# Patient Record
Sex: FEMALE | Race: AMERICAN INDIAN OR ALASKA NATIVE | HISPANIC OR LATINO | Employment: OTHER | URBAN - NONMETROPOLITAN AREA
[De-identification: names, ages, dates, MRNs, and addresses within clinical notes are randomized per-mention and may not be internally consistent; named-entity substitution may affect disease eponyms.]

---

## 2018-05-30 DIAGNOSIS — R42 DIZZINESS AND GIDDINESS: Primary | ICD-10-CM

## 2018-05-31 ENCOUNTER — HOSPITAL ENCOUNTER (OUTPATIENT)
Dept: REHABILITATION | Age: 68
Discharge: STILL ADMITTED OR ADMINISTRATIVE | End: 2018-05-31
Attending: INTERNAL MEDICINE

## 2018-05-31 DIAGNOSIS — R42 DIZZINESS AND GIDDINESS: ICD-10-CM

## 2018-05-31 PROCEDURE — G8982 BODY POS GOAL STATUS: HCPCS | Performed by: HEARING INSTRUMENT SPECIALIST

## 2018-05-31 PROCEDURE — 97112 NEUROMUSCULAR REEDUCATION: CPT | Performed by: HEARING INSTRUMENT SPECIALIST

## 2018-05-31 PROCEDURE — G8981 BODY POS CURRENT STATUS: HCPCS | Performed by: HEARING INSTRUMENT SPECIALIST

## 2018-05-31 PROCEDURE — 10004173 HB COUNTER-THERAPY VISIT PT: Performed by: HEARING INSTRUMENT SPECIALIST

## 2018-05-31 PROCEDURE — 97161 PT EVAL LOW COMPLEX 20 MIN: CPT | Performed by: HEARING INSTRUMENT SPECIALIST

## 2018-06-07 ENCOUNTER — HOSPITAL ENCOUNTER (OUTPATIENT)
Dept: REHABILITATION | Age: 68
Discharge: STILL ADMITTED OR ADMINISTRATIVE | End: 2018-06-07

## 2018-06-07 PROCEDURE — 10004173 HB COUNTER-THERAPY VISIT PT: Performed by: HEARING INSTRUMENT SPECIALIST

## 2018-06-07 PROCEDURE — 97112 NEUROMUSCULAR REEDUCATION: CPT | Performed by: HEARING INSTRUMENT SPECIALIST

## 2018-06-13 ENCOUNTER — HOSPITAL ENCOUNTER (OUTPATIENT)
Dept: REHABILITATION | Age: 68
Discharge: STILL ADMITTED OR ADMINISTRATIVE | End: 2018-06-13

## 2018-06-13 PROCEDURE — 10004173 HB COUNTER-THERAPY VISIT PT: Performed by: HEARING INSTRUMENT SPECIALIST

## 2018-06-13 PROCEDURE — 97112 NEUROMUSCULAR REEDUCATION: CPT | Performed by: HEARING INSTRUMENT SPECIALIST

## 2018-06-28 ENCOUNTER — HOSPITAL ENCOUNTER (OUTPATIENT)
Dept: REHABILITATION | Age: 68
Discharge: STILL ADMITTED OR ADMINISTRATIVE | End: 2018-06-28

## 2018-06-28 PROCEDURE — 10004173 HB COUNTER-THERAPY VISIT PT: Performed by: HEARING INSTRUMENT SPECIALIST

## 2018-06-28 PROCEDURE — 97112 NEUROMUSCULAR REEDUCATION: CPT | Performed by: HEARING INSTRUMENT SPECIALIST

## 2018-07-02 ENCOUNTER — HOSPITAL ENCOUNTER (OUTPATIENT)
Dept: REHABILITATION | Age: 68
Discharge: STILL ADMITTED OR ADMINISTRATIVE | End: 2018-07-02

## 2018-07-02 PROCEDURE — G8978 MOBILITY CURRENT STATUS: HCPCS | Performed by: HEARING INSTRUMENT SPECIALIST

## 2018-07-02 PROCEDURE — G8979 MOBILITY GOAL STATUS: HCPCS | Performed by: HEARING INSTRUMENT SPECIALIST

## 2018-07-02 PROCEDURE — 10004173 HB COUNTER-THERAPY VISIT PT: Performed by: HEARING INSTRUMENT SPECIALIST

## 2018-07-02 PROCEDURE — 97112 NEUROMUSCULAR REEDUCATION: CPT | Performed by: HEARING INSTRUMENT SPECIALIST

## 2018-07-02 PROCEDURE — 97530 THERAPEUTIC ACTIVITIES: CPT | Performed by: HEARING INSTRUMENT SPECIALIST

## 2018-07-10 ENCOUNTER — HOSPITAL ENCOUNTER (OUTPATIENT)
Dept: REHABILITATION | Age: 68
Discharge: STILL ADMITTED OR ADMINISTRATIVE | End: 2018-07-10

## 2018-07-10 PROCEDURE — 10004173 HB COUNTER-THERAPY VISIT PT: Performed by: HEARING INSTRUMENT SPECIALIST

## 2018-07-10 PROCEDURE — 97112 NEUROMUSCULAR REEDUCATION: CPT | Performed by: HEARING INSTRUMENT SPECIALIST

## 2018-07-17 ENCOUNTER — APPOINTMENT (OUTPATIENT)
Dept: REHABILITATION | Age: 68
End: 2018-07-17

## 2018-07-19 ENCOUNTER — HOSPITAL ENCOUNTER (OUTPATIENT)
Dept: REHABILITATION | Age: 68
Discharge: STILL ADMITTED OR ADMINISTRATIVE | End: 2018-07-19
Attending: INTERNAL MEDICINE

## 2018-07-19 PROCEDURE — 10004173 HB COUNTER-THERAPY VISIT PT: Performed by: HEARING INSTRUMENT SPECIALIST

## 2018-07-19 PROCEDURE — 97530 THERAPEUTIC ACTIVITIES: CPT | Performed by: HEARING INSTRUMENT SPECIALIST

## 2019-08-27 ENCOUNTER — WALK IN (OUTPATIENT)
Dept: URGENT CARE | Age: 69
End: 2019-08-27

## 2019-08-27 VITALS
HEART RATE: 95 BPM | WEIGHT: 149.5 LBS | OXYGEN SATURATION: 98 % | TEMPERATURE: 98.4 F | RESPIRATION RATE: 16 BRPM | HEIGHT: 60 IN | DIASTOLIC BLOOD PRESSURE: 58 MMHG | BODY MASS INDEX: 29.35 KG/M2 | SYSTOLIC BLOOD PRESSURE: 128 MMHG

## 2019-08-27 DIAGNOSIS — B86 SCABIES: Primary | ICD-10-CM

## 2019-08-27 PROCEDURE — 99203 OFFICE O/P NEW LOW 30 MIN: CPT | Performed by: NURSE PRACTITIONER

## 2019-08-27 RX ORDER — ROSUVASTATIN CALCIUM 20 MG/1
20 TABLET, COATED ORAL DAILY
Status: ON HOLD | COMMUNITY
End: 2019-10-01 | Stop reason: ALTCHOICE

## 2019-08-27 RX ORDER — SIMVASTATIN 20 MG
20 TABLET ORAL EVERY EVENING
COMMUNITY
Start: 2019-03-01

## 2019-08-27 RX ORDER — PERMETHRIN 50 MG/G
CREAM TOPICAL
Qty: 60 G | Refills: 1 | Status: SHIPPED | OUTPATIENT
Start: 2019-08-27 | End: 2019-09-10

## 2019-08-27 ASSESSMENT — ENCOUNTER SYMPTOMS
COLOR CHANGE: 0
RESPIRATORY NEGATIVE: 1
GASTROINTESTINAL NEGATIVE: 1
CONSTITUTIONAL NEGATIVE: 1
ACTIVITY CHANGE: 0
FATIGUE: 0
APPETITE CHANGE: 0
WOUND: 1
FEVER: 0
CHILLS: 0

## 2019-09-30 ENCOUNTER — APPOINTMENT (OUTPATIENT)
Dept: CT IMAGING | Age: 69
End: 2019-09-30
Attending: EMERGENCY MEDICINE

## 2019-09-30 ENCOUNTER — WALK IN (OUTPATIENT)
Dept: URGENT CARE | Age: 69
End: 2019-09-30

## 2019-09-30 ENCOUNTER — IMAGING SERVICES (OUTPATIENT)
Dept: GENERAL RADIOLOGY | Age: 69
End: 2019-09-30
Attending: FAMILY MEDICINE

## 2019-09-30 ENCOUNTER — HOSPITAL ENCOUNTER (EMERGENCY)
Age: 69
Discharge: ANOTHER HOSPITAL | End: 2019-09-30
Attending: EMERGENCY MEDICINE | Admitting: EMERGENCY MEDICINE

## 2019-09-30 VITALS
DIASTOLIC BLOOD PRESSURE: 78 MMHG | TEMPERATURE: 98.6 F | WEIGHT: 147 LBS | OXYGEN SATURATION: 97 % | SYSTOLIC BLOOD PRESSURE: 156 MMHG | BODY MASS INDEX: 28.86 KG/M2 | RESPIRATION RATE: 14 BRPM | HEIGHT: 60 IN | HEART RATE: 107 BPM

## 2019-09-30 VITALS
HEART RATE: 112 BPM | TEMPERATURE: 97.8 F | RESPIRATION RATE: 22 BRPM | WEIGHT: 147 LBS | HEIGHT: 60 IN | SYSTOLIC BLOOD PRESSURE: 161 MMHG | DIASTOLIC BLOOD PRESSURE: 72 MMHG | OXYGEN SATURATION: 95 % | BODY MASS INDEX: 28.86 KG/M2

## 2019-09-30 DIAGNOSIS — E87.5 HYPERKALEMIA: ICD-10-CM

## 2019-09-30 DIAGNOSIS — E87.29 METABOLIC ACIDOSIS, INCREASED ANION GAP: ICD-10-CM

## 2019-09-30 DIAGNOSIS — R73.9 HYPERGLYCEMIA: ICD-10-CM

## 2019-09-30 DIAGNOSIS — R10.84 GENERALIZED ABDOMINAL PAIN: ICD-10-CM

## 2019-09-30 DIAGNOSIS — R10.84 GENERALIZED ABDOMINAL PAIN: Primary | ICD-10-CM

## 2019-09-30 DIAGNOSIS — K85.90 ACUTE PANCREATITIS, UNSPECIFIED COMPLICATION STATUS, UNSPECIFIED PANCREATITIS TYPE: Primary | ICD-10-CM

## 2019-09-30 DIAGNOSIS — D72.825 BANDEMIA: ICD-10-CM

## 2019-09-30 LAB
ALBUMIN SERPL-MCNC: 3.3 G/DL (ref 3.6–5.1)
ALBUMIN SERPL-MCNC: 3.9 G/DL (ref 3.6–5.1)
ALBUMIN/GLOB SERPL: 0.7 {RATIO} (ref 1–2.4)
ALBUMIN/GLOB SERPL: 0.8 {RATIO} (ref 1–2.4)
ALP SERPL-CCNC: 85 UNITS/L (ref 45–117)
ALP SERPL-CCNC: 98 UNITS/L (ref 45–117)
ALT SERPL-CCNC: 83 UNITS/L
ALT SERPL-CCNC: 84 UNITS/L
ANION GAP SERPL CALC-SCNC: 19 MMOL/L (ref 10–20)
ANION GAP SERPL CALC-SCNC: 22 MMOL/L (ref 10–20)
AST SERPL-CCNC: 60 UNITS/L
AST SERPL-CCNC: 73 UNITS/L
BASE DEFICIT BLDV-SCNC: 3 MMOL/L (ref 0–2)
BASO+EOS+MONOS # BLD AUTO: 0.8 K/MCL (ref 0.1–1.1)
BASO+EOS+MONOS NFR BLD AUTO: 4 %
BASOPHILS # BLD AUTO: 0 K/MCL (ref 0–0.3)
BASOPHILS NFR BLD AUTO: 0 %
BILIRUB SERPL-MCNC: 0.7 MG/DL (ref 0.2–1)
BILIRUB SERPL-MCNC: 1 MG/DL (ref 0.2–1)
BILIRUB UR QL STRIP: NEGATIVE
BUN SERPL-MCNC: 26 MG/DL (ref 6–20)
BUN SERPL-MCNC: 26 MG/DL (ref 6–20)
BUN/CREAT SERPL: 33 (ref 7–25)
BUN/CREAT SERPL: 34 (ref 7–25)
CALCIUM SERPL-MCNC: 9.2 MG/DL (ref 8.4–10.2)
CALCIUM SERPL-MCNC: 9.3 MG/DL (ref 8.4–10.2)
CHLORIDE SERPL-SCNC: 94 MMOL/L (ref 98–107)
CHLORIDE SERPL-SCNC: 96 MMOL/L (ref 98–107)
CLARITY, POC: CLEAR
CO2 SERPL-SCNC: 22 MMOL/L (ref 21–32)
CO2 SERPL-SCNC: 23 MMOL/L (ref 21–32)
COLOR UR: YELLOW
CREAT SERPL-MCNC: 0.76 MG/DL (ref 0.51–0.95)
CREAT SERPL-MCNC: 0.8 MG/DL (ref 0.51–0.95)
CROSSMATCH EXPIRE: NORMAL
DIFFERENTIAL METHOD BLD: ABNORMAL
DIFFERENTIAL METHOD BLD: ABNORMAL
EOSINOPHIL # BLD AUTO: 0 K/MCL (ref 0.1–0.5)
EOSINOPHIL NFR SPEC: 0 %
ERYTHROCYTE [DISTWIDTH] IN BLOOD: 13.4 % (ref 11–15)
ERYTHROCYTE [DISTWIDTH] IN BLOOD: 13.8 % (ref 11–15)
FASTING STATUS PATIENT QL REPORTED: ABNORMAL HRS
GLOBULIN SER-MCNC: 4.8 G/DL (ref 2–4)
GLOBULIN SER-MCNC: 5 G/DL (ref 2–4)
GLUCOSE BLDC GLUCOMTR-MCNC: 370 MG/DL (ref 70–99)
GLUCOSE BLDC GLUCOMTR-MCNC: 481 MG/DL (ref 70–99)
GLUCOSE BLDC GLUCOMTR-MCNC: 583 MG/DL (ref 70–99)
GLUCOSE SERPL-MCNC: 610 MG/DL (ref 65–99)
GLUCOSE SERPL-MCNC: 635 MG/DL (ref 65–99)
GLUCOSE UR-MCNC: ABNORMAL MG/DL
HCO3 BLDV-SCNC: 23 MMOL/L (ref 22–28)
HCT VFR BLD CALC: 42.8 % (ref 36–46.5)
HCT VFR BLD CALC: 44.7 % (ref 36–46.5)
HGB BLD-MCNC: 14 G/DL (ref 12–15.5)
HGB BLD-MCNC: 14.6 G/DL (ref 12–15.5)
HOROWITZ INDEX BLDV+IHG-RTO: 41 % (ref 60–80)
IMM GRANULOCYTES # BLD AUTO: 0.2 K/MCL (ref 0–0.2)
IMM GRANULOCYTES NFR BLD: 1 %
KETONES, POC: ABNORMAL
LACTATE SERPL-SCNC: 4.9 MMOL/L (ref 0–2)
LEUKOCYTE ESTERASE UR QL STRIP: NEGATIVE
LIPASE SERPL-CCNC: ABNORMAL UNITS/L (ref 73–393)
LYMPHOCYTES # BLD AUTO: 1.1 K/MCL (ref 1–4)
LYMPHOCYTES # BLD MANUAL: 1.4 K/MCL (ref 1–4)
LYMPHOCYTES NFR BLD AUTO: 5 %
LYMPHOCYTES NFR BLD MANUAL: 6 %
MAGNESIUM SERPL-MCNC: 2.5 MG/DL (ref 1.7–2.4)
MCH RBC QN AUTO: 28.3 PG (ref 26–34)
MCH RBC QN AUTO: 29.3 PG (ref 26–34)
MCHC RBC AUTO-ENTMCNC: 32.7 G/DL (ref 32–36.5)
MCHC RBC AUTO-ENTMCNC: 32.7 G/DL (ref 32–36.5)
MCV RBC AUTO: 86.6 FL (ref 78–100)
MCV RBC AUTO: 89.6 FL (ref 78–100)
MONOCYTES # BLD MANUAL: 1.2 K/MCL (ref 0.3–0.9)
MONOCYTES NFR BLD MANUAL: 5 %
NEUTROPHILS # BLD AUTO: 21.5 K/MCL (ref 1.8–7.7)
NEUTROPHILS # BLD: 21.4 K/MCL (ref 1.8–7.7)
NEUTROPHILS NFR BLD AUTO: 88 %
NEUTROPHILS NFR BLD AUTO: 91 %
NITRITE UR QL STRIP: NEGATIVE
NRBC BLD MANUAL-RTO: 0 /100 WBC
OCCULT BLOOD, POC: ABNORMAL
PCO2 BLDV: 45 MM HG (ref 38–51)
PH BLDV: 7.32 UNITS (ref 7.35–7.45)
PH UR STRIP: 5.5 [PH]
PLATELET # BLD: 290 K/MCL (ref 140–450)
PLATELET # BLD: 294 K/MCL (ref 140–450)
PO2 BLDV: 26 MM HG (ref 35–42)
POTASSIUM SERPL-SCNC: 5.1 MMOL/L (ref 3.4–5.1)
POTASSIUM SERPL-SCNC: 5.7 MMOL/L (ref 3.4–5.1)
PROT SERPL-MCNC: 8.3 G/DL (ref 6.4–8.2)
PROT SERPL-MCNC: 8.7 G/DL (ref 6.4–8.2)
PROT UR-MCNC: ABNORMAL G/DL
RBC # BLD: 4.94 MIL/MCL (ref 4–5.2)
RBC # BLD: 4.99 MIL/MCL (ref 4–5.2)
SAO2 % BLDV: 42 % (ref 60–80)
SODIUM SERPL-SCNC: 130 MMOL/L (ref 135–145)
SODIUM SERPL-SCNC: 135 MMOL/L (ref 135–145)
SP GR UR STRIP: 1.01
TRIGL SERPL-MCNC: 42 MG/DL
TROPONIN I SERPL-MCNC: <0.02 NG/ML
UROBILINOGEN UR-MCNC: 0.2 MG/DL (ref 0–1)
WBC # BLD: 23.4 K/MCL (ref 4.2–11)
WBC # BLD: 24.3 K/MCL (ref 4.2–11)

## 2019-09-30 PROCEDURE — 93010 ELECTROCARDIOGRAM REPORT: CPT | Performed by: INTERNAL MEDICINE

## 2019-09-30 PROCEDURE — 99291 CRITICAL CARE FIRST HOUR: CPT | Performed by: EMERGENCY MEDICINE

## 2019-09-30 PROCEDURE — 10002805 HB CONTRAST AGENT: Performed by: RADIOLOGY

## 2019-09-30 PROCEDURE — 99203 OFFICE O/P NEW LOW 30 MIN: CPT | Performed by: FAMILY MEDICINE

## 2019-09-30 PROCEDURE — 10002807 HB RX 258: Performed by: EMERGENCY MEDICINE

## 2019-09-30 PROCEDURE — 84478 ASSAY OF TRIGLYCERIDES: CPT

## 2019-09-30 PROCEDURE — 10002807 HB RX 258: Performed by: RADIOLOGY

## 2019-09-30 PROCEDURE — 10002807 HB RX 258: Performed by: PHYSICIAN ASSISTANT

## 2019-09-30 PROCEDURE — 80053 COMPREHEN METABOLIC PANEL: CPT

## 2019-09-30 PROCEDURE — 82805 BLOOD GASES W/O2 SATURATION: CPT

## 2019-09-30 PROCEDURE — 99285 EMERGENCY DEPT VISIT HI MDM: CPT | Performed by: NURSE PRACTITIONER

## 2019-09-30 PROCEDURE — 93005 ELECTROCARDIOGRAM TRACING: CPT | Performed by: EMERGENCY MEDICINE

## 2019-09-30 PROCEDURE — 96361 HYDRATE IV INFUSION ADD-ON: CPT | Performed by: NURSE PRACTITIONER

## 2019-09-30 PROCEDURE — 74019 RADEX ABDOMEN 2 VIEWS: CPT | Performed by: RADIOLOGY

## 2019-09-30 PROCEDURE — 85025 COMPLETE CBC W/AUTO DIFF WBC: CPT | Performed by: INTERNAL MEDICINE

## 2019-09-30 PROCEDURE — 80053 COMPREHEN METABOLIC PANEL: CPT | Performed by: INTERNAL MEDICINE

## 2019-09-30 PROCEDURE — 36415 COLL VENOUS BLD VENIPUNCTURE: CPT | Performed by: INTERNAL MEDICINE

## 2019-09-30 PROCEDURE — 36415 COLL VENOUS BLD VENIPUNCTURE: CPT | Performed by: FAMILY MEDICINE

## 2019-09-30 PROCEDURE — 81003 URINALYSIS AUTO W/O SCOPE: CPT | Performed by: FAMILY MEDICINE

## 2019-09-30 PROCEDURE — 82962 GLUCOSE BLOOD TEST: CPT

## 2019-09-30 PROCEDURE — 96375 TX/PRO/DX INJ NEW DRUG ADDON: CPT | Performed by: NURSE PRACTITIONER

## 2019-09-30 PROCEDURE — 83605 ASSAY OF LACTIC ACID: CPT

## 2019-09-30 PROCEDURE — 96376 TX/PRO/DX INJ SAME DRUG ADON: CPT | Performed by: NURSE PRACTITIONER

## 2019-09-30 PROCEDURE — 74176 CT ABD & PELVIS W/O CONTRAST: CPT | Performed by: RADIOLOGY

## 2019-09-30 PROCEDURE — 83735 ASSAY OF MAGNESIUM: CPT

## 2019-09-30 PROCEDURE — 96365 THER/PROPH/DIAG IV INF INIT: CPT | Performed by: NURSE PRACTITIONER

## 2019-09-30 PROCEDURE — 82010 KETONE BODYS QUAN: CPT

## 2019-09-30 PROCEDURE — 87040 BLOOD CULTURE FOR BACTERIA: CPT

## 2019-09-30 PROCEDURE — 10002800 HB RX 250 W HCPCS: Performed by: EMERGENCY MEDICINE

## 2019-09-30 PROCEDURE — 74176 CT ABD & PELVIS W/O CONTRAST: CPT

## 2019-09-30 PROCEDURE — 36415 COLL VENOUS BLD VENIPUNCTURE: CPT

## 2019-09-30 PROCEDURE — 85025 COMPLETE CBC W/AUTO DIFF WBC: CPT

## 2019-09-30 PROCEDURE — 84484 ASSAY OF TROPONIN QUANT: CPT

## 2019-09-30 RX ORDER — DEXTROSE MONOHYDRATE 25 G/50ML
25 INJECTION, SOLUTION INTRAVENOUS PRN
Status: DISCONTINUED | OUTPATIENT
Start: 2019-09-30 | End: 2019-10-01 | Stop reason: HOSPADM

## 2019-09-30 RX ORDER — 0.9 % SODIUM CHLORIDE 0.9 %
2 VIAL (ML) INJECTION EVERY 12 HOURS SCHEDULED
Status: DISCONTINUED | OUTPATIENT
Start: 2019-09-30 | End: 2019-10-01 | Stop reason: HOSPADM

## 2019-09-30 RX ORDER — DEXTROSE MONOHYDRATE 25 G/50ML
25 INJECTION, SOLUTION INTRAVENOUS PRN
Status: DISCONTINUED | OUTPATIENT
Start: 2019-09-30 | End: 2019-09-30 | Stop reason: SDUPTHER

## 2019-09-30 RX ORDER — NICOTINE POLACRILEX 4 MG
15 LOZENGE BUCCAL PRN
Status: DISCONTINUED | OUTPATIENT
Start: 2019-09-30 | End: 2019-09-30 | Stop reason: SDUPTHER

## 2019-09-30 RX ORDER — SODIUM CHLORIDE 9 MG/ML
INJECTION, SOLUTION INTRAVENOUS
Status: DISCONTINUED
Start: 2019-09-30 | End: 2019-10-01 | Stop reason: HOSPADM

## 2019-09-30 RX ORDER — NICOTINE POLACRILEX 4 MG
15 LOZENGE BUCCAL PRN
Status: DISCONTINUED | OUTPATIENT
Start: 2019-09-30 | End: 2019-10-01 | Stop reason: HOSPADM

## 2019-09-30 RX ORDER — ONDANSETRON 2 MG/ML
4 INJECTION INTRAMUSCULAR; INTRAVENOUS ONCE
Status: COMPLETED | OUTPATIENT
Start: 2019-09-30 | End: 2019-09-30

## 2019-09-30 RX ADMIN — SODIUM CHLORIDE 1000 ML: 9 INJECTION, SOLUTION INTRAVENOUS at 22:04

## 2019-09-30 RX ADMIN — ONDANSETRON 4 MG: 2 INJECTION INTRAMUSCULAR; INTRAVENOUS at 20:40

## 2019-09-30 RX ADMIN — INSULIN HUMAN 14 UNITS/HR: 100 INJECTION, SOLUTION PARENTERAL at 22:25

## 2019-09-30 RX ADMIN — FENTANYL CITRATE 50 MCG: 50 INJECTION INTRAMUSCULAR; INTRAVENOUS at 20:42

## 2019-09-30 RX ADMIN — SODIUM CHLORIDE 64 ML: 9 INJECTION, SOLUTION INTRAVENOUS at 20:54

## 2019-09-30 RX ADMIN — IOPAMIDOL 90 ML: 612 INJECTION, SOLUTION INTRAVENOUS at 20:54

## 2019-09-30 RX ADMIN — SODIUM CHLORIDE 1000 ML: 9 INJECTION, SOLUTION INTRAVENOUS at 20:39

## 2019-09-30 RX ADMIN — FENTANYL CITRATE 50 MCG: 50 INJECTION INTRAMUSCULAR; INTRAVENOUS at 22:18

## 2019-09-30 RX ADMIN — HYDROMORPHONE HYDROCHLORIDE 1 MG: 1 INJECTION, SOLUTION INTRAMUSCULAR; INTRAVENOUS; SUBCUTANEOUS at 22:50

## 2019-09-30 SDOH — HEALTH STABILITY: MENTAL HEALTH: HOW OFTEN DO YOU HAVE A DRINK CONTAINING ALCOHOL?: NEVER

## 2019-09-30 ASSESSMENT — PAIN DESCRIPTION - PAIN TYPE: TYPE: ACUTE PAIN

## 2019-09-30 ASSESSMENT — PAIN SCALES - GENERAL
PAINLEVEL_OUTOF10: 10
PAINLEVEL_OUTOF10: 2
PAINLEVEL_OUTOF10: 10
PAINLEVEL_OUTOF10: 10

## 2019-10-01 ENCOUNTER — APPOINTMENT (OUTPATIENT)
Dept: ULTRASOUND IMAGING | Age: 69
DRG: 871 | End: 2019-10-01
Attending: INTERNAL MEDICINE

## 2019-10-01 ENCOUNTER — HOSPITAL ENCOUNTER (INPATIENT)
Age: 69
LOS: 14 days | Discharge: HOME HEALTH | DRG: 871 | End: 2019-10-15
Attending: INTERNAL MEDICINE | Admitting: INTERNAL MEDICINE

## 2019-10-01 ENCOUNTER — APPOINTMENT (OUTPATIENT)
Dept: GENERAL RADIOLOGY | Age: 69
DRG: 871 | End: 2019-10-01
Attending: INTERNAL MEDICINE

## 2019-10-01 DIAGNOSIS — Z74.09 IMPAIRED MOBILITY: ICD-10-CM

## 2019-10-01 DIAGNOSIS — K81.0 ACUTE CHOLECYSTITIS: Primary | ICD-10-CM

## 2019-10-01 DIAGNOSIS — D72.829 LEUKOCYTOSIS, UNSPECIFIED TYPE: ICD-10-CM

## 2019-10-01 DIAGNOSIS — R10.10 UPPER ABDOMINAL PAIN, UNSPECIFIED: ICD-10-CM

## 2019-10-01 DIAGNOSIS — E11.69 TYPE 2 DIABETES MELLITUS WITH OTHER SPECIFIED COMPLICATION, UNSPECIFIED WHETHER LONG TERM INSULIN USE (CMD): ICD-10-CM

## 2019-10-01 LAB
ALBUMIN SERPL-MCNC: 2.6 G/DL (ref 3.6–5.1)
ALBUMIN/GLOB SERPL: 0.7 {RATIO} (ref 1–2.4)
ALP SERPL-CCNC: 74 UNITS/L (ref 45–117)
ALT SERPL-CCNC: 52 UNITS/L
ANION GAP SERPL CALC-SCNC: 16 MMOL/L (ref 10–20)
APPEARANCE UR: ABNORMAL
AST SERPL-CCNC: 35 UNITS/L
ATRIAL RATE (BPM): 107
B-OH-BUTYR SERPL-SCNC: 1.9 MMOL/L (ref 0–0.3)
BACTERIA #/AREA URNS HPF: ABNORMAL /HPF
BASOPHILS # BLD AUTO: 0.1 K/MCL (ref 0–0.3)
BASOPHILS NFR BLD AUTO: 0 %
BILIRUB SERPL-MCNC: 0.5 MG/DL (ref 0.2–1)
BILIRUB UR QL STRIP: NEGATIVE
BUN SERPL-MCNC: 21 MG/DL (ref 6–20)
BUN/CREAT SERPL: 45 (ref 7–25)
CALCIUM SERPL-MCNC: 7.8 MG/DL (ref 8.4–10.2)
CANCER AG19-9 SERPL-ACNC: <2 UNITS/ML (ref 0–35)
CHLORIDE SERPL-SCNC: 108 MMOL/L (ref 98–107)
CO2 SERPL-SCNC: 21 MMOL/L (ref 21–32)
COLOR UR: YELLOW
CREAT SERPL-MCNC: 0.47 MG/DL (ref 0.51–0.95)
DIASTOLIC BLOOD PRESSURE: 75
DIFFERENTIAL METHOD BLD: ABNORMAL
EOSINOPHIL # BLD AUTO: 0 K/MCL (ref 0.1–0.5)
EOSINOPHIL NFR SPEC: 0 %
ERYTHROCYTE [DISTWIDTH] IN BLOOD: 14.1 % (ref 11–15)
ETHANOL SERPL-MCNC: NORMAL MG/DL
GLOBULIN SER-MCNC: 4 G/DL (ref 2–4)
GLUCOSE BLDC GLUCOMTR-MCNC: 120 MG/DL (ref 70–99)
GLUCOSE BLDC GLUCOMTR-MCNC: 142 MG/DL (ref 70–99)
GLUCOSE BLDC GLUCOMTR-MCNC: 167 MG/DL (ref 70–99)
GLUCOSE BLDC GLUCOMTR-MCNC: 173 MG/DL (ref 70–99)
GLUCOSE BLDC GLUCOMTR-MCNC: 180 MG/DL (ref 70–99)
GLUCOSE BLDC GLUCOMTR-MCNC: 182 MG/DL (ref 70–99)
GLUCOSE BLDC GLUCOMTR-MCNC: 194 MG/DL (ref 70–99)
GLUCOSE BLDC GLUCOMTR-MCNC: 198 MG/DL (ref 70–99)
GLUCOSE BLDC GLUCOMTR-MCNC: 203 MG/DL (ref 70–99)
GLUCOSE BLDC GLUCOMTR-MCNC: 227 MG/DL (ref 70–99)
GLUCOSE BLDC GLUCOMTR-MCNC: 248 MG/DL (ref 70–99)
GLUCOSE BLDC GLUCOMTR-MCNC: 323 MG/DL (ref 70–99)
GLUCOSE BLDC GLUCOMTR-MCNC: 340 MG/DL (ref 70–99)
GLUCOSE SERPL-MCNC: 210 MG/DL (ref 65–99)
GLUCOSE UR STRIP-MCNC: >500 MG/DL
HCT VFR BLD CALC: 36 % (ref 36–46.5)
HGB BLD-MCNC: 11.6 G/DL (ref 12–15.5)
HGB UR QL STRIP: NEGATIVE
HYALINE CASTS #/AREA URNS LPF: ABNORMAL /LPF (ref 0–5)
IMM GRANULOCYTES # BLD AUTO: 0.4 K/MCL (ref 0–0.2)
IMM GRANULOCYTES NFR BLD: 2 %
KETONES UR STRIP-MCNC: 20 MG/DL
LACTATE SERPL-SCNC: 1.1 MMOL/L (ref 0–2)
LACTATE SERPL-SCNC: 2.4 MMOL/L (ref 0–2)
LEUKOCYTE ESTERASE UR QL STRIP: NEGATIVE
LIPASE SERPL-CCNC: 3993 UNITS/L (ref 73–393)
LYMPHOCYTES # BLD MANUAL: 1.1 K/MCL (ref 1–4)
LYMPHOCYTES NFR BLD MANUAL: 4 %
MAGNESIUM SERPL-MCNC: 2.2 MG/DL (ref 1.7–2.4)
MCH RBC QN AUTO: 28.8 PG (ref 26–34)
MCHC RBC AUTO-ENTMCNC: 32.2 G/DL (ref 32–36.5)
MCV RBC AUTO: 89.3 FL (ref 78–100)
MONOCYTES # BLD MANUAL: 1 K/MCL (ref 0.3–0.9)
MONOCYTES NFR BLD MANUAL: 4 %
MUCOUS THREADS URNS QL MICRO: PRESENT
NEUTROPHILS # BLD: 24.8 K/MCL (ref 1.8–7.7)
NEUTROPHILS NFR BLD AUTO: 90 %
NITRITE UR QL STRIP: POSITIVE
NRBC BLD MANUAL-RTO: 0 /100 WBC
P AXIS (DEGREES): 63
PH UR STRIP: 6 UNITS (ref 5–7)
PLATELET # BLD: 205 K/MCL (ref 140–450)
POTASSIUM SERPL-SCNC: 4 MMOL/L (ref 3.4–5.1)
PR-INTERVAL (MSEC): 144
PROCALCITONIN SERPL-MCNC: 4.19 NG/ML
PROT SERPL-MCNC: 6.6 G/DL (ref 6.4–8.2)
PROT UR STRIP-MCNC: NEGATIVE MG/DL
QRS-INTERVAL (MSEC): 72
QT-INTERVAL (MSEC): 352
QTC: 470
R AXIS (DEGREES): 89
RBC # BLD: 4.03 MIL/MCL (ref 4–5.2)
RBC #/AREA URNS HPF: ABNORMAL /HPF (ref 0–2)
REPORT TEXT: NORMAL
SODIUM SERPL-SCNC: 141 MMOL/L (ref 135–145)
SP GR UR STRIP: 1.03 (ref 1–1.03)
SPECIMEN SOURCE: ABNORMAL
SQUAMOUS #/AREA URNS HPF: ABNORMAL /HPF (ref 0–5)
SYSTOLIC BLOOD PRESSURE: 164
T AXIS (DEGREES): 73
UROBILINOGEN UR STRIP-MCNC: 0.2 MG/DL (ref 0–1)
VENTRICULAR RATE EKG/MIN (BPM): 107
WBC # BLD: 27.3 K/MCL (ref 4.2–11)
WBC #/AREA URNS HPF: ABNORMAL /HPF (ref 0–5)

## 2019-10-01 PROCEDURE — 36415 COLL VENOUS BLD VENIPUNCTURE: CPT

## 2019-10-01 PROCEDURE — 10002803 HB RX 637: Performed by: NURSE PRACTITIONER

## 2019-10-01 PROCEDURE — 80320 DRUG SCREEN QUANTALCOHOLS: CPT

## 2019-10-01 PROCEDURE — 83735 ASSAY OF MAGNESIUM: CPT

## 2019-10-01 PROCEDURE — 10002801 HB RX 250 W/O HCPCS: Performed by: INTERNAL MEDICINE

## 2019-10-01 PROCEDURE — 82962 GLUCOSE BLOOD TEST: CPT

## 2019-10-01 PROCEDURE — 10002803 HB RX 637: Performed by: INTERNAL MEDICINE

## 2019-10-01 PROCEDURE — 86301 IMMUNOASSAY TUMOR CA 19-9: CPT

## 2019-10-01 PROCEDURE — 87186 SC STD MICRODIL/AGAR DIL: CPT

## 2019-10-01 PROCEDURE — 10002807 HB RX 258: Performed by: PHYSICIAN ASSISTANT

## 2019-10-01 PROCEDURE — 10002800 HB RX 250 W HCPCS: Performed by: INTERNAL MEDICINE

## 2019-10-01 PROCEDURE — 10004651 HB RX, NO CHARGE ITEM: Performed by: INTERNAL MEDICINE

## 2019-10-01 PROCEDURE — 10002800 HB RX 250 W HCPCS: Performed by: PHYSICIAN ASSISTANT

## 2019-10-01 PROCEDURE — 87086 URINE CULTURE/COLONY COUNT: CPT

## 2019-10-01 PROCEDURE — 10002807 HB RX 258: Performed by: INTERNAL MEDICINE

## 2019-10-01 PROCEDURE — 10003445 HB TELEMETRY PER DAY

## 2019-10-01 PROCEDURE — 71045 X-RAY EXAM CHEST 1 VIEW: CPT | Performed by: RADIOLOGY

## 2019-10-01 PROCEDURE — 71045 X-RAY EXAM CHEST 1 VIEW: CPT

## 2019-10-01 PROCEDURE — 84145 PROCALCITONIN (PCT): CPT

## 2019-10-01 PROCEDURE — 99222 1ST HOSP IP/OBS MODERATE 55: CPT | Performed by: INTERNAL MEDICINE

## 2019-10-01 PROCEDURE — 99223 1ST HOSP IP/OBS HIGH 75: CPT | Performed by: INTERNAL MEDICINE

## 2019-10-01 PROCEDURE — 82787 IGG 1 2 3 OR 4 EACH: CPT

## 2019-10-01 PROCEDURE — 87633 RESP VIRUS 12-25 TARGETS: CPT

## 2019-10-01 PROCEDURE — 10004281 HB COUNTER-STAFF TIME PER 15 MIN

## 2019-10-01 PROCEDURE — 80053 COMPREHEN METABOLIC PANEL: CPT

## 2019-10-01 PROCEDURE — 85025 COMPLETE CBC W/AUTO DIFF WBC: CPT

## 2019-10-01 PROCEDURE — 10003585 HB ROOM CHARGE INTERMEDIATE CARE

## 2019-10-01 PROCEDURE — 87088 URINE BACTERIA CULTURE: CPT

## 2019-10-01 PROCEDURE — 81001 URINALYSIS AUTO W/SCOPE: CPT

## 2019-10-01 PROCEDURE — 76705 ECHO EXAM OF ABDOMEN: CPT | Performed by: RADIOLOGY

## 2019-10-01 PROCEDURE — 83605 ASSAY OF LACTIC ACID: CPT

## 2019-10-01 PROCEDURE — 76705 ECHO EXAM OF ABDOMEN: CPT

## 2019-10-01 PROCEDURE — 10002800 HB RX 250 W HCPCS: Performed by: HOSPITALIST

## 2019-10-01 PROCEDURE — 87040 BLOOD CULTURE FOR BACTERIA: CPT

## 2019-10-01 PROCEDURE — 83690 ASSAY OF LIPASE: CPT

## 2019-10-01 RX ORDER — POTASSIUM CHLORIDE 1.5 G/1.58G
20 POWDER, FOR SOLUTION ORAL EVERY 4 HOURS PRN
Status: DISCONTINUED | OUTPATIENT
Start: 2019-10-01 | End: 2019-10-15 | Stop reason: HOSPADM

## 2019-10-01 RX ORDER — 0.9 % SODIUM CHLORIDE 0.9 %
2 VIAL (ML) INJECTION EVERY 12 HOURS SCHEDULED
Status: DISCONTINUED | OUTPATIENT
Start: 2019-10-01 | End: 2019-10-02

## 2019-10-01 RX ORDER — FAMOTIDINE 10 MG/ML
20 INJECTION, SOLUTION INTRAVENOUS EVERY 12 HOURS SCHEDULED
Status: DISCONTINUED | OUTPATIENT
Start: 2019-10-01 | End: 2019-10-03 | Stop reason: ALTCHOICE

## 2019-10-01 RX ORDER — NITROGLYCERIN 0.4 MG/1
0.4 TABLET SUBLINGUAL EVERY 5 MIN PRN
Status: DISCONTINUED | OUTPATIENT
Start: 2019-10-01 | End: 2019-10-15 | Stop reason: HOSPADM

## 2019-10-01 RX ORDER — POTASSIUM CHLORIDE 20 MEQ/1
20 TABLET, EXTENDED RELEASE ORAL EVERY 4 HOURS PRN
Status: DISCONTINUED | OUTPATIENT
Start: 2019-10-01 | End: 2019-10-15 | Stop reason: HOSPADM

## 2019-10-01 RX ORDER — MAGNESIUM SULFATE 1 G/100ML
1 INJECTION INTRAVENOUS DAILY PRN
Status: DISCONTINUED | OUTPATIENT
Start: 2019-10-01 | End: 2019-10-15 | Stop reason: HOSPADM

## 2019-10-01 RX ORDER — DEXTROSE MONOHYDRATE 50 MG/ML
INJECTION, SOLUTION INTRAVENOUS CONTINUOUS PRN
Status: DISCONTINUED | OUTPATIENT
Start: 2019-10-01 | End: 2019-10-11

## 2019-10-01 RX ORDER — DEXTROSE MONOHYDRATE 25 G/50ML
25 INJECTION, SOLUTION INTRAVENOUS PRN
Status: DISCONTINUED | OUTPATIENT
Start: 2019-10-01 | End: 2019-10-15 | Stop reason: HOSPADM

## 2019-10-01 RX ORDER — INSULIN LISPRO 100 [IU]/ML
4 INJECTION, SOLUTION INTRAVENOUS; SUBCUTANEOUS
Status: DISCONTINUED | OUTPATIENT
Start: 2019-10-01 | End: 2019-10-01

## 2019-10-01 RX ORDER — LOSARTAN POTASSIUM 50 MG/1
50 TABLET ORAL DAILY
Status: DISCONTINUED | OUTPATIENT
Start: 2019-10-01 | End: 2019-10-10

## 2019-10-01 RX ORDER — AMOXICILLIN 250 MG
2 CAPSULE ORAL DAILY PRN
Status: DISCONTINUED | OUTPATIENT
Start: 2019-10-01 | End: 2019-10-15 | Stop reason: HOSPADM

## 2019-10-01 RX ORDER — PERMETHRIN 50 MG/G
CREAM TOPICAL
Status: ON HOLD | COMMUNITY
End: 2019-10-11 | Stop reason: HOSPADM

## 2019-10-01 RX ORDER — NICOTINE POLACRILEX 4 MG
15 LOZENGE BUCCAL PRN
Status: DISCONTINUED | OUTPATIENT
Start: 2019-10-01 | End: 2019-10-15 | Stop reason: HOSPADM

## 2019-10-01 RX ORDER — NICOTINE POLACRILEX 4 MG
15 LOZENGE BUCCAL PRN
Status: DISCONTINUED | OUTPATIENT
Start: 2019-10-01 | End: 2019-10-03 | Stop reason: SDUPTHER

## 2019-10-01 RX ORDER — POLYETHYLENE GLYCOL 3350 17 G/17G
17 POWDER, FOR SOLUTION ORAL DAILY
Status: DISCONTINUED | OUTPATIENT
Start: 2019-10-01 | End: 2019-10-15 | Stop reason: HOSPADM

## 2019-10-01 RX ORDER — ENOXAPARIN SODIUM 100 MG/ML
40 INJECTION SUBCUTANEOUS DAILY
Status: DISCONTINUED | OUTPATIENT
Start: 2019-10-01 | End: 2019-10-15 | Stop reason: HOSPADM

## 2019-10-01 RX ORDER — DEXTROSE MONOHYDRATE 25 G/50ML
25 INJECTION, SOLUTION INTRAVENOUS PRN
Status: DISCONTINUED | OUTPATIENT
Start: 2019-10-01 | End: 2019-10-03 | Stop reason: SDUPTHER

## 2019-10-01 RX ORDER — POTASSIUM CHLORIDE 20 MEQ/1
40 TABLET, EXTENDED RELEASE ORAL EVERY 4 HOURS PRN
Status: DISCONTINUED | OUTPATIENT
Start: 2019-10-01 | End: 2019-10-15 | Stop reason: HOSPADM

## 2019-10-01 RX ORDER — DEXTROSE MONOHYDRATE 50 MG/ML
INJECTION, SOLUTION INTRAVENOUS CONTINUOUS PRN
Status: DISCONTINUED | OUTPATIENT
Start: 2019-10-01 | End: 2019-10-03 | Stop reason: SDUPTHER

## 2019-10-01 RX ORDER — BISACODYL 10 MG
10 SUPPOSITORY, RECTAL RECTAL ONCE
Status: COMPLETED | OUTPATIENT
Start: 2019-10-01 | End: 2019-10-01

## 2019-10-01 RX ORDER — ONDANSETRON 2 MG/ML
4 INJECTION INTRAMUSCULAR; INTRAVENOUS 2 TIMES DAILY PRN
Status: DISCONTINUED | OUTPATIENT
Start: 2019-10-01 | End: 2019-10-09

## 2019-10-01 RX ORDER — ACETAMINOPHEN 325 MG/1
650 TABLET ORAL EVERY 4 HOURS PRN
Status: DISCONTINUED | OUTPATIENT
Start: 2019-10-01 | End: 2019-10-15 | Stop reason: HOSPADM

## 2019-10-01 RX ORDER — POTASSIUM CHLORIDE 14.9 MG/ML
20 INJECTION INTRAVENOUS EVERY 4 HOURS PRN
Status: DISCONTINUED | OUTPATIENT
Start: 2019-10-01 | End: 2019-10-15 | Stop reason: HOSPADM

## 2019-10-01 RX ORDER — BISACODYL 10 MG
10 SUPPOSITORY, RECTAL RECTAL DAILY PRN
Status: DISCONTINUED | OUTPATIENT
Start: 2019-10-01 | End: 2019-10-15 | Stop reason: HOSPADM

## 2019-10-01 RX ORDER — POTASSIUM CHLORIDE 1.5 G/1.58G
40 POWDER, FOR SOLUTION ORAL EVERY 4 HOURS PRN
Status: DISCONTINUED | OUTPATIENT
Start: 2019-10-01 | End: 2019-10-15 | Stop reason: HOSPADM

## 2019-10-01 RX ORDER — ASPIRIN 81 MG/1
81 TABLET, CHEWABLE ORAL DAILY
Status: DISCONTINUED | OUTPATIENT
Start: 2019-10-01 | End: 2019-10-15 | Stop reason: HOSPADM

## 2019-10-01 RX ORDER — PROCHLORPERAZINE EDISYLATE 5 MG/ML
5 INJECTION INTRAMUSCULAR; INTRAVENOUS EVERY 6 HOURS PRN
Status: DISCONTINUED | OUTPATIENT
Start: 2019-10-01 | End: 2019-10-04

## 2019-10-01 RX ORDER — POTASSIUM CHLORIDE 14.9 G/1000ML
40 INJECTION, SOLUTION INTRAVENOUS EVERY 4 HOURS PRN
Status: DISCONTINUED | OUTPATIENT
Start: 2019-10-01 | End: 2019-10-15 | Stop reason: HOSPADM

## 2019-10-01 RX ORDER — INSULIN GLARGINE 100 [IU]/ML
50 INJECTION, SOLUTION SUBCUTANEOUS EVERY 12 HOURS SCHEDULED
Status: DISCONTINUED | OUTPATIENT
Start: 2019-10-01 | End: 2019-10-02

## 2019-10-01 RX ORDER — SODIUM CHLORIDE 9 MG/ML
INJECTION, SOLUTION INTRAVENOUS
Status: COMPLETED
Start: 2019-10-01 | End: 2019-10-01

## 2019-10-01 RX ORDER — SODIUM CHLORIDE, SODIUM LACTATE, POTASSIUM CHLORIDE, CALCIUM CHLORIDE 600; 310; 30; 20 MG/100ML; MG/100ML; MG/100ML; MG/100ML
INJECTION, SOLUTION INTRAVENOUS CONTINUOUS
Status: DISCONTINUED | OUTPATIENT
Start: 2019-10-01 | End: 2019-10-02

## 2019-10-01 RX ORDER — PROCHLORPERAZINE EDISYLATE 5 MG/ML
5 INJECTION INTRAMUSCULAR; INTRAVENOUS EVERY 6 HOURS PRN
Status: DISCONTINUED | OUTPATIENT
Start: 2019-10-01 | End: 2019-10-01

## 2019-10-01 RX ORDER — HYDRALAZINE HYDROCHLORIDE 20 MG/ML
10 INJECTION INTRAMUSCULAR; INTRAVENOUS EVERY 6 HOURS PRN
Status: DISCONTINUED | OUTPATIENT
Start: 2019-10-01 | End: 2019-10-15 | Stop reason: HOSPADM

## 2019-10-01 RX ADMIN — SODIUM CHLORIDE, POTASSIUM CHLORIDE, SODIUM LACTATE AND CALCIUM CHLORIDE: 600; 310; 30; 20 INJECTION, SOLUTION INTRAVENOUS at 06:33

## 2019-10-01 RX ADMIN — BISACODYL 10 MG: 10 SUPPOSITORY RECTAL at 02:18

## 2019-10-01 RX ADMIN — PIPERACILLIN SODIUM,TAZOBACTAM SODIUM 3.38 G: 3; .375 INJECTION, POWDER, FOR SOLUTION INTRAVENOUS at 21:18

## 2019-10-01 RX ADMIN — INSULIN GLARGINE 50 UNITS: 100 INJECTION, SOLUTION SUBCUTANEOUS at 09:46

## 2019-10-01 RX ADMIN — POLYETHYLENE GLYCOL 3350 17 G: 17 POWDER, FOR SOLUTION ORAL at 16:27

## 2019-10-01 RX ADMIN — SODIUM CHLORIDE, POTASSIUM CHLORIDE, SODIUM LACTATE AND CALCIUM CHLORIDE: 600; 310; 30; 20 INJECTION, SOLUTION INTRAVENOUS at 20:58

## 2019-10-01 RX ADMIN — HYDROMORPHONE HYDROCHLORIDE 1 MG: 1 INJECTION, SOLUTION INTRAMUSCULAR; INTRAVENOUS; SUBCUTANEOUS at 19:56

## 2019-10-01 RX ADMIN — SODIUM CHLORIDE, POTASSIUM CHLORIDE, SODIUM LACTATE AND CALCIUM CHLORIDE: 600; 310; 30; 20 INJECTION, SOLUTION INTRAVENOUS at 14:30

## 2019-10-01 RX ADMIN — PIPERACILLIN SODIUM,TAZOBACTAM SODIUM 3.38 G: 3; .375 INJECTION, POWDER, FOR SOLUTION INTRAVENOUS at 16:21

## 2019-10-01 RX ADMIN — CEFEPIME HYDROCHLORIDE 2000 MG: 2 INJECTION, POWDER, FOR SOLUTION INTRAVENOUS at 02:36

## 2019-10-01 RX ADMIN — FAMOTIDINE 20 MG: 10 INJECTION INTRAVENOUS at 01:45

## 2019-10-01 RX ADMIN — INSULIN LISPRO 4 UNITS: 100 INJECTION, SOLUTION INTRAVENOUS; SUBCUTANEOUS at 12:46

## 2019-10-01 RX ADMIN — ENOXAPARIN SODIUM 40 MG: 40 INJECTION SUBCUTANEOUS at 08:04

## 2019-10-01 RX ADMIN — HYDROMORPHONE HYDROCHLORIDE 1 MG: 1 INJECTION, SOLUTION INTRAMUSCULAR; INTRAVENOUS; SUBCUTANEOUS at 09:25

## 2019-10-01 RX ADMIN — HYDROMORPHONE HYDROCHLORIDE 0.5 MG: 1 INJECTION, SOLUTION INTRAMUSCULAR; INTRAVENOUS; SUBCUTANEOUS at 01:12

## 2019-10-01 RX ADMIN — SODIUM CHLORIDE 500 ML: 9 INJECTION, SOLUTION INTRAVENOUS at 02:36

## 2019-10-01 RX ADMIN — INSULIN HUMAN 8 UNITS/HR: 100 INJECTION, SOLUTION PARENTERAL at 05:05

## 2019-10-01 RX ADMIN — ONDANSETRON 4 MG: 2 INJECTION INTRAMUSCULAR; INTRAVENOUS at 14:06

## 2019-10-01 RX ADMIN — HYDROMORPHONE HYDROCHLORIDE 1 MG: 1 INJECTION, SOLUTION INTRAMUSCULAR; INTRAVENOUS; SUBCUTANEOUS at 14:08

## 2019-10-01 RX ADMIN — FAMOTIDINE 20 MG: 10 INJECTION INTRAVENOUS at 08:02

## 2019-10-01 RX ADMIN — FAMOTIDINE 20 MG: 10 INJECTION INTRAVENOUS at 21:20

## 2019-10-01 RX ADMIN — ONDANSETRON 4 MG: 2 INJECTION INTRAMUSCULAR; INTRAVENOUS at 08:00

## 2019-10-01 RX ADMIN — PROCHLORPERAZINE EDISYLATE 5 MG: 5 INJECTION INTRAMUSCULAR; INTRAVENOUS at 16:16

## 2019-10-01 RX ADMIN — SODIUM CHLORIDE 1000 ML: 9 INJECTION, SOLUTION INTRAVENOUS at 00:56

## 2019-10-01 RX ADMIN — HYDROMORPHONE HYDROCHLORIDE 1 MG: 1 INJECTION, SOLUTION INTRAMUSCULAR; INTRAVENOUS; SUBCUTANEOUS at 05:29

## 2019-10-01 RX ADMIN — ACETAMINOPHEN 650 MG: 325 TABLET ORAL at 20:06

## 2019-10-01 RX ADMIN — VANCOMYCIN HYDROCHLORIDE 1250 MG: 10 INJECTION, POWDER, LYOPHILIZED, FOR SOLUTION INTRAVENOUS at 03:17

## 2019-10-01 RX ADMIN — INSULIN LISPRO 2 UNITS: 100 INJECTION, SOLUTION INTRAVENOUS; SUBCUTANEOUS at 16:26

## 2019-10-01 RX ADMIN — PROCHLORPERAZINE EDISYLATE 5 MG: 5 INJECTION INTRAMUSCULAR; INTRAVENOUS at 09:42

## 2019-10-01 ASSESSMENT — LIFESTYLE VARIABLES
AUDIT-C TOTAL SCORE: 0
ALCOHOL_USE_STATUS: NO OR LOW RISK WITH VALIDATED TOOL
HOW MANY STANDARD DRINKS CONTAINING ALCOHOL DO YOU HAVE ON A TYPICAL DAY: 0,1 OR 2
HOW OFTEN DO YOU HAVE A DRINK CONTAINING ALCOHOL: NEVER
HOW OFTEN DO YOU HAVE 6 OR MORE DRINKS ON ONE OCCASION: NEVER

## 2019-10-01 ASSESSMENT — PAIN SCALES - GENERAL
PAINLEVEL_OUTOF10: 3
PAINLEVEL_OUTOF10: 10
PAINLEVEL_OUTOF10: 8
PAINLEVEL_OUTOF10: 10
PAINLEVEL_OUTOF10: 10
PAINLEVEL_OUTOF10: 0
PAINLEVEL_OUTOF10: 3

## 2019-10-01 ASSESSMENT — COGNITIVE AND FUNCTIONAL STATUS - GENERAL
BECAUSE OF A PHYSICAL, MENTAL, OR EMOTIONAL CONDITION, DO YOU HAVE DIFFICULTY DOING ERRANDS ALONE: NO
ARE YOU BLIND OR DO YOU HAVE SERIOUS DIFFICULTY SEEING, EVEN WHEN WEARING GLASSES: NO
DO YOU HAVE DIFFICULTY DRESSING OR BATHING: NO
DO YOU HAVE SERIOUS DIFFICULTY WALKING OR CLIMBING STAIRS: YES
ARE YOU DEAF OR DO YOU HAVE SERIOUS DIFFICULTY  HEARING: NO
BECAUSE OF A PHYSICAL, MENTAL, OR EMOTIONAL CONDITION, DO YOU HAVE SERIOUS DIFFICULTY CONCENTRATING, REMEMBERING OR MAKING DECISIONS: NO

## 2019-10-01 ASSESSMENT — ACTIVITIES OF DAILY LIVING (ADL)
ADL_BEFORE_ADMISSION: INDEPENDENT
ADL_SCORE: 12
ADL_SHORT_OF_BREATH: YES
CHRONIC_PAIN_PRESENT: NO
RECENT_DECLINE_ADL: YES, ACUTE ILLNESS WITHOUT THERAPY NEEDS

## 2019-10-01 ASSESSMENT — PATIENT HEALTH QUESTIONNAIRE - PHQ9: IS PATIENT ABLE TO COMPLETE PHQ2 OR PHQ9: NO, DEFER TO LATER TIME

## 2019-10-01 ASSESSMENT — COLUMBIA-SUICIDE SEVERITY RATING SCALE - C-SSRS: IS THE PATIENT ABLE TO COMPLETE C-SSRS: NO, DEFER TO LATER TIME

## 2019-10-02 ENCOUNTER — APPOINTMENT (OUTPATIENT)
Dept: INTERVENTIONAL RADIOLOGY/VASCULAR | Age: 69
DRG: 871 | End: 2019-10-02
Attending: HOSPITALIST

## 2019-10-02 ENCOUNTER — APPOINTMENT (OUTPATIENT)
Dept: MRI IMAGING | Age: 69
DRG: 871 | End: 2019-10-02
Attending: NURSE PRACTITIONER

## 2019-10-02 ENCOUNTER — APPOINTMENT (OUTPATIENT)
Dept: INTERVENTIONAL RADIOLOGY/VASCULAR | Age: 69
DRG: 871 | End: 2019-10-02
Attending: PHYSICIAN ASSISTANT

## 2019-10-02 ENCOUNTER — APPOINTMENT (OUTPATIENT)
Dept: NUCLEAR MEDICINE | Age: 69
DRG: 871 | End: 2019-10-02
Attending: PHYSICIAN ASSISTANT

## 2019-10-02 LAB
ALBUMIN SERPL-MCNC: 2.4 G/DL (ref 3.6–5.1)
ALBUMIN/GLOB SERPL: 0.6 {RATIO} (ref 1–2.4)
ALP SERPL-CCNC: 77 UNITS/L (ref 45–117)
ALT SERPL-CCNC: 38 UNITS/L
ANION GAP SERPL CALC-SCNC: 11 MMOL/L (ref 10–20)
AST SERPL-CCNC: 24 UNITS/L
ATRIAL RATE (BPM): 106
BACTERIA UR CULT: ABNORMAL
BASOPHILS # BLD AUTO: 0.1 K/MCL (ref 0–0.3)
BASOPHILS NFR BLD AUTO: 0 %
BILIRUB SERPL-MCNC: 0.5 MG/DL (ref 0.2–1)
BUN SERPL-MCNC: 14 MG/DL (ref 6–20)
BUN/CREAT SERPL: 24 (ref 7–25)
C PNEUM DNA SPEC QL NAA+PROBE: NOT DETECTED
CALCIUM SERPL-MCNC: 7.9 MG/DL (ref 8.4–10.2)
CHLORIDE SERPL-SCNC: 107 MMOL/L (ref 98–107)
CO2 SERPL-SCNC: 27 MMOL/L (ref 21–32)
CREAT SERPL-MCNC: 0.59 MG/DL (ref 0.51–0.95)
DIASTOLIC BLOOD PRESSURE: 71
DIFFERENTIAL METHOD BLD: ABNORMAL
EOSINOPHIL # BLD AUTO: 0.1 K/MCL (ref 0.1–0.5)
EOSINOPHIL NFR SPEC: 1 %
ERYTHROCYTE [DISTWIDTH] IN BLOOD: 14.4 % (ref 11–15)
FLUAV H1 2009 PAND RNA NPH QL NAA+PROBE: NOT DETECTED
FLUAV H1 RNA NPH QL NAA+PROBE: NOT DETECTED
FLUAV H3 RNA NPH QL NAA+PROBE: NOT DETECTED
FLUAV RNA NPH QL NAA+PROBE: NORMAL
FLUBV RNA NPH QL NAA+PROBE: NOT DETECTED
GLOBULIN SER-MCNC: 3.8 G/DL (ref 2–4)
GLUCOSE BLDC GLUCOMTR-MCNC: 140 MG/DL (ref 70–99)
GLUCOSE BLDC GLUCOMTR-MCNC: 156 MG/DL (ref 70–99)
GLUCOSE BLDC GLUCOMTR-MCNC: 156 MG/DL (ref 70–99)
GLUCOSE BLDC GLUCOMTR-MCNC: 159 MG/DL (ref 70–99)
GLUCOSE BLDC GLUCOMTR-MCNC: 160 MG/DL (ref 70–99)
GLUCOSE BLDC GLUCOMTR-MCNC: 183 MG/DL (ref 70–99)
GLUCOSE SERPL-MCNC: 173 MG/DL (ref 65–99)
HADV DNA NPH QL NAA+PROBE: NOT DETECTED
HBA1C MFR BLD: 9.9 % (ref 4.5–5.6)
HBOV DNA SPEC QL NAA+PROBE: NOT DETECTED
HCOV 229E RNA SPEC QL NAA+PROBE: NOT DETECTED
HCOV HKU1 RNA SPEC QL NAA+PROBE: NOT DETECTED
HCOV NL63 RNA SPEC QL NAA+PROBE: NOT DETECTED
HCOV OC43 RNA SPEC QL NAA+PROBE: NOT DETECTED
HCT VFR BLD CALC: 36.9 % (ref 36–46.5)
HGB BLD-MCNC: 11.8 G/DL (ref 12–15.5)
HMPV RNA NPH QL NAA+PROBE: NOT DETECTED
HPIV1 RNA NPH QL NAA+PROBE: NOT DETECTED
HPIV2 RNA NPH QL NAA+PROBE: NOT DETECTED
HPIV3 RNA NPH QL NAA+PROBE: NOT DETECTED
HPIV4 RNA NPH QL NAA+PROBE: NOT DETECTED
IMM GRANULOCYTES # BLD AUTO: 0.4 K/MCL (ref 0–0.2)
IMM GRANULOCYTES NFR BLD: 1 %
INR PPP: 1.4
LIPASE SERPL-CCNC: 593 UNITS/L (ref 73–393)
LYMPHOCYTES # BLD MANUAL: 2.1 K/MCL (ref 1–4)
LYMPHOCYTES NFR BLD MANUAL: 7 %
M PNEUMO DNA SPEC QL NAA+PROBE: NOT DETECTED
MCH RBC QN AUTO: 28.6 PG (ref 26–34)
MCHC RBC AUTO-ENTMCNC: 32 G/DL (ref 32–36.5)
MCV RBC AUTO: 89.3 FL (ref 78–100)
MONOCYTES # BLD MANUAL: 1.6 K/MCL (ref 0.3–0.9)
MONOCYTES NFR BLD MANUAL: 6 %
NEUTROPHILS # BLD: 24.4 K/MCL (ref 1.8–7.7)
NEUTROPHILS NFR BLD AUTO: 85 %
NRBC BLD MANUAL-RTO: 0 /100 WBC
ORGANISM: ABNORMAL
P AXIS (DEGREES): 54
PLATELET # BLD: 180 K/MCL (ref 140–450)
POTASSIUM SERPL-SCNC: 3.6 MMOL/L (ref 3.4–5.1)
POTASSIUM SERPL-SCNC: 3.7 MMOL/L (ref 3.4–5.1)
POTASSIUM SERPL-SCNC: 3.8 MMOL/L (ref 3.4–5.1)
PR-INTERVAL (MSEC): 142
PROT SERPL-MCNC: 6.2 G/DL (ref 6.4–8.2)
PROTHROMBIN TIME: 14 SEC (ref 9.7–11.8)
QRS-INTERVAL (MSEC): 74
QT-INTERVAL (MSEC): 354
QTC: 470
R AXIS (DEGREES): 91
RBC # BLD: 4.13 MIL/MCL (ref 4–5.2)
REPORT STATUS (RPT): ABNORMAL
REPORT TEXT: NORMAL
RSV A RNA NPH QL NAA+PROBE: NOT DETECTED
RSV B RNA NPH QL NAA+PROBE: NOT DETECTED
RV+EV RNA SPEC QL NAA+PROBE: NOT DETECTED
SODIUM SERPL-SCNC: 141 MMOL/L (ref 135–145)
SPECIMEN SOURCE: ABNORMAL
SPECIMEN SOURCE: NORMAL
SYSTOLIC BLOOD PRESSURE: 151
T AXIS (DEGREES): 79
VENTRICULAR RATE EKG/MIN (BPM): 106
WBC # BLD: 28.7 K/MCL (ref 4.2–11)

## 2019-10-02 PROCEDURE — 99233 SBSQ HOSP IP/OBS HIGH 50: CPT | Performed by: PHYSICIAN ASSISTANT

## 2019-10-02 PROCEDURE — 82962 GLUCOSE BLOOD TEST: CPT

## 2019-10-02 PROCEDURE — 78227 HEPATOBIL SYST IMAGE W/DRUG: CPT | Performed by: RADIOLOGY

## 2019-10-02 PROCEDURE — C1769 GUIDE WIRE: HCPCS

## 2019-10-02 PROCEDURE — 10002800 HB RX 250 W HCPCS: Performed by: HOSPITALIST

## 2019-10-02 PROCEDURE — 74181 MRI ABDOMEN W/O CONTRAST: CPT

## 2019-10-02 PROCEDURE — 85025 COMPLETE CBC W/AUTO DIFF WBC: CPT

## 2019-10-02 PROCEDURE — 36569 INSJ PICC 5 YR+ W/O IMAGING: CPT

## 2019-10-02 PROCEDURE — 10002800 HB RX 250 W HCPCS: Performed by: RADIOLOGY

## 2019-10-02 PROCEDURE — 74181 MRI ABDOMEN W/O CONTRAST: CPT | Performed by: RADIOLOGY

## 2019-10-02 PROCEDURE — 84132 ASSAY OF SERUM POTASSIUM: CPT

## 2019-10-02 PROCEDURE — 10004185 IR PERCUTANEOUS TRANSHEPATIC CHOLANGIOGRAM (PTC) *W/INTERVENTION

## 2019-10-02 PROCEDURE — 83036 HEMOGLOBIN GLYCOSYLATED A1C: CPT

## 2019-10-02 PROCEDURE — 10002803 HB RX 637: Performed by: INTERNAL MEDICINE

## 2019-10-02 PROCEDURE — C1894 INTRO/SHEATH, NON-LASER: HCPCS

## 2019-10-02 PROCEDURE — 99233 SBSQ HOSP IP/OBS HIGH 50: CPT | Performed by: HOSPITALIST

## 2019-10-02 PROCEDURE — 10004651 HB RX, NO CHARGE ITEM: Performed by: HOSPITALIST

## 2019-10-02 PROCEDURE — 10004325 HB COUNTER ASSESSMENT EA 15 MIN: Performed by: GENERAL ACUTE CARE HOSPITAL

## 2019-10-02 PROCEDURE — 10002807 HB RX 258: Performed by: INTERNAL MEDICINE

## 2019-10-02 PROCEDURE — 10002807 HB RX 258: Performed by: HOSPITALIST

## 2019-10-02 PROCEDURE — 10002801 HB RX 250 W/O HCPCS: Performed by: RADIOLOGY

## 2019-10-02 PROCEDURE — 10002801 HB RX 250 W/O HCPCS: Performed by: INTERNAL MEDICINE

## 2019-10-02 PROCEDURE — 10002800 HB RX 250 W HCPCS: Performed by: PHYSICIAN ASSISTANT

## 2019-10-02 PROCEDURE — 02HV33Z INSERTION OF INFUSION DEVICE INTO SUPERIOR VENA CAVA, PERCUTANEOUS APPROACH: ICD-10-PCS | Performed by: RADIOLOGY

## 2019-10-02 PROCEDURE — 10002803 HB RX 637: Performed by: NURSE PRACTITIONER

## 2019-10-02 PROCEDURE — 36573 INSJ PICC RS&I 5 YR+: CPT | Performed by: RADIOLOGY

## 2019-10-02 PROCEDURE — 10002807 HB RX 258: Performed by: PHYSICIAN ASSISTANT

## 2019-10-02 PROCEDURE — 76376 3D RENDER W/INTRP POSTPROCES: CPT | Performed by: RADIOLOGY

## 2019-10-02 PROCEDURE — 10003445 HB TELEMETRY PER DAY

## 2019-10-02 PROCEDURE — 87070 CULTURE OTHR SPECIMN AEROBIC: CPT

## 2019-10-02 PROCEDURE — 10003585 HB ROOM CHARGE INTERMEDIATE CARE

## 2019-10-02 PROCEDURE — 47490 INCISION OF GALLBLADDER: CPT | Performed by: RADIOLOGY

## 2019-10-02 PROCEDURE — 0F9430Z DRAINAGE OF GALLBLADDER WITH DRAINAGE DEVICE, PERCUTANEOUS APPROACH: ICD-10-PCS | Performed by: RADIOLOGY

## 2019-10-02 PROCEDURE — 10002805 HB CONTRAST AGENT: Performed by: RADIOLOGY

## 2019-10-02 PROCEDURE — C1729 CATH, DRAINAGE: HCPCS

## 2019-10-02 PROCEDURE — 83690 ASSAY OF LIPASE: CPT

## 2019-10-02 PROCEDURE — 10004651 HB RX, NO CHARGE ITEM: Performed by: INTERNAL MEDICINE

## 2019-10-02 PROCEDURE — 10002800 HB RX 250 W HCPCS: Performed by: INTERNAL MEDICINE

## 2019-10-02 PROCEDURE — 85610 PROTHROMBIN TIME: CPT

## 2019-10-02 PROCEDURE — 36415 COLL VENOUS BLD VENIPUNCTURE: CPT

## 2019-10-02 PROCEDURE — 10003056 HB DISPOSABLE INSTRUMENT/SUPPLY 1

## 2019-10-02 PROCEDURE — 80053 COMPREHEN METABOLIC PANEL: CPT

## 2019-10-02 PROCEDURE — 78803 RP LOCLZJ TUM SPECT 1 AREA: CPT

## 2019-10-02 PROCEDURE — 10002807 HB RX 258

## 2019-10-02 RX ORDER — LIDOCAINE HYDROCHLORIDE 10 MG/ML
INJECTION, SOLUTION INFILTRATION; PERINEURAL
Status: COMPLETED | OUTPATIENT
Start: 2019-10-02 | End: 2019-10-02

## 2019-10-02 RX ORDER — SODIUM CHLORIDE 9 MG/ML
INJECTION, SOLUTION INTRAVENOUS CONTINUOUS
Status: DISPENSED | OUTPATIENT
Start: 2019-10-02 | End: 2019-10-05

## 2019-10-02 RX ORDER — SODIUM CHLORIDE 9 MG/ML
INJECTION, SOLUTION INTRAVENOUS
Status: COMPLETED
Start: 2019-10-02 | End: 2019-10-02

## 2019-10-02 RX ORDER — 0.9 % SODIUM CHLORIDE 0.9 %
2 VIAL (ML) INJECTION EVERY 12 HOURS SCHEDULED
Status: DISCONTINUED | OUTPATIENT
Start: 2019-10-02 | End: 2019-10-15 | Stop reason: HOSPADM

## 2019-10-02 RX ORDER — INSULIN GLARGINE 100 [IU]/ML
25 INJECTION, SOLUTION SUBCUTANEOUS EVERY 12 HOURS SCHEDULED
Status: DISCONTINUED | OUTPATIENT
Start: 2019-10-02 | End: 2019-10-04

## 2019-10-02 RX ADMIN — PIPERACILLIN SODIUM,TAZOBACTAM SODIUM 3.38 G: 3; .375 INJECTION, POWDER, FOR SOLUTION INTRAVENOUS at 21:00

## 2019-10-02 RX ADMIN — LIDOCAINE HYDROCHLORIDE 5 ML: 10 INJECTION, SOLUTION INFILTRATION; PERINEURAL at 15:44

## 2019-10-02 RX ADMIN — SODIUM CHLORIDE 500 ML: 9 INJECTION, SOLUTION INTRAVENOUS at 18:00

## 2019-10-02 RX ADMIN — HYDROMORPHONE HYDROCHLORIDE 0.5 MG: 1 INJECTION, SOLUTION INTRAMUSCULAR; INTRAVENOUS; SUBCUTANEOUS at 18:19

## 2019-10-02 RX ADMIN — INSULIN GLARGINE 25 UNITS: 100 INJECTION, SOLUTION SUBCUTANEOUS at 08:42

## 2019-10-02 RX ADMIN — INSULIN LISPRO 2 UNITS: 100 INJECTION, SOLUTION INTRAVENOUS; SUBCUTANEOUS at 13:14

## 2019-10-02 RX ADMIN — SODIUM CHLORIDE 1000 ML: 9 INJECTION, SOLUTION INTRAVENOUS at 09:18

## 2019-10-02 RX ADMIN — INSULIN LISPRO 2 UNITS: 100 INJECTION, SOLUTION INTRAVENOUS; SUBCUTANEOUS at 06:08

## 2019-10-02 RX ADMIN — POTASSIUM CHLORIDE 20 MEQ: 1.5 POWDER, FOR SOLUTION ORAL at 07:41

## 2019-10-02 RX ADMIN — FAMOTIDINE 20 MG: 10 INJECTION INTRAVENOUS at 20:23

## 2019-10-02 RX ADMIN — INSULIN LISPRO 2 UNITS: 100 INJECTION, SOLUTION INTRAVENOUS; SUBCUTANEOUS at 18:15

## 2019-10-02 RX ADMIN — SODIUM CHLORIDE 1.27 MCG: 9 INJECTION, SOLUTION INTRAMUSCULAR; INTRAVENOUS; SUBCUTANEOUS at 09:47

## 2019-10-02 RX ADMIN — HYDROMORPHONE HYDROCHLORIDE 1 MG: 1 INJECTION, SOLUTION INTRAMUSCULAR; INTRAVENOUS; SUBCUTANEOUS at 01:17

## 2019-10-02 RX ADMIN — ONDANSETRON 4 MG: 2 INJECTION INTRAMUSCULAR; INTRAVENOUS at 22:11

## 2019-10-02 RX ADMIN — ASPIRIN 81 MG 81 MG: 81 TABLET ORAL at 07:40

## 2019-10-02 RX ADMIN — PIPERACILLIN SODIUM,TAZOBACTAM SODIUM 3.38 G: 3; .375 INJECTION, POWDER, FOR SOLUTION INTRAVENOUS at 06:12

## 2019-10-02 RX ADMIN — ENOXAPARIN SODIUM 40 MG: 40 INJECTION SUBCUTANEOUS at 07:46

## 2019-10-02 RX ADMIN — LIDOCAINE HYDROCHLORIDE 15 ML: 10 INJECTION, SOLUTION INFILTRATION; PERINEURAL at 15:52

## 2019-10-02 RX ADMIN — SODIUM CHLORIDE: 9 INJECTION, SOLUTION INTRAVENOUS at 20:24

## 2019-10-02 RX ADMIN — SODIUM CHLORIDE, POTASSIUM CHLORIDE, SODIUM LACTATE AND CALCIUM CHLORIDE: 600; 310; 30; 20 INJECTION, SOLUTION INTRAVENOUS at 04:26

## 2019-10-02 RX ADMIN — IOPAMIDOL 5 ML: 612 INJECTION, SOLUTION INTRAVENOUS at 15:45

## 2019-10-02 RX ADMIN — HYDROMORPHONE HYDROCHLORIDE 1 MG: 1 INJECTION, SOLUTION INTRAMUSCULAR; INTRAVENOUS; SUBCUTANEOUS at 09:43

## 2019-10-02 RX ADMIN — ACETAMINOPHEN 650 MG: 325 TABLET ORAL at 18:14

## 2019-10-02 RX ADMIN — POTASSIUM CHLORIDE 20 MEQ: 14.9 INJECTION, SOLUTION INTRAVENOUS at 14:27

## 2019-10-02 RX ADMIN — SODIUM CHLORIDE 500 ML: 9 INJECTION, SOLUTION INTRAVENOUS at 06:12

## 2019-10-02 RX ADMIN — ACETAMINOPHEN 650 MG: 325 TABLET ORAL at 22:07

## 2019-10-02 RX ADMIN — POLYETHYLENE GLYCOL 3350 17 G: 17 POWDER, FOR SOLUTION ORAL at 07:40

## 2019-10-02 RX ADMIN — HYDROMORPHONE HYDROCHLORIDE 1 MG: 1 INJECTION, SOLUTION INTRAMUSCULAR; INTRAVENOUS; SUBCUTANEOUS at 12:58

## 2019-10-02 RX ADMIN — HYDROMORPHONE HYDROCHLORIDE 0.5 MG: 1 INJECTION, SOLUTION INTRAMUSCULAR; INTRAVENOUS; SUBCUTANEOUS at 23:08

## 2019-10-02 RX ADMIN — HYDROMORPHONE HYDROCHLORIDE 1 MG: 1 INJECTION, SOLUTION INTRAMUSCULAR; INTRAVENOUS; SUBCUTANEOUS at 06:05

## 2019-10-02 RX ADMIN — FENTANYL CITRATE 50 MCG: 50 INJECTION INTRAMUSCULAR; INTRAVENOUS at 15:53

## 2019-10-02 RX ADMIN — LOSARTAN POTASSIUM 50 MG: 50 TABLET, FILM COATED ORAL at 07:40

## 2019-10-02 RX ADMIN — PIPERACILLIN SODIUM,TAZOBACTAM SODIUM 3.38 G: 3; .375 INJECTION, POWDER, FOR SOLUTION INTRAVENOUS at 13:04

## 2019-10-02 RX ADMIN — HYDRALAZINE HYDROCHLORIDE 10 MG: 20 INJECTION INTRAMUSCULAR; INTRAVENOUS at 10:00

## 2019-10-02 RX ADMIN — FAMOTIDINE 20 MG: 10 INJECTION INTRAVENOUS at 07:31

## 2019-10-02 RX ADMIN — HYDRALAZINE HYDROCHLORIDE 10 MG: 20 INJECTION INTRAMUSCULAR; INTRAVENOUS at 16:21

## 2019-10-02 RX ADMIN — ONDANSETRON 4 MG: 2 INJECTION INTRAMUSCULAR; INTRAVENOUS at 07:28

## 2019-10-02 ASSESSMENT — LIFESTYLE VARIABLES: SMOKING_HISTORY: NO

## 2019-10-02 ASSESSMENT — PAIN SCALES - GENERAL
PAINLEVEL_OUTOF10: 4
PAINLEVEL_OUTOF10: 10
PAINLEVEL_OUTOF10: 4
PAINLEVEL_OUTOF10: 8
PAINLEVEL_OUTOF10: 4
PAINLEVEL_OUTOF10: 10
PAINLEVEL_OUTOF10: 8
PAINLEVEL_OUTOF10: 10
PAINLEVEL_OUTOF10: 10
PAINLEVEL_OUTOF10: 3

## 2019-10-02 ASSESSMENT — PATIENT HEALTH QUESTIONNAIRE - PHQ9: IS PATIENT ABLE TO COMPLETE PHQ2 OR PHQ9: NO, DEFER TO LATER TIME

## 2019-10-03 LAB
ALBUMIN SERPL-MCNC: 1.9 G/DL (ref 3.6–5.1)
ALBUMIN/GLOB SERPL: 0.5 {RATIO} (ref 1–2.4)
ALP SERPL-CCNC: 69 UNITS/L (ref 45–117)
ALT SERPL-CCNC: 29 UNITS/L
ANION GAP SERPL CALC-SCNC: 12 MMOL/L (ref 10–20)
AST SERPL-CCNC: 22 UNITS/L
BASOPHILS # BLD AUTO: 0.1 K/MCL (ref 0–0.3)
BASOPHILS NFR BLD AUTO: 0 %
BILIRUB SERPL-MCNC: 0.5 MG/DL (ref 0.2–1)
BUN SERPL-MCNC: 11 MG/DL (ref 6–20)
BUN/CREAT SERPL: 23 (ref 7–25)
CALCIUM SERPL-MCNC: 7.3 MG/DL (ref 8.4–10.2)
CHLORIDE SERPL-SCNC: 108 MMOL/L (ref 98–107)
CO2 SERPL-SCNC: 25 MMOL/L (ref 21–32)
CREAT SERPL-MCNC: 0.48 MG/DL (ref 0.51–0.95)
DIFFERENTIAL METHOD BLD: ABNORMAL
EOSINOPHIL # BLD AUTO: 0.1 K/MCL (ref 0.1–0.5)
EOSINOPHIL NFR SPEC: 1 %
ERYTHROCYTE [DISTWIDTH] IN BLOOD: 14.5 % (ref 11–15)
GLOBULIN SER-MCNC: 3.7 G/DL (ref 2–4)
GLUCOSE BLDC GLUCOMTR-MCNC: 118 MG/DL (ref 70–99)
GLUCOSE BLDC GLUCOMTR-MCNC: 132 MG/DL (ref 70–99)
GLUCOSE BLDC GLUCOMTR-MCNC: 152 MG/DL (ref 70–99)
GLUCOSE BLDC GLUCOMTR-MCNC: 169 MG/DL (ref 70–99)
GLUCOSE BLDC GLUCOMTR-MCNC: 201 MG/DL (ref 70–99)
GLUCOSE BLDC GLUCOMTR-MCNC: 77 MG/DL (ref 70–99)
GLUCOSE SERPL-MCNC: 133 MG/DL (ref 65–99)
HCT VFR BLD CALC: 31.9 % (ref 36–46.5)
HGB BLD-MCNC: 10.3 G/DL (ref 12–15.5)
IGG4 SER-MCNC: 38.2 MG/DL (ref 3.9–86.4)
IMM GRANULOCYTES # BLD AUTO: 0.3 K/MCL (ref 0–0.2)
IMM GRANULOCYTES NFR BLD: 1 %
LIPASE SERPL-CCNC: 170 UNITS/L (ref 73–393)
LYMPHOCYTES # BLD MANUAL: 1.7 K/MCL (ref 1–4)
LYMPHOCYTES NFR BLD MANUAL: 9 %
MCH RBC QN AUTO: 28.7 PG (ref 26–34)
MCHC RBC AUTO-ENTMCNC: 32.3 G/DL (ref 32–36.5)
MCV RBC AUTO: 88.9 FL (ref 78–100)
MONOCYTES # BLD MANUAL: 0.8 K/MCL (ref 0.3–0.9)
MONOCYTES NFR BLD MANUAL: 4 %
NEUTROPHILS # BLD: 15.4 K/MCL (ref 1.8–7.7)
NEUTROPHILS NFR BLD AUTO: 85 %
NRBC BLD MANUAL-RTO: 0 /100 WBC
PLATELET # BLD: 174 K/MCL (ref 140–450)
POTASSIUM SERPL-SCNC: 3.4 MMOL/L (ref 3.4–5.1)
POTASSIUM SERPL-SCNC: 3.6 MMOL/L (ref 3.4–5.1)
POTASSIUM SERPL-SCNC: 3.7 MMOL/L (ref 3.4–5.1)
PROT SERPL-MCNC: 5.6 G/DL (ref 6.4–8.2)
RBC # BLD: 3.59 MIL/MCL (ref 4–5.2)
SODIUM SERPL-SCNC: 142 MMOL/L (ref 135–145)
WBC # BLD: 18.2 K/MCL (ref 4.2–11)

## 2019-10-03 PROCEDURE — 10002800 HB RX 250 W HCPCS: Performed by: INTERNAL MEDICINE

## 2019-10-03 PROCEDURE — 10002803 HB RX 637: Performed by: HOSPITALIST

## 2019-10-03 PROCEDURE — 10002803 HB RX 637: Performed by: INTERNAL MEDICINE

## 2019-10-03 PROCEDURE — 83690 ASSAY OF LIPASE: CPT

## 2019-10-03 PROCEDURE — 99231 SBSQ HOSP IP/OBS SF/LOW 25: CPT | Performed by: NURSE PRACTITIONER

## 2019-10-03 PROCEDURE — 10002801 HB RX 250 W/O HCPCS: Performed by: INTERNAL MEDICINE

## 2019-10-03 PROCEDURE — 10004173 HB COUNTER-THERAPY VISIT PT: Performed by: PHYSICAL THERAPIST

## 2019-10-03 PROCEDURE — 10002807 HB RX 258: Performed by: INTERNAL MEDICINE

## 2019-10-03 PROCEDURE — 10002800 HB RX 250 W HCPCS: Performed by: HOSPITALIST

## 2019-10-03 PROCEDURE — 10003445 HB TELEMETRY PER DAY

## 2019-10-03 PROCEDURE — 85025 COMPLETE CBC W/AUTO DIFF WBC: CPT

## 2019-10-03 PROCEDURE — 10002807 HB RX 258: Performed by: PHYSICIAN ASSISTANT

## 2019-10-03 PROCEDURE — 97116 GAIT TRAINING THERAPY: CPT | Performed by: PHYSICAL THERAPIST

## 2019-10-03 PROCEDURE — 84132 ASSAY OF SERUM POTASSIUM: CPT

## 2019-10-03 PROCEDURE — 99233 SBSQ HOSP IP/OBS HIGH 50: CPT | Performed by: SURGERY

## 2019-10-03 PROCEDURE — 10003585 HB ROOM CHARGE INTERMEDIATE CARE

## 2019-10-03 PROCEDURE — 10004651 HB RX, NO CHARGE ITEM: Performed by: INTERNAL MEDICINE

## 2019-10-03 PROCEDURE — 10002800 HB RX 250 W HCPCS: Performed by: PHYSICIAN ASSISTANT

## 2019-10-03 PROCEDURE — 82962 GLUCOSE BLOOD TEST: CPT

## 2019-10-03 PROCEDURE — 80053 COMPREHEN METABOLIC PANEL: CPT

## 2019-10-03 PROCEDURE — 10002807 HB RX 258: Performed by: HOSPITALIST

## 2019-10-03 PROCEDURE — 97161 PT EVAL LOW COMPLEX 20 MIN: CPT | Performed by: PHYSICAL THERAPIST

## 2019-10-03 PROCEDURE — 99233 SBSQ HOSP IP/OBS HIGH 50: CPT | Performed by: HOSPITALIST

## 2019-10-03 PROCEDURE — 99232 SBSQ HOSP IP/OBS MODERATE 35: CPT | Performed by: PHYSICIAN ASSISTANT

## 2019-10-03 RX ORDER — HYDROCODONE BITARTRATE AND ACETAMINOPHEN 5; 325 MG/1; MG/1
1-2 TABLET ORAL EVERY 6 HOURS PRN
Status: DISCONTINUED | OUTPATIENT
Start: 2019-10-03 | End: 2019-10-09

## 2019-10-03 RX ORDER — SODIUM CHLORIDE 9 MG/ML
INJECTION, SOLUTION INTRAVENOUS
Status: COMPLETED
Start: 2019-10-03 | End: 2019-10-03

## 2019-10-03 RX ORDER — FAMOTIDINE 20 MG/1
20 TABLET, FILM COATED ORAL EVERY 12 HOURS SCHEDULED
Status: DISCONTINUED | OUTPATIENT
Start: 2019-10-03 | End: 2019-10-04 | Stop reason: ALTCHOICE

## 2019-10-03 RX ADMIN — ONDANSETRON 4 MG: 2 INJECTION INTRAMUSCULAR; INTRAVENOUS at 09:18

## 2019-10-03 RX ADMIN — PIPERACILLIN SODIUM,TAZOBACTAM SODIUM 3.38 G: 3; .375 INJECTION, POWDER, FOR SOLUTION INTRAVENOUS at 14:32

## 2019-10-03 RX ADMIN — HYDROCODONE BITARTRATE AND ACETAMINOPHEN 1 TABLET: 5; 325 TABLET ORAL at 12:38

## 2019-10-03 RX ADMIN — PIPERACILLIN SODIUM,TAZOBACTAM SODIUM 3.38 G: 3; .375 INJECTION, POWDER, FOR SOLUTION INTRAVENOUS at 21:00

## 2019-10-03 RX ADMIN — SODIUM CHLORIDE: 9 INJECTION, SOLUTION INTRAVENOUS at 03:41

## 2019-10-03 RX ADMIN — SODIUM CHLORIDE: 9 INJECTION, SOLUTION INTRAVENOUS at 10:26

## 2019-10-03 RX ADMIN — LOSARTAN POTASSIUM 50 MG: 50 TABLET, FILM COATED ORAL at 09:31

## 2019-10-03 RX ADMIN — ASPIRIN 81 MG 81 MG: 81 TABLET ORAL at 09:31

## 2019-10-03 RX ADMIN — HYDROMORPHONE HYDROCHLORIDE 1 MG: 1 INJECTION, SOLUTION INTRAMUSCULAR; INTRAVENOUS; SUBCUTANEOUS at 08:53

## 2019-10-03 RX ADMIN — ENOXAPARIN SODIUM 40 MG: 40 INJECTION SUBCUTANEOUS at 09:36

## 2019-10-03 RX ADMIN — PIPERACILLIN SODIUM,TAZOBACTAM SODIUM 3.38 G: 3; .375 INJECTION, POWDER, FOR SOLUTION INTRAVENOUS at 05:32

## 2019-10-03 RX ADMIN — HYDROCODONE BITARTRATE AND ACETAMINOPHEN 1 TABLET: 5; 325 TABLET ORAL at 19:54

## 2019-10-03 RX ADMIN — POTASSIUM CHLORIDE 20 MEQ: 1.5 POWDER, FOR SOLUTION ORAL at 09:31

## 2019-10-03 RX ADMIN — SODIUM CHLORIDE 25 ML: 9 INJECTION, SOLUTION INTRAVENOUS at 14:33

## 2019-10-03 RX ADMIN — ACETAMINOPHEN 650 MG: 325 TABLET ORAL at 10:38

## 2019-10-03 RX ADMIN — INSULIN LISPRO 4 UNITS: 100 INJECTION, SOLUTION INTRAVENOUS; SUBCUTANEOUS at 17:06

## 2019-10-03 RX ADMIN — POTASSIUM CHLORIDE 20 MEQ: 1.5 POWDER, FOR SOLUTION ORAL at 16:52

## 2019-10-03 RX ADMIN — SODIUM CHLORIDE: 9 INJECTION, SOLUTION INTRAVENOUS at 17:05

## 2019-10-03 RX ADMIN — FAMOTIDINE 20 MG: 10 INJECTION INTRAVENOUS at 09:33

## 2019-10-03 RX ADMIN — PROCHLORPERAZINE EDISYLATE 5 MG: 5 INJECTION INTRAMUSCULAR; INTRAVENOUS at 12:43

## 2019-10-03 RX ADMIN — HYDROCODONE BITARTRATE AND ACETAMINOPHEN 1 TABLET: 5; 325 TABLET ORAL at 11:15

## 2019-10-03 RX ADMIN — HYDROCODONE BITARTRATE AND ACETAMINOPHEN 1 TABLET: 5; 325 TABLET ORAL at 16:53

## 2019-10-03 RX ADMIN — FAMOTIDINE 20 MG: 20 TABLET ORAL at 20:00

## 2019-10-03 RX ADMIN — HYDROMORPHONE HYDROCHLORIDE 1 MG: 1 INJECTION, SOLUTION INTRAMUSCULAR; INTRAVENOUS; SUBCUTANEOUS at 05:32

## 2019-10-03 ASSESSMENT — ACTIVITIES OF DAILY LIVING (ADL): PRIOR_ADL: INDEPENDENT

## 2019-10-03 ASSESSMENT — PAIN SCALES - GENERAL
PAINLEVEL_OUTOF10: 2
PAINLEVEL_OUTOF10: 3
PAINLEVEL_OUTOF10: 4
PAINLEVEL_OUTOF10: 0
PAINLEVEL_OUTOF10: 0
PAINLEVEL_OUTOF10: 8
PAINLEVEL_OUTOF10: 4
PAINLEVEL_OUTOF10: 8
PAINLEVEL_OUTOF10: 5
PAINLEVEL_OUTOF10: 10
PAINLEVEL_OUTOF10: 9
PAINLEVEL_OUTOF10: 8

## 2019-10-03 ASSESSMENT — COGNITIVE AND FUNCTIONAL STATUS - GENERAL
BASIC_MOBILITY_CONVERTED_SCORE: 41.05
BASIC_MOBILITY_RAW_SCORE: 18

## 2019-10-04 ENCOUNTER — APPOINTMENT (OUTPATIENT)
Dept: CT IMAGING | Age: 69
DRG: 871 | End: 2019-10-04
Attending: PHYSICIAN ASSISTANT

## 2019-10-04 ENCOUNTER — TELEPHONE (OUTPATIENT)
Dept: GASTROENTEROLOGY | Age: 69
End: 2019-10-04

## 2019-10-04 LAB
ALBUMIN SERPL-MCNC: 2 G/DL (ref 3.6–5.1)
ALBUMIN/GLOB SERPL: 0.5 {RATIO} (ref 1–2.4)
ALP SERPL-CCNC: 71 UNITS/L (ref 45–117)
ALT SERPL-CCNC: 23 UNITS/L
ANION GAP SERPL CALC-SCNC: 16 MMOL/L (ref 10–20)
AST SERPL-CCNC: 15 UNITS/L
BASOPHILS # BLD AUTO: 0.1 K/MCL (ref 0–0.3)
BASOPHILS NFR BLD AUTO: 0 %
BILIRUB SERPL-MCNC: 0.4 MG/DL (ref 0.2–1)
BUN SERPL-MCNC: 11 MG/DL (ref 6–20)
BUN/CREAT SERPL: 22 (ref 7–25)
CALCIUM SERPL-MCNC: 7.7 MG/DL (ref 8.4–10.2)
CHLORIDE SERPL-SCNC: 105 MMOL/L (ref 98–107)
CO2 SERPL-SCNC: 21 MMOL/L (ref 21–32)
CREAT SERPL-MCNC: 0.49 MG/DL (ref 0.51–0.95)
DIFFERENTIAL METHOD BLD: ABNORMAL
EOSINOPHIL # BLD AUTO: 0.3 K/MCL (ref 0.1–0.5)
EOSINOPHIL NFR SPEC: 2 %
ERYTHROCYTE [DISTWIDTH] IN BLOOD: 14.5 % (ref 11–15)
GLOBULIN SER-MCNC: 3.9 G/DL (ref 2–4)
GLUCOSE BLDC GLUCOMTR-MCNC: 162 MG/DL (ref 70–99)
GLUCOSE BLDC GLUCOMTR-MCNC: 164 MG/DL (ref 70–99)
GLUCOSE BLDC GLUCOMTR-MCNC: 164 MG/DL (ref 70–99)
GLUCOSE BLDC GLUCOMTR-MCNC: 205 MG/DL (ref 70–99)
GLUCOSE SERPL-MCNC: 169 MG/DL (ref 65–99)
HCT VFR BLD CALC: 34.3 % (ref 36–46.5)
HGB BLD-MCNC: 11 G/DL (ref 12–15.5)
IMM GRANULOCYTES # BLD AUTO: 0.2 K/MCL (ref 0–0.2)
IMM GRANULOCYTES NFR BLD: 1 %
LYMPHOCYTES # BLD MANUAL: 1.6 K/MCL (ref 1–4)
LYMPHOCYTES NFR BLD MANUAL: 9 %
MCH RBC QN AUTO: 28.6 PG (ref 26–34)
MCHC RBC AUTO-ENTMCNC: 32.1 G/DL (ref 32–36.5)
MCV RBC AUTO: 89.1 FL (ref 78–100)
MONOCYTES # BLD MANUAL: 1 K/MCL (ref 0.3–0.9)
MONOCYTES NFR BLD MANUAL: 5 %
NEUTROPHILS # BLD: 15.7 K/MCL (ref 1.8–7.7)
NEUTROPHILS NFR BLD AUTO: 83 %
NRBC BLD MANUAL-RTO: 0 /100 WBC
PLATELET # BLD: 212 K/MCL (ref 140–450)
POTASSIUM SERPL-SCNC: 3.5 MMOL/L (ref 3.4–5.1)
POTASSIUM SERPL-SCNC: 3.9 MMOL/L (ref 3.4–5.1)
PROT SERPL-MCNC: 5.9 G/DL (ref 6.4–8.2)
RBC # BLD: 3.85 MIL/MCL (ref 4–5.2)
SODIUM SERPL-SCNC: 138 MMOL/L (ref 135–145)
WBC # BLD: 18.8 K/MCL (ref 4.2–11)

## 2019-10-04 PROCEDURE — 10002803 HB RX 637: Performed by: HOSPITALIST

## 2019-10-04 PROCEDURE — 85025 COMPLETE CBC W/AUTO DIFF WBC: CPT

## 2019-10-04 PROCEDURE — 74177 CT ABD & PELVIS W/CONTRAST: CPT | Performed by: RADIOLOGY

## 2019-10-04 PROCEDURE — 10002805 HB CONTRAST AGENT: Performed by: HOSPITALIST

## 2019-10-04 PROCEDURE — 10004651 HB RX, NO CHARGE ITEM: Performed by: RADIOLOGY

## 2019-10-04 PROCEDURE — 10003445 HB TELEMETRY PER DAY

## 2019-10-04 PROCEDURE — 99223 1ST HOSP IP/OBS HIGH 75: CPT | Performed by: INTERNAL MEDICINE

## 2019-10-04 PROCEDURE — 74177 CT ABD & PELVIS W/CONTRAST: CPT

## 2019-10-04 PROCEDURE — 99231 SBSQ HOSP IP/OBS SF/LOW 25: CPT | Performed by: NURSE PRACTITIONER

## 2019-10-04 PROCEDURE — 10002807 HB RX 258: Performed by: HOSPITALIST

## 2019-10-04 PROCEDURE — 10002800 HB RX 250 W HCPCS: Performed by: NURSE PRACTITIONER

## 2019-10-04 PROCEDURE — 84132 ASSAY OF SERUM POTASSIUM: CPT

## 2019-10-04 PROCEDURE — 10004325 HB COUNTER ASSESSMENT EA 15 MIN: Performed by: GENERAL ACUTE CARE HOSPITAL

## 2019-10-04 PROCEDURE — 82962 GLUCOSE BLOOD TEST: CPT

## 2019-10-04 PROCEDURE — 99232 SBSQ HOSP IP/OBS MODERATE 35: CPT | Performed by: PHYSICIAN ASSISTANT

## 2019-10-04 PROCEDURE — 80053 COMPREHEN METABOLIC PANEL: CPT

## 2019-10-04 PROCEDURE — C9113 INJ PANTOPRAZOLE SODIUM, VIA: HCPCS | Performed by: NURSE PRACTITIONER

## 2019-10-04 PROCEDURE — 10003585 HB ROOM CHARGE INTERMEDIATE CARE

## 2019-10-04 PROCEDURE — 10002807 HB RX 258: Performed by: INTERNAL MEDICINE

## 2019-10-04 PROCEDURE — 10004281 HB COUNTER-STAFF TIME PER 15 MIN

## 2019-10-04 PROCEDURE — 10002800 HB RX 250 W HCPCS: Performed by: PHYSICIAN ASSISTANT

## 2019-10-04 PROCEDURE — 10002800 HB RX 250 W HCPCS: Performed by: INTERNAL MEDICINE

## 2019-10-04 PROCEDURE — 10002807 HB RX 258: Performed by: PHYSICIAN ASSISTANT

## 2019-10-04 PROCEDURE — 99233 SBSQ HOSP IP/OBS HIGH 50: CPT | Performed by: HOSPITALIST

## 2019-10-04 RX ORDER — PANTOPRAZOLE SODIUM 40 MG/10ML
40 INJECTION, POWDER, LYOPHILIZED, FOR SOLUTION INTRAVENOUS EVERY 12 HOURS SCHEDULED
Status: DISCONTINUED | OUTPATIENT
Start: 2019-10-04 | End: 2019-10-12

## 2019-10-04 RX ORDER — METOCLOPRAMIDE HYDROCHLORIDE 5 MG/ML
10 INJECTION INTRAMUSCULAR; INTRAVENOUS EVERY 6 HOURS PRN
Status: DISCONTINUED | OUTPATIENT
Start: 2019-10-04 | End: 2019-10-04

## 2019-10-04 RX ORDER — METOCLOPRAMIDE HYDROCHLORIDE 5 MG/ML
5 INJECTION INTRAMUSCULAR; INTRAVENOUS EVERY 6 HOURS PRN
Status: DISCONTINUED | OUTPATIENT
Start: 2019-10-04 | End: 2019-10-15 | Stop reason: HOSPADM

## 2019-10-04 RX ORDER — FAMOTIDINE 10 MG/ML
20 INJECTION, SOLUTION INTRAVENOUS EVERY 12 HOURS SCHEDULED
Status: DISCONTINUED | OUTPATIENT
Start: 2019-10-04 | End: 2019-10-04

## 2019-10-04 RX ADMIN — PIPERACILLIN SODIUM,TAZOBACTAM SODIUM 3.38 G: 3; .375 INJECTION, POWDER, FOR SOLUTION INTRAVENOUS at 15:21

## 2019-10-04 RX ADMIN — PANTOPRAZOLE SODIUM 40 MG: 40 INJECTION, POWDER, LYOPHILIZED, FOR SOLUTION INTRAVENOUS at 20:59

## 2019-10-04 RX ADMIN — INSULIN LISPRO 2 UNITS: 100 INJECTION, SOLUTION INTRAVENOUS; SUBCUTANEOUS at 16:08

## 2019-10-04 RX ADMIN — HYDROMORPHONE HYDROCHLORIDE 0.5 MG: 1 INJECTION, SOLUTION INTRAMUSCULAR; INTRAVENOUS; SUBCUTANEOUS at 11:33

## 2019-10-04 RX ADMIN — HYDROCODONE BITARTRATE AND ACETAMINOPHEN 1 TABLET: 5; 325 TABLET ORAL at 05:35

## 2019-10-04 RX ADMIN — SODIUM CHLORIDE 100 ML: 9 INJECTION, SOLUTION INTRAVENOUS at 10:55

## 2019-10-04 RX ADMIN — ONDANSETRON 4 MG: 2 INJECTION INTRAMUSCULAR; INTRAVENOUS at 19:15

## 2019-10-04 RX ADMIN — INSULIN LISPRO 2 UNITS: 100 INJECTION, SOLUTION INTRAVENOUS; SUBCUTANEOUS at 11:27

## 2019-10-04 RX ADMIN — INSULIN LISPRO 2 UNITS: 100 INJECTION, SOLUTION INTRAVENOUS; SUBCUTANEOUS at 06:00

## 2019-10-04 RX ADMIN — PIPERACILLIN SODIUM,TAZOBACTAM SODIUM 3.38 G: 3; .375 INJECTION, POWDER, FOR SOLUTION INTRAVENOUS at 21:04

## 2019-10-04 RX ADMIN — POTASSIUM CHLORIDE 40 MEQ: 14.9 INJECTION, SOLUTION INTRAVENOUS at 06:44

## 2019-10-04 RX ADMIN — HYDRALAZINE HYDROCHLORIDE 10 MG: 20 INJECTION INTRAMUSCULAR; INTRAVENOUS at 00:41

## 2019-10-04 RX ADMIN — HYDROMORPHONE HYDROCHLORIDE 0.5 MG: 1 INJECTION, SOLUTION INTRAMUSCULAR; INTRAVENOUS; SUBCUTANEOUS at 08:29

## 2019-10-04 RX ADMIN — HYDROCODONE BITARTRATE AND ACETAMINOPHEN 1 TABLET: 5; 325 TABLET ORAL at 00:33

## 2019-10-04 RX ADMIN — PANTOPRAZOLE SODIUM 40 MG: 40 INJECTION, POWDER, LYOPHILIZED, FOR SOLUTION INTRAVENOUS at 15:16

## 2019-10-04 RX ADMIN — POTASSIUM CHLORIDE 20 MEQ: 14.9 INJECTION, SOLUTION INTRAVENOUS at 17:14

## 2019-10-04 RX ADMIN — SODIUM CHLORIDE, PRESERVATIVE FREE 2 ML: 5 INJECTION INTRAVENOUS at 21:02

## 2019-10-04 RX ADMIN — PIPERACILLIN SODIUM,TAZOBACTAM SODIUM 3.38 G: 3; .375 INJECTION, POWDER, FOR SOLUTION INTRAVENOUS at 05:40

## 2019-10-04 RX ADMIN — SODIUM CHLORIDE: 9 INJECTION, SOLUTION INTRAVENOUS at 16:35

## 2019-10-04 RX ADMIN — SODIUM CHLORIDE, PRESERVATIVE FREE 2 ML: 5 INJECTION INTRAVENOUS at 08:32

## 2019-10-04 RX ADMIN — SODIUM CHLORIDE 500 ML: 9 INJECTION, SOLUTION INTRAVENOUS at 17:13

## 2019-10-04 RX ADMIN — IOPAMIDOL 100 ML: 612 INJECTION, SOLUTION INTRAVENOUS at 10:55

## 2019-10-04 RX ADMIN — HYDROMORPHONE HYDROCHLORIDE 0.5 MG: 1 INJECTION, SOLUTION INTRAMUSCULAR; INTRAVENOUS; SUBCUTANEOUS at 15:43

## 2019-10-04 RX ADMIN — ONDANSETRON 4 MG: 2 INJECTION INTRAMUSCULAR; INTRAVENOUS at 06:00

## 2019-10-04 RX ADMIN — HYDROMORPHONE HYDROCHLORIDE 0.5 MG: 1 INJECTION, SOLUTION INTRAMUSCULAR; INTRAVENOUS; SUBCUTANEOUS at 23:55

## 2019-10-04 RX ADMIN — HYDROMORPHONE HYDROCHLORIDE 0.5 MG: 1 INJECTION, SOLUTION INTRAMUSCULAR; INTRAVENOUS; SUBCUTANEOUS at 20:57

## 2019-10-04 ASSESSMENT — PATIENT HEALTH QUESTIONNAIRE - PHQ9
SUM OF ALL RESPONSES TO PHQ9 QUESTIONS 1 AND 2: 0
IS PATIENT ABLE TO COMPLETE PHQ2 OR PHQ9: YES

## 2019-10-04 ASSESSMENT — PAIN SCALES - GENERAL
PAINLEVEL_OUTOF10: 10
PAINLEVEL_OUTOF10: 6
PAINLEVEL_OUTOF10: 10
PAINLEVEL_OUTOF10: 2
PAINLEVEL_OUTOF10: 0
PAINLEVEL_OUTOF10: 10

## 2019-10-05 LAB
ALBUMIN SERPL-MCNC: 1.8 G/DL (ref 3.6–5.1)
ALBUMIN/GLOB SERPL: 0.5 {RATIO} (ref 1–2.4)
ALP SERPL-CCNC: 69 UNITS/L (ref 45–117)
ALT SERPL-CCNC: 17 UNITS/L
ANION GAP SERPL CALC-SCNC: 17 MMOL/L (ref 10–20)
AST SERPL-CCNC: 11 UNITS/L
BASOPHILS # BLD AUTO: 0 K/MCL (ref 0–0.3)
BASOPHILS NFR BLD AUTO: 0 %
BILIRUB SERPL-MCNC: 0.3 MG/DL (ref 0.2–1)
BUN SERPL-MCNC: 11 MG/DL (ref 6–20)
BUN/CREAT SERPL: 19 (ref 7–25)
CALCIUM SERPL-MCNC: 7.6 MG/DL (ref 8.4–10.2)
CHLORIDE SERPL-SCNC: 106 MMOL/L (ref 98–107)
CO2 SERPL-SCNC: 20 MMOL/L (ref 21–32)
CREAT SERPL-MCNC: 0.57 MG/DL (ref 0.51–0.95)
DIFFERENTIAL METHOD BLD: ABNORMAL
EOSINOPHIL # BLD AUTO: 0.2 K/MCL (ref 0.1–0.5)
EOSINOPHIL NFR SPEC: 1 %
ERYTHROCYTE [DISTWIDTH] IN BLOOD: 14.6 % (ref 11–15)
GLOBULIN SER-MCNC: 3.8 G/DL (ref 2–4)
GLUCOSE BLDC GLUCOMTR-MCNC: 189 MG/DL (ref 70–99)
GLUCOSE BLDC GLUCOMTR-MCNC: 196 MG/DL (ref 70–99)
GLUCOSE BLDC GLUCOMTR-MCNC: 212 MG/DL (ref 70–99)
GLUCOSE BLDC GLUCOMTR-MCNC: 238 MG/DL (ref 70–99)
GLUCOSE SERPL-MCNC: 229 MG/DL (ref 65–99)
HCT VFR BLD CALC: 31.4 % (ref 36–46.5)
HGB BLD-MCNC: 10 G/DL (ref 12–15.5)
IMM GRANULOCYTES # BLD AUTO: 0.1 K/MCL (ref 0–0.2)
IMM GRANULOCYTES NFR BLD: 1 %
LYMPHOCYTES # BLD MANUAL: 1.8 K/MCL (ref 1–4)
LYMPHOCYTES NFR BLD MANUAL: 11 %
MAGNESIUM SERPL-MCNC: 2.1 MG/DL (ref 1.7–2.4)
MCH RBC QN AUTO: 28.3 PG (ref 26–34)
MCHC RBC AUTO-ENTMCNC: 31.8 G/DL (ref 32–36.5)
MCV RBC AUTO: 89 FL (ref 78–100)
MONOCYTES # BLD MANUAL: 0.9 K/MCL (ref 0.3–0.9)
MONOCYTES NFR BLD MANUAL: 6 %
NEUTROPHILS # BLD: 13.1 K/MCL (ref 1.8–7.7)
NEUTROPHILS NFR BLD AUTO: 81 %
NRBC BLD MANUAL-RTO: 0 /100 WBC
PLATELET # BLD: 211 K/MCL (ref 140–450)
POTASSIUM SERPL-SCNC: 4.4 MMOL/L (ref 3.4–5.1)
PROT SERPL-MCNC: 5.6 G/DL (ref 6.4–8.2)
RBC # BLD: 3.53 MIL/MCL (ref 4–5.2)
SODIUM SERPL-SCNC: 139 MMOL/L (ref 135–145)
WBC # BLD: 16.2 K/MCL (ref 4.2–11)

## 2019-10-05 PROCEDURE — 10002800 HB RX 250 W HCPCS: Performed by: HOSPITALIST

## 2019-10-05 PROCEDURE — 83735 ASSAY OF MAGNESIUM: CPT

## 2019-10-05 PROCEDURE — 10003585 HB ROOM CHARGE INTERMEDIATE CARE

## 2019-10-05 PROCEDURE — 80053 COMPREHEN METABOLIC PANEL: CPT

## 2019-10-05 PROCEDURE — 10002803 HB RX 637: Performed by: INTERNAL MEDICINE

## 2019-10-05 PROCEDURE — 10004327 HB COUNTER ASSESSMENT EA 15 MIN PARENTERAL: Performed by: GENERAL ACUTE CARE HOSPITAL

## 2019-10-05 PROCEDURE — 10002800 HB RX 250 W HCPCS: Performed by: NURSE PRACTITIONER

## 2019-10-05 PROCEDURE — 10002800 HB RX 250 W HCPCS: Performed by: PHYSICIAN ASSISTANT

## 2019-10-05 PROCEDURE — 3E0336Z INTRODUCTION OF NUTRITIONAL SUBSTANCE INTO PERIPHERAL VEIN, PERCUTANEOUS APPROACH: ICD-10-PCS | Performed by: HOSPITALIST

## 2019-10-05 PROCEDURE — 99233 SBSQ HOSP IP/OBS HIGH 50: CPT | Performed by: INTERNAL MEDICINE

## 2019-10-05 PROCEDURE — 99233 SBSQ HOSP IP/OBS HIGH 50: CPT | Performed by: SURGERY

## 2019-10-05 PROCEDURE — 10004651 HB RX, NO CHARGE ITEM: Performed by: RADIOLOGY

## 2019-10-05 PROCEDURE — 10002807 HB RX 258: Performed by: HOSPITALIST

## 2019-10-05 PROCEDURE — 85025 COMPLETE CBC W/AUTO DIFF WBC: CPT

## 2019-10-05 PROCEDURE — 10002801 HB RX 250 W/O HCPCS: Performed by: INTERNAL MEDICINE

## 2019-10-05 PROCEDURE — 99233 SBSQ HOSP IP/OBS HIGH 50: CPT | Performed by: HOSPITALIST

## 2019-10-05 PROCEDURE — 82962 GLUCOSE BLOOD TEST: CPT

## 2019-10-05 PROCEDURE — 10002807 HB RX 258: Performed by: RADIOLOGY

## 2019-10-05 PROCEDURE — 10002807 HB RX 258: Performed by: PHYSICIAN ASSISTANT

## 2019-10-05 PROCEDURE — 10003445 HB TELEMETRY PER DAY

## 2019-10-05 PROCEDURE — 10004281 HB COUNTER-STAFF TIME PER 15 MIN: Performed by: GENERAL ACUTE CARE HOSPITAL

## 2019-10-05 PROCEDURE — 10002800 HB RX 250 W HCPCS: Performed by: INTERNAL MEDICINE

## 2019-10-05 PROCEDURE — C9113 INJ PANTOPRAZOLE SODIUM, VIA: HCPCS | Performed by: NURSE PRACTITIONER

## 2019-10-05 RX ORDER — DEXTROSE MONOHYDRATE 100 MG/ML
1000 INJECTION, SOLUTION INTRAVENOUS CONTINUOUS PRN
Status: DISCONTINUED | OUTPATIENT
Start: 2019-10-05 | End: 2019-10-11

## 2019-10-05 RX ADMIN — LOSARTAN POTASSIUM 50 MG: 50 TABLET, FILM COATED ORAL at 08:46

## 2019-10-05 RX ADMIN — PANTOPRAZOLE SODIUM 40 MG: 40 INJECTION, POWDER, LYOPHILIZED, FOR SOLUTION INTRAVENOUS at 21:07

## 2019-10-05 RX ADMIN — PIPERACILLIN SODIUM,TAZOBACTAM SODIUM 3.38 G: 3; .375 INJECTION, POWDER, FOR SOLUTION INTRAVENOUS at 15:18

## 2019-10-05 RX ADMIN — SODIUM CHLORIDE: 9 INJECTION, SOLUTION INTRAVENOUS at 15:27

## 2019-10-05 RX ADMIN — HYDROMORPHONE HYDROCHLORIDE 1 MG: 1 INJECTION, SOLUTION INTRAMUSCULAR; INTRAVENOUS; SUBCUTANEOUS at 11:49

## 2019-10-05 RX ADMIN — INSULIN LISPRO 4 UNITS: 100 INJECTION, SOLUTION INTRAVENOUS; SUBCUTANEOUS at 17:15

## 2019-10-05 RX ADMIN — METOCLOPRAMIDE 5 MG: 5 INJECTION, SOLUTION INTRAMUSCULAR; INTRAVENOUS at 02:52

## 2019-10-05 RX ADMIN — SODIUM CHLORIDE, PRESERVATIVE FREE 2 ML: 5 INJECTION INTRAVENOUS at 08:49

## 2019-10-05 RX ADMIN — HYDROMORPHONE HYDROCHLORIDE 1 MG: 1 INJECTION, SOLUTION INTRAMUSCULAR; INTRAVENOUS; SUBCUTANEOUS at 22:14

## 2019-10-05 RX ADMIN — PANTOPRAZOLE SODIUM 40 MG: 40 INJECTION, POWDER, LYOPHILIZED, FOR SOLUTION INTRAVENOUS at 08:48

## 2019-10-05 RX ADMIN — PIPERACILLIN SODIUM,TAZOBACTAM SODIUM 3.38 G: 3; .375 INJECTION, POWDER, FOR SOLUTION INTRAVENOUS at 21:09

## 2019-10-05 RX ADMIN — LYSINE, LEUCINE, PHENYLALANINE, VALINE, HISTIDINE, ISOLEUCINE, METHIONINE, THREONINE, TRYPTOPHAN, ALANINE, ARGININE, GLYCINE, PROLINE, GLUTAMIC ACID, SERINE, ASPARTIC ACID, TYROSINE
1.18; 1.04; 1.04; 960; 894; 749; 749; 749; 250; 2.17; 1.47; 1.04; 894; 749; 592; 434; 39 INJECTION, SOLUTION INTRAVENOUS at 21:14

## 2019-10-05 RX ADMIN — HYDROMORPHONE HYDROCHLORIDE 1 MG: 1 INJECTION, SOLUTION INTRAMUSCULAR; INTRAVENOUS; SUBCUTANEOUS at 08:54

## 2019-10-05 RX ADMIN — PIPERACILLIN SODIUM,TAZOBACTAM SODIUM 3.38 G: 3; .375 INJECTION, POWDER, FOR SOLUTION INTRAVENOUS at 05:09

## 2019-10-05 RX ADMIN — LABETALOL 20 MG/4 ML (5 MG/ML) INTRAVENOUS SYRINGE 10 MG: at 05:21

## 2019-10-05 RX ADMIN — INSULIN LISPRO 2 UNITS: 100 INJECTION, SOLUTION INTRAVENOUS; SUBCUTANEOUS at 11:47

## 2019-10-05 RX ADMIN — HYDROMORPHONE HYDROCHLORIDE 1 MG: 1 INJECTION, SOLUTION INTRAMUSCULAR; INTRAVENOUS; SUBCUTANEOUS at 17:23

## 2019-10-05 RX ADMIN — INSULIN LISPRO 4 UNITS: 100 INJECTION, SOLUTION INTRAVENOUS; SUBCUTANEOUS at 06:42

## 2019-10-05 RX ADMIN — SODIUM CHLORIDE 25 ML: 9 INJECTION, SOLUTION INTRAVENOUS at 17:31

## 2019-10-05 RX ADMIN — ONDANSETRON 4 MG: 2 INJECTION INTRAMUSCULAR; INTRAVENOUS at 11:43

## 2019-10-05 RX ADMIN — SOYBEAN OIL 250 ML: 20 INJECTION, SOLUTION INTRAVENOUS at 21:19

## 2019-10-06 LAB
ALBUMIN SERPL-MCNC: 1.7 G/DL (ref 3.6–5.1)
ALBUMIN/GLOB SERPL: 0.5 {RATIO} (ref 1–2.4)
ALP SERPL-CCNC: 79 UNITS/L (ref 45–117)
ALT SERPL-CCNC: 16 UNITS/L
ANION GAP SERPL CALC-SCNC: 12 MMOL/L (ref 10–20)
AST SERPL-CCNC: 15 UNITS/L
BACTERIA BLD CULT: NORMAL
BACTERIA BLD CULT: NORMAL
BACTERIA SPEC ANAEROBE+AEROBE CULT: NORMAL
BASOPHILS # BLD AUTO: 0 K/MCL (ref 0–0.3)
BASOPHILS NFR BLD AUTO: 0 %
BILIRUB SERPL-MCNC: 0.2 MG/DL (ref 0.2–1)
BUN SERPL-MCNC: 12 MG/DL (ref 6–20)
BUN/CREAT SERPL: 21 (ref 7–25)
CALCIUM SERPL-MCNC: 7.6 MG/DL (ref 8.4–10.2)
CHLORIDE SERPL-SCNC: 105 MMOL/L (ref 98–107)
CO2 SERPL-SCNC: 25 MMOL/L (ref 21–32)
CREAT SERPL-MCNC: 0.58 MG/DL (ref 0.51–0.95)
DIFFERENTIAL METHOD BLD: ABNORMAL
EOSINOPHIL # BLD AUTO: 0.3 K/MCL (ref 0.1–0.5)
EOSINOPHIL NFR SPEC: 3 %
ERYTHROCYTE [DISTWIDTH] IN BLOOD: 14.7 % (ref 11–15)
GLOBULIN SER-MCNC: 3.6 G/DL (ref 2–4)
GLUCOSE BLDC GLUCOMTR-MCNC: 243 MG/DL (ref 70–99)
GLUCOSE BLDC GLUCOMTR-MCNC: 258 MG/DL (ref 70–99)
GLUCOSE BLDC GLUCOMTR-MCNC: 279 MG/DL (ref 70–99)
GLUCOSE SERPL-MCNC: 318 MG/DL (ref 65–99)
GRAM STN SPEC: NORMAL
HCT VFR BLD CALC: 29.1 % (ref 36–46.5)
HGB BLD-MCNC: 9.3 G/DL (ref 12–15.5)
IMM GRANULOCYTES # BLD AUTO: 0.2 K/MCL (ref 0–0.2)
IMM GRANULOCYTES NFR BLD: 2 %
LYMPHOCYTES # BLD MANUAL: 1.7 K/MCL (ref 1–4)
LYMPHOCYTES NFR BLD MANUAL: 14 %
MAGNESIUM SERPL-MCNC: 2 MG/DL (ref 1.7–2.4)
MCH RBC QN AUTO: 28.5 PG (ref 26–34)
MCHC RBC AUTO-ENTMCNC: 32 G/DL (ref 32–36.5)
MCV RBC AUTO: 89.3 FL (ref 78–100)
MONOCYTES # BLD MANUAL: 0.8 K/MCL (ref 0.3–0.9)
MONOCYTES NFR BLD MANUAL: 7 %
NEUTROPHILS # BLD: 9.3 K/MCL (ref 1.8–7.7)
NEUTROPHILS NFR BLD AUTO: 74 %
NRBC BLD MANUAL-RTO: 0 /100 WBC
PHOSPHATE SERPL-MCNC: 2.1 MG/DL (ref 2.4–4.7)
PLATELET # BLD: 204 K/MCL (ref 140–450)
POTASSIUM SERPL-SCNC: 4.1 MMOL/L (ref 3.4–5.1)
PROT SERPL-MCNC: 5.3 G/DL (ref 6.4–8.2)
RBC # BLD: 3.26 MIL/MCL (ref 4–5.2)
REPORT STATUS (RPT): NORMAL
SODIUM SERPL-SCNC: 138 MMOL/L (ref 135–145)
SPECIMEN SOURCE: NORMAL
WBC # BLD: 12.4 K/MCL (ref 4.2–11)

## 2019-10-06 PROCEDURE — 10002800 HB RX 250 W HCPCS: Performed by: INTERNAL MEDICINE

## 2019-10-06 PROCEDURE — 10002807 HB RX 258: Performed by: HOSPITALIST

## 2019-10-06 PROCEDURE — 83735 ASSAY OF MAGNESIUM: CPT

## 2019-10-06 PROCEDURE — 10004173 HB COUNTER-THERAPY VISIT PT: Performed by: PHYSICAL THERAPIST

## 2019-10-06 PROCEDURE — C9113 INJ PANTOPRAZOLE SODIUM, VIA: HCPCS | Performed by: NURSE PRACTITIONER

## 2019-10-06 PROCEDURE — 10002800 HB RX 250 W HCPCS: Performed by: NURSE PRACTITIONER

## 2019-10-06 PROCEDURE — 10004172 HB COUNTER-THERAPY VISIT OT

## 2019-10-06 PROCEDURE — 85025 COMPLETE CBC W/AUTO DIFF WBC: CPT

## 2019-10-06 PROCEDURE — 10002807 HB RX 258: Performed by: PHYSICIAN ASSISTANT

## 2019-10-06 PROCEDURE — 10002803 HB RX 637: Performed by: INTERNAL MEDICINE

## 2019-10-06 PROCEDURE — 10002803 HB RX 637: Performed by: HOSPITALIST

## 2019-10-06 PROCEDURE — 97535 SELF CARE MNGMENT TRAINING: CPT

## 2019-10-06 PROCEDURE — 10004651 HB RX, NO CHARGE ITEM: Performed by: INTERNAL MEDICINE

## 2019-10-06 PROCEDURE — 80053 COMPREHEN METABOLIC PANEL: CPT

## 2019-10-06 PROCEDURE — 84100 ASSAY OF PHOSPHORUS: CPT

## 2019-10-06 PROCEDURE — 99233 SBSQ HOSP IP/OBS HIGH 50: CPT | Performed by: HOSPITALIST

## 2019-10-06 PROCEDURE — 97530 THERAPEUTIC ACTIVITIES: CPT | Performed by: PHYSICAL THERAPIST

## 2019-10-06 PROCEDURE — 10002800 HB RX 250 W HCPCS: Performed by: HOSPITALIST

## 2019-10-06 PROCEDURE — 10003445 HB TELEMETRY PER DAY

## 2019-10-06 PROCEDURE — 99233 SBSQ HOSP IP/OBS HIGH 50: CPT | Performed by: SURGERY

## 2019-10-06 PROCEDURE — 97165 OT EVAL LOW COMPLEX 30 MIN: CPT

## 2019-10-06 PROCEDURE — 82962 GLUCOSE BLOOD TEST: CPT

## 2019-10-06 PROCEDURE — 10000002 HB ROOM CHARGE MED SURG

## 2019-10-06 PROCEDURE — 97530 THERAPEUTIC ACTIVITIES: CPT

## 2019-10-06 PROCEDURE — 10002800 HB RX 250 W HCPCS: Performed by: PHYSICIAN ASSISTANT

## 2019-10-06 RX ORDER — INSULIN GLARGINE 100 [IU]/ML
25 INJECTION, SOLUTION SUBCUTANEOUS EVERY 12 HOURS SCHEDULED
Status: DISCONTINUED | OUTPATIENT
Start: 2019-10-06 | End: 2019-10-07

## 2019-10-06 RX ADMIN — METOCLOPRAMIDE 5 MG: 5 INJECTION, SOLUTION INTRAMUSCULAR; INTRAVENOUS at 05:29

## 2019-10-06 RX ADMIN — HYDROMORPHONE HYDROCHLORIDE 1 MG: 1 INJECTION, SOLUTION INTRAMUSCULAR; INTRAVENOUS; SUBCUTANEOUS at 15:08

## 2019-10-06 RX ADMIN — INSULIN LISPRO 9 UNITS: 100 INJECTION, SOLUTION INTRAVENOUS; SUBCUTANEOUS at 18:08

## 2019-10-06 RX ADMIN — INSULIN LISPRO 4 UNITS: 100 INJECTION, SOLUTION INTRAVENOUS; SUBCUTANEOUS at 00:32

## 2019-10-06 RX ADMIN — SOYBEAN OIL 250 ML: 20 INJECTION, SOLUTION INTRAVENOUS at 22:02

## 2019-10-06 RX ADMIN — HYDROMORPHONE HYDROCHLORIDE 1 MG: 1 INJECTION, SOLUTION INTRAMUSCULAR; INTRAVENOUS; SUBCUTANEOUS at 20:59

## 2019-10-06 RX ADMIN — HYDROCODONE BITARTRATE AND ACETAMINOPHEN 1 TABLET: 5; 325 TABLET ORAL at 10:06

## 2019-10-06 RX ADMIN — ENOXAPARIN SODIUM 40 MG: 40 INJECTION SUBCUTANEOUS at 09:55

## 2019-10-06 RX ADMIN — ONDANSETRON 4 MG: 2 INJECTION INTRAMUSCULAR; INTRAVENOUS at 18:09

## 2019-10-06 RX ADMIN — PIPERACILLIN SODIUM,TAZOBACTAM SODIUM 3.38 G: 3; .375 INJECTION, POWDER, FOR SOLUTION INTRAVENOUS at 05:31

## 2019-10-06 RX ADMIN — INSULIN LISPRO 9 UNITS: 100 INJECTION, SOLUTION INTRAVENOUS; SUBCUTANEOUS at 12:58

## 2019-10-06 RX ADMIN — INSULIN LISPRO 8 UNITS: 100 INJECTION, SOLUTION INTRAVENOUS; SUBCUTANEOUS at 05:48

## 2019-10-06 RX ADMIN — LOSARTAN POTASSIUM 50 MG: 50 TABLET, FILM COATED ORAL at 08:11

## 2019-10-06 RX ADMIN — PIPERACILLIN SODIUM,TAZOBACTAM SODIUM 3.38 G: 3; .375 INJECTION, POWDER, FOR SOLUTION INTRAVENOUS at 21:00

## 2019-10-06 RX ADMIN — INSULIN GLARGINE 25 UNITS: 100 INJECTION, SOLUTION SUBCUTANEOUS at 14:23

## 2019-10-06 RX ADMIN — LYSINE, LEUCINE, PHENYLALANINE, VALINE, HISTIDINE, ISOLEUCINE, METHIONINE, THREONINE, TRYPTOPHAN, ALANINE, ARGININE, GLYCINE, PROLINE, GLUTAMIC ACID, SERINE, ASPARTIC ACID, TYROSINE
1.18; 1.04; 1.04; 960; 894; 749; 749; 749; 250; 2.17; 1.47; 1.04; 894; 749; 592; 434; 39 INJECTION, SOLUTION INTRAVENOUS at 22:00

## 2019-10-06 RX ADMIN — PIPERACILLIN SODIUM,TAZOBACTAM SODIUM 3.38 G: 3; .375 INJECTION, POWDER, FOR SOLUTION INTRAVENOUS at 14:23

## 2019-10-06 RX ADMIN — PANTOPRAZOLE SODIUM 40 MG: 40 INJECTION, POWDER, LYOPHILIZED, FOR SOLUTION INTRAVENOUS at 20:59

## 2019-10-06 RX ADMIN — HYDROMORPHONE HYDROCHLORIDE 0.5 MG: 1 INJECTION, SOLUTION INTRAMUSCULAR; INTRAVENOUS; SUBCUTANEOUS at 12:55

## 2019-10-06 RX ADMIN — HYDROMORPHONE HYDROCHLORIDE 1 MG: 1 INJECTION, SOLUTION INTRAMUSCULAR; INTRAVENOUS; SUBCUTANEOUS at 05:48

## 2019-10-06 RX ADMIN — PANTOPRAZOLE SODIUM 40 MG: 40 INJECTION, POWDER, LYOPHILIZED, FOR SOLUTION INTRAVENOUS at 09:58

## 2019-10-06 RX ADMIN — ASPIRIN 81 MG 81 MG: 81 TABLET ORAL at 09:51

## 2019-10-06 RX ADMIN — HYDROMORPHONE HYDROCHLORIDE 1 MG: 1 INJECTION, SOLUTION INTRAMUSCULAR; INTRAVENOUS; SUBCUTANEOUS at 00:35

## 2019-10-06 ASSESSMENT — COGNITIVE AND FUNCTIONAL STATUS - GENERAL
HELP NEEDED DRESSING REGULAR LOWER BODY CLOTHING: A LOT
HELP NEEDED FOR TOILETING: A LOT
HELP NEEDED FOR BATHING: A LOT
HELP NEEDED DRESSING REGULAR UPPER BODY CLOTHING: A LITTLE
DAILY_ACTIVITY_RAW_SCORE: 16
BASIC_MOBILITY_RAW_SCORE: 13
BASIC_MOBILITY_CONVERTED_SCORE: 33.99
HELP NEEDED FOR PERSONAL GROOMING: A LITTLE
DAILY_ACTIVITY_CONVERTED_SCORE: 35.96

## 2019-10-06 ASSESSMENT — PAIN SCALES - GENERAL
PAINLEVEL_OUTOF10: 9
PAINLEVEL_OUTOF10: 10
PAINLEVEL_OUTOF10: 4
PAINLEVEL_OUTOF10: 10
PAINLEVEL_OUTOF10: 9
PAINLEVEL_OUTOF10: 10
PAINLEVEL_OUTOF10: 8

## 2019-10-07 LAB
ALBUMIN SERPL-MCNC: 1.9 G/DL (ref 3.6–5.1)
ALBUMIN/GLOB SERPL: 0.5 {RATIO} (ref 1–2.4)
ALP SERPL-CCNC: 86 UNITS/L (ref 45–117)
ALT SERPL-CCNC: 23 UNITS/L
ANION GAP SERPL CALC-SCNC: 10 MMOL/L (ref 10–20)
AST SERPL-CCNC: 20 UNITS/L
BASOPHILS # BLD AUTO: 0 K/MCL (ref 0–0.3)
BASOPHILS NFR BLD AUTO: 0 %
BILIRUB SERPL-MCNC: 0.3 MG/DL (ref 0.2–1)
BUN SERPL-MCNC: 9 MG/DL (ref 6–20)
BUN/CREAT SERPL: 17 (ref 7–25)
CALCIUM SERPL-MCNC: 7.9 MG/DL (ref 8.4–10.2)
CHLORIDE SERPL-SCNC: 104 MMOL/L (ref 98–107)
CO2 SERPL-SCNC: 27 MMOL/L (ref 21–32)
CREAT SERPL-MCNC: 0.52 MG/DL (ref 0.51–0.95)
DIFFERENTIAL METHOD BLD: ABNORMAL
EOSINOPHIL # BLD AUTO: 0.3 K/MCL (ref 0.1–0.5)
EOSINOPHIL NFR SPEC: 3 %
ERYTHROCYTE [DISTWIDTH] IN BLOOD: 14.3 % (ref 11–15)
GLOBULIN SER-MCNC: 3.9 G/DL (ref 2–4)
GLUCOSE BLDC GLUCOMTR-MCNC: 210 MG/DL (ref 70–99)
GLUCOSE BLDC GLUCOMTR-MCNC: 253 MG/DL (ref 70–99)
GLUCOSE BLDC GLUCOMTR-MCNC: 319 MG/DL (ref 70–99)
GLUCOSE BLDC GLUCOMTR-MCNC: 370 MG/DL (ref 70–99)
GLUCOSE SERPL-MCNC: 297 MG/DL (ref 65–99)
HCT VFR BLD CALC: 31.3 % (ref 36–46.5)
HGB BLD-MCNC: 10.1 G/DL (ref 12–15.5)
IMM GRANULOCYTES # BLD AUTO: 0.3 K/MCL (ref 0–0.2)
IMM GRANULOCYTES NFR BLD: 2 %
INR PPP: 1.1
LYMPHOCYTES # BLD MANUAL: 1.6 K/MCL (ref 1–4)
LYMPHOCYTES NFR BLD MANUAL: 15 %
MCH RBC QN AUTO: 28.9 PG (ref 26–34)
MCHC RBC AUTO-ENTMCNC: 32.3 G/DL (ref 32–36.5)
MCV RBC AUTO: 89.4 FL (ref 78–100)
MONOCYTES # BLD MANUAL: 0.8 K/MCL (ref 0.3–0.9)
MONOCYTES NFR BLD MANUAL: 7 %
NEUTROPHILS # BLD: 7.8 K/MCL (ref 1.8–7.7)
NEUTROPHILS NFR BLD AUTO: 73 %
NRBC BLD MANUAL-RTO: 0 /100 WBC
PLATELET # BLD: 219 K/MCL (ref 140–450)
POTASSIUM SERPL-SCNC: 3.1 MMOL/L (ref 3.4–5.1)
PROT SERPL-MCNC: 5.8 G/DL (ref 6.4–8.2)
PROTHROMBIN TIME: 11.7 SEC (ref 9.7–11.8)
RBC # BLD: 3.5 MIL/MCL (ref 4–5.2)
SODIUM SERPL-SCNC: 138 MMOL/L (ref 135–145)
TRIGL SERPL-MCNC: 221 MG/DL
WBC # BLD: 10.8 K/MCL (ref 4.2–11)

## 2019-10-07 PROCEDURE — 99232 SBSQ HOSP IP/OBS MODERATE 35: CPT | Performed by: NURSE PRACTITIONER

## 2019-10-07 PROCEDURE — 10002807 HB RX 258: Performed by: INTERNAL MEDICINE

## 2019-10-07 PROCEDURE — 10002803 HB RX 637: Performed by: NURSE PRACTITIONER

## 2019-10-07 PROCEDURE — 10002800 HB RX 250 W HCPCS: Performed by: INTERNAL MEDICINE

## 2019-10-07 PROCEDURE — 10004172 HB COUNTER-THERAPY VISIT OT: Performed by: OCCUPATIONAL THERAPIST

## 2019-10-07 PROCEDURE — 10002807 HB RX 258: Performed by: PHYSICIAN ASSISTANT

## 2019-10-07 PROCEDURE — 10002803 HB RX 637: Performed by: INTERNAL MEDICINE

## 2019-10-07 PROCEDURE — 10002800 HB RX 250 W HCPCS: Performed by: NURSE PRACTITIONER

## 2019-10-07 PROCEDURE — 10004651 HB RX, NO CHARGE ITEM: Performed by: INTERNAL MEDICINE

## 2019-10-07 PROCEDURE — C9113 INJ PANTOPRAZOLE SODIUM, VIA: HCPCS | Performed by: NURSE PRACTITIONER

## 2019-10-07 PROCEDURE — 10004327 HB COUNTER ASSESSMENT EA 15 MIN PARENTERAL: Performed by: DIETITIAN, REGISTERED

## 2019-10-07 PROCEDURE — 10002807 HB RX 258: Performed by: HOSPITALIST

## 2019-10-07 PROCEDURE — 99233 SBSQ HOSP IP/OBS HIGH 50: CPT | Performed by: HOSPITALIST

## 2019-10-07 PROCEDURE — 10002800 HB RX 250 W HCPCS: Performed by: PHYSICIAN ASSISTANT

## 2019-10-07 PROCEDURE — 85610 PROTHROMBIN TIME: CPT

## 2019-10-07 PROCEDURE — 84478 ASSAY OF TRIGLYCERIDES: CPT

## 2019-10-07 PROCEDURE — 10000002 HB ROOM CHARGE MED SURG

## 2019-10-07 PROCEDURE — 10002800 HB RX 250 W HCPCS: Performed by: HOSPITALIST

## 2019-10-07 PROCEDURE — 85025 COMPLETE CBC W/AUTO DIFF WBC: CPT

## 2019-10-07 PROCEDURE — 80053 COMPREHEN METABOLIC PANEL: CPT

## 2019-10-07 PROCEDURE — 97530 THERAPEUTIC ACTIVITIES: CPT | Performed by: OCCUPATIONAL THERAPIST

## 2019-10-07 PROCEDURE — 82962 GLUCOSE BLOOD TEST: CPT

## 2019-10-07 PROCEDURE — 97535 SELF CARE MNGMENT TRAINING: CPT | Performed by: OCCUPATIONAL THERAPIST

## 2019-10-07 PROCEDURE — 10002807 HB RX 258: Performed by: RADIOLOGY

## 2019-10-07 PROCEDURE — 99233 SBSQ HOSP IP/OBS HIGH 50: CPT | Performed by: SURGERY

## 2019-10-07 RX ORDER — INSULIN GLARGINE 100 [IU]/ML
35 INJECTION, SOLUTION SUBCUTANEOUS EVERY 12 HOURS SCHEDULED
Status: DISCONTINUED | OUTPATIENT
Start: 2019-10-07 | End: 2019-10-10

## 2019-10-07 RX ADMIN — INSULIN LISPRO 11 UNITS: 100 INJECTION, SOLUTION INTRAVENOUS; SUBCUTANEOUS at 11:56

## 2019-10-07 RX ADMIN — POTASSIUM CHLORIDE 40 MEQ: 14.9 INJECTION, SOLUTION INTRAVENOUS at 20:23

## 2019-10-07 RX ADMIN — ENOXAPARIN SODIUM 40 MG: 40 INJECTION SUBCUTANEOUS at 09:25

## 2019-10-07 RX ADMIN — ASPIRIN 81 MG 81 MG: 81 TABLET ORAL at 09:26

## 2019-10-07 RX ADMIN — HYDROMORPHONE HYDROCHLORIDE 1 MG: 1 INJECTION, SOLUTION INTRAMUSCULAR; INTRAVENOUS; SUBCUTANEOUS at 20:20

## 2019-10-07 RX ADMIN — INSULIN LISPRO 9 UNITS: 100 INJECTION, SOLUTION INTRAVENOUS; SUBCUTANEOUS at 17:59

## 2019-10-07 RX ADMIN — SODIUM CHLORIDE 25 ML: 9 INJECTION, SOLUTION INTRAVENOUS at 20:23

## 2019-10-07 RX ADMIN — PIPERACILLIN SODIUM,TAZOBACTAM SODIUM 3.38 G: 3; .375 INJECTION, POWDER, FOR SOLUTION INTRAVENOUS at 14:40

## 2019-10-07 RX ADMIN — INSULIN GLARGINE 35 UNITS: 100 INJECTION, SOLUTION SUBCUTANEOUS at 18:19

## 2019-10-07 RX ADMIN — SODIUM CHLORIDE 250 ML: 9 INJECTION, SOLUTION INTRAVENOUS at 05:39

## 2019-10-07 RX ADMIN — METOCLOPRAMIDE 5 MG: 5 INJECTION, SOLUTION INTRAMUSCULAR; INTRAVENOUS at 20:20

## 2019-10-07 RX ADMIN — INSULIN GLARGINE 25 UNITS: 100 INJECTION, SOLUTION SUBCUTANEOUS at 05:49

## 2019-10-07 RX ADMIN — PANTOPRAZOLE SODIUM 40 MG: 40 INJECTION, POWDER, LYOPHILIZED, FOR SOLUTION INTRAVENOUS at 20:20

## 2019-10-07 RX ADMIN — LOSARTAN POTASSIUM 50 MG: 50 TABLET, FILM COATED ORAL at 09:25

## 2019-10-07 RX ADMIN — INSULIN LISPRO 9 UNITS: 100 INJECTION, SOLUTION INTRAVENOUS; SUBCUTANEOUS at 05:49

## 2019-10-07 RX ADMIN — PIPERACILLIN SODIUM,TAZOBACTAM SODIUM 3.38 G: 3; .375 INJECTION, POWDER, FOR SOLUTION INTRAVENOUS at 05:44

## 2019-10-07 RX ADMIN — PIPERACILLIN SODIUM,TAZOBACTAM SODIUM 3.38 G: 3; .375 INJECTION, POWDER, FOR SOLUTION INTRAVENOUS at 23:00

## 2019-10-07 RX ADMIN — INSULIN LISPRO 13 UNITS: 100 INJECTION, SOLUTION INTRAVENOUS; SUBCUTANEOUS at 01:22

## 2019-10-07 RX ADMIN — PANTOPRAZOLE SODIUM 40 MG: 40 INJECTION, POWDER, LYOPHILIZED, FOR SOLUTION INTRAVENOUS at 09:25

## 2019-10-07 RX ADMIN — HYDROMORPHONE HYDROCHLORIDE 1 MG: 1 INJECTION, SOLUTION INTRAMUSCULAR; INTRAVENOUS; SUBCUTANEOUS at 14:40

## 2019-10-07 RX ADMIN — POLYETHYLENE GLYCOL 3350 17 G: 17 POWDER, FOR SOLUTION ORAL at 09:25

## 2019-10-07 RX ADMIN — HYDROMORPHONE HYDROCHLORIDE 1 MG: 1 INJECTION, SOLUTION INTRAMUSCULAR; INTRAVENOUS; SUBCUTANEOUS at 05:56

## 2019-10-07 RX ADMIN — ONDANSETRON 4 MG: 2 INJECTION INTRAMUSCULAR; INTRAVENOUS at 17:06

## 2019-10-07 RX ADMIN — LYSINE, LEUCINE, PHENYLALANINE, VALINE, HISTIDINE, ISOLEUCINE, METHIONINE, THREONINE, TRYPTOPHAN, ALANINE, ARGININE, GLYCINE, PROLINE, GLUTAMIC ACID, SERINE, ASPARTIC ACID, TYROSINE
1.18; 1.04; 1.04; 960; 894; 749; 749; 749; 250; 2.17; 1.47; 1.04; 894; 749; 592; 434; 39 INJECTION, SOLUTION INTRAVENOUS at 20:22

## 2019-10-07 RX ADMIN — HYDROMORPHONE HYDROCHLORIDE 1 MG: 1 INJECTION, SOLUTION INTRAMUSCULAR; INTRAVENOUS; SUBCUTANEOUS at 01:25

## 2019-10-07 RX ADMIN — SOYBEAN OIL 250 ML: 20 INJECTION, SOLUTION INTRAVENOUS at 20:22

## 2019-10-07 ASSESSMENT — COGNITIVE AND FUNCTIONAL STATUS - GENERAL
DAILY_ACTIVITY_RAW_SCORE: 16
HELP NEEDED FOR TOILETING: A LOT
HELP NEEDED FOR BATHING: A LOT
HELP NEEDED FOR PERSONAL GROOMING: A LITTLE
HELP NEEDED DRESSING REGULAR LOWER BODY CLOTHING: A LOT
DAILY_ACTIVITY_CONVERTED_SCORE: 35.96
HELP NEEDED DRESSING REGULAR UPPER BODY CLOTHING: A LITTLE

## 2019-10-07 ASSESSMENT — PAIN SCALES - GENERAL
PAINLEVEL_OUTOF10: 9
PAINLEVEL_OUTOF10: 10

## 2019-10-08 ENCOUNTER — ADVANCED DIRECTIVES (OUTPATIENT)
Dept: HEALTH INFORMATION MANAGEMENT | Age: 69
End: 2019-10-08

## 2019-10-08 ENCOUNTER — APPOINTMENT (OUTPATIENT)
Dept: CT IMAGING | Age: 69
DRG: 871 | End: 2019-10-08
Attending: NURSE PRACTITIONER

## 2019-10-08 LAB
ANION GAP SERPL CALC-SCNC: 13 MMOL/L (ref 10–20)
BUN SERPL-MCNC: 6 MG/DL (ref 6–20)
BUN/CREAT SERPL: 13 (ref 7–25)
CALCIUM SERPL-MCNC: 8.2 MG/DL (ref 8.4–10.2)
CHLORIDE SERPL-SCNC: 102 MMOL/L (ref 98–107)
CO2 SERPL-SCNC: 28 MMOL/L (ref 21–32)
CREAT SERPL-MCNC: 0.48 MG/DL (ref 0.51–0.95)
ERYTHROCYTE [DISTWIDTH] IN BLOOD: 14.1 % (ref 11–15)
GLUCOSE BLDC GLUCOMTR-MCNC: 203 MG/DL (ref 70–99)
GLUCOSE BLDC GLUCOMTR-MCNC: 250 MG/DL (ref 70–99)
GLUCOSE BLDC GLUCOMTR-MCNC: 261 MG/DL (ref 70–99)
GLUCOSE BLDC GLUCOMTR-MCNC: 269 MG/DL (ref 70–99)
GLUCOSE SERPL-MCNC: 241 MG/DL (ref 65–99)
HCT VFR BLD CALC: 31.8 % (ref 36–46.5)
HGB BLD-MCNC: 10.4 G/DL (ref 12–15.5)
LACTATE SERPL-SCNC: 1 MMOL/L (ref 0–2)
LIPASE SERPL-CCNC: 219 UNITS/L (ref 73–393)
MAGNESIUM SERPL-MCNC: 1.7 MG/DL (ref 1.7–2.4)
MCH RBC QN AUTO: 28.8 PG (ref 26–34)
MCHC RBC AUTO-ENTMCNC: 32.7 G/DL (ref 32–36.5)
MCV RBC AUTO: 88.1 FL (ref 78–100)
NRBC BLD MANUAL-RTO: 0 /100 WBC
PLATELET # BLD: 262 K/MCL (ref 140–450)
POTASSIUM SERPL-SCNC: 3.6 MMOL/L (ref 3.4–5.1)
RBC # BLD: 3.61 MIL/MCL (ref 4–5.2)
SODIUM SERPL-SCNC: 139 MMOL/L (ref 135–145)
WBC # BLD: 9.8 K/MCL (ref 4.2–11)

## 2019-10-08 PROCEDURE — 10002803 HB RX 637: Performed by: HOSPITALIST

## 2019-10-08 PROCEDURE — 97530 THERAPEUTIC ACTIVITIES: CPT | Performed by: OCCUPATIONAL THERAPIST

## 2019-10-08 PROCEDURE — 10002800 HB RX 250 W HCPCS: Performed by: NURSE PRACTITIONER

## 2019-10-08 PROCEDURE — 10004172 HB COUNTER-THERAPY VISIT OT: Performed by: OCCUPATIONAL THERAPIST

## 2019-10-08 PROCEDURE — 10002800 HB RX 250 W HCPCS: Performed by: PHYSICIAN ASSISTANT

## 2019-10-08 PROCEDURE — 99233 SBSQ HOSP IP/OBS HIGH 50: CPT | Performed by: INTERNAL MEDICINE

## 2019-10-08 PROCEDURE — 97535 SELF CARE MNGMENT TRAINING: CPT | Performed by: OCCUPATIONAL THERAPIST

## 2019-10-08 PROCEDURE — 10002803 HB RX 637: Performed by: INTERNAL MEDICINE

## 2019-10-08 PROCEDURE — 83690 ASSAY OF LIPASE: CPT

## 2019-10-08 PROCEDURE — 97116 GAIT TRAINING THERAPY: CPT

## 2019-10-08 PROCEDURE — 99233 SBSQ HOSP IP/OBS HIGH 50: CPT | Performed by: SURGERY

## 2019-10-08 PROCEDURE — 83605 ASSAY OF LACTIC ACID: CPT

## 2019-10-08 PROCEDURE — 10002807 HB RX 258: Performed by: RADIOLOGY

## 2019-10-08 PROCEDURE — 10004173 HB COUNTER-THERAPY VISIT PT

## 2019-10-08 PROCEDURE — 99233 SBSQ HOSP IP/OBS HIGH 50: CPT | Performed by: HOSPITALIST

## 2019-10-08 PROCEDURE — 10004327 HB COUNTER ASSESSMENT EA 15 MIN PARENTERAL: Performed by: DIETITIAN, REGISTERED

## 2019-10-08 PROCEDURE — 10002807 HB RX 258: Performed by: INTERNAL MEDICINE

## 2019-10-08 PROCEDURE — 10004651 HB RX, NO CHARGE ITEM: Performed by: RADIOLOGY

## 2019-10-08 PROCEDURE — 10004651 HB RX, NO CHARGE ITEM: Performed by: INTERNAL MEDICINE

## 2019-10-08 PROCEDURE — 83735 ASSAY OF MAGNESIUM: CPT

## 2019-10-08 PROCEDURE — 10002800 HB RX 250 W HCPCS: Performed by: INTERNAL MEDICINE

## 2019-10-08 PROCEDURE — 10002807 HB RX 258: Performed by: HOSPITALIST

## 2019-10-08 PROCEDURE — 82962 GLUCOSE BLOOD TEST: CPT

## 2019-10-08 PROCEDURE — 10000002 HB ROOM CHARGE MED SURG

## 2019-10-08 PROCEDURE — C9113 INJ PANTOPRAZOLE SODIUM, VIA: HCPCS | Performed by: NURSE PRACTITIONER

## 2019-10-08 PROCEDURE — 10002807 HB RX 258: Performed by: PHYSICIAN ASSISTANT

## 2019-10-08 PROCEDURE — 80048 BASIC METABOLIC PNL TOTAL CA: CPT

## 2019-10-08 PROCEDURE — 74177 CT ABD & PELVIS W/CONTRAST: CPT | Performed by: RADIOLOGY

## 2019-10-08 PROCEDURE — 10002800 HB RX 250 W HCPCS: Performed by: HOSPITALIST

## 2019-10-08 PROCEDURE — 10002803 HB RX 637: Performed by: NURSE PRACTITIONER

## 2019-10-08 PROCEDURE — 85027 COMPLETE CBC AUTOMATED: CPT

## 2019-10-08 PROCEDURE — 97530 THERAPEUTIC ACTIVITIES: CPT

## 2019-10-08 PROCEDURE — 10002805 HB CONTRAST AGENT: Performed by: RADIOLOGY

## 2019-10-08 PROCEDURE — 74177 CT ABD & PELVIS W/CONTRAST: CPT

## 2019-10-08 RX ADMIN — ONDANSETRON 4 MG: 2 INJECTION INTRAMUSCULAR; INTRAVENOUS at 22:01

## 2019-10-08 RX ADMIN — INSULIN GLARGINE 35 UNITS: 100 INJECTION, SOLUTION SUBCUTANEOUS at 05:08

## 2019-10-08 RX ADMIN — HYDROMORPHONE HYDROCHLORIDE 1 MG: 1 INJECTION, SOLUTION INTRAMUSCULAR; INTRAVENOUS; SUBCUTANEOUS at 00:48

## 2019-10-08 RX ADMIN — MAGNESIUM SULFATE HEPTAHYDRATE 1 G: 1 INJECTION, SOLUTION INTRAVENOUS at 17:31

## 2019-10-08 RX ADMIN — HYDROMORPHONE HYDROCHLORIDE 0.5 MG: 1 INJECTION, SOLUTION INTRAMUSCULAR; INTRAVENOUS; SUBCUTANEOUS at 15:12

## 2019-10-08 RX ADMIN — PIPERACILLIN SODIUM,TAZOBACTAM SODIUM 3.38 G: 3; .375 INJECTION, POWDER, FOR SOLUTION INTRAVENOUS at 13:15

## 2019-10-08 RX ADMIN — HYDROCODONE BITARTRATE AND ACETAMINOPHEN 1 TABLET: 5; 325 TABLET ORAL at 20:47

## 2019-10-08 RX ADMIN — LYSINE, LEUCINE, PHENYLALANINE, VALINE, HISTIDINE, ISOLEUCINE, METHIONINE, THREONINE, TRYPTOPHAN, ALANINE, ARGININE, GLYCINE, PROLINE, GLUTAMIC ACID, SERINE, ASPARTIC ACID, TYROSINE
1.18; 1.04; 1.04; 960; 894; 749; 749; 749; 250; 2.17; 1.47; 1.04; 894; 749; 592; 434; 39 INJECTION, SOLUTION INTRAVENOUS at 20:52

## 2019-10-08 RX ADMIN — SOYBEAN OIL 250 ML: 20 INJECTION, SOLUTION INTRAVENOUS at 20:51

## 2019-10-08 RX ADMIN — PIPERACILLIN SODIUM,TAZOBACTAM SODIUM 3.38 G: 3; .375 INJECTION, POWDER, FOR SOLUTION INTRAVENOUS at 22:01

## 2019-10-08 RX ADMIN — SODIUM CHLORIDE, PRESERVATIVE FREE 2 ML: 5 INJECTION INTRAVENOUS at 09:33

## 2019-10-08 RX ADMIN — ENOXAPARIN SODIUM 40 MG: 40 INJECTION SUBCUTANEOUS at 09:33

## 2019-10-08 RX ADMIN — HYDRALAZINE HYDROCHLORIDE 10 MG: 20 INJECTION INTRAMUSCULAR; INTRAVENOUS at 00:48

## 2019-10-08 RX ADMIN — INSULIN LISPRO 11 UNITS: 100 INJECTION, SOLUTION INTRAVENOUS; SUBCUTANEOUS at 13:14

## 2019-10-08 RX ADMIN — PANTOPRAZOLE SODIUM 40 MG: 40 INJECTION, POWDER, LYOPHILIZED, FOR SOLUTION INTRAVENOUS at 19:46

## 2019-10-08 RX ADMIN — POLYETHYLENE GLYCOL 3350 17 G: 17 POWDER, FOR SOLUTION ORAL at 09:33

## 2019-10-08 RX ADMIN — ONDANSETRON 4 MG: 2 INJECTION INTRAMUSCULAR; INTRAVENOUS at 05:08

## 2019-10-08 RX ADMIN — HYDROMORPHONE HYDROCHLORIDE 1 MG: 1 INJECTION, SOLUTION INTRAMUSCULAR; INTRAVENOUS; SUBCUTANEOUS at 05:08

## 2019-10-08 RX ADMIN — ONDANSETRON 4 MG: 2 INJECTION INTRAMUSCULAR; INTRAVENOUS at 10:36

## 2019-10-08 RX ADMIN — SODIUM CHLORIDE 25 ML: 9 INJECTION, SOLUTION INTRAVENOUS at 05:09

## 2019-10-08 RX ADMIN — SODIUM CHLORIDE 500 ML: 9 INJECTION, SOLUTION INTRAVENOUS at 22:13

## 2019-10-08 RX ADMIN — PANTOPRAZOLE SODIUM 40 MG: 40 INJECTION, POWDER, LYOPHILIZED, FOR SOLUTION INTRAVENOUS at 09:32

## 2019-10-08 RX ADMIN — INSULIN LISPRO 11 UNITS: 100 INJECTION, SOLUTION INTRAVENOUS; SUBCUTANEOUS at 05:08

## 2019-10-08 RX ADMIN — HYDROCODONE BITARTRATE AND ACETAMINOPHEN 1 TABLET: 5; 325 TABLET ORAL at 13:29

## 2019-10-08 RX ADMIN — HYDRALAZINE HYDROCHLORIDE 10 MG: 20 INJECTION INTRAMUSCULAR; INTRAVENOUS at 17:31

## 2019-10-08 RX ADMIN — HYDROMORPHONE HYDROCHLORIDE 0.5 MG: 1 INJECTION, SOLUTION INTRAMUSCULAR; INTRAVENOUS; SUBCUTANEOUS at 19:43

## 2019-10-08 RX ADMIN — PIPERACILLIN SODIUM,TAZOBACTAM SODIUM 3.38 G: 3; .375 INJECTION, POWDER, FOR SOLUTION INTRAVENOUS at 05:09

## 2019-10-08 RX ADMIN — HYDROMORPHONE HYDROCHLORIDE 1 MG: 1 INJECTION, SOLUTION INTRAMUSCULAR; INTRAVENOUS; SUBCUTANEOUS at 10:41

## 2019-10-08 RX ADMIN — INSULIN LISPRO 9 UNITS: 100 INJECTION, SOLUTION INTRAVENOUS; SUBCUTANEOUS at 00:48

## 2019-10-08 RX ADMIN — LOSARTAN POTASSIUM 50 MG: 50 TABLET, FILM COATED ORAL at 09:32

## 2019-10-08 RX ADMIN — INSULIN GLARGINE 35 UNITS: 100 INJECTION, SOLUTION SUBCUTANEOUS at 17:49

## 2019-10-08 RX ADMIN — ASPIRIN 81 MG 81 MG: 81 TABLET ORAL at 09:33

## 2019-10-08 RX ADMIN — ACETAMINOPHEN 650 MG: 325 TABLET ORAL at 09:32

## 2019-10-08 RX ADMIN — INSULIN LISPRO 9 UNITS: 100 INJECTION, SOLUTION INTRAVENOUS; SUBCUTANEOUS at 17:49

## 2019-10-08 RX ADMIN — IOPAMIDOL 100 ML: 612 INJECTION, SOLUTION INTRAVENOUS at 11:30

## 2019-10-08 RX ADMIN — SODIUM CHLORIDE, PRESERVATIVE FREE 2 ML: 5 INJECTION INTRAVENOUS at 21:05

## 2019-10-08 RX ADMIN — SODIUM CHLORIDE 100 ML: 900 INJECTION INTRAVENOUS at 11:30

## 2019-10-08 RX ADMIN — POTASSIUM CHLORIDE 20 MEQ: 14.9 INJECTION, SOLUTION INTRAVENOUS at 19:55

## 2019-10-08 ASSESSMENT — PAIN SCALES - GENERAL
PAINLEVEL_OUTOF10: 10
PAINLEVEL_OUTOF10: 9

## 2019-10-08 ASSESSMENT — COGNITIVE AND FUNCTIONAL STATUS - GENERAL
HELP NEEDED FOR TOILETING: A LOT
HELP NEEDED DRESSING REGULAR UPPER BODY CLOTHING: A LITTLE
HELP NEEDED FOR PERSONAL GROOMING: A LITTLE
HELP NEEDED FOR BATHING: A LOT
DAILY_ACTIVITY_RAW_SCORE: 16
HELP NEEDED DRESSING REGULAR LOWER BODY CLOTHING: A LOT
BASIC_MOBILITY_CONVERTED_SCORE: 33.99
DAILY_ACTIVITY_CONVERTED_SCORE: 35.96
BASIC_MOBILITY_RAW_SCORE: 13

## 2019-10-09 ENCOUNTER — APPOINTMENT (OUTPATIENT)
Dept: GENERAL RADIOLOGY | Age: 69
DRG: 871 | End: 2019-10-09
Attending: NURSE PRACTITIONER

## 2019-10-09 DIAGNOSIS — K81.0 ACUTE CHOLECYSTITIS: Primary | ICD-10-CM

## 2019-10-09 LAB
GLUCOSE BLDC GLUCOMTR-MCNC: 131 MG/DL (ref 70–99)
GLUCOSE BLDC GLUCOMTR-MCNC: 167 MG/DL (ref 70–99)
GLUCOSE BLDC GLUCOMTR-MCNC: 196 MG/DL (ref 70–99)
GLUCOSE BLDC GLUCOMTR-MCNC: 199 MG/DL (ref 70–99)
GLUCOSE BLDC GLUCOMTR-MCNC: 204 MG/DL (ref 70–99)
GLUCOSE BLDC GLUCOMTR-MCNC: 243 MG/DL (ref 70–99)
GLUCOSE BLDC GLUCOMTR-MCNC: 290 MG/DL (ref 70–99)
MAGNESIUM SERPL-MCNC: 2.1 MG/DL (ref 1.7–2.4)
NT-PROBNP SERPL-MCNC: 154 PG/ML
POTASSIUM SERPL-SCNC: 3.7 MMOL/L (ref 3.4–5.1)
POTASSIUM SERPL-SCNC: 3.7 MMOL/L (ref 3.4–5.1)

## 2019-10-09 PROCEDURE — 83735 ASSAY OF MAGNESIUM: CPT

## 2019-10-09 PROCEDURE — 10004281 HB COUNTER-STAFF TIME PER 15 MIN: Performed by: GENERAL ACUTE CARE HOSPITAL

## 2019-10-09 PROCEDURE — 83880 ASSAY OF NATRIURETIC PEPTIDE: CPT

## 2019-10-09 PROCEDURE — 10002800 HB RX 250 W HCPCS: Performed by: HOSPITALIST

## 2019-10-09 PROCEDURE — C9113 INJ PANTOPRAZOLE SODIUM, VIA: HCPCS | Performed by: NURSE PRACTITIONER

## 2019-10-09 PROCEDURE — 99232 SBSQ HOSP IP/OBS MODERATE 35: CPT | Performed by: NURSE PRACTITIONER

## 2019-10-09 PROCEDURE — 10002800 HB RX 250 W HCPCS: Performed by: PHYSICIAN ASSISTANT

## 2019-10-09 PROCEDURE — 10004651 HB RX, NO CHARGE ITEM: Performed by: INTERNAL MEDICINE

## 2019-10-09 PROCEDURE — 10004325 HB COUNTER ASSESSMENT EA 15 MIN: Performed by: GENERAL ACUTE CARE HOSPITAL

## 2019-10-09 PROCEDURE — 84132 ASSAY OF SERUM POTASSIUM: CPT

## 2019-10-09 PROCEDURE — 10004651 HB RX, NO CHARGE ITEM: Performed by: RADIOLOGY

## 2019-10-09 PROCEDURE — 10002800 HB RX 250 W HCPCS: Performed by: NURSE PRACTITIONER

## 2019-10-09 PROCEDURE — 99232 SBSQ HOSP IP/OBS MODERATE 35: CPT | Performed by: HOSPITALIST

## 2019-10-09 PROCEDURE — 97535 SELF CARE MNGMENT TRAINING: CPT | Performed by: OCCUPATIONAL THERAPIST

## 2019-10-09 PROCEDURE — 10002800 HB RX 250 W HCPCS: Performed by: INTERNAL MEDICINE

## 2019-10-09 PROCEDURE — 10002807 HB RX 258: Performed by: INTERNAL MEDICINE

## 2019-10-09 PROCEDURE — 82962 GLUCOSE BLOOD TEST: CPT

## 2019-10-09 PROCEDURE — 99232 SBSQ HOSP IP/OBS MODERATE 35: CPT | Performed by: PHYSICIAN ASSISTANT

## 2019-10-09 PROCEDURE — 97530 THERAPEUTIC ACTIVITIES: CPT | Performed by: OCCUPATIONAL THERAPIST

## 2019-10-09 PROCEDURE — 10002807 HB RX 258: Performed by: PHYSICIAN ASSISTANT

## 2019-10-09 PROCEDURE — 36415 COLL VENOUS BLD VENIPUNCTURE: CPT

## 2019-10-09 PROCEDURE — 10002803 HB RX 637: Performed by: INTERNAL MEDICINE

## 2019-10-09 PROCEDURE — 10004172 HB COUNTER-THERAPY VISIT OT: Performed by: OCCUPATIONAL THERAPIST

## 2019-10-09 PROCEDURE — 10002803 HB RX 637: Performed by: HOSPITALIST

## 2019-10-09 PROCEDURE — 10000002 HB ROOM CHARGE MED SURG

## 2019-10-09 PROCEDURE — 99233 SBSQ HOSP IP/OBS HIGH 50: CPT | Performed by: SURGERY

## 2019-10-09 PROCEDURE — 10002803 HB RX 637: Performed by: NURSE PRACTITIONER

## 2019-10-09 RX ORDER — LIDOCAINE HYDROCHLORIDE 20 MG/ML
10 JELLY TOPICAL ONCE
Status: DISCONTINUED | OUTPATIENT
Start: 2019-10-09 | End: 2019-10-15 | Stop reason: HOSPADM

## 2019-10-09 RX ORDER — HYDROCODONE BITARTRATE AND ACETAMINOPHEN 5; 325 MG/1; MG/1
1 TABLET ORAL EVERY 8 HOURS PRN
Status: DISCONTINUED | OUTPATIENT
Start: 2019-10-09 | End: 2019-10-10

## 2019-10-09 RX ORDER — ONDANSETRON 2 MG/ML
4 INJECTION INTRAMUSCULAR; INTRAVENOUS 2 TIMES DAILY
Status: DISCONTINUED | OUTPATIENT
Start: 2019-10-09 | End: 2019-10-10

## 2019-10-09 RX ORDER — INSULIN GLARGINE 100 [IU]/ML
20 INJECTION, SOLUTION SUBCUTANEOUS ONCE
Status: COMPLETED | OUTPATIENT
Start: 2019-10-09 | End: 2019-10-09

## 2019-10-09 RX ORDER — FUROSEMIDE 40 MG/1
40 TABLET ORAL DAILY
Status: DISCONTINUED | OUTPATIENT
Start: 2019-10-09 | End: 2019-10-15 | Stop reason: HOSPADM

## 2019-10-09 RX ADMIN — HYDROMORPHONE HYDROCHLORIDE 1 MG: 1 INJECTION, SOLUTION INTRAMUSCULAR; INTRAVENOUS; SUBCUTANEOUS at 01:06

## 2019-10-09 RX ADMIN — ASPIRIN 81 MG 81 MG: 81 TABLET ORAL at 08:17

## 2019-10-09 RX ADMIN — HYDROMORPHONE HYDROCHLORIDE 1 MG: 1 INJECTION, SOLUTION INTRAMUSCULAR; INTRAVENOUS; SUBCUTANEOUS at 04:42

## 2019-10-09 RX ADMIN — SODIUM CHLORIDE, PRESERVATIVE FREE 2 ML: 5 INJECTION INTRAVENOUS at 19:56

## 2019-10-09 RX ADMIN — HYDROCODONE BITARTRATE AND ACETAMINOPHEN 1 TABLET: 5; 325 TABLET ORAL at 08:42

## 2019-10-09 RX ADMIN — ONDANSETRON 4 MG: 2 INJECTION INTRAMUSCULAR; INTRAVENOUS at 08:17

## 2019-10-09 RX ADMIN — FUROSEMIDE 40 MG: 40 TABLET ORAL at 12:55

## 2019-10-09 RX ADMIN — INSULIN LISPRO 7 UNITS: 100 INJECTION, SOLUTION INTRAVENOUS; SUBCUTANEOUS at 12:54

## 2019-10-09 RX ADMIN — METOCLOPRAMIDE 5 MG: 5 INJECTION, SOLUTION INTRAMUSCULAR; INTRAVENOUS at 01:06

## 2019-10-09 RX ADMIN — LOSARTAN POTASSIUM 50 MG: 50 TABLET, FILM COATED ORAL at 08:16

## 2019-10-09 RX ADMIN — POTASSIUM CHLORIDE 20 MEQ: 14.9 INJECTION, SOLUTION INTRAVENOUS at 18:11

## 2019-10-09 RX ADMIN — HYDRALAZINE HYDROCHLORIDE 10 MG: 20 INJECTION INTRAMUSCULAR; INTRAVENOUS at 10:48

## 2019-10-09 RX ADMIN — HYDRALAZINE HYDROCHLORIDE 10 MG: 20 INJECTION INTRAMUSCULAR; INTRAVENOUS at 18:05

## 2019-10-09 RX ADMIN — HYDROCODONE BITARTRATE AND ACETAMINOPHEN 1 TABLET: 5; 325 TABLET ORAL at 18:08

## 2019-10-09 RX ADMIN — INSULIN GLARGINE 20 UNITS: 100 INJECTION, SOLUTION SUBCUTANEOUS at 19:55

## 2019-10-09 RX ADMIN — INSULIN LISPRO 11 UNITS: 100 INJECTION, SOLUTION INTRAVENOUS; SUBCUTANEOUS at 08:42

## 2019-10-09 RX ADMIN — ENOXAPARIN SODIUM 40 MG: 40 INJECTION SUBCUTANEOUS at 08:29

## 2019-10-09 RX ADMIN — POLYETHYLENE GLYCOL 3350 17 G: 17 POWDER, FOR SOLUTION ORAL at 08:44

## 2019-10-09 RX ADMIN — ONDANSETRON 4 MG: 2 INJECTION INTRAMUSCULAR; INTRAVENOUS at 18:01

## 2019-10-09 RX ADMIN — POTASSIUM CHLORIDE 20 MEQ: 14.9 INJECTION, SOLUTION INTRAVENOUS at 02:54

## 2019-10-09 RX ADMIN — PIPERACILLIN SODIUM,TAZOBACTAM SODIUM 3.38 G: 3; .375 INJECTION, POWDER, FOR SOLUTION INTRAVENOUS at 05:59

## 2019-10-09 RX ADMIN — PANTOPRAZOLE SODIUM 40 MG: 40 INJECTION, POWDER, LYOPHILIZED, FOR SOLUTION INTRAVENOUS at 08:07

## 2019-10-09 RX ADMIN — SODIUM CHLORIDE, PRESERVATIVE FREE 2 ML: 5 INJECTION INTRAVENOUS at 08:44

## 2019-10-09 RX ADMIN — SODIUM CHLORIDE 500 ML: 9 INJECTION, SOLUTION INTRAVENOUS at 18:17

## 2019-10-09 RX ADMIN — PANTOPRAZOLE SODIUM 40 MG: 40 INJECTION, POWDER, LYOPHILIZED, FOR SOLUTION INTRAVENOUS at 20:01

## 2019-10-09 RX ADMIN — INSULIN GLARGINE 35 UNITS: 100 INJECTION, SOLUTION SUBCUTANEOUS at 06:01

## 2019-10-09 ASSESSMENT — COGNITIVE AND FUNCTIONAL STATUS - GENERAL
DAILY_ACTIVITY_RAW_SCORE: 16
HELP NEEDED DRESSING REGULAR UPPER BODY CLOTHING: A LITTLE
HELP NEEDED FOR BATHING: A LOT
HELP NEEDED FOR PERSONAL GROOMING: A LITTLE
DAILY_ACTIVITY_CONVERTED_SCORE: 35.96
HELP NEEDED DRESSING REGULAR LOWER BODY CLOTHING: A LOT
HELP NEEDED FOR TOILETING: A LOT

## 2019-10-09 ASSESSMENT — PAIN SCALES - GENERAL
PAINLEVEL_OUTOF10: 7
PAINLEVEL_OUTOF10: 10
PAINLEVEL_OUTOF10: 8
PAINLEVEL_OUTOF10: 10
PAINLEVEL_OUTOF10: 8
PAINLEVEL_OUTOF10: 10
PAINLEVEL_OUTOF10: 8
PAINLEVEL_OUTOF10: 10

## 2019-10-10 LAB
ANION GAP SERPL CALC-SCNC: 13 MMOL/L (ref 10–20)
BASOPHILS # BLD AUTO: 0 K/MCL (ref 0–0.3)
BASOPHILS NFR BLD AUTO: 0 %
BUN SERPL-MCNC: 6 MG/DL (ref 6–20)
BUN/CREAT SERPL: 12 (ref 7–25)
CALCIUM SERPL-MCNC: 8.2 MG/DL (ref 8.4–10.2)
CHLORIDE SERPL-SCNC: 102 MMOL/L (ref 98–107)
CO2 SERPL-SCNC: 27 MMOL/L (ref 21–32)
CREAT SERPL-MCNC: 0.5 MG/DL (ref 0.51–0.95)
DIFFERENTIAL METHOD BLD: ABNORMAL
EOSINOPHIL # BLD AUTO: 0.1 K/MCL (ref 0.1–0.5)
EOSINOPHIL NFR SPEC: 1 %
ERYTHROCYTE [DISTWIDTH] IN BLOOD: 14.7 % (ref 11–15)
GLUCOSE BLDC GLUCOMTR-MCNC: 129 MG/DL (ref 70–99)
GLUCOSE BLDC GLUCOMTR-MCNC: 155 MG/DL (ref 70–99)
GLUCOSE BLDC GLUCOMTR-MCNC: 185 MG/DL (ref 70–99)
GLUCOSE BLDC GLUCOMTR-MCNC: 187 MG/DL (ref 70–99)
GLUCOSE SERPL-MCNC: 141 MG/DL (ref 65–99)
HCT VFR BLD CALC: 31.9 % (ref 36–46.5)
HGB BLD-MCNC: 10.5 G/DL (ref 12–15.5)
IMM GRANULOCYTES # BLD AUTO: 0.1 K/MCL (ref 0–0.2)
IMM GRANULOCYTES NFR BLD: 1 %
LYMPHOCYTES # BLD MANUAL: 1.8 K/MCL (ref 1–4)
LYMPHOCYTES NFR BLD MANUAL: 19 %
MCH RBC QN AUTO: 28.6 PG (ref 26–34)
MCHC RBC AUTO-ENTMCNC: 32.9 G/DL (ref 32–36.5)
MCV RBC AUTO: 86.9 FL (ref 78–100)
MONOCYTES # BLD MANUAL: 0.8 K/MCL (ref 0.3–0.9)
MONOCYTES NFR BLD MANUAL: 8 %
NEUTROPHILS # BLD: 6.7 K/MCL (ref 1.8–7.7)
NEUTROPHILS NFR BLD AUTO: 71 %
NRBC BLD MANUAL-RTO: 0 /100 WBC
PLATELET # BLD: 301 K/MCL (ref 140–450)
POTASSIUM SERPL-SCNC: 3.5 MMOL/L (ref 3.4–5.1)
POTASSIUM SERPL-SCNC: 3.8 MMOL/L (ref 3.4–5.1)
POTASSIUM SERPL-SCNC: 4 MMOL/L (ref 3.4–5.1)
RBC # BLD: 3.67 MIL/MCL (ref 4–5.2)
SODIUM SERPL-SCNC: 138 MMOL/L (ref 135–145)
WBC # BLD: 9.5 K/MCL (ref 4.2–11)

## 2019-10-10 PROCEDURE — 10002803 HB RX 637: Performed by: NURSE PRACTITIONER

## 2019-10-10 PROCEDURE — 10002800 HB RX 250 W HCPCS: Performed by: INTERNAL MEDICINE

## 2019-10-10 PROCEDURE — 10002800 HB RX 250 W HCPCS: Performed by: NURSE PRACTITIONER

## 2019-10-10 PROCEDURE — 97116 GAIT TRAINING THERAPY: CPT

## 2019-10-10 PROCEDURE — 10004281 HB COUNTER-STAFF TIME PER 15 MIN: Performed by: GENERAL ACUTE CARE HOSPITAL

## 2019-10-10 PROCEDURE — 10000002 HB ROOM CHARGE MED SURG

## 2019-10-10 PROCEDURE — 99232 SBSQ HOSP IP/OBS MODERATE 35: CPT | Performed by: HOSPITALIST

## 2019-10-10 PROCEDURE — 85025 COMPLETE CBC W/AUTO DIFF WBC: CPT

## 2019-10-10 PROCEDURE — 10002800 HB RX 250 W HCPCS: Performed by: HOSPITALIST

## 2019-10-10 PROCEDURE — 10004651 HB RX, NO CHARGE ITEM: Performed by: RADIOLOGY

## 2019-10-10 PROCEDURE — 80048 BASIC METABOLIC PNL TOTAL CA: CPT

## 2019-10-10 PROCEDURE — 87493 C DIFF AMPLIFIED PROBE: CPT

## 2019-10-10 PROCEDURE — 84132 ASSAY OF SERUM POTASSIUM: CPT

## 2019-10-10 PROCEDURE — 10002803 HB RX 637: Performed by: HOSPITALIST

## 2019-10-10 PROCEDURE — 10002803 HB RX 637: Performed by: INTERNAL MEDICINE

## 2019-10-10 PROCEDURE — C9113 INJ PANTOPRAZOLE SODIUM, VIA: HCPCS | Performed by: NURSE PRACTITIONER

## 2019-10-10 PROCEDURE — 82962 GLUCOSE BLOOD TEST: CPT

## 2019-10-10 PROCEDURE — 10004325 HB COUNTER ASSESSMENT EA 15 MIN: Performed by: GENERAL ACUTE CARE HOSPITAL

## 2019-10-10 PROCEDURE — 97530 THERAPEUTIC ACTIVITIES: CPT

## 2019-10-10 PROCEDURE — 10002801 HB RX 250 W/O HCPCS: Performed by: NURSE PRACTITIONER

## 2019-10-10 PROCEDURE — 10004651 HB RX, NO CHARGE ITEM: Performed by: INTERNAL MEDICINE

## 2019-10-10 PROCEDURE — 94640 AIRWAY INHALATION TREATMENT: CPT

## 2019-10-10 PROCEDURE — 10004173 HB COUNTER-THERAPY VISIT PT

## 2019-10-10 RX ORDER — ONDANSETRON 4 MG/1
4 TABLET, ORALLY DISINTEGRATING ORAL ONCE
Status: COMPLETED | OUTPATIENT
Start: 2019-10-10 | End: 2019-10-10

## 2019-10-10 RX ORDER — FUROSEMIDE 10 MG/ML
40 INJECTION INTRAMUSCULAR; INTRAVENOUS ONCE
Status: COMPLETED | OUTPATIENT
Start: 2019-10-10 | End: 2019-10-10

## 2019-10-10 RX ORDER — NICOTINE POLACRILEX 4 MG
30 LOZENGE BUCCAL PRN
Status: DISCONTINUED | OUTPATIENT
Start: 2019-10-10 | End: 2019-10-15 | Stop reason: HOSPADM

## 2019-10-10 RX ORDER — DEXTROSE MONOHYDRATE 50 MG/ML
INJECTION, SOLUTION INTRAVENOUS CONTINUOUS PRN
Status: DISCONTINUED | OUTPATIENT
Start: 2019-10-10 | End: 2019-10-10 | Stop reason: SDUPTHER

## 2019-10-10 RX ORDER — DEXTROSE MONOHYDRATE 25 G/50ML
12.5 INJECTION, SOLUTION INTRAVENOUS PRN
Status: DISCONTINUED | OUTPATIENT
Start: 2019-10-10 | End: 2019-10-15 | Stop reason: HOSPADM

## 2019-10-10 RX ORDER — LOSARTAN POTASSIUM 50 MG/1
50 TABLET ORAL ONCE
Status: COMPLETED | OUTPATIENT
Start: 2019-10-10 | End: 2019-10-10

## 2019-10-10 RX ORDER — LOSARTAN POTASSIUM 50 MG/1
100 TABLET ORAL DAILY
Status: DISCONTINUED | OUTPATIENT
Start: 2019-10-11 | End: 2019-10-15 | Stop reason: HOSPADM

## 2019-10-10 RX ORDER — INSULIN GLARGINE 100 [IU]/ML
15 INJECTION, SOLUTION SUBCUTANEOUS EVERY 12 HOURS SCHEDULED
Status: DISCONTINUED | OUTPATIENT
Start: 2019-10-10 | End: 2019-10-15 | Stop reason: HOSPADM

## 2019-10-10 RX ORDER — HYDROCODONE BITARTRATE AND ACETAMINOPHEN 5; 325 MG/1; MG/1
1 TABLET ORAL EVERY 12 HOURS PRN
Status: DISCONTINUED | OUTPATIENT
Start: 2019-10-10 | End: 2019-10-15 | Stop reason: HOSPADM

## 2019-10-10 RX ORDER — NICOTINE POLACRILEX 4 MG
15 LOZENGE BUCCAL PRN
Status: DISCONTINUED | OUTPATIENT
Start: 2019-10-10 | End: 2019-10-10 | Stop reason: SDUPTHER

## 2019-10-10 RX ORDER — DEXTROSE MONOHYDRATE 25 G/50ML
25 INJECTION, SOLUTION INTRAVENOUS PRN
Status: DISCONTINUED | OUTPATIENT
Start: 2019-10-10 | End: 2019-10-10 | Stop reason: SDUPTHER

## 2019-10-10 RX ORDER — IPRATROPIUM BROMIDE AND ALBUTEROL SULFATE 2.5; .5 MG/3ML; MG/3ML
3 SOLUTION RESPIRATORY (INHALATION)
Status: DISCONTINUED | OUTPATIENT
Start: 2019-10-10 | End: 2019-10-11

## 2019-10-10 RX ADMIN — ASPIRIN 81 MG 81 MG: 81 TABLET ORAL at 10:17

## 2019-10-10 RX ADMIN — POLYETHYLENE GLYCOL 3350 17 G: 17 POWDER, FOR SOLUTION ORAL at 10:17

## 2019-10-10 RX ADMIN — SODIUM CHLORIDE, PRESERVATIVE FREE 2 ML: 5 INJECTION INTRAVENOUS at 12:25

## 2019-10-10 RX ADMIN — LOSARTAN POTASSIUM 50 MG: 50 TABLET, FILM COATED ORAL at 11:01

## 2019-10-10 RX ADMIN — ALTEPLASE 2 MG: 2.2 INJECTION, POWDER, LYOPHILIZED, FOR SOLUTION INTRAVENOUS at 14:17

## 2019-10-10 RX ADMIN — ONDANSETRON 4 MG: 4 TABLET, ORALLY DISINTEGRATING ORAL at 10:04

## 2019-10-10 RX ADMIN — POTASSIUM CHLORIDE 20 MEQ: 14.9 INJECTION, SOLUTION INTRAVENOUS at 04:01

## 2019-10-10 RX ADMIN — HYDROCODONE BITARTRATE AND ACETAMINOPHEN 1 TABLET: 5; 325 TABLET ORAL at 08:35

## 2019-10-10 RX ADMIN — PANTOPRAZOLE SODIUM 40 MG: 40 INJECTION, POWDER, LYOPHILIZED, FOR SOLUTION INTRAVENOUS at 10:04

## 2019-10-10 RX ADMIN — INSULIN GLARGINE 15 UNITS: 100 INJECTION, SOLUTION SUBCUTANEOUS at 21:39

## 2019-10-10 RX ADMIN — IPRATROPIUM BROMIDE AND ALBUTEROL SULFATE 3 ML: 2.5; .5 SOLUTION RESPIRATORY (INHALATION) at 20:17

## 2019-10-10 RX ADMIN — HYDROCODONE BITARTRATE AND ACETAMINOPHEN 1 TABLET: 5; 325 TABLET ORAL at 21:57

## 2019-10-10 RX ADMIN — HYDRALAZINE HYDROCHLORIDE 10 MG: 20 INJECTION INTRAMUSCULAR; INTRAVENOUS at 12:23

## 2019-10-10 RX ADMIN — FUROSEMIDE 40 MG: 10 INJECTION, SOLUTION INTRAMUSCULAR; INTRAVENOUS at 15:02

## 2019-10-10 RX ADMIN — ALTEPLASE 2 MG: 2.2 INJECTION, POWDER, LYOPHILIZED, FOR SOLUTION INTRAVENOUS at 14:11

## 2019-10-10 RX ADMIN — FUROSEMIDE 40 MG: 40 TABLET ORAL at 10:17

## 2019-10-10 RX ADMIN — LOSARTAN POTASSIUM 50 MG: 50 TABLET, FILM COATED ORAL at 10:17

## 2019-10-10 RX ADMIN — PANTOPRAZOLE SODIUM 40 MG: 40 INJECTION, POWDER, LYOPHILIZED, FOR SOLUTION INTRAVENOUS at 21:39

## 2019-10-10 RX ADMIN — METOCLOPRAMIDE 5 MG: 5 INJECTION, SOLUTION INTRAMUSCULAR; INTRAVENOUS at 21:55

## 2019-10-10 RX ADMIN — POTASSIUM CHLORIDE 20 MEQ: 20 TABLET, EXTENDED RELEASE ORAL at 11:01

## 2019-10-10 RX ADMIN — SODIUM CHLORIDE, PRESERVATIVE FREE 2 ML: 5 INJECTION INTRAVENOUS at 21:39

## 2019-10-10 RX ADMIN — METOCLOPRAMIDE 5 MG: 5 INJECTION, SOLUTION INTRAMUSCULAR; INTRAVENOUS at 14:29

## 2019-10-10 RX ADMIN — ENOXAPARIN SODIUM 40 MG: 40 INJECTION SUBCUTANEOUS at 10:16

## 2019-10-10 RX ADMIN — IPRATROPIUM BROMIDE AND ALBUTEROL SULFATE 3 ML: 2.5; .5 SOLUTION RESPIRATORY (INHALATION) at 15:05

## 2019-10-10 ASSESSMENT — COGNITIVE AND FUNCTIONAL STATUS - GENERAL
BASIC_MOBILITY_RAW_SCORE: 15
BASIC_MOBILITY_CONVERTED_SCORE: 36.97

## 2019-10-10 ASSESSMENT — PAIN SCALES - GENERAL
PAINLEVEL_OUTOF10: 10
PAINLEVEL_OUTOF10: 9
PAINLEVEL_OUTOF10: 10
PAINLEVEL_OUTOF10: 9

## 2019-10-11 LAB
C DIFF DNA SPEC QL NAA+PROBE: NOT DETECTED
GLUCOSE BLDC GLUCOMTR-MCNC: 132 MG/DL (ref 70–99)
GLUCOSE BLDC GLUCOMTR-MCNC: 138 MG/DL (ref 70–99)
GLUCOSE BLDC GLUCOMTR-MCNC: 200 MG/DL (ref 70–99)
GLUCOSE BLDC GLUCOMTR-MCNC: 222 MG/DL (ref 70–99)
POTASSIUM SERPL-SCNC: 3.5 MMOL/L (ref 3.4–5.1)
POTASSIUM SERPL-SCNC: 3.7 MMOL/L (ref 3.4–5.1)
SPECIMEN SOURCE: NORMAL

## 2019-10-11 PROCEDURE — 10004281 HB COUNTER-STAFF TIME PER 15 MIN

## 2019-10-11 PROCEDURE — 84132 ASSAY OF SERUM POTASSIUM: CPT

## 2019-10-11 PROCEDURE — 10002800 HB RX 250 W HCPCS: Performed by: NURSE PRACTITIONER

## 2019-10-11 PROCEDURE — 10004173 HB COUNTER-THERAPY VISIT PT: Performed by: PHYSICAL THERAPY ASSISTANT

## 2019-10-11 PROCEDURE — 99232 SBSQ HOSP IP/OBS MODERATE 35: CPT | Performed by: HOSPITALIST

## 2019-10-11 PROCEDURE — 10004651 HB RX, NO CHARGE ITEM: Performed by: INTERNAL MEDICINE

## 2019-10-11 PROCEDURE — 97530 THERAPEUTIC ACTIVITIES: CPT | Performed by: PHYSICAL THERAPY ASSISTANT

## 2019-10-11 PROCEDURE — 10002803 HB RX 637: Performed by: HOSPITALIST

## 2019-10-11 PROCEDURE — 10002803 HB RX 637: Performed by: NURSE PRACTITIONER

## 2019-10-11 PROCEDURE — 10004651 HB RX, NO CHARGE ITEM: Performed by: RADIOLOGY

## 2019-10-11 PROCEDURE — C9113 INJ PANTOPRAZOLE SODIUM, VIA: HCPCS | Performed by: NURSE PRACTITIONER

## 2019-10-11 PROCEDURE — 10002800 HB RX 250 W HCPCS: Performed by: INTERNAL MEDICINE

## 2019-10-11 PROCEDURE — 10004172 HB COUNTER-THERAPY VISIT OT: Performed by: OCCUPATIONAL THERAPIST

## 2019-10-11 PROCEDURE — 10002803 HB RX 637: Performed by: INTERNAL MEDICINE

## 2019-10-11 PROCEDURE — 10000002 HB ROOM CHARGE MED SURG

## 2019-10-11 PROCEDURE — 97116 GAIT TRAINING THERAPY: CPT | Performed by: PHYSICAL THERAPY ASSISTANT

## 2019-10-11 PROCEDURE — 97535 SELF CARE MNGMENT TRAINING: CPT | Performed by: OCCUPATIONAL THERAPIST

## 2019-10-11 PROCEDURE — 82962 GLUCOSE BLOOD TEST: CPT

## 2019-10-11 RX ORDER — ASPIRIN 81 MG/1
81 TABLET, CHEWABLE ORAL DAILY
Qty: 30 TABLET | Refills: 0 | Status: SHIPPED | COMMUNITY
Start: 2019-10-12

## 2019-10-11 RX ORDER — ONDANSETRON 4 MG/1
4 TABLET, FILM COATED ORAL EVERY 12 HOURS PRN
Qty: 20 TABLET | Refills: 0 | Status: SHIPPED
Start: 2019-10-11

## 2019-10-11 RX ORDER — INSULIN GLARGINE 100 [IU]/ML
15 INJECTION, SOLUTION SUBCUTANEOUS EVERY 12 HOURS SCHEDULED
Qty: 10 ML | Refills: 12 | Status: SHIPPED | OUTPATIENT
Start: 2019-10-11

## 2019-10-11 RX ORDER — ACETAMINOPHEN 325 MG/1
650 TABLET ORAL EVERY 4 HOURS PRN
Qty: 30 TABLET | Refills: 0 | Status: SHIPPED | OUTPATIENT
Start: 2019-10-11

## 2019-10-11 RX ORDER — LOSARTAN POTASSIUM 100 MG/1
100 TABLET ORAL DAILY
Qty: 30 TABLET | Refills: 0 | Status: SHIPPED | COMMUNITY
Start: 2019-10-12

## 2019-10-11 RX ORDER — IPRATROPIUM BROMIDE AND ALBUTEROL SULFATE 2.5; .5 MG/3ML; MG/3ML
3 SOLUTION RESPIRATORY (INHALATION)
Status: DISCONTINUED | OUTPATIENT
Start: 2019-10-11 | End: 2019-10-11

## 2019-10-11 RX ADMIN — INSULIN LISPRO 2 UNITS: 100 INJECTION, SOLUTION INTRAVENOUS; SUBCUTANEOUS at 17:53

## 2019-10-11 RX ADMIN — FUROSEMIDE 40 MG: 40 TABLET ORAL at 08:23

## 2019-10-11 RX ADMIN — PANTOPRAZOLE SODIUM 40 MG: 40 INJECTION, POWDER, LYOPHILIZED, FOR SOLUTION INTRAVENOUS at 20:24

## 2019-10-11 RX ADMIN — ASPIRIN 81 MG 81 MG: 81 TABLET ORAL at 08:23

## 2019-10-11 RX ADMIN — ENOXAPARIN SODIUM 40 MG: 40 INJECTION SUBCUTANEOUS at 08:23

## 2019-10-11 RX ADMIN — SODIUM CHLORIDE, PRESERVATIVE FREE 2 ML: 5 INJECTION INTRAVENOUS at 20:25

## 2019-10-11 RX ADMIN — POTASSIUM CHLORIDE 20 MEQ: 20 TABLET, EXTENDED RELEASE ORAL at 13:06

## 2019-10-11 RX ADMIN — INSULIN LISPRO 4 UNITS: 100 INJECTION, SOLUTION INTRAVENOUS; SUBCUTANEOUS at 13:06

## 2019-10-11 RX ADMIN — POTASSIUM CHLORIDE 20 MEQ: 20 TABLET, EXTENDED RELEASE ORAL at 21:46

## 2019-10-11 RX ADMIN — INSULIN GLARGINE 15 UNITS: 100 INJECTION, SOLUTION SUBCUTANEOUS at 20:25

## 2019-10-11 RX ADMIN — PANTOPRAZOLE SODIUM 40 MG: 40 INJECTION, POWDER, LYOPHILIZED, FOR SOLUTION INTRAVENOUS at 08:23

## 2019-10-11 RX ADMIN — SODIUM CHLORIDE, PRESERVATIVE FREE 2 ML: 5 INJECTION INTRAVENOUS at 08:25

## 2019-10-11 RX ADMIN — LOSARTAN POTASSIUM 100 MG: 50 TABLET, FILM COATED ORAL at 08:23

## 2019-10-11 RX ADMIN — INSULIN GLARGINE 15 UNITS: 100 INJECTION, SOLUTION SUBCUTANEOUS at 08:23

## 2019-10-11 ASSESSMENT — COGNITIVE AND FUNCTIONAL STATUS - GENERAL
HELP NEEDED FOR BATHING: A LOT
HELP NEEDED FOR TOILETING: A LOT
BASIC_MOBILITY_RAW_SCORE: 17
BASIC_MOBILITY_CONVERTED_SCORE: 39.67
HELP NEEDED DRESSING REGULAR UPPER BODY CLOTHING: A LITTLE
DAILY_ACTIVITY_RAW_SCORE: 16
HELP NEEDED DRESSING REGULAR LOWER BODY CLOTHING: A LOT
HELP NEEDED FOR PERSONAL GROOMING: A LITTLE
DAILY_ACTIVITY_CONVERTED_SCORE: 35.96

## 2019-10-11 ASSESSMENT — PAIN SCALES - GENERAL
PAINLEVEL_OUTOF10: 10
PAINLEVEL_OUTOF10: 4
PAINLEVEL_OUTOF10: 9
PAINLEVEL_OUTOF10: 3
PAINLEVEL_OUTOF10: 0

## 2019-10-12 LAB
GLUCOSE BLDC GLUCOMTR-MCNC: 157 MG/DL (ref 70–99)
GLUCOSE BLDC GLUCOMTR-MCNC: 274 MG/DL (ref 70–99)
GLUCOSE BLDC GLUCOMTR-MCNC: 91 MG/DL (ref 70–99)
POTASSIUM SERPL-SCNC: 3.4 MMOL/L (ref 3.4–5.1)
POTASSIUM SERPL-SCNC: 3.9 MMOL/L (ref 3.4–5.1)

## 2019-10-12 PROCEDURE — 10002803 HB RX 637: Performed by: NURSE PRACTITIONER

## 2019-10-12 PROCEDURE — 10002800 HB RX 250 W HCPCS: Performed by: NURSE PRACTITIONER

## 2019-10-12 PROCEDURE — 10002803 HB RX 637: Performed by: HOSPITALIST

## 2019-10-12 PROCEDURE — 10004651 HB RX, NO CHARGE ITEM: Performed by: INTERNAL MEDICINE

## 2019-10-12 PROCEDURE — 10004651 HB RX, NO CHARGE ITEM: Performed by: RADIOLOGY

## 2019-10-12 PROCEDURE — 82962 GLUCOSE BLOOD TEST: CPT

## 2019-10-12 PROCEDURE — 10002800 HB RX 250 W HCPCS: Performed by: INTERNAL MEDICINE

## 2019-10-12 PROCEDURE — 36415 COLL VENOUS BLD VENIPUNCTURE: CPT

## 2019-10-12 PROCEDURE — 10000002 HB ROOM CHARGE MED SURG

## 2019-10-12 PROCEDURE — 10004173 HB COUNTER-THERAPY VISIT PT: Performed by: PHYSICAL THERAPY ASSISTANT

## 2019-10-12 PROCEDURE — 97116 GAIT TRAINING THERAPY: CPT | Performed by: PHYSICAL THERAPY ASSISTANT

## 2019-10-12 PROCEDURE — 97530 THERAPEUTIC ACTIVITIES: CPT | Performed by: PHYSICAL THERAPY ASSISTANT

## 2019-10-12 PROCEDURE — 84132 ASSAY OF SERUM POTASSIUM: CPT

## 2019-10-12 PROCEDURE — 99232 SBSQ HOSP IP/OBS MODERATE 35: CPT | Performed by: HOSPITALIST

## 2019-10-12 PROCEDURE — 10002803 HB RX 637: Performed by: INTERNAL MEDICINE

## 2019-10-12 RX ORDER — PANTOPRAZOLE SODIUM 40 MG/1
40 TABLET, DELAYED RELEASE ORAL EVERY 12 HOURS SCHEDULED
Status: DISCONTINUED | OUTPATIENT
Start: 2019-10-12 | End: 2019-10-15 | Stop reason: HOSPADM

## 2019-10-12 RX ADMIN — INSULIN GLARGINE 15 UNITS: 100 INJECTION, SOLUTION SUBCUTANEOUS at 10:49

## 2019-10-12 RX ADMIN — POTASSIUM CHLORIDE 40 MEQ: 1.5 POWDER, FOR SOLUTION ORAL at 11:53

## 2019-10-12 RX ADMIN — ENOXAPARIN SODIUM 40 MG: 40 INJECTION SUBCUTANEOUS at 10:49

## 2019-10-12 RX ADMIN — LOSARTAN POTASSIUM 100 MG: 50 TABLET, FILM COATED ORAL at 10:49

## 2019-10-12 RX ADMIN — PANTOPRAZOLE SODIUM 40 MG: 40 TABLET, DELAYED RELEASE ORAL at 11:53

## 2019-10-12 RX ADMIN — SODIUM CHLORIDE, PRESERVATIVE FREE 2 ML: 5 INJECTION INTRAVENOUS at 10:50

## 2019-10-12 RX ADMIN — INSULIN GLARGINE 15 UNITS: 100 INJECTION, SOLUTION SUBCUTANEOUS at 20:23

## 2019-10-12 RX ADMIN — POLYETHYLENE GLYCOL 3350 17 G: 17 POWDER, FOR SOLUTION ORAL at 10:48

## 2019-10-12 RX ADMIN — FUROSEMIDE 40 MG: 40 TABLET ORAL at 10:48

## 2019-10-12 RX ADMIN — PANTOPRAZOLE SODIUM 40 MG: 40 TABLET, DELAYED RELEASE ORAL at 20:23

## 2019-10-12 RX ADMIN — ASPIRIN 81 MG 81 MG: 81 TABLET ORAL at 10:49

## 2019-10-12 RX ADMIN — ACETAMINOPHEN 650 MG: 325 TABLET ORAL at 18:32

## 2019-10-12 RX ADMIN — SODIUM CHLORIDE, PRESERVATIVE FREE 2 ML: 5 INJECTION INTRAVENOUS at 20:24

## 2019-10-12 ASSESSMENT — PAIN SCALES - GENERAL
PAINLEVEL_OUTOF10: 0
PAINLEVEL_OUTOF10: 8
PAINLEVEL_OUTOF10: 2
PAINLEVEL_OUTOF10: 7

## 2019-10-12 ASSESSMENT — COGNITIVE AND FUNCTIONAL STATUS - GENERAL
BASIC_MOBILITY_RAW_SCORE: 20
BASIC_MOBILITY_CONVERTED_SCORE: 43.99

## 2019-10-13 LAB
ALBUMIN SERPL-MCNC: 2.6 G/DL (ref 3.6–5.1)
ALBUMIN/GLOB SERPL: 0.7 {RATIO} (ref 1–2.4)
ALP SERPL-CCNC: 73 UNITS/L (ref 45–117)
ALT SERPL-CCNC: 30 UNITS/L
ANION GAP SERPL CALC-SCNC: 15 MMOL/L (ref 10–20)
AST SERPL-CCNC: 28 UNITS/L
BASOPHILS # BLD AUTO: 0 K/MCL (ref 0–0.3)
BASOPHILS NFR BLD AUTO: 0 %
BILIRUB SERPL-MCNC: 0.3 MG/DL (ref 0.2–1)
BUN SERPL-MCNC: 11 MG/DL (ref 6–20)
BUN/CREAT SERPL: 18 (ref 7–25)
CALCIUM SERPL-MCNC: 8.4 MG/DL (ref 8.4–10.2)
CHLORIDE SERPL-SCNC: 103 MMOL/L (ref 98–107)
CO2 SERPL-SCNC: 25 MMOL/L (ref 21–32)
CREAT SERPL-MCNC: 0.61 MG/DL (ref 0.51–0.95)
DIFFERENTIAL METHOD BLD: ABNORMAL
EOSINOPHIL # BLD AUTO: 0.4 K/MCL (ref 0.1–0.5)
EOSINOPHIL NFR SPEC: 4 %
ERYTHROCYTE [DISTWIDTH] IN BLOOD: 14.7 % (ref 11–15)
GLOBULIN SER-MCNC: 3.8 G/DL (ref 2–4)
GLUCOSE BLDC GLUCOMTR-MCNC: 142 MG/DL (ref 70–99)
GLUCOSE BLDC GLUCOMTR-MCNC: 147 MG/DL (ref 70–99)
GLUCOSE BLDC GLUCOMTR-MCNC: 175 MG/DL (ref 70–99)
GLUCOSE BLDC GLUCOMTR-MCNC: 209 MG/DL (ref 70–99)
GLUCOSE SERPL-MCNC: 139 MG/DL (ref 65–99)
HCT VFR BLD CALC: 31.7 % (ref 36–46.5)
HGB BLD-MCNC: 10.4 G/DL (ref 12–15.5)
IMM GRANULOCYTES # BLD AUTO: 0.1 K/MCL (ref 0–0.2)
IMM GRANULOCYTES NFR BLD: 1 %
LYMPHOCYTES # BLD MANUAL: 2.1 K/MCL (ref 1–4)
LYMPHOCYTES NFR BLD MANUAL: 23 %
MCH RBC QN AUTO: 28.7 PG (ref 26–34)
MCHC RBC AUTO-ENTMCNC: 32.8 G/DL (ref 32–36.5)
MCV RBC AUTO: 87.3 FL (ref 78–100)
MONOCYTES # BLD MANUAL: 0.8 K/MCL (ref 0.3–0.9)
MONOCYTES NFR BLD MANUAL: 8 %
NEUTROPHILS # BLD: 5.8 K/MCL (ref 1.8–7.7)
NEUTROPHILS NFR BLD AUTO: 64 %
NRBC BLD MANUAL-RTO: 0 /100 WBC
PLATELET # BLD: 355 K/MCL (ref 140–450)
POTASSIUM SERPL-SCNC: 3.7 MMOL/L (ref 3.4–5.1)
PROT SERPL-MCNC: 6.4 G/DL (ref 6.4–8.2)
RBC # BLD: 3.63 MIL/MCL (ref 4–5.2)
SODIUM SERPL-SCNC: 139 MMOL/L (ref 135–145)
WBC # BLD: 9.2 K/MCL (ref 4.2–11)

## 2019-10-13 PROCEDURE — 10002800 HB RX 250 W HCPCS: Performed by: INTERNAL MEDICINE

## 2019-10-13 PROCEDURE — 10002803 HB RX 637: Performed by: NURSE PRACTITIONER

## 2019-10-13 PROCEDURE — 10002803 HB RX 637: Performed by: INTERNAL MEDICINE

## 2019-10-13 PROCEDURE — 10002800 HB RX 250 W HCPCS: Performed by: NURSE PRACTITIONER

## 2019-10-13 PROCEDURE — 97535 SELF CARE MNGMENT TRAINING: CPT | Performed by: OCCUPATIONAL THERAPIST

## 2019-10-13 PROCEDURE — 85025 COMPLETE CBC W/AUTO DIFF WBC: CPT

## 2019-10-13 PROCEDURE — 99232 SBSQ HOSP IP/OBS MODERATE 35: CPT | Performed by: HOSPITALIST

## 2019-10-13 PROCEDURE — 10004651 HB RX, NO CHARGE ITEM: Performed by: RADIOLOGY

## 2019-10-13 PROCEDURE — 80053 COMPREHEN METABOLIC PANEL: CPT

## 2019-10-13 PROCEDURE — 10004172 HB COUNTER-THERAPY VISIT OT: Performed by: OCCUPATIONAL THERAPIST

## 2019-10-13 PROCEDURE — 10002803 HB RX 637: Performed by: HOSPITALIST

## 2019-10-13 PROCEDURE — 10000002 HB ROOM CHARGE MED SURG

## 2019-10-13 PROCEDURE — 10004651 HB RX, NO CHARGE ITEM: Performed by: INTERNAL MEDICINE

## 2019-10-13 PROCEDURE — 82962 GLUCOSE BLOOD TEST: CPT

## 2019-10-13 RX ADMIN — HYDROCODONE BITARTRATE AND ACETAMINOPHEN 1 TABLET: 5; 325 TABLET ORAL at 16:13

## 2019-10-13 RX ADMIN — SODIUM CHLORIDE, PRESERVATIVE FREE 2 ML: 5 INJECTION INTRAVENOUS at 20:41

## 2019-10-13 RX ADMIN — PANTOPRAZOLE SODIUM 40 MG: 40 TABLET, DELAYED RELEASE ORAL at 20:40

## 2019-10-13 RX ADMIN — HYDROCODONE BITARTRATE AND ACETAMINOPHEN 1 TABLET: 5; 325 TABLET ORAL at 09:13

## 2019-10-13 RX ADMIN — FUROSEMIDE 40 MG: 40 TABLET ORAL at 09:12

## 2019-10-13 RX ADMIN — ASPIRIN 81 MG 81 MG: 81 TABLET ORAL at 09:12

## 2019-10-13 RX ADMIN — SODIUM CHLORIDE, PRESERVATIVE FREE 2 ML: 5 INJECTION INTRAVENOUS at 09:15

## 2019-10-13 RX ADMIN — POTASSIUM CHLORIDE 20 MEQ: 20 TABLET, EXTENDED RELEASE ORAL at 09:13

## 2019-10-13 RX ADMIN — ACETAMINOPHEN 650 MG: 325 TABLET ORAL at 06:09

## 2019-10-13 RX ADMIN — LOSARTAN POTASSIUM 100 MG: 50 TABLET, FILM COATED ORAL at 09:12

## 2019-10-13 RX ADMIN — INSULIN GLARGINE 15 UNITS: 100 INJECTION, SOLUTION SUBCUTANEOUS at 20:40

## 2019-10-13 RX ADMIN — INSULIN GLARGINE 15 UNITS: 100 INJECTION, SOLUTION SUBCUTANEOUS at 09:13

## 2019-10-13 RX ADMIN — PANTOPRAZOLE SODIUM 40 MG: 40 TABLET, DELAYED RELEASE ORAL at 09:12

## 2019-10-13 RX ADMIN — ENOXAPARIN SODIUM 40 MG: 40 INJECTION SUBCUTANEOUS at 09:13

## 2019-10-13 RX ADMIN — ACETAMINOPHEN 650 MG: 325 TABLET ORAL at 20:40

## 2019-10-13 RX ADMIN — INSULIN LISPRO 4 UNITS: 100 INJECTION, SOLUTION INTRAVENOUS; SUBCUTANEOUS at 18:21

## 2019-10-13 ASSESSMENT — PAIN SCALES - GENERAL
PAINLEVEL_OUTOF10: 8
PAINLEVEL_OUTOF10: 7
PAINLEVEL_OUTOF10: 3
PAINLEVEL_OUTOF10: 8
PAINLEVEL_OUTOF10: 10
PAINLEVEL_OUTOF10: 8
PAINLEVEL_OUTOF10: 10

## 2019-10-13 ASSESSMENT — COGNITIVE AND FUNCTIONAL STATUS - GENERAL
HELP NEEDED DRESSING REGULAR LOWER BODY CLOTHING: A LOT
DAILY_ACTIVITY_RAW_SCORE: 17
HELP NEEDED FOR BATHING: A LOT
HELP NEEDED FOR PERSONAL GROOMING: A LITTLE
DAILY_ACTIVITY_CONVERTED_SCORE: 37.26
HELP NEEDED FOR TOILETING: A LITTLE
HELP NEEDED DRESSING REGULAR UPPER BODY CLOTHING: A LITTLE

## 2019-10-14 LAB
GLUCOSE BLDC GLUCOMTR-MCNC: 141 MG/DL (ref 70–99)
GLUCOSE BLDC GLUCOMTR-MCNC: 205 MG/DL (ref 70–99)
GLUCOSE BLDC GLUCOMTR-MCNC: 216 MG/DL (ref 70–99)
INR PPP: 1.1
PROTHROMBIN TIME: 11.5 SEC (ref 9.7–11.8)

## 2019-10-14 PROCEDURE — 97116 GAIT TRAINING THERAPY: CPT | Performed by: PHYSICAL THERAPIST

## 2019-10-14 PROCEDURE — 10002800 HB RX 250 W HCPCS: Performed by: NURSE PRACTITIONER

## 2019-10-14 PROCEDURE — 10004325 HB COUNTER ASSESSMENT EA 15 MIN: Performed by: DIETITIAN, REGISTERED

## 2019-10-14 PROCEDURE — 10002803 HB RX 637: Performed by: NURSE PRACTITIONER

## 2019-10-14 PROCEDURE — 36415 COLL VENOUS BLD VENIPUNCTURE: CPT

## 2019-10-14 PROCEDURE — 10004173 HB COUNTER-THERAPY VISIT PT: Performed by: PHYSICAL THERAPIST

## 2019-10-14 PROCEDURE — 10004651 HB RX, NO CHARGE ITEM: Performed by: INTERNAL MEDICINE

## 2019-10-14 PROCEDURE — 10002803 HB RX 637: Performed by: HOSPITALIST

## 2019-10-14 PROCEDURE — 10004651 HB RX, NO CHARGE ITEM: Performed by: RADIOLOGY

## 2019-10-14 PROCEDURE — 99232 SBSQ HOSP IP/OBS MODERATE 35: CPT | Performed by: HOSPITALIST

## 2019-10-14 PROCEDURE — 82962 GLUCOSE BLOOD TEST: CPT

## 2019-10-14 PROCEDURE — 10002800 HB RX 250 W HCPCS: Performed by: INTERNAL MEDICINE

## 2019-10-14 PROCEDURE — 10000002 HB ROOM CHARGE MED SURG

## 2019-10-14 PROCEDURE — 85610 PROTHROMBIN TIME: CPT

## 2019-10-14 PROCEDURE — 97530 THERAPEUTIC ACTIVITIES: CPT | Performed by: PHYSICAL THERAPIST

## 2019-10-14 RX ORDER — PANTOPRAZOLE SODIUM 40 MG/1
40 TABLET, DELAYED RELEASE ORAL EVERY 12 HOURS SCHEDULED
Qty: 60 TABLET | Refills: 0 | Status: SHIPPED | OUTPATIENT
Start: 2019-10-14

## 2019-10-14 RX ADMIN — LOSARTAN POTASSIUM 100 MG: 50 TABLET, FILM COATED ORAL at 09:26

## 2019-10-14 RX ADMIN — HYDROCODONE BITARTRATE AND ACETAMINOPHEN 1 TABLET: 5; 325 TABLET ORAL at 09:25

## 2019-10-14 RX ADMIN — INSULIN LISPRO 4 UNITS: 100 INJECTION, SOLUTION INTRAVENOUS; SUBCUTANEOUS at 18:10

## 2019-10-14 RX ADMIN — ASPIRIN 81 MG 81 MG: 81 TABLET ORAL at 09:26

## 2019-10-14 RX ADMIN — INSULIN GLARGINE 15 UNITS: 100 INJECTION, SOLUTION SUBCUTANEOUS at 09:26

## 2019-10-14 RX ADMIN — POLYETHYLENE GLYCOL 3350 17 G: 17 POWDER, FOR SOLUTION ORAL at 09:25

## 2019-10-14 RX ADMIN — SODIUM CHLORIDE, PRESERVATIVE FREE 2 ML: 5 INJECTION INTRAVENOUS at 21:34

## 2019-10-14 RX ADMIN — FUROSEMIDE 40 MG: 40 TABLET ORAL at 09:26

## 2019-10-14 RX ADMIN — PANTOPRAZOLE SODIUM 40 MG: 40 TABLET, DELAYED RELEASE ORAL at 21:33

## 2019-10-14 RX ADMIN — PANTOPRAZOLE SODIUM 40 MG: 40 TABLET, DELAYED RELEASE ORAL at 09:26

## 2019-10-14 RX ADMIN — ENOXAPARIN SODIUM 40 MG: 40 INJECTION SUBCUTANEOUS at 09:26

## 2019-10-14 RX ADMIN — SODIUM CHLORIDE, PRESERVATIVE FREE 2 ML: 5 INJECTION INTRAVENOUS at 09:33

## 2019-10-14 RX ADMIN — INSULIN GLARGINE 15 UNITS: 100 INJECTION, SOLUTION SUBCUTANEOUS at 21:33

## 2019-10-14 ASSESSMENT — PAIN SCALES - GENERAL
PAINLEVEL_OUTOF10: 6
PAINLEVEL_OUTOF10: 0
PAINLEVEL_OUTOF10: 10
PAINLEVEL_OUTOF10: 6

## 2019-10-14 ASSESSMENT — COGNITIVE AND FUNCTIONAL STATUS - GENERAL
BASIC_MOBILITY_RAW_SCORE: 20
BASIC_MOBILITY_CONVERTED_SCORE: 43.99

## 2019-10-15 VITALS
WEIGHT: 143.08 LBS | OXYGEN SATURATION: 96 % | HEIGHT: 60 IN | HEART RATE: 84 BPM | SYSTOLIC BLOOD PRESSURE: 117 MMHG | BODY MASS INDEX: 28.09 KG/M2 | TEMPERATURE: 98.5 F | RESPIRATION RATE: 16 BRPM | DIASTOLIC BLOOD PRESSURE: 56 MMHG

## 2019-10-15 LAB
GLUCOSE BLDC GLUCOMTR-MCNC: 138 MG/DL (ref 70–99)
GLUCOSE BLDC GLUCOMTR-MCNC: 285 MG/DL (ref 70–99)

## 2019-10-15 PROCEDURE — 10002803 HB RX 637: Performed by: NURSE PRACTITIONER

## 2019-10-15 PROCEDURE — 10002800 HB RX 250 W HCPCS: Performed by: INTERNAL MEDICINE

## 2019-10-15 PROCEDURE — 10004651 HB RX, NO CHARGE ITEM: Performed by: RADIOLOGY

## 2019-10-15 PROCEDURE — 97530 THERAPEUTIC ACTIVITIES: CPT | Performed by: PHYSICAL THERAPY ASSISTANT

## 2019-10-15 PROCEDURE — 99239 HOSP IP/OBS DSCHRG MGMT >30: CPT | Performed by: HOSPITALIST

## 2019-10-15 PROCEDURE — 82962 GLUCOSE BLOOD TEST: CPT

## 2019-10-15 PROCEDURE — 10004651 HB RX, NO CHARGE ITEM: Performed by: INTERNAL MEDICINE

## 2019-10-15 PROCEDURE — 10004173 HB COUNTER-THERAPY VISIT PT: Performed by: PHYSICAL THERAPY ASSISTANT

## 2019-10-15 PROCEDURE — 97116 GAIT TRAINING THERAPY: CPT | Performed by: PHYSICAL THERAPY ASSISTANT

## 2019-10-15 PROCEDURE — 10002803 HB RX 637: Performed by: HOSPITALIST

## 2019-10-15 PROCEDURE — 10002800 HB RX 250 W HCPCS: Performed by: NURSE PRACTITIONER

## 2019-10-15 PROCEDURE — 97535 SELF CARE MNGMENT TRAINING: CPT | Performed by: OCCUPATIONAL THERAPIST

## 2019-10-15 PROCEDURE — 10004172 HB COUNTER-THERAPY VISIT OT: Performed by: OCCUPATIONAL THERAPIST

## 2019-10-15 RX ADMIN — PANTOPRAZOLE SODIUM 40 MG: 40 TABLET, DELAYED RELEASE ORAL at 08:28

## 2019-10-15 RX ADMIN — ASPIRIN 81 MG 81 MG: 81 TABLET ORAL at 08:28

## 2019-10-15 RX ADMIN — POLYETHYLENE GLYCOL 3350 17 G: 17 POWDER, FOR SOLUTION ORAL at 08:28

## 2019-10-15 RX ADMIN — INSULIN GLARGINE 15 UNITS: 100 INJECTION, SOLUTION SUBCUTANEOUS at 08:28

## 2019-10-15 RX ADMIN — SODIUM CHLORIDE, PRESERVATIVE FREE 2 ML: 5 INJECTION INTRAVENOUS at 08:28

## 2019-10-15 RX ADMIN — FUROSEMIDE 40 MG: 40 TABLET ORAL at 08:28

## 2019-10-15 RX ADMIN — INSULIN LISPRO 6 UNITS: 100 INJECTION, SOLUTION INTRAVENOUS; SUBCUTANEOUS at 12:24

## 2019-10-15 RX ADMIN — LOSARTAN POTASSIUM 100 MG: 50 TABLET, FILM COATED ORAL at 08:28

## 2019-10-15 RX ADMIN — ENOXAPARIN SODIUM 40 MG: 40 INJECTION SUBCUTANEOUS at 08:28

## 2019-10-15 ASSESSMENT — COGNITIVE AND FUNCTIONAL STATUS - GENERAL
BASIC_MOBILITY_CONVERTED_SCORE: 57.68
DAILY_ACTIVITY_RAW_SCORE: 24
BASIC_MOBILITY_RAW_SCORE: 24
DAILY_ACTIVITY_CONVERTED_SCORE: 57.54

## 2019-10-15 ASSESSMENT — PAIN SCALES - GENERAL
PAINLEVEL_OUTOF10: 3
PAINLEVEL_OUTOF10: 3

## 2019-11-01 ENCOUNTER — TELEPHONE (OUTPATIENT)
Dept: INTERVENTIONAL RADIOLOGY/VASCULAR | Age: 69
End: 2019-11-01

## 2021-10-27 ENCOUNTER — APPOINTMENT (OUTPATIENT)
Dept: CT IMAGING | Facility: HOSPITAL | Age: 71
DRG: 639 | End: 2021-10-27
Attending: EMERGENCY MEDICINE
Payer: MEDICARE

## 2021-10-27 ENCOUNTER — HOSPITAL ENCOUNTER (INPATIENT)
Facility: HOSPITAL | Age: 71
LOS: 3 days | Discharge: HOME | DRG: 639 | End: 2021-10-30
Attending: EMERGENCY MEDICINE | Admitting: HOSPITALIST
Payer: MEDICARE

## 2021-10-27 DIAGNOSIS — R55 SYNCOPE AND COLLAPSE: Primary | ICD-10-CM

## 2021-10-27 DIAGNOSIS — R73.9 HYPERGLYCEMIA: ICD-10-CM

## 2021-10-27 PROBLEM — E11.65 HYPEROSMOLAR HYPERGLYCEMIC STATE (HHS) (HCC): Status: ACTIVE | Noted: 2021-10-27

## 2021-10-27 PROBLEM — E11.00 HYPEROSMOLAR HYPERGLYCEMIC STATE (HHS) (HCC): Status: ACTIVE | Noted: 2021-10-27

## 2021-10-27 PROCEDURE — 70486 CT MAXILLOFACIAL W/O DYE: CPT | Performed by: EMERGENCY MEDICINE

## 2021-10-27 PROCEDURE — 99223 1ST HOSP IP/OBS HIGH 75: CPT | Performed by: HOSPITALIST

## 2021-10-27 PROCEDURE — 70450 CT HEAD/BRAIN W/O DYE: CPT | Performed by: EMERGENCY MEDICINE

## 2021-10-27 RX ORDER — ONDANSETRON 2 MG/ML
4 INJECTION INTRAMUSCULAR; INTRAVENOUS EVERY 6 HOURS PRN
Status: DISCONTINUED | OUTPATIENT
Start: 2021-10-27 | End: 2021-10-30

## 2021-10-27 RX ORDER — MULTIVIT-MIN/IRON/FOLIC ACID/K 18-600-40
CAPSULE ORAL DAILY
COMMUNITY

## 2021-10-27 RX ORDER — ACETAMINOPHEN 325 MG/1
650 TABLET ORAL EVERY 6 HOURS PRN
Status: DISCONTINUED | OUTPATIENT
Start: 2021-10-27 | End: 2021-10-28

## 2021-10-27 RX ORDER — SODIUM CHLORIDE 9 MG/ML
INJECTION, SOLUTION INTRAVENOUS CONTINUOUS
Status: DISCONTINUED | OUTPATIENT
Start: 2021-10-27 | End: 2021-10-30

## 2021-10-27 RX ORDER — LOSARTAN POTASSIUM 50 MG/1
50 TABLET ORAL DAILY
COMMUNITY

## 2021-10-27 RX ORDER — SIMVASTATIN 20 MG
20 TABLET ORAL DAILY
COMMUNITY
End: 2021-12-15

## 2021-10-27 RX ORDER — INSULIN LISPRO 100 [IU]/ML
68 INJECTION, SOLUTION INTRAVENOUS; SUBCUTANEOUS
Status: ON HOLD | COMMUNITY
End: 2021-10-30

## 2021-10-27 RX ORDER — DEXTROSE MONOHYDRATE 25 G/50ML
50 INJECTION, SOLUTION INTRAVENOUS
Status: DISCONTINUED | OUTPATIENT
Start: 2021-10-27 | End: 2021-10-28

## 2021-10-27 RX ORDER — HEPARIN SODIUM 5000 [USP'U]/ML
5000 INJECTION, SOLUTION INTRAVENOUS; SUBCUTANEOUS EVERY 12 HOURS SCHEDULED
Status: DISCONTINUED | OUTPATIENT
Start: 2021-10-27 | End: 2021-10-30

## 2021-10-27 NOTE — ED QUICK NOTES
Orders for admission, patient is aware of plan and ready to go upstairs. Any questions, please call ED JULISSA Villegas extension [phone].       Type of COVID test sent: Rapid  COVID Suspicion level: Low    Titratable drug(s) infusing: NA  Rate: NA    LOC at time o

## 2021-10-27 NOTE — ED INITIAL ASSESSMENT (HPI)
Patient complains of fall at home today, unwitnessed, states she does not remember falling, denies chest pain, complains of headache, a&ox3

## 2021-10-27 NOTE — ED PROVIDER NOTES
Patient Seen in: [facility] Emergency Department      History   Patient presents with:  Fall    Stated Complaint: fall, head injury    Subjective:   HPI    71-year-old female presents for evaluation after a fall.   Patient reports she remembers goi Movements: Extraocular movements intact. Conjunctiva/sclera: Conjunctivae normal.      Pupils: Pupils are equal, round, and reactive to light. Cardiovascular:      Rate and Rhythm: Normal rate and regular rhythm.       Heart sounds: Normal heart soun while in ED: CT MAXILLOFACIAL (BWK=[id number])   Final Result    PROCEDURE: CT MAXILLO FACIAL (VTR=60141)         COMPARISON: [facility], CT BRAIN OR HEAD (CPT=70450),     10/27/2021, 6:01 PM.         INDICATIONS: Unwitnessed fall, right facial unremarkable.                         =====    CONCLUSION:     1. Mild soft tissue swelling over the right zygoma. No associated     fracture of the facial bones. No postseptal/intraorbital extension. 2. Left maxillary sinusitis.     3. Large perforati Soft tissue swelling over the right zygoma. No calvarial fracture or     acute intracranial process. 2. Mild generalized atrophy and mild chronic microangiopathic ischemic     changes with large vessel calcific atherosclerosis.     3. Left maxillary sin up care with a primary care provider within the next three months to obtain basic health screening including reassessment of your blood pressure. Medications Prescribed:  There are no discharge medications for this patient.                         [facility]

## 2021-10-28 ENCOUNTER — APPOINTMENT (OUTPATIENT)
Dept: ULTRASOUND IMAGING | Facility: HOSPITAL | Age: 71
DRG: 639 | End: 2021-10-28
Attending: HOSPITALIST
Payer: MEDICARE

## 2021-10-28 ENCOUNTER — APPOINTMENT (OUTPATIENT)
Dept: CV DIAGNOSTICS | Facility: HOSPITAL | Age: 71
DRG: 639 | End: 2021-10-28
Attending: HOSPITALIST
Payer: MEDICARE

## 2021-10-28 PROCEDURE — 93880 EXTRACRANIAL BILAT STUDY: CPT | Performed by: HOSPITALIST

## 2021-10-28 PROCEDURE — 93306 TTE W/DOPPLER COMPLETE: CPT | Performed by: HOSPITALIST

## 2021-10-28 PROCEDURE — 99233 SBSQ HOSP IP/OBS HIGH 50: CPT | Performed by: HOSPITALIST

## 2021-10-28 PROCEDURE — 99222 1ST HOSP IP/OBS MODERATE 55: CPT | Performed by: INTERNAL MEDICINE

## 2021-10-28 RX ORDER — DEXTROSE MONOHYDRATE 25 G/50ML
50 INJECTION, SOLUTION INTRAVENOUS
Status: DISCONTINUED | OUTPATIENT
Start: 2021-10-28 | End: 2021-10-30

## 2021-10-28 RX ORDER — ATORVASTATIN CALCIUM 10 MG/1
10 TABLET, FILM COATED ORAL NIGHTLY
Status: DISCONTINUED | OUTPATIENT
Start: 2021-10-28 | End: 2021-10-30

## 2021-10-28 RX ORDER — ACETAMINOPHEN 325 MG/1
650 TABLET ORAL EVERY 6 HOURS PRN
Status: DISCONTINUED | OUTPATIENT
Start: 2021-10-28 | End: 2021-10-30

## 2021-10-28 NOTE — PLAN OF CARE
Gerry Mai has been sleeping on and off this morning between blood sugar checks. Up to bathroom standby assist, dizzy when first standing resolved quickly, encouraged use of walker for steadying assist but Gerry Pau refused. Blood pressure is stable.    Transitioned o puncture sites for bleeding and/or hematoma  - Assess quality of pulses, skin color and temperature  - Assess for signs of decreased coronary artery perfusion - ex.  Angina  - Evaluate fluid balance, assess for edema, trend weights  Outcome: Progressing

## 2021-10-28 NOTE — PROGRESS NOTES
[facility]    Progress Note    Mekhi Kessler Patient Status:  Inpatient    1950 MRN [id number]   Location [facility] 2W/SW Attending Morris Baron MD   [facility] Day # 1 PCP None Pcp     Chief Complaint: syncope    SUBJECTIVE: MAXILLOFACIAL (LZY=85490)    Result Date: 10/27/2021  CONCLUSION:  1. Mild soft tissue swelling over the right zygoma. No associated fracture of the facial bones. No postseptal/intraorbital extension. 2. Left maxillary sinusitis.  3. Large perforation of state (HHS) ([facility]      Plan:     Syncope  - seems vasovagal in nature  - checked orthostats - negative  - given IVFs  - echo done - normal EF, grade 2 diastolic dysfunction   - awaiting carotids    Hyperosmolar hyperglycemia  - seen by Dr Prashanth Pulido  - start

## 2021-10-28 NOTE — PLAN OF CARE
Ms. Emmett Naranjo is alert and oriented. Remains on IVF and insulin gtt, insulin was OFF at 0200, and restarted at 0230 per protocol, see flowsheets for more information. Now q2 checks. VSS overnight, tylenol given PRN for headache.      Problem: Patient Centered C medications to optimize hemodynamic stability  - Monitor arterial and/or venous puncture sites for bleeding and/or hematoma  - Assess quality of pulses, skin color and temperature  - Assess for signs of decreased coronary artery perfusion - ex.  Angina  - E

## 2021-10-28 NOTE — CONSULTS
[facility]    Report of Consultation    Lashawn Sandhu Patient Status:  Inpatient    1950 MRN [id number]   Location [facility] 2W/SW Attending Shorty Talbot MD   [facility] Day # 1 PCP None Pcp     Date of Admission:  10/27/2021 glucose-vitamin C (DEX-4) chewable tab 8 tablet, 8 tablet, Oral, Q15 Min PRN  •  Insulin Aspart Pen (NOVOLOG) 100 UNIT/ML flexpen 1-5 Units, 1-5 Units, Subcutaneous, TID CC  •  insulin detemir (LEVEMIR) 100 UNIT/ML flextouch 18 Units, 18 Units, Subcutaneou bruising  Neuro: motor grossly intact, moving all extremities without difficulty  Psychiatric:  oriented to time, self, and place  Extremities: no obvious extremity swelling, no lesions      Impression and Plan:  Patient Active Problem List:     Syncope an

## 2021-10-28 NOTE — H&P
[facility]    PATIENT'S NAME: Venessa Serina   ATTENDING PHYSICIAN: Philly Patel MD   PATIENT ACCOUNT#:   [de-identified]    LOCATION:  [place] [address]  MEDICAL RECORD #:   [id number]       YOB: 1950  ADMISSION DATE:       10/27/2 nighttime. She takes her own insulin, but she did not take her long-acting insulin today. She checks her blood sugar at home sporadically, and she gets numbers in the 200s and 300s usually. She eats only 1 meal a day.   She reported that she feels a bit Dictated By Duncan Quintanilla MD  d: 10/27/2021 19:00:37  t: 10/27/2021 19:30:22  [facility] [id number]  /

## 2021-10-28 NOTE — ED QUICK NOTES
Patient present to ED due to fall at home. patient with no memory of fall and fall was unwitnessed. Luis Walters on [employer]. Bruising to right forehead. PMH of diabetes and HTN. patient is a/o x 4. Answering questions appropriately.  States she was feeling f

## 2021-10-29 PROCEDURE — 99233 SBSQ HOSP IP/OBS HIGH 50: CPT | Performed by: HOSPITALIST

## 2021-10-29 RX ORDER — LISINOPRIL 5 MG/1
5 TABLET ORAL DAILY
Status: DISCONTINUED | OUTPATIENT
Start: 2021-10-29 | End: 2021-10-29

## 2021-10-29 RX ORDER — LOSARTAN POTASSIUM 50 MG/1
50 TABLET ORAL DAILY
Status: DISCONTINUED | OUTPATIENT
Start: 2021-10-29 | End: 2021-10-30

## 2021-10-29 NOTE — OCCUPATIONAL THERAPY NOTE
OCCUPATIONAL THERAPY EVALUATION - INPATIENT     Room Number: 226/226-A  Evaluation Date: 10/29/2021  Type of Evaluation: Initial       Physician Order: IP Consult to Occupational Therapy  Reason for Therapy: ADL/IADL Dysfunction and Discharge Planning    O Recommendations:  (RW, Shower chair)    PLAN  OT Treatment Plan: Balance activities; ADL training;Functional transfer training; Endurance training;Patient/Family training;Patient/Family education; Compensatory technique education       OCCUPATIONAL THERAPY ME upper body clothing?: A Little  -   Taking care of personal grooming such as brushing teeth?: A Little  -   Eating meals?: A Little    AM-PAC Score:  Score: 18  Approx Degree of Impairment: 46.65%  Standardized Score (AM-PAC Scale): 38.66  CMS Modifier (G-

## 2021-10-29 NOTE — DIABETES ED
[facility]    Diabetes Education  Note    Kari Wells Patient Status:  Inpatient   1950 MRN [id number]  Location [facility] 2W/SW Attending Benji Toure MD  [facility] Day # 2 PCP None Pcp      Order received to discuss Diabet

## 2021-10-29 NOTE — CM/SW NOTE
10/29/21 1200   CM/SW Referral Data   Referral Source    Reason for Referral Discharge planning   Informant Patient   Patient Info   Patient's Current Mental Status at Time of Assessment Alert;Oriented   Patient's [facility]

## 2021-10-29 NOTE — SPIRITUAL CARE NOTE
Pt alert and had just finished eating her breakfast.  Pt says is coping adequately, but is ready to go home. Hoping her blood sugar will stabilize.  listened empathically as she talked about how she would need additional care since her fall.

## 2021-10-29 NOTE — PLAN OF CARE
Problem: Patient Centered Care  Goal: Patient preferences are identified and integrated in the patient's plan of care  Description: Interventions:  - What would you like us to know as we care for you?   - Provide timely, complete, and accurate informatio repeat lab results as appropriate  - Fluid restriction as ordered  - Instruct patient on fluid and nutrition restrictions as appropriate  Outcome: Progressing     Problem: MUSCULOSKELETAL - ADULT  Goal: Return mobility to safest level of function  Descript

## 2021-10-29 NOTE — PROGRESS NOTES
[place] [facility] - [facility]    Progress Note    Milagros Meza Patient Status:  Inpatient    1950 MRN [id number]   Location [facility] 2W/SW Attending Karyn Baez MD   [facility] Day # 2 PCP None Pcp     Date of Admission:  10/27/2021  Date of **OR** [DISCONTINUED] glucose-vitamin C (DEX-4) chewable tab 8 tablet, 8 tablet, Oral, Q15 Min PRN  •  Insulin Aspart Pen (NOVOLOG) 100 UNIT/ML flexpen 1-5 Units, 1-5 Units, Subcutaneous, TID CC  •  insulin detemir (LEVEMIR) 100 UNIT/ML flextouch 18 Units, time, self, and place  Extremities: no obvious extremity swelling, no lesions      Impression and Plan:  Patient Active Problem List:     Syncope and collapse     Hyperglycemia     Hyperosmolar hyperglycemic state (HHS) (HCC)    Uncontrolled Type 2 DM pres

## 2021-10-29 NOTE — PHYSICAL THERAPY NOTE
PHYSICAL THERAPY EVALUATION - INPATIENT     Room Number: 226/226-A  Evaluation Date: 10/29/2021  Type of Evaluation: Initial   Physician Order: PT Eval and Treat    Presenting Problem:  (hyperglycemia)  Reason for Therapy: Mobility Dysfunction and Dischar education; Family education;Gait training;Transfer training  Rehab Potential : Fair  Frequency (Obs): 3-5x/week       PHYSICAL THERAPY MEDICAL/SOCIAL HISTORY     History related to current admission:      Problem List  Principal Problem:    Syncope and ny Moving to and from a bed to a chair (including a wheelchair)?: A Little   -   Need to walk in hospital room?: A Little   -   Climbing 3-5 steps with a railing?: A Little     AM-PAC Score:  Raw Score: 18   Approx Degree of Impairment: 46.58%   Standardized

## 2021-10-29 NOTE — PLAN OF CARE
Problem: Diabetes/Glucose Control  Goal: Glucose maintained within prescribed range  Description: INTERVENTIONS:  - Monitor Blood Glucose as ordered  - Assess for signs and symptoms of hyperglycemia and hypoglycemia  - Administer ordered medications to m needed  - Ensure adequate protection for wounds/incisions during mobilization  - Obtain PT/OT consults as needed  - Advance activity as appropriate  - Communicate ordered activity level and limitations with patient/family  Outcome: Progressing

## 2021-10-29 NOTE — PROGRESS NOTES
[facility]    Progress Note    Consuelo Craven Patient Status:  Inpatient    1950 MRN [id number]   Location [facility] 2W/SW Attending Batsheva Downs MD   [facility] Day # 2 PCP None Pcp     Chief Complaint: syncope    SUBJECTIVE: 10/27/2021 at 6:20 PM          Kenisha 3 (CPT=93880)    Result Date: 10/28/2021  CONCLUSION:  1. Bilateral carotid bifurcation/proximal ICA plaque without hemodynamic significance.   2. Antegrade flow within both vertebral arteries Q15 Min PRN          Assessment  Patient Active Problem List:     Syncope and collapse     Hyperglycemia     Hyperosmolar hyperglycemic state (HHS) ([facility])      Plan:     Syncope  - seems vasovagal in nature  - checked orthostats - negative  - given IVFs  -

## 2021-10-30 VITALS
RESPIRATION RATE: 16 BRPM | WEIGHT: 138 LBS | BODY MASS INDEX: 27.09 KG/M2 | SYSTOLIC BLOOD PRESSURE: 133 MMHG | TEMPERATURE: 98 F | HEART RATE: 83 BPM | HEIGHT: 60 IN | OXYGEN SATURATION: 96 % | DIASTOLIC BLOOD PRESSURE: 49 MMHG

## 2021-10-30 PROCEDURE — 99239 HOSP IP/OBS DSCHRG MGMT >30: CPT | Performed by: HOSPITALIST

## 2021-10-30 PROCEDURE — 99233 SBSQ HOSP IP/OBS HIGH 50: CPT | Performed by: INTERNAL MEDICINE

## 2021-10-30 RX ORDER — INSULIN LISPRO 100 [IU]/ML
12 INJECTION, SOLUTION INTRAVENOUS; SUBCUTANEOUS
Qty: 4 ML | Refills: 0 | Status: SHIPPED | OUTPATIENT
Start: 2021-10-30 | End: 2021-10-30

## 2021-10-30 RX ORDER — INSULIN LISPRO 100 [IU]/ML
25 INJECTION, SOLUTION INTRAVENOUS; SUBCUTANEOUS
Qty: 1 EACH | Refills: 0 | Status: SHIPPED | OUTPATIENT
Start: 2021-10-30 | End: 2021-10-30

## 2021-10-30 RX ORDER — INSULIN LISPRO 100 [IU]/ML
12 INJECTION, SOLUTION INTRAVENOUS; SUBCUTANEOUS
Qty: 1 EACH | Refills: 0 | Status: SHIPPED | OUTPATIENT
Start: 2021-10-30 | End: 2021-10-30

## 2021-10-30 RX ORDER — INSULIN LISPRO 100 [IU]/ML
25 INJECTION, SOLUTION INTRAVENOUS; SUBCUTANEOUS
Qty: 1 EACH | Refills: 0 | Status: SHIPPED | OUTPATIENT
Start: 2021-10-30 | End: 2021-11-12

## 2021-10-30 NOTE — PLAN OF CARE
Problem: Patient Centered Care  Goal: Patient preferences are identified and integrated in the patient's plan of care  Description: Interventions:  - What would you like us to know as we care for you? \"I am from home with my family. \"  - Provide timely, and/or venous puncture sites for bleeding and/or hematoma  - Assess quality of pulses, skin color and temperature  - Assess for signs of decreased coronary artery perfusion - ex.  Angina  - Evaluate fluid balance, assess for edema, trend weights  Outcome: P

## 2021-10-30 NOTE — PLAN OF CARE
Problem: Patient Centered Care  Goal: Patient preferences are identified and integrated in the patient's plan of care  Description: Interventions:  - What would you like us to know as we care for you? \"I am from home with my family. \"  - Provide timely, optimize hemodynamic stability  - Monitor arterial and/or venous puncture sites for bleeding and/or hematoma  - Assess quality of pulses, skin color and temperature  - Assess for signs of decreased coronary artery perfusion - ex.  Angina  - Evaluate fluid b

## 2021-10-30 NOTE — PROGRESS NOTES
[place] [facility] - [facility]    Progress Note    Rasta Ruiz Patient Status:  Inpatient    1950 MRN [id number]   Location [facility] 2W/SW Attending Foreign Gama MD   [facility] Day # 3 PCP None Pcp     Date of Admission:  10/27/2021  Date of (LIPITOR) tab 10 mg, 10 mg, Oral, Nightly  •  glucose (DEX4) oral liquid 15 g, 15 g, Oral, Q15 Min PRN **OR** glucose-vitamin C (DEX-4) chewable tab 4 tablet, 4 tablet, Oral, Q15 Min PRN **OR** dextrose 50 % injection 50 mL, 50 mL, Intravenous, Q15 Min PRN extremities without difficulty  Psychiatric:  oriented to time, self, and place  Extremities: no obvious extremity swelling, no lesions      Impression and Plan:  Patient Active Problem List:     Syncope and collapse     Hyperglycemia     Hyperosmolar hype

## 2021-10-30 NOTE — DISCHARGE SUMMARY
[facility]    Discharge Summary    Rylee Crawford Patient Status:  Inpatient    1950 MRN [id number]   Location [facility] 5SW/SE Attending Mickie Lopez MD   Hosp Day # 3 PCP None Pcp     Date of Admission: 10/27/2021 orthostats - negative  - given IVFs  - echo done - normal EF, grade 2 diastolic dysfunction       - carotids with no significant stenosis     Hyperosmolar hyperglycemia  - seen by Dr Sheri Garcia  - started insulin drip - now stopped  - weaned to subcutaneous

## 2021-10-30 NOTE — DIABETES ED
[facility]OSP - [facility]    Diabetes Education  Note    Lien Butler Patient Status:  Inpatient   1950 MRN [id number]  Location [facility] 5SW/SE Attending Qian Price MD  Hosp Day # 3 PCP None Pcp      Labs:    HgbA1C (%)   Date Valu with PCP and medical team    Patient verbalized understanding, was receptive to information provided. Recommendations:  Monitor blood sugar and take medications as prescribed.   Attend outpatient diabetes education          Vivienne Roman RN  Diabetes E

## 2021-10-30 NOTE — BH PROGRESS NOTE
Patient transferred in stable conditions, blood sugar WNL no signs or symptoms of hyperglycemia, all personal belongings brought with. Currently in bed resting.

## 2021-10-31 NOTE — PLAN OF CARE
Daughter Abbe Samano called that she is at the ER ready to pick the pt. Removed IV, dressing in place, still c/d/i. Discharge paperworks and personal belongings handed to the pt and instructed regarding her new meds and follow up appt with Dr. Donald Paulino.  Left t

## 2021-11-04 ENCOUNTER — PATIENT OUTREACH (OUTPATIENT)
Dept: CASE MANAGEMENT | Age: 71
End: 2021-11-04

## 2021-11-04 NOTE — PROGRESS NOTES
1st attempt       Keith Goodman MD  [address]   [phone]     Unable to contact patient       Left voice mail

## 2021-11-05 NOTE — PROGRESS NOTES
2nd attempt to review DM F/U questions    Diabetic Outreach D/C Follow Up Questions:     1. Did you receive the information provided during your hospitalization and at discharge about diabetes? yes    If No:  Would you like us to mail you the information?

## 2021-11-11 ENCOUNTER — HOSPITAL ENCOUNTER (OUTPATIENT)
Age: 71
Discharge: HOME | End: 2021-11-11
Payer: MEDICARE

## 2021-11-11 VITALS
DIASTOLIC BLOOD PRESSURE: 42 MMHG | OXYGEN SATURATION: 96 % | SYSTOLIC BLOOD PRESSURE: 131 MMHG | RESPIRATION RATE: 18 BRPM | WEIGHT: 137 LBS | TEMPERATURE: 97 F | HEIGHT: 60 IN | BODY MASS INDEX: 26.9 KG/M2 | HEART RATE: 96 BPM

## 2021-11-11 DIAGNOSIS — Z20.822 ENCOUNTER FOR LABORATORY TESTING FOR COVID-19 VIRUS: Primary | ICD-10-CM

## 2021-11-11 DIAGNOSIS — J06.9 UPPER RESPIRATORY TRACT INFECTION, UNSPECIFIED TYPE: ICD-10-CM

## 2021-11-11 PROCEDURE — 99203 OFFICE O/P NEW LOW 30 MIN: CPT | Performed by: NURSE PRACTITIONER

## 2021-11-11 PROCEDURE — 87880 STREP A ASSAY W/OPTIC: CPT | Performed by: NURSE PRACTITIONER

## 2021-11-11 PROCEDURE — U0002 COVID-19 LAB TEST NON-CDC: HCPCS | Performed by: NURSE PRACTITIONER

## 2021-11-11 NOTE — ED INITIAL ASSESSMENT (HPI)
Cough, stuffy nose, and sore throat  Symptoms for 3 days  No GI complaints  No fever  +sick contacts

## 2021-11-11 NOTE — TELEPHONE ENCOUNTER
Pharmacy refill request for:     Humalog 100 U/ml Kwik pen inj  3ml  Administer 12 units under the skin three times daily before meals  Qty: 15    REQUESTED QTY: 36    * Patient is requesting a 90 day supply.

## 2021-11-11 NOTE — ED PROVIDER NOTES
Patient Seen in: [facility]      History   Patient presents with:  Cough/URI    Stated Complaint: coughing/sorethroat/sneezing    Subjective:   HPI    60-year-old female, with history of diabetes, hypertension and high cholesterol, presents t is normal.      Breath sounds: Normal breath sounds. Abdominal:      Palpations: Abdomen is soft. Tenderness: There is no abdominal tenderness. Musculoskeletal:         General: No tenderness. Cervical back: Neck supple.    Skin:     Findings:

## 2021-11-13 RX ORDER — INSULIN LISPRO 100 [IU]/ML
25 INJECTION, SOLUTION INTRAVENOUS; SUBCUTANEOUS
Qty: 69 ML | Refills: 0 | Status: SHIPPED | OUTPATIENT
Start: 2021-11-13 | End: 2021-12-13

## 2021-11-29 ENCOUNTER — PATIENT OUTREACH (OUTPATIENT)
Dept: CASE MANAGEMENT | Age: 71
End: 2021-11-29

## 2021-12-03 NOTE — PROGRESS NOTES
Patients daughter [facility] requesting assistance scheduling apt for patient     Attempted to return VM but was unable to contact patients daughter. [facility] for CB if assistance is still needed.

## 2021-12-15 NOTE — TELEPHONE ENCOUNTER
Dr. Estefania Jones    Patient had a appointment on 11/29/21 with Albert Goetz but was a no show, next follow up appointment scheduled on 1/10/22. 30 day supply pended.

## 2021-12-15 NOTE — TELEPHONE ENCOUNTER
Pt requesting refill: Pt is out of medication         Current Outpatient Medications:   •  simvastatin 20 MG Oral Tab, Take 20 mg by mouth daily. , Disp: , Rfl:

## 2021-12-16 ENCOUNTER — APPOINTMENT (OUTPATIENT)
Dept: CT IMAGING | Facility: HOSPITAL | Age: 71
End: 2021-12-16
Attending: EMERGENCY MEDICINE
Payer: MEDICARE

## 2021-12-16 ENCOUNTER — HOSPITAL ENCOUNTER (EMERGENCY)
Facility: HOSPITAL | Age: 71
Discharge: HOME | End: 2021-12-16
Attending: EMERGENCY MEDICINE
Payer: MEDICARE

## 2021-12-16 VITALS
DIASTOLIC BLOOD PRESSURE: 84 MMHG | RESPIRATION RATE: 18 BRPM | BODY MASS INDEX: 26.9 KG/M2 | WEIGHT: 137 LBS | OXYGEN SATURATION: 98 % | TEMPERATURE: 98 F | HEIGHT: 60 IN | SYSTOLIC BLOOD PRESSURE: 128 MMHG | HEART RATE: 80 BPM

## 2021-12-16 DIAGNOSIS — S00.511A ABRASION OF LIP, INITIAL ENCOUNTER: ICD-10-CM

## 2021-12-16 DIAGNOSIS — S00.33XA CONTUSION OF NOSE, INITIAL ENCOUNTER: Primary | ICD-10-CM

## 2021-12-16 PROCEDURE — 99284 EMERGENCY DEPT VISIT MOD MDM: CPT

## 2021-12-16 PROCEDURE — 70486 CT MAXILLOFACIAL W/O DYE: CPT | Performed by: EMERGENCY MEDICINE

## 2021-12-16 RX ORDER — IBUPROFEN 600 MG/1
600 TABLET ORAL EVERY 8 HOURS PRN
Qty: 30 TABLET | Refills: 0 | Status: SHIPPED | OUTPATIENT
Start: 2021-12-16 | End: 2021-12-23

## 2021-12-16 RX ORDER — ACETAMINOPHEN 325 MG/1
650 TABLET ORAL ONCE
Status: COMPLETED | OUTPATIENT
Start: 2021-12-16 | End: 2021-12-16

## 2021-12-16 RX ORDER — SIMVASTATIN 20 MG
20 TABLET ORAL DAILY
Qty: 30 TABLET | Refills: 0 | Status: SHIPPED | OUTPATIENT
Start: 2021-12-16

## 2021-12-17 NOTE — ED INITIAL ASSESSMENT (HPI)
Patient had mechanical fall this evening. Patient hit her face, has abrasions to lip and nose. Patient also has swelling and bruising to nose. Patient denies loss of consciousness. Patient denies blood thinner use.

## 2021-12-17 NOTE — ED PROVIDER NOTES
Patient Seen in: [facility] AND [facility] Emergency Department      History   Patient presents with:  Fall    Stated Complaint: fall r side of face    Subjective:   HPI    30-year-old female with past medical history significant for depression, diabetes, high b abrasions. No loose teeth  Eyes: Conjunctivae and EOM are normal. PERRLA  Neck: Normal range of motion. Neck supple. No tracheal deviation present. CV: s1s2+, RRR, no m/r/g, normal distal pulses  Pulmonary/Chest: CTA b/l with no rales, wheezes.   No ches

## 2022-01-03 ENCOUNTER — HOSPITAL ENCOUNTER (INPATIENT)
Facility: HOSPITAL | Age: 72
LOS: 3 days | Discharge: NURSING, REHAB OR LONG-TERM CARE | DRG: 637 | End: 2022-01-06
Attending: EMERGENCY MEDICINE | Admitting: HOSPITALIST
Payer: MEDICARE

## 2022-01-03 ENCOUNTER — APPOINTMENT (OUTPATIENT)
Dept: CT IMAGING | Facility: HOSPITAL | Age: 72
DRG: 637 | End: 2022-01-03
Attending: EMERGENCY MEDICINE
Payer: MEDICARE

## 2022-01-03 DIAGNOSIS — E11.65 TYPE 2 DIABETES MELLITUS WITH HYPERGLYCEMIA, UNSPECIFIED WHETHER LONG TERM INSULIN USE (HCC): Primary | ICD-10-CM

## 2022-01-03 DIAGNOSIS — U07.1 COVID-19: ICD-10-CM

## 2022-01-03 DIAGNOSIS — R29.6 FREQUENT FALLS: ICD-10-CM

## 2022-01-03 LAB
ANION GAP SERPL CALC-SCNC: 11 MMOL/L (ref 0–18)
BASOPHILS # BLD AUTO: 0.01 X10(3) UL (ref 0–0.2)
BASOPHILS NFR BLD AUTO: 0.2 %
BILIRUB UR QL: NEGATIVE
BUN BLD-MCNC: 18 MG/DL (ref 7–18)
BUN/CREAT SERPL: 18 (ref 10–20)
CALCIUM BLD-MCNC: 9.4 MG/DL (ref 8.5–10.1)
CHLORIDE SERPL-SCNC: 93 MMOL/L (ref 98–112)
CO2 SERPL-SCNC: 27 MMOL/L (ref 21–32)
COLOR UR: YELLOW
CREAT BLD-MCNC: 1 MG/DL
DEPRECATED RDW RBC AUTO: 39.9 FL (ref 35.1–46.3)
EOSINOPHIL # BLD AUTO: 0.01 X10(3) UL (ref 0–0.7)
EOSINOPHIL NFR BLD AUTO: 0.2 %
ERYTHROCYTE [DISTWIDTH] IN BLOOD BY AUTOMATED COUNT: 12.9 % (ref 11–15)
GLUCOSE BLD-MCNC: 535 MG/DL (ref 70–99)
GLUCOSE BLDC GLUCOMTR-MCNC: 135 MG/DL (ref 70–99)
GLUCOSE BLDC GLUCOMTR-MCNC: 141 MG/DL (ref 70–99)
GLUCOSE BLDC GLUCOMTR-MCNC: 141 MG/DL (ref 70–99)
GLUCOSE BLDC GLUCOMTR-MCNC: 142 MG/DL (ref 70–99)
GLUCOSE BLDC GLUCOMTR-MCNC: 142 MG/DL (ref 70–99)
GLUCOSE BLDC GLUCOMTR-MCNC: 159 MG/DL (ref 70–99)
GLUCOSE BLDC GLUCOMTR-MCNC: 200 MG/DL (ref 70–99)
GLUCOSE BLDC GLUCOMTR-MCNC: 245 MG/DL (ref 70–99)
GLUCOSE BLDC GLUCOMTR-MCNC: 270 MG/DL (ref 70–99)
GLUCOSE BLDC GLUCOMTR-MCNC: 272 MG/DL (ref 70–99)
GLUCOSE BLDC GLUCOMTR-MCNC: 301 MG/DL (ref 70–99)
GLUCOSE BLDC GLUCOMTR-MCNC: 410 MG/DL (ref 70–99)
GLUCOSE UR-MCNC: >=500 MG/DL
HCT VFR BLD AUTO: 40.8 %
HGB BLD-MCNC: 13.4 G/DL
HGB UR QL STRIP.AUTO: NEGATIVE
IMM GRANULOCYTES # BLD AUTO: 0.02 X10(3) UL (ref 0–1)
IMM GRANULOCYTES NFR BLD: 0.3 %
KETONES UR-MCNC: 20 MG/DL
LEUKOCYTE ESTERASE UR QL STRIP.AUTO: NEGATIVE
LYMPHOCYTES # BLD AUTO: 1.35 X10(3) UL (ref 1–4)
LYMPHOCYTES NFR BLD AUTO: 21.6 %
MCH RBC QN AUTO: 27.7 PG (ref 26–34)
MCHC RBC AUTO-ENTMCNC: 32.8 G/DL (ref 31–37)
MCV RBC AUTO: 84.5 FL
MONOCYTES # BLD AUTO: 0.44 X10(3) UL (ref 0.1–1)
MONOCYTES NFR BLD AUTO: 7 %
MRSA DNA SPEC QL NAA+PROBE: NEGATIVE
NEUTROPHILS # BLD AUTO: 4.43 X10 (3) UL (ref 1.5–7.7)
NEUTROPHILS # BLD AUTO: 4.43 X10(3) UL (ref 1.5–7.7)
NEUTROPHILS NFR BLD AUTO: 70.7 %
NITRITE UR QL STRIP.AUTO: POSITIVE
OSMOLALITY SERPL CALC.SUM OF ELEC: 298 MOSM/KG (ref 275–295)
PH UR: 6 [PH] (ref 5–8)
PLATELET # BLD AUTO: 196 10(3)UL (ref 150–450)
POTASSIUM SERPL-SCNC: 4.6 MMOL/L (ref 3.5–5.1)
PROT UR-MCNC: 30 MG/DL
RBC # BLD AUTO: 4.83 X10(6)UL
SARS-COV-2 RNA RESP QL NAA+PROBE: DETECTED
SODIUM SERPL-SCNC: 131 MMOL/L (ref 136–145)
SP GR UR STRIP: 1.03 (ref 1–1.03)
UROBILINOGEN UR STRIP-ACNC: <2
WBC # BLD AUTO: 6.3 X10(3) UL (ref 4–11)

## 2022-01-03 PROCEDURE — 70450 CT HEAD/BRAIN W/O DYE: CPT | Performed by: EMERGENCY MEDICINE

## 2022-01-03 PROCEDURE — 99221 1ST HOSP IP/OBS SF/LOW 40: CPT | Performed by: INTERNAL MEDICINE

## 2022-01-03 RX ORDER — CYCLOBENZAPRINE HCL 10 MG
5 TABLET ORAL 2 TIMES DAILY PRN
Status: DISCONTINUED | OUTPATIENT
Start: 2022-01-03 | End: 2022-01-06

## 2022-01-03 RX ORDER — DEXAMETHASONE 6 MG/1
6 TABLET ORAL DAILY
Status: DISCONTINUED | OUTPATIENT
Start: 2022-01-03 | End: 2022-01-03

## 2022-01-03 RX ORDER — DEXTROSE MONOHYDRATE 25 G/50ML
50 INJECTION, SOLUTION INTRAVENOUS
Status: DISCONTINUED | OUTPATIENT
Start: 2022-01-03 | End: 2022-01-06

## 2022-01-03 RX ORDER — INSULIN LISPRO 100 [IU]/ML
INJECTION, SOLUTION INTRAVENOUS; SUBCUTANEOUS
COMMUNITY
End: 2022-01-06

## 2022-01-03 NOTE — PLAN OF CARE
Problem: Patient Centered Care  Goal: Patient preferences are identified and integrated in the patient's plan of care  Description: Interventions:  - What would you like us to know as we care for you?  I live home alone  - Provide timely, complete, and ac Progressing   Patient admitted from ER in stable conditions. No signs of distress noted. Denies any pain or discomfort. BG remained WNL. Will continue to monitor.

## 2022-01-03 NOTE — H&P
Hospitalist History and Physical  name: Fabrice Cole  Room: 929850-  Date of admission: 1/3/2022    Primary care provider:  Dr. Felix Mcmullen:  Reason for Admission: Complicated UTI with elevated blood sugar and mechanical fall    History Never Smoker      Smokeless tobacco: Never Used    Vaping Use      Vaping Use: Never used    Substance and Sexual Activity      Alcohol use: Yes        Comment: very infrequent, every couple of months      Drug use: Never      Sexual activity: Not on file Wili Cardona, [address] [facility], [address] [facility], [address] [facility], [facility], [address], [address], [address], [address], [place]    CardiacEnzymes  Invalid input(s): TROPONINT    Diabetes Studies  No results found for: GLUF, MICROALBUR            Imaging:  CT BRAIN OR Toya Sommers MD on 1/03/2022 at 7:26 AM     Finalized by (CST): Toya Sommers MD on 1/03/2022 at 7:29 AM          CT MAXILLOFACIAL (QLK=48248)    Result Date: 12/16/2021  PROCEDURE: CT MAXILLO FACIAL (CPT=70486)  COMPARISON: [facility] culture. 4. Hyperlipidemia: Continue statin  5. Hypertension: Continue losartan  6.  Vitamin D deficiency: Continue replacement      Prophylaxis:  DVT-Heparin  GI-Protonix    Code Status: Full code    Teodora Simeon MD  [facility] Primary Care

## 2022-01-03 NOTE — ED QUICK NOTES
PATIENT IS BROUGHT BY HER DAUGHTER FOR THE FALLS. PATIENT IS NOT AWARE WHY SHE FELL. DENIED TRIPPING. PATIENT STATES FALLING FIRST TIME AT 21:15 ON CONCRETE IN THE BASEMENT AND SECOND TIME OFF THE 3RD STAIR AT 2215. PATIENT DENIES HITTING HER HEAD.  DEN

## 2022-01-03 NOTE — ED QUICK NOTES
Orders for admission, patient is aware of plan and ready to go upstairs. Any questions, please call ED RN Domo Polo at extension [phone].      Patient Covid vaccination status: Fully vaccinated     COVID Test Ordered in ED: Rapid SARS-CoV-2 by PCR    COVID Suspi

## 2022-01-03 NOTE — CONSULTS
[facility]OSP - [facility]    Report of Consultation    Brant Rangel Patient Status:  Emergency    1950 MRN [id number]   Location [address] Attending Leslie Durham MD   Hosp Day # 0 PCP None Pcp     Date of Admis in full sentences   Cardiac: Regular rate .     Impression and Plan:  Patient Active Problem List:     Syncope and collapse     Hyperglycemia     Hyperosmolar hyperglycemic state (HHS) ([facility] [address]     Type 2 diabetes mellitus with hyperglycemia, with long-term cu

## 2022-01-03 NOTE — ED PROVIDER NOTES
Patient Seen in: [facility] AND [facility] Emergency Department      History   Patient presents with:  Fall    Stated Complaint: falls     Subjective:   HPI    Pt is 81 yo F who arrives by EMS from home for fall. Pt tripped and fell in basement earlier today.   non tender  CV: RRR, no murmurs  Resp: CTAB, no wheezes or retractions  Ab: soft, nontender, no distension, no midline back tenderness  Extremities: FROM of all extremities, no cyanosis/clubbing/edema  Neuro: CN intact, normal speech, 5/5 motor strength in Record Review: I personally reviewed available prior medical records for any recent pertinent discharge summaries, testing, and procedures and reviewed those reports. Critical Care:   I spent a total of 60 minutes of critical care time in obtaining histo

## 2022-01-03 NOTE — ED INITIAL ASSESSMENT (HPI)
Pt brought in by daughter for frequent falls, c/o 2 falls tonight, reports tripping on a hole in a concrete floor in the basement, then falling down 4 steps at 0030a. Pt is awake and A&O x3 c/o back pain, bilateral knee pain, headache. No LOC.

## 2022-01-04 LAB
GLUCOSE BLDC GLUCOMTR-MCNC: 126 MG/DL (ref 70–99)
GLUCOSE BLDC GLUCOMTR-MCNC: 222 MG/DL (ref 70–99)
GLUCOSE BLDC GLUCOMTR-MCNC: 246 MG/DL (ref 70–99)
GLUCOSE BLDC GLUCOMTR-MCNC: 260 MG/DL (ref 70–99)
GLUCOSE BLDC GLUCOMTR-MCNC: 281 MG/DL (ref 70–99)
VIT D+METAB SERPL-MCNC: 46.9 NG/ML (ref 30–100)

## 2022-01-04 RX ORDER — LOSARTAN POTASSIUM 50 MG/1
50 TABLET ORAL DAILY
Status: DISCONTINUED | OUTPATIENT
Start: 2022-01-04 | End: 2022-01-05

## 2022-01-04 RX ORDER — SODIUM CHLORIDE 9 MG/ML
INJECTION, SOLUTION INTRAVENOUS CONTINUOUS
Status: DISCONTINUED | OUTPATIENT
Start: 2022-01-04 | End: 2022-01-06

## 2022-01-04 RX ORDER — ATORVASTATIN CALCIUM 20 MG/1
20 TABLET, FILM COATED ORAL NIGHTLY
Refills: 0 | Status: DISCONTINUED | OUTPATIENT
Start: 2022-01-04 | End: 2022-01-06

## 2022-01-04 RX ORDER — MELATONIN
2000 DAILY
Status: DISCONTINUED | OUTPATIENT
Start: 2022-01-04 | End: 2022-01-06

## 2022-01-04 NOTE — CM/SW NOTE
WAQAS received MDO for discharge planning, \"family requesting home health services. \" PT/OT recommending [facility]. WAQAS contacted patient's daughter, Melissa Mckeon, to discuss discharge planning. Melissa Santinosari reports that patient lives at home with her and family.  Melissa Mckeon report

## 2022-01-04 NOTE — PLAN OF CARE
Problem: Patient Centered Care  Goal: Patient preferences are identified and integrated in the patient's plan of care  Description: Interventions:  - What would you like us to know as we care for you?  I live home alone  - Provide timely, complete, and ac target range  - Assess barriers to adequate nutritional intake and initiate nutrition consult as needed  - Instruct patient on self management of diabetes  Outcome: Progressing     Patient states has chronic cramping of feet, MD notified and PRN medication

## 2022-01-04 NOTE — PROGRESS NOTES
Endocrine Note    Reviewed BG levels from the past 24 hours which are improved since admission but persistent hyperglycemia. Increase Novolog to 14 units subcutaneous TID with meals and increase Levemir to 36 units subcutaneous daily. High dose CF.   Will

## 2022-01-04 NOTE — PROGRESS NOTES
Progress Note  name: Daniel Geronimo  Room: 674/383-F  Date of admission: 1/3/2022    Primary care provider:  Dr. Jamar Gonzalez: seen and examined. Patient is saturating well on RA. orthostats positive feel dizzy when walk. Started on IVF.  On R of education: Not on file      Highest education level: Not on file    Occupational History      Not on file    Tobacco Use      Smoking status: Never Smoker      Smokeless tobacco: Never Used    Vaping Use      Vaping Use: Never used    Substance and Sexu Coags:   Recent Labs   Lab 01/03/22  0218   .0         Urinalysis  Invalid input(s): Rajesh Gonzalez, TODD WOOD, Arianna Tierney, Octavio Beaulieu, Mynor Sebastian, [address] [place] [address] 13, [facility], [address] [place], [facility] [address], [facility] 41, S sinusitis. A preliminary report was issued by the [address] [place] [address] Radiology teleradiology service. There are no major discrepancies.   Dictated by (CST): Haley Barth MD on 1/03/2022 at 7:26 AM     Finalized by (CST): Haley Barth MD on 1/03/2022 at 7: positive. IVF. 3. Generalized weakness secondary to Covid: Patient is vaccinated. Continue symptomatic care. Patient is not hypoxic. 4. Complicated UTI: Ceftriaxone. Follow-up urine culture. 5. Hyperlipidemia: Continue statin  6.  Hypertension: Cont

## 2022-01-04 NOTE — OCCUPATIONAL THERAPY NOTE
OCCUPATIONAL THERAPY EVALUATION - INPATIENT     Room Number: 558/558-A  Evaluation Date: 1/4/2022  Type of Evaluation: Initial  Presenting Problem: type II DM with hyperglycemia    Physician Order: IP Consult to Occupational Therapy  Reason for Therapy: with hyperglycemia ([facility].)    Frequent falls    COVID-19      Past Medical History  Past Medical History:   Diagnosis Date   • Arthritis    • Back problem    • Depression    • Diabetes ([facility].)    • Essential hypertension    • High blood pressure    • High chol 19  Approx Degree of Impairment: 42.8%  Standardized Score (AM-PAC Scale): 40.22  CMS Modifier (G-Code): CK    FUNCTIONAL TRANSFER ASSESSMENT        Toilet Transfer: min A  Chair Transfer: min A    FUNCTIONAL ADL ASSESSMENT  Grooming: CGA  Toileting: CGA

## 2022-01-04 NOTE — PHYSICAL THERAPY NOTE
PHYSICAL THERAPY EVALUATION - INPATIENT     Room Number: 558/558-A  Evaluation Date: 1/4/2022  Type of Evaluation: Initial   Physician Order: PT Eval and Treat    Presenting Problem: falls, weakness, found to be COVID+ 1/3/22  Co-Morbidities : DM, high gl use bathroom. Able to walk 5'x3 with RW and mod A, unsteady and feeling weak. She needs help to stand and manage clothing. She is below her normal baseline and will benefit from inpatient rehab.  She is up in chair with all needs in reach and RN aware of se assist her in the daytime. She reports being independent on stairs and with walking without a device, uses a cane in the community.  She does not shop or cook per her own report    SUBJECTIVE  \"I feel weak\"    PHYSICAL THERAPY EXAMINATION     OBJECTIVE  P 40.78   CMS Modifier (G-Code): CK    FUNCTIONAL ABILITY STATUS  Functional Mobility/Gait Assessment  Gait Assistance:  Moderate assistance  Distance (ft): 5'x3  Assistive Device: Rolling walker  Pattern: Shuffle    Bed Mobility: min A    Transfers:  Min A t

## 2022-01-05 LAB
GLUCOSE BLDC GLUCOMTR-MCNC: 257 MG/DL (ref 70–99)
GLUCOSE BLDC GLUCOMTR-MCNC: 306 MG/DL (ref 70–99)
GLUCOSE BLDC GLUCOMTR-MCNC: 309 MG/DL (ref 70–99)
GLUCOSE BLDC GLUCOMTR-MCNC: 320 MG/DL (ref 70–99)

## 2022-01-05 PROCEDURE — 99231 SBSQ HOSP IP/OBS SF/LOW 25: CPT | Performed by: INTERNAL MEDICINE

## 2022-01-05 NOTE — PROGRESS NOTES
[facility]    Progress Note    [place] Patient Status:  Inpatient    1950 MRN [id number]   Location [facility] 5SW/SE Attending Amish Wilkerson MD   Hosp Day # 2 PCP None Pcp     Subjective:  Feeling better this morning,

## 2022-01-05 NOTE — PLAN OF CARE
Problem: Patient Centered Care  Goal: Patient preferences are identified and integrated in the patient's plan of care  Description: Interventions:  - What would you like us to know as we care for you?  I live home alone  - Provide timely, complete, and ac Progressing   Patient remained stable. No signs of distress. Denies any pain or discomfort. Will continue to monitor.

## 2022-01-05 NOTE — PLAN OF CARE
Problem: Patient Centered Care  Goal: Patient preferences are identified and integrated in the patient's plan of care  Description: Interventions:  - What would you like us to know as we care for you?  I live home alone  - Provide timely, complete, and ac signs and symptoms of hyperglycemia and hypoglycemia  - Administer ordered medications to maintain glucose within target range  - Assess barriers to adequate nutritional intake and initiate nutrition consult as needed  - Instruct patient on self management

## 2022-01-05 NOTE — PLAN OF CARE
Orthostatic BP assessed, per MD to keep one more day. IV antibiotics, IV fluids. Sugars controlled. Will continue to monitor.    Problem: Patient Centered Care  Goal: Patient preferences are identified and integrated in the patient's plan of care  [facility] ADULT  Goal: Glucose maintained within prescribed range  Description: INTERVENTIONS:  - Monitor Blood Glucose as ordered  - Assess for signs and symptoms of hyperglycemia and hypoglycemia  - Administer ordered medications to maintain glucose within target

## 2022-01-05 NOTE — PROGRESS NOTES
Progress Note  name: Jorge Vivar  Room: 948/054-Q  Date of admission: 1/3/2022    Primary care provider:  Dr. Stanislav Miles: seen and examined. Patient is saturating well on RA. orthostats positive still continue iVF.   Discontinue losartan education level: Not on file    Occupational History      Not on file    Tobacco Use      Smoking status: Never Smoker      Smokeless tobacco: Never Used    Vaping Use      Vaping Use: Never used    Substance and Sexual Activity      Alcohol use:  Yes .0         Urinalysis  Invalid input(s): Mary Martinez, [address], [address] [facility], [address], [address] [facility], [address] [facility], [address] [place] [address], [address] [place] [address], [address] [place] [address] 13, [facility], [address], [facility] [address], [facility], [address] [address]  Invalid input(s): [facility] teleradiology service. There are no major discrepancies.   Dictated by (CST): Jennifer Anderson MD on 1/03/2022 at 7:26 AM     Finalized by (CST): Jennifer Anderson MD on 1/03/2022 at 7:29 AM          CT MAXILLOFACIAL (CPT=70486)    Resul Covid: Patient is vaccinated. Continue symptomatic care. Patient is not hypoxic. 4. Complicated UTI: Ceftriaxone. Follow-up urine culture. 5. Hyperlipidemia: Continue statin  6. Hypertension: Continue losartan- hold due to orthostat positive  7.  Vit

## 2022-01-05 NOTE — DIABETES ED
[facility] HOSP - [facility]    Diabetes Education  Note    Gladys Macias Patient Status:  Inpatient   1950 MRN [id number]  Location [facility] 5SW/SE Attending Alivia Membreno MD  Hosp Day # 2 PCP None Pcp      Reason for Visit:Md order indicate

## 2022-01-06 VITALS
TEMPERATURE: 98 F | DIASTOLIC BLOOD PRESSURE: 59 MMHG | HEART RATE: 80 BPM | SYSTOLIC BLOOD PRESSURE: 143 MMHG | HEIGHT: 60 IN | RESPIRATION RATE: 18 BRPM | OXYGEN SATURATION: 95 % | BODY MASS INDEX: 28.47 KG/M2 | WEIGHT: 145 LBS

## 2022-01-06 LAB
ANION GAP SERPL CALC-SCNC: 6 MMOL/L (ref 0–18)
BASOPHILS # BLD AUTO: 0.02 X10(3) UL (ref 0–0.2)
BASOPHILS NFR BLD AUTO: 0.3 %
BUN BLD-MCNC: 14 MG/DL (ref 7–18)
BUN/CREAT SERPL: 22.6 (ref 10–20)
CALCIUM BLD-MCNC: 8.4 MG/DL (ref 8.5–10.1)
CHLORIDE SERPL-SCNC: 108 MMOL/L (ref 98–112)
CO2 SERPL-SCNC: 28 MMOL/L (ref 21–32)
CREAT BLD-MCNC: 0.62 MG/DL
DEPRECATED RDW RBC AUTO: 40.5 FL (ref 35.1–46.3)
EOSINOPHIL # BLD AUTO: 0.12 X10(3) UL (ref 0–0.7)
EOSINOPHIL NFR BLD AUTO: 1.7 %
ERYTHROCYTE [DISTWIDTH] IN BLOOD BY AUTOMATED COUNT: 12.7 % (ref 11–15)
GLUCOSE BLD-MCNC: 277 MG/DL (ref 70–99)
GLUCOSE BLDC GLUCOMTR-MCNC: 200 MG/DL (ref 70–99)
GLUCOSE BLDC GLUCOMTR-MCNC: 270 MG/DL (ref 70–99)
GLUCOSE BLDC GLUCOMTR-MCNC: 279 MG/DL (ref 70–99)
HCT VFR BLD AUTO: 37.7 %
HGB BLD-MCNC: 12.4 G/DL
IMM GRANULOCYTES # BLD AUTO: 0.04 X10(3) UL (ref 0–1)
IMM GRANULOCYTES NFR BLD: 0.6 %
LYMPHOCYTES # BLD AUTO: 2.33 X10(3) UL (ref 1–4)
LYMPHOCYTES NFR BLD AUTO: 32.1 %
MCH RBC QN AUTO: 28.6 PG (ref 26–34)
MCHC RBC AUTO-ENTMCNC: 32.9 G/DL (ref 31–37)
MCV RBC AUTO: 86.9 FL
MONOCYTES # BLD AUTO: 0.55 X10(3) UL (ref 0.1–1)
MONOCYTES NFR BLD AUTO: 7.6 %
NEUTROPHILS # BLD AUTO: 4.19 X10 (3) UL (ref 1.5–7.7)
NEUTROPHILS # BLD AUTO: 4.19 X10(3) UL (ref 1.5–7.7)
NEUTROPHILS NFR BLD AUTO: 57.7 %
OSMOLALITY SERPL CALC.SUM OF ELEC: 304 MOSM/KG (ref 275–295)
PLATELET # BLD AUTO: 183 10(3)UL (ref 150–450)
POTASSIUM SERPL-SCNC: 5.2 MMOL/L (ref 3.5–5.1)
RBC # BLD AUTO: 4.34 X10(6)UL
SODIUM SERPL-SCNC: 142 MMOL/L (ref 136–145)
WBC # BLD AUTO: 7.3 X10(3) UL (ref 4–11)

## 2022-01-06 PROCEDURE — 99231 SBSQ HOSP IP/OBS SF/LOW 25: CPT | Performed by: INTERNAL MEDICINE

## 2022-01-06 NOTE — CM/SW NOTE
MDO for dc. SAMMY spoke with RN to confirm, CM completed AVS. RN to review for accuracy and print.     Ivory Plummer, JULISSA    Ext [phone]

## 2022-01-06 NOTE — PHYSICAL THERAPY NOTE
PHYSICAL THERAPY TREATMENT NOTE - INPATIENT     Room Number: 558/558-A       Presenting Problem: falls, weakness, found to be COVID+ 1/3/22  Co-Morbidities : DM, high glucose levels    Problem List  Principal Problem:    Type 2 diabetes mellitus with hyper +  Dynamic Sitting: Fair  Static Standing: Fair -  Dynamic Standing: Fair -    ACTIVITY TOLERANCE          BP: 154/84  BP Location: Right arm  BP Method: Automatic  Patient Position: Sitting     O2 WALK  Oxygen Therapy  SPO2% on Room Air at Rest: 95  SPO2% is able to ambulate 100  feet with assist device: walker - rolling at assistance level: SBA   Goal #3   Current Status  Goal met.  Updated 1/6/21. 50 ft with RW, CGA   Goal #4 Patient will negotiate 6 stairs/one curb w/ assistive device and minimal assistan

## 2022-01-06 NOTE — OCCUPATIONAL THERAPY NOTE
OCCUPATIONAL THERAPY TREATMENT NOTE - INPATIENT        Room Number: 558/558-A  Presenting Problem:  (covid)    Problem List  Principal Problem:    Type 2 diabetes mellitus with hyperglycemia, unspecified whether long term insulin use ([facility] 75.)  Active Problems transfer training    SUBJECTIVE  Pt w/ c/o lightheadedness w/ ambulation    OBJECTIVE  Precautions: Hard of hearing;Bed/chair alarm    PAIN ASSESSMENT  Ratin    ACTIVITY TOLERANCE  good  98%  On room air  91HR w/ activity      ACTIVITIES OF DAILY LIVIN

## 2022-01-06 NOTE — PROGRESS NOTES
[facility]    Progress Note    Shun Golden Patient Status:  Inpatient    1950 MRN [id number]   Location [facility] 5SW/SE Attending Florentino Cee MD   Hosp Day # 3 PCP None Pcp     Subjective:  Feeling better this morning,

## 2022-01-06 NOTE — PLAN OF CARE
No acute changes overnight. IV fluids continued. All safety measures are in place. Call light is within reach. Plan is to discharge to [facility] once medically cleared.   Problem: Patient Centered Care  Goal: Patient preferences are identified and integrated in t METABOLIC/FLUID AND ELECTROLYTES - ADULT  Goal: Glucose maintained within prescribed range  Description: INTERVENTIONS:  - Monitor Blood Glucose as ordered  - Assess for signs and symptoms of hyperglycemia and hypoglycemia  - Administer ordered medications

## 2022-01-06 NOTE — PROGRESS NOTES
Progress Note  name: Ghada Perez  Room: 564/797-C  Date of admission: 1/3/2022    Primary care provider:  Dr. Elisabeth Middleton: seen and examined. Patient is saturating well on RA. orthostats positive still continue iVF.   Discontinue losartan education level: Not on file    Occupational History      Not on file    Tobacco Use      Smoking status: Never Smoker      Smokeless tobacco: Never Used    Vaping Use      Vaping Use: Never used    Substance and Sexual Activity      Alcohol use:  Yes 28.0   BUN 18 14       Coags:   Recent Labs   Lab 01/03/22  0218 01/06/22  0613   .0 183.0         Urinalysis  Invalid input(s): ISRAEL Whitaker, [address] [facility], [facility] [address] [facility], [address] [facility], [place], [place], [address] [facility], [facility],  atherosclerosis. 3. Chronic left maxillary sinusitis. A preliminary report was issued by the [address] [place] [address] Radiology teleradiology service. There are no major discrepancies.   Dictated by (CST): Rock Dao MD on 1/03/2022 at 7:26 AM     Finalized by (CST times daily. 2. Dehydration: orthostats positive. IVF. 3. Generalized weakness secondary to Covid: Patient is vaccinated. Continue symptomatic care. Patient is not hypoxic. 4. Complicated UTI: Ceftriaxone. Follow-up urine culture.   5. Hyperlipidemi

## 2022-01-06 NOTE — PLAN OF CARE
VSS, orthostatic BP assessed this shift, no acute changes, patient will be discharging to [facility], will call for report, will remove IV. Adequate for discharge. Report given to [facility].     Problem: Patient Centered Care  Goal: Patient preferences are carlos specific interventions  Outcome: Adequate for Discharge     Problem: METABOLIC/FLUID AND ELECTROLYTES - ADULT  Goal: Glucose maintained within prescribed range  Description: INTERVENTIONS:  - Monitor Blood Glucose as ordered  - Assess for signs and symptom

## 2022-01-07 NOTE — PROGRESS NOTES
Lens Material: Patient picked up by [facility] ambulance. IV line discontinued and all personal belongings accompanied with patient.

## 2022-01-07 NOTE — PROGRESS NOTES
Patient had some red streaks in BM this evening, MD notified, Hemoglobin stable this morning. No concern from MD, follow up CBC tomorrow morning outpatient at facility and follow up [facility] aware.

## 2022-01-14 NOTE — DISCHARGE SUMMARY
Discharge summary  name: Ghada Perez  Room: 255/768-V  Date of admission: 1/3/2022  Date of discharge: 1/6/2022    Primary care provider:  Dr. Elisabeth Middleton: seen and examined. Patient is saturating well on RA. Stable for discharge.  Follow file      Highest education level: Not on file    Occupational History      Not on file    Tobacco Use      Smoking status: Never Smoker      Smokeless tobacco: Never Used    Vaping Use      Vaping Use: Never used    Substance and Sexual Activity      Alco ALT, AST, ALKPHOS, ALB, LIPASE in the last 168 hours. Invalid input(s): CREATININE, GLCS, CALCIUM, MGNSM, PHOSPH, BILITOT, BILIDIR, PROT, AMYASE    Coags:   No results for input(s): INR, PLT, PTT in the last 168 hours.       Urinalysis  Invalid input(s): Stable mild generalized atrophy and mild chronic microangiopathic ischemic changes with large vessel calcific atherosclerosis. 3. Chronic left maxillary sinusitis. A preliminary report was issued by the [address] [place] [address] Radiology teleradiology service.  There are n Patient is noncompliant. Blood glucose in 500s. Patient is started on Levemir 30 units daily. NovoLog 8 units 3 times daily. 2. Dehydration: orthostats positive. IVF. 3. Generalized weakness secondary to Covid: Patient is vaccinated.   Continue symptoma

## 2022-02-04 ENCOUNTER — HOSPITAL ENCOUNTER (EMERGENCY)
Facility: HOSPITAL | Age: 72
Discharge: HOME | End: 2022-02-05
Attending: EMERGENCY MEDICINE
Payer: MEDICARE

## 2022-02-04 DIAGNOSIS — R29.6 FREQUENT FALLS: Primary | ICD-10-CM

## 2022-02-04 DIAGNOSIS — E11.65 TYPE 2 DIABETES MELLITUS WITH HYPERGLYCEMIA, WITH LONG-TERM CURRENT USE OF INSULIN (HCC): ICD-10-CM

## 2022-02-04 DIAGNOSIS — Z79.4 TYPE 2 DIABETES MELLITUS WITH HYPERGLYCEMIA, WITH LONG-TERM CURRENT USE OF INSULIN (HCC): ICD-10-CM

## 2022-02-04 LAB
ANION GAP SERPL CALC-SCNC: 5 MMOL/L (ref 0–18)
BASOPHILS # BLD AUTO: 0.03 X10(3) UL (ref 0–0.2)
BASOPHILS NFR BLD AUTO: 0.3 %
BUN BLD-MCNC: 18 MG/DL (ref 7–18)
BUN/CREAT SERPL: 14.8 (ref 10–20)
CALCIUM BLD-MCNC: 9.5 MG/DL (ref 8.5–10.1)
CHLORIDE SERPL-SCNC: 94 MMOL/L (ref 98–112)
CO2 SERPL-SCNC: 28 MMOL/L (ref 21–32)
CREAT BLD-MCNC: 1.22 MG/DL
DEPRECATED RDW RBC AUTO: 37.7 FL (ref 35.1–46.3)
EOSINOPHIL # BLD AUTO: 0.1 X10(3) UL (ref 0–0.7)
EOSINOPHIL NFR BLD AUTO: 1 %
ERYTHROCYTE [DISTWIDTH] IN BLOOD BY AUTOMATED COUNT: 12.4 % (ref 11–15)
GLUCOSE BLD-MCNC: 559 MG/DL (ref 70–99)
GLUCOSE BLDC GLUCOMTR-MCNC: 416 MG/DL (ref 70–99)
GLUCOSE BLDC GLUCOMTR-MCNC: 587 MG/DL (ref 70–99)
HCT VFR BLD AUTO: 38.6 %
HGB BLD-MCNC: 12.8 G/DL
IMM GRANULOCYTES # BLD AUTO: 0.05 X10(3) UL (ref 0–1)
IMM GRANULOCYTES NFR BLD: 0.5 %
LYMPHOCYTES # BLD AUTO: 2.18 X10(3) UL (ref 1–4)
LYMPHOCYTES NFR BLD AUTO: 22.2 %
MCH RBC QN AUTO: 27.9 PG (ref 26–34)
MCHC RBC AUTO-ENTMCNC: 33.2 G/DL (ref 31–37)
MCV RBC AUTO: 84.1 FL
MONOCYTES # BLD AUTO: 0.58 X10(3) UL (ref 0.1–1)
MONOCYTES NFR BLD AUTO: 5.9 %
NEUTROPHILS # BLD AUTO: 6.87 X10 (3) UL (ref 1.5–7.7)
NEUTROPHILS # BLD AUTO: 6.87 X10(3) UL (ref 1.5–7.7)
OSMOLALITY SERPL CALC.SUM OF ELEC: 291 MOSM/KG (ref 275–295)
PLATELET # BLD AUTO: 248 10(3)UL (ref 150–450)
POTASSIUM SERPL-SCNC: 4.2 MMOL/L (ref 3.5–5.1)
RBC # BLD AUTO: 4.59 X10(6)UL
SARS-COV-2 RNA RESP QL NAA+PROBE: DETECTED
SODIUM SERPL-SCNC: 127 MMOL/L (ref 136–145)
WBC # BLD AUTO: 9.8 X10(3) UL (ref 4–11)

## 2022-02-04 PROCEDURE — 96360 HYDRATION IV INFUSION INIT: CPT

## 2022-02-04 PROCEDURE — 82962 GLUCOSE BLOOD TEST: CPT

## 2022-02-04 PROCEDURE — 80048 BASIC METABOLIC PNL TOTAL CA: CPT | Performed by: EMERGENCY MEDICINE

## 2022-02-04 PROCEDURE — 99285 EMERGENCY DEPT VISIT HI MDM: CPT

## 2022-02-04 PROCEDURE — 85025 COMPLETE CBC W/AUTO DIFF WBC: CPT | Performed by: EMERGENCY MEDICINE

## 2022-02-04 PROCEDURE — 96361 HYDRATE IV INFUSION ADD-ON: CPT

## 2022-02-04 RX ORDER — INSULIN ASPART 100 [IU]/ML
0.15 INJECTION, SOLUTION INTRAVENOUS; SUBCUTANEOUS ONCE
Status: COMPLETED | OUTPATIENT
Start: 2022-02-04 | End: 2022-02-04

## 2022-02-04 RX ORDER — INSULIN ASPART 100 [IU]/ML
0.2 INJECTION, SOLUTION INTRAVENOUS; SUBCUTANEOUS ONCE
Status: COMPLETED | OUTPATIENT
Start: 2022-02-04 | End: 2022-02-04

## 2022-02-05 VITALS
HEART RATE: 79 BPM | SYSTOLIC BLOOD PRESSURE: 158 MMHG | DIASTOLIC BLOOD PRESSURE: 75 MMHG | TEMPERATURE: 99 F | OXYGEN SATURATION: 99 % | RESPIRATION RATE: 16 BRPM

## 2022-02-05 LAB
GLUCOSE BLDC GLUCOMTR-MCNC: 182 MG/DL (ref 70–99)
GLUCOSE BLDC GLUCOMTR-MCNC: 291 MG/DL (ref 70–99)

## 2022-02-05 PROCEDURE — 82962 GLUCOSE BLOOD TEST: CPT

## 2022-02-05 NOTE — ED INITIAL ASSESSMENT (HPI)
Pt reports fall hit head , been home for one week from rehab, usually taking insulin,needs prescription at home, pt granddaughter brought here reports need more assistance at home

## 2022-02-05 NOTE — CM/SW NOTE
RACH contacted Joaquín Edmondraisa SUERO @ [facility] re: SNF placment. David Phelps [facility] SUP reached out to her admissions department and they have no beds available tonight. Per David Phelps she states her admissions team encourages us to check back again tomorrow AM after 11AM.    RACH also left message for Bharti at [facility] to see if they are able to take patient tonight. Will await call back from [facility]. RACH notified Dr. Mariola Snyder to obtain rapid COVID on patient.

## 2022-02-05 NOTE — CM/SW NOTE
ERCM provided patient and pt's grand-daughter with [facility] SNF list. Belen Galan agreeable to [facility], [facility]. contacting [facility] in [place] as they have bed availability. RACH called and spoke with Vandana at [facility] in [place] @ [phone] re: SNF placement for patient. Vandana requests ERCM send referral via Aidin. RACH will send SNF referral via Aidin. Vadnana will call ERCM back once referral received and reviewed. Belen Angelia will review SNF list and notify ERCM of any other SNF's she would like for us to try to send patient to if [facility] unable to take patient tonight. PJM notified El Mahan RN to have Dr. Aishwarya Ramirez complete pt's ER note as soon as he is able. El Mahan RN notified to please send pt's rapid COVID now if possible.  [facility] ER RN v/u.

## 2022-02-05 NOTE — ED QUICK NOTES
Called  of the nursing home, no answer from the floor nurse. This is the 4th time I called. Informed  that patient will be picked up from the ED at [address] home to call if they have questions.

## 2022-02-05 NOTE — CM/SW NOTE
ERCM sent SNF referral via Aidin to MEL[facility] Vargas in [facility]. ERCM attached face sheet, IP transfer report, pt's COVID vaccine record and all pertinent ERCM notes with SNF referral via Aidin. ERCM will send ER encounter summary once ER MD has completed his note and pt's rapid COVID results once available in epic.

## 2022-02-05 NOTE — CM/SW NOTE
The pt has been accepted to [facility] in [place].     [facility] scheduled for 10am    PCS completed    Going to room # 114    Nurse to nurse report [phone] extension [phone]

## 2022-02-05 NOTE — CM/SW NOTE
Casey Calderon from [facility] called back - they have no beds available at [facility].

## 2022-02-05 NOTE — ED QUICK NOTES
Called nursing home for report. No answer and talked to . Dulce Maria Bridges me to call back for report.  Informed facility that the ambulance will be here at [facility].

## 2022-02-05 NOTE — CM/SW NOTE
RACH called pt's grand-daughter Shruthi Jameson @ [phone] and updated her with pt's likely discharge plan to [facility] in [facility] grand-daughter agreeable.

## 2022-02-05 NOTE — CM/SW NOTE
ER MD completed ER note - ER encounter summary sent via Aidin. Rapid COVID (+) - COVID result sent via Aidin. ERCM called and spoke with Vandnaa @ [facility] [address] and informed her of pt's COVID (+) result. Per Jacklyn Hodge she reached out to her  re: SNF admission and he advised her to contact the DON to let them know, per Jacklyn Hodge she called and texted her DON and is just waiting for the DON to get back with her - per Vandana they have beds and likely will be able to take patient, however it may not be until AM. [facility], [facility]. notified Dr. Toby Villeda of this - he is agreeable to hold patient in ER until SNF placement obtained. RACH also updated LadanER Charge RN on the above, Fabien [facility] RN ok with patient remaining in ER until placement obtained in AM. Patient updated on the above discharge plan and agreeable also.

## 2022-02-22 ENCOUNTER — OFFICE VISIT (OUTPATIENT)
Dept: INTERNAL MEDICINE CLINIC | Facility: CLINIC | Age: 72
End: 2022-02-22
Payer: MEDICARE

## 2022-02-22 VITALS
SYSTOLIC BLOOD PRESSURE: 124 MMHG | BODY MASS INDEX: 25.32 KG/M2 | OXYGEN SATURATION: 97 % | WEIGHT: 129 LBS | TEMPERATURE: 97 F | DIASTOLIC BLOOD PRESSURE: 60 MMHG | HEIGHT: 60 IN | HEART RATE: 93 BPM

## 2022-02-22 DIAGNOSIS — R29.6 FREQUENT FALLS: ICD-10-CM

## 2022-02-22 DIAGNOSIS — R41.3 MEMORY LOSS: ICD-10-CM

## 2022-02-22 DIAGNOSIS — E11.65 TYPE 2 DIABETES MELLITUS WITH HYPERGLYCEMIA, WITH LONG-TERM CURRENT USE OF INSULIN (HCC): ICD-10-CM

## 2022-02-22 DIAGNOSIS — Z79.4 TYPE 2 DIABETES MELLITUS WITH HYPERGLYCEMIA, WITH LONG-TERM CURRENT USE OF INSULIN (HCC): ICD-10-CM

## 2022-02-22 DIAGNOSIS — R55 SYNCOPE AND COLLAPSE: ICD-10-CM

## 2022-02-22 DIAGNOSIS — I51.89 GRADE II DIASTOLIC DYSFUNCTION: ICD-10-CM

## 2022-02-22 DIAGNOSIS — E11.65 HYPEROSMOLAR HYPERGLYCEMIC STATE (HHS) (HCC): Primary | ICD-10-CM

## 2022-02-22 DIAGNOSIS — E11.00 HYPEROSMOLAR HYPERGLYCEMIC STATE (HHS) (HCC): Primary | ICD-10-CM

## 2022-02-22 PROCEDURE — 99203 OFFICE O/P NEW LOW 30 MIN: CPT | Performed by: INTERNAL MEDICINE

## 2022-02-22 RX ORDER — INSULIN GLARGINE 100 [IU]/ML
INJECTION, SOLUTION SUBCUTANEOUS
COMMUNITY
End: 2022-02-24

## 2022-02-23 ENCOUNTER — OFFICE VISIT (OUTPATIENT)
Dept: INTERNAL MEDICINE CLINIC | Facility: CLINIC | Age: 72
End: 2022-02-23
Payer: MEDICARE

## 2022-02-23 VITALS — HEART RATE: 85 BPM | OXYGEN SATURATION: 98 % | DIASTOLIC BLOOD PRESSURE: 62 MMHG | SYSTOLIC BLOOD PRESSURE: 118 MMHG

## 2022-02-23 DIAGNOSIS — G31.84 MILD COGNITIVE IMPAIRMENT: ICD-10-CM

## 2022-02-23 DIAGNOSIS — E11.59 TYPE 2 DIABETES MELLITUS WITH OTHER CIRCULATORY COMPLICATION, WITH LONG-TERM CURRENT USE OF INSULIN (HCC): ICD-10-CM

## 2022-02-23 DIAGNOSIS — E11.65 TYPE 2 DIABETES MELLITUS WITH HYPERGLYCEMIA, WITH LONG-TERM CURRENT USE OF INSULIN (HCC): Primary | ICD-10-CM

## 2022-02-23 DIAGNOSIS — Z79.4 TYPE 2 DIABETES MELLITUS WITH HYPERGLYCEMIA, WITH LONG-TERM CURRENT USE OF INSULIN (HCC): Primary | ICD-10-CM

## 2022-02-23 DIAGNOSIS — Z79.4 TYPE 2 DIABETES MELLITUS WITH OTHER CIRCULATORY COMPLICATION, WITH LONG-TERM CURRENT USE OF INSULIN (HCC): ICD-10-CM

## 2022-02-23 LAB
GLUCOSE BLOOD: 437
TEST STRIP LOT #: NORMAL NUMERIC

## 2022-02-23 PROCEDURE — 99214 OFFICE O/P EST MOD 30 MIN: CPT | Performed by: INTERNAL MEDICINE

## 2022-02-23 PROCEDURE — 82947 ASSAY GLUCOSE BLOOD QUANT: CPT | Performed by: INTERNAL MEDICINE

## 2022-02-23 RX ORDER — INSULIN DEGLUDEC 200 U/ML
30 INJECTION, SOLUTION SUBCUTANEOUS
COMMUNITY
End: 2022-02-24

## 2022-02-24 ENCOUNTER — TELEPHONE (OUTPATIENT)
Dept: INTERNAL MEDICINE CLINIC | Facility: CLINIC | Age: 72
End: 2022-02-24

## 2022-02-24 RX ORDER — INSULIN DEGLUDEC 200 U/ML
30 INJECTION, SOLUTION SUBCUTANEOUS DAILY
Qty: 6 ML | Refills: 0 | Status: SHIPPED | OUTPATIENT
Start: 2022-02-24

## 2022-02-24 NOTE — TELEPHONE ENCOUNTER
Daughter of pt is calling stating that pt needs lantus, insulin degludec and insulin aspart pen.         Please advise

## 2022-02-25 ENCOUNTER — TELEPHONE (OUTPATIENT)
Dept: INTERNAL MEDICINE CLINIC | Facility: CLINIC | Age: 72
End: 2022-02-25

## 2022-02-25 NOTE — TELEPHONE ENCOUNTER
Daughter is calling requesting a new RX for patient above for Glucose monitor,lancets and test strips (contour next) please call Nhan Gonzalez back with any updates. Thank you.

## 2022-02-26 RX ORDER — PEN NEEDLE, DIABETIC 30 GX3/16"
1 NEEDLE, DISPOSABLE MISCELLANEOUS 4 TIMES DAILY
Qty: 100 EACH | Refills: 5 | Status: SHIPPED | OUTPATIENT
Start: 2022-02-26

## 2022-02-28 RX ORDER — BLOOD-GLUCOSE METER
1 EACH MISCELLANEOUS 2 TIMES DAILY
Qty: 1 KIT | Refills: 0 | Status: SHIPPED | OUTPATIENT
Start: 2022-02-28 | End: 2023-02-28

## 2022-02-28 RX ORDER — BLOOD SUGAR DIAGNOSTIC
STRIP MISCELLANEOUS
Qty: 100 STRIP | Refills: 1 | Status: SHIPPED | OUTPATIENT
Start: 2022-02-28 | End: 2023-02-28

## 2022-02-28 RX ORDER — LANCETS
1 EACH MISCELLANEOUS 2 TIMES DAILY
Qty: 100 EACH | Refills: 1 | Status: SHIPPED | OUTPATIENT
Start: 2022-02-28 | End: 2023-02-28

## 2022-03-08 ENCOUNTER — OFFICE VISIT (OUTPATIENT)
Dept: INTERNAL MEDICINE CLINIC | Facility: CLINIC | Age: 72
End: 2022-03-08
Payer: MEDICARE

## 2022-03-08 VITALS
HEIGHT: 60 IN | HEART RATE: 88 BPM | BODY MASS INDEX: 25.52 KG/M2 | WEIGHT: 130 LBS | DIASTOLIC BLOOD PRESSURE: 56 MMHG | TEMPERATURE: 98 F | SYSTOLIC BLOOD PRESSURE: 112 MMHG | OXYGEN SATURATION: 98 %

## 2022-03-08 DIAGNOSIS — Z79.4 TYPE 2 DIABETES MELLITUS WITH HYPERGLYCEMIA, WITH LONG-TERM CURRENT USE OF INSULIN (HCC): Primary | ICD-10-CM

## 2022-03-08 DIAGNOSIS — E11.65 TYPE 2 DIABETES MELLITUS WITH HYPERGLYCEMIA, WITH LONG-TERM CURRENT USE OF INSULIN (HCC): Primary | ICD-10-CM

## 2022-03-08 DIAGNOSIS — R55 SYNCOPE AND COLLAPSE: ICD-10-CM

## 2022-03-08 PROCEDURE — 99213 OFFICE O/P EST LOW 20 MIN: CPT | Performed by: INTERNAL MEDICINE

## 2022-04-18 ENCOUNTER — TELEPHONE (OUTPATIENT)
Dept: INTERNAL MEDICINE CLINIC | Facility: CLINIC | Age: 72
End: 2022-04-18

## 2022-04-19 ENCOUNTER — OFFICE VISIT (OUTPATIENT)
Dept: INTERNAL MEDICINE CLINIC | Facility: CLINIC | Age: 72
End: 2022-04-19
Payer: MEDICARE

## 2022-04-19 VITALS
DIASTOLIC BLOOD PRESSURE: 52 MMHG | SYSTOLIC BLOOD PRESSURE: 98 MMHG | BODY MASS INDEX: 25.52 KG/M2 | WEIGHT: 130 LBS | OXYGEN SATURATION: 97 % | HEIGHT: 60 IN | HEART RATE: 86 BPM | TEMPERATURE: 97 F

## 2022-04-19 DIAGNOSIS — E11.65 TYPE 2 DIABETES MELLITUS WITH HYPERGLYCEMIA, WITH LONG-TERM CURRENT USE OF INSULIN (HCC): ICD-10-CM

## 2022-04-19 DIAGNOSIS — Z79.4 TYPE 2 DIABETES MELLITUS WITH HYPERGLYCEMIA, WITH LONG-TERM CURRENT USE OF INSULIN (HCC): ICD-10-CM

## 2022-04-19 DIAGNOSIS — R68.89 COLD INTOLERANCE: Primary | ICD-10-CM

## 2022-04-19 LAB
ALBUMIN SERPL-MCNC: 3.3 G/DL (ref 3.4–5)
ALBUMIN/GLOB SERPL: 0.7 {RATIO} (ref 1–2)
ALP LIVER SERPL-CCNC: 93 U/L
ALT SERPL-CCNC: 37 U/L
ANION GAP SERPL CALC-SCNC: 7 MMOL/L (ref 0–18)
AST SERPL-CCNC: 37 U/L (ref 15–37)
BILIRUB SERPL-MCNC: 0.4 MG/DL (ref 0.1–2)
BUN BLD-MCNC: 21 MG/DL (ref 7–18)
BUN/CREAT SERPL: 16.3 (ref 10–20)
CALCIUM BLD-MCNC: 9.4 MG/DL (ref 8.5–10.1)
CHLORIDE SERPL-SCNC: 99 MMOL/L (ref 98–112)
CHOLEST SERPL-MCNC: 228 MG/DL (ref ?–200)
CO2 SERPL-SCNC: 25 MMOL/L (ref 21–32)
CREAT BLD-MCNC: 1.29 MG/DL
EST. AVERAGE GLUCOSE BLD GHB EST-MCNC: 326 MG/DL (ref 68–126)
FASTING PATIENT LIPID ANSWER: NO
FASTING STATUS PATIENT QL REPORTED: NO
GLOBULIN PLAS-MCNC: 4.9 G/DL (ref 2.8–4.4)
GLUCOSE BLD-MCNC: 277 MG/DL (ref 70–99)
HBA1C MFR BLD: 13 % (ref ?–5.7)
HDLC SERPL-MCNC: 38 MG/DL (ref 40–59)
LDLC SERPL CALC-MCNC: 132 MG/DL (ref ?–100)
NONHDLC SERPL-MCNC: 190 MG/DL (ref ?–130)
OSMOLALITY SERPL CALC.SUM OF ELEC: 285 MOSM/KG (ref 275–295)
POTASSIUM SERPL-SCNC: 4.6 MMOL/L (ref 3.5–5.1)
PROT SERPL-MCNC: 8.2 G/DL (ref 6.4–8.2)
SODIUM SERPL-SCNC: 131 MMOL/L (ref 136–145)
T4 FREE SERPL-MCNC: 1 NG/DL (ref 0.8–1.7)
TRIGL SERPL-MCNC: 326 MG/DL (ref 30–149)
TSI SER-ACNC: 4.01 MIU/ML (ref 0.36–3.74)
VLDLC SERPL CALC-MCNC: 60 MG/DL (ref 0–30)

## 2022-04-19 PROCEDURE — 36415 COLL VENOUS BLD VENIPUNCTURE: CPT | Performed by: INTERNAL MEDICINE

## 2022-04-19 PROCEDURE — 99214 OFFICE O/P EST MOD 30 MIN: CPT | Performed by: INTERNAL MEDICINE

## 2022-04-19 PROCEDURE — 80053 COMPREHEN METABOLIC PANEL: CPT | Performed by: INTERNAL MEDICINE

## 2022-04-19 PROCEDURE — 83036 HEMOGLOBIN GLYCOSYLATED A1C: CPT | Performed by: INTERNAL MEDICINE

## 2022-04-19 PROCEDURE — 84443 ASSAY THYROID STIM HORMONE: CPT | Performed by: INTERNAL MEDICINE

## 2022-04-19 PROCEDURE — 84439 ASSAY OF FREE THYROXINE: CPT | Performed by: INTERNAL MEDICINE

## 2022-04-19 PROCEDURE — 80061 LIPID PANEL: CPT | Performed by: INTERNAL MEDICINE

## 2022-04-19 PROCEDURE — 82570 ASSAY OF URINE CREATININE: CPT | Performed by: INTERNAL MEDICINE

## 2022-04-19 PROCEDURE — 82043 UR ALBUMIN QUANTITATIVE: CPT | Performed by: INTERNAL MEDICINE

## 2022-04-20 ENCOUNTER — TELEPHONE (OUTPATIENT)
Dept: INTERNAL MEDICINE CLINIC | Facility: CLINIC | Age: 72
End: 2022-04-20

## 2022-04-20 LAB
CREAT UR-SCNC: 116 MG/DL
MICROALBUMIN UR-MCNC: 14 MG/DL
MICROALBUMIN/CREAT 24H UR-RTO: 120.7 UG/MG (ref ?–30)

## 2022-04-20 NOTE — TELEPHONE ENCOUNTER
Called Phuc back. He stated that upon discussion yesterday with patient's daughter, daughter was not pleased with the fact that the Novolog was switched to Humalog because Novolog wasn't covered. Informed Sheri Ferreira that the daughter was already made aware the reasoning behind the Novolog switch (costing ~$300+), while Humalog is in formulary. Also discussed that the 6-12 units usage has been discussed by Dr Kailey Worthy with the daughter. Will titrate up to 12 units based off of readings.     No further questions from pharmacist.

## 2022-04-20 NOTE — TELEPHONE ENCOUNTER
Daughter of pt is calling stating that pt has court on may 20th in [facility]. Daughter wants to know if doctor has diagnosis pt with memory loss and if has if he can provide a letter to take court.       Please call and advise

## 2022-04-20 NOTE — TELEPHONE ENCOUNTER
Hubert Buckner with [facility]'s pharmacy is calling for clarification on the following medication :      Insulin Lispro 200 UNIT/ML Subcutaneous Solution Pen-injector

## 2022-04-20 NOTE — TELEPHONE ENCOUNTER
----- Message from Bharti Silverio MD sent at 4/20/2022  8:27 AM CDT -----  Please let know that hemoglobin A1c has not changed. Suggesting were not making much progress.   Please have her make an appointment to see Dr. Skylar Diaz the endocrinologist.

## 2022-04-20 NOTE — TELEPHONE ENCOUNTER
Results were discussed with granddaughter Malachi Josue) regarding patient's A1C being uncontrolled at 13.0. Patient would need to see endo to treat her diabetes. Patient was supposed to see Marko Brito but no show twice. Patient's granddaughter expressed understanding.

## 2022-04-21 NOTE — TELEPHONE ENCOUNTER
Daughter Hyun Dawn) called back in regards to message below. Daughter is basically stated that she needs a letter for her mom stating that she has MCI (mild cognitive impairment) as  had mention before. Patient is on probation and  needs a letter to present to the court.

## 2022-05-11 ENCOUNTER — PATIENT OUTREACH (OUTPATIENT)
Dept: CASE MANAGEMENT | Age: 72
End: 2022-05-11

## 2022-05-17 ENCOUNTER — OFFICE VISIT (OUTPATIENT)
Dept: INTERNAL MEDICINE CLINIC | Facility: CLINIC | Age: 72
End: 2022-05-17
Payer: MEDICARE

## 2022-05-17 VITALS
OXYGEN SATURATION: 97 % | DIASTOLIC BLOOD PRESSURE: 46 MMHG | BODY MASS INDEX: 25.52 KG/M2 | SYSTOLIC BLOOD PRESSURE: 90 MMHG | WEIGHT: 130 LBS | HEIGHT: 60 IN | HEART RATE: 70 BPM | TEMPERATURE: 97 F

## 2022-05-17 DIAGNOSIS — Z79.4 TYPE 2 DIABETES MELLITUS WITH OTHER CIRCULATORY COMPLICATION, WITH LONG-TERM CURRENT USE OF INSULIN (HCC): ICD-10-CM

## 2022-05-17 DIAGNOSIS — E11.21 DIABETIC NEPHROPATHY ASSOCIATED WITH TYPE 2 DIABETES MELLITUS (HCC): ICD-10-CM

## 2022-05-17 DIAGNOSIS — G31.84 MILD COGNITIVE IMPAIRMENT: ICD-10-CM

## 2022-05-17 DIAGNOSIS — R54 FRAILTY: Primary | ICD-10-CM

## 2022-05-17 DIAGNOSIS — R68.89 COLD INTOLERANCE: ICD-10-CM

## 2022-05-17 DIAGNOSIS — E11.65 UNCONTROLLED TYPE 2 DIABETES MELLITUS WITH HYPERGLYCEMIA (HCC): ICD-10-CM

## 2022-05-17 DIAGNOSIS — R26.9 ABNORMAL GAIT: ICD-10-CM

## 2022-05-17 DIAGNOSIS — E11.59 TYPE 2 DIABETES MELLITUS WITH OTHER CIRCULATORY COMPLICATION, WITH LONG-TERM CURRENT USE OF INSULIN (HCC): ICD-10-CM

## 2022-05-17 PROCEDURE — 99214 OFFICE O/P EST MOD 30 MIN: CPT | Performed by: INTERNAL MEDICINE

## 2022-05-17 RX ORDER — INSULIN LISPRO 100 [IU]/ML
INJECTION, SOLUTION INTRAVENOUS; SUBCUTANEOUS
COMMUNITY
Start: 2022-04-21

## 2022-05-23 ENCOUNTER — PATIENT OUTREACH (OUTPATIENT)
Dept: CASE MANAGEMENT | Age: 72
End: 2022-05-23

## 2022-05-23 NOTE — PROGRESS NOTES
Contacting patient to follow up on CCM for the month.  LMCB    Total time - 2 min  Total Monthly time- 2  min

## 2022-06-16 ENCOUNTER — PATIENT OUTREACH (OUTPATIENT)
Dept: CASE MANAGEMENT | Age: 72
End: 2022-06-16

## 2022-06-30 ENCOUNTER — OFFICE VISIT (OUTPATIENT)
Dept: ENDOCRINOLOGY CLINIC | Facility: CLINIC | Age: 72
End: 2022-06-30
Payer: MEDICARE

## 2022-06-30 VITALS
HEART RATE: 70 BPM | RESPIRATION RATE: 16 BRPM | BODY MASS INDEX: 26.11 KG/M2 | SYSTOLIC BLOOD PRESSURE: 139 MMHG | WEIGHT: 133 LBS | HEIGHT: 60 IN | DIASTOLIC BLOOD PRESSURE: 54 MMHG

## 2022-06-30 DIAGNOSIS — E78.5 DYSLIPIDEMIA: ICD-10-CM

## 2022-06-30 DIAGNOSIS — I10 PRIMARY HYPERTENSION: ICD-10-CM

## 2022-06-30 DIAGNOSIS — Z79.4 TYPE 2 DIABETES MELLITUS WITH HYPERGLYCEMIA, WITH LONG-TERM CURRENT USE OF INSULIN (HCC): Primary | ICD-10-CM

## 2022-06-30 DIAGNOSIS — E11.65 TYPE 2 DIABETES MELLITUS WITH HYPERGLYCEMIA, WITH LONG-TERM CURRENT USE OF INSULIN (HCC): Primary | ICD-10-CM

## 2022-06-30 LAB
CARTRIDGE LOT#: ABNORMAL NUMERIC
GLUCOSE BLOOD: 190
HEMOGLOBIN A1C: 12 % (ref 4.3–5.6)
TEST STRIP LOT #: NORMAL NUMERIC

## 2022-07-06 NOTE — PROGRESS NOTES
Blood Sugar Review and Adjustments    Start Time: 4:04pm  End Time: 4:51pm    Pt presents with daughter and granddaughter. Indication: Pt is T2D followed by Dr Marsha Lares. At their last appointment, it was noted that pt was only taking [place] when her blood sugar was elevated and Humalog as needed. Advised to start [place] consistently at 30 units in AM and Humalog 8 units TID with meals. Appointment today for follow up. A1c: 12.0% (2022)    Current Diabetes Medication:  Tresiba 30 units daily in AM --> 34 unit   Humalog 8 units TID with meals   ** always taking before eating   ** only taking with breakfast and dinner    Blood Sugar Values:  Advised to check blood sugars pre-meals  Fastin's   Before Lunch: Not checking because not eating  Before Dinner: 300's   Lowest blood sugar 100    Nutrition Intake:  Breakfast: Eggs with tortilla/bread or oatmeal (monkefruit sugar)  Lunch/Snacks: fruit (peaches, tries to avoid banana), sandwich with lunchmeat, candy (dannyler, M&Ms, cupcake)  Dinner: Ground beef/pork chops/fish with potatoes (very minimal) with mixed veggies/brocoli with tortillas (3)     Reviewed Nutrition:   [x] Discussed healthy weight management, glucose management, lipid management, and BP management as related to diabetes   [x] Discussed basic meal planning guidelines for diabetes regular mealtime, limited concentrated sweets.  Worked on establishing eating pattern/timing of meals and snacks    [x] Discussed in depth meal planning using the healthy eating with diabetes plate method with focus on balanced macronutrient consumption, including identifying foods that are carbohydrates, lean protein, non-starchy vegetables, and heart healthy fats   [x] Stressed importance of carbohydrate consistency in each meal   [x] Recommended carbohydrate targets of 45 grams at meals and 10-15 grams at snacks   [x] Educated on label reading   [x] Educated on portion control; including use of measuring cups, spoons, or food scale   [x] Provided suggestions for lower carb, healthy snacks   [x] Educated on importance of food monitoring and how to use food logs   [x] Discussed how to handle special occasions, dining out, and eating on the go   [x] Discussed menu planning, healthy food shopping, cooking tips, and need to pre prepare foods    [x] Educated on emotional eating and being mindful at meals   [x] Reviewed other behavior modification strategies    [x] Emphasized the need for small, sustainable changes and working on [employer] goals to encourage sustainable behaviors    [x] Instructions on how to use helpful websites or nutrition/tracking apps reviewed    [x] Discussed barriers and overcoming barriers to best achieve meal planning goals     Interpretation:  - Pt states she is not eating a lunch so she is not taking Humalog dose.  However, she is snacking around lunch consisting of carbohydrates that add up to a meal  - Higher blood sugars before dinner most likely related to uncovered high carb snack/meal    Recommendations/Plan:  - Increase Tresiba to 34 units in AM  - Continue with Humalog 8 units at meal  - Give 8 units at lunch unless <15g of carbs  - Check blood sugar 3x daily  - Nutrition   --> monitor oatmeal intake    --> avoid candy, cupcakes, sweets, etc   --> limit dinner to 1-2 tortillas if having a small amount of rice   --> measure out corn as it has carbohydrates   --> reviewed healthy dessert options    Updated Diabetes Medication:  Tresiba 34 units in AM  Humalog 8 units TID with meals    Follow Up:  1 week update with BG  Dr Marsha Lares 8/10/2022    [address]

## 2022-07-07 ENCOUNTER — NURSE ONLY (OUTPATIENT)
Dept: ENDOCRINOLOGY CLINIC | Facility: CLINIC | Age: 72
End: 2022-07-07
Payer: MEDICARE

## 2022-07-07 DIAGNOSIS — E11.65 TYPE 2 DIABETES MELLITUS WITH HYPERGLYCEMIA, WITH LONG-TERM CURRENT USE OF INSULIN (HCC): Primary | ICD-10-CM

## 2022-07-07 DIAGNOSIS — Z79.4 TYPE 2 DIABETES MELLITUS WITH HYPERGLYCEMIA, WITH LONG-TERM CURRENT USE OF INSULIN (HCC): Primary | ICD-10-CM

## 2022-07-07 PROCEDURE — 99211 OFF/OP EST MAY X REQ PHY/QHP: CPT | Performed by: INTERNAL MEDICINE

## 2022-08-10 ENCOUNTER — OFFICE VISIT (OUTPATIENT)
Dept: ENDOCRINOLOGY CLINIC | Facility: CLINIC | Age: 72
End: 2022-08-10
Payer: MEDICARE

## 2022-08-10 VITALS
HEIGHT: 60 IN | WEIGHT: 141 LBS | SYSTOLIC BLOOD PRESSURE: 122 MMHG | RESPIRATION RATE: 16 BRPM | HEART RATE: 85 BPM | BODY MASS INDEX: 27.68 KG/M2 | DIASTOLIC BLOOD PRESSURE: 64 MMHG

## 2022-08-10 DIAGNOSIS — Z79.4 TYPE 2 DIABETES MELLITUS WITH HYPERGLYCEMIA, WITH LONG-TERM CURRENT USE OF INSULIN (HCC): Primary | ICD-10-CM

## 2022-08-10 DIAGNOSIS — I10 PRIMARY HYPERTENSION: ICD-10-CM

## 2022-08-10 DIAGNOSIS — E11.65 TYPE 2 DIABETES MELLITUS WITH HYPERGLYCEMIA, WITH LONG-TERM CURRENT USE OF INSULIN (HCC): Primary | ICD-10-CM

## 2022-08-10 DIAGNOSIS — E78.5 DYSLIPIDEMIA: ICD-10-CM

## 2022-08-10 LAB
CARTRIDGE LOT#: ABNORMAL NUMERIC
GLUCOSE BLOOD: 208
HEMOGLOBIN A1C: 9.4 % (ref 4.3–5.6)
TEST STRIP LOT #: NORMAL NUMERIC

## 2022-08-10 PROCEDURE — 99214 OFFICE O/P EST MOD 30 MIN: CPT | Performed by: INTERNAL MEDICINE

## 2022-08-10 PROCEDURE — 82947 ASSAY GLUCOSE BLOOD QUANT: CPT | Performed by: INTERNAL MEDICINE

## 2022-08-10 PROCEDURE — 83036 HEMOGLOBIN GLYCOSYLATED A1C: CPT | Performed by: INTERNAL MEDICINE

## 2022-08-23 RX ORDER — INSULIN DEGLUDEC 200 U/ML
INJECTION, SOLUTION SUBCUTANEOUS
Qty: 6 ML | Refills: 0 | OUTPATIENT
Start: 2022-08-23

## 2022-08-25 ENCOUNTER — OFFICE VISIT (OUTPATIENT)
Dept: PHYSICAL MEDICINE AND REHAB | Facility: CLINIC | Age: 72
End: 2022-08-25
Payer: MEDICARE

## 2022-08-25 VITALS — WEIGHT: 142 LBS | BODY MASS INDEX: 27.88 KG/M2 | HEIGHT: 60 IN

## 2022-08-25 DIAGNOSIS — R20.2 NUMBNESS AND TINGLING OF BOTH FEET: Primary | ICD-10-CM

## 2022-08-25 DIAGNOSIS — M54.16 LEFT LUMBAR RADICULITIS: ICD-10-CM

## 2022-08-25 DIAGNOSIS — R20.0 NUMBNESS AND TINGLING OF BOTH FEET: Primary | ICD-10-CM

## 2022-08-25 PROBLEM — M62.81 MUSCLE WEAKNESS (GENERALIZED): Status: ACTIVE | Noted: 2022-01-06

## 2022-08-25 PROBLEM — I73.9 PERIPHERAL VASCULAR DISEASE (HCC): Status: ACTIVE | Noted: 2018-08-15

## 2022-08-25 PROBLEM — R26.81 UNSTEADINESS ON FEET: Status: ACTIVE | Noted: 2022-01-06

## 2022-08-25 PROBLEM — M19.90 UNSPECIFIED OSTEOARTHRITIS, UNSPECIFIED SITE: Status: ACTIVE | Noted: 2022-01-06

## 2022-08-25 PROBLEM — R41.841 COGNITIVE COMMUNICATION DEFICIT: Status: ACTIVE | Noted: 2022-01-06

## 2022-08-25 PROBLEM — R42 VERTIGO: Status: ACTIVE | Noted: 2020-08-04

## 2022-08-25 PROBLEM — I73.9 PERIPHERAL VASCULAR DISEASE: Status: ACTIVE | Noted: 2018-08-15

## 2022-08-25 PROCEDURE — 99204 OFFICE O/P NEW MOD 45 MIN: CPT | Performed by: PHYSICAL MEDICINE & REHABILITATION

## 2022-08-25 RX ORDER — GABAPENTIN 100 MG/1
CAPSULE ORAL
Qty: 120 CAPSULE | Refills: 0 | Status: SHIPPED | OUTPATIENT
Start: 2022-08-25

## 2022-08-29 ENCOUNTER — HOSPITAL ENCOUNTER (OUTPATIENT)
Age: 72
Discharge: HOME | End: 2022-08-29
Payer: MEDICARE

## 2022-08-29 ENCOUNTER — APPOINTMENT (OUTPATIENT)
Dept: GENERAL RADIOLOGY | Age: 72
End: 2022-08-29
Attending: NURSE PRACTITIONER
Payer: MEDICARE

## 2022-08-29 VITALS
TEMPERATURE: 98 F | OXYGEN SATURATION: 99 % | DIASTOLIC BLOOD PRESSURE: 53 MMHG | SYSTOLIC BLOOD PRESSURE: 118 MMHG | HEART RATE: 82 BPM | RESPIRATION RATE: 18 BRPM

## 2022-08-29 DIAGNOSIS — S99.922A FOOT INJURY, LEFT, INITIAL ENCOUNTER: Primary | ICD-10-CM

## 2022-08-29 PROCEDURE — 99213 OFFICE O/P EST LOW 20 MIN: CPT | Performed by: NURSE PRACTITIONER

## 2022-08-29 PROCEDURE — L3260 AMBULATORY SURGICAL BOOT EAC: HCPCS | Performed by: NURSE PRACTITIONER

## 2022-08-29 PROCEDURE — 73630 X-RAY EXAM OF FOOT: CPT | Performed by: NURSE PRACTITIONER

## 2022-08-29 RX ORDER — NAPROXEN 500 MG/1
500 TABLET ORAL 2 TIMES DAILY PRN
Qty: 20 TABLET | Refills: 0 | Status: SHIPPED | OUTPATIENT
Start: 2022-08-29

## 2022-08-29 NOTE — ED INITIAL ASSESSMENT (HPI)
Pt here with family , pt states 2 days ago she slipped down 3 steps of stairs and injured the top and anterior lateral part of her left foot , pt states she get sharp spasms in her foot, pt states she has pain to put weight on her left foot

## 2022-08-31 ENCOUNTER — PATIENT OUTREACH (OUTPATIENT)
Dept: CASE MANAGEMENT | Age: 72
End: 2022-08-31

## 2022-09-12 RX ORDER — INSULIN LISPRO 100 [IU]/ML
INJECTION, SOLUTION INTRAVENOUS; SUBCUTANEOUS
Qty: 15 ML | Refills: 0 | Status: SHIPPED | OUTPATIENT
Start: 2022-09-12

## 2022-09-21 ENCOUNTER — HOSPITAL ENCOUNTER (OUTPATIENT)
Age: 72
Discharge: HOME | End: 2022-09-21

## 2022-09-21 VITALS
DIASTOLIC BLOOD PRESSURE: 68 MMHG | HEART RATE: 83 BPM | SYSTOLIC BLOOD PRESSURE: 148 MMHG | TEMPERATURE: 97 F | OXYGEN SATURATION: 100 % | RESPIRATION RATE: 19 BRPM

## 2022-09-21 DIAGNOSIS — J02.9 SORE THROAT: ICD-10-CM

## 2022-09-21 DIAGNOSIS — J02.0 STREPTOCOCCAL SORE THROAT: Primary | ICD-10-CM

## 2022-09-21 DIAGNOSIS — Z20.822 ENCOUNTER FOR LABORATORY TESTING FOR COVID-19 VIRUS: ICD-10-CM

## 2022-09-21 LAB
S PYO AG THROAT QL: POSITIVE
SARS-COV-2 RNA RESP QL NAA+PROBE: NOT DETECTED

## 2022-09-21 PROCEDURE — U0002 COVID-19 LAB TEST NON-CDC: HCPCS | Performed by: EMERGENCY MEDICINE

## 2022-09-21 PROCEDURE — A9150 MISC/EXPER NON-PRESCRIPT DRU: HCPCS | Performed by: EMERGENCY MEDICINE

## 2022-09-21 PROCEDURE — 87880 STREP A ASSAY W/OPTIC: CPT | Performed by: EMERGENCY MEDICINE

## 2022-09-21 PROCEDURE — 99213 OFFICE O/P EST LOW 20 MIN: CPT | Performed by: EMERGENCY MEDICINE

## 2022-09-21 RX ORDER — PENICILLIN V POTASSIUM 500 MG/1
500 TABLET ORAL 2 TIMES DAILY
Qty: 20 TABLET | Refills: 0 | Status: SHIPPED | OUTPATIENT
Start: 2022-09-21 | End: 2022-10-01

## 2022-09-21 RX ORDER — ACETAMINOPHEN 500 MG
1000 TABLET ORAL ONCE
Status: COMPLETED | OUTPATIENT
Start: 2022-09-21 | End: 2022-09-21

## 2022-09-23 RX ORDER — INSULIN DEGLUDEC 200 U/ML
30 INJECTION, SOLUTION SUBCUTANEOUS DAILY
Qty: 15 ML | Refills: 0 | Status: SHIPPED | OUTPATIENT
Start: 2022-09-23

## 2022-09-26 ENCOUNTER — PROCEDURE VISIT (OUTPATIENT)
Dept: PHYSICAL MEDICINE AND REHAB | Facility: CLINIC | Age: 72
End: 2022-09-26

## 2022-09-26 DIAGNOSIS — M54.41 CHRONIC BILATERAL LOW BACK PAIN WITH BILATERAL SCIATICA: ICD-10-CM

## 2022-09-26 DIAGNOSIS — M54.42 CHRONIC BILATERAL LOW BACK PAIN WITH BILATERAL SCIATICA: ICD-10-CM

## 2022-09-26 DIAGNOSIS — R20.0 NUMBNESS AND TINGLING OF BOTH FEET: ICD-10-CM

## 2022-09-26 DIAGNOSIS — R20.2 NUMBNESS AND TINGLING OF BOTH FEET: ICD-10-CM

## 2022-09-26 DIAGNOSIS — G89.29 CHRONIC BILATERAL LOW BACK PAIN WITH BILATERAL SCIATICA: ICD-10-CM

## 2022-09-26 NOTE — PROGRESS NOTES
EMG results discussed at conclusion of the visit; sensorimotor polyneuropathy, no lumbosacral radiculopathy. Mild relief from gabapentin without side effects. Increase gabapentin to 300mg BID; due to CKD this is max dose for her. Pursue MRI lumbar spine, return to discuss results. Given the EMG results she likely has symptoms coming from both lumbar facet arthropathy + DM neuropathy.      Eveline West DO  Physical Medicine and [facility] [address] [place] [address]

## 2022-09-27 ENCOUNTER — PATIENT MESSAGE (OUTPATIENT)
Dept: PHYSICAL MEDICINE AND REHAB | Facility: CLINIC | Age: 72
End: 2022-09-27

## 2022-09-27 ENCOUNTER — OFFICE VISIT (OUTPATIENT)
Dept: INTERNAL MEDICINE CLINIC | Facility: CLINIC | Age: 72
End: 2022-09-27

## 2022-09-27 VITALS
SYSTOLIC BLOOD PRESSURE: 136 MMHG | TEMPERATURE: 98 F | HEART RATE: 75 BPM | BODY MASS INDEX: 29 KG/M2 | DIASTOLIC BLOOD PRESSURE: 64 MMHG | WEIGHT: 148 LBS | OXYGEN SATURATION: 96 %

## 2022-09-27 DIAGNOSIS — R26.9 ABNORMAL GAIT: ICD-10-CM

## 2022-09-27 DIAGNOSIS — Z79.4 TYPE 2 DIABETES MELLITUS WITH HYPERGLYCEMIA, WITH LONG-TERM CURRENT USE OF INSULIN (HCC): Primary | ICD-10-CM

## 2022-09-27 DIAGNOSIS — E11.65 TYPE 2 DIABETES MELLITUS WITH HYPERGLYCEMIA, WITH LONG-TERM CURRENT USE OF INSULIN (HCC): Primary | ICD-10-CM

## 2022-09-27 DIAGNOSIS — H90.3 SENSORINEURAL HEARING LOSS (SNHL) OF BOTH EARS: ICD-10-CM

## 2022-09-27 DIAGNOSIS — Z12.11 COLON CANCER SCREENING: ICD-10-CM

## 2022-09-27 PROCEDURE — 99214 OFFICE O/P EST MOD 30 MIN: CPT | Performed by: INTERNAL MEDICINE

## 2022-09-27 NOTE — TELEPHONE ENCOUNTER
Left message reminding patient to schedule L-Spine MRI ([id number]) recommended by Dr. Amparo Chavez.  Number provided to [address] [place] [address] scheduling ([phone])

## 2022-10-12 ENCOUNTER — TELEPHONE (OUTPATIENT)
Dept: INTERNAL MEDICINE CLINIC | Facility: CLINIC | Age: 72
End: 2022-10-12

## 2022-10-12 NOTE — TELEPHONE ENCOUNTER
Daughter of pt is calling stating that last visit with dr. Nunu Jackson pt was sent to see a physiatry specialist. Daughter states that was told she was going to get a call and has not received any call by specialist.      Please call and advise

## 2022-10-13 ENCOUNTER — HOSPITAL ENCOUNTER (OUTPATIENT)
Dept: MRI IMAGING | Age: 72
Discharge: HOME | End: 2022-10-13
Attending: PHYSICAL MEDICINE & REHABILITATION
Payer: MEDICARE

## 2022-10-13 DIAGNOSIS — M54.16 LEFT LUMBAR RADICULITIS: ICD-10-CM

## 2022-10-13 DIAGNOSIS — R20.0 NUMBNESS AND TINGLING OF BOTH FEET: ICD-10-CM

## 2022-10-13 DIAGNOSIS — R20.2 NUMBNESS AND TINGLING OF BOTH FEET: ICD-10-CM

## 2022-10-13 PROCEDURE — 72148 MRI LUMBAR SPINE W/O DYE: CPT | Performed by: PHYSICAL MEDICINE & REHABILITATION

## 2022-10-13 NOTE — TELEPHONE ENCOUNTER
LVM.    Patient will be the one to  office, not the other way around. A copy of the referral will be mailed to patient's home address as STARFACE is not active. Otherwise, daughter can call back so PCP office can provide the number to physiatry. ---    If daughter calls back, please provide number to Dr. Ruthie Ballard (referral and information found under 'Referrals' tab).

## 2022-10-17 ENCOUNTER — TELEPHONE (OUTPATIENT)
Dept: PHYSICAL MEDICINE AND REHAB | Facility: CLINIC | Age: 72
End: 2022-10-17

## 2022-10-17 NOTE — TELEPHONE ENCOUNTER
----- Message from Beto Price DO sent at 10/14/2022 10:31 AM CDT -----  MRI lumbar spine reviewed; she has a disc protrusion at one of the lower levels of her back and mild age related findings of the joints of the lower back. Follow up to discuss treatment.

## 2022-10-20 ENCOUNTER — OFFICE VISIT (OUTPATIENT)
Dept: AUDIOLOGY | Facility: CLINIC | Age: 72
End: 2022-10-20
Payer: MEDICARE

## 2022-10-20 ENCOUNTER — OFFICE VISIT (OUTPATIENT)
Dept: OTOLARYNGOLOGY | Facility: CLINIC | Age: 72
End: 2022-10-20
Payer: MEDICARE

## 2022-10-20 VITALS — BODY MASS INDEX: 29 KG/M2 | TEMPERATURE: 98 F | WEIGHT: 148 LBS

## 2022-10-20 DIAGNOSIS — R49.0 HOARSENESS: Primary | ICD-10-CM

## 2022-10-20 DIAGNOSIS — H90.3 SENSORINEURAL HEARING LOSS (SNHL) OF BOTH EARS: Primary | ICD-10-CM

## 2022-10-20 DIAGNOSIS — H90.3 SENSORINEURAL HEARING LOSS (SNHL) OF BOTH EARS: ICD-10-CM

## 2022-10-20 PROCEDURE — 92557 COMPREHENSIVE HEARING TEST: CPT | Performed by: AUDIOLOGIST

## 2022-10-20 PROCEDURE — 92567 TYMPANOMETRY: CPT | Performed by: AUDIOLOGIST

## 2022-10-20 PROCEDURE — 99213 OFFICE O/P EST LOW 20 MIN: CPT | Performed by: SPECIALIST

## 2022-10-20 NOTE — PATIENT INSTRUCTIONS
Had both an audiogram and otoacoustic emission test.  This reveals probably a mild to moderate hearing loss. The hearing test showed an asymmetric loss on the left ear however the otoacoustic emission test was symmetric. Word discrimination score was also 92% in both ears which is very good. I think you should contact [address] [place] [address] and see whether they have any benefits for hearing aids or not. Your vocal cords were moving normally and the back portion of them did not show any abnormalities by mirror examination. If your hoarseness continues we can consider a fiberoptic scope.

## 2022-10-24 RX ORDER — INSULIN LISPRO 100 [IU]/ML
INJECTION, SOLUTION INTRAVENOUS; SUBCUTANEOUS
Qty: 15 ML | Refills: 0 | Status: SHIPPED | OUTPATIENT
Start: 2022-10-24

## 2022-10-27 RX ORDER — INSULIN DEGLUDEC 200 U/ML
INJECTION, SOLUTION SUBCUTANEOUS
Qty: 15 ML | Refills: 0 | Status: SHIPPED | OUTPATIENT
Start: 2022-10-27

## 2022-11-01 ENCOUNTER — OFFICE VISIT (OUTPATIENT)
Dept: PHYSICAL MEDICINE AND REHAB | Facility: CLINIC | Age: 72
End: 2022-11-01
Payer: MEDICARE

## 2022-11-01 VITALS
WEIGHT: 148 LBS | DIASTOLIC BLOOD PRESSURE: 60 MMHG | BODY MASS INDEX: 29 KG/M2 | HEART RATE: 82 BPM | SYSTOLIC BLOOD PRESSURE: 110 MMHG | OXYGEN SATURATION: 97 %

## 2022-11-01 DIAGNOSIS — G62.9 POLYNEUROPATHY: Primary | ICD-10-CM

## 2022-11-01 PROCEDURE — 99214 OFFICE O/P EST MOD 30 MIN: CPT | Performed by: PHYSICAL MEDICINE & REHABILITATION

## 2022-11-01 RX ORDER — PREGABALIN 50 MG/1
50 CAPSULE ORAL 2 TIMES DAILY
Qty: 60 CAPSULE | Refills: 0 | Status: SHIPPED | OUTPATIENT
Start: 2022-11-01

## 2022-11-01 NOTE — PATIENT INSTRUCTIONS
Stop the gabapentin and replace it with the lyrica twice daily once you receive the prescription. Do not take both.

## 2022-11-10 RX ORDER — PEN NEEDLE, DIABETIC 30 GX3/16"
1 NEEDLE, DISPOSABLE MISCELLANEOUS 4 TIMES DAILY
Qty: 100 EACH | Refills: 5 | Status: SHIPPED | OUTPATIENT
Start: 2022-11-10

## 2022-12-03 DIAGNOSIS — G62.9 POLYNEUROPATHY: ICD-10-CM

## 2022-12-05 RX ORDER — INSULIN LISPRO 100 [IU]/ML
10 INJECTION, SOLUTION INTRAVENOUS; SUBCUTANEOUS
Qty: 30 ML | Refills: 0 | Status: SHIPPED | OUTPATIENT
Start: 2022-12-05

## 2022-12-05 RX ORDER — INSULIN DEGLUDEC 200 U/ML
30 INJECTION, SOLUTION SUBCUTANEOUS DAILY
Qty: 15 ML | Refills: 0 | Status: SHIPPED | OUTPATIENT
Start: 2022-12-05

## 2022-12-05 RX ORDER — PEN NEEDLE, DIABETIC 30 GX3/16"
1 NEEDLE, DISPOSABLE MISCELLANEOUS 4 TIMES DAILY
Qty: 400 EACH | Refills: 0 | Status: SHIPPED | OUTPATIENT
Start: 2022-12-05

## 2022-12-05 NOTE — TELEPHONE ENCOUNTER
LOV: 8/10/22    No future appointments. RTC 3 months  Mychart message sent reminding patient to make an appointment.

## 2022-12-19 RX ORDER — PREGABALIN 50 MG/1
50 CAPSULE ORAL 2 TIMES DAILY
Qty: 60 CAPSULE | Refills: 0 | Status: SHIPPED | OUTPATIENT
Start: 2022-12-19

## 2022-12-19 NOTE — TELEPHONE ENCOUNTER
Update received via patient message. Will route to Dr. Mode Bansal for his review/signature at this time.

## 2023-01-03 RX ORDER — INSULIN LISPRO 100 [IU]/ML
10 INJECTION, SOLUTION INTRAVENOUS; SUBCUTANEOUS
Qty: 30 ML | Refills: 0 | Status: SHIPPED | OUTPATIENT
Start: 2023-01-03

## 2023-01-03 RX ORDER — INSULIN DEGLUDEC 200 U/ML
30 INJECTION, SOLUTION SUBCUTANEOUS DAILY
Qty: 15 ML | Refills: 0 | Status: SHIPPED | OUTPATIENT
Start: 2023-01-03

## 2023-01-03 NOTE — TELEPHONE ENCOUNTER
LOV: 8/10/2022    RTC: 3 months     F/U: 1/25/2023    Dr Colleen Cantu-- orders pending, approve if appropriate

## 2023-01-12 ENCOUNTER — OFFICE VISIT (OUTPATIENT)
Dept: INTERNAL MEDICINE CLINIC | Facility: CLINIC | Age: 73
End: 2023-01-12
Payer: MEDICARE

## 2023-01-12 VITALS
WEIGHT: 148 LBS | DIASTOLIC BLOOD PRESSURE: 68 MMHG | SYSTOLIC BLOOD PRESSURE: 126 MMHG | OXYGEN SATURATION: 100 % | TEMPERATURE: 98 F | BODY MASS INDEX: 29 KG/M2 | HEART RATE: 89 BPM

## 2023-01-12 DIAGNOSIS — E78.5 DYSLIPIDEMIA: ICD-10-CM

## 2023-01-12 DIAGNOSIS — E11.22 TYPE 2 DIABETES MELLITUS WITH STAGE 3B CHRONIC KIDNEY DISEASE, WITH LONG-TERM CURRENT USE OF INSULIN (HCC): ICD-10-CM

## 2023-01-12 DIAGNOSIS — F33.2 SEVERE EPISODE OF RECURRENT MAJOR DEPRESSIVE DISORDER, WITHOUT PSYCHOTIC FEATURES (HCC): ICD-10-CM

## 2023-01-12 DIAGNOSIS — Z79.4 TYPE 2 DIABETES MELLITUS WITH STAGE 3B CHRONIC KIDNEY DISEASE, WITH LONG-TERM CURRENT USE OF INSULIN (HCC): ICD-10-CM

## 2023-01-12 DIAGNOSIS — I10 PRIMARY HYPERTENSION: ICD-10-CM

## 2023-01-12 DIAGNOSIS — N18.32 TYPE 2 DIABETES MELLITUS WITH STAGE 3B CHRONIC KIDNEY DISEASE, WITH LONG-TERM CURRENT USE OF INSULIN (HCC): ICD-10-CM

## 2023-01-12 DIAGNOSIS — Z78.0 ENCOUNTER FOR OSTEOPOROSIS SCREENING IN ASYMPTOMATIC POSTMENOPAUSAL PATIENT: Primary | ICD-10-CM

## 2023-01-12 DIAGNOSIS — Z13.820 ENCOUNTER FOR OSTEOPOROSIS SCREENING IN ASYMPTOMATIC POSTMENOPAUSAL PATIENT: Primary | ICD-10-CM

## 2023-01-12 DIAGNOSIS — Z12.31 BREAST CANCER SCREENING BY MAMMOGRAM: ICD-10-CM

## 2023-01-12 PROBLEM — R41.841 COGNITIVE COMMUNICATION DEFICIT: Status: RESOLVED | Noted: 2022-01-06 | Resolved: 2023-01-12

## 2023-01-12 PROBLEM — R54 FRAILTY: Status: RESOLVED | Noted: 2022-05-17 | Resolved: 2023-01-12

## 2023-01-12 PROBLEM — R73.9 HYPERGLYCEMIA: Status: RESOLVED | Noted: 2021-10-27 | Resolved: 2023-01-12

## 2023-01-12 PROBLEM — R42 VERTIGO: Status: RESOLVED | Noted: 2020-08-04 | Resolved: 2023-01-12

## 2023-01-12 PROBLEM — R26.81 UNSTEADINESS ON FEET: Status: RESOLVED | Noted: 2022-01-06 | Resolved: 2023-01-12

## 2023-01-12 PROBLEM — E11.00 HYPEROSMOLAR HYPERGLYCEMIC STATE (HHS) (HCC): Status: RESOLVED | Noted: 2021-10-27 | Resolved: 2023-01-12

## 2023-01-12 PROBLEM — M62.81 MUSCLE WEAKNESS (GENERALIZED): Status: RESOLVED | Noted: 2022-01-06 | Resolved: 2023-01-12

## 2023-01-12 PROBLEM — R55 SYNCOPE AND COLLAPSE: Status: RESOLVED | Noted: 2021-10-27 | Resolved: 2023-01-12

## 2023-01-12 PROBLEM — R29.6 FREQUENT FALLS: Status: RESOLVED | Noted: 2022-01-03 | Resolved: 2023-01-12

## 2023-01-12 PROCEDURE — G0439 PPPS, SUBSEQ VISIT: HCPCS | Performed by: INTERNAL MEDICINE

## 2023-01-12 PROCEDURE — 99214 OFFICE O/P EST MOD 30 MIN: CPT | Performed by: INTERNAL MEDICINE

## 2023-01-12 RX ORDER — INSULIN DEGLUDEC 200 U/ML
30 INJECTION, SOLUTION SUBCUTANEOUS DAILY
Qty: 15 ML | Refills: 3 | Status: SHIPPED | OUTPATIENT
Start: 2023-01-12

## 2023-01-12 RX ORDER — LOSARTAN POTASSIUM 50 MG/1
50 TABLET ORAL DAILY
Qty: 90 TABLET | Refills: 2 | Status: SHIPPED | OUTPATIENT
Start: 2023-01-12

## 2023-01-12 RX ORDER — SIMVASTATIN 20 MG
20 TABLET ORAL DAILY
Qty: 90 TABLET | Refills: 2 | Status: SHIPPED | OUTPATIENT
Start: 2023-01-12

## 2023-02-10 ENCOUNTER — TELEPHONE (OUTPATIENT)
Dept: INTERNAL MEDICINE CLINIC | Facility: CLINIC | Age: 73
End: 2023-02-10

## 2023-02-15 ENCOUNTER — PATIENT OUTREACH (OUTPATIENT)
Dept: CASE MANAGEMENT | Age: 73
End: 2023-02-15

## 2023-02-16 ENCOUNTER — PATIENT OUTREACH (OUTPATIENT)
Dept: CASE MANAGEMENT | Age: 73
End: 2023-02-16

## 2023-02-16 DIAGNOSIS — E78.5 DYSLIPIDEMIA: ICD-10-CM

## 2023-02-16 DIAGNOSIS — N18.32 TYPE 2 DIABETES MELLITUS WITH STAGE 3B CHRONIC KIDNEY DISEASE, WITH LONG-TERM CURRENT USE OF INSULIN (HCC): ICD-10-CM

## 2023-02-16 DIAGNOSIS — Z79.4 TYPE 2 DIABETES MELLITUS WITH STAGE 3B CHRONIC KIDNEY DISEASE, WITH LONG-TERM CURRENT USE OF INSULIN (HCC): ICD-10-CM

## 2023-02-16 DIAGNOSIS — R20.0 NUMBNESS AND TINGLING OF BOTH FEET: ICD-10-CM

## 2023-02-16 DIAGNOSIS — E11.22 TYPE 2 DIABETES MELLITUS WITH STAGE 3B CHRONIC KIDNEY DISEASE, WITH LONG-TERM CURRENT USE OF INSULIN (HCC): ICD-10-CM

## 2023-02-16 DIAGNOSIS — I73.9 PERIPHERAL VASCULAR DISEASE (HCC): ICD-10-CM

## 2023-02-16 DIAGNOSIS — M54.16 LEFT LUMBAR RADICULITIS: ICD-10-CM

## 2023-02-16 DIAGNOSIS — R20.2 NUMBNESS AND TINGLING OF BOTH FEET: ICD-10-CM

## 2023-02-16 RX ORDER — SERTRALINE HYDROCHLORIDE 100 MG/1
TABLET, FILM COATED ORAL
COMMUNITY
Start: 2023-01-24

## 2023-02-16 RX ORDER — GABAPENTIN 300 MG/1
CAPSULE ORAL
Qty: 180 CAPSULE | Refills: 0 | OUTPATIENT
Start: 2023-02-16

## 2023-02-16 NOTE — PROGRESS NOTES
Spoke to patient daughter Dewayne Kaiser, whom states patient has a different phone number that what we have on file,however she is POA and would like her phone number to be primary. She provided me with [phone] to contact patient for CCM call. [facility] patient is currently with her and is available to complete CCM call. Contacted the number provided 3 different times without a success.  Left a message for Dewayne Kaiser asking her to have patient call back to complete initial into the program.

## 2023-02-16 NOTE — PROGRESS NOTES
Daughter Red Duff returned my call.  States patient is having problems with her phone asked for a call back around 4pm .

## 2023-02-23 ENCOUNTER — OFFICE VISIT (OUTPATIENT)
Dept: INTERNAL MEDICINE CLINIC | Facility: CLINIC | Age: 73
End: 2023-02-23
Payer: MEDICARE

## 2023-02-23 VITALS
OXYGEN SATURATION: 96 % | BODY MASS INDEX: 27 KG/M2 | HEART RATE: 93 BPM | WEIGHT: 137 LBS | DIASTOLIC BLOOD PRESSURE: 64 MMHG | SYSTOLIC BLOOD PRESSURE: 118 MMHG

## 2023-02-23 DIAGNOSIS — I10 PRIMARY HYPERTENSION: ICD-10-CM

## 2023-02-23 DIAGNOSIS — G64 ABNORMAL GAIT DUE TO PERIPHERAL SENSORY DISORDER: ICD-10-CM

## 2023-02-23 DIAGNOSIS — E11.22 TYPE 2 DIABETES MELLITUS WITH STAGE 3B CHRONIC KIDNEY DISEASE, WITH LONG-TERM CURRENT USE OF INSULIN (HCC): ICD-10-CM

## 2023-02-23 DIAGNOSIS — N18.32 TYPE 2 DIABETES MELLITUS WITH STAGE 3B CHRONIC KIDNEY DISEASE, WITH LONG-TERM CURRENT USE OF INSULIN (HCC): ICD-10-CM

## 2023-02-23 DIAGNOSIS — Z79.4 TYPE 2 DIABETES MELLITUS WITH STAGE 3B CHRONIC KIDNEY DISEASE, WITH LONG-TERM CURRENT USE OF INSULIN (HCC): ICD-10-CM

## 2023-02-23 DIAGNOSIS — R26.9 ABNORMAL GAIT DUE TO PERIPHERAL SENSORY DISORDER: ICD-10-CM

## 2023-02-23 DIAGNOSIS — F33.2 SEVERE EPISODE OF RECURRENT MAJOR DEPRESSIVE DISORDER, WITHOUT PSYCHOTIC FEATURES (HCC): ICD-10-CM

## 2023-02-23 DIAGNOSIS — Z91.81 HISTORY OF RECENT FALL: Primary | ICD-10-CM

## 2023-02-23 PROCEDURE — 99214 OFFICE O/P EST MOD 30 MIN: CPT | Performed by: INTERNAL MEDICINE

## 2023-02-23 RX ORDER — LOSARTAN POTASSIUM 50 MG/1
25 TABLET ORAL DAILY
Qty: 90 TABLET | Refills: 2 | Status: SHIPPED | OUTPATIENT
Start: 2023-02-23

## 2023-02-23 RX ORDER — SIMVASTATIN 20 MG
20 TABLET ORAL DAILY
Qty: 90 TABLET | Refills: 2 | Status: SHIPPED | OUTPATIENT
Start: 2023-02-23

## 2023-02-28 ENCOUNTER — APPOINTMENT (OUTPATIENT)
Dept: MRI IMAGING | Facility: HOSPITAL | Age: 73
End: 2023-02-28
Attending: HOSPITALIST
Payer: MEDICARE

## 2023-02-28 ENCOUNTER — APPOINTMENT (OUTPATIENT)
Dept: CT IMAGING | Facility: HOSPITAL | Age: 73
End: 2023-02-28
Attending: EMERGENCY MEDICINE
Payer: MEDICARE

## 2023-02-28 ENCOUNTER — HOSPITAL ENCOUNTER (INPATIENT)
Facility: HOSPITAL | Age: 73
LOS: 3 days | Discharge: NURSING, REHAB OR LONG-TERM CARE | End: 2023-03-03
Attending: EMERGENCY MEDICINE | Admitting: HOSPITALIST
Payer: MEDICARE

## 2023-02-28 DIAGNOSIS — N39.0 URINARY TRACT INFECTION WITHOUT HEMATURIA, SITE UNSPECIFIED: ICD-10-CM

## 2023-02-28 DIAGNOSIS — R73.9 HYPERGLYCEMIA: Primary | ICD-10-CM

## 2023-02-28 DIAGNOSIS — R29.6 FREQUENT FALLS: ICD-10-CM

## 2023-02-28 LAB
ANION GAP SERPL CALC-SCNC: 8 MMOL/L (ref 0–18)
BASOPHILS # BLD AUTO: 0.02 X10(3) UL (ref 0–0.2)
BASOPHILS NFR BLD AUTO: 0.2 %
BILIRUB UR QL: NEGATIVE
BUN BLD-MCNC: 19 MG/DL (ref 7–18)
BUN/CREAT SERPL: 19 (ref 10–20)
CALCIUM BLD-MCNC: 9.1 MG/DL (ref 8.5–10.1)
CHLORIDE SERPL-SCNC: 96 MMOL/L (ref 98–112)
CLARITY UR: CLEAR
CO2 SERPL-SCNC: 28 MMOL/L (ref 21–32)
COLOR UR: YELLOW
CREAT BLD-MCNC: 1 MG/DL
DEPRECATED RDW RBC AUTO: 42 FL (ref 35.1–46.3)
EOSINOPHIL # BLD AUTO: 0.09 X10(3) UL (ref 0–0.7)
EOSINOPHIL NFR BLD AUTO: 1 %
ERYTHROCYTE [DISTWIDTH] IN BLOOD BY AUTOMATED COUNT: 13.6 % (ref 11–15)
EST. AVERAGE GLUCOSE BLD GHB EST-MCNC: 315 MG/DL (ref 68–126)
GFR SERPLBLD BASED ON 1.73 SQ M-ARVRAT: 60 ML/MIN/1.73M2 (ref 60–?)
GLUCOSE BLD-MCNC: 322 MG/DL (ref 70–99)
GLUCOSE BLDC GLUCOMTR-MCNC: 132 MG/DL (ref 70–99)
GLUCOSE BLDC GLUCOMTR-MCNC: 169 MG/DL (ref 70–99)
GLUCOSE BLDC GLUCOMTR-MCNC: 173 MG/DL (ref 70–99)
GLUCOSE BLDC GLUCOMTR-MCNC: 178 MG/DL (ref 70–99)
GLUCOSE BLDC GLUCOMTR-MCNC: 299 MG/DL (ref 70–99)
GLUCOSE BLDC GLUCOMTR-MCNC: 301 MG/DL (ref 70–99)
GLUCOSE BLDC GLUCOMTR-MCNC: 63 MG/DL (ref 70–99)
GLUCOSE BLDC GLUCOMTR-MCNC: 66 MG/DL (ref 70–99)
GLUCOSE BLDC GLUCOMTR-MCNC: 74 MG/DL (ref 70–99)
GLUCOSE UR-MCNC: >=1000 MG/DL
HBA1C MFR BLD: 12.6 % (ref ?–5.7)
HCT VFR BLD AUTO: 42.8 %
HGB BLD-MCNC: 14.2 G/DL
IMM GRANULOCYTES # BLD AUTO: 0.04 X10(3) UL (ref 0–1)
IMM GRANULOCYTES NFR BLD: 0.5 %
LEUKOCYTE ESTERASE UR QL STRIP.AUTO: NEGATIVE
LYMPHOCYTES # BLD AUTO: 1.07 X10(3) UL (ref 1–4)
LYMPHOCYTES NFR BLD AUTO: 12.4 %
MCH RBC QN AUTO: 27.6 PG (ref 26–34)
MCHC RBC AUTO-ENTMCNC: 33.2 G/DL (ref 31–37)
MCV RBC AUTO: 83.3 FL
MONOCYTES # BLD AUTO: 0.67 X10(3) UL (ref 0.1–1)
MONOCYTES NFR BLD AUTO: 7.8 %
NEUTROPHILS # BLD AUTO: 6.75 X10 (3) UL (ref 1.5–7.7)
NEUTROPHILS # BLD AUTO: 6.75 X10(3) UL (ref 1.5–7.7)
NEUTROPHILS NFR BLD AUTO: 78.1 %
NITRITE UR QL STRIP.AUTO: POSITIVE
OSMOLALITY SERPL CALC.SUM OF ELEC: 289 MOSM/KG (ref 275–295)
PH UR: 6 [PH] (ref 5–8)
PLATELET # BLD AUTO: 207 10(3)UL (ref 150–450)
POTASSIUM SERPL-SCNC: 3.5 MMOL/L (ref 3.5–5.1)
RBC # BLD AUTO: 5.14 X10(6)UL
SARS-COV-2 RNA RESP QL NAA+PROBE: NOT DETECTED
SODIUM SERPL-SCNC: 132 MMOL/L (ref 136–145)
SP GR UR STRIP: 1.01 (ref 1–1.03)
UROBILINOGEN UR STRIP-ACNC: 0.2
WBC # BLD AUTO: 8.6 X10(3) UL (ref 4–11)

## 2023-02-28 PROCEDURE — 72125 CT NECK SPINE W/O DYE: CPT | Performed by: EMERGENCY MEDICINE

## 2023-02-28 PROCEDURE — 99222 1ST HOSP IP/OBS MODERATE 55: CPT | Performed by: HOSPITALIST

## 2023-02-28 PROCEDURE — 72141 MRI NECK SPINE W/O DYE: CPT | Performed by: HOSPITALIST

## 2023-02-28 PROCEDURE — 70450 CT HEAD/BRAIN W/O DYE: CPT | Performed by: EMERGENCY MEDICINE

## 2023-02-28 PROCEDURE — 70551 MRI BRAIN STEM W/O DYE: CPT | Performed by: HOSPITALIST

## 2023-02-28 RX ORDER — ACETAMINOPHEN 325 MG/1
650 TABLET ORAL EVERY 6 HOURS PRN
Status: DISCONTINUED | OUTPATIENT
Start: 2023-02-28 | End: 2023-03-03

## 2023-02-28 RX ORDER — ONDANSETRON 2 MG/ML
4 INJECTION INTRAMUSCULAR; INTRAVENOUS EVERY 6 HOURS PRN
Status: DISCONTINUED | OUTPATIENT
Start: 2023-02-28 | End: 2023-03-03

## 2023-02-28 RX ORDER — MAGNESIUM HYDROXIDE/ALUMINUM HYDROXICE/SIMETHICONE 120; 1200; 1200 MG/30ML; MG/30ML; MG/30ML
30 SUSPENSION ORAL 4 TIMES DAILY PRN
Status: DISCONTINUED | OUTPATIENT
Start: 2023-02-28 | End: 2023-03-03

## 2023-02-28 RX ORDER — METOCLOPRAMIDE HYDROCHLORIDE 5 MG/ML
5 INJECTION INTRAMUSCULAR; INTRAVENOUS EVERY 6 HOURS PRN
Status: DISCONTINUED | OUTPATIENT
Start: 2023-02-28 | End: 2023-03-03

## 2023-02-28 RX ORDER — SODIUM CHLORIDE 9 MG/ML
INJECTION, SOLUTION INTRAVENOUS CONTINUOUS
Status: DISCONTINUED | OUTPATIENT
Start: 2023-02-28 | End: 2023-02-28

## 2023-02-28 RX ORDER — ZOLPIDEM TARTRATE 5 MG/1
5 TABLET ORAL NIGHTLY PRN
Status: DISCONTINUED | OUTPATIENT
Start: 2023-02-28 | End: 2023-03-03

## 2023-02-28 RX ORDER — HYDROCODONE BITARTRATE AND ACETAMINOPHEN 5; 325 MG/1; MG/1
1 TABLET ORAL EVERY 6 HOURS PRN
Status: DISCONTINUED | OUTPATIENT
Start: 2023-02-28 | End: 2023-03-03

## 2023-02-28 RX ORDER — PANTOPRAZOLE SODIUM 40 MG/1
40 TABLET, DELAYED RELEASE ORAL
Status: DISCONTINUED | OUTPATIENT
Start: 2023-02-28 | End: 2023-03-03

## 2023-02-28 RX ORDER — NICOTINE POLACRILEX 4 MG
15 LOZENGE BUCCAL
Status: DISCONTINUED | OUTPATIENT
Start: 2023-02-28 | End: 2023-03-03

## 2023-02-28 RX ORDER — HYDRALAZINE HYDROCHLORIDE 20 MG/ML
10 INJECTION INTRAMUSCULAR; INTRAVENOUS EVERY 4 HOURS PRN
Status: DISCONTINUED | OUTPATIENT
Start: 2023-02-28 | End: 2023-03-03

## 2023-02-28 RX ORDER — SERTRALINE HYDROCHLORIDE 100 MG/1
100 TABLET, FILM COATED ORAL DAILY
Status: DISCONTINUED | OUTPATIENT
Start: 2023-02-28 | End: 2023-03-03

## 2023-02-28 RX ORDER — SODIUM CHLORIDE 9 MG/ML
INJECTION, SOLUTION INTRAVENOUS CONTINUOUS
Status: DISCONTINUED | OUTPATIENT
Start: 2023-02-28 | End: 2023-03-01

## 2023-02-28 RX ORDER — ATORVASTATIN CALCIUM 10 MG/1
10 TABLET, FILM COATED ORAL NIGHTLY
Status: DISCONTINUED | OUTPATIENT
Start: 2023-02-28 | End: 2023-03-03

## 2023-02-28 RX ORDER — NICOTINE POLACRILEX 4 MG
30 LOZENGE BUCCAL
Status: DISCONTINUED | OUTPATIENT
Start: 2023-02-28 | End: 2023-03-03

## 2023-02-28 RX ORDER — HEPARIN SODIUM 5000 [USP'U]/ML
5000 INJECTION, SOLUTION INTRAVENOUS; SUBCUTANEOUS EVERY 12 HOURS SCHEDULED
Status: DISCONTINUED | OUTPATIENT
Start: 2023-02-28 | End: 2023-03-03

## 2023-02-28 RX ORDER — DEXTROSE MONOHYDRATE 25 G/50ML
50 INJECTION, SOLUTION INTRAVENOUS
Status: DISCONTINUED | OUTPATIENT
Start: 2023-02-28 | End: 2023-03-03

## 2023-02-28 RX ORDER — PREGABALIN 25 MG/1
50 CAPSULE ORAL 2 TIMES DAILY
Status: DISCONTINUED | OUTPATIENT
Start: 2023-02-28 | End: 2023-03-03

## 2023-02-28 RX ORDER — LOSARTAN POTASSIUM 25 MG/1
25 TABLET ORAL DAILY
Status: DISCONTINUED | OUTPATIENT
Start: 2023-02-28 | End: 2023-03-03

## 2023-02-28 NOTE — ED INITIAL ASSESSMENT (HPI)
S: various vague symptoms that include but not limited to increased frequency of urination, increased blood sugars, intermittently not following directions, lower extremity weakness  B: DM II, cholesterol  A: Patient is following directions in triage, and although difficult to ascertain the exact reason for visit to the ER, many of the presenting problems sound chronic, but just worsening as of recent.   R: basic labs ordered, accucheck

## 2023-02-28 NOTE — PROGRESS NOTES
[facility] Pharmacy Note:  Renal Dose Adjustment for Metoclopramide (REGLAN)    Joey Talbot has been prescribed Metoclopramide (REGLAN) 10 mg every 6 hours as needed for nausea/vomiting,. Estimated Creatinine Clearance: 37.1 mL/min (based on SCr of 1 mg/dL). Calculated creatinine clearance is < 40 ml/min, therefore, the dose of Metoclopramide (REGLAN) has been changed to 5 mg every 6 hours as needed for nausea/vomiting per P&T approved protocol. Pharmacy will continue to follow, and if renal function improves, will resume the original order.        Thank you,  Marleni Clark, PharmD  2/28/2023 8:08 AM

## 2023-02-28 NOTE — CM/SW NOTE
02/28/23 0900   CM/SW Referral Data   Referral Source Social Work (self-referral)   Reason for Referral Discharge planning   Informant Patient   Access to Care / Medical Hx   Do you have a doctor or clinic where you usually go for medical care (including prenatal care)? Yes  [facility] Running)   Patient Info   Advanced directives? No   Information provided? Yes   Patient's Current Mental Status at Time of Assessment Alert;Oriented   Patient's [address]   Number of Levels in Home 2   Number of Stair in Home 6 to bedroom   Patient lives with Daughter  Ele Burgos)   Patient Status Prior to Admission   Independent with ADLs and Mobility No   Pt. requires assistance with Ambulating   Services in place prior to admission DME/Supplies at home   Type of DME/Supplies [facility]   Discharge Needs   Anticipated D/C needs To be determined   Choice of Post-Acute Provider   Informed patient of right to choose their preferred provider Yes   SW intern met with pt at bedside to complete above assessment. Pt confirmed address on face sheet as correct. Pt states that when ambulating she uses a cane at baseline. Pt states that she does not drive anymore. Pt denied O2 at home and denied dialysis. Pt is agreeable to [facility] and [facility] if needed. PLAN: Pending PT/OT rec. Requested Glendy to send tentative [facility] referrals. Need F2F and facility acceptance/pt choice.      Social Work Intern  [employer]

## 2023-02-28 NOTE — CM/SW NOTE
Department  notified of request for LARA and HHC, aidin referrals started. Assigned CM/SW to follow up with pt/family on further discharge planning.      Jocelynn Millan   February 28, 2023   10:18

## 2023-02-28 NOTE — PLAN OF CARE
Patient being transferred to room 524. All belongings with patient and daughter Susanna Berry was notified of room transfer. Report given to [facility], JULISSA.

## 2023-02-28 NOTE — ED QUICK NOTES
Orders for admission, patient is aware of plan and ready to go upstairs. Any questions, please call ED RN acosta at extension.      Patient Covid vaccination status: Fully vaccinated     COVID Test Ordered in ED: Rapid SARS-CoV-2 by PCR    COVID Suspicion at Admission: N/A    Running Infusions:  NS    Mental Status/LOC at time of transport: alert    Other pertinent information:   CIWA score: N/A   NIH score:  N/A

## 2023-03-01 ENCOUNTER — TELEPHONE (OUTPATIENT)
Dept: ENDOCRINOLOGY CLINIC | Facility: CLINIC | Age: 73
End: 2023-03-01

## 2023-03-01 LAB
ANION GAP SERPL CALC-SCNC: 5 MMOL/L (ref 0–18)
BASOPHILS # BLD AUTO: 0.02 X10(3) UL (ref 0–0.2)
BASOPHILS NFR BLD AUTO: 0.3 %
BUN BLD-MCNC: 9 MG/DL (ref 7–18)
BUN/CREAT SERPL: 15 (ref 10–20)
CALCIUM BLD-MCNC: 8.1 MG/DL (ref 8.5–10.1)
CHLORIDE SERPL-SCNC: 108 MMOL/L (ref 98–112)
CO2 SERPL-SCNC: 28 MMOL/L (ref 21–32)
CREAT BLD-MCNC: 0.6 MG/DL
DEPRECATED RDW RBC AUTO: 42.2 FL (ref 35.1–46.3)
EOSINOPHIL # BLD AUTO: 0.21 X10(3) UL (ref 0–0.7)
EOSINOPHIL NFR BLD AUTO: 2.8 %
ERYTHROCYTE [DISTWIDTH] IN BLOOD BY AUTOMATED COUNT: 13.9 % (ref 11–15)
GFR SERPLBLD BASED ON 1.73 SQ M-ARVRAT: 96 ML/MIN/1.73M2 (ref 60–?)
GLUCOSE BLD-MCNC: 133 MG/DL (ref 70–99)
GLUCOSE BLDC GLUCOMTR-MCNC: 119 MG/DL (ref 70–99)
GLUCOSE BLDC GLUCOMTR-MCNC: 125 MG/DL (ref 70–99)
GLUCOSE BLDC GLUCOMTR-MCNC: 183 MG/DL (ref 70–99)
GLUCOSE BLDC GLUCOMTR-MCNC: 234 MG/DL (ref 70–99)
GLUCOSE BLDC GLUCOMTR-MCNC: 261 MG/DL (ref 70–99)
GLUCOSE BLDC GLUCOMTR-MCNC: 59 MG/DL (ref 70–99)
HCT VFR BLD AUTO: 36.5 %
HGB BLD-MCNC: 12.1 G/DL
IMM GRANULOCYTES # BLD AUTO: 0.05 X10(3) UL (ref 0–1)
IMM GRANULOCYTES NFR BLD: 0.7 %
LYMPHOCYTES # BLD AUTO: 1.78 X10(3) UL (ref 1–4)
LYMPHOCYTES NFR BLD AUTO: 23.7 %
MCH RBC QN AUTO: 27.4 PG (ref 26–34)
MCHC RBC AUTO-ENTMCNC: 33.2 G/DL (ref 31–37)
MCV RBC AUTO: 82.8 FL
MONOCYTES # BLD AUTO: 0.64 X10(3) UL (ref 0.1–1)
MONOCYTES NFR BLD AUTO: 8.5 %
NEUTROPHILS # BLD AUTO: 4.82 X10 (3) UL (ref 1.5–7.7)
NEUTROPHILS # BLD AUTO: 4.82 X10(3) UL (ref 1.5–7.7)
NEUTROPHILS NFR BLD AUTO: 64 %
OSMOLALITY SERPL CALC.SUM OF ELEC: 293 MOSM/KG (ref 275–295)
PLATELET # BLD AUTO: 197 10(3)UL (ref 150–450)
POTASSIUM SERPL-SCNC: 3.1 MMOL/L (ref 3.5–5.1)
RBC # BLD AUTO: 4.41 X10(6)UL
SODIUM SERPL-SCNC: 141 MMOL/L (ref 136–145)
WBC # BLD AUTO: 7.5 X10(3) UL (ref 4–11)

## 2023-03-01 PROCEDURE — 99233 SBSQ HOSP IP/OBS HIGH 50: CPT | Performed by: HOSPITALIST

## 2023-03-01 RX ORDER — POTASSIUM CHLORIDE 20 MEQ/1
40 TABLET, EXTENDED RELEASE ORAL ONCE
Status: COMPLETED | OUTPATIENT
Start: 2023-03-01 | End: 2023-03-01

## 2023-03-01 NOTE — TELEPHONE ENCOUNTER
Hospital scheduled called to schedule a hospital follow up for pt. Pt being discharged tomorrow and advised to see endo within 30 days. Scheduled First available, pt unable to drive to [facility]. It calling today, call  or call pt tomorrow.

## 2023-03-01 NOTE — DIETARY NOTE
Brief Nutrition Education Note:    A1c 12.6%, per H&P family reporting poor compliance with medication. Pt visited, no family in room. Patient educated regarding diabetes diet basics. Discussed carbohydrate foods, portion sizes, and food label reading. Ready, Set, Start Counting Handout given and initial meal plan provided. Encouraged to share education material with daughter. Questions answered. Pt somewhat receptive to information, may require reinforcement. Encouraged to attend outpatient diabetes education. Will f/u per protocol. Please consult nutrition services if earlier intervention is indicated.     Evelyne Livingston MS, KATJA, [facility]  Clinical Dietitian  P: [phone]

## 2023-03-01 NOTE — CM/SW NOTE
PT/OT rec home health care. Per chart review, patient is in agreement with home health care. Orders/f2f placed. Notified [facility] as they are an accepting provider.        MAURI Bey, [place]    [id number]

## 2023-03-02 ENCOUNTER — TELEPHONE (OUTPATIENT)
Dept: INTERNAL MEDICINE CLINIC | Facility: CLINIC | Age: 73
End: 2023-03-02

## 2023-03-02 LAB
ANION GAP SERPL CALC-SCNC: 1 MMOL/L (ref 0–18)
BUN BLD-MCNC: 11 MG/DL (ref 7–18)
BUN/CREAT SERPL: 14.3 (ref 10–20)
CALCIUM BLD-MCNC: 8.7 MG/DL (ref 8.5–10.1)
CHLORIDE SERPL-SCNC: 108 MMOL/L (ref 98–112)
CO2 SERPL-SCNC: 30 MMOL/L (ref 21–32)
CREAT BLD-MCNC: 0.77 MG/DL
DEPRECATED RDW RBC AUTO: 43.4 FL (ref 35.1–46.3)
ERYTHROCYTE [DISTWIDTH] IN BLOOD BY AUTOMATED COUNT: 14 % (ref 11–15)
GFR SERPLBLD BASED ON 1.73 SQ M-ARVRAT: 82 ML/MIN/1.73M2 (ref 60–?)
GLUCOSE BLD-MCNC: 262 MG/DL (ref 70–99)
GLUCOSE BLDC GLUCOMTR-MCNC: 145 MG/DL (ref 70–99)
GLUCOSE BLDC GLUCOMTR-MCNC: 153 MG/DL (ref 70–99)
GLUCOSE BLDC GLUCOMTR-MCNC: 191 MG/DL (ref 70–99)
GLUCOSE BLDC GLUCOMTR-MCNC: 216 MG/DL (ref 70–99)
GLUCOSE BLDC GLUCOMTR-MCNC: 221 MG/DL (ref 70–99)
HCT VFR BLD AUTO: 38.3 %
HGB BLD-MCNC: 12.7 G/DL
MCH RBC QN AUTO: 27.8 PG (ref 26–34)
MCHC RBC AUTO-ENTMCNC: 33.2 G/DL (ref 31–37)
MCV RBC AUTO: 83.8 FL
OSMOLALITY SERPL CALC.SUM OF ELEC: 296 MOSM/KG (ref 275–295)
PLATELET # BLD AUTO: 228 10(3)UL (ref 150–450)
POTASSIUM SERPL-SCNC: 3.7 MMOL/L (ref 3.5–5.1)
POTASSIUM SERPL-SCNC: 3.7 MMOL/L (ref 3.5–5.1)
RBC # BLD AUTO: 4.57 X10(6)UL
SODIUM SERPL-SCNC: 139 MMOL/L (ref 136–145)
WBC # BLD AUTO: 8.8 X10(3) UL (ref 4–11)

## 2023-03-02 PROCEDURE — 99233 SBSQ HOSP IP/OBS HIGH 50: CPT | Performed by: HOSPITALIST

## 2023-03-02 NOTE — PHYSICAL THERAPY NOTE
PHYSICAL THERAPY TREATMENT NOTE - INPATIENT     Room Number: 524/524-A       Presenting Problem: hyperglycemia    Problem List  Principal Problem:    Hyperglycemia  Active Problems:    Urinary tract infection without hematuria, site unspecified    Frequent falls      PHYSICAL THERAPY ASSESSMENT   Chart reviewed. RN Ernie Vallecillo approved participation in physical therapy. PPE worn by therapist: mask and gloves. Patient was not wearing a mask during session. Patient presented in bed with 0/10 pain. Patient with good  progress towards goals during this session. Education provided on Physical therapy plan of care and physiological benefits of out of bed mobility. Patient with good carryover. Pt is received in the bed and was cleared for therapy session. Co treat session with JAYRO Will. Ortho static BP taken throughout the session. Supine was 155/67 Pt is min A with bed mobility and to transfer to the EOB. Pt required assist with her upper trunk to sit up. Pt sat EOB for a few minutes and denied any dizziness and light headedness. BP EOB was 149/56. Pt then was min A with sit<>stand transfers with the RW. Pt stood for about 1 minute with posterior lean and unsteady and BP was 132/40. Pt AMB in the room about 25'x2 with RW min A. Pt with decreased cammie and shuffling steps with narrow DUC. Did not AMB pt further due to decreased BP in standing. Returned pt back to sitting in the chair with all needs within reach and alarm system activated. BP taken after AMB and was 127/56. Pt reported that she has had multiple falls at home stating that she has a fall about once every other week. Would recommend pt dc to [facility] at this time due to her history of falls and her unsteadiness. Discussed with pt and she has good understanding and was agreeable. Reported to the RN on the status of the pt.      Bed mobility: Min assist  Transfers: Min assist  Gait Assistance: Minimum assistance  Distance (ft): 25'x2  Assistive Device: Rolling walker  Pattern:  (narrow DUC)           Patient was left in bedside chair and alarm activated at end of session with all needs in reach. The patient's Approx Degree of Impairment: 50.57% has been calculated based on documentation in the [facility] '6 clicks' Inpatient Basic Mobility Short Form. Research supports that patients with this level of impairment may benefit from Subacute Rehab. RN aware of patient status post session. DISCHARGE RECOMMENDATIONS  PT Discharge Recommendations: Sub-acute rehabilitation     PLAN  PT Treatment Plan: Bed mobility; Body mechanics; Coordination; Endurance; Energy conservation;Patient education;Gait training;Strengthening;Stair training;Transfer training;Balance training    SUBJECTIVE  Pt was agreeable to therapy session. OBJECTIVE  Precautions: Bed/chair alarm    WEIGHT BEARING RESTRICTION  Weight Bearing Restriction: None                PAIN ASSESSMENT   Ratin     Management Techniques: Activity promotion; Body mechanics; Relaxation;Repositioning    BALANCE                                                                                                                       Static Sitting: Fair +  Dynamic Sitting: Fair           Static Standing: Poor +  Dynamic Standing: Poor +    ACTIVITY TOLERANCE                         O2 WALK       AM-PAC '6-Clicks' INPATIENT SHORT FORM - BASIC MOBILITY  How much difficulty does the patient currently have. .. Patient Difficulty: Turning over in bed (including adjusting bedclothes, sheets and blankets)?: A Little   Patient Difficulty: Sitting down on and standing up from a chair with arms (e.g., wheelchair, bedside commode, etc.): A Little   Patient Difficulty: Moving from lying on back to sitting on the side of the bed?: A Little   How much help from another person does the patient currently need. ..    Help from Another: Moving to and from a bed to a chair (including a wheelchair)?: A Little   Help from Another: Need to walk in hospital room?: A Little   Help from Another: Climbing 3-5 steps with a railing?: A Lot     AM-PAC Score:  Raw Score: 17   Approx Degree of Impairment: 50.57%   Standardized Score (AM-PAC Scale): 42.13   CMS Modifier (G-Code): CK          Patient End of Session: Up in chair;Needs met;Call light within reach;RN aware of session/findings; All patient questions and concerns addressed; Alarm set    CURRENT GOALS   Goals to be met by: 3/8/23  Patient Goal Patient's self-stated goal is: go home   Goal #1 Patient is able to demonstrate supine - sit EOB @ level: modified independent     Goal #1   Current Status Min A   Goal #2 Patient is able to demonstrate transfers Sit to/from Stand at assistance level: supervision with walker - rolling     Goal #2  Current Status Min A with the RW unsteady with posterior lean   Goal #3 Patient is able to ambulate 150 feet with assist device: walker - rolling at assistance level: supervision   Goal #3   Current Status 25'x2 with the RW min A   Goal #4 Patient will negotiate 6 stairs/one curb w/ assistive device and supervision   Goal #4   Current Status NT   Goal #5 Patient to demonstrate independence with home activity/exercise instructions provided to patient in preparation for discharge.    Goal #5   Current Status IN PROGRESS   Goal #6    Goal #6  Current Status      PT Session Time: 24 minutes    Therapeutic Activity: 24 minutes

## 2023-03-02 NOTE — HOME CARE LIAISON
Patient met with and provided with list of Lara Jeffery providers from [facility], patient choice is [facility]. Agency reserved in [facility] and contact information placed on AVS. Financial interest disclosure provided to patient. SAMMY Jameson updated.

## 2023-03-02 NOTE — TELEPHONE ENCOUNTER
[facility] sent advising patient that Dr. Millie Arroyo has openings on 3/7 and 3/8 (as of right now) - patient advised to call clinic if interested

## 2023-03-02 NOTE — DISCHARGE INSTRUCTIONS
Sometimes managing your health at home requires assistance. The [place] team has recognized your preference to use [facility]. They can be reached by phone at [phone]. The fax number for your reference is [phone]. A representative from the home health agency will contact you or your family to schedule your first visit.       aKt Taylor RN  [facility] Liaison  [phone]

## 2023-03-02 NOTE — TELEPHONE ENCOUNTER
Dr Zuleyma Edward-- see pt message, currently does not know the status of when she is being discharged. Ok to keep current appt on 4/12/2023 at the moment? Please advise.

## 2023-03-02 NOTE — CM/SW NOTE
PT/OT recommending LARA at dc, pt agreeable to dc recommendation. List of accepting facilities provide to patient at bedside. Pt agreeable to review list and provide choice tomorrow. [facility] is back plan if pt changes her mind and is not agreeable to LARA at time of dc. Plan: LARA pending facility choice and medical clearance.     Alvarado Qureshi, BSN    [phone]

## 2023-03-03 VITALS
RESPIRATION RATE: 18 BRPM | OXYGEN SATURATION: 95 % | TEMPERATURE: 98 F | BODY MASS INDEX: 25 KG/M2 | SYSTOLIC BLOOD PRESSURE: 160 MMHG | HEART RATE: 81 BPM | WEIGHT: 126 LBS | DIASTOLIC BLOOD PRESSURE: 55 MMHG

## 2023-03-03 LAB
ANION GAP SERPL CALC-SCNC: 6 MMOL/L (ref 0–18)
BUN BLD-MCNC: 15 MG/DL (ref 7–18)
BUN/CREAT SERPL: 21.1 (ref 10–20)
CALCIUM BLD-MCNC: 8.8 MG/DL (ref 8.5–10.1)
CHLORIDE SERPL-SCNC: 107 MMOL/L (ref 98–112)
CO2 SERPL-SCNC: 26 MMOL/L (ref 21–32)
CREAT BLD-MCNC: 0.71 MG/DL
DEPRECATED RDW RBC AUTO: 41.1 FL (ref 35.1–46.3)
ERYTHROCYTE [DISTWIDTH] IN BLOOD BY AUTOMATED COUNT: 13.8 % (ref 11–15)
GFR SERPLBLD BASED ON 1.73 SQ M-ARVRAT: 91 ML/MIN/1.73M2 (ref 60–?)
GLUCOSE BLD-MCNC: 189 MG/DL (ref 70–99)
GLUCOSE BLDC GLUCOMTR-MCNC: 239 MG/DL (ref 70–99)
GLUCOSE BLDC GLUCOMTR-MCNC: 329 MG/DL (ref 70–99)
HCT VFR BLD AUTO: 37.5 %
HGB BLD-MCNC: 12.4 G/DL
MCH RBC QN AUTO: 27.3 PG (ref 26–34)
MCHC RBC AUTO-ENTMCNC: 33.1 G/DL (ref 31–37)
MCV RBC AUTO: 82.4 FL
OSMOLALITY SERPL CALC.SUM OF ELEC: 294 MOSM/KG (ref 275–295)
PLATELET # BLD AUTO: 222 10(3)UL (ref 150–450)
POTASSIUM SERPL-SCNC: 3.8 MMOL/L (ref 3.5–5.1)
RBC # BLD AUTO: 4.55 X10(6)UL
SARS-COV-2 RNA RESP QL NAA+PROBE: NOT DETECTED
SODIUM SERPL-SCNC: 139 MMOL/L (ref 136–145)
WBC # BLD AUTO: 9.5 X10(3) UL (ref 4–11)

## 2023-03-03 PROCEDURE — 99232 SBSQ HOSP IP/OBS MODERATE 35: CPT | Performed by: INTERNAL MEDICINE

## 2023-03-03 PROCEDURE — 99239 HOSP IP/OBS DSCHRG MGMT >30: CPT | Performed by: HOSPITALIST

## 2023-03-03 RX ORDER — INSULIN LISPRO 100 [IU]/ML
14 INJECTION, SOLUTION INTRAVENOUS; SUBCUTANEOUS
Qty: 30 ML | Refills: 0 | Status: SHIPPED | OUTPATIENT
Start: 2023-03-03

## 2023-03-03 RX ORDER — INSULIN LISPRO 100 [IU]/ML
8 INJECTION, SOLUTION INTRAVENOUS; SUBCUTANEOUS
Qty: 30 ML | Refills: 0 | Status: SHIPPED | OUTPATIENT
Start: 2023-03-03 | End: 2023-03-03

## 2023-03-03 RX ORDER — CEFDINIR 300 MG/1
300 CAPSULE ORAL 2 TIMES DAILY
Qty: 10 CAPSULE | Refills: 0 | Status: SHIPPED | OUTPATIENT
Start: 2023-03-03 | End: 2023-03-08

## 2023-03-03 NOTE — PHYSICAL THERAPY NOTE
PHYSICAL THERAPY TREATMENT NOTE - INPATIENT     Room Number: 524/524-A       Presenting Problem: hyperglycemia    Problem List  Principal Problem:    Hyperglycemia  Active Problems:    Urinary tract infection without hematuria, site unspecified    Frequent falls      PHYSICAL THERAPY ASSESSMENT   Chart reviewed. RN Jay Gamble approved participation in physical therapy. PPE worn by therapist: mask and gloves. Patient was not wearing a mask during session. Patient presented in bed with did not rate pain. Patient with good  progress towards goals during this session. Education provided on Physical therapy plan of care and physiological benefits of out of bed mobility. Patient with fair carryover. Pt is received in the bed and was cleared for therapy session. Pt is min A with bed mobility and to transfer to the EOB. Pt sat EOB for a few minutes and denied any dizziness and light headedness. Pt is min A with sit<>stand transfers with the RW. Pt with unsteadiness while standing. Pt was able to AMB around the bed to the chair with the RW about 20' min A. Pt also with some unsteadiness AMB. Pt is repositioned and left in the chair with all needs within reach and alarm system activated. Pt then was educated and performed B LE exercises to increase strength/endurence to improve functional mobility. Pt is cued for proper technique. Pt reported that she has had multiple falls at home stating that she has a fall about once every other week. Would recommend pt dc to [facility] at this time due to her history of falls and her unsteadiness. Discussed with pt and she has good understanding and was agreeable. Reported to the RN on the status of the pt. Bed mobility: Min assist  Transfers: Min assist  Gait Assistance: Minimum assistance  Distance (ft): 20'  Assistive Device: Rolling walker  Pattern: Shuffle           Patient was left in bedside chair and alarm activated at end of session with all needs in reach.  The patient's Approx Degree of Impairment: 50.57% has been calculated based on documentation in the [facility] '6 clicks' Inpatient Basic Mobility Short Form. Research supports that patients with this level of impairment may benefit from Subacute Rehab. RN aware of patient status post session. DISCHARGE RECOMMENDATIONS  PT Discharge Recommendations: Sub-acute rehabilitation     PLAN  PT Treatment Plan: Bed mobility; Body mechanics; Coordination; Endurance; Patient education;Gait training;Strengthening;Transfer training;Balance training    SUBJECTIVE  Pt was agreeable to therapy session. OBJECTIVE  Precautions: Bed/chair alarm    WEIGHT BEARING RESTRICTION  Weight Bearing Restriction: None                PAIN ASSESSMENT   Rating:  (did not rate)  Location: [facility]  Management Techniques: Activity promotion; Body mechanics; Relaxation;Repositioning    BALANCE                                                                                                                       Static Sitting: Fair +  Dynamic Sitting: Fair           Static Standing: Poor +  Dynamic Standing: Poor +    ACTIVITY TOLERANCE                         O2 WALK       AM-PAC '6-Clicks' INPATIENT SHORT FORM - BASIC MOBILITY  How much difficulty does the patient currently have. .. Patient Difficulty: Turning over in bed (including adjusting bedclothes, sheets and blankets)?: A Little   Patient Difficulty: Sitting down on and standing up from a chair with arms (e.g., wheelchair, bedside commode, etc.): A Little   Patient Difficulty: Moving from lying on back to sitting on the side of the bed?: A Little   How much help from another person does the patient currently need. ..    Help from Another: Moving to and from a bed to a chair (including a wheelchair)?: A Little   Help from Another: Need to walk in hospital room?: A Little   Help from Another: Climbing 3-5 steps with a railing?: A Lot     AM-PAC Score:  Raw Score: 17   Approx Degree of Impairment: 50.57%   Standardized Score (AM-PAC Scale): 42.13   CMS Modifier (G-Code): CK          THERAPEUTIC EXERCISES  Lower Extremity Alternating marching  Ankle pumps  Glut sets  LAQ     Position Sitting       Patient End of Session: Up in chair;Needs met;Call light within reach;RN aware of session/findings; All patient questions and concerns addressed; Alarm set    CURRENT GOALS     Goals to be met by: 3/8/23  Patient Goal Patient's self-stated goal is: go home   Goal #1 Patient is able to demonstrate supine - sit EOB @ level: modified independent     Goal #1   Current Status Min A   Goal #2 Patient is able to demonstrate transfers Sit to/from Stand at assistance level: supervision with walker - rolling     Goal #2  Current Status Min A with the RW unsteady    Goal #3 Patient is able to ambulate 150 feet with assist device: walker - rolling at assistance level: supervision   Goal #3   Current Status 20' with the RW min A unsteady   Goal #4 Patient will negotiate 6 stairs/one curb w/ assistive device and supervision   Goal #4   Current Status NT   Goal #5 Patient to demonstrate independence with home activity/exercise instructions provided to patient in preparation for discharge.    Goal #5   Current Status IN PROGRESS   Goal #6    Goal #6  Current Status          PT Session Time: 25 minutes    Therapeutic Activity: 15 minutes  Therapeutic Exercise: 10 minutes

## 2023-03-03 NOTE — CM/SW NOTE
03/03/23 1410   Discharge disposition   Expected discharge disposition [facility] Provider   [facility] [address])   Discharge transportation [facility]     Pt is stable for dc today. MD dc order entered. Pt will dc to [facility] [address] for LARA, liaison Windy Bermudez confirmed bed is available today. Pt and daughter agreeable to transfer today and cost of medicar which will be $40. [facility] scheduled for 4:30pm.    Number for nurse report is [phone]. Plan:  [place] for LARA today. / to remain available for support and/or discharge planning.      VASILIY OliveraN    [phone]

## 2023-03-03 NOTE — CM/SW NOTE
Pt notified CM that [facility] is chosen facility for LARA at [facility]. Facility reserved in [address] and notified of possible dc today. Plan: [facility] for pending medical clearance.     Serene Fatima, VASILIYN    [phone]

## 2023-03-06 ENCOUNTER — INITIAL APN SNF VISIT (OUTPATIENT)
Dept: INTERNAL MEDICINE CLINIC | Facility: SKILLED NURSING FACILITY | Age: 73
End: 2023-03-06

## 2023-03-06 VITALS
BODY MASS INDEX: 29 KG/M2 | WEIGHT: 150 LBS | TEMPERATURE: 98 F | OXYGEN SATURATION: 98 % | DIASTOLIC BLOOD PRESSURE: 62 MMHG | SYSTOLIC BLOOD PRESSURE: 111 MMHG | HEART RATE: 78 BPM | RESPIRATION RATE: 18 BRPM

## 2023-03-06 DIAGNOSIS — R29.6 FREQUENT FALLS: ICD-10-CM

## 2023-03-06 DIAGNOSIS — F32.A DEPRESSION, UNSPECIFIED DEPRESSION TYPE: ICD-10-CM

## 2023-03-06 DIAGNOSIS — R73.9 HYPERGLYCEMIA: ICD-10-CM

## 2023-03-06 DIAGNOSIS — R53.1 GENERALIZED WEAKNESS: ICD-10-CM

## 2023-03-06 DIAGNOSIS — R27.8 ACUTE ATAXIA: ICD-10-CM

## 2023-03-06 PROCEDURE — 99310 SBSQ NF CARE HIGH MDM 45: CPT | Performed by: NURSE PRACTITIONER

## 2023-03-06 PROCEDURE — 1111F DSCHRG MED/CURRENT MED MERGE: CPT | Performed by: NURSE PRACTITIONER

## 2023-03-06 PROCEDURE — 1126F AMNT PAIN NOTED NONE PRSNT: CPT | Performed by: NURSE PRACTITIONER

## 2023-03-07 ENCOUNTER — EXTERNAL FACILITY (OUTPATIENT)
Dept: INTERNAL MEDICINE CLINIC | Facility: CLINIC | Age: 73
End: 2023-03-07

## 2023-03-07 DIAGNOSIS — I51.89 GRADE II DIASTOLIC DYSFUNCTION: ICD-10-CM

## 2023-03-07 DIAGNOSIS — R29.6 FREQUENT FALLS: ICD-10-CM

## 2023-03-07 DIAGNOSIS — E11.22 TYPE 2 DIABETES MELLITUS WITH STAGE 3B CHRONIC KIDNEY DISEASE, WITH LONG-TERM CURRENT USE OF INSULIN (HCC): ICD-10-CM

## 2023-03-07 DIAGNOSIS — I10 PRIMARY HYPERTENSION: ICD-10-CM

## 2023-03-07 DIAGNOSIS — N18.32 TYPE 2 DIABETES MELLITUS WITH STAGE 3B CHRONIC KIDNEY DISEASE, WITH LONG-TERM CURRENT USE OF INSULIN (HCC): ICD-10-CM

## 2023-03-07 DIAGNOSIS — Z79.4 TYPE 2 DIABETES MELLITUS WITH STAGE 3B CHRONIC KIDNEY DISEASE, WITH LONG-TERM CURRENT USE OF INSULIN (HCC): ICD-10-CM

## 2023-03-07 DIAGNOSIS — I73.9 PERIPHERAL VASCULAR DISEASE (HCC): ICD-10-CM

## 2023-03-07 PROCEDURE — 99305 1ST NF CARE MODERATE MDM 35: CPT | Performed by: INTERNAL MEDICINE

## 2023-03-07 PROCEDURE — 1111F DSCHRG MED/CURRENT MED MERGE: CPT | Performed by: INTERNAL MEDICINE

## 2023-03-08 ENCOUNTER — SNF VISIT (OUTPATIENT)
Dept: INTERNAL MEDICINE CLINIC | Facility: SKILLED NURSING FACILITY | Age: 73
End: 2023-03-08

## 2023-03-08 VITALS
OXYGEN SATURATION: 98 % | WEIGHT: 150 LBS | RESPIRATION RATE: 19 BRPM | SYSTOLIC BLOOD PRESSURE: 111 MMHG | DIASTOLIC BLOOD PRESSURE: 62 MMHG | BODY MASS INDEX: 29 KG/M2 | HEART RATE: 78 BPM | TEMPERATURE: 97 F

## 2023-03-08 DIAGNOSIS — R29.6 FREQUENT FALLS: ICD-10-CM

## 2023-03-08 DIAGNOSIS — R27.8 ACUTE ATAXIA: ICD-10-CM

## 2023-03-08 DIAGNOSIS — R73.9 HYPERGLYCEMIA: ICD-10-CM

## 2023-03-08 PROCEDURE — 1126F AMNT PAIN NOTED NONE PRSNT: CPT | Performed by: NURSE PRACTITIONER

## 2023-03-08 PROCEDURE — 1111F DSCHRG MED/CURRENT MED MERGE: CPT | Performed by: NURSE PRACTITIONER

## 2023-03-08 PROCEDURE — 99309 SBSQ NF CARE MODERATE MDM 30: CPT | Performed by: NURSE PRACTITIONER

## 2023-03-09 ENCOUNTER — EXTERNAL FACILITY (OUTPATIENT)
Dept: INTERNAL MEDICINE CLINIC | Facility: CLINIC | Age: 73
End: 2023-03-09

## 2023-03-09 DIAGNOSIS — Z79.4 TYPE 2 DIABETES MELLITUS WITH STAGE 3B CHRONIC KIDNEY DISEASE, WITH LONG-TERM CURRENT USE OF INSULIN (HCC): ICD-10-CM

## 2023-03-09 DIAGNOSIS — E11.22 TYPE 2 DIABETES MELLITUS WITH STAGE 3B CHRONIC KIDNEY DISEASE, WITH LONG-TERM CURRENT USE OF INSULIN (HCC): ICD-10-CM

## 2023-03-09 DIAGNOSIS — I51.89 GRADE II DIASTOLIC DYSFUNCTION: ICD-10-CM

## 2023-03-09 DIAGNOSIS — N18.32 TYPE 2 DIABETES MELLITUS WITH STAGE 3B CHRONIC KIDNEY DISEASE, WITH LONG-TERM CURRENT USE OF INSULIN (HCC): ICD-10-CM

## 2023-03-09 DIAGNOSIS — R29.6 FREQUENT FALLS: ICD-10-CM

## 2023-03-09 DIAGNOSIS — I10 PRIMARY HYPERTENSION: ICD-10-CM

## 2023-03-09 DIAGNOSIS — I73.9 PERIPHERAL VASCULAR DISEASE (HCC): ICD-10-CM

## 2023-03-09 PROCEDURE — 1111F DSCHRG MED/CURRENT MED MERGE: CPT | Performed by: INTERNAL MEDICINE

## 2023-03-09 PROCEDURE — 99308 SBSQ NF CARE LOW MDM 20: CPT | Performed by: INTERNAL MEDICINE

## 2023-03-13 ENCOUNTER — SNF VISIT (OUTPATIENT)
Dept: INTERNAL MEDICINE CLINIC | Facility: SKILLED NURSING FACILITY | Age: 73
End: 2023-03-13

## 2023-03-13 VITALS
DIASTOLIC BLOOD PRESSURE: 68 MMHG | OXYGEN SATURATION: 97 % | BODY MASS INDEX: 28 KG/M2 | HEART RATE: 69 BPM | TEMPERATURE: 98 F | WEIGHT: 143 LBS | RESPIRATION RATE: 18 BRPM | SYSTOLIC BLOOD PRESSURE: 124 MMHG

## 2023-03-13 DIAGNOSIS — R73.9 HYPERGLYCEMIA: ICD-10-CM

## 2023-03-13 DIAGNOSIS — R29.6 FREQUENT FALLS: ICD-10-CM

## 2023-03-13 DIAGNOSIS — R27.8 ACUTE ATAXIA: ICD-10-CM

## 2023-03-14 ENCOUNTER — EXTERNAL FACILITY (OUTPATIENT)
Dept: INTERNAL MEDICINE CLINIC | Facility: CLINIC | Age: 73
End: 2023-03-14

## 2023-03-14 DIAGNOSIS — E78.5 DYSLIPIDEMIA: ICD-10-CM

## 2023-03-14 DIAGNOSIS — I73.9 PERIPHERAL VASCULAR DISEASE (HCC): ICD-10-CM

## 2023-03-14 DIAGNOSIS — I10 PRIMARY HYPERTENSION: ICD-10-CM

## 2023-03-14 DIAGNOSIS — R29.6 FREQUENT FALLS: ICD-10-CM

## 2023-03-14 PROCEDURE — 99308 SBSQ NF CARE LOW MDM 20: CPT | Performed by: INTERNAL MEDICINE

## 2023-03-14 PROCEDURE — 1111F DSCHRG MED/CURRENT MED MERGE: CPT | Performed by: INTERNAL MEDICINE

## 2023-03-16 ENCOUNTER — EXTERNAL FACILITY (OUTPATIENT)
Dept: INTERNAL MEDICINE CLINIC | Facility: CLINIC | Age: 73
End: 2023-03-16

## 2023-03-16 DIAGNOSIS — I51.89 GRADE II DIASTOLIC DYSFUNCTION: ICD-10-CM

## 2023-03-16 DIAGNOSIS — N18.32 TYPE 2 DIABETES MELLITUS WITH STAGE 3B CHRONIC KIDNEY DISEASE, WITH LONG-TERM CURRENT USE OF INSULIN (HCC): ICD-10-CM

## 2023-03-16 DIAGNOSIS — Z79.4 TYPE 2 DIABETES MELLITUS WITH STAGE 3B CHRONIC KIDNEY DISEASE, WITH LONG-TERM CURRENT USE OF INSULIN (HCC): ICD-10-CM

## 2023-03-16 DIAGNOSIS — R29.6 FREQUENT FALLS: ICD-10-CM

## 2023-03-16 DIAGNOSIS — E11.22 TYPE 2 DIABETES MELLITUS WITH STAGE 3B CHRONIC KIDNEY DISEASE, WITH LONG-TERM CURRENT USE OF INSULIN (HCC): ICD-10-CM

## 2023-03-16 DIAGNOSIS — I73.9 PERIPHERAL VASCULAR DISEASE (HCC): ICD-10-CM

## 2023-03-16 PROCEDURE — 99308 SBSQ NF CARE LOW MDM 20: CPT | Performed by: INTERNAL MEDICINE

## 2023-03-16 PROCEDURE — 1111F DSCHRG MED/CURRENT MED MERGE: CPT | Performed by: INTERNAL MEDICINE

## 2023-03-17 ENCOUNTER — SNF DISCHARGE (OUTPATIENT)
Dept: INTERNAL MEDICINE CLINIC | Facility: SKILLED NURSING FACILITY | Age: 73
End: 2023-03-17

## 2023-03-17 DIAGNOSIS — N39.0 URINARY TRACT INFECTION WITHOUT HEMATURIA, SITE UNSPECIFIED: ICD-10-CM

## 2023-03-17 DIAGNOSIS — Z79.4 TYPE 2 DIABETES MELLITUS WITH DIABETIC NEUROPATHY, WITH LONG-TERM CURRENT USE OF INSULIN (HCC): ICD-10-CM

## 2023-03-17 DIAGNOSIS — R26.0 ATAXIC GAIT: ICD-10-CM

## 2023-03-17 DIAGNOSIS — I10 PRIMARY HYPERTENSION: ICD-10-CM

## 2023-03-17 DIAGNOSIS — F32.A DEPRESSION, UNSPECIFIED DEPRESSION TYPE: ICD-10-CM

## 2023-03-17 DIAGNOSIS — E11.40 TYPE 2 DIABETES MELLITUS WITH DIABETIC NEUROPATHY, WITH LONG-TERM CURRENT USE OF INSULIN (HCC): ICD-10-CM

## 2023-03-17 PROCEDURE — 99315 NF DSCHRG MGMT 30 MIN/LESS: CPT | Performed by: NURSE PRACTITIONER

## 2023-03-17 PROCEDURE — 1111F DSCHRG MED/CURRENT MED MERGE: CPT | Performed by: NURSE PRACTITIONER

## 2023-03-18 ENCOUNTER — APPOINTMENT (OUTPATIENT)
Dept: CT IMAGING | Facility: HOSPITAL | Age: 73
End: 2023-03-18
Attending: EMERGENCY MEDICINE
Payer: MEDICARE

## 2023-03-18 ENCOUNTER — APPOINTMENT (OUTPATIENT)
Dept: CT IMAGING | Facility: HOSPITAL | Age: 73
DRG: 639 | End: 2023-03-18
Attending: EMERGENCY MEDICINE
Payer: MEDICARE

## 2023-03-18 ENCOUNTER — HOSPITAL ENCOUNTER (INPATIENT)
Facility: HOSPITAL | Age: 73
LOS: 3 days | Discharge: HOME | End: 2023-03-21
Attending: EMERGENCY MEDICINE | Admitting: HOSPITALIST
Payer: MEDICARE

## 2023-03-18 ENCOUNTER — HOSPITAL ENCOUNTER (INPATIENT)
Facility: HOSPITAL | Age: 73
LOS: 3 days | Discharge: HOME | DRG: 639 | End: 2023-03-21
Attending: EMERGENCY MEDICINE | Admitting: HOSPITALIST
Payer: MEDICARE

## 2023-03-18 DIAGNOSIS — R29.6 FREQUENT FALLS: ICD-10-CM

## 2023-03-18 DIAGNOSIS — R73.9 HYPERGLYCEMIA: Primary | ICD-10-CM

## 2023-03-18 LAB
AMPHET UR QL SCN: NEGATIVE
ANION GAP SERPL CALC-SCNC: 7 MMOL/L (ref 0–18)
BASOPHILS # BLD AUTO: 0.04 X10(3) UL (ref 0–0.2)
BASOPHILS NFR BLD AUTO: 0.4 %
BENZODIAZ UR QL SCN: NEGATIVE
BILIRUB UR QL: NEGATIVE
BUN BLD-MCNC: 23 MG/DL (ref 7–18)
BUN/CREAT SERPL: 19.3 (ref 10–20)
CALCIUM BLD-MCNC: 9.1 MG/DL (ref 8.5–10.1)
CANNABINOIDS UR QL SCN: NEGATIVE
CHLORIDE SERPL-SCNC: 97 MMOL/L (ref 98–112)
CLARITY UR: CLEAR
CO2 SERPL-SCNC: 27 MMOL/L (ref 21–32)
COCAINE UR QL: NEGATIVE
COLOR UR: YELLOW
CREAT BLD-MCNC: 1.19 MG/DL
CREAT UR-SCNC: 41.7 MG/DL
DEPRECATED RDW RBC AUTO: 42.1 FL (ref 35.1–46.3)
EOSINOPHIL # BLD AUTO: 0.13 X10(3) UL (ref 0–0.7)
EOSINOPHIL NFR BLD AUTO: 1.4 %
ERYTHROCYTE [DISTWIDTH] IN BLOOD BY AUTOMATED COUNT: 13.8 % (ref 11–15)
ETHANOL SERPL-MCNC: <3 MG/DL (ref ?–3)
GFR SERPLBLD BASED ON 1.73 SQ M-ARVRAT: 49 ML/MIN/1.73M2 (ref 60–?)
GLUCOSE BLD-MCNC: 495 MG/DL (ref 70–99)
GLUCOSE BLDC GLUCOMTR-MCNC: 117 MG/DL (ref 70–99)
GLUCOSE BLDC GLUCOMTR-MCNC: 175 MG/DL (ref 70–99)
GLUCOSE BLDC GLUCOMTR-MCNC: 202 MG/DL (ref 70–99)
GLUCOSE BLDC GLUCOMTR-MCNC: 320 MG/DL (ref 70–99)
GLUCOSE BLDC GLUCOMTR-MCNC: 448 MG/DL (ref 70–99)
GLUCOSE BLDC GLUCOMTR-MCNC: 459 MG/DL (ref 70–99)
GLUCOSE UR-MCNC: >=500 MG/DL
HCT VFR BLD AUTO: 40.7 %
HGB BLD-MCNC: 13.4 G/DL
HGB UR QL STRIP.AUTO: NEGATIVE
IMM GRANULOCYTES # BLD AUTO: 0.03 X10(3) UL (ref 0–1)
IMM GRANULOCYTES NFR BLD: 0.3 %
KETONES UR-MCNC: NEGATIVE MG/DL
LYMPHOCYTES # BLD AUTO: 2.07 X10(3) UL (ref 1–4)
LYMPHOCYTES NFR BLD AUTO: 21.9 %
MCH RBC QN AUTO: 27.7 PG (ref 26–34)
MCHC RBC AUTO-ENTMCNC: 32.9 G/DL (ref 31–37)
MCV RBC AUTO: 84.3 FL
MDMA UR QL SCN: NEGATIVE
MONOCYTES # BLD AUTO: 0.82 X10(3) UL (ref 0.1–1)
MONOCYTES NFR BLD AUTO: 8.7 %
MRSA DNA SPEC QL NAA+PROBE: NEGATIVE
NEUTROPHILS # BLD AUTO: 6.36 X10 (3) UL (ref 1.5–7.7)
NEUTROPHILS # BLD AUTO: 6.36 X10(3) UL (ref 1.5–7.7)
NEUTROPHILS NFR BLD AUTO: 67.3 %
NITRITE UR QL STRIP.AUTO: NEGATIVE
OPIATES UR QL SCN: NEGATIVE
OSMOLALITY SERPL CALC.SUM OF ELEC: 298 MOSM/KG (ref 275–295)
OXYCODONE UR QL SCN: NEGATIVE
PH UR: 7 [PH] (ref 5–8)
PLATELET # BLD AUTO: 273 10(3)UL (ref 150–450)
POTASSIUM SERPL-SCNC: 4.8 MMOL/L (ref 3.5–5.1)
PROT UR-MCNC: NEGATIVE MG/DL
RBC # BLD AUTO: 4.83 X10(6)UL
SARS-COV-2 RNA RESP QL NAA+PROBE: NOT DETECTED
SODIUM SERPL-SCNC: 131 MMOL/L (ref 136–145)
SP GR UR STRIP: 1.02 (ref 1–1.03)
UROBILINOGEN UR STRIP-ACNC: <2
VIT C UR-MCNC: NEGATIVE MG/DL
WBC # BLD AUTO: 9.5 X10(3) UL (ref 4–11)

## 2023-03-18 PROCEDURE — 70450 CT HEAD/BRAIN W/O DYE: CPT | Performed by: EMERGENCY MEDICINE

## 2023-03-18 PROCEDURE — 72125 CT NECK SPINE W/O DYE: CPT | Performed by: EMERGENCY MEDICINE

## 2023-03-18 PROCEDURE — 99223 1ST HOSP IP/OBS HIGH 75: CPT | Performed by: HOSPITALIST

## 2023-03-18 RX ORDER — ONDANSETRON 2 MG/ML
4 INJECTION INTRAMUSCULAR; INTRAVENOUS EVERY 6 HOURS PRN
Status: DISCONTINUED | OUTPATIENT
Start: 2023-03-18 | End: 2023-03-21

## 2023-03-18 RX ORDER — NICOTINE POLACRILEX 4 MG
30 LOZENGE BUCCAL
Status: DISCONTINUED | OUTPATIENT
Start: 2023-03-18 | End: 2023-03-21

## 2023-03-18 RX ORDER — SERTRALINE HYDROCHLORIDE 25 MG/1
25 TABLET, FILM COATED ORAL DAILY
Status: DISCONTINUED | OUTPATIENT
Start: 2023-03-19 | End: 2023-03-21

## 2023-03-18 RX ORDER — BISACODYL 10 MG
10 SUPPOSITORY, RECTAL RECTAL
Status: DISCONTINUED | OUTPATIENT
Start: 2023-03-18 | End: 2023-03-21

## 2023-03-18 RX ORDER — HYDROCODONE BITARTRATE AND ACETAMINOPHEN 5; 325 MG/1; MG/1
1 TABLET ORAL EVERY 4 HOURS PRN
Status: DISCONTINUED | OUTPATIENT
Start: 2023-03-18 | End: 2023-03-21

## 2023-03-18 RX ORDER — LOSARTAN POTASSIUM 25 MG/1
25 TABLET ORAL DAILY
Status: DISCONTINUED | OUTPATIENT
Start: 2023-03-19 | End: 2023-03-21

## 2023-03-18 RX ORDER — POLYETHYLENE GLYCOL 3350 17 G/17G
17 POWDER, FOR SOLUTION ORAL DAILY PRN
Status: DISCONTINUED | OUTPATIENT
Start: 2023-03-18 | End: 2023-03-21

## 2023-03-18 RX ORDER — HYDROCODONE BITARTRATE AND ACETAMINOPHEN 5; 325 MG/1; MG/1
2 TABLET ORAL EVERY 4 HOURS PRN
Status: DISCONTINUED | OUTPATIENT
Start: 2023-03-18 | End: 2023-03-21

## 2023-03-18 RX ORDER — SODIUM CHLORIDE 9 MG/ML
INJECTION, SOLUTION INTRAVENOUS CONTINUOUS
Status: DISCONTINUED | OUTPATIENT
Start: 2023-03-18 | End: 2023-03-20

## 2023-03-18 RX ORDER — SODIUM PHOSPHATE, DIBASIC AND SODIUM PHOSPHATE, MONOBASIC 7; 19 G/133ML; G/133ML
1 ENEMA RECTAL ONCE AS NEEDED
Status: DISCONTINUED | OUTPATIENT
Start: 2023-03-18 | End: 2023-03-21

## 2023-03-18 RX ORDER — PRAVASTATIN SODIUM 20 MG
20 TABLET ORAL NIGHTLY
Status: DISCONTINUED | OUTPATIENT
Start: 2023-03-18 | End: 2023-03-21

## 2023-03-18 RX ORDER — SENNOSIDES 8.6 MG
17.2 TABLET ORAL NIGHTLY PRN
Status: DISCONTINUED | OUTPATIENT
Start: 2023-03-18 | End: 2023-03-21

## 2023-03-18 RX ORDER — PREGABALIN 25 MG/1
50 CAPSULE ORAL 2 TIMES DAILY
Status: DISCONTINUED | OUTPATIENT
Start: 2023-03-18 | End: 2023-03-21

## 2023-03-18 RX ORDER — NICOTINE POLACRILEX 4 MG
15 LOZENGE BUCCAL
Status: DISCONTINUED | OUTPATIENT
Start: 2023-03-18 | End: 2023-03-21

## 2023-03-18 RX ORDER — HYDRALAZINE HYDROCHLORIDE 20 MG/ML
20 INJECTION INTRAMUSCULAR; INTRAVENOUS EVERY 6 HOURS PRN
Status: DISCONTINUED | OUTPATIENT
Start: 2023-03-18 | End: 2023-03-21

## 2023-03-18 RX ORDER — DEXTROSE MONOHYDRATE 25 G/50ML
50 INJECTION, SOLUTION INTRAVENOUS
Status: DISCONTINUED | OUTPATIENT
Start: 2023-03-18 | End: 2023-03-21

## 2023-03-18 RX ORDER — ACETAMINOPHEN 325 MG/1
650 TABLET ORAL EVERY 4 HOURS PRN
Status: DISCONTINUED | OUTPATIENT
Start: 2023-03-18 | End: 2023-03-21

## 2023-03-18 RX ORDER — HEPARIN SODIUM 5000 [USP'U]/ML
5000 INJECTION, SOLUTION INTRAVENOUS; SUBCUTANEOUS EVERY 8 HOURS SCHEDULED
Status: DISCONTINUED | OUTPATIENT
Start: 2023-03-18 | End: 2023-03-21

## 2023-03-18 RX ORDER — METOCLOPRAMIDE HYDROCHLORIDE 5 MG/ML
5 INJECTION INTRAMUSCULAR; INTRAVENOUS EVERY 8 HOURS PRN
Status: DISCONTINUED | OUTPATIENT
Start: 2023-03-18 | End: 2023-03-21

## 2023-03-18 NOTE — ED INITIAL ASSESSMENT (HPI)
Pt presents with granddaughter s/p being discharged yesterday. Granddaughter reports pt has fallen three times since discharge. Granddaughter reports pt hit back of head on corner of table. Denies blood thinner use. Pt does not remember falls.

## 2023-03-19 LAB
ANION GAP SERPL CALC-SCNC: 9 MMOL/L (ref 0–18)
BASOPHILS # BLD AUTO: 0.04 X10(3) UL (ref 0–0.2)
BASOPHILS NFR BLD AUTO: 0.4 %
BUN BLD-MCNC: 20 MG/DL (ref 7–18)
BUN/CREAT SERPL: 25 (ref 10–20)
CALCIUM BLD-MCNC: 9.2 MG/DL (ref 8.5–10.1)
CHLORIDE SERPL-SCNC: 104 MMOL/L (ref 98–112)
CO2 SERPL-SCNC: 26 MMOL/L (ref 21–32)
CREAT BLD-MCNC: 0.8 MG/DL
DEPRECATED RDW RBC AUTO: 41.1 FL (ref 35.1–46.3)
EOSINOPHIL # BLD AUTO: 0.22 X10(3) UL (ref 0–0.7)
EOSINOPHIL NFR BLD AUTO: 2.3 %
ERYTHROCYTE [DISTWIDTH] IN BLOOD BY AUTOMATED COUNT: 13.4 % (ref 11–15)
EST. AVERAGE GLUCOSE BLD GHB EST-MCNC: 312 MG/DL (ref 68–126)
GFR SERPLBLD BASED ON 1.73 SQ M-ARVRAT: 79 ML/MIN/1.73M2 (ref 60–?)
GLUCOSE BLD-MCNC: 118 MG/DL (ref 70–99)
GLUCOSE BLDC GLUCOMTR-MCNC: 116 MG/DL (ref 70–99)
GLUCOSE BLDC GLUCOMTR-MCNC: 119 MG/DL (ref 70–99)
GLUCOSE BLDC GLUCOMTR-MCNC: 133 MG/DL (ref 70–99)
GLUCOSE BLDC GLUCOMTR-MCNC: 207 MG/DL (ref 70–99)
GLUCOSE BLDC GLUCOMTR-MCNC: 282 MG/DL (ref 70–99)
GLUCOSE BLDC GLUCOMTR-MCNC: 99 MG/DL (ref 70–99)
HBA1C MFR BLD: 12.5 % (ref ?–5.7)
HCT VFR BLD AUTO: 37.2 %
HGB BLD-MCNC: 12.2 G/DL
IMM GRANULOCYTES # BLD AUTO: 0.04 X10(3) UL (ref 0–1)
IMM GRANULOCYTES NFR BLD: 0.4 %
LYMPHOCYTES # BLD AUTO: 2.51 X10(3) UL (ref 1–4)
LYMPHOCYTES NFR BLD AUTO: 26.8 %
MCH RBC QN AUTO: 27.5 PG (ref 26–34)
MCHC RBC AUTO-ENTMCNC: 32.8 G/DL (ref 31–37)
MCV RBC AUTO: 83.8 FL
MONOCYTES # BLD AUTO: 0.87 X10(3) UL (ref 0.1–1)
MONOCYTES NFR BLD AUTO: 9.3 %
NEUTROPHILS # BLD AUTO: 5.69 X10 (3) UL (ref 1.5–7.7)
NEUTROPHILS # BLD AUTO: 5.69 X10(3) UL (ref 1.5–7.7)
NEUTROPHILS NFR BLD AUTO: 60.8 %
OSMOLALITY SERPL CALC.SUM OF ELEC: 292 MOSM/KG (ref 275–295)
PLATELET # BLD AUTO: 261 10(3)UL (ref 150–450)
POTASSIUM SERPL-SCNC: 3.8 MMOL/L (ref 3.5–5.1)
RBC # BLD AUTO: 4.44 X10(6)UL
SODIUM SERPL-SCNC: 139 MMOL/L (ref 136–145)
WBC # BLD AUTO: 9.4 X10(3) UL (ref 4–11)

## 2023-03-19 PROCEDURE — 99222 1ST HOSP IP/OBS MODERATE 55: CPT | Performed by: INTERNAL MEDICINE

## 2023-03-19 PROCEDURE — 99233 SBSQ HOSP IP/OBS HIGH 50: CPT | Performed by: HOSPITALIST

## 2023-03-19 RX ORDER — POTASSIUM CHLORIDE 20 MEQ/1
40 TABLET, EXTENDED RELEASE ORAL ONCE
Status: COMPLETED | OUTPATIENT
Start: 2023-03-19 | End: 2023-03-19

## 2023-03-19 NOTE — PLAN OF CARE
Problem: Patient Centered Care  Goal: Patient preferences are identified and integrated in the patient's plan of care  Description: Interventions:  - What would you like us to know as we care for you?   - Provide timely, complete, and accurate information to patient/family  - Incorporate patient and family knowledge, values, beliefs, and cultural backgrounds into the planning and delivery of care  - Encourage patient/family to participate in care and decision-making at the level they choose  - Honor patient and family perspectives and choices  Outcome: Progressing     Problem: Diabetes/Glucose Control  Goal: Glucose maintained within prescribed range  Description: INTERVENTIONS:  - Monitor Blood Glucose as ordered  - Assess for signs and symptoms of hyperglycemia and hypoglycemia  - Administer ordered medications to maintain glucose within target range  - Assess barriers to adequate nutritional intake and initiate nutrition consult as needed  - Instruct patient on self management of diabetes  Outcome: Progressing     Problem: Patient/Family Goals  Goal: Patient/Family Long Term Goal  Description: Patient's Long Term Goal: Safe & Free of Falls! Interventions:  - Fall risk precautions are followed at all times. - Bed alarm in use at all times.  - Non-skid socks are worn to ensure safety.  - See additional Care Plan goals for specific interventions  Outcome: Progressing  Goal: Patient/Family Short Term Goal  Description: Patient's Short Term Goal: Blood sugar within normal range! Interventions:   - Blood sugar is monitored before meals and at bedtime.  - Patient on insulin sliding scale and receives insulin per scale and during meal    times. - Endocrinologist on consult. - See additional Care Plan goals for specific interventions  Outcome: Progressing     Patient is presently resting in the bed. Alert x 4. Vital signs taken and stable. Patient denied pain or discomfort at current time.  Fall risk precautions are followed at all times. Tolerates clear liquid diet well. Intravenous fluid infusing and tolerated. Bed alarm in use at all times. Fall risk band on patient. Call light within reach at all times.

## 2023-03-19 NOTE — ED QUICK NOTES
Report called to [facility]. Pt and family updated in the plan of care. All questions answered, pt and family verbalized understanding.

## 2023-03-19 NOTE — PROGRESS NOTES
Double RN skin check done prior to transfer off Unit. Skin check performed by this RN and karen    Wounds are as follows: buttocks red, clean dry intact. trasnferred to room 519. Will remain available for any further questions or concerns.

## 2023-03-19 NOTE — PLAN OF CARE
Pt is alert & or x 3,  following cammands. Up to bathroom with walker assist. She fell at home before coming in , ct & spin neg. I reviewed importance of using your walker at home, since she has had falls in the past. Giving her a lot of encouragement to do this and to do better on checking her blood glucose. We reviewed the diabetic booklet together. She stated she will do better. Sinus r. Yury ac/hs, last bm 03/18, voding per bathroom. 1 assit with walker. Bed alarm on. Call light reviewed, given a lot of emotional support and encouragement. reviewed call light usage. Going to room 519 report given to rn. Steff Solis. Spoke to [employer] and let her kno we are transferring her to room 519.   Problem: Patient Centered Care  Goal: Patient preferences are identified and integrated in the patient's plan of care  Description: Interventions:  - What would you like us to know as we care for you?  - Provide timely, complete, and accurate information to patient/family  - Incorporate patient and family knowledge, values, beliefs, and cultural backgrounds into the planning and delivery of care  - Encourage patient/family to participate in care and decision-making at the level they choose  - Honor patient and family perspectives and choices  Outcome: Progressing     Problem: Diabetes/Glucose Control  Goal: Glucose maintained within prescribed range  Description: INTERVENTIONS:  - Monitor Blood Glucose as ordered  - Assess for signs and symptoms of hyperglycemia and hypoglycemia  - Administer ordered medications to maintain glucose within target range  - Assess barriers to adequate nutritional intake and initiate nutrition consult as needed  - Instruct patient on self management of diabetes  Outcome: Progressing     Problem: Patient/Family Goals  Goal: Patient/Family Long Term Goal  Description: Patient's Long Term Goal: to not fall again    Interventions:  - See additional Care Plan goals for specific interventions  Outcome: Progressing  Goal: Patient/Family Short Term Goal  Description: Patient's Short Term Goal: to go back home with my daughter    Interventions:     - See additional Care Plan goals for specific interventions  Outcome: Progressing

## 2023-03-19 NOTE — PLAN OF CARE
Patient received in bed as transferred from [address] Unit. Alert x 4. Patient made comfortable in the bed. Vital signs taken and stable. Skin was assessed by this RN and Charge RN. Patient buttocks are red, but clean, dry and intact. No complaints of pain or discomfort. Intravenous fluids infusing and tolerated. Fall risk precautions are followed at all times. Fall risk band placed on patient. Bed alarm in use at all times. Call light within reach at all times. Patient instructed to use call light within reach. Patient verbalized understanding. Patient will be monitored for pain, safety, and comfort at all times.

## 2023-03-20 LAB
ANION GAP SERPL CALC-SCNC: 4 MMOL/L (ref 0–18)
BUN BLD-MCNC: 14 MG/DL (ref 7–18)
BUN/CREAT SERPL: 20.3 (ref 10–20)
CALCIUM BLD-MCNC: 8.5 MG/DL (ref 8.5–10.1)
CHLORIDE SERPL-SCNC: 113 MMOL/L (ref 98–112)
CO2 SERPL-SCNC: 26 MMOL/L (ref 21–32)
CREAT BLD-MCNC: 0.69 MG/DL
DEPRECATED RDW RBC AUTO: 43.4 FL (ref 35.1–46.3)
ERYTHROCYTE [DISTWIDTH] IN BLOOD BY AUTOMATED COUNT: 13.7 % (ref 11–15)
GFR SERPLBLD BASED ON 1.73 SQ M-ARVRAT: 93 ML/MIN/1.73M2 (ref 60–?)
GLUCOSE BLD-MCNC: 138 MG/DL (ref 70–99)
GLUCOSE BLDC GLUCOMTR-MCNC: 131 MG/DL (ref 70–99)
GLUCOSE BLDC GLUCOMTR-MCNC: 183 MG/DL (ref 70–99)
GLUCOSE BLDC GLUCOMTR-MCNC: 231 MG/DL (ref 70–99)
GLUCOSE BLDC GLUCOMTR-MCNC: 235 MG/DL (ref 70–99)
GLUCOSE BLDC GLUCOMTR-MCNC: 351 MG/DL (ref 70–99)
HCT VFR BLD AUTO: 37.2 %
HGB BLD-MCNC: 12.1 G/DL
MCH RBC QN AUTO: 28.1 PG (ref 26–34)
MCHC RBC AUTO-ENTMCNC: 32.5 G/DL (ref 31–37)
MCV RBC AUTO: 86.3 FL
OSMOLALITY SERPL CALC.SUM OF ELEC: 299 MOSM/KG (ref 275–295)
PLATELET # BLD AUTO: 254 10(3)UL (ref 150–450)
POTASSIUM SERPL-SCNC: 4.2 MMOL/L (ref 3.5–5.1)
POTASSIUM SERPL-SCNC: 4.2 MMOL/L (ref 3.5–5.1)
RBC # BLD AUTO: 4.31 X10(6)UL
SODIUM SERPL-SCNC: 143 MMOL/L (ref 136–145)
WBC # BLD AUTO: 8.7 X10(3) UL (ref 4–11)

## 2023-03-20 PROCEDURE — 99233 SBSQ HOSP IP/OBS HIGH 50: CPT | Performed by: HOSPITALIST

## 2023-03-20 PROCEDURE — 99232 SBSQ HOSP IP/OBS MODERATE 35: CPT | Performed by: INTERNAL MEDICINE

## 2023-03-20 NOTE — HOME CARE LIAISON
This patient is pending with [facility]. Patient will need a F2F prior to DC.

## 2023-03-20 NOTE — PLAN OF CARE
Problem: Patient Centered Care  Goal: Patient preferences are identified and integrated in the patient's plan of care  Description: Interventions:  - What would you like us to know as we care for you? From home with daughter  - Provide timely, complete, and accurate information to patient/family  - Incorporate patient and family knowledge, values, beliefs, and cultural backgrounds into the planning and delivery of care  - Encourage patient/family to participate in care and decision-making at the level they choose  - Honor patient and family perspectives and choices  Outcome: Progressing     Problem: Diabetes/Glucose Control  Goal: Glucose maintained within prescribed range  Description: INTERVENTIONS:  - Monitor Blood Glucose as ordered  - Assess for signs and symptoms of hyperglycemia and hypoglycemia  - Administer ordered medications to maintain glucose within target range  - Assess barriers to adequate nutritional intake and initiate nutrition consult as needed  - Instruct patient on self management of diabetes  3/20/2023 1403 by Philly Antoine RN  Outcome: Progressing  Note: Nurse monitored blood glucose during shift, nurse administered insulin per protocol. 3/20/2023 1403 by Philly Antoine RN  Note: Nurse monitored blood glucose during shift, nurse administered insulin per protocol.       Problem: MUSCULOSKELETAL - ADULT  Goal: Return mobility to safest level of function  Description: INTERVENTIONS:  - Assess patient stability and activity tolerance for standing, transferring and ambulating w/ or w/o assistive devices  - Assist with transfers and ambulation using safe patient handling equipment as needed  - Ensure adequate protection for wounds/incisions during mobilization  - Obtain PT/OT consults as needed  - Advance activity as appropriate  - Communicate ordered activity level and limitations with patient/family  3/20/2023 1403 by Philly Antoine RN  Outcome: Not Progressing  Note: Nurse ambulated with patient to restroom, patient unsteady gait. 3/20/2023 1403 by Roxanne Askew RN  Note: Nurse ambulated with patient to restroom, patient unsteady gait. Problem: Impaired Functional Mobility  Goal: Achieve highest/safest level of mobility/gait  Description: Interventions:  - Assess patient's functional ability and stability  - Promote increasing activity/tolerance for mobility and gait  - Educate and engage patient/family in tolerated activity level and precautions  - Recommend use of  RW for transfers and ambulation  3/20/2023 1403 by Roxanne Askew RN  Outcome: Not Progressing  Note: Patient 1 assist with walker and stand by assist.   3/20/2023 1403 by Roxanne Askew RN  Note: Patient 1 assist with walker and stand by assist.     Patients vitals stable, denies pain. Patient ambulates 1 assist with walker, unsteady gait. Patient up in chair. Chair alarm. Bed low, locked, low and bed alarm on. Call light in reach. Frequent hourly rounding during shift.

## 2023-03-20 NOTE — DISCHARGE INSTRUCTIONS
Diabetes: Your A1C level is greater than 8%. It is recommended that you attend an outpatient diabetes education program.  Please discuss with your Primary Care Provider at your next visit to obtain a referral.  If you wish to make an appointment at [facility], call [phone]. Sometimes managing your health at home requires assistance. The [facility] team has recognized your preference to use [facility]. They can be reached by phone at [phone]. The fax number for your reference is [phone]. A representative from the home health agency will contact you or your family to schedule your first visit.

## 2023-03-20 NOTE — CM/SW NOTE
03/20/23 1300   CM/WAQAS Referral Data   Referral Source    Reason for Referral Discharge planning;Readmission   Informant Patient   Readmission Assessment   Factors that patient feels contributed to this readmission Acute/Chronic Clinical Presentation   Pt's living situation prior to admission? Home with family   Pt's level of independence at discharge? Total assist (max)   Pt. received education on diagnoses at time of discharge? Yes   Did you know who and how to call someone if you felt worse? Yes   Did any new symptoms or issues develop after you were discharged? Yes   ----Post D/C symptoms: Symptoms/issue related to previous hospitalization No   Did you understand your discharge instructions? Yes   Were medications taken as indicated on discharge instructions? Yes   Did you have a follow-up appointment scheduled at time of discharge? Yes   ----F/U appt: Did you go to that appointment? Yes   ---- F/U appt: How many days after discharge was follow-up appointment? 0-14 days   ----Home Health Recommendation: Recommended, arranged   Was full assessment completed by WAQAS/SAMMY on prior admission? Yes   Was the recommended discharge plan achieved? Yes   Was pt. discharged w/out services? No   Medical Hx   Does patient have an established PCP? Yes  Joann Silva   Patient Info   Patient's Current Mental Status at Time of Assessment Alert;Oriented   Patient's [address]   Patient lives with Daughter   Patient Status Prior to Admission   Independent with ADLs and Mobility Yes   Discharge Needs   Anticipated D/C needs To be determined   Pt discussed during nursing rounds. Dx hyperglycemia, falls at home. From home w/daughter, uses RW at baseline. Recent dc to [facility] after last admission. PT/OT evals are needed for dc recommendation, RN is aware. 1730: MD recommending [facility] placement d/t multiple hospitalizations after falls at home.  CM spoke w/patient at bedside regarding dc planning and pt notified CM that she will refused [facility] at [place]. PT recommending HH w/PT and OT at [place], pt agreeable to that recommendation. Pt was already pending w/[facility] per liaison Chinmay Hoover. New F2F entered for RN, PT, and OT. Plan: Home w/daughter with [facility] pending medical clearance. / to remain available for support and/or discharge planning.      MAURA Arellano    [phone]

## 2023-03-20 NOTE — PLAN OF CARE
No acute changes at this point in time, patient resting comfortably in bed. No reports of pain or discomfort. Problem: Patient Centered Care  Goal: Patient preferences are identified and integrated in the patient's plan of care  Description: Interventions:  - What would you like us to know as we care for you?  From home with daughter  - Provide timely, complete, and accurate information to patient/family  - Incorporate patient and family knowledge, values, beliefs, and cultural backgrounds into the planning and delivery of care  - Encourage patient/family to participate in care and decision-making at the level they choose  - Honor patient and family perspectives and choices  Outcome: Progressing     Problem: Diabetes/Glucose Control  Goal: Glucose maintained within prescribed range  Description: INTERVENTIONS:  - Monitor Blood Glucose as ordered  - Assess for signs and symptoms of hyperglycemia and hypoglycemia  - Administer ordered medications to maintain glucose within target range  - Assess barriers to adequate nutritional intake and initiate nutrition consult as needed  - Instruct patient on self management of diabetes  Outcome: Progressing     Problem: Patient/Family Goals  Goal: Patient/Family Long Term Goal  Description: Patient's Long Term Goal: to go back home    Interventions:  - monitor VS, labs, test results  - follow MD orders  - administer medications  - discharge planning   - See additional Care Plan goals for specific interventions  Outcome: Progressing  Goal: Patient/Family Short Term Goal  Description: Patient's Short Term Goal: to control BG    Interventions:   - endo consult  - MD orders  - See additional Care Plan goals for specific interventions  Outcome: Not Progressing     Problem: MUSCULOSKELETAL - ADULT  Goal: Return mobility to safest level of function  Description: INTERVENTIONS:  - Assess patient stability and activity tolerance for standing, transferring and ambulating w/ or w/o assistive devices  - Assist with transfers and ambulation using safe patient handling equipment as needed  - Ensure adequate protection for wounds/incisions during mobilization  - Obtain PT/OT consults as needed  - Advance activity as appropriate  - Communicate ordered activity level and limitations with patient/family  Outcome: Progressing     Problem: Impaired Functional Mobility  Goal: Achieve highest/safest level of mobility/gait  Description: Interventions:  - Assess patient's functional ability and stability  - Promote increasing activity/tolerance for mobility and gait  - Educate and engage patient/family in tolerated activity level and precautions  - Recommend use of  RW for transfers and ambulation  Outcome: Progressing

## 2023-03-21 VITALS
HEART RATE: 90 BPM | RESPIRATION RATE: 16 BRPM | HEIGHT: 60 IN | DIASTOLIC BLOOD PRESSURE: 37 MMHG | OXYGEN SATURATION: 96 % | BODY MASS INDEX: 27.82 KG/M2 | WEIGHT: 141.69 LBS | SYSTOLIC BLOOD PRESSURE: 111 MMHG | TEMPERATURE: 98 F

## 2023-03-21 LAB
GLUCOSE BLDC GLUCOMTR-MCNC: 177 MG/DL (ref 70–99)
GLUCOSE BLDC GLUCOMTR-MCNC: 301 MG/DL (ref 70–99)

## 2023-03-21 PROCEDURE — 99239 HOSP IP/OBS DSCHRG MGMT >30: CPT | Performed by: HOSPITALIST

## 2023-03-21 PROCEDURE — 99232 SBSQ HOSP IP/OBS MODERATE 35: CPT | Performed by: INTERNAL MEDICINE

## 2023-03-21 RX ORDER — METOCLOPRAMIDE HYDROCHLORIDE 5 MG/ML
10 INJECTION INTRAMUSCULAR; INTRAVENOUS EVERY 8 HOURS PRN
Status: DISCONTINUED | OUTPATIENT
Start: 2023-03-21 | End: 2023-03-21

## 2023-03-21 RX ORDER — CIPROFLOXACIN 500 MG/1
500 TABLET, FILM COATED ORAL 2 TIMES DAILY
Qty: 14 TABLET | Refills: 0 | Status: SHIPPED | OUTPATIENT
Start: 2023-03-21 | End: 2023-03-28

## 2023-03-21 NOTE — CM/SW NOTE
WAQAS spoke with the pt's dtr. Vicky Swanson via telephone. Vicky Swanson is looking at alternative places for the pt. To live, but the pt. Does not qualify for medicaid and the pt. Make to little to private pay at memory care or assisted living. She did confirm she is working with [facility] and is running into the same problem. WAQAS notified her that unfortunately there aren't grants for these types of services available. WAQAS did explain the [facility] and notified her it will start 24-48 hours after discharge. [facility] info has been added to the AVS.  WAQAS also emailed the caregiver list to Vicky Swanson at [email or web address]    Plan: Home with [facility] [place], [place] ext [phone]

## 2023-03-21 NOTE — PLAN OF CARE
Patient  at bedtime, per MD given additional novolog. Recheck BG at 0200 301, insulin given per MD for coverage. No reports of pain or discomfort. Problem: Patient Centered Care  Goal: Patient preferences are identified and integrated in the patient's plan of care  Description: Interventions:  - What would you like us to know as we care for you?  From home with daughter  - Provide timely, complete, and accurate information to patient/family  - Incorporate patient and family knowledge, values, beliefs, and cultural backgrounds into the planning and delivery of care  - Encourage patient/family to participate in care and decision-making at the level they choose  - Honor patient and family perspectives and choices  Outcome: Progressing     Problem: Diabetes/Glucose Control  Goal: Glucose maintained within prescribed range  Description: INTERVENTIONS:  - Monitor Blood Glucose as ordered  - Assess for signs and symptoms of hyperglycemia and hypoglycemia  - Administer ordered medications to maintain glucose within target range  - Assess barriers to adequate nutritional intake and initiate nutrition consult as needed  - Instruct patient on self management of diabetes  Outcome: Not Progressing     Problem: Patient/Family Goals  Goal: Patient/Family Long Term Goal  Description: Patient's Long Term Goal: to go back home    Interventions:  - monitor VS, labs, test results  - follow MD orders  - administer medications  - discharge planning   - See additional Care Plan goals for specific interventions  Outcome: Progressing  Goal: Patient/Family Short Term Goal  Description: Patient's Short Term Goal: to control BG    Interventions:   - endo consult  - MD orders  - See additional Care Plan goals for specific interventions  Outcome: Progressing     Problem: MUSCULOSKELETAL - ADULT  Goal: Return mobility to safest level of function  Description: INTERVENTIONS:  - Assess patient stability and activity tolerance for standing, transferring and ambulating w/ or w/o assistive devices  - Assist with transfers and ambulation using safe patient handling equipment as needed  - Ensure adequate protection for wounds/incisions during mobilization  - Obtain PT/OT consults as needed  - Advance activity as appropriate  - Communicate ordered activity level and limitations with patient/family  Outcome: Progressing     Problem: Impaired Functional Mobility  Goal: Achieve highest/safest level of mobility/gait  Description: Interventions:  - Assess patient's functional ability and stability  - Promote increasing activity/tolerance for mobility and gait  - Educate and engage patient/family in tolerated activity level and precautions  - Recommend use of  RW for transfers and ambulation  Outcome: Progressing

## 2023-03-21 NOTE — PLAN OF CARE
Patient is alert and oriented x's 4, vital signs are stable. Patient has no pain or discomfort. Fall and safety precautions in place, call light within reach. Discharge orders are in place. Patient to be transported home by daughter, home health arranged. AVS explained to patient and daughter, all questions answered. Problem: Patient Centered Care  Goal: Patient preferences are identified and integrated in the patient's plan of care  Description: Interventions:  - What would you like us to know as we care for you?  From home with daughter  - Provide timely, complete, and accurate information to patient/family  - Incorporate patient and family knowledge, values, beliefs, and cultural backgrounds into the planning and delivery of care  - Encourage patient/family to participate in care and decision-making at the level they choose  - Honor patient and family perspectives and choices  3/21/2023 1300 by Ranjit Segundo RN  Outcome: Adequate for Discharge  3/21/2023 1300 by Ranjit Segundo RN  Outcome: Progressing     Problem: Diabetes/Glucose Control  Goal: Glucose maintained within prescribed range  Description: INTERVENTIONS:  - Monitor Blood Glucose as ordered  - Assess for signs and symptoms of hyperglycemia and hypoglycemia  - Administer ordered medications to maintain glucose within target range  - Assess barriers to adequate nutritional intake and initiate nutrition consult as needed  - Instruct patient on self management of diabetes  3/21/2023 1300 by Ranjit Segundo RN  Outcome: Adequate for Discharge  3/21/2023 1300 by Ranjit Segundo RN  Outcome: Progressing     Problem: Patient/Family Goals  Goal: Patient/Family Long Term Goal  Description: Patient's Long Term Goal: to go back home    Interventions:  - monitor VS, labs, test results  - follow MD orders  - administer medications  - discharge planning   - See additional Care Plan goals for specific interventions  3/21/2023 1300 by Brittanie Gauthier RN  Outcome: Adequate for Discharge  3/21/2023 1300 by Brittanie Gauthier RN  Outcome: Progressing  Goal: Patient/Family Short Term Goal  Description: Patient's Short Term Goal: to control BG    Interventions:   - endo consult  - MD orders  - See additional Care Plan goals for specific interventions  3/21/2023 1300 by Brittanie Gauthier RN  Outcome: Adequate for Discharge  3/21/2023 1300 by Brittanie Gauthier RN  Outcome: Progressing     Problem: MUSCULOSKELETAL - ADULT  Goal: Return mobility to safest level of function  Description: INTERVENTIONS:  - Assess patient stability and activity tolerance for standing, transferring and ambulating w/ or w/o assistive devices  - Assist with transfers and ambulation using safe patient handling equipment as needed  - Ensure adequate protection for wounds/incisions during mobilization  - Obtain PT/OT consults as needed  - Advance activity as appropriate  - Communicate ordered activity level and limitations with patient/family  3/21/2023 1300 by Brittanie Gauthier RN  Outcome: Adequate for Discharge  3/21/2023 1300 by Brittanie Gauthier RN  Outcome: Progressing     Problem: Impaired Functional Mobility  Goal: Achieve highest/safest level of mobility/gait  Description: Interventions:  - Assess patient's functional ability and stability  - Promote increasing activity/tolerance for mobility and gait  - Educate and engage patient/family in tolerated activity level and precautions  - Recommend use of  RW for transfers and ambulation  3/21/2023 1300 by Brittanie Gauthier RN  Outcome: Adequate for Discharge  3/21/2023 1300 by Brittanie Gauthier RN  Outcome: Progressing

## 2023-03-21 NOTE — PROGRESS NOTES
[facility] Pharmacy Note:  Renal Dose Adjustment for Metoclopramide (REGLAN)    Joey Talbot has been prescribed Metoclopramide (REGLAN) 5 mg every 8 hours as needed for nausea/vomiting,. Estimated Creatinine Clearance: 53.7 mL/min (based on SCr of 0.69 mg/dL). Calculated creatinine clearance is > 40 ml/min, therefore, the dose of Metoclopramide (REGLAN) has been changed to 10mg every 8 hours as needed for nausea/vomiting per P&T approved protocol. Pharmacy will continue to follow, and if renal function improves, will resume the original order.        Thank you,  Farrah Jenkins, PharmD  3/21/2023 7:18 AM

## 2023-03-22 ENCOUNTER — TELEPHONE (OUTPATIENT)
Dept: INTERNAL MEDICINE CLINIC | Facility: CLINIC | Age: 73
End: 2023-03-22

## 2023-03-22 ENCOUNTER — TELEPHONE (OUTPATIENT)
Dept: ENDOCRINOLOGY CLINIC | Facility: CLINIC | Age: 73
End: 2023-03-22

## 2023-03-22 ENCOUNTER — PATIENT OUTREACH (OUTPATIENT)
Dept: CASE MANAGEMENT | Age: 73
End: 2023-03-22

## 2023-03-22 DIAGNOSIS — Z02.9 ENCOUNTERS FOR UNSPECIFIED ADMINISTRATIVE PURPOSE: ICD-10-CM

## 2023-03-22 PROCEDURE — 1111F DSCHRG MED/CURRENT MED MERGE: CPT

## 2023-03-22 NOTE — TELEPHONE ENCOUNTER
Rosette Mar from [place] [address] called to inform doctor that pt looks very depressed  Also wants to know if doctor will sign orders for pt,nurse and ot.   To please call her back at [phone]

## 2023-03-22 NOTE — PROGRESS NOTES
NCM placed call to patient for TCM, patient's daughter answered and states that her mother is sleeping, NCM will call back later.   Discharge Dx:    Hyperglycemia  Frequent falls  HX: DM II (A1c = 12.5 on 3/19/23)

## 2023-03-22 NOTE — TELEPHONE ENCOUNTER
Called patient per Dr. Joaquin Briseno to schedule an appointment for TCM follow up on hospital stay. Left message to call back to schedule sometime this week or next week.

## 2023-03-22 NOTE — TELEPHONE ENCOUNTER
Called [facility] nurse back and let her know you would sign HH orders. Just wanted to send her message to you as an FYI.

## 2023-03-22 NOTE — TELEPHONE ENCOUNTER
[place]  states she saw patient this morning and fasting blood sugar was at 397 mg/dL. Blood sugars last night was 539 mg/dL. Please call. Thank you.

## 2023-03-23 RX ORDER — INSULIN DEGLUDEC 200 U/ML
36 INJECTION, SOLUTION SUBCUTANEOUS DAILY
Qty: 15 ML | Refills: 3 | COMMUNITY
Start: 2023-03-23

## 2023-03-23 NOTE — PROGRESS NOTES
LM for pt to call [facility] for TCM since discharge. [facility] phone number was provided for pt to call back.

## 2023-03-23 NOTE — TELEPHONE ENCOUNTER
Checked with Dr. Hidalgo Listen to see if ok to inject the additional 6 units of tresiba now and got ok from her. RN called patient's daughter and relayed Dr. Fermin Olvera recommendation as below. Daughter is agreeable to CGM and discussed process with DME. Gladys Maker form filled out for [employer] and placed in Dr. Fermin Olvera folder for signature along with last chart note and copies of insurance.

## 2023-03-23 NOTE — TELEPHONE ENCOUNTER
Dr. Green Dials patient's daughter and relayed your recommendation as below. Daughter is concern that this will not help with controlling BG because pt was in the hospital recently and was just discharged the day before yesterday so patient was getting her insulin consistently while in the hospital.  Yesterday, she took the patient's BG fasting 398 and, BG 2 hours after a meal and it was around 500 something and that's when she called. Humalog 14 units TID w/ meals  Tresiba 30 units daily    Please advise. Thank you.

## 2023-03-23 NOTE — TELEPHONE ENCOUNTER
Patients daughter Nichole Cid calling to speak with nurse. Patients blood sugar today is 355. Please call at [phone], will try to transfer to rn, thanks.

## 2023-03-23 NOTE — TELEPHONE ENCOUNTER
Hi!  I think that it would be important for patient to take the insulin regularly first. If they can ensure that it would be the first step. Thank you!

## 2023-03-23 NOTE — TELEPHONE ENCOUNTER
Hi!  Can we prescribe the Angella Narvaez for the patient? In the meantime, let us incraese her long-acting insulin to 36 units. Please ask her to follow up with us in 3 days. Thank you!

## 2023-03-23 NOTE — TELEPHONE ENCOUNTER
Dr. Alvarado Pillai,     Please see below messages and advise. Daughter gave another update just now. Pt just woke up and BG is 355 (fasting). Pt has not injected insulin and would like to know if patient should increase any of the doses. Noted below on previous RN's documentation that there is some compliance issue with insulin.

## 2023-03-30 ENCOUNTER — OFFICE VISIT (OUTPATIENT)
Dept: INTERNAL MEDICINE CLINIC | Facility: CLINIC | Age: 73
End: 2023-03-30
Payer: MEDICARE

## 2023-03-30 VITALS
BODY MASS INDEX: 27.09 KG/M2 | SYSTOLIC BLOOD PRESSURE: 100 MMHG | WEIGHT: 138 LBS | OXYGEN SATURATION: 92 % | DIASTOLIC BLOOD PRESSURE: 50 MMHG | HEIGHT: 60 IN | HEART RATE: 68 BPM

## 2023-03-30 DIAGNOSIS — F32.A DEPRESSION, UNSPECIFIED DEPRESSION TYPE: ICD-10-CM

## 2023-03-30 DIAGNOSIS — N18.32 TYPE 2 DIABETES MELLITUS WITH STAGE 3B CHRONIC KIDNEY DISEASE, WITH LONG-TERM CURRENT USE OF INSULIN (HCC): ICD-10-CM

## 2023-03-30 DIAGNOSIS — E11.22 TYPE 2 DIABETES MELLITUS WITH STAGE 3B CHRONIC KIDNEY DISEASE, WITH LONG-TERM CURRENT USE OF INSULIN (HCC): ICD-10-CM

## 2023-03-30 DIAGNOSIS — R73.9 HYPERGLYCEMIA: Primary | ICD-10-CM

## 2023-03-30 DIAGNOSIS — Z79.4 TYPE 2 DIABETES MELLITUS WITH STAGE 3B CHRONIC KIDNEY DISEASE, WITH LONG-TERM CURRENT USE OF INSULIN (HCC): ICD-10-CM

## 2023-03-30 DIAGNOSIS — I10 PRIMARY HYPERTENSION: ICD-10-CM

## 2023-03-30 DIAGNOSIS — N39.0 URINARY TRACT INFECTION WITHOUT HEMATURIA, SITE UNSPECIFIED: ICD-10-CM

## 2023-03-30 PROCEDURE — 99495 TRANSJ CARE MGMT MOD F2F 14D: CPT | Performed by: INTERNAL MEDICINE

## 2023-03-30 PROCEDURE — 1111F DSCHRG MED/CURRENT MED MERGE: CPT | Performed by: INTERNAL MEDICINE

## 2023-03-31 ENCOUNTER — PATIENT OUTREACH (OUTPATIENT)
Dept: CASE MANAGEMENT | Age: 73
End: 2023-03-31

## 2023-03-31 NOTE — PROGRESS NOTES
Multiple attempts to reach pt and messages left with no return call. Patient went in for HFU appt with PCP on 3/30/23. Encounter closing.

## 2023-04-05 ENCOUNTER — TELEPHONE (OUTPATIENT)
Dept: INTERNAL MEDICINE CLINIC | Facility: CLINIC | Age: 73
End: 2023-04-05

## 2023-04-05 NOTE — TELEPHONE ENCOUNTER
Demi Painting from [facility] called to inform doctor that they started physical therapy early per family request  If you have any questions to please call her back at [phone]

## 2023-04-11 ENCOUNTER — TELEPHONE (OUTPATIENT)
Dept: INTERNAL MEDICINE CLINIC | Facility: CLINIC | Age: 73
End: 2023-04-11

## 2023-04-11 RX ORDER — SERTRALINE HYDROCHLORIDE 100 MG/1
100 TABLET, FILM COATED ORAL DAILY
Qty: 30 TABLET | Refills: 0 | Status: SHIPPED | OUTPATIENT
Start: 2023-04-11

## 2023-04-12 ENCOUNTER — TELEPHONE (OUTPATIENT)
Dept: INTERNAL MEDICINE CLINIC | Facility: CLINIC | Age: 73
End: 2023-04-12

## 2023-04-12 ENCOUNTER — OFFICE VISIT (OUTPATIENT)
Dept: ENDOCRINOLOGY CLINIC | Facility: CLINIC | Age: 73
End: 2023-04-12

## 2023-04-12 VITALS — DIASTOLIC BLOOD PRESSURE: 62 MMHG | SYSTOLIC BLOOD PRESSURE: 114 MMHG | HEART RATE: 82 BPM

## 2023-04-12 DIAGNOSIS — E11.65 TYPE 2 DIABETES MELLITUS WITH HYPERGLYCEMIA, WITH LONG-TERM CURRENT USE OF INSULIN (HCC): Primary | ICD-10-CM

## 2023-04-12 DIAGNOSIS — Z79.4 TYPE 2 DIABETES MELLITUS WITH HYPERGLYCEMIA, WITH LONG-TERM CURRENT USE OF INSULIN (HCC): Primary | ICD-10-CM

## 2023-04-12 DIAGNOSIS — E78.5 DYSLIPIDEMIA: ICD-10-CM

## 2023-04-12 DIAGNOSIS — I10 PRIMARY HYPERTENSION: ICD-10-CM

## 2023-04-12 LAB
GLUCOSE BLOOD: 312
TEST STRIP LOT #: NORMAL NUMERIC

## 2023-04-12 PROCEDURE — 82947 ASSAY GLUCOSE BLOOD QUANT: CPT | Performed by: INTERNAL MEDICINE

## 2023-04-12 PROCEDURE — 99214 OFFICE O/P EST MOD 30 MIN: CPT | Performed by: INTERNAL MEDICINE

## 2023-04-12 PROCEDURE — 1111F DSCHRG MED/CURRENT MED MERGE: CPT | Performed by: INTERNAL MEDICINE

## 2023-04-12 RX ORDER — DULAGLUTIDE 0.75 MG/.5ML
0.75 INJECTION, SOLUTION SUBCUTANEOUS WEEKLY
Qty: 2 ML | Refills: 0 | Status: SHIPPED | OUTPATIENT
Start: 2023-04-12 | End: 2023-05-12

## 2023-04-12 NOTE — TELEPHONE ENCOUNTER
Home health nurse Vannessa Delgado called regarding patient being discharge from home health next week. Patient will not comply to OT or PT.  came to visit her and stated that she does what she does to make her daughter go crazy. Nurse stated that daughter is moving patient out of her home at putting her into a low-income senior housing. Nurse stated that her diet is poor and patient eats whatever she wants and drinks coke.

## 2023-04-26 ENCOUNTER — PATIENT OUTREACH (OUTPATIENT)
Dept: CASE MANAGEMENT | Age: 73
End: 2023-04-26

## 2023-05-05 ENCOUNTER — TELEPHONE (OUTPATIENT)
Dept: INTERNAL MEDICINE CLINIC | Facility: CLINIC | Age: 73
End: 2023-05-05

## 2023-05-05 DIAGNOSIS — Z12.11 ENCOUNTER FOR SCREENING COLONOSCOPY: Primary | ICD-10-CM

## 2023-05-05 NOTE — TELEPHONE ENCOUNTER
Order placed for colonoscopy, daughter notified and info was provided to her with GI dr.    She was also reminded to get mammo and dexa scan.

## 2023-05-12 ENCOUNTER — TELEPHONE (OUTPATIENT)
Dept: ENDOCRINOLOGY CLINIC | Facility: CLINIC | Age: 73
End: 2023-05-12

## 2023-05-12 NOTE — TELEPHONE ENCOUNTER
Ms Dominiquesierra Aziza at [facility] is request to get patient recent office notes faxed to # [phone]. She states that she received a order for Dexcom Unit for CGM.

## 2023-05-15 DIAGNOSIS — Z79.4 TYPE 2 DIABETES MELLITUS WITH HYPERGLYCEMIA, WITH LONG-TERM CURRENT USE OF INSULIN (HCC): ICD-10-CM

## 2023-05-15 DIAGNOSIS — E11.65 TYPE 2 DIABETES MELLITUS WITH HYPERGLYCEMIA, WITH LONG-TERM CURRENT USE OF INSULIN (HCC): ICD-10-CM

## 2023-05-16 NOTE — TELEPHONE ENCOUNTER
LOV: 4/12/23    RTC: 6 Weeks    FU: 5/25/23      Per LOV Note, \"- Continue Tresiba to 36 units every morning; Stop Humalog  - Start Trulicity 4.68AS qweekly for 30 days and then increase to 1.5mg qweekly        Please refuse Humalog Rx below if appropriate - Cynthia Zurita Rx was sent on 3/23/23

## 2023-05-19 RX ORDER — INSULIN LISPRO 100 [IU]/ML
INJECTION, SOLUTION INTRAVENOUS; SUBCUTANEOUS
Qty: 30 ML | Refills: 0 | Status: SHIPPED | OUTPATIENT
Start: 2023-05-19 | End: 2023-05-19

## 2023-05-19 RX ORDER — DULAGLUTIDE 1.5 MG/.5ML
1.5 INJECTION, SOLUTION SUBCUTANEOUS WEEKLY
Qty: 6 ML | Refills: 0 | Status: SHIPPED | OUTPATIENT
Start: 2023-05-19 | End: 2023-08-17

## 2023-05-19 RX ORDER — DULAGLUTIDE 0.75 MG/.5ML
INJECTION, SOLUTION SUBCUTANEOUS
Qty: 2 ML | Refills: 0 | Status: SHIPPED | OUTPATIENT
Start: 2023-05-19 | End: 2023-05-19

## 2023-05-19 NOTE — TELEPHONE ENCOUNTER
Called pharmacy to have them cancel Humalog and 0.59DH Trulicity dose prescriptions. Attempted to call patient to notify her of increased Trulicity dose, no answer and unable to leave message. Hosseint sent.

## 2023-05-19 NOTE — TELEPHONE ENCOUNTER
Hi!  Can you please let patient know that I am increasing her Trulicity dose from 2.59OZ to 1.5mg qweekly. Thank you!

## 2023-05-25 ENCOUNTER — TELEPHONE (OUTPATIENT)
Dept: ENDOCRINOLOGY CLINIC | Facility: CLINIC | Age: 73
End: 2023-05-25

## 2023-05-25 ENCOUNTER — LAB ENCOUNTER (OUTPATIENT)
Dept: LAB | Facility: REFERENCE LAB | Age: 73
End: 2023-05-25
Attending: INTERNAL MEDICINE
Payer: MEDICARE

## 2023-05-25 ENCOUNTER — OFFICE VISIT (OUTPATIENT)
Dept: ENDOCRINOLOGY CLINIC | Facility: CLINIC | Age: 73
End: 2023-05-25

## 2023-05-25 VITALS
HEART RATE: 83 BPM | DIASTOLIC BLOOD PRESSURE: 55 MMHG | HEIGHT: 60 IN | SYSTOLIC BLOOD PRESSURE: 137 MMHG | BODY MASS INDEX: 26.97 KG/M2 | WEIGHT: 137.38 LBS

## 2023-05-25 DIAGNOSIS — E11.65 TYPE 2 DIABETES MELLITUS WITH HYPERGLYCEMIA, WITH LONG-TERM CURRENT USE OF INSULIN (HCC): ICD-10-CM

## 2023-05-25 DIAGNOSIS — E11.65 TYPE 2 DIABETES MELLITUS WITH HYPERGLYCEMIA, WITH LONG-TERM CURRENT USE OF INSULIN (HCC): Primary | ICD-10-CM

## 2023-05-25 DIAGNOSIS — Z79.4 TYPE 2 DIABETES MELLITUS WITH HYPERGLYCEMIA, WITH LONG-TERM CURRENT USE OF INSULIN (HCC): Primary | ICD-10-CM

## 2023-05-25 DIAGNOSIS — I10 PRIMARY HYPERTENSION: ICD-10-CM

## 2023-05-25 DIAGNOSIS — E78.5 DYSLIPIDEMIA: ICD-10-CM

## 2023-05-25 DIAGNOSIS — Z79.4 TYPE 2 DIABETES MELLITUS WITH HYPERGLYCEMIA, WITH LONG-TERM CURRENT USE OF INSULIN (HCC): ICD-10-CM

## 2023-05-25 LAB
ALBUMIN SERPL-MCNC: 3.7 G/DL (ref 3.4–5)
ALBUMIN/GLOB SERPL: 0.9 {RATIO} (ref 1–2)
ALP LIVER SERPL-CCNC: 67 U/L
ALT SERPL-CCNC: 31 U/L
ANION GAP SERPL CALC-SCNC: 3 MMOL/L (ref 0–18)
AST SERPL-CCNC: 34 U/L (ref 15–37)
BILIRUB SERPL-MCNC: 0.2 MG/DL (ref 0.1–2)
BUN BLD-MCNC: 21 MG/DL (ref 7–18)
BUN/CREAT SERPL: 23.6 (ref 10–20)
CALCIUM BLD-MCNC: 9.2 MG/DL (ref 8.5–10.1)
CARTRIDGE LOT#: ABNORMAL NUMERIC
CHLORIDE SERPL-SCNC: 103 MMOL/L (ref 98–112)
CHOLEST SERPL-MCNC: 154 MG/DL (ref ?–200)
CO2 SERPL-SCNC: 30 MMOL/L (ref 21–32)
CREAT BLD-MCNC: 0.89 MG/DL
FASTING PATIENT LIPID ANSWER: YES
FASTING STATUS PATIENT QL REPORTED: YES
GFR SERPLBLD BASED ON 1.73 SQ M-ARVRAT: 69 ML/MIN/1.73M2 (ref 60–?)
GLOBULIN PLAS-MCNC: 4.3 G/DL (ref 2.8–4.4)
GLUCOSE BLD-MCNC: 124 MG/DL (ref 70–99)
GLUCOSE BLOOD: 73
GLUCOSE BLOOD: 91
HDLC SERPL-MCNC: 35 MG/DL (ref 40–59)
HEMOGLOBIN A1C: 8.9 % (ref 4.3–5.6)
LDLC SERPL CALC-MCNC: 101 MG/DL (ref ?–100)
NONHDLC SERPL-MCNC: 119 MG/DL (ref ?–130)
OSMOLALITY SERPL CALC.SUM OF ELEC: 286 MOSM/KG (ref 275–295)
POTASSIUM SERPL-SCNC: 4.2 MMOL/L (ref 3.5–5.1)
PROT SERPL-MCNC: 8 G/DL (ref 6.4–8.2)
SODIUM SERPL-SCNC: 136 MMOL/L (ref 136–145)
TEST STRIP LOT #: NORMAL NUMERIC
TEST STRIP LOT #: NORMAL NUMERIC
TRIGL SERPL-MCNC: 96 MG/DL (ref 30–149)
VIT D+METAB SERPL-MCNC: 21.3 NG/ML (ref 30–100)
VLDLC SERPL CALC-MCNC: 16 MG/DL (ref 0–30)

## 2023-05-25 PROCEDURE — 82306 VITAMIN D 25 HYDROXY: CPT

## 2023-05-25 PROCEDURE — 80053 COMPREHEN METABOLIC PANEL: CPT

## 2023-05-25 PROCEDURE — 83036 HEMOGLOBIN GLYCOSYLATED A1C: CPT | Performed by: INTERNAL MEDICINE

## 2023-05-25 PROCEDURE — 99214 OFFICE O/P EST MOD 30 MIN: CPT | Performed by: INTERNAL MEDICINE

## 2023-05-25 PROCEDURE — 36415 COLL VENOUS BLD VENIPUNCTURE: CPT

## 2023-05-25 PROCEDURE — 82947 ASSAY GLUCOSE BLOOD QUANT: CPT | Performed by: INTERNAL MEDICINE

## 2023-05-25 PROCEDURE — 80061 LIPID PANEL: CPT

## 2023-05-25 NOTE — TELEPHONE ENCOUNTER
Patient in clinic for f/u with Dr. J Luis Alaniz to pharmacist - patient in coverage gap: trulicity 3.4WY 3-month supply $778  Patient and daughter given [address] PAP application to complete    Provider's portion placed in brown accordion folder

## 2023-05-30 ENCOUNTER — OFFICE VISIT (OUTPATIENT)
Dept: INTERNAL MEDICINE CLINIC | Facility: CLINIC | Age: 73
End: 2023-05-30
Payer: MEDICARE

## 2023-05-30 VITALS
OXYGEN SATURATION: 96 % | BODY MASS INDEX: 27 KG/M2 | DIASTOLIC BLOOD PRESSURE: 52 MMHG | WEIGHT: 138 LBS | SYSTOLIC BLOOD PRESSURE: 104 MMHG | HEART RATE: 86 BPM | TEMPERATURE: 97 F

## 2023-05-30 DIAGNOSIS — G89.29 CHRONIC LEFT-SIDED LOW BACK PAIN WITHOUT SCIATICA: ICD-10-CM

## 2023-05-30 DIAGNOSIS — M54.50 CHRONIC LEFT-SIDED LOW BACK PAIN WITHOUT SCIATICA: ICD-10-CM

## 2023-05-30 DIAGNOSIS — E11.22 TYPE 2 DIABETES MELLITUS WITH STAGE 3B CHRONIC KIDNEY DISEASE, WITH LONG-TERM CURRENT USE OF INSULIN (HCC): ICD-10-CM

## 2023-05-30 DIAGNOSIS — N39.498 FREQUENT URINARY INCONTINENCE: ICD-10-CM

## 2023-05-30 DIAGNOSIS — E11.65 TYPE 2 DIABETES MELLITUS WITH HYPERGLYCEMIA, WITH LONG-TERM CURRENT USE OF INSULIN (HCC): Primary | ICD-10-CM

## 2023-05-30 DIAGNOSIS — R26.9 ABNORMAL GAIT: ICD-10-CM

## 2023-05-30 DIAGNOSIS — N18.32 TYPE 2 DIABETES MELLITUS WITH STAGE 3B CHRONIC KIDNEY DISEASE, WITH LONG-TERM CURRENT USE OF INSULIN (HCC): ICD-10-CM

## 2023-05-30 DIAGNOSIS — I51.89 GRADE II DIASTOLIC DYSFUNCTION: ICD-10-CM

## 2023-05-30 DIAGNOSIS — Z79.4 TYPE 2 DIABETES MELLITUS WITH HYPERGLYCEMIA, WITH LONG-TERM CURRENT USE OF INSULIN (HCC): Primary | ICD-10-CM

## 2023-05-30 DIAGNOSIS — I10 PRIMARY HYPERTENSION: ICD-10-CM

## 2023-05-30 DIAGNOSIS — Z79.4 TYPE 2 DIABETES MELLITUS WITH STAGE 3B CHRONIC KIDNEY DISEASE, WITH LONG-TERM CURRENT USE OF INSULIN (HCC): ICD-10-CM

## 2023-05-30 PROBLEM — N39.0 URINARY TRACT INFECTION WITHOUT HEMATURIA, SITE UNSPECIFIED: Status: RESOLVED | Noted: 2023-02-28 | Resolved: 2023-05-30

## 2023-05-30 PROBLEM — R29.6 FREQUENT FALLS: Status: RESOLVED | Noted: 2023-02-28 | Resolved: 2023-05-30

## 2023-05-30 PROBLEM — R73.9 HYPERGLYCEMIA: Status: RESOLVED | Noted: 2023-02-28 | Resolved: 2023-05-30

## 2023-05-30 PROCEDURE — 99214 OFFICE O/P EST MOD 30 MIN: CPT | Performed by: INTERNAL MEDICINE

## 2023-05-30 RX ORDER — SERTRALINE HYDROCHLORIDE 100 MG/1
100 TABLET, FILM COATED ORAL DAILY
Qty: 90 TABLET | Refills: 2 | Status: SHIPPED | OUTPATIENT
Start: 2023-05-30

## 2023-05-30 RX ORDER — LOSARTAN POTASSIUM 50 MG/1
25 TABLET ORAL DAILY
Qty: 90 TABLET | Refills: 2 | Status: SHIPPED | OUTPATIENT
Start: 2023-05-30

## 2023-05-30 RX ORDER — SIMVASTATIN 20 MG
20 TABLET ORAL DAILY
Qty: 90 TABLET | Refills: 2 | Status: SHIPPED | OUTPATIENT
Start: 2023-05-30

## 2023-05-30 NOTE — CM/SW NOTE
Called by Dr. Jonathan Lizarraga to assist with getting patient back into rehab as patient is home with family and is having frequent falls. ERCM met with patient and Kathe Noriega at bedside. Patient is living with daughter Jimmy Bowen and Kathe Noriega. Kathe Leann cares for patient during the day while pt's daughter Jimmy Bowen is at work. Patient was discharged to [facility] on 1/6 and was discharged home a week ago and family is unable to manage patient at home. Per Kathe Noriega patient has frequent falls and \"is stubborn and does not listen to us. \" Kathe Leann also mentions patient has a  glucometer \"somewhere in a storage unit\" but family is unable to obtain the glucometer because the storage unit is \"jammed full of stuff and patient does not know where it is in the storage unit. \" Per Kathe Noriega they have notified pt's endocrinologist and [facility] re: needing a glucometer and both are working to to obtain one for them. Per Kathe Noriega patient has a F/U appointment scheduled for 2/9 with the endocrinologist. Kathe Noriega requesting us to assist with getting patient glucometer. Advised shaun Rodriguez since pt's endocrinologist and [facility] already working on getting patient a glucometer - it is best to continue to follow up with them re: this, encouraged grand-daughter to address the glucometer at pt's upcoming endocrine F/U appointment scheduled for 2/9. Grand-daughter Emily/u. Discussed SNF preferences with pt and grand-nuvia Rodriguez. Kathe Noriega does not want patient to return to [facility]. Grand-daughter requests [facility]. Picato Counseling:  I discussed with the patient the risks of Picato including but not limited to erythema, scaling, itching, weeping, crusting, and pain.

## 2023-05-31 ENCOUNTER — PATIENT OUTREACH (OUTPATIENT)
Dept: CASE MANAGEMENT | Age: 73
End: 2023-05-31

## 2023-06-06 ENCOUNTER — PATIENT OUTREACH (OUTPATIENT)
Dept: CASE MANAGEMENT | Age: 73
End: 2023-06-06

## 2023-06-12 ENCOUNTER — NURSE ONLY (OUTPATIENT)
Dept: ENDOCRINOLOGY CLINIC | Facility: CLINIC | Age: 73
End: 2023-06-12

## 2023-06-12 DIAGNOSIS — Z79.4 TYPE 2 DIABETES MELLITUS WITH HYPERGLYCEMIA, WITH LONG-TERM CURRENT USE OF INSULIN (HCC): Primary | ICD-10-CM

## 2023-06-12 DIAGNOSIS — E11.65 TYPE 2 DIABETES MELLITUS WITH HYPERGLYCEMIA, WITH LONG-TERM CURRENT USE OF INSULIN (HCC): Primary | ICD-10-CM

## 2023-06-12 NOTE — PROGRESS NOTES
Patient arrived at clinic with daughter to start dexcom G7 - patient brought supplies. Dexcom G7 sensor placed on patient's right arm - patient tolerated well  Patient will be using  - Dexcom G7  set up and paired with sensor (code: [id number])  Patient and daughter stated understanding to change sensor in 10 days and to call clinic with any questions.   [employer] PAP application given to patient (see TE 5/25/23 - patient in coverage gap)- patient and daughter stated understanding that [facility] is not accepting applications for trulicity at this time but if we have completed application we will submit as soon as they start accepting applications

## 2023-06-27 ENCOUNTER — NURSE ONLY (OUTPATIENT)
Facility: CLINIC | Age: 73
End: 2023-06-27

## 2023-06-27 ENCOUNTER — TELEPHONE (OUTPATIENT)
Dept: ENDOCRINOLOGY CLINIC | Facility: CLINIC | Age: 73
End: 2023-06-27

## 2023-06-27 DIAGNOSIS — E55.9 VITAMIN D DEFICIENCY: Primary | ICD-10-CM

## 2023-06-28 ENCOUNTER — TELEPHONE (OUTPATIENT)
Dept: INTERNAL MEDICINE CLINIC | Facility: CLINIC | Age: 73
End: 2023-06-28

## 2023-06-28 DIAGNOSIS — Z79.4 TYPE 2 DIABETES MELLITUS WITH HYPERGLYCEMIA, WITH LONG-TERM CURRENT USE OF INSULIN (HCC): Primary | ICD-10-CM

## 2023-06-28 DIAGNOSIS — E11.65 TYPE 2 DIABETES MELLITUS WITH HYPERGLYCEMIA, WITH LONG-TERM CURRENT USE OF INSULIN (HCC): Primary | ICD-10-CM

## 2023-06-28 RX ORDER — BLOOD SUGAR DIAGNOSTIC
STRIP MISCELLANEOUS
Qty: 100 STRIP | Refills: 2 | Status: SHIPPED | OUTPATIENT
Start: 2023-06-28 | End: 2024-06-27

## 2023-07-08 RX ORDER — ERGOCALCIFEROL 1.25 MG/1
50000 CAPSULE ORAL WEEKLY
Qty: 12 CAPSULE | Refills: 0 | Status: SHIPPED | OUTPATIENT
Start: 2023-07-08 | End: 2023-10-06

## 2023-07-09 NOTE — TELEPHONE ENCOUNTER
Hi!  Please let patient know that her vitamin D level is low and I would like to start her on ergocalciferol 50,000 international units once weekly for 3 months. We will check the vitamin D in 3 months. All other blood tests are stable. Thank you!

## 2023-07-12 NOTE — TELEPHONE ENCOUNTER
Spoke to patient's daughter, Norma Ekta to relay message below - daughter stated understanding for patient to start ergocalciferol 50,000 units weekly for 12 weeks and then repeat vitamin D lab    Patient's daughter stated they are having trouble applying  dexcom G7 sensor - [place] scheduled 7/13/23 to review/teach sensor application

## 2023-07-13 ENCOUNTER — NURSE ONLY (OUTPATIENT)
Dept: ENDOCRINOLOGY CLINIC | Facility: CLINIC | Age: 73
End: 2023-07-13

## 2023-07-13 DIAGNOSIS — E11.65 TYPE 2 DIABETES MELLITUS WITH HYPERGLYCEMIA, WITH LONG-TERM CURRENT USE OF INSULIN (HCC): Primary | ICD-10-CM

## 2023-07-13 DIAGNOSIS — Z79.4 TYPE 2 DIABETES MELLITUS WITH HYPERGLYCEMIA, WITH LONG-TERM CURRENT USE OF INSULIN (HCC): Primary | ICD-10-CM

## 2023-07-13 PROCEDURE — 99211 OFF/OP EST MAY X REQ PHY/QHP: CPT | Performed by: INTERNAL MEDICINE

## 2023-07-13 RX ORDER — GLUCAGON INJECTION, SOLUTION 1 MG/.2ML
1 INJECTION, SOLUTION SUBCUTANEOUS ONCE AS NEEDED
Qty: 1 EACH | Refills: 0 | Status: SHIPPED | OUTPATIENT
Start: 2023-07-13 | End: 2023-07-13

## 2023-07-13 NOTE — PROGRESS NOTES
Dexcom G7 Start    Pt presents with daughter and grand-daughter    Start Time: 5:02pm  End Time: 5:46pm    Indication: Pt is T2D followed by Dr Jinny Kaplan. Pt reached out recently due to having issues with Dexcom G7. Appointment today to review Dexcom G7.     A1C: 8.9% (2023)    Current Diabetes Medication:  Tresiba 30 units daily (AM)  Humalog 14 units TID    Recent Blood Sugar:  Fastin mg/dl, 160 mg/dl, 90 mg/dl, 38 mg/dl, 50 mg/dl  Before lunch: mid 200's mg/dl  Before dinner: mid 200's- 300 mg/dl  Before bed: 190 mg/dl     Topics Discussed:  - Dexcom G7 not compatible with phone;  provider  - Reviewed Dexcom process and application  - Dexcom sensor applied on L arm and 4 digit sensor code entered  - Warm up period in process  - Reviewed alerts, calibrating, and logging information  - Reviewed contacting Dexcom if sensor fails for replacement    Plan:  - Dexcom G7 started while in office and  provided  - Daughter did not know she had to twist off cap from applicator  - Daughter successfully applied sensor on pt  - Of note, pt has been having variability in blood sugar  - Daughter stated pt has been taking multiple doses of Tresiba, Humalog, and sometimes not taking Humalog most likely contributing to variability  - Reviewed the severity of stacking insulin as demonstrated by low blood sugars  - Reviewed in length MOA of Tresiba and Humalog   --> Advised to always take Humalog when eating   --> If needed, could always create scale (ie if BG <100 take 12 units, if BG>200 take 16 units)  - Daughter will now be taking over care in regards to insulin administration  - Demonstrated Linsey Bishop and rx sent in  - Reviewed target blood sugars of  mg/dl based off of age  - Log book provided to daughter to track blood sugars    Follow Up:  Encouraged 3mo with Dr Harrie Bence

## 2023-07-19 ENCOUNTER — PATIENT OUTREACH (OUTPATIENT)
Dept: CASE MANAGEMENT | Age: 73
End: 2023-07-19

## 2023-07-31 ENCOUNTER — TELEPHONE (OUTPATIENT)
Dept: ENDOCRINOLOGY CLINIC | Facility: CLINIC | Age: 73
End: 2023-07-31

## 2023-07-31 RX ORDER — INSULIN LISPRO 100 [IU]/ML
14 INJECTION, SOLUTION INTRAVENOUS; SUBCUTANEOUS 3 TIMES DAILY
Qty: 38 ML | Refills: 0 | Status: SHIPPED | OUTPATIENT
Start: 2023-07-31

## 2023-07-31 RX ORDER — INSULIN DEGLUDEC 200 U/ML
30 INJECTION, SOLUTION SUBCUTANEOUS DAILY
Qty: 15 ML | Refills: 0 | Status: SHIPPED | OUTPATIENT
Start: 2023-07-31

## 2023-07-31 NOTE — TELEPHONE ENCOUNTER
Pt states she is out of insulin and pharmacy told her Humalog rx was denied - asking to talk to Rn asap

## 2023-07-31 NOTE — TELEPHONE ENCOUNTER
Called Brian to inquire on below message since RN does not see that office denied insulin. Per pharmacy Dr. Nnamdi Ervin denied the RX for humalog today. RN tried calling the 708 number from [facility] documentation. No answer and the voicemail was a man's voice. Contacted patient's daughter on [employer] and made her aware of above. She also states patient has 1 pen left of tresiba. She also reported that the last sensor they placed failed (error: signal fail/replace sensor) and tried to call an 855 number for replacement but the automated system said there was no record of patient getting Dexcom. RN provided another phone number to call and website to request replacement via online ([email or web address])    Refilled humalog and tresiba per protocol. Advised daughter to contact office if there were any issues picking up medication.

## 2023-08-02 ENCOUNTER — TELEPHONE (OUTPATIENT)
Dept: ENDOCRINOLOGY CLINIC | Facility: CLINIC | Age: 73
End: 2023-08-02

## 2023-08-02 DIAGNOSIS — Z79.4 TYPE 2 DIABETES MELLITUS WITH HYPERGLYCEMIA, WITH LONG-TERM CURRENT USE OF INSULIN (HCC): ICD-10-CM

## 2023-08-02 DIAGNOSIS — E11.65 TYPE 2 DIABETES MELLITUS WITH HYPERGLYCEMIA, WITH LONG-TERM CURRENT USE OF INSULIN (HCC): ICD-10-CM

## 2023-08-02 RX ORDER — BLOOD SUGAR DIAGNOSTIC
STRIP MISCELLANEOUS
Qty: 300 STRIP | Refills: 0 | Status: SHIPPED | OUTPATIENT
Start: 2023-08-02 | End: 2024-08-01

## 2023-08-02 NOTE — TELEPHONE ENCOUNTER
LOV: 5/25/23  RTC: 3 months    Spoke to pharmacist: Addi Mo for tresiba sent on 7/31/23 ready for patient pickup with $0 co-pay; RX sent for test strips (per pharmacist: test strips sent on 6/28/23 not picked up - needs DWO)  LM advising patient that tresiba is available for pickup and that we are working on getting test strips covered by Medicare - DWO completed for test strips - will have Dr. Scarlet Kelly sign (since Dr. Jerome Caldera is out of office until 8/7/23)

## 2023-08-03 ENCOUNTER — MED REC SCAN ONLY (OUTPATIENT)
Dept: ENDOCRINOLOGY CLINIC | Facility: CLINIC | Age: 73
End: 2023-08-03

## 2023-08-03 NOTE — TELEPHONE ENCOUNTER
Signed (by Dr. Zoey Galvez) [address] for test strips/lancets faxed to Rekha Christianson at [phone] and [phone]    Placed in brown accordion folder

## 2023-08-08 ENCOUNTER — TELEPHONE (OUTPATIENT)
Dept: ENDOCRINOLOGY CLINIC | Facility: CLINIC | Age: 73
End: 2023-08-08

## 2023-08-08 DIAGNOSIS — E55.9 VITAMIN D DEFICIENCY: ICD-10-CM

## 2023-08-08 NOTE — TELEPHONE ENCOUNTER
Pt daughter calling for refill on Contour test strips and Vit D 50,0000 to be sent to [facility]. The test strips sent on 8/2 are for the wrong monitor, and pharmacy states they did not receive Rx. Pharmacy informed pt Rx on 7/8 for Ergocalciferol was never received.

## 2023-08-09 RX ORDER — PERPHENAZINE 16 MG/1
TABLET, FILM COATED ORAL
Qty: 300 EACH | Refills: 0 | Status: SHIPPED | OUTPATIENT
Start: 2023-08-09 | End: 2023-08-09

## 2023-08-09 RX ORDER — PERPHENAZINE 16 MG/1
TABLET, FILM COATED ORAL
Qty: 300 EACH | Refills: 0 | Status: SHIPPED | OUTPATIENT
Start: 2023-08-09

## 2023-08-09 RX ORDER — ERGOCALCIFEROL 1.25 MG/1
50000 CAPSULE ORAL WEEKLY
Qty: 12 CAPSULE | Refills: 0 | Status: SHIPPED | OUTPATIENT
Start: 2023-08-09

## 2023-08-09 NOTE — TELEPHONE ENCOUNTER
Spoke to daughter, does not have Contour strips or Ergocalciferol. Spoke to pharmacy states the following:   CMN form needed for patient Contour, will resend Rx so that pharmacy can call [facility], and send CMN form. RN in [facility] will be looking out for form to be completed. Received Ergocalciferol- daughter aware    Daughter states Nirav Shay has been failing for patient, unable to check BG- G7 sample placed at  in [facility], daughter is aware.

## 2023-08-09 NOTE — TELEPHONE ENCOUNTER
Last refill 7/8/23. Please discard or approve if approve if appropriate.        3 Month Supply Pending  LOV  5/25/23

## 2023-08-10 NOTE — TELEPHONE ENCOUNTER
Spoke to pharmacy, stated they received CMN form from PCP office, states it will be filled later today. Pharmacy will notify patient.

## 2023-08-13 ENCOUNTER — HOSPITAL ENCOUNTER (EMERGENCY)
Facility: HOSPITAL | Age: 73
Discharge: HOME | End: 2023-08-14
Attending: EMERGENCY MEDICINE
Payer: MEDICARE

## 2023-08-13 DIAGNOSIS — S02.2XXA CLOSED FRACTURE OF NASAL BONE, INITIAL ENCOUNTER: Primary | ICD-10-CM

## 2023-08-13 DIAGNOSIS — R73.9 HYPERGLYCEMIA: ICD-10-CM

## 2023-08-13 PROCEDURE — 12011 RPR F/E/E/N/L/M 2.5 CM/<: CPT

## 2023-08-13 PROCEDURE — 99285 EMERGENCY DEPT VISIT HI MDM: CPT

## 2023-08-14 ENCOUNTER — APPOINTMENT (OUTPATIENT)
Dept: CT IMAGING | Facility: HOSPITAL | Age: 73
End: 2023-08-14
Attending: EMERGENCY MEDICINE
Payer: MEDICARE

## 2023-08-14 VITALS
DIASTOLIC BLOOD PRESSURE: 66 MMHG | HEIGHT: 60 IN | SYSTOLIC BLOOD PRESSURE: 162 MMHG | HEART RATE: 86 BPM | TEMPERATURE: 97 F | WEIGHT: 139 LBS | RESPIRATION RATE: 19 BRPM | BODY MASS INDEX: 27.29 KG/M2 | OXYGEN SATURATION: 97 %

## 2023-08-14 LAB
ANION GAP SERPL CALC-SCNC: 8 MMOL/L (ref 0–18)
BASOPHILS # BLD AUTO: 0.02 X10(3) UL (ref 0–0.2)
BASOPHILS NFR BLD AUTO: 0.2 %
BILIRUB UR QL: NEGATIVE
BUN BLD-MCNC: 26 MG/DL (ref 7–18)
BUN/CREAT SERPL: 24.1 (ref 10–20)
CALCIUM BLD-MCNC: 8.8 MG/DL (ref 8.5–10.1)
CHLORIDE SERPL-SCNC: 101 MMOL/L (ref 98–112)
CLARITY UR: CLEAR
CO2 SERPL-SCNC: 24 MMOL/L (ref 21–32)
COLOR UR: COLORLESS
CREAT BLD-MCNC: 1.08 MG/DL
DEPRECATED RDW RBC AUTO: 41.2 FL (ref 35.1–46.3)
EGFRCR SERPLBLD CKD-EPI 2021: 55 ML/MIN/1.73M2 (ref 60–?)
EOSINOPHIL # BLD AUTO: 0.12 X10(3) UL (ref 0–0.7)
EOSINOPHIL NFR BLD AUTO: 1.1 %
ERYTHROCYTE [DISTWIDTH] IN BLOOD BY AUTOMATED COUNT: 13.2 % (ref 11–15)
GLUCOSE BLD-MCNC: 440 MG/DL (ref 70–99)
GLUCOSE BLDC GLUCOMTR-MCNC: 348 MG/DL (ref 70–99)
GLUCOSE BLDC GLUCOMTR-MCNC: 403 MG/DL (ref 70–99)
GLUCOSE BLDC GLUCOMTR-MCNC: 433 MG/DL (ref 70–99)
GLUCOSE UR-MCNC: >1000 MG/DL
HCT VFR BLD AUTO: 38.8 %
HGB BLD-MCNC: 12.8 G/DL
HGB UR QL STRIP.AUTO: NEGATIVE
IMM GRANULOCYTES # BLD AUTO: 0.04 X10(3) UL (ref 0–1)
IMM GRANULOCYTES NFR BLD: 0.4 %
KETONES UR-MCNC: NEGATIVE MG/DL
LEUKOCYTE ESTERASE UR QL STRIP.AUTO: NEGATIVE
LYMPHOCYTES # BLD AUTO: 1.85 X10(3) UL (ref 1–4)
LYMPHOCYTES NFR BLD AUTO: 17.4 %
MCH RBC QN AUTO: 27.9 PG (ref 26–34)
MCHC RBC AUTO-ENTMCNC: 33 G/DL (ref 31–37)
MCV RBC AUTO: 84.5 FL
MONOCYTES # BLD AUTO: 0.65 X10(3) UL (ref 0.1–1)
MONOCYTES NFR BLD AUTO: 6.1 %
NEUTROPHILS # BLD AUTO: 7.97 X10 (3) UL (ref 1.5–7.7)
NEUTROPHILS # BLD AUTO: 7.97 X10(3) UL (ref 1.5–7.7)
NEUTROPHILS NFR BLD AUTO: 74.8 %
NITRITE UR QL STRIP.AUTO: NEGATIVE
OSMOLALITY SERPL CALC.SUM OF ELEC: 300 MOSM/KG (ref 275–295)
PH UR: 6 [PH] (ref 5–8)
PLATELET # BLD AUTO: 227 10(3)UL (ref 150–450)
POTASSIUM SERPL-SCNC: 4.2 MMOL/L (ref 3.5–5.1)
PROT UR-MCNC: NEGATIVE MG/DL
RBC # BLD AUTO: 4.59 X10(6)UL
SODIUM SERPL-SCNC: 133 MMOL/L (ref 136–145)
SP GR UR STRIP: 1.01 (ref 1–1.03)
UROBILINOGEN UR STRIP-ACNC: NORMAL
WBC # BLD AUTO: 10.7 X10(3) UL (ref 4–11)

## 2023-08-14 PROCEDURE — 81003 URINALYSIS AUTO W/O SCOPE: CPT | Performed by: EMERGENCY MEDICINE

## 2023-08-14 PROCEDURE — 80048 BASIC METABOLIC PNL TOTAL CA: CPT | Performed by: EMERGENCY MEDICINE

## 2023-08-14 PROCEDURE — 82962 GLUCOSE BLOOD TEST: CPT

## 2023-08-14 PROCEDURE — 70450 CT HEAD/BRAIN W/O DYE: CPT | Performed by: EMERGENCY MEDICINE

## 2023-08-14 PROCEDURE — 70486 CT MAXILLOFACIAL W/O DYE: CPT | Performed by: EMERGENCY MEDICINE

## 2023-08-14 PROCEDURE — 85025 COMPLETE CBC W/AUTO DIFF WBC: CPT | Performed by: EMERGENCY MEDICINE

## 2023-08-14 PROCEDURE — 96360 HYDRATION IV INFUSION INIT: CPT

## 2023-08-14 NOTE — ED INITIAL ASSESSMENT (HPI)
Pt from home to ED via triage for evaluation following a mechanical fall. Pt states she fell from a standing position, tripped over her shoe. Pt landed on concrete, pt with lac to bridge of nose and swelling to nose and lips. Unk loc.

## 2023-08-14 NOTE — ED PROVIDER NOTES
Pt signed out to me by Dr Elias Freeman to f/u on CT and labs. CTs as below with nasal bone fracture, otherwise no other traumatic injury noted. Her glucose was elevated on arrival, previous ER MD gave 2L NS and repeat glucose is 348. I gave her fast-acting insulin 6 units. Pt states she usually takes more, will recheck her bs when she gets home. She does not have signs of acidosis. Pt ambulatory in the room. Pt otherwise well-appearing, advised of CT and labs findings. Pt also requesting dental f/u. NONCONTRAST CT HEAD AND FACIAL BONES:    Compared to 3/18/2023    IMPRESSION:  HEAD:  No acute intracranial abnormality. No acute bleed, hydrocephalus or shift of midline structures. Unchanged moderate chronic white matter changes and mild volume loss. FACIAL BONES:  Bilateral nondisplaced nasal bone fractures with mild overlying soft tissue thickening. The globes are grossly intact. No evidence of retro-bulbar  hematoma. Mild mucosal thickening of paranasal sinuses. Mandibular teeth periapical lucencies noted.

## 2023-08-14 NOTE — DISCHARGE INSTRUCTIONS
Please follow with your endocrinologist regarding your glucose levels. You sustained a head injury today. After a head injury you are always at risk for delayed brain bleed. Return to ER for any changes in mentation, significant headaches, weakness, numbness, losing stool/urine on yourself. The Emergency Department is not intended to be a substitute for an effort to provide complete medical care. The imaging, if any, have often been interpreted on a preliminary basis pending final reading by the radiologist. If your blood pressure was greater than 140/90, please have this blood pressure rechecked by your primary care provider michele the next few days. You will benefit from a further discussion of lifestyle modifications that include Weight Reduction - Dietary Sodium Restriction - Increased Physical Activity and Moderation in alcohol (ETOH) Consumption. If possible check your pressure at home and keep a blood pressure log to bring to your physician.

## 2023-08-14 NOTE — ED QUICK NOTES
Pt in stable condition. Pt is A&O x 4 speaking in complete coherent sentences. RR even and unlabored. Pt educated on discharge paper work and able to verbally teach back.  Pt denies any furher questions or concerns and was take out of ED by family in wheel chair per request.

## 2023-08-21 ENCOUNTER — OFFICE VISIT (OUTPATIENT)
Dept: ENDOCRINOLOGY CLINIC | Facility: CLINIC | Age: 73
End: 2023-08-21

## 2023-08-21 ENCOUNTER — TELEPHONE (OUTPATIENT)
Dept: ENDOCRINOLOGY CLINIC | Facility: CLINIC | Age: 73
End: 2023-08-21

## 2023-08-21 ENCOUNTER — OFFICE VISIT (OUTPATIENT)
Dept: OTOLARYNGOLOGY | Facility: CLINIC | Age: 73
End: 2023-08-21

## 2023-08-21 VITALS — BODY MASS INDEX: 27.29 KG/M2 | HEIGHT: 60 IN | TEMPERATURE: 98 F | WEIGHT: 139 LBS

## 2023-08-21 VITALS
HEART RATE: 76 BPM | HEIGHT: 60 IN | SYSTOLIC BLOOD PRESSURE: 141 MMHG | WEIGHT: 138.81 LBS | DIASTOLIC BLOOD PRESSURE: 59 MMHG | BODY MASS INDEX: 27.25 KG/M2

## 2023-08-21 DIAGNOSIS — K08.9 POOR DENTITION: ICD-10-CM

## 2023-08-21 DIAGNOSIS — S02.2XXA CLOSED FRACTURE OF NASAL BONE, INITIAL ENCOUNTER: Primary | ICD-10-CM

## 2023-08-21 DIAGNOSIS — E78.5 DYSLIPIDEMIA: Primary | ICD-10-CM

## 2023-08-21 DIAGNOSIS — Z79.4 TYPE 2 DIABETES MELLITUS WITH HYPERGLYCEMIA, WITH LONG-TERM CURRENT USE OF INSULIN (HCC): ICD-10-CM

## 2023-08-21 DIAGNOSIS — I10 PRIMARY HYPERTENSION: ICD-10-CM

## 2023-08-21 DIAGNOSIS — E11.65 TYPE 2 DIABETES MELLITUS WITH HYPERGLYCEMIA, WITH LONG-TERM CURRENT USE OF INSULIN (HCC): ICD-10-CM

## 2023-08-21 DIAGNOSIS — E11.65 UNCONTROLLED TYPE 2 DIABETES MELLITUS WITH HYPERGLYCEMIA (HCC): ICD-10-CM

## 2023-08-21 DIAGNOSIS — J34.89 NASAL SEPTAL PERFORATION: ICD-10-CM

## 2023-08-21 LAB
CARTRIDGE LOT#: ABNORMAL NUMERIC
GLUCOSE BLOOD: 258
HEMOGLOBIN A1C: 8.2 % (ref 4.3–5.6)
TEST STRIP LOT #: NORMAL NUMERIC

## 2023-08-21 PROCEDURE — 83036 HEMOGLOBIN GLYCOSYLATED A1C: CPT | Performed by: INTERNAL MEDICINE

## 2023-08-21 PROCEDURE — 82947 ASSAY GLUCOSE BLOOD QUANT: CPT | Performed by: INTERNAL MEDICINE

## 2023-08-21 PROCEDURE — 99214 OFFICE O/P EST MOD 30 MIN: CPT | Performed by: INTERNAL MEDICINE

## 2023-08-21 RX ORDER — INSULIN DEGLUDEC 200 U/ML
36 INJECTION, SOLUTION SUBCUTANEOUS DAILY
Qty: 15 ML | Refills: 0 | Status: SHIPPED | OUTPATIENT
Start: 2023-08-21 | End: 2023-08-21

## 2023-08-21 RX ORDER — SEMAGLUTIDE 0.68 MG/ML
INJECTION, SOLUTION SUBCUTANEOUS
Qty: 3 ML | Refills: 0 | Status: SHIPPED | OUTPATIENT
Start: 2023-08-21 | End: 2023-10-05

## 2023-08-21 RX ORDER — PERPHENAZINE 16 MG/1
TABLET, FILM COATED ORAL
Qty: 300 EACH | Refills: 0 | Status: SHIPPED | OUTPATIENT
Start: 2023-08-21

## 2023-08-21 RX ORDER — BLOOD SUGAR DIAGNOSTIC
STRIP MISCELLANEOUS
Qty: 300 STRIP | Refills: 0 | Status: CANCELLED | OUTPATIENT
Start: 2023-08-21 | End: 2024-08-20

## 2023-08-21 RX ORDER — PEN NEEDLE, DIABETIC 30 GX3/16"
1 NEEDLE, DISPOSABLE MISCELLANEOUS 4 TIMES DAILY
Qty: 400 EACH | Refills: 0 | Status: SHIPPED | OUTPATIENT
Start: 2023-08-21

## 2023-08-21 RX ORDER — INSULIN DEGLUDEC 200 U/ML
36 INJECTION, SOLUTION SUBCUTANEOUS DAILY
Qty: 36 ML | Refills: 1 | Status: SHIPPED | OUTPATIENT
Start: 2023-08-21 | End: 2023-11-19

## 2023-08-21 RX ORDER — INSULIN LISPRO 100 [IU]/ML
18 INJECTION, SOLUTION INTRAVENOUS; SUBCUTANEOUS 3 TIMES DAILY
Qty: 51 ML | Refills: 1 | Status: SHIPPED | OUTPATIENT
Start: 2023-08-21 | End: 2023-11-19

## 2023-08-21 NOTE — PROGRESS NOTES
Name: Sharon Braun  Date: 8/21/23    Referring Physician: No ref. provider found    HISTORY OF PRESENT ILLNESS   Sharon Braun is a 71year old female who presents for evaluation and management of type 2 diabetes. She was diagnosed with diabetes about 19 years ago. At the last visit, we saw that she was not taking her insulin regularly, and so asked her to at least start taking her insulin and we would lower her doses. I felt that patient was on too high of doses and therefore was not taking them regularly to prevent hypoglycemia. I had tried to decrease her doses, but she had been admitted to the hospital and her doses were increased. At the last visit, I had stopped her humalog and started her on Trulicity 4.22AV and continued her Tresiba 36 units. She had been doing well on this, but cannot afford the Trulicity due to the donut hole. We were also able to get the [facility] for her. She restarted her humalog at 14 units.     Blood Glucose Today: 258 today  HbA1C or glycohemoglobin is 8.2 today (and it was 8.8 on 5/25/23 and it was 12.5 on 3/19/23 and it was 9.4 on 8/10/22 and it was 12 on 6/30/22 and it was 13 on 4/19/22)  Type 1 or Type 2?: Type 2  Medications for DM: Tresiba 36 units qAM; Humalog 14 units with meals  Checking blood sugars 3-4 times a day  Blood sugars are always in the two hundreds  Dietary compliance: fair    Exercise: everyday, physical therapy    Polyuria/polydipsia: none    Blurred vision: none    Flu Vaccine This Season: yes    Covid Vaccine: overdue for booster    REVIEW OF SYSTEMS  CV: Cardiovascular disease present: none         Hypertension present: yes, on meds, at goal         Hyperlipidemia present: not at goal, on meds (5/25/23)         Peripheral Vascular Disease present: none    : Nephropathy present: MAC: 120.7 (4/19/22) Creatinine: 0.89 (5/25/23)     Neuro: Neuropathy present: none    Eyes: Diabetic retinopathy present: DM Retinopathy            Most recent visit to eye doctor in last 12 months: sees eye doctor regularly    Skin: Infection or ulceration: none    Osteoporosis/ Osteopenia: Scheduled for July Vitamin D: 21.3 (5/25/23)    Thyroid disease: none TSH: 4.010 (4/19/22)    Medications:     Current Outpatient Medications:     Glucose Blood (CONTOUR NEXT TEST) In Vitro Strip, Check BG 3x daily DX code:E11.65, Disp: 300 each, Rfl: 0    Insulin Pen Needle (PEN NEEDLES) 32G X 4 MM Does not apply Misc, 1 pen  4 (four) times daily. Use  New pen needle  With each injection, Disp: 400 each, Rfl: 0    Insulin Degludec (TRESIBA FLEXTOUCH) 200 UNIT/ML Subcutaneous Solution Pen-injector, Inject 36 Units/day into the skin daily. , Disp: 36 mL, Rfl: 1    ergocalciferol 1.25 MG (15956 UT) Oral Cap, Take 1 capsule (50,000 Units total) by mouth once a week., Disp: 12 capsule, Rfl: 0    Glucose Blood (ACCU-CHEK FITO PLUS) In Vitro Strip, Use as directed to check blood glucose 3 times/day - DX code:E11.65 using insulin, Disp: 300 strip, Rfl: 0    Insulin Lispro, 1 Unit Dial, (HUMALOG KWIKPEN) 100 UNIT/ML Subcutaneous Solution Pen-injector, Inject 14 Units into the skin in the morning, at noon, and at bedtime. , Disp: 38 mL, Rfl: 0    simvastatin 20 MG Oral Tab, Take 1 tablet (20 mg total) by mouth daily. , Disp: 90 tablet, Rfl: 2    sertraline 100 MG Oral Tab, Take 1 tablet (100 mg total) by mouth daily. , Disp: 90 tablet, Rfl: 2    losartan 50 MG Oral Tab, Take 0.5 tablets (25 mg total) by mouth daily. , Disp: 90 tablet, Rfl: 2    pregabalin 50 MG Oral Cap, Take 1 capsule (50 mg total) by mouth 2 (two) times daily. , Disp: 60 capsule, Rfl: 0     Allergies:   No Known Allergies    Social History:   Social History     Socioeconomic History    Marital status:     Tobacco Use    Smoking status: Never    Smokeless tobacco: Never   Vaping Use    Vaping Use: Never used   Substance and Sexual Activity    Alcohol use: Yes     Comment: very infrequent, every couple of months    Drug use: Never   Social Determinants of Health  Financial Resource Strain: Low Risk  (2/16/2023)      Financial Resource Strain          Difficulty of Paying Living Expenses: Not very hard          Med Affordability: No  Food Insecurity: No Food Insecurity (2/16/2023)      Food Insecurity          Food Insecurity: Never true  Transportation Needs: No Transportation Needs (2/16/2023)      Transportation Needs          Lack of Transportation: No  Physical Activity: Inactive (2/16/2023)      Exercise Vital Sign          Days of Exercise per Week: 0 days          Minutes of Exercise per Session: 0 min  Stress: No Stress Concern Present (2/16/2023)      Stress          Feeling of Stress : No  Social Connections: Socially Integrated (2/16/2023)      Social Connections          Frequency of Socialization with Friends and Family: 3  Housing Stability: Low Risk  (2/16/2023)      Housing Stability          Housing Instability: No    Medical History:   Past Medical History:   Diagnosis Date    Arthritis     Back problem     Depression     Diabetes ([address]     Essential hypertension     High blood pressure     High cholesterol     Visual impairment        Surgical history:   History reviewed. No pertinent surgical history. PHYSICAL EXAM   08/21/23  1133   BP: 141/59   Pulse: 76   Weight: 138 lb 12.8 oz (63 kg)   Height: 5' (1.524 m)         General Appearance:  alert, well developed, in no acute distress  Eyes:  normal conjunctivae, sclera. , normal sclera and normal pupils  Psychiatric:  oriented to time, self, and place  Nutritional:  no abnormal weight gain or loss    Lab Data:   Lab Results   Component Value Date     (H) 03/19/2023    A1C 8.2 (A) 08/21/2023     Lab Results   Component Value Date     (H) 08/14/2023    BUN 26 (H) 08/14/2023    BUNCREA 24.1 (H) 08/14/2023    CREATSERUM 1.08 (H) 08/14/2023    ANIONGAP 8 08/14/2023    GFRNAA 42 (L) 04/19/2022    GFRAA 48 (L) 04/19/2022    CA 8.8 08/14/2023    OSMOCALC 300 (H) 08/14/2023 ALKPHO 67 05/25/2023    AST 34 05/25/2023    ALT 31 05/25/2023    BILT 0.2 05/25/2023    TP 8.0 05/25/2023    ALB 3.7 05/25/2023    GLOBULIN 4.3 05/25/2023     (L) 08/14/2023    K 4.2 08/14/2023     08/14/2023    CO2 24.0 08/14/2023     Lab Results   Component Value Date    CHOLEST 154 05/25/2023    TRIG 96 05/25/2023    HDL 35 (L) 05/25/2023     (H) 05/25/2023    VLDL 16 05/25/2023    NONHDLC 119 05/25/2023     Lab Results   Component Value Date    MALBP 14.00 04/19/2022    CREUR 41.70 03/18/2023       ASSESSMENT/PLAN:  This is a 71year-old woman here for evaluation and management of uncontrolled type 2 diabetes. We discussed the ABCs of DM.     1.) Hyperglycemia Management- We discussed the importance of glycemic control to prevent complications of diabetes. We discussed the importance of SBGM. I offered and provided patient education materials and offered a blood glucose log book. - Continue Tresiba to 36 units every morning; Increase Humalog from 14 to 18 units tid with meals  - I will prescribe Ozempic 0.25mg qweekly for patient  - Continue checking blood sugars fasting and before meals    2.) Management of Diabetic Complications- We discussed the complications of diabetes include retinopathy, neuropathy, nephropathy and cardiovascular disease. - Ophtho- up to date  - Flu and Covid vaccine- up to date, except for booster  - BP- at goal, on meds  - Lipids- not at goal, started on meds, check in 3 months  - MAC- elevated, will monitor, check in 3 months  - CMP- up to date, creatinine elevated, check in 3 months  - Neuropathy- none  - CAD- none    3.) Lifestyle Management for Diabetes- We discussed importance of a low CHO diet, and recommend 45gm per meal or 135gm per day. We discussed the importance of trying to follow a Mediterranean diet, with an emphasis on vegetables at every meal, with lots whole grains, and protein from either plant-based sources, or poultry and fish.    - Diet- I have given her 'the diabetes plate' handout  - Exercise- she will work on this    Return to clinic in 3 months    Prior to this encounter, I spent over 15 minutes with preparing for the visit, including reviewing documents from other specialties as well as from PCP and going over test results. During the face to face encounter, I spent an additional 15 minutes which were determined for follow-up. Greater than 50% of the time was spent in counseling, anticipatory guidance, and coordination of care. Patient concerns were answered to the best of my knowledge. 8/21/23  Martha Mantilla MD

## 2023-08-22 ENCOUNTER — TELEPHONE (OUTPATIENT)
Dept: ENDOCRINOLOGY CLINIC | Facility: CLINIC | Age: 73
End: 2023-08-22

## 2023-08-22 ENCOUNTER — PATIENT OUTREACH (OUTPATIENT)
Dept: CASE MANAGEMENT | Age: 73
End: 2023-08-22

## 2023-08-22 DIAGNOSIS — E11.65 TYPE 2 DIABETES MELLITUS WITH HYPERGLYCEMIA, WITH LONG-TERM CURRENT USE OF INSULIN (HCC): ICD-10-CM

## 2023-08-22 DIAGNOSIS — Z79.4 TYPE 2 DIABETES MELLITUS WITH HYPERGLYCEMIA, WITH LONG-TERM CURRENT USE OF INSULIN (HCC): ICD-10-CM

## 2023-08-22 DIAGNOSIS — E78.5 DYSLIPIDEMIA: Primary | ICD-10-CM

## 2023-08-22 DIAGNOSIS — M15.3 POST-TRAUMATIC OSTEOARTHRITIS OF MULTIPLE JOINTS: ICD-10-CM

## 2023-08-22 NOTE — H&P
Spoke to [place] for CCM. Updates to patient care team/comments: UTD  Patient reported changes in medications: none  Med Adherence  Comment: States she has not checked her BS in three weeks. She is having problems with insurance coverage,. Health Maintenance: Reviewed with patient. DEXA Scan Never done  COVID-19 Vaccine(3 - Moderna series) due on 06/10/2021  Mammogram due on 08/21/2021  Colorectal Cancer Screening due on 02/22/2022  Diabetes Care: Microalb/Creat Ratio due on 04/19/2023  HTN: BP Follow-Up due on 09/21/2023  Influenza Vaccine(1) due on 10/01/2023  Diabetes Care A1C due on 11/21/2023  Annual Physical due on 01/12/2024  Diabetes Care Dilated Eye Exam due on 03/29/2024  Diabetes Care: GFR due on 08/14/2024  Annual Depression Screening Completed  Fall Risk Screening (Annual) Completed  Diabetes Care Foot Exam (Annual) Completed  Pneumococcal Vaccine: 65+ Years Completed  Zoster Vaccines Completed    Patient updates/concerns:     Pt states daughter manages everything for her. States the best time to call is after 4:00 pm once daughter leaves her home. Pt prefers to be called on her cell number for CCM calls. Reports fracturing nasal bones after a fall in her home. States her BS were very high and may have contributed to fall. She is seeing Dr. Giles Sims and [facility] for management. She is working on cutting back on carb intake. States she has cut back on portions, daughter gets frustrated with patient because she skips meals. Pt inquired about eating raw meat. States she has done this since she was in her mid 19's. Reports eating a handful of raw ground beef or beef chunks every so often. Last time she did this was two weeks ago. She ate a handful of raw meat. States she enjoys the taste and has never became ill because if it. She is pondering why she likes it so much. Dexcom sensors are not being covered by her insurance.  States he does not have any strips to check BS at home co-pay is $30. HGBA1C:    Lab Results   Component Value Date    A1C 8.2 (A) 08/21/2023    A1C 8.9 (A) 05/25/2023    A1C 12.5 (H) 03/19/2023     (H) 03/19/2023       Goals/Action Plan: Active goal from previous outreach:   Unsteady gait due post heart attack   Patient reported progress towards goals: None, states she is not very active. Feels unmotivated. Dr. Sadia Duenas referred her to see Dr. Nikita Tariq, reports forgeting she was advised to see him. NEW GOAL: Follow up with Daughter about scheduling appointment with Dr. Nikita Tariq                - What: F/u on unsteady gait, improve BS and stop skipping meals           - When: as soon as possible           - How: by scheduling appointment with Nikita Tariq, eating balanced meals  and getting a BS Meter           - How Often: n/a            - Where: n/a  Patient Reported Barriers and Concerns: Recent fall,  fracture, high BS                    - Plan for overcoming barriers: Pt will work with daughter ENT AND ENDO on recovering and scheduling appointment with Nikita Tariq. Care Managers Interventions: Message added to active TE dated 8/22 for Dr. Abdifatah Murphy. Dicussed at length the importance of eating balanced meals, not skipping meals. Staying hydrated and exercising daily. Advised on the dangers of eating raw meats. Raw meat may contain harmful bacteria including Salmonella, Listeria, Campylobacter and E. coli that can cause food poisoning. These bacteria are destroyed when meat is correctly cooked. Future Appointments:   Future Appointments   Date Time Provider Jai Luong   11/21/2023  1:30 PM Gillian Pavon MD [facility] [place] [place] Care Manager Follow Up Date: One- two weeks    Reason For Follow Up: review progress and or barriers towards patient's goals. Time Spent This Encounter Total:  29 min medical record review, telephone communication, care plan updates where needed, education, goals, and action plan recreation/update. Provided acknowledgment and validation to patient's concerns.    Monthly Minute Total including today: 29  Physical assessment, complete health history, and need for CCM established by Shine Josue MD.

## 2023-08-22 NOTE — TELEPHONE ENCOUNTER
Pt states she is having problems with [facility]. She is being told Dexcom is not covered and copy for strips is $30 she is not able to afford it. Pt is asking if authorization needs to be put through insurance for coverage. Please advise patient she is feeling frustrated.

## 2023-08-22 NOTE — TELEPHONE ENCOUNTER
Patients daughter is calling states that she does not have any strips to check her sugar.  Pharmacy told family that they need chart notes       Medication Quantity Refills Start End   Glucose Blood (CONTOUR NEXT TEST) In Vitro Strip 300 each 0 8/21/2023    Sig:   Check BG 3x daily DX code:E11.65     Route:   (none)     Order #:   [id number]

## 2023-08-22 NOTE — TELEPHONE ENCOUNTER
Called [facility] in [facility] - confirmed with pharmacist that they do need chart notes faxed to them. RN faxed chart notes electronically to [phone]. Patient inquired about prescription for Dexcom and if sent to pharmacy. Not sent. I advised patient's daughter that since patient has medicare, she will most likely have to go through DME. Patient's daughter stated that patient has used [facility] in the past. I provided contact number and advised her to call to follow up on a refill.  Yesterdays chart notes have been faxed to [facility] electronically [phone]

## 2023-08-22 NOTE — TELEPHONE ENCOUNTER
HI!  Please look into this. Please find out which test strips are covered for and why the Dexcom is not covered. Thank you!

## 2023-09-05 ENCOUNTER — TELEPHONE (OUTPATIENT)
Dept: ENDOCRINOLOGY CLINIC | Facility: CLINIC | Age: 73
End: 2023-09-05

## 2023-09-05 NOTE — TELEPHONE ENCOUNTER
Dr. Marcia Petersen to patient's daughter who is going to bring patient in to clinic tomorrow for a [facility] professional sensor. I advised patient's daughter to have patient try to eat consistent meals high in protein and low in carbs as well as take insulin doses and check blood sugars as instructed. Patient's daughter will bring food log and blood sugar log with her to appointment tomorrow. Please advise when you would like patient to return to clinic for download after sensor is placed and who you want patient to see for download.

## 2023-09-05 NOTE — TELEPHONE ENCOUNTER
Hi!  Can we have patient come in for a Yasmany placement and ask her to keep track of her dietary intake and insulin dosing? Thank you!

## 2023-09-05 NOTE — TELEPHONE ENCOUNTER
Daughter states pt blood sugar levels are fluctuating please call currently blood sugar is at 364 attempting to call high priority RN

## 2023-09-05 NOTE — TELEPHONE ENCOUNTER
Dr. Mahesh Real to patient who stated that patient's fasting blood sugar today was 60 - patient had two glucose tablets - and 15 minutes later blood sugar is now at 365. Patient's daughter stated that they are having issues with insurance covering Dexcom through DME and getting strips through pharmacy - patient's daughter stated they are requesting for chart notes that have been faxed previously, RN refaxed chart notes to these companies. [facility] fax: [phone], [facility]: [phone]     Patient's daughter stated that blood sugars have been fluctuating through out the day - blood sugars are still mostly high in the 2-300's. Hyperglycemia     Onset of hyperglycemia: All day - states since LOV blood sugars have been high    BG levels (please print out CGM and/or pump report if patient is wearing one):   9/5 BG 60 fasting --> after correcting with glucose tablets (2) 341  9/4 9:30 pm 226; 257 at 5:20 pm; 3:05 pm 251  9/3 164 afternoon, morning fasting 280  9/2 198 at 7 pm, 202 1 pm  9/1 11 pm 123; 7:30 pm 164; 9:30 am 199     Symptoms (RN only: N/V, abdominal pain and/or radiating to the back, blurry vision, increased urinary frequency, blurred vision, CP, SOB): Frequent urination - denies all other symptoms    Pattern of hyperglycemia: Throughout the day    Steroid therapy: None    Acute Illness: None    Change in Diet: No changes in diet per patient daughter    List DM Medications/Compliance:-   Continue Tresiba to 36 units every morning;    Increase Humalog from 14 to 18 units tid with meals  Ozempic 0.25mg qweekly for patient --> patient not able to get due to high deductible cost

## 2023-09-06 ENCOUNTER — LAB ENCOUNTER (OUTPATIENT)
Dept: LAB | Facility: HOSPITAL | Age: 73
End: 2023-09-06
Attending: INTERNAL MEDICINE
Payer: MEDICARE

## 2023-09-06 ENCOUNTER — NURSE ONLY (OUTPATIENT)
Dept: ENDOCRINOLOGY CLINIC | Facility: CLINIC | Age: 73
End: 2023-09-06

## 2023-09-06 DIAGNOSIS — E11.65 TYPE 2 DIABETES MELLITUS WITH HYPERGLYCEMIA, WITH LONG-TERM CURRENT USE OF INSULIN (HCC): ICD-10-CM

## 2023-09-06 DIAGNOSIS — E55.9 VITAMIN D DEFICIENCY: ICD-10-CM

## 2023-09-06 DIAGNOSIS — Z79.4 TYPE 2 DIABETES MELLITUS WITH HYPERGLYCEMIA, WITH LONG-TERM CURRENT USE OF INSULIN (HCC): ICD-10-CM

## 2023-09-06 DIAGNOSIS — Z79.4 TYPE 2 DIABETES MELLITUS WITH HYPERGLYCEMIA, WITH LONG-TERM CURRENT USE OF INSULIN (HCC): Primary | ICD-10-CM

## 2023-09-06 DIAGNOSIS — E11.65 TYPE 2 DIABETES MELLITUS WITH HYPERGLYCEMIA, WITH LONG-TERM CURRENT USE OF INSULIN (HCC): Primary | ICD-10-CM

## 2023-09-06 DIAGNOSIS — E78.5 DYSLIPIDEMIA: ICD-10-CM

## 2023-09-06 LAB
ALBUMIN SERPL-MCNC: 3.7 G/DL (ref 3.4–5)
ALBUMIN/GLOB SERPL: 0.9 {RATIO} (ref 1–2)
ALP LIVER SERPL-CCNC: 74 U/L
ALT SERPL-CCNC: 29 U/L
ANION GAP SERPL CALC-SCNC: 7 MMOL/L (ref 0–18)
AST SERPL-CCNC: 26 U/L (ref 15–37)
BILIRUB SERPL-MCNC: 0.4 MG/DL (ref 0.1–2)
BUN BLD-MCNC: 22 MG/DL (ref 7–18)
BUN/CREAT SERPL: 21.6 (ref 10–20)
CALCIUM BLD-MCNC: 9.5 MG/DL (ref 8.5–10.1)
CHLORIDE SERPL-SCNC: 104 MMOL/L (ref 98–112)
CHOLEST SERPL-MCNC: 141 MG/DL (ref ?–200)
CO2 SERPL-SCNC: 28 MMOL/L (ref 21–32)
CREAT BLD-MCNC: 1.02 MG/DL
EGFRCR SERPLBLD CKD-EPI 2021: 59 ML/MIN/1.73M2 (ref 60–?)
FASTING PATIENT LIPID ANSWER: YES
FASTING STATUS PATIENT QL REPORTED: YES
GLOBULIN PLAS-MCNC: 4.3 G/DL (ref 2.8–4.4)
GLUCOSE BLD-MCNC: 151 MG/DL (ref 70–99)
HDLC SERPL-MCNC: 35 MG/DL (ref 40–59)
LDLC SERPL CALC-MCNC: 81 MG/DL (ref ?–100)
NONHDLC SERPL-MCNC: 106 MG/DL (ref ?–130)
OSMOLALITY SERPL CALC.SUM OF ELEC: 294 MOSM/KG (ref 275–295)
POTASSIUM SERPL-SCNC: 4.3 MMOL/L (ref 3.5–5.1)
PROT SERPL-MCNC: 8 G/DL (ref 6.4–8.2)
SODIUM SERPL-SCNC: 139 MMOL/L (ref 136–145)
TRIGL SERPL-MCNC: 139 MG/DL (ref 30–149)
VIT D+METAB SERPL-MCNC: 108.7 NG/ML (ref 30–100)
VLDLC SERPL CALC-MCNC: 22 MG/DL (ref 0–30)

## 2023-09-06 PROCEDURE — 80053 COMPREHEN METABOLIC PANEL: CPT

## 2023-09-06 PROCEDURE — 36415 COLL VENOUS BLD VENIPUNCTURE: CPT

## 2023-09-06 PROCEDURE — 82306 VITAMIN D 25 HYDROXY: CPT

## 2023-09-06 PROCEDURE — 80061 LIPID PANEL: CPT

## 2023-09-06 PROCEDURE — 95250 CONT GLUC MNTR PHYS/QHP EQP: CPT | Performed by: INTERNAL MEDICINE

## 2023-09-06 NOTE — PROGRESS NOTES
Patient came in for [employer] professional placement per Dr. Frank Keller request. RN applied professional sensor to patient's arm - tolerated well. RN placed sticky tack and overpatch to keep don sensor in place. Patient to return to office on 9/19 to meet with [facility] for download. Patient left clinic with working sensor. RN advised patient to keep food log and blood sugar/insulin log and bring to next appointment.

## 2023-09-18 ENCOUNTER — TELEPHONE (OUTPATIENT)
Dept: ENDOCRINOLOGY CLINIC | Facility: CLINIC | Age: 73
End: 2023-09-18

## 2023-09-18 NOTE — PROGRESS NOTES
Continuous Glucose Monitor Download    Start Time: 1:07pm  End Time: 1:57pm    Indication: Pt is T2D followed by Dr Carol Rodriguez. Pt reached out recently due to have fluctuating blood sugars. Pt then had a LibrePro paced on. Appointment today for download. A1c: 8.2% (8/21/2023)    Current Diabetes Medication:  Tresiba 36 units daily (AM)  Humalog 18 units TID  (Has not been able to start Ozempic d/t high deductible)    Sensor Type: LibrePro    Reviewed CGMS download and patient logs:  Days of sensor use: 14 days 9/6/23-9/19/23  Average glucose (mg/dL): 156 mg/dl  Estimated GMI: 7.0%  Standard deviation =/- 39.7 (mg/dL)  Target Ranges:  mg/dL          60% of time in range  8% of time below range (4% low, 4% very low)  32% of time above range      Interpretation:  - Blood sugar relatively steady overnight with a fasting average in 140's mg/dl  - Variability around meal time resulting in target range, low blood sugar, and high blood sugars. Noted on all meals    Recommended medication changes/Plan:  - Reviewed report with pt and daughter which demonstrates improved blood sugars overall but variability around meal time most likely related to inconsistent carbohydrates per meal; will plan to create small carb and normal/large carb meal doses  - Discussed that pt is eligible to get Dexcom G7 sensors through [facility] but there is a balance. Daughter states that this is incorrect and it is due to the pt having another  within 5 years and that additional paperwork is needed. Refer to TE 8/21/2023  - Report reviewed with Dr Carol Rodriguez and time of visit. Placed in folder for final review  - Check blood sugar before meals while not using CGM sensor.  Reach out with patterns of high/low blood sugar  - Medication adjustments   Tresiba 36 units daily (afternoon)   Humalog    Small carb meals: 14 units     Ie: Eggs with sausage and small bowl of fruit; carnitas (1-2) for breakfast    Large carb meals: 18-20 units  Ie: Tortillas (3); Pashto bread; rice; beans; desserts; pancakes    Updated Diabetes Medication:  Tresiba 36 units daily (afternoon)  Humalog   Small carb meals: 14 units   Large carb meals: 18-20 units    Follow up:  11/21/2023 Dr Odonnell Needs

## 2023-09-19 ENCOUNTER — NURSE ONLY (OUTPATIENT)
Dept: ENDOCRINOLOGY CLINIC | Facility: CLINIC | Age: 73
End: 2023-09-19

## 2023-09-19 DIAGNOSIS — E11.65 TYPE 2 DIABETES MELLITUS WITH HYPERGLYCEMIA, WITH LONG-TERM CURRENT USE OF INSULIN (HCC): Primary | ICD-10-CM

## 2023-09-19 DIAGNOSIS — Z79.4 TYPE 2 DIABETES MELLITUS WITH HYPERGLYCEMIA, WITH LONG-TERM CURRENT USE OF INSULIN (HCC): Primary | ICD-10-CM

## 2023-09-19 PROCEDURE — 99211 OFF/OP EST MAY X REQ PHY/QHP: CPT | Performed by: INTERNAL MEDICINE

## 2023-09-19 NOTE — PATIENT INSTRUCTIONS
Diabetes Medications:  Tresiba 36 units daily (afternoon)  Humalog   Smaller carbs: 14 units    *Eggs with sausage and piece of bread or tortilla, carnitas with breakfast   Larger meal in carbs: 18-20 units    *Tortillas (3), Ivorian bread, rice, beans, desserts, pancakes

## 2023-10-06 ENCOUNTER — HOSPITAL ENCOUNTER (EMERGENCY)
Facility: HOSPITAL | Age: 73
Discharge: HOME | End: 2023-10-06
Attending: EMERGENCY MEDICINE

## 2023-10-06 VITALS
TEMPERATURE: 97 F | SYSTOLIC BLOOD PRESSURE: 174 MMHG | OXYGEN SATURATION: 97 % | HEART RATE: 85 BPM | DIASTOLIC BLOOD PRESSURE: 59 MMHG | RESPIRATION RATE: 16 BRPM

## 2023-10-06 DIAGNOSIS — R73.09 BLOOD GLUCOSE LABILE: Primary | ICD-10-CM

## 2023-10-06 LAB
BILIRUB UR QL: NEGATIVE
CLARITY UR: CLEAR
COLOR UR: COLORLESS
GLUCOSE BLDC GLUCOMTR-MCNC: 75 MG/DL (ref 70–99)
GLUCOSE BLDC GLUCOMTR-MCNC: 78 MG/DL (ref 70–99)
GLUCOSE BLDC GLUCOMTR-MCNC: 96 MG/DL (ref 70–99)
GLUCOSE UR-MCNC: NORMAL MG/DL
HGB UR QL STRIP.AUTO: NEGATIVE
KETONES UR-MCNC: NEGATIVE MG/DL
LEUKOCYTE ESTERASE UR QL STRIP.AUTO: NEGATIVE
NITRITE UR QL STRIP.AUTO: NEGATIVE
PH UR: 6.5 [PH] (ref 5–8)
PROT UR-MCNC: NEGATIVE MG/DL
SP GR UR STRIP: <1.005 (ref 1–1.03)
UROBILINOGEN UR STRIP-ACNC: NORMAL

## 2023-10-06 PROCEDURE — 82962 GLUCOSE BLOOD TEST: CPT

## 2023-10-06 PROCEDURE — 99283 EMERGENCY DEPT VISIT LOW MDM: CPT

## 2023-10-06 PROCEDURE — 81003 URINALYSIS AUTO W/O SCOPE: CPT | Performed by: EMERGENCY MEDICINE

## 2023-10-06 PROCEDURE — 36000 PLACE NEEDLE IN VEIN: CPT

## 2023-10-06 NOTE — ED INITIAL ASSESSMENT (HPI)
Patient arrives to ER for evaluation of blood sugar. Patient states her blood sugar was steadily dropping and it read low on the last one. Patient states she had 4 glucose tablets.      Blood sugar 96 in triage

## 2023-10-11 ENCOUNTER — PATIENT OUTREACH (OUTPATIENT)
Dept: CASE MANAGEMENT | Age: 73
End: 2023-10-11

## 2023-10-11 NOTE — PROGRESS NOTES
1st attempt ER f/up apt request  No answer, LVMTCB to schedule apts  DM -existing apt (11/21), unable to contact  PCP -unable to contact  Closing encounter

## 2023-10-16 ENCOUNTER — TELEPHONE (OUTPATIENT)
Dept: INTERNAL MEDICINE CLINIC | Facility: CLINIC | Age: 73
End: 2023-10-16

## 2023-10-16 NOTE — TELEPHONE ENCOUNTER
----- Message from Jhon Castro MD sent at 10/7/2023  8:43 AM CDT -----  Was in the emergency room with low sugars elevated blood pressure I like to see her next week please

## 2023-10-16 NOTE — TELEPHONE ENCOUNTER
Called patient and left message to call the office to schedule an appointment for an ER follow up. Patient was also supposed to follow up back in Aug from her May appointment 3 month follow up but never scheduled.

## 2023-10-18 ENCOUNTER — PATIENT OUTREACH (OUTPATIENT)
Dept: CASE MANAGEMENT | Age: 73
End: 2023-10-18

## 2023-10-18 NOTE — H&P
Reviewed pt's chart including recent visits. Attempted to contact pt for monthly outreach no answer left detailed message for pt to call back at [phone]    I plan on following up on recent ED visit, hypoglycemic episodes. Assist in coordinating a follow up appointment with Dr. Catalina Aguayo. Care everywhere updated. Immunization reconciliation completed. No updates available. Pt is due for: The following highlighted below. Pt has orders pending for Mammogram, Dexa Scan and urine micro albumin.      DEXA Scan Never done( Patient has pending order)  Mammogram due on 08/21/2021( Patient has pending order)  Colorectal Cancer Screening due on 02/22/2022  Diabetes Care: Microalb/Creat Ratio due on 04/19/2023 ( Patient has pending order)  COVID-19 Vaccine(3 - 2023-24 season) due on 09/01/2023  HTN: BP Follow-Up due on 09/21/2023  Influenza Vaccine(1) Never done  Annual Physical due on 01/12/2024  Diabetes Care A1C due on 11/21/2023  Diabetes Care Dilated Eye Exam due on 03/29/2024  Diabetes Care: GFR due on 09/06/2024  Annual Depression Screening Completed  Fall Risk Screening (Annual) Completed  Diabetes Care Foot Exam (Annual) Completed  Pneumococcal Vaccine: 65+ Years Completed  Zoster Vaccines Completed      Future Appointments   Date Time Provider Jai Luong   11/21/2023  1:30 PM Linsey Roberts MD [facility]     Total time - 5 min  Total Monthly time-  5min

## 2023-10-27 DIAGNOSIS — Z79.4 TYPE 2 DIABETES MELLITUS WITH HYPERGLYCEMIA, WITH LONG-TERM CURRENT USE OF INSULIN (HCC): Primary | ICD-10-CM

## 2023-10-27 DIAGNOSIS — E11.65 TYPE 2 DIABETES MELLITUS WITH HYPERGLYCEMIA, WITH LONG-TERM CURRENT USE OF INSULIN (HCC): Primary | ICD-10-CM

## 2023-10-30 RX ORDER — INSULIN LISPRO 100 [IU]/ML
INJECTION, SOLUTION INTRAVENOUS; SUBCUTANEOUS
Qty: 54 ML | Refills: 1 | Status: SHIPPED | OUTPATIENT
Start: 2023-10-30

## 2023-11-10 DIAGNOSIS — E55.9 VITAMIN D DEFICIENCY: ICD-10-CM

## 2023-11-14 RX ORDER — ERGOCALCIFEROL 1.25 MG/1
50000 CAPSULE ORAL WEEKLY
Qty: 12 CAPSULE | Refills: 0 | OUTPATIENT
Start: 2023-11-14

## 2023-11-21 ENCOUNTER — OFFICE VISIT (OUTPATIENT)
Dept: ENDOCRINOLOGY CLINIC | Facility: CLINIC | Age: 73
End: 2023-11-21

## 2023-11-21 ENCOUNTER — PATIENT OUTREACH (OUTPATIENT)
Dept: CASE MANAGEMENT | Age: 73
End: 2023-11-21

## 2023-11-21 VITALS
HEART RATE: 79 BPM | DIASTOLIC BLOOD PRESSURE: 53 MMHG | SYSTOLIC BLOOD PRESSURE: 124 MMHG | BODY MASS INDEX: 26.43 KG/M2 | HEIGHT: 61 IN | WEIGHT: 140 LBS

## 2023-11-21 DIAGNOSIS — E11.65 TYPE 2 DIABETES MELLITUS WITH HYPERGLYCEMIA, WITH LONG-TERM CURRENT USE OF INSULIN (HCC): Primary | ICD-10-CM

## 2023-11-21 DIAGNOSIS — Z79.4 TYPE 2 DIABETES MELLITUS WITH HYPERGLYCEMIA, WITH LONG-TERM CURRENT USE OF INSULIN (HCC): Primary | ICD-10-CM

## 2023-11-21 DIAGNOSIS — E78.5 DYSLIPIDEMIA: ICD-10-CM

## 2023-11-21 DIAGNOSIS — I10 PRIMARY HYPERTENSION: ICD-10-CM

## 2023-11-21 LAB
CARTRIDGE EXPIRATION DATE: ABNORMAL DATE
CARTRIDGE LOT#: ABNORMAL NUMERIC
GLUCOSE BLOOD: 308
HEMOGLOBIN A1C: 10 % (ref 4.3–5.6)
TEST STRIP EXPIRATION DATE: NORMAL DATE
TEST STRIP LOT #: NORMAL NUMERIC

## 2023-11-21 PROCEDURE — 99214 OFFICE O/P EST MOD 30 MIN: CPT | Performed by: INTERNAL MEDICINE

## 2023-11-21 PROCEDURE — 82947 ASSAY GLUCOSE BLOOD QUANT: CPT | Performed by: INTERNAL MEDICINE

## 2023-11-21 PROCEDURE — 83036 HEMOGLOBIN GLYCOSYLATED A1C: CPT | Performed by: INTERNAL MEDICINE

## 2023-11-21 RX ORDER — GLIPIZIDE 10 MG/1
10 TABLET ORAL
Qty: 180 TABLET | Refills: 0 | Status: SHIPPED | OUTPATIENT
Start: 2023-11-21 | End: 2024-02-19

## 2023-11-21 NOTE — PROGRESS NOTES
Name: Fredi Ramos  Date: 11/21/23    Referring Physician: No ref. provider found    HISTORY OF PRESENT ILLNESS   Fredi Ramos is a 68year old female who presents for follow-up of management of type 2 diabetes. She was diagnosed with diabetes about 19 years ago. We saw that she was not taking her insulin regularly, and so asked her to at least start taking her insulin and we would lower her doses. I felt that patient was on too high of doses and therefore was not taking them regularly to prevent hypoglycemia. I had tried to decrease her doses, but she had been admitted to the hospital and her doses were increased. I had stopped her humalog and started her on Trulicity 4.35YK and continued her Tresiba 36 units. She had been doing well on this, but cannot afford the Trulicity due to the donut hole. We were also able to get the [facility] for her. She restarted her humalog at 14 units. She is concerned that the Dexcom goes off all of the time and the blood sugars are low. She is also not taking insulin everytime she eats something.      Blood Glucose Today: 308 today  HbA1C or glycohemoglobin is 10.0 today (and it was 8.2 on 8/21/23 and it was 8.8 on 5/25/23 and it was 12.5 on 3/19/23 and it was 9.4 on 8/10/22 and it was 12 on 6/30/22 and it was 13 on 4/19/22)  Type 1 or Type 2?: Type 2  Medications for DM: Tresiba 36 units qAM; Humalog 14 units with meals  Checking blood sugars 3-4 times a day  Blood sugars are always in the two hundreds  Dietary compliance: fair    Exercise: everyday, physical therapy    Polyuria/polydipsia: none    Blurred vision: none    Flu Vaccine This Season: yes    Covid Vaccine: overdue for booster    REVIEW OF SYSTEMS  CV: Cardiovascular disease present: none         Hypertension present: yes, on meds, at goal         Hyperlipidemia present: not at goal, HDL is low, on meds (9/06/23)         Peripheral Vascular Disease present: none    : Nephropathy present: MAC: 120.7 (4/19/22) Creatinine: 1.02 (9/06/23)     Neuro: Neuropathy present: none    Eyes: Diabetic retinopathy present: DM Retinopathy            Most recent visit to eye doctor in last 12 months: sees eye doctor regularly    Skin: Infection or ulceration: none    Osteoporosis/ Osteopenia: Scheduled for July Vitamin D: 108.7 (9/06/23)    Thyroid disease: none TSH: 4.010 (4/19/22)    Medications:     Current Outpatient Medications:     Insulin Lispro, 1 Unit Dial, 100 UNIT/ML Subcutaneous Solution Pen-injector, Patient will take 14 units with small carb meals and 18-20 units with larger carb meals. , Disp: 54 mL, Rfl: 1    Glucose Blood (CONTOUR NEXT TEST) In Vitro Strip, Check BG 3x daily DX code:E11.65, Disp: 300 each, Rfl: 0    Insulin Pen Needle (PEN NEEDLES) 32G X 4 MM Does not apply Misc, 1 pen  4 (four) times daily. Use  New pen needle  With each injection, Disp: 400 each, Rfl: 0    ergocalciferol 1.25 MG (19619 UT) Oral Cap, Take 1 capsule (50,000 Units total) by mouth once a week., Disp: 12 capsule, Rfl: 0    Glucose Blood (ACCU-CHEK FITO PLUS) In Vitro Strip, Use as directed to check blood glucose 3 times/day - DX code:E11.65 using insulin, Disp: 300 strip, Rfl: 0    simvastatin 20 MG Oral Tab, Take 1 tablet (20 mg total) by mouth daily. , Disp: 90 tablet, Rfl: 2    sertraline 100 MG Oral Tab, Take 1 tablet (100 mg total) by mouth daily. , Disp: 90 tablet, Rfl: 2    losartan 50 MG Oral Tab, Take 0.5 tablets (25 mg total) by mouth daily. , Disp: 90 tablet, Rfl: 2    pregabalin 50 MG Oral Cap, Take 1 capsule (50 mg total) by mouth 2 (two) times daily. , Disp: 60 capsule, Rfl: 0    semaglutide (OZEMPIC, 0.25 OR 0.5 MG/DOSE,) 2 MG/3ML Subcutaneous Solution Pen-injector, Inject 0.25 mg into the skin once a week for 30 days, THEN 0.5 mg once a week for 15 days. , Disp: 3 mL, Rfl: 0     Allergies:   No Known Allergies    Social History:   Social History     Socioeconomic History    Marital status:     Tobacco Use    Smoking status: Never    Smokeless tobacco: Never   Vaping Use    Vaping Use: Never used   Substance and Sexual Activity    Alcohol use: Yes     Comment: very infrequent, every couple of months    Drug use: Never     Social Determinants of Health     Financial Resource Strain: Low Risk  (2/16/2023)    Financial Resource Strain     Difficulty of Paying Living Expenses: Not very hard     Med Affordability: No   Food Insecurity: No Food Insecurity (2/16/2023)    Food Insecurity     Food Insecurity: Never true   Transportation Needs: No Transportation Needs (2/16/2023)    Transportation Needs     Lack of Transportation: No   Physical Activity: Inactive (2/16/2023)    Exercise Vital Sign     Days of Exercise per Week: 0 days     Minutes of Exercise per Session: 0 min   Stress: No Stress Concern Present (2/16/2023)    Stress     Feeling of Stress : No   Social Connections: Socially Integrated (2/16/2023)    Social Connections     Frequency of Socialization with Friends and Family: 3   Housing Stability: Low Risk  (2/16/2023)    Housing Stability     Housing Instability: No       Medical History:   Past Medical History:   Diagnosis Date    Arthritis     Back problem     Depression     Diabetes ([facility] [address]     Essential hypertension     High blood pressure     High cholesterol     Visual impairment        Surgical history:   No past surgical history on file. PHYSICAL EXAM  Vitals:    11/21/23 1339   BP: 124/53   Pulse: 79   Weight: 140 lb (63.5 kg)   Height: 5' 1\" (1.549 m)           General Appearance:  alert, well developed, in no acute distress  Eyes:  normal conjunctivae, sclera. , normal sclera and normal pupils  Psychiatric:  oriented to time, self, and place  Nutritional:  no abnormal weight gain or loss    Lab Data:   Lab Results   Component Value Date     (H) 03/19/2023    A1C 10.0 (A) 11/21/2023     Lab Results   Component Value Date     (H) 09/06/2023    BUN 22 (H) 09/06/2023    BUNCREA 21.6 (H) 09/06/2023 CREATSERUM 1.02 09/06/2023    ANIONGAP 7 09/06/2023    GFRNAA 42 (L) 04/19/2022    GFRAA 48 (L) 04/19/2022    CA 9.5 09/06/2023    OSMOCALC 294 09/06/2023    ALKPHO 74 09/06/2023    AST 26 09/06/2023    ALT 29 09/06/2023    BILT 0.4 09/06/2023    TP 8.0 09/06/2023    ALB 3.7 09/06/2023    GLOBULIN 4.3 09/06/2023     09/06/2023    K 4.3 09/06/2023     09/06/2023    CO2 28.0 09/06/2023     Lab Results   Component Value Date    CHOLEST 141 09/06/2023    TRIG 139 09/06/2023    HDL 35 (L) 09/06/2023    LDL 81 09/06/2023    VLDL 22 09/06/2023    NONHDLC 106 09/06/2023     Lab Results   Component Value Date    MALBP 14.00 04/19/2022    CREUR 41.70 03/18/2023       ASSESSMENT/PLAN:  This is a 68year-old woman here for evaluation and management of uncontrolled type 2 diabetes. We discussed the ABCs of DM.     1.) Hyperglycemia Management- We discussed the importance of glycemic control to prevent complications of diabetes. We discussed the importance of SBGM. I offered and provided patient education materials and offered a blood glucose log book. - Continue Tresiba to 36 units every morning; Continue Humalog from 14 units tid with meals  - I will provide patient assistance program paperwork for patient  - Start glipizide 10mg PO bid  - Continue checking blood sugars with Dexcom    2.) Management of Diabetic Complications- We discussed the complications of diabetes include retinopathy, neuropathy, nephropathy and cardiovascular disease. - Ophtho- up to date  - Flu and Covid vaccine- up to date, except for booster  - BP- at goal, on meds  - Lipids- check in 3 months  - MAC- elevated, will monitor, check in 3 months  - CMP- check in 3 months  - Neuropathy- none  - CAD- none    3.) Lifestyle Management for Diabetes- We discussed importance of a low CHO diet, and recommend 45gm per meal or 135gm per day.  We discussed the importance of trying to follow a Mediterranean diet, with an emphasis on vegetables at every meal, with lots whole grains, and protein from either plant-based sources, or poultry and fish. - Diet- I have given her 'the diabetes plate' handout  - Exercise- she will work on this    Return to clinic in 3 months    Prior to this encounter, I spent over 15 minutes with preparing for the visit, including reviewing documents from other specialties as well as from PCP and going over test results. During the face to face encounter, I spent an additional 15 minutes which were determined for follow-up. Greater than 50% of the time was spent in counseling, anticipatory guidance, and coordination of care. Patient concerns were answered to the best of my knowledge. 11/21/23  Martha Guevara MD

## 2023-11-21 NOTE — PROGRESS NOTES
Reviewed pt's chart including recent visits. Attempted to contact pt for monthly outreach no answer left detailed message for pt to call back. No response letter sent via my chart. Care everywhere updated. Immunization reconciliation completed. No updates available. Pt is due for highlighted items below. Health Maintenance   Topic Date Due    DEXA Scan  Never done    Mammogram  08/21/2021    Colorectal Cancer Screening  02/22/2022    Diabetes Care: Microalb/Creat Ratio  04/19/2023    COVID-19 Vaccine (3 - 2023-24 season) 09/01/2023    HTN: BP Follow-Up  09/21/2023    Influenza Vaccine (1) Never done    Diabetes Care A1C  11/21/2023    Annual Physical  01/12/2024    Diabetes Care Dilated Eye Exam  03/29/2024    Diabetes Care: GFR  09/06/2024    Annual Depression Screening  Completed    Fall Risk Screening (Annual)  Completed    Diabetes Care Foot Exam (Annual)  Completed    Pneumococcal Vaccine: 65+ Years  Completed    Zoster Vaccines  Completed     Reviewed patient future appointments.   Future Appointments   Date Time Provider Jai Luong   11/21/2023  1:30 PM Mae Marie MD [facility]     Total time -6  min  Total Monthly time- 6 min

## 2023-12-27 ENCOUNTER — PATIENT OUTREACH (OUTPATIENT)
Dept: CASE MANAGEMENT | Age: 73
End: 2023-12-27

## 2023-12-27 NOTE — PROGRESS NOTES
Reviewed pt's chart including recent visits. Attempted to contact pt for monthly outreach no answer left detailed message for pt to call back. Reconciled chart using care everywhere. Pt has not viewed no response letter sent last month via my chart. Letter printed and mailed. Pt is due for: The following highlighted below. Health Maintenance   Topic Date Due    DEXA Scan  Never done    Mammogram  08/21/2021    Colorectal Cancer Screening  02/22/2022    Diabetes Care: Microalb/Creat Ratio  04/19/2023    COVID-19 Vaccine (3 - 2023-24 season) 09/01/2023    Influenza Vaccine (1) Never done    Annual Physical  01/12/2024    Diabetes Care A1C  02/21/2024    Diabetes Care Dilated Eye Exam  03/29/2024    Diabetes Care: GFR  09/06/2024    Annual Depression Screening  Completed    Fall Risk Screening (Annual)  Completed    Diabetes Care Foot Exam (Annual)  Completed    Pneumococcal Vaccine: 65+ Years  Completed    Zoster Vaccines  Completed     No future appointments.     Total time - 5 min  Total Monthly time- 5 min

## 2024-01-05 ENCOUNTER — PATIENT OUTREACH (OUTPATIENT)
Dept: CASE MANAGEMENT | Age: 74
End: 2024-01-05

## 2024-01-05 NOTE — PROGRESS NOTES
Reviewed pt's chart including recent visits.  Attempted to contact pt for monthly outreach no answer left detailed message for pt to call back.     No response letter mailed last month. If patient does not call back CCM removal letter will be sent this month.    Reconciled chart using care everywhere.     Pt is due for: The following highlighted below    Health Maintenance   Topic Date Due    DEXA Scan  Never done    Mammogram  08/21/2021    Colorectal Cancer Screening  02/22/2022    Diabetes Care: Microalb/Creat Ratio  04/19/2023    COVID-19 Vaccine (3 - 2023-24 season) 09/01/2023    Influenza Vaccine (1) Never done    Annual Depression Screening  01/01/2024    Fall Risk Screening (Annual)  01/01/2024    Diabetes Care Foot Exam (Annual)  01/01/2024    Annual Physical  01/12/2024    Diabetes Care Dilated Eye Exam  03/29/2024    Diabetes Care A1C  02/21/2024    Diabetes Care: GFR  09/06/2024    Pneumococcal Vaccine: 65+ Years  Completed    Zoster Vaccines  Completed     No future appointments.    Total time - 3 min  Total Monthly time- 3 min

## 2024-01-15 ENCOUNTER — PATIENT OUTREACH (OUTPATIENT)
Dept: CASE MANAGEMENT | Age: 74
End: 2024-01-15

## 2024-01-15 DIAGNOSIS — I10 PRIMARY HYPERTENSION: ICD-10-CM

## 2024-01-15 DIAGNOSIS — I73.9 PERIPHERAL VASCULAR DISEASE (HCC): ICD-10-CM

## 2024-01-15 DIAGNOSIS — Z79.4 TYPE 2 DIABETES MELLITUS WITH STAGE 3B CHRONIC KIDNEY DISEASE, WITH LONG-TERM CURRENT USE OF INSULIN (HCC): Primary | ICD-10-CM

## 2024-01-15 DIAGNOSIS — E78.5 DYSLIPIDEMIA: ICD-10-CM

## 2024-01-15 DIAGNOSIS — N18.32 TYPE 2 DIABETES MELLITUS WITH STAGE 3B CHRONIC KIDNEY DISEASE, WITH LONG-TERM CURRENT USE OF INSULIN (HCC): Primary | ICD-10-CM

## 2024-01-15 DIAGNOSIS — E11.22 TYPE 2 DIABETES MELLITUS WITH STAGE 3B CHRONIC KIDNEY DISEASE, WITH LONG-TERM CURRENT USE OF INSULIN (HCC): Primary | ICD-10-CM

## 2024-01-15 NOTE — PROGRESS NOTES
Spoke to Ananya for [facility].      Updates to patient care team/comments: Dr. Lees, Bethany Jameson. added to care team    Patient reported changes in medications:None  Med Adherence  Comment: Pt reports taking medications as directed     Health Maintenance: Reviewed with patient and daughter Xochitl  Health Maintenance   Topic Date Due    DEXA Scan  Never done    Mammogram  08/21/2021    Colorectal Cancer Screening  02/22/2022    Diabetes Care: Microalb/Creat Ratio  04/19/2023    COVID-19 Vaccine (3 - 2023-24 season) 09/01/2023 Pt     Influenza Vaccine (1) Never done Pt declined does not get them.     MA Annual Health Assessment  01/01/2024 Schduled    Annual Depression Screening  01/01/2024  Updated    Fall Risk Screening (Annual)  01/01/2024   Updated    Diabetes Care Foot Exam (Annual)  01/01/2024    Diabetes Care Dilated Eye Exam  03/29/2024    Diabetes Care A1C  02/21/2024    Diabetes Care: GFR  09/06/2024    Pneumococcal Vaccine: 65+ Years  Completed    Zoster Vaccines  Completed       Patient updates/concerns:     States she lives with her daughter Xochitl. Pt reports she sleeps in the wreck room. She shares the room with her great granddaughter. Reports they bicker a lot, she has over stayed her welcome.     She likes to visit her friend in [place] for 1-2 weeks at a time. He resides at an assisted living facility. Reports daughters do not allow her to go visit him. Spoke at length about concerns.    States she is aggravated daughter does not let her drive. Pt reports she is paying for her car monthly as well as insurance. Daughter took her car away and is allowing her daughter, patients adult granddaughter to drive patients vehicle. Pt is very upset about situation, however states no one will listen to her. Pt feels her biggest barrier is boredom and lack of transportation. States she never goes out she watches Tv all day. Daughters only take her out when its health care related. Pt feels she is wasting  away at home.    Pt reports her daughter and granddaughter tell her she has dementia. Pt states she admits for forgets things from time to time however does not feel she has dementia.     Goals/Action Plan:    Active goal from previous outreach:   Follow up with Daughter about scheduling appointment with Dr. Meza      Patient reported progress towards goals: Pt states she struggles keeping up with her health because of transportation. New short term goal initiated.                 - What:  Pt states getting her health on tract is her goal.           - Where/When/How: Pt will start by completing Medicare PX and setting small monthly goals.  Patient Reported Barriers and Concerns: Transportation                    - Plan for overcoming barriers: States she will speak with Dr. Monson about being able to drive.    Care Managers Interventions:     Encouraged patient to follow up with Dr. Monson for Medicare PX. Assisted in scheduling appointment.    Advised on [employer] driving  evaluation program.     Fall and depression screen updated    Encouraged patient eat healthy balanced meals. Avoiding process food and staying hydrated.    Future Appointments: No future appointments.    Next Care Manager Follow Up Date: One month    Reason For Follow Up: review progress and or barriers towards patient's goals.     Time Spent This Encounter Total: 38 min medical record review, telephone communication, care plan updates where needed, education, goals, and action plan recreation/update. Provided acknowledgment and validation to patient's concerns.   Monthly Minute Total including today: 38  Physical assessment, complete health history, and need for CCM established by Jermaine Monson MD.'

## 2024-01-25 ENCOUNTER — OFFICE VISIT (OUTPATIENT)
Dept: INTERNAL MEDICINE CLINIC | Facility: CLINIC | Age: 74
End: 2024-01-25
Payer: MEDICARE

## 2024-01-25 VITALS
SYSTOLIC BLOOD PRESSURE: 124 MMHG | BODY MASS INDEX: 26.62 KG/M2 | DIASTOLIC BLOOD PRESSURE: 60 MMHG | OXYGEN SATURATION: 96 % | HEIGHT: 61 IN | HEART RATE: 75 BPM | WEIGHT: 141 LBS

## 2024-01-25 DIAGNOSIS — G89.29 CHRONIC LEFT-SIDED LOW BACK PAIN WITHOUT SCIATICA: ICD-10-CM

## 2024-01-25 DIAGNOSIS — Z12.11 COLON CANCER SCREENING: ICD-10-CM

## 2024-01-25 DIAGNOSIS — E78.5 DYSLIPIDEMIA: ICD-10-CM

## 2024-01-25 DIAGNOSIS — G31.84 MCI (MILD COGNITIVE IMPAIRMENT) WITH MEMORY LOSS: ICD-10-CM

## 2024-01-25 DIAGNOSIS — I73.9 PERIPHERAL VASCULAR DISEASE (HCC): ICD-10-CM

## 2024-01-25 DIAGNOSIS — Z12.31 BREAST CANCER SCREENING BY MAMMOGRAM: ICD-10-CM

## 2024-01-25 DIAGNOSIS — M54.50 CHRONIC LEFT-SIDED LOW BACK PAIN WITHOUT SCIATICA: ICD-10-CM

## 2024-01-25 DIAGNOSIS — E11.22 TYPE 2 DIABETES MELLITUS WITH STAGE 3B CHRONIC KIDNEY DISEASE, WITH LONG-TERM CURRENT USE OF INSULIN (HCC): ICD-10-CM

## 2024-01-25 DIAGNOSIS — Z79.4 TYPE 2 DIABETES MELLITUS WITH STAGE 3B CHRONIC KIDNEY DISEASE, WITH LONG-TERM CURRENT USE OF INSULIN (HCC): ICD-10-CM

## 2024-01-25 DIAGNOSIS — E11.65 TYPE 2 DIABETES MELLITUS WITH HYPERGLYCEMIA, WITH LONG-TERM CURRENT USE OF INSULIN (HCC): Primary | ICD-10-CM

## 2024-01-25 DIAGNOSIS — N18.32 TYPE 2 DIABETES MELLITUS WITH STAGE 3B CHRONIC KIDNEY DISEASE, WITH LONG-TERM CURRENT USE OF INSULIN (HCC): ICD-10-CM

## 2024-01-25 DIAGNOSIS — F33.0 MILD RECURRENT MAJOR DEPRESSION (HCC): Chronic | ICD-10-CM

## 2024-01-25 DIAGNOSIS — Z79.4 TYPE 2 DIABETES MELLITUS WITH HYPERGLYCEMIA, WITH LONG-TERM CURRENT USE OF INSULIN (HCC): Primary | ICD-10-CM

## 2024-01-25 PROBLEM — R26.9 ABNORMAL GAIT: Status: RESOLVED | Noted: 2023-05-30 | Resolved: 2024-01-25

## 2024-01-25 PROBLEM — I51.89 GRADE II DIASTOLIC DYSFUNCTION: Status: RESOLVED | Noted: 2022-02-22 | Resolved: 2024-01-25

## 2024-01-25 PROBLEM — I10 PRIMARY HYPERTENSION: Status: RESOLVED | Noted: 2022-06-30 | Resolved: 2024-01-25

## 2024-01-25 PROBLEM — M19.90 UNSPECIFIED OSTEOARTHRITIS, UNSPECIFIED SITE: Status: RESOLVED | Noted: 2022-01-06 | Resolved: 2024-01-25

## 2024-01-25 PROCEDURE — 99214 OFFICE O/P EST MOD 30 MIN: CPT | Performed by: INTERNAL MEDICINE

## 2024-01-25 PROCEDURE — G0439 PPPS, SUBSEQ VISIT: HCPCS | Performed by: INTERNAL MEDICINE

## 2024-01-25 PROCEDURE — 3078F DIAST BP <80 MM HG: CPT | Performed by: INTERNAL MEDICINE

## 2024-01-25 PROCEDURE — 1170F FXNL STATUS ASSESSED: CPT | Performed by: INTERNAL MEDICINE

## 2024-01-25 PROCEDURE — 3008F BODY MASS INDEX DOCD: CPT | Performed by: INTERNAL MEDICINE

## 2024-01-25 PROCEDURE — 1160F RVW MEDS BY RX/DR IN RCRD: CPT | Performed by: INTERNAL MEDICINE

## 2024-01-25 PROCEDURE — 3074F SYST BP LT 130 MM HG: CPT | Performed by: INTERNAL MEDICINE

## 2024-01-25 PROCEDURE — 1159F MED LIST DOCD IN RCRD: CPT | Performed by: INTERNAL MEDICINE

## 2024-01-25 PROCEDURE — 1125F AMNT PAIN NOTED PAIN PRSNT: CPT | Performed by: INTERNAL MEDICINE

## 2024-01-25 PROCEDURE — 96160 PT-FOCUSED HLTH RISK ASSMT: CPT | Performed by: INTERNAL MEDICINE

## 2024-01-25 RX ORDER — INSULIN DEGLUDEC INJECTION 100 U/ML
36 INJECTION, SOLUTION SUBCUTANEOUS DAILY
COMMUNITY
Start: 2024-01-25

## 2024-01-25 NOTE — PROGRESS NOTES
Ananya Dowling is a 73 year old  who presents for a MA (Medicare Advantage) Supervisit (Once per calendar year)    Discussed medicare wellness , reviewed assessments and care gaps for screening and immunizations.    In addition medical issues  addressed today included ;  DISCUSSED MEDS  X  10 MIN  SEEMS UNCLEAR WHAT MEDS  TAKING  - BUT  DOES KNOW  DM MEDS AND  DOSES - CONTROL HAS BEEN POOR  NO LOWS     MEMORY IS AN ISSUE  -        Daughter  told me  looking  for place for mother to live      They dont like each other -  daughter  has gven up on helping her      Pt does endorse  depression      Ros  no cardiac or pulm sx      Has chronic back pain     Has no claudication    Allergies:   has No Known Allergies.  Medical History:    has a past medical history of Arthritis, Back problem, Depression, Diabetes (HCC), Essential hypertension, High blood pressure, High cholesterol, and Visual impairment.  Surgical History:    has no past surgical history on file.   Family History:   family history includes Diabetes in her son; Heart Attack in her father; Heart Disorder in her son; Stroke in her mother.  Social History:    reports that she has never smoked. She has never used smokeless tobacco. She reports current alcohol use. She reports that she does not use drugs.        Fall Risk Assessment:   She has been screened for Falls and is High Risk. Fall Prevention information provided to patient in After Visit Summary.    Do you feel unsteady when standing or walking?: Yes  Do you worry about falling?: Yes  Have you fallen in the past year?: No     Cognitive Assessment:   She had a completely normal cognitive assessment - see flowsheet entries     Functional Ability/Status:   Ananya Dowling has some abnormal functions as listed below:  She has difficulties Affording Meds based on screening of functional status. She has Hearing problems based on screening of functional status.        Depression Screening (PHQ-2/PHQ-9): Feeling  down, depressed, or hopeless: 3         Advanced Directives:  End of life not discussed  do to  discussing  relationship issues   Tobacco:  She has never smoked tobacco.    CAGE Alcohol Screen:   She has been screened for alcohol abuse and her score is not 0:  Cut: Have you ever felt you should Cut down on your drinking?: Yes  Annoyed: Have people Annoyed you by criticizing your drinking?: No  Guilty: Have you ever felt bad or Guilty about your drinking?: No  Eye Opener: Have you ever had a drink first thing in the morning to steady your nerves or to get rid of a hangover (Eye opener)?: No  Total Score: 1          Patient Care Team:  Jermaine Monson MD as PCP - General (Internal Medicine)  Katya Ceja CMA as [facility] Care Manager (Care Coordinator)  Martha Lees MD (ENDOCRINOLOGY)  Trino Sims MD as Referring Physician (OPHTHALMOLOGY)  Objective:   /60 (BP Location: Right arm, Patient Position: Sitting, Cuff Size: adult)   Pulse 75   Ht 5' 1\" (1.549 m)   Wt 141 lb (64 kg)   SpO2 96%   BMI 26.64 kg/m²    Estimated body mass index is 26.64 kg/m² as calculated from the following:    Height as of this encounter: 5' 1\" (1.549 m).    Weight as of this encounter: 141 lb (64 kg).  Affect  flat  seemd depressed -  answers all questions    Hard to say if demented - she does  endorse poor memory  daughter agrees -  ( has  ?? Dementia  but  I can't commit to diagnosis- but  MCI )    Head/neck ; nl     Lungs: Clear     Heart: Regular    Ext; nl   Bilateral barefoote appearance is normal.  Bilateral monofilament/sensation of both feet is normal.  Pedal pulse exam of both lower feet is normal.      Neurological: No focal deficit, nl cognition         Medicare Hearing Assessment:  Hearing Screening    Time taken: 1/25/2024 12:56 PM  Entry User: Jermaine Monson MD  Screening Method: Finger Rub  Finger Rub Result: Pass             Visual Acuity:   Visual Acuity:   Right Eye Visual Acuity: Corrected  Right Eye Chart Acuity: 20/40   Left Eye Visual Acuity: Corrected Left Eye Chart Acuity: 20/40   Both Eyes Visual Acuity: Corrected Both Eyes Chart Acuity: 20/40   Able To Tolerate Visual Acuity: Yes        Current Outpatient Medications:     Insulin Degludec (TRESIBA FLEXTOUCH) 100 UNIT/ML Subcutaneous Solution Pen-injector, Inject 0.36 mL (36 Units total) into the skin daily., Disp: , Rfl:     glipiZIDE 10 MG Oral Tab, Take 1 tablet (10 mg total) by mouth 2 (two) times daily before meals., Disp: 180 tablet, Rfl: 0    Insulin Lispro, 1 Unit Dial, 100 UNIT/ML Subcutaneous Solution Pen-injector, Patient will take 14 units with small carb meals and 18-20 units with larger carb meals., Disp: 54 mL, Rfl: 1    Glucose Blood (CONTOUR NEXT TEST) In Vitro Strip, Check BG 3x daily DX code:E11.65, Disp: 300 each, Rfl: 0    Insulin Pen Needle (PEN NEEDLES) 32G X 4 MM Does not apply Misc, 1 pen  4 (four) times daily. Use  New pen needle  With each injection, Disp: 400 each, Rfl: 0    ergocalciferol 1.25 MG (35019 UT) Oral Cap, Take 1 capsule (50,000 Units total) by mouth once a week., Disp: 12 capsule, Rfl: 0    Glucose Blood (ACCU-CHEK FITO PLUS) In Vitro Strip, Use as directed to check blood glucose 3 times/day - DX code:E11.65 using insulin, Disp: 300 strip, Rfl: 0    simvastatin 20 MG Oral Tab, Take 1 tablet (20 mg total) by mouth daily., Disp: 90 tablet, Rfl: 2    sertraline 100 MG Oral Tab, Take 1 tablet (100 mg total) by mouth daily., Disp: 90 tablet, Rfl: 2    losartan 50 MG Oral Tab, Take 0.5 tablets (25 mg total) by mouth daily., Disp: 90 tablet, Rfl: 2    pregabalin 50 MG Oral Cap, Take 1 capsule (50 mg total) by mouth 2 (two) times daily., Disp: 60 capsule, Rfl: 0     ASSESSMENT  and   PLAN    Medicare wellness  Dicussed prevention screening test and immunization for age  Reinforced healthy diet, lifestyle, and exercise. Discussed current state of health.    Medical issues     1. Type 2 diabetes mellitus with  hyperglycemia, with long-term current use of insulin (HCC) (Primary) - has poor control  - due to  her depresson personallity and  MCI  -  long [facility] would be could or assisted living -  running on higher side safer than lower   -     PODIATRY - INTERNAL; Future; Expected date: 01/26/2024  -     EVALUATE & TREAT, ENDO (INTERNAL)  -     Hemoglobin A1C; Future; Expected date: 01/25/2024  -     Microalb/Creat Ratio, Random Urine; Future; Expected date: 01/25/2024  -     Comp Metabolic Panel (14); Future; Expected date: 01/25/2024  2. Breast cancer screening by mammogram- daughter doesn't thnk she will do them   -     [facility] ARBEN 2D+3D SCREENING BILAT (CPT=77067/71593); Future; Expected date: 01/25/2024  3. Colon cancer screening- orderd  -     Occult Blood, Fecal, FIT Immunoassay; Future; Expected date: 01/25/2024  4. Mild recurrent major depression (HCC)- keep on current meds   5. Type 2 diabetes mellitus with stage 3b chronic kidney disease, with long-term current use of insulin (HCC)  6. Dyslipidemia  cpm   -     Lipid Panel; Future; Expected date: 01/25/2024  7. Peripheral vstatin ascular disease (HCC)- has no sxs  - keep on   8. Chronic left-sided low back pain without sciatica-  no sig change  - on lyrica  9. MCI (mild cognitive impairment) with memory loss- as noted above  did well on testing  dont think demented                 Return in about 3 months (around 4/25/2024).     Jermaine Monson MD      General Health:  In the past six months, have you lost more than 10 pounds without trying?: 2 - No  Has your appetite been poor?: Yes  Type of Diet: Diabetic  How does the patient maintain a good energy level?: Other  How would you describe your daily physical activity?: None  How would you describe your current health state?: Fair  On a scale of 0 to 10, with 0 being no pain and 10 being severe pain, what is your pain level?: 1 - (Mild)  In the past six months, have you experienced urine leakage?: 0-No  At any  time do you feel concerned for the safety/well-being of yourself and/or your children, in your home or elsewhere?: No  Have you had any immunizations at another office such as Influenza, Hepatitis B, Tetanus, or Pneumococcal?: No       Ananya Dowling's SCREENING SCHEDULE   Tests on this list are recommended by your physician but may not be covered, or covered at this frequency, by your insurer.   Please check with your insurance carrier before scheduling to verify coverage.   PREVENTATIVE SERVICES FREQUENCY &  COVERAGE DETAILS LAST COMPLETION DATE   Diabetes Screening    Fasting Blood Sugar /  Glucose    One screening every 12 months if never tested or if previously tested but not diagnosed with pre-diabetes   One screening every 6 months if diagnosed with pre-diabetes Lab Results   Component Value Date     (H) 09/06/2023        Cardiovascular Disease Screening    Lipid Panel  Cholesterol  Lipoprotein (HDL)  Triglycerides Covered every 5 years for all Medicare beneficiaries without apparent signs or symptoms of cardiovascular disease Lab Results   Component Value Date    CHOLEST 141 09/06/2023    HDL 35 (L) 09/06/2023    LDL 81 09/06/2023    TRIG 139 09/06/2023         Electrocardiogram (EKG)   Covered if needed at Welcome to Medicare, and non-screening if indicated for medical reasons 01/03/2022      Ultrasound Screening for Abdominal Aortic Aneurysm (AAA) Covered once in a lifetime for one of the following risk factors    Men who are 65-75 years old and have ever smoked    Anyone with a family history -     Colorectal Cancer Screening  Covered for ages 50-85; only need ONE of the following:    Colonoscopy   Covered every 10 years    Covered every 2 years if patient is at high risk or previous colonoscopy was abnormal 02/16/2016    Health Maintenance   Topic Date Due    Colorectal Cancer Screening  02/22/2022       Flexible Sigmoidoscopy   Covered every 4 years -    Fecal Occult Blood Test Covered annually -    Bone Density Screening    Bone density screening    Covered every 2 years after age 65 if diagnosed with risk of osteoporosis or estrogen deficiency.    Covered yearly for long-term glucocorticoid medication use (Steroids) No results found for this or any previous visit.      Health Maintenance Due   Topic Date Due    DEXA Scan  Never done      Pap and Pelvic    Pap   Covered every 2 years for women at normal risk; Annually if at high risk -  No recommendations at this time    Chlamydia Annually if high risk -  No recommendations at this time   Screening Mammogram    Mammogram     Recommend annually for all female patients aged 40 and older    One baseline mammogram covered for patients aged 35-39 -    Health Maintenance   Topic Date Due    Mammogram  08/21/2021       Immunizations    Influenza Covered once per flu season  Please get every year -  No recommendations at this time    Pneumococcal Each vaccine ([id number] & [id number]) covered once after 65 Prevnar 13: 05/16/2016    Uoaiegvld51: 09/10/2020     No recommendations at this time    Hepatitis B One screening covered for patients with certain risk factors   -  No recommendations at this time    Tetanus Toxoid Not covered by Medicare Part B unless medically necessary (cut with metal); may be covered with your pharmacy prescription benefits 03/20/2008    Tetanus, Diptheria and Pertusis TD and TDaP Not covered by Medicare Part B -  No recommendations at this time    Zoster Not covered by Medicare Part B; may be covered with your pharmacy  prescription benefits -  No recommendations at this time     Diabetes      Hemoglobin A1C Annually; if last result is elevated, may be asked to retest more frequently.    Medicare covers every 3 months Lab Results   Component Value Date     (H) 03/19/2023    A1C 10.0 (A) 11/21/2023       Hemoglobin A1C Test due on 02/21/2024    Creat/alb ratio Annually Lab Results   Component Value Date    MICROALBCREA 120.7 (H)  04/19/2022       LDL Annually Lab Results   Component Value Date    LDL 81 09/06/2023       Dilated Eye Exam Annually Last Diabetic Eye Exam:  Last Dilated Eye Exam: 03/29/23  Eye Exam shows Diabetic Retinopathy?: No       Annual Monitoring of Persistent Medications (ACE/ARB, digoxin diuretics, anticonvulsants)    Potassium Annually Lab Results   Component Value Date    K 4.3 09/06/2023         Creatinine   Annually Lab Results   Component Value Date    CREATSERUM 1.02 09/06/2023         BUN Annually Lab Results   Component Value Date    BUN 22 (H) 09/06/2023       Drug Serum Conc Annually No results found for: \"DIGOXIN\", \"DIG\", \"VALP\"

## 2024-02-28 ENCOUNTER — PATIENT OUTREACH (OUTPATIENT)
Dept: CASE MANAGEMENT | Age: 74
End: 2024-02-28

## 2024-02-28 NOTE — PROGRESS NOTES
Spoke to patient, reports she is not able to speak freely at the moment. She is requesting  return call on Friday after 1pm.     Reviewed pt's chart including recent visits.  Attempted to contact pt for monthly outreach no answer left detailed message for pt to call back.     Reconciled chart using care everywhere.     Pt is due for: the following highlighted below.  Health Maintenance   Topic Date Due    DEXA Scan  Never done    Mammogram  08/21/2021    Colorectal Cancer Screening  02/22/2022    Diabetes Care: Microalb/Creat Ratio  04/19/2023    Diabetes Care A1C  02/21/2024    Diabetes Care Dilated Eye Exam  03/29/2024    Influenza Vaccine (1) 01/15/2025 (Originally 10/1/2023)    COVID-19 Vaccine (3 - 2023-24 season) 01/15/2025 (Originally 9/1/2023)    Diabetes Care: GFR  09/06/2024    Diabetes Care Foot Exam  01/25/2025    MA Annual Health Assessment  Completed    Annual Depression Screening  Completed    Fall Risk Screening (Annual)  Completed    Pneumococcal Vaccine: 65+ Years  Completed    Zoster Vaccines  Completed     Future Appointments   Date Time Provider Department Center   3/25/2024 12:20 PM Jermaine Monson MD [facility]       Total time - 6 min  Total Monthly time- 6 min

## 2024-03-22 ENCOUNTER — PATIENT OUTREACH (OUTPATIENT)
Dept: CASE MANAGEMENT | Age: 74
End: 2024-03-22

## 2024-03-22 DIAGNOSIS — Z79.4 TYPE 2 DIABETES MELLITUS WITH HYPERGLYCEMIA, WITH LONG-TERM CURRENT USE OF INSULIN (HCC): ICD-10-CM

## 2024-03-22 DIAGNOSIS — E11.65 TYPE 2 DIABETES MELLITUS WITH HYPERGLYCEMIA, WITH LONG-TERM CURRENT USE OF INSULIN (HCC): ICD-10-CM

## 2024-03-22 DIAGNOSIS — F33.0 MILD RECURRENT MAJOR DEPRESSION (HCC): ICD-10-CM

## 2024-03-22 DIAGNOSIS — E11.22 TYPE 2 DIABETES MELLITUS WITH STAGE 3B CHRONIC KIDNEY DISEASE, WITH LONG-TERM CURRENT USE OF INSULIN (HCC): Primary | ICD-10-CM

## 2024-03-22 DIAGNOSIS — N18.32 TYPE 2 DIABETES MELLITUS WITH STAGE 3B CHRONIC KIDNEY DISEASE, WITH LONG-TERM CURRENT USE OF INSULIN (HCC): Primary | ICD-10-CM

## 2024-03-22 DIAGNOSIS — E78.5 DYSLIPIDEMIA: ICD-10-CM

## 2024-03-22 DIAGNOSIS — Z79.4 TYPE 2 DIABETES MELLITUS WITH STAGE 3B CHRONIC KIDNEY DISEASE, WITH LONG-TERM CURRENT USE OF INSULIN (HCC): Primary | ICD-10-CM

## 2024-03-22 DIAGNOSIS — I73.9 PERIPHERAL VASCULAR DISEASE (HCC): ICD-10-CM

## 2024-03-22 NOTE — PROGRESS NOTES
Spoke to Ananya for CCM.      Updates to patient care team/comments: None  Patient reported changes in medications: None    Med Adherence  Comment: Pt confirms she is taking medications as instructed by providers.     Health Maintenance: Discussed at length with patient, encouraged updating.   Health Maintenance   Topic Date Due    DEXA Scan  Never done    Mammogram  08/21/2021    Colorectal Cancer Screening  02/22/2022    Diabetes Care: Microalb/Creat Ratio  04/19/2023    Diabetes Care A1C  02/21/2024    Diabetes Care Dilated Eye Exam  03/29/2024    Influenza Vaccine (1) 01/15/2025 (Originally 10/1/2023)    COVID-19 Vaccine (3 - 2023-24 season) 01/15/2025 (Originally 9/1/2023)    Diabetes Care: GFR  09/06/2024    Diabetes Care Foot Exam  01/25/2025    MA Annual Health Assessment  Completed    Annual Depression Screening  Completed    Fall Risk Screening (Annual)  Completed    Pneumococcal Vaccine: 65+ Years  Completed    Zoster Vaccines  Completed     Patient updates/concerns:     The patient reports being under significant stress at home. Her daughter has suggested that she needs to move out, and her son in law is assisting her finding an apartment. He has presented her with various options in the vicinity to consider. A major obstacle for the patient is her lack of driving ability. She hopes to discuss the possibility of driving again with .     Goals/Action Plan:    Active goal from previous outreach:   Follow up with Daughter about scheduling appointment with Dr. Meza       Patient reported progress towards goals:                - What:None           - Where/When/How: The patient states she is experiencing challenging problems at home.All leading to her inability to update preventative care and keep her appointments.Nevertheless she plans on keeping her appointment with Dr. Monson.   Patient Reported Barriers and Concerns: She needs to find a new placed to live in.                   - Plan for  overcoming barriers: She is looking at different apartments in the next couple of weeks.    Care Managers Interventions:   Encouraged three balanced meals along with  20-30 minutes of daily exercise and stretching.     Advised on pending lab orders and mammogram. Offered to assist in scheduling her Mammogram appointment, she declined.     Care every where updated    Social determinants of health updated     Provided support. Encouraged patient to call as needed.    Reviewed Future Appointments:   Future Appointments   Date Time Provider Department Center   3/25/2024 12:20 PM Jermaine Monson MD [facility]     Next Care Manager Follow Up Date: One month. Encouraged patient to call as needed.    Reason For Follow Up: review progress and or barriers towards patient's goals.     Time Spent This Encounter Total: 29  min medical record review, telephone communication, care plan updates where needed, education, goals, and action plan recreation/update. Provided acknowledgment and validation to patient's concerns.   Monthly Minute Total including today:  29 min  Physical assessment, complete health history, and need for CCM established by Jermaine Monson MD.

## 2024-03-31 PROCEDURE — 99490 CHRNC CARE MGMT STAFF 1ST 20: CPT

## 2024-03-31 PROCEDURE — 1159F MED LIST DOCD IN RCRD: CPT

## 2024-04-16 ENCOUNTER — PATIENT OUTREACH (OUTPATIENT)
Dept: CASE MANAGEMENT | Age: 74
End: 2024-04-16

## 2024-04-16 NOTE — PROGRESS NOTES
Reviewed pt's chart including recent visits.  Attempted to contact pt for monthly outreach no answer left detailed message for pt to call back.     Reconciled chart using care everywhere.     Pt is due for:     Health Maintenance   Topic Date Due    DEXA Scan  Never done    Mammogram  08/21/2021    Colorectal Cancer Screening  02/22/2022    Diabetes Care: Microalb/Creat Ratio  04/19/2023    Diabetes Care A1C  02/21/2024    Influenza Vaccine (Season Ended) 01/15/2025 (Originally 10/1/2024)    COVID-19 Vaccine (3 - 2023-24 season) 01/15/2025 (Originally 9/1/2023)    Diabetes Care: GFR  09/06/2024    Diabetes Care Dilated Eye Exam  09/29/2024    Diabetes Care Foot Exam  01/25/2025    MA Annual Health Assessment  Completed    Annual Depression Screening  Completed    Fall Risk Screening (Annual)  Completed    Pneumococcal Vaccine: 65+ Years  Completed    Zoster Vaccines  Completed       Future Appointments   Date Time Provider Department Center   4/30/2024 10:40 AM [facility]   5/4/2024  9:20 AM Jermaine Monson MD [facility]   6/5/2024  1:15 PM Martha Lees MD [id number]       Total time - 3 min  Total Monthly time- 3 min

## 2024-04-17 ENCOUNTER — TELEPHONE (OUTPATIENT)
Dept: INTERNAL MEDICINE CLINIC | Facility: CLINIC | Age: 74
End: 2024-04-17

## 2024-04-17 ENCOUNTER — OFFICE VISIT (OUTPATIENT)
Dept: INTERNAL MEDICINE CLINIC | Facility: CLINIC | Age: 74
End: 2024-04-17
Payer: MEDICARE

## 2024-04-17 VITALS
OXYGEN SATURATION: 98 % | DIASTOLIC BLOOD PRESSURE: 54 MMHG | HEIGHT: 61 IN | WEIGHT: 134 LBS | BODY MASS INDEX: 25.3 KG/M2 | SYSTOLIC BLOOD PRESSURE: 118 MMHG | HEART RATE: 92 BPM

## 2024-04-17 DIAGNOSIS — E11.22 TYPE 2 DIABETES MELLITUS WITH STAGE 3B CHRONIC KIDNEY DISEASE, WITH LONG-TERM CURRENT USE OF INSULIN (HCC): ICD-10-CM

## 2024-04-17 DIAGNOSIS — N18.32 TYPE 2 DIABETES MELLITUS WITH STAGE 3B CHRONIC KIDNEY DISEASE, WITH LONG-TERM CURRENT USE OF INSULIN (HCC): ICD-10-CM

## 2024-04-17 DIAGNOSIS — E11.00 HYPEROSMOLAR NON-KETOTIC STATE DUE TO TYPE 2 DIABETES MELLITUS (HCC): ICD-10-CM

## 2024-04-17 DIAGNOSIS — Z79.4 TYPE 2 DIABETES MELLITUS WITH STAGE 3B CHRONIC KIDNEY DISEASE, WITH LONG-TERM CURRENT USE OF INSULIN (HCC): ICD-10-CM

## 2024-04-17 DIAGNOSIS — F33.0 MILD RECURRENT MAJOR DEPRESSION (HCC): Chronic | ICD-10-CM

## 2024-04-17 DIAGNOSIS — A41.51 E. COLI SEPTICEMIA (HCC): Primary | ICD-10-CM

## 2024-04-17 PROCEDURE — 99215 OFFICE O/P EST HI 40 MIN: CPT | Performed by: INTERNAL MEDICINE

## 2024-04-17 PROCEDURE — 3078F DIAST BP <80 MM HG: CPT | Performed by: INTERNAL MEDICINE

## 2024-04-17 PROCEDURE — 1160F RVW MEDS BY RX/DR IN RCRD: CPT | Performed by: INTERNAL MEDICINE

## 2024-04-17 PROCEDURE — 1159F MED LIST DOCD IN RCRD: CPT | Performed by: INTERNAL MEDICINE

## 2024-04-17 PROCEDURE — 3074F SYST BP LT 130 MM HG: CPT | Performed by: INTERNAL MEDICINE

## 2024-04-17 PROCEDURE — 3008F BODY MASS INDEX DOCD: CPT | Performed by: INTERNAL MEDICINE

## 2024-04-17 RX ORDER — LIDOCAINE 4 G/G
1 PATCH TOPICAL DAILY
COMMUNITY
Start: 2023-12-30

## 2024-04-17 RX ORDER — GABAPENTIN 300 MG/1
300 CAPSULE ORAL 3 TIMES DAILY
COMMUNITY
Start: 2024-04-12

## 2024-04-17 NOTE — TELEPHONE ENCOUNTER
Humaira is calling from [facility] wanting to know if dr. Monson can sign home health order for pt above.      Please call Humaira at [phone].

## 2024-04-17 NOTE — PROGRESS NOTES
Ananya Dowling female 73 year old    Here  with  daughter      Chief Complaint   Patient presents with    ER F/U     S/p hosp stay  uro sepsis( e coli)  -  hyperosmolar  DM -  confusion- for 2 weeks  -reviewed records and discussed symptoms (which were nonexistent) workup treatment and follow-up for more than 25 minutes.    She feels better now and her sugars are coming down.  Denies any fever chills back pain.    Never picked up the medicine for depression.    Daughter discussed patient's noncompliance and not listening and currently gave her mother ultimatum that she would be a gar[place] of the state if she did not listen so now mother is finally paying attention to diet and taking medicines as directed by daughter.    Now on 11  long acting  7  short acting      We discussed no symptoms of dysuria back pain flank pain fever or chills.      Patient Active Problem List   Diagnosis    Type 2 diabetes mellitus with stage 3b chronic kidney disease, with long-term current use of insulin ([facility])    Dyslipidemia    Peripheral vascular disease (HCC)    Type 2 diabetes mellitus with hyperglycemia, with long-term current use of insulin ([facility])    Chronic left-sided low back pain without sciatica    Mild recurrent major depression ([facility])          Current Outpatient Medications on File Prior to Visit   Medication Sig Dispense Refill    gabapentin 300 MG Oral Cap Take 1 capsule (300 mg total) by mouth 3 (three) times daily.      lidocaine 4 % External Patch Place 1 patch onto the skin daily.      Insulin Degludec (TRESIBA FLEXTOUCH) 100 UNIT/ML Subcutaneous Solution Pen-injector Inject 36 Units into the skin daily.      Insulin Lispro, 1 Unit Dial, 100 UNIT/ML Subcutaneous Solution Pen-injector Patient will take 14 units with small carb meals and 18-20 units with larger carb meals. 54 mL 1    Glucose Blood (CONTOUR NEXT TEST) In Vitro Strip Check BG 3x daily DX code:E11.65 300 each 0    Insulin Pen Needle (PEN NEEDLES) 32G X 4 MM  Does not apply Misc 1 pen  4 (four) times daily. Use  New pen needle  With each injection 400 each 0    ergocalciferol 1.25 MG (35332 UT) Oral Cap Take 1 capsule (50,000 Units total) by mouth once a week. 12 capsule 0    Glucose Blood (ACCU-CHEK FITO PLUS) In Vitro Strip Use as directed to check blood glucose 3 times/day - DX code:E11.65 using insulin 300 strip 0    simvastatin 20 MG Oral Tab Take 1 tablet (20 mg total) by mouth daily. 90 tablet 2    sertraline 100 MG Oral Tab Take 1 tablet (100 mg total) by mouth daily. 90 tablet 2    losartan 50 MG Oral Tab Take 0.5 tablets (25 mg total) by mouth daily. 90 tablet 2    pregabalin 50 MG Oral Cap Take 1 capsule (50 mg total) by mouth 2 (two) times daily. 60 capsule 0    buPROPion  MG Oral Tablet 24 Hr Take 1 tablet (150 mg total) by mouth daily. (Patient not taking: Reported on 4/17/2024) 30 tablet 0     No current facility-administered medications on file prior to visit.          Vitals:    04/17/24 1454   BP: 118/54   Pulse: 92   VITALSBody mass index is 25.32 kg/m².    Pertinent findings on the physical exam; she is in no distress.  Affect is flat.  Does not seem angry.  Does not seem confused.  Heart is regular lungs are clear.    Ananya was seen today for er f/u.    Diagnoses and all orders for this visit:    E. coli septicemia ([facility])  -     Urinalysis with Culture Reflex; Future    Hyperosmolar non-ketotic state due to type 2 diabetes mellitus ([facility])    Mild recurrent major depression ([facility])    Type 2 diabetes mellitus with stage 3b chronic kidney disease, with long-term current use of insulin ([facility])  -     Microalb/Creat Ratio, Random Urine; Future  -     Comp Metabolic Panel (14); Future         Had a long discussion about her diagnosis of E. coli and septicemia.  She was treated for extended amount of time with IV antibiotics.  Currently has no symptoms discussed the importance of checking her urine in several days to make sure there is no signs of  infection.  Will also check her urine microalbumin and chemistries for her diabetes.    She has a long history of noncompliance and poorly controlled diabetes.  She is now following directions by daughter and hopefully will get her diabetes under control.  She does have an appointment to see endocrinology in June.  We discussed current treatment and recommendations.  Will continue regimen started at the hospital.    Discussed her depression and starting her on the Wellbutrin along with her SSRI.    This was an extended visit that took 40 minutes.            This note was prepared using Dragon Medical voice recognition dictation software and as a result, errors may occur. When identified, these errors have been corrected. While every attempt is made to correct errors during dictation, discrepancies may still exist

## 2024-04-17 NOTE — TELEPHONE ENCOUNTER
Call returned to [facility]/ Humaira messaging need for HH orders from Dr Monson.    Humaira has no clinic information and does not know who requested home care orders for pt who was last seen 3/26/24 for depression and diabetes by Dr Monson. No hospital admission since then per EPIC.  Transferred to / Giana RN.    RN says pt was IP at [facility] 4/3-4/14 for metabolic encephalopathy and HH was ordered post discharge for PT/ OT and nursing to do Med Admin teaching to pt/family.    No IP hospitalization noted for pt in our EPIC version (last admin 12/30/23.  Per [facility] HH nurse patient has 2 EPIC accts and in one acct this episode appears.    Also appears this pt is currently in Dr Arellano office for neuro psych / dementia eval.  Will route to Dr Monson to determine what he believes necessary for HH orders at this juncture.  ___________________  Discussed HH needs of patient w/ Dr Monson. After yesterday's exam, PCP does not believe any HH services are required, hence no orders to be written.    Call p;aced to :[facility] office/ Renate/ Thao Asst. Unable to reach. Vmail message left re; no HH needs at this time.

## 2024-04-19 NOTE — TELEPHONE ENCOUNTER
LM on Humaira Tijerina's vm at [facility] at [phone] that per Dr. Monson there is no need for home health services so he would not be writing orders.

## 2024-04-30 ENCOUNTER — LAB ENCOUNTER (OUTPATIENT)
Dept: LAB | Facility: REFERENCE LAB | Age: 74
End: 2024-04-30
Attending: INTERNAL MEDICINE
Payer: MEDICARE

## 2024-04-30 ENCOUNTER — HOSPITAL ENCOUNTER (OUTPATIENT)
Dept: MAMMOGRAPHY | Age: 74
Discharge: HOME | End: 2024-04-30
Attending: INTERNAL MEDICINE
Payer: MEDICARE

## 2024-04-30 DIAGNOSIS — E11.22 TYPE 2 DIABETES MELLITUS WITH STAGE 3B CHRONIC KIDNEY DISEASE, WITH LONG-TERM CURRENT USE OF INSULIN (HCC): ICD-10-CM

## 2024-04-30 DIAGNOSIS — N18.32 TYPE 2 DIABETES MELLITUS WITH STAGE 3B CHRONIC KIDNEY DISEASE, WITH LONG-TERM CURRENT USE OF INSULIN (HCC): ICD-10-CM

## 2024-04-30 DIAGNOSIS — Z12.31 BREAST CANCER SCREENING BY MAMMOGRAM: ICD-10-CM

## 2024-04-30 DIAGNOSIS — Z79.4 TYPE 2 DIABETES MELLITUS WITH STAGE 3B CHRONIC KIDNEY DISEASE, WITH LONG-TERM CURRENT USE OF INSULIN (HCC): ICD-10-CM

## 2024-04-30 LAB
ALBUMIN SERPL-MCNC: 4.1 G/DL (ref 3.2–4.8)
ALBUMIN/GLOB SERPL: 1.2 {RATIO} (ref 1–2)
ALP LIVER SERPL-CCNC: 83 U/L
ALT SERPL-CCNC: 24 U/L
ANION GAP SERPL CALC-SCNC: 8 MMOL/L (ref 0–18)
AST SERPL-CCNC: 31 U/L (ref ?–34)
BILIRUB SERPL-MCNC: 0.3 MG/DL (ref 0.2–1.1)
BUN BLD-MCNC: 25 MG/DL (ref 9–23)
BUN/CREAT SERPL: 25 (ref 10–20)
CALCIUM BLD-MCNC: 9.6 MG/DL (ref 8.7–10.4)
CHLORIDE SERPL-SCNC: 99 MMOL/L (ref 98–112)
CO2 SERPL-SCNC: 26 MMOL/L (ref 21–32)
CREAT BLD-MCNC: 1 MG/DL
EGFRCR SERPLBLD CKD-EPI 2021: 60 ML/MIN/1.73M2 (ref 60–?)
FASTING STATUS PATIENT QL REPORTED: YES
GLOBULIN PLAS-MCNC: 3.4 G/DL (ref 2.8–4.4)
GLUCOSE BLD-MCNC: 405 MG/DL (ref 70–99)
OSMOLALITY SERPL CALC.SUM OF ELEC: 297 MOSM/KG (ref 275–295)
POTASSIUM SERPL-SCNC: 4.3 MMOL/L (ref 3.5–5.1)
PROT SERPL-MCNC: 7.5 G/DL (ref 5.7–8.2)
SODIUM SERPL-SCNC: 133 MMOL/L (ref 136–145)

## 2024-04-30 PROCEDURE — 77067 SCR MAMMO BI INCL CAD: CPT | Performed by: INTERNAL MEDICINE

## 2024-04-30 PROCEDURE — 77063 BREAST TOMOSYNTHESIS BI: CPT | Performed by: INTERNAL MEDICINE

## 2024-04-30 PROCEDURE — 80053 COMPREHEN METABOLIC PANEL: CPT

## 2024-04-30 PROCEDURE — 36415 COLL VENOUS BLD VENIPUNCTURE: CPT

## 2024-05-01 ENCOUNTER — TELEPHONE (OUTPATIENT)
Dept: ENDOCRINOLOGY CLINIC | Facility: CLINIC | Age: 74
End: 2024-05-01

## 2024-05-01 ENCOUNTER — TELEPHONE (OUTPATIENT)
Facility: CLINIC | Age: 74
End: 2024-05-01

## 2024-05-01 DIAGNOSIS — Z79.4 TYPE 2 DIABETES MELLITUS WITH HYPERGLYCEMIA, WITH LONG-TERM CURRENT USE OF INSULIN (HCC): Primary | ICD-10-CM

## 2024-05-01 DIAGNOSIS — E11.65 TYPE 2 DIABETES MELLITUS WITH HYPERGLYCEMIA, WITH LONG-TERM CURRENT USE OF INSULIN (HCC): Primary | ICD-10-CM

## 2024-05-01 LAB — GLUCOSE BLDC GLUCOMTR-MCNC: 390 MG/DL (ref 70–99)

## 2024-05-01 NOTE — TELEPHONE ENCOUNTER
Patients daughter Xcohitl calling regarding test strips, indicates Madhavi/[facility]ele[facility] has been sending E-scripts and patient is now out. Please update daughter [phone],thanks.

## 2024-05-01 NOTE — TELEPHONE ENCOUNTER
Spoke to daughter who confirmed correct test strip was sent. Daughter mentioned patient was hospitalized with hyperglycemia of 600 recently due to noncompliance with diabetic medications.     Patient also diagnosed with Ecoli and UTI.     RN offered sooner appointment. Daughter agreed to reschedule with Dr Lees.     Appointment booked for 5/9/24 at 0930 am.

## 2024-05-01 NOTE — TELEPHONE ENCOUNTER
Rx Contour next test strips sent to [facility] to call us if different brand is needed.         Passed protocol for test strips - Endocrine Refill protocol for Glucose testing supplies       Protocol Criteria:  Appointment with Endocrinology completed in the last 12 months or scheduled in the next 6 months     Verify appointment has been completed or scheduled in the appropriate timeline. If so can send a 90 day supply with 1 refill.        Last completed office visit:11/21/2023  Next scheduled Follow up: 06/05/24

## 2024-05-01 NOTE — TELEPHONE ENCOUNTER
Patient daughter calling in regards to prescription for test strips.  Pharmacy informed her that it will required a Prior Authorization

## 2024-05-02 ENCOUNTER — HOSPITAL ENCOUNTER (INPATIENT)
Facility: HOSPITAL | Age: 74
LOS: 5 days | Discharge: NURSING, REHAB OR LONG-TERM CARE | DRG: 690 | End: 2024-05-07
Attending: EMERGENCY MEDICINE | Admitting: STUDENT IN AN ORGANIZED HEALTH CARE EDUCATION/TRAINING PROGRAM
Payer: MEDICARE

## 2024-05-02 DIAGNOSIS — D72.829 LEUKOCYTOSIS, UNSPECIFIED TYPE: ICD-10-CM

## 2024-05-02 DIAGNOSIS — N39.0 URINARY TRACT INFECTION WITHOUT HEMATURIA, SITE UNSPECIFIED: ICD-10-CM

## 2024-05-02 DIAGNOSIS — R73.9 HYPERGLYCEMIA: ICD-10-CM

## 2024-05-02 DIAGNOSIS — E87.1 HYPONATREMIA: Primary | ICD-10-CM

## 2024-05-02 LAB
ANION GAP SERPL CALC-SCNC: 3 MMOL/L (ref 0–18)
ANION GAP SERPL CALC-SCNC: 6 MMOL/L (ref 0–18)
BASOPHILS # BLD AUTO: 0.05 X10(3) UL (ref 0–0.2)
BASOPHILS NFR BLD AUTO: 0.3 %
BILIRUB UR QL: NEGATIVE
BUN BLD-MCNC: 12 MG/DL (ref 9–23)
BUN BLD-MCNC: 21 MG/DL (ref 9–23)
BUN BLD-MCNC: 21 MG/DL (ref 9–23)
BUN/CREAT SERPL: 16.7 (ref 10–20)
BUN/CREAT SERPL: 21.4 (ref 10–20)
CALCIUM BLD-MCNC: 9.2 MG/DL (ref 8.7–10.4)
CALCIUM BLD-MCNC: 9.4 MG/DL (ref 8.7–10.4)
CHLORIDE SERPL-SCNC: 106 MMOL/L (ref 98–112)
CHLORIDE SERPL-SCNC: 98 MMOL/L (ref 98–112)
CO2 SERPL-SCNC: 25 MMOL/L (ref 21–32)
CO2 SERPL-SCNC: 27 MMOL/L (ref 21–32)
CREAT BLD-MCNC: 0.72 MG/DL
CREAT BLD-MCNC: 0.98 MG/DL
DEPRECATED RDW RBC AUTO: 39.4 FL (ref 35.1–46.3)
DEPRECATED RDW RBC AUTO: 40.4 FL (ref 35.1–46.3)
EGFRCR SERPLBLD CKD-EPI 2021: 61 ML/MIN/1.73M2 (ref 60–?)
EGFRCR SERPLBLD CKD-EPI 2021: 89 ML/MIN/1.73M2 (ref 60–?)
EOSINOPHIL # BLD AUTO: 0.03 X10(3) UL (ref 0–0.7)
EOSINOPHIL NFR BLD AUTO: 0.2 %
ERYTHROCYTE [DISTWIDTH] IN BLOOD BY AUTOMATED COUNT: 13 % (ref 11–15)
ERYTHROCYTE [DISTWIDTH] IN BLOOD BY AUTOMATED COUNT: 13.1 % (ref 11–15)
EST. AVERAGE GLUCOSE BLD GHB EST-MCNC: 309 MG/DL (ref 68–126)
GLUCOSE BLD-MCNC: 218 MG/DL (ref 70–99)
GLUCOSE BLD-MCNC: 390 MG/DL (ref 70–99)
GLUCOSE BLDC GLUCOMTR-MCNC: 235 MG/DL (ref 70–99)
GLUCOSE BLDC GLUCOMTR-MCNC: 277 MG/DL (ref 70–99)
GLUCOSE BLDC GLUCOMTR-MCNC: 281 MG/DL (ref 70–99)
GLUCOSE BLDC GLUCOMTR-MCNC: 327 MG/DL (ref 70–99)
GLUCOSE BLDC GLUCOMTR-MCNC: 334 MG/DL (ref 70–99)
GLUCOSE UR-MCNC: >1000 MG/DL
HBA1C MFR BLD: 12.4 % (ref ?–5.7)
HCT VFR BLD AUTO: 36.8 %
HCT VFR BLD AUTO: 39.3 %
HGB BLD-MCNC: 12.5 G/DL
HGB BLD-MCNC: 13 G/DL
IMM GRANULOCYTES # BLD AUTO: 0.1 X10(3) UL (ref 0–1)
IMM GRANULOCYTES NFR BLD: 0.5 %
KETONES UR-MCNC: 20 MG/DL
LACTATE SERPL-SCNC: 1.2 MMOL/L (ref 0.5–2)
LEUKOCYTE ESTERASE UR QL STRIP.AUTO: 500
LYMPHOCYTES # BLD AUTO: 1.49 X10(3) UL (ref 1–4)
LYMPHOCYTES NFR BLD AUTO: 7.7 %
MCH RBC QN AUTO: 28 PG (ref 26–34)
MCH RBC QN AUTO: 28 PG (ref 26–34)
MCHC RBC AUTO-ENTMCNC: 33.1 G/DL (ref 31–37)
MCHC RBC AUTO-ENTMCNC: 34 G/DL (ref 31–37)
MCV RBC AUTO: 82.5 FL
MCV RBC AUTO: 84.5 FL
MONOCYTES # BLD AUTO: 0.94 X10(3) UL (ref 0.1–1)
MONOCYTES NFR BLD AUTO: 4.8 %
NEUTROPHILS # BLD AUTO: 16.86 X10 (3) UL (ref 1.5–7.7)
NEUTROPHILS # BLD AUTO: 16.86 X10(3) UL (ref 1.5–7.7)
NEUTROPHILS NFR BLD AUTO: 86.5 %
OSMOLALITY SERPL CALC.SUM OF ELEC: 287 MOSM/KG (ref 275–295)
OSMOLALITY SERPL CALC.SUM OF ELEC: 288 MOSM/KG (ref 275–295)
PH UR: 5.5 [PH] (ref 5–8)
PLATELET # BLD AUTO: 236 10(3)UL (ref 150–450)
PLATELET # BLD AUTO: 257 10(3)UL (ref 150–450)
POTASSIUM SERPL-SCNC: 3.7 MMOL/L (ref 3.5–5.1)
POTASSIUM SERPL-SCNC: 4.8 MMOL/L (ref 3.5–5.1)
PROT UR-MCNC: 30 MG/DL
RBC # BLD AUTO: 4.46 X10(6)UL
RBC # BLD AUTO: 4.65 X10(6)UL
RBC #/AREA URNS AUTO: >10 /HPF
SODIUM SERPL-SCNC: 129 MMOL/L (ref 136–145)
SODIUM SERPL-SCNC: 136 MMOL/L (ref 136–145)
SP GR UR STRIP: 1.02 (ref 1–1.03)
UROBILINOGEN UR STRIP-ACNC: NORMAL
WBC # BLD AUTO: 16.4 X10(3) UL (ref 4–11)
WBC # BLD AUTO: 19.5 X10(3) UL (ref 4–11)
WBC #/AREA URNS AUTO: >50 /HPF

## 2024-05-02 PROCEDURE — 99223 1ST HOSP IP/OBS HIGH 75: CPT | Performed by: STUDENT IN AN ORGANIZED HEALTH CARE EDUCATION/TRAINING PROGRAM

## 2024-05-02 RX ORDER — POLYETHYLENE GLYCOL 3350 17 G/17G
17 POWDER, FOR SOLUTION ORAL DAILY PRN
Status: DISCONTINUED | OUTPATIENT
Start: 2024-05-02 | End: 2024-05-07

## 2024-05-02 RX ORDER — ACETAMINOPHEN 500 MG
500 TABLET ORAL EVERY 4 HOURS PRN
Status: DISCONTINUED | OUTPATIENT
Start: 2024-05-02 | End: 2024-05-07

## 2024-05-02 RX ORDER — ONDANSETRON 2 MG/ML
4 INJECTION INTRAMUSCULAR; INTRAVENOUS EVERY 6 HOURS PRN
Status: DISCONTINUED | OUTPATIENT
Start: 2024-05-02 | End: 2024-05-07

## 2024-05-02 RX ORDER — ENOXAPARIN SODIUM 100 MG/ML
40 INJECTION SUBCUTANEOUS DAILY
Status: DISCONTINUED | OUTPATIENT
Start: 2024-05-02 | End: 2024-05-07

## 2024-05-02 RX ORDER — PRAVASTATIN SODIUM 10 MG
10 TABLET ORAL NIGHTLY
Status: DISCONTINUED | OUTPATIENT
Start: 2024-05-02 | End: 2024-05-07

## 2024-05-02 RX ORDER — BUPROPION HYDROCHLORIDE 150 MG/1
150 TABLET ORAL DAILY
Status: DISCONTINUED | OUTPATIENT
Start: 2024-05-02 | End: 2024-05-07

## 2024-05-02 RX ORDER — SENNOSIDES 8.6 MG
17.2 TABLET ORAL NIGHTLY PRN
Status: DISCONTINUED | OUTPATIENT
Start: 2024-05-02 | End: 2024-05-07

## 2024-05-02 RX ORDER — ENEMA 19; 7 G/133ML; G/133ML
1 ENEMA RECTAL ONCE AS NEEDED
Status: DISCONTINUED | OUTPATIENT
Start: 2024-05-02 | End: 2024-05-07

## 2024-05-02 RX ORDER — DEXTROSE MONOHYDRATE 25 G/50ML
50 INJECTION, SOLUTION INTRAVENOUS
Status: DISCONTINUED | OUTPATIENT
Start: 2024-05-02 | End: 2024-05-07

## 2024-05-02 RX ORDER — GABAPENTIN 300 MG/1
300 CAPSULE ORAL 3 TIMES DAILY
Status: DISCONTINUED | OUTPATIENT
Start: 2024-05-02 | End: 2024-05-07

## 2024-05-02 RX ORDER — NICOTINE POLACRILEX 4 MG
30 LOZENGE BUCCAL
Status: DISCONTINUED | OUTPATIENT
Start: 2024-05-02 | End: 2024-05-07

## 2024-05-02 RX ORDER — BISACODYL 10 MG
10 SUPPOSITORY, RECTAL RECTAL
Status: DISCONTINUED | OUTPATIENT
Start: 2024-05-02 | End: 2024-05-07

## 2024-05-02 RX ORDER — LOSARTAN POTASSIUM 25 MG/1
25 TABLET ORAL DAILY
Status: DISCONTINUED | OUTPATIENT
Start: 2024-05-02 | End: 2024-05-03

## 2024-05-02 RX ORDER — NICOTINE POLACRILEX 4 MG
15 LOZENGE BUCCAL
Status: DISCONTINUED | OUTPATIENT
Start: 2024-05-02 | End: 2024-05-07

## 2024-05-02 RX ORDER — SERTRALINE HYDROCHLORIDE 100 MG/1
100 TABLET, FILM COATED ORAL DAILY
Status: DISCONTINUED | OUTPATIENT
Start: 2024-05-02 | End: 2024-05-07

## 2024-05-02 RX ORDER — PREGABALIN 50 MG/1
50 CAPSULE ORAL 2 TIMES DAILY
Status: DISCONTINUED | OUTPATIENT
Start: 2024-05-02 | End: 2024-05-07

## 2024-05-02 NOTE — ED QUICK NOTES
Orders for admission, patient is aware of plan and ready to go upstairs. Any questions, please call ED RN Anne at extension [phone].     Patient Covid vaccination status: Fully vaccinated     COVID Test Ordered in ED: None    COVID Suspicion at Admission: N/A    Running Infusions:      Mental Status/LOC at time of transport: a&ox4    Other pertinent information:   CIWA score: N/A   NIH score:  N/A

## 2024-05-02 NOTE — OCCUPATIONAL THERAPY NOTE
OCCUPATIONAL THERAPY EVALUATION - INPATIENT     Room Number: 455/455-A  Evaluation Date: 5/2/2024  Type of Evaluation: Initial  Presenting Problem: hyperglycemia, UTI    Physician Order: IP Consult to Occupational Therapy  Reason for Therapy: ADL/IADL Dysfunction and Discharge Planning    OCCUPATIONAL THERAPY ASSESSMENT   Patient is a 73 year old female admitted 5/2/2024.  Prior to admission, patient's baseline is mod I.  Patient is currently functioning below baseline with ADLs, functional transfers and functional mobility.  Patient is requiring minimal assist and moderate assist as a result of the following impairments: decreased functional strength, decreased functional reach, decreased endurance, impaired   balance, and decreased muscular endurance. Occupational Therapy will continue to follow for duration of hospitalization.    Patient will benefit from continued skilled OT Services to promote return to prior level of function and safety with continuous assistance and gradual rehabilitative therapy     PLAN  OT Treatment Plan: Balance activities;Energy conservation/work simplification techniques;ADL training;Functional transfer training;UE strengthening/ROM;Endurance training;Patient/Family training;Patient/Family education;Equipment eval/education;Compensatory technique education  OT Device Recommendations: TBD    OCCUPATIONAL THERAPY MEDICAL/SOCIAL HISTORY   Problem List  Principal Problem:    Hyperglycemia  Active Problems:    Leukocytosis, unspecified type    Urinary tract infection without hematuria, site unspecified    HOME SITUATION  Type of Home: House  Home Layout: Two level; Bed/bath upstairs  Lives With: Daughter; Family  Toilet and Equipment: Standard height toilet  Shower/Tub and Equipment: Tub-shower combo  Other Equipment: Other (Comment) (owns cane, walker and uses intermittently)  Drives: No  Patient Regularly Uses: None      Prior Level of [place]: mod I, intermittent walker/cane use as  needed. Pt reported having 1 fall at home in the past 2 weeks and has had a recent admission for UTI    SUBJECTIVE  \"I am tired today\"    OCCUPATIONAL THERAPY EXAMINATION    OBJECTIVE  Precautions: Bed/chair alarm  Fall Risk: High fall risk    PAIN ASSESSMENT  Ratin    COGNITION  Orientation Level:  oriented to place, oriented to time, oriented to situation, and oriented to person  Follows all commands, sometimes required increased time for processing/motor planning    VISION  Current Vision: no visual deficits      Communication: WFL    Behavioral/Emotional/Social: WFL    RANGE OF MOTION   Upper extremity ROM is within functional limits     STRENGTH ASSESSMENT  Upper extremity strength is within functional limits     COORDINATION  Gross Motor: WFL   Fine Motor: WFL     ACTIVITIES OF DAILY LIVING ASSESSMENT  AM-PAC ‘6-Clicks’ Inpatient Daily Activity Short Form  How much help from another person does the patient currently need…  -   Putting on and taking off regular lower body clothing?: A Lot  -   Bathing (including washing, rinsing, drying)?: A Little  -   Toileting, which includes using toilet, bedpan or urinal? : A Little  -   Putting on and taking off regular upper body clothing?: None  -   Taking care of personal grooming such as brushing teeth?: A Little  -   Eating meals?: None    AM-PAC Score:  Score: 19  Approx Degree of Impairment: 42.8%  Standardized Score (AM-PAC Scale): 40.22  CMS Modifier (G-Code): CK    FUNCTIONAL TRANSFER ASSESSMENT  Sit to Stand: Edge of Bed  Edge of Bed: Moderate Assist    BED MOBILITY  Supine to Sit : Minimal Assist  Sit to Supine (OT): Minimal Assist    BALANCE ASSESSMENT  Static Sitting: Minimal Assist (progressing to CGA)  Static Standing: Moderate Assist (min-mod A, initially with posterior lean requiring mod A for balance and progressing to min A with cues for posture/positioning)    FUNCTIONAL ADL ASSESSMENT  Grooming Seated: Supervision  LB Dressing Seated: Moderate  Assist  Toileting Standing: Minimal Assist (standing with simulated reaching)       Skilled Therapy Provided: RN approved session. Limited by fatigue and LE weakness. Required min-mod A for functional transfers/mobility and ADLs. Left supine with all needs met. RN aware.    EDUCATION PROVIDED  Patient: Role of Occupational Therapy; Plan of Care; Functional Transfer Techniques; Fall Prevention; Posture/Positioning; Compensatory ADL Techniques  Patient's Response to Education: Verbalized Understanding; Returned Demonstration; Requires Further Education    The patient's Approx Degree of Impairment: 42.8% has been calculated based on documentation in the [facility] '6 clicks' Inpatient Daily Activity Short Form.  Research supports that patients with this level of impairment may benefit from HHOT however would benefit from rehab as pt has had recent falls at home and recent admit.  Final disposition will be made by interdisciplinary medical team.     Patient End of Session: In bed;Needs met;Call light within reach;RN aware of session/findings;All patient questions and concerns addressed;Alarm set    OT Goals  Patients self stated goal is: improved strength for ADLs     Patient will complete functional transfer with mod I  Comment:     Patient will complete toileting with mod I  Comment:     Patient will tolerate standing for 5 minutes in prep for adls with mod I   Comment:    Patient will complete LB dressing with mod I and AE PRN  Comment:          Goals  on: 2024  Frequency: 3-5x/wk    Patient Evaluation Complexity Level:   Occupational Profile/Medical History MODERATE - Expanded review of history including review of medical or therapy record   Specific performance deficits impacting engagement in ADL/IADL MODERATE  3 - 5 performance deficits   Client Assessment/Performance Deficits MODERATE - Comorbidities and min to mod modifications of tasks    Clinical Decision Making MODERATE - Analysis of occupational  profile, detailed assessments, several treatment options    Overall Complexity MODERATE     OT Session Time: 25 minutes    Therapeutic Activity: 15 minutes  10 min zena Macdonald OTR/L

## 2024-05-02 NOTE — ED PROVIDER NOTES
Patient Seen in: [facility] Emergency Department      History     Chief Complaint   Patient presents with    Hyperglycemia    Nausea/Vomiting/Diarrhea     Stated Complaint: abd pain    Subjective:   HPI    Pt is 72 yo F who p/w 1 day of vomiting and diarrhea. +weakness and fatigue. No fevers, cough, headaches or body aches. No abdominal pain. Hospitalized two weeks ago for sepsis/UTI. Currently not on antibiotics. States glucoses have been running high despite insulin.     Objective:   No pertinent past medical history.            No pertinent past surgical history.              No pertinent social history.            Review of Systems    Positive for stated complaint: abd pain  Other systems are as noted in HPI.  Constitutional and vital signs reviewed.      All other systems reviewed and negative except as noted above.    Physical Exam     ED Triage Vitals   BP 05/01/24 2306 158/76   Pulse 05/01/24 2306 118   Resp 05/01/24 2306 22   Temp 05/01/24 2306 98.3 °F (36.8 °C)   Temp src --    SpO2 05/01/24 2306 96 %   O2 Device 05/02/24 0034 None (Room air)       Current:/66   Pulse 97   Temp 98.3 °F (36.8 °C)   Resp 16   SpO2 98%         Physical Exam    GENERAL: No acute distress, awake and alert  HEENT: EOMI, PERRL, MMM  Neck: supple  CV: tachycardic no murmurs  Resp: CTAB, no wheezes or retractions  Ab: soft, nontender, no distension, no cvat  Extremities: FROM of all extremities  Neuro: CN intact, normal speech, 5/5 motor strength in all extremities, no focal deficits  SKIN: warm, dry, no rashes      ED Course     Labs Reviewed   BASIC METABOLIC PANEL (8) - Abnormal; Notable for the following components:       Result Value    Glucose 390 (*)     Sodium 129 (*)     BUN/CREA Ratio 21.4 (*)     All other components within normal limits   URINALYSIS WITH CULTURE REFLEX - Abnormal; Notable for the following components:    Clarity Urine Turbid (*)     Glucose Urine >1000 (*)     Ketones Urine 20 (*)      Blood Urine 1+ (*)     Protein Urine 30 (*)     Nitrite Urine 2+ (*)     Leukocyte Esterase Urine 500 (*)     WBC Urine >50 (*)     RBC Urine >10 (*)     Bacteria Urine Rare (*)     Squamous Epi. Cells Few (*)     All other components within normal limits   POCT GLUCOSE - Abnormal; Notable for the following components:    POC Glucose  390 (*)     All other components within normal limits   CBC W/ DIFFERENTIAL - Abnormal; Notable for the following components:    WBC 19.5 (*)     Neutrophil Absolute Prelim 16.86 (*)     Neutrophil Absolute 16.86 (*)     All other components within normal limits   LACTIC ACID, PLASMA - Normal   REDRAW BMP - Normal   CBC WITH DIFFERENTIAL WITH PLATELET    Narrative:     The following orders were created for panel order CBC With Differential With Platelet.  Procedure                               Abnormality         Status                     ---------                               -----------         ------                     CBC W/ DIFFERENTIAL[[id number]]          Abnormal            Final result                 Please view results for these tests on the individual orders.   BLOOD CULTURE   BLOOD CULTURE   URINE CULTURE, ROUTINE        MDM         Medical Decision Making  Pt given 2 L IVF  Labs remarkable for low NA, high glucose, leukocytosis. Blood cultures pending    Pt started on IV meropenem given resistances in past cultures.     Amount and/or Complexity of Data Reviewed  External Data Reviewed: labs and notes.     Details: Recent pcp notes, lab results reviewed  Labs: ordered.  Discussion of management or test interpretation with external provider(s): D/w dr Houston    Risk  Decision regarding hospitalization.        Disposition and Plan     Clinical Impression:  1. Hyponatremia    2. Hyperglycemia    3. Leukocytosis, unspecified type    4. Urinary tract infection without hematuria, site unspecified         Disposition:  Admit  5/2/2024  3:35 am    Follow-up:  No follow-up  provider specified.  We recommend that you schedule follow up care with a primary care provider within the next three months to obtain basic health screening including reassessment of your blood pressure.      Medications Prescribed:  Current Discharge Medication List                            Hospital Problems       Present on Admission  Date Reviewed: 4/17/2024            ICD-10-CM Noted POA    * (Principal) Hyponatremia E87.1 5/2/2024 Unknown

## 2024-05-02 NOTE — TELEPHONE ENCOUNTER
Called pt daughter per [facility] and was notified that pt has contour next machine and has bought test strips since paperwork is needed before they can dispense.   Called pharmacy and was notified that they need CMN form and clinic notes. Pharm tech states that they will fax paperwork.

## 2024-05-02 NOTE — PHYSICAL THERAPY NOTE
PHYSICAL THERAPY EVALUATION - INPATIENT     Room Number: 455/455-A  Evaluation Date: 5/2/2024  Type of Evaluation: Initial   Physician Order: PT Eval and Treat    Presenting Problem: hyperglycemia , nausea, vomiting, diarrhea  Co-Morbidities : recent hx of UTI  Reason for Therapy: Mobility Dysfunction and Discharge Planning    PHYSICAL THERAPY ASSESSMENT   Patient is a 73 year old female admitted 5/2/2024 for hyperglycemia, nausea, vomiting, diarrhea.  Prior to admission, patient's baseline is independent with personal ADLs, assist with IADLs, ambulates with cane or RW.  Patient is currently functioning below baseline with bed mobility, transfers, gait, stair negotiation, standing prolonged periods, and performing household tasks.  Patient is requiring contact guard assist, minimal assist, and moderate assist as a result of the following impairments: decreased functional strength, impaired dynamic standing balance, decreased muscular endurance, and medical status.  Physical Therapy will continue to follow for duration of hospitalization.    Patient will benefit from continued skilled PT Services to promote return to prior level of function and safety with continuous assistance and gradual rehabilitative therapy .    PLAN  PT Treatment Plan: Bed mobility;Body mechanics;Endurance;Energy conservation;Patient education;Gait training;Strengthening;Stair training;Balance training;Transfer training  Rehab Potential : Good  Frequency (Obs): 3-5x/week    PHYSICAL THERAPY MEDICAL/SOCIAL HISTORY     Problem List  Principal Problem:    Hyperglycemia  Active Problems:    Leukocytosis, unspecified type    Urinary tract infection without hematuria, site unspecified      HOME SITUATION  Home Situation  Type of Home: House  Home Layout: Two level;Bed/bath upstairs  Stairs to Enter : 4  Railing: Yes  Stairs to Bedroom: 6  Railing: Yes  Lives With: Daughter;Family  Drives: No  Patient Owned Equipment: Rolling walker;Cane  Patient  Regularly Uses: None     Prior Level of [place]: per pt report, independent with ADLs and functional mobility with use of a cane and/or RW    SUBJECTIVE  \"I'm just tired\"    PHYSICAL THERAPY EXAMINATION   OBJECTIVE  Precautions: Bed/chair alarm  Fall Risk: High fall risk    WEIGHT BEARING RESTRICTION  Weight Bearing Restriction: None    PAIN ASSESSMENT  Ratin    COGNITION  Overall Cognitive Status:  WFL - within functional limits    RANGE OF MOTION AND STRENGTH ASSESSMENT  Upper extremity ROM and strength are within functional limits.  Lower extremity ROM is within functional limits.  Lower extremity strength is within functional limits.    BALANCE  Static Sitting: Good  Dynamic Sitting: Fair +  Static Standing: Poor +  Dynamic Standing: Poor    AM-PAC '6-Clicks' INPATIENT SHORT FORM - BASIC MOBILITY  How much difficulty does the patient currently have...  Patient Difficulty: Turning over in bed (including adjusting bedclothes, sheets and blankets)?: A Little   Patient Difficulty: Sitting down on and standing up from a chair with arms (e.g., wheelchair, bedside commode, etc.): A Little   Patient Difficulty: Moving from lying on back to sitting on the side of the bed?: A Little   How much help from another person does the patient currently need...   Help from Another: Moving to and from a bed to a chair (including a wheelchair)?: A Lot   Help from Another: Need to walk in hospital room?: A Lot   Help from Another: Climbing 3-5 steps with a railing?: A Lot     AM-PAC Score:  Raw Score: 15   Approx Degree of Impairment: 57.7%   Standardized Score (AM-PAC Scale): 39.45   CMS Modifier (G-Code): CK    FUNCTIONAL ABILITY STATUS  Functional Mobility/Gait Assessment  Gait Assistance: Moderate assistance  Distance (ft): 30  Assistive Device: Rolling walker  Pattern: Shuffle (slow pace, narrow DUC, posterior lean, unsteady with 1 posterior LOB requring mod A to correct.)  Supine to Sit: contact guard assist  Sit to  Supine: contact guard assist  Sit to Stand: minimal assist    Exercise/Education Provided:  Bed mobility  Body mechanics  Energy conservation  Functional activity tolerated  Gait training  Posture  Transfer training    RN approved PT eval. Pt received resting in bed. Introduced self and role. Educated on PT plan of care, benefits of oob mobility and goals for today. Pt verbalized understanding. Demos bed mobility with CGA. STS transfer with RW and Min A. Initially with significant posterior lean on bed with standing, requires increased time and VC for anterior weight shift. Ambulated short distance in room with RW and mod A. Overall very unsteady with continued posterior lean and +LOB requiring mod A to correct. Returned to bed and left with needs within reach, bed alarm on, handoff to RN complete.     The patient's Approx Degree of Impairment: 57.7% has been calculated based on documentation in the [facility] '6 clicks' Inpatient Basic Mobility Short Form.  Research supports that patients with this level of impairment may benefit from a rehab facility.  Final disposition will be made by interdisciplinary medical team.    Patient End of Session: In bed;Needs met;Call light within reach;RN aware of session/findings;All patient questions and concerns addressed;Alarm set;SCDs in place    CURRENT GOALS  Goals to be met by: 5/8/24  Patient Goal Patient's self-stated goal is: go home   Goal #1 Patient is able to demonstrate supine - sit EOB @ level: supervision     Goal #1   Current Status    Goal #2 Patient is able to demonstrate transfers Sit to/from Stand at assistance level: supervision with walker - rolling     Goal #2  Current Status    Goal #3 Patient is able to ambulate 100 feet with assist device: walker - rolling at assistance level: supervision   Goal #3   Current Status    Goal #4 Patient will negotiate 4 stairs/one curb w/ assistive device and supervision   Goal #4   Current Status    Goal #5 Patient to  demonstrate independence with home activity/exercise instructions provided to patient in preparation for discharge.   Goal #5   Current Status    Goal #6    Goal #6  Current Status      Patient Evaluation Complexity Level:  History Moderate - 1 or 2 personal factors and/or co-morbidities   Examination of body systems Moderate - addressing a total of 3 or more elements   Clinical Presentation  Moderate - Evolving   Clinical Decision Making  Moderate Complexity     Therapeutic Activity:  15 minutes

## 2024-05-02 NOTE — ED INITIAL ASSESSMENT (HPI)
Patient arrives through triage in wheelchair with c/o of hyperglycemia as well as Nausea, vomiting, diarrhea. Patient states ecoli in bloodstream during recent hospitalization.

## 2024-05-02 NOTE — CONSULTS
[facility] Note:  Renal Adjustment for meropenem (MERREM)    Ananya Dowling is a 73 year old patient who has been prescribed meropenem (MERREM) 500 mg every 12 hrs.  The estimated creatinine clearance is 53.3 mL/min (based on SCr of 0.72 mg/dL). The dose has been adjusted to meropenem (MERREM) 500 mg every 8 hrs per hospital renal dose adjustment protocol for treatment of UTI.  Pharmacy will follow and adjust dose as warranted for additional renal function changes.    Thank you,    Kristyn Ramirez, PharmD  5/2/2024  10:51 AM

## 2024-05-02 NOTE — H&P
[facility]  part of [facility]    History & Physical    Ananya Dowling Patient Status:  Emergency    1950 MRN [id number]   Location [facility] EMERGENCY DEPARTMENT Attending Brittany Jaimes MD   Hosp Day # 0 PCP Jermaine Monson MD     Date:  2024  Date of Admission:  2024    Chief Complaint:  Chief Complaint   Patient presents with    Hyperglycemia    Nausea/Vomiting/Diarrhea       Assessment and Plan:    73-year-old female presents for evaluation of fatigue/nausea/elevated blood glucose.  Patient with recent brief hospitalization for E. coli septicemia, on presentation found to have asymptomatic urinary tract infection as well as elevated blood glucose of 390.  Patient admitted for IV antibiotics and management of blood glucose.    Recent E. coli septicemia 2024  Asymptomatic urinary tract infection  -Urinalysis grossly positive for UTI.  Per chart review, previous urine culture from 3/18/2023 with Enterobacter, resistant to Rocephin.  Patient received a dose of meropenem in the ED  -Given her recent history of E. coli septicemia and previous resistance, will continue with meropenem pending urine cultures, de-escalate IV antibiotic therapy once sensitivities become available    Fatigue  Hyperglycemia  Uncontrolled IDDM type II  -Most recent hemoglobin A1c 10 2023.  Blood glucose on presentation noted to be elevated 390.  -Hold home dose of glipizide  -Resume home dose Tresiba 36 units and initiate diabetic diet.  Check hemoglobin A1c.  Possibly noncompliant with diet/insulin administration.  Initiate diabetic diet.  Insulin sliding scale.  Hypoglycemia protocol.    Pseudohyponatremia  -Sodium level noted to be 129 on presentation but when corrected for elevated blood glucose it is actually 134    Other medical conditions  Essential hypertension  Hyperlipidemia  Depression  Arthritis    Prophylaxis  Lovenox    History of Present Illness:  Ananya Dowling is  a(n) 73 year old female, who presents for evaluation of nausea, diarrhea, elevated blood glucose.  Patient reports that she was recently hospitalized at an outside facility but unclear of why and per chart review, patient was actually hospitalized for E. coli septicemia 2 weeks ago.  Patient was discharged home and was doing okay but has had difficulty controlling her blood glucose.  Every time she checks her sugar is in the 300s.  She reports nausea but no vomiting.  She had 1 episode of watery diarrhea today.  She does report chills but no fever.  Denies any chest pain or shortness of breath.  Denies any lightheadedness or dizziness.  Patient denies symptoms of dysuria or increased frequency or urgency on urination.  Denies any numbness or tingling sensation in her extremities.  On presentation to the ED, initial vital signs reveal temp 98.3, heart rate 118, blood pressure 158/76, respiratory rate 22.  Lab work reveals blood glucose 390, sodium level 129 but when corrected for elevated blood glucose, it is actually 134.  Urinalysis grossly positive for UTI.  Patient received 2 L IV fluid and started on meropenem.  Patient admitted under hospitalist service for further evaluation management.    History:  Past Medical History:    Arthritis    Back problem    Depression    Diabetes (HCC)    Essential hypertension    High blood pressure    High cholesterol    Visual impairment     No past surgical history on file.  Family History   Problem Relation Age of Onset    Heart Attack Father     Stroke Mother     Heart Disorder Son     Diabetes Son       reports that she has never smoked. She has never used smokeless tobacco. She reports current alcohol use. She reports that she does not use drugs.      Allergies:  No Known Allergies    Home Medications:  Prior to Admission Medications   Prescriptions Last Dose Informant Patient Reported? Taking?   Glucose Blood (ACCU-CHEK FITO PLUS) In Vitro Strip   No No   Sig: Use as  directed to check blood glucose 3 times/day - DX code:E11.65 using insulin   Glucose Blood (CONTOUR NEXT TEST) In Vitro Strip   No No   Sig: Check BG 3x daily DX code:E11.65   Insulin Degludec (TRESIBA FLEXTOUCH) 100 UNIT/ML Subcutaneous Solution Pen-injector   Yes No   Sig: Inject 36 Units into the skin daily.   Insulin Lispro, 1 Unit Dial, 100 UNIT/ML Subcutaneous Solution Pen-injector   No No   Sig: Patient will take 14 units with small carb meals and 18-20 units with larger carb meals.   Insulin Pen Needle (PEN NEEDLES) 32G X 4 MM Does not apply Misc   No No   Si pen  4 (four) times daily. Use  New pen needle  With each injection   buPROPion  MG Oral Tablet 24 Hr   No No   Sig: Take 1 tablet (150 mg total) by mouth daily.   Patient not taking: Reported on 2024   ergocalciferol 1.25 MG (64429 UT) Oral Cap   No No   Sig: Take 1 capsule (50,000 Units total) by mouth once a week.   gabapentin 300 MG Oral Cap   Yes No   Sig: Take 1 capsule (300 mg total) by mouth 3 (three) times daily.   glipiZIDE 10 MG Oral Tab   No No   Sig: Take 1 tablet (10 mg total) by mouth 2 (two) times daily before meals.   lidocaine 4 % External Patch   Yes No   Sig: Place 1 patch onto the skin daily.   losartan 50 MG Oral Tab   No No   Sig: Take 0.5 tablets (25 mg total) by mouth daily.   pregabalin 50 MG Oral Cap   No No   Sig: Take 1 capsule (50 mg total) by mouth 2 (two) times daily.   sertraline 100 MG Oral Tab   No No   Sig: Take 1 tablet (100 mg total) by mouth daily.   simvastatin 20 MG Oral Tab   No No   Sig: Take 1 tablet (20 mg total) by mouth daily.      Facility-Administered Medications: None       Review of Systems:  Constitutional:  + fatigue  Eye:  Negative.  Ear/Nose/Mouth/Throat:  Negative.  Respiratory:  Negative  Cardiovascular: Negative  Gastrointestinal:  Negative.  Genitourinary:  Negative  Endocrine:  Negative.  Immunologic:  Negative.  Musculoskeletal:  Negative.  Integumentary:  Negative.  Neurologic:   Negative.  Psychiatric:  Negative.  ROS reviewed as documented in chart    Physical Exam:  Temp:  [98.3 °F (36.8 °C)] 98.3 °F (36.8 °C)  Pulse:  [] 97  Resp:  [16-22] 16  BP: (145-158)/(62-76) 152/66  SpO2:  [94 %-98 %] 98 %    General:  Alert and oriented.  Slightly confused  Diffuse skin problem:  None.  Eye:  Pupils are equal, round and reactive to light, extraocular movements are intact, Normal conjunctiva.  HENT:  Normocephalic, oral mucosa is moist.  Head:  Normocephalic, atraumatic.  Neck:  Supple, non-tender, no carotid bruit, no jugular venous distention, no lymphadenopathy, no thyromegaly.  Respiratory:  Lungs are clear to auscultation, respirations are non-labored, breath sounds are equal, symmetrical chest wall expansion.  Cardiovascular:  Normal rate, regular rhythm, no murmur, no edema.  Gastrointestinal:  Soft, non-tender, non-distended, normal bowel sounds, no organomegaly.  Lymphatics:  No lymphadenopathy neck, axilla, groin.  Musculoskeletal: Normal range of motion.  normal strength.  Feet:  Normal pulses.      Laboratory Data:   Lab Results   Component Value Date    WBC 19.5 05/01/2024    HGB 12.5 05/01/2024    HCT 36.8 05/01/2024    .0 05/01/2024    CREATSERUM 0.98 05/01/2024    BUN 21 05/02/2024     05/01/2024    K 4.8 05/02/2024    CL 98 05/01/2024    CO2 25.0 05/01/2024     05/01/2024    CA 9.4 05/01/2024       Imaging:  [facility] ARBEN 2D+3D SCREENING BILAT (CPT=77067/99325)    Result Date: 4/30/2024  CONCLUSION:   No mammographic evidence for malignancy.  As long as the patient's clinical breast exam remains unchanged, annual screening mammogram is recommended.   BI-RADS CATEGORY:   DIAGNOSTIC CATEGORY 2--BENIGN FINDING:   RECOMMENDATIONS:  ROUTINE MAMMOGRAM AND CLINICAL EVALUATION IN 12 MONTHS.       PLEASE NOTE: NORMAL MAMMOGRAM DOES NOT EXCLUDE THE POSSIBILITY OF BREAST CANCER.  A CLINICALLY SUSPICIOUS PALPABLE LUMP SHOULD BE BIOPSIED.   For patients over the age of  40, the target due date for the patient's next mammogram has been entered into a reminder system.   Patient received a discharge summary from the technologist after completion of exam.  Breast marker legend used on images  Triangle = Palpable lump [place] = Skin tag or mole BB = Nipple Linear viri = [place] = Pain    Dictated by (CST): Maurilio Merchant MD on 4/30/2024 at 1:03 PM     Finalized by (CST): Maurilio Merchant MD on 4/30/2024 at 1:05 PM            CODE STATUS  Full    Primary care physician  Jermaine Monson MD    60 minutes spent on this admission - examining patient, obtaining history, reviewing previous medical records, going over test results/imaging and discussing plan of care. All questions answered.     Disposition  Clinical course will dictate outcome      Rosemarie Patricio MD  5/2/2024  4:08 AM

## 2024-05-02 NOTE — PLAN OF CARE
No acute changes today. Worked with PT/OT; weak. Voiding in KLab. She had incontinent BM today. Accucheks ACHS; covered with SS Insulin. Tolerating diet. IV abx as ordered/scheduled. No pain. No family at bedside today.     Problem: Patient Centered Care  Goal: Patient preferences are identified and integrated in the patient's plan of care  Description: Interventions:  - Provide timely, complete, and accurate information to patient/family  - Incorporate patient and family knowledge, values, beliefs, and cultural backgrounds into the planning and delivery of care  - Encourage patient/family to participate in care and decision-making at the level they choose  - Honor patient and family perspectives and choices  Outcome: Progressing     Problem: Diabetes/Glucose Control  Goal: Glucose maintained within prescribed range  Description: INTERVENTIONS:  - Monitor Blood Glucose as ordered  - Assess for signs and symptoms of hyperglycemia and hypoglycemia  - Administer ordered medications to maintain glucose within target range  - Assess barriers to adequate nutritional intake and initiate nutrition consult as needed  - Instruct patient on self management of diabetes  Outcome: Progressing     Problem: PAIN - ADULT  Goal: Verbalizes/displays adequate comfort level or patient's stated pain goal  Description: INTERVENTIONS:  - Encourage pt to monitor pain and request assistance  - Assess pain using appropriate pain scale  - Administer analgesics based on type and severity of pain and evaluate response  - Implement non-pharmacological measures as appropriate and evaluate response  - Consider cultural and social influences on pain and pain management  - Manage/alleviate anxiety  - Utilize distraction and/or relaxation techniques  - Monitor for opioid side effects  - Notify MD/LIP if interventions unsuccessful or patient reports new pain  - Anticipate increased pain with activity and pre-medicate as appropriate  Outcome:  Progressing     Problem: RISK FOR INFECTION - ADULT  Goal: Absence of fever/infection during anticipated neutropenic period  Description: INTERVENTIONS  - Monitor WBC  - Administer growth factors as ordered  - Implement neutropenic guidelines  Outcome: Progressing     Problem: SAFETY ADULT - FALL  Goal: Free from fall injury  Description: INTERVENTIONS:  - Assess pt frequently for physical needs  - Identify cognitive and physical deficits and behaviors that affect risk of falls.  - [place] fall precautions as indicated by assessment.  - Educate pt/family on patient safety including physical limitations  - Instruct pt to call for assistance with activity based on assessment  - Modify environment to reduce risk of injury  - Provide assistive devices as appropriate  - Consider OT/PT consult to assist with strengthening/mobility  - Encourage toileting schedule  Outcome: Progressing     Problem: DISCHARGE PLANNING  Goal: Discharge to home or other facility with appropriate resources  Description: INTERVENTIONS:  - Identify barriers to discharge w/pt and caregiver  - Include patient/family/discharge partner in discharge planning  - Arrange for needed discharge resources and transportation as appropriate  - Identify discharge learning needs (meds, wound care, etc)  - Arrange for interpreters to assist at discharge as needed  - Consider post-discharge preferences of patient/family/discharge partner  - Complete POLST form as appropriate  - Assess patient's ability to be responsible for managing their own health  - Refer to Case Management Department for coordinating discharge planning if the patient needs post-hospital services based on physician/LIP order or complex needs related to functional status, cognitive ability or social support system  Outcome: Progressing

## 2024-05-03 LAB
ANION GAP SERPL CALC-SCNC: 6 MMOL/L (ref 0–18)
BASOPHILS # BLD AUTO: 0.04 X10(3) UL (ref 0–0.2)
BASOPHILS NFR BLD AUTO: 0.3 %
BUN BLD-MCNC: 14 MG/DL (ref 9–23)
BUN/CREAT SERPL: 17.9 (ref 10–20)
CALCIUM BLD-MCNC: 9 MG/DL (ref 8.7–10.4)
CHLORIDE SERPL-SCNC: 103 MMOL/L (ref 98–112)
CO2 SERPL-SCNC: 25 MMOL/L (ref 21–32)
CREAT BLD-MCNC: 0.78 MG/DL
DEPRECATED RDW RBC AUTO: 39.5 FL (ref 35.1–46.3)
EGFRCR SERPLBLD CKD-EPI 2021: 81 ML/MIN/1.73M2 (ref 60–?)
EOSINOPHIL # BLD AUTO: 0.16 X10(3) UL (ref 0–0.7)
EOSINOPHIL NFR BLD AUTO: 1.2 %
ERYTHROCYTE [DISTWIDTH] IN BLOOD BY AUTOMATED COUNT: 12.9 % (ref 11–15)
GLUCOSE BLD-MCNC: 269 MG/DL (ref 70–99)
GLUCOSE BLDC GLUCOMTR-MCNC: 216 MG/DL (ref 70–99)
GLUCOSE BLDC GLUCOMTR-MCNC: 244 MG/DL (ref 70–99)
GLUCOSE BLDC GLUCOMTR-MCNC: 246 MG/DL (ref 70–99)
GLUCOSE BLDC GLUCOMTR-MCNC: 251 MG/DL (ref 70–99)
HCT VFR BLD AUTO: 32.9 %
HGB BLD-MCNC: 11.3 G/DL
IMM GRANULOCYTES # BLD AUTO: 0.06 X10(3) UL (ref 0–1)
IMM GRANULOCYTES NFR BLD: 0.5 %
LYMPHOCYTES # BLD AUTO: 2.44 X10(3) UL (ref 1–4)
LYMPHOCYTES NFR BLD AUTO: 18.6 %
MCH RBC QN AUTO: 28.8 PG (ref 26–34)
MCHC RBC AUTO-ENTMCNC: 34.3 G/DL (ref 31–37)
MCV RBC AUTO: 83.7 FL
MONOCYTES # BLD AUTO: 0.71 X10(3) UL (ref 0.1–1)
MONOCYTES NFR BLD AUTO: 5.4 %
NEUTROPHILS # BLD AUTO: 9.72 X10 (3) UL (ref 1.5–7.7)
NEUTROPHILS # BLD AUTO: 9.72 X10(3) UL (ref 1.5–7.7)
NEUTROPHILS NFR BLD AUTO: 74 %
OSMOLALITY SERPL CALC.SUM OF ELEC: 288 MOSM/KG (ref 275–295)
PLATELET # BLD AUTO: 200 10(3)UL (ref 150–450)
POTASSIUM SERPL-SCNC: 4.3 MMOL/L (ref 3.5–5.1)
RBC # BLD AUTO: 3.93 X10(6)UL
SODIUM SERPL-SCNC: 134 MMOL/L (ref 136–145)
WBC # BLD AUTO: 13.1 X10(3) UL (ref 4–11)

## 2024-05-03 PROCEDURE — 99233 SBSQ HOSP IP/OBS HIGH 50: CPT | Performed by: HOSPITALIST

## 2024-05-03 RX ORDER — LOSARTAN POTASSIUM 50 MG/1
50 TABLET ORAL DAILY
Status: DISCONTINUED | OUTPATIENT
Start: 2024-05-04 | End: 2024-05-07

## 2024-05-03 RX ORDER — LOSARTAN POTASSIUM 25 MG/1
25 TABLET ORAL ONCE
Status: COMPLETED | OUTPATIENT
Start: 2024-05-03 | End: 2024-05-03

## 2024-05-03 RX ORDER — SODIUM CHLORIDE 9 MG/ML
INJECTION, SOLUTION INTRAVENOUS CONTINUOUS
Status: DISCONTINUED | OUTPATIENT
Start: 2024-05-03 | End: 2024-05-05

## 2024-05-03 NOTE — DIABETES ED
Attempted to see patient for diabetes education. She is currently consuming lunch and talking on the phone. Request visit tomorrow.

## 2024-05-03 NOTE — CM/SW NOTE
05/03/24 1000   CM/SW Screening   Referral Source    Information Source [facility] staff;Chart review;Nursing rounds   Patient's Current Mental Status at Time of Assessment Alert;Oriented   Patient's Home Environment House   Patient lives with Daughter;Grandchild  (JUANA)     CM self ref to case for dc planning    CM met with pt at bedside. Confirmed address on file and PCP.    CM explained therapy needs and LARA process. Pt is not agreeable to therapy anticipated need for LARA. Pt is agreeable to home w/ [facility]. Agreed to [facility] if available    CM req DSC send ref for HHC, f2f done    1430  [facility] is available    Plan  Home with [facility] pending choice    / to remain available for support and/or discharge planning.     Catia Gil, RN    Ext [phone]

## 2024-05-03 NOTE — PAYOR COMM NOTE
--------------  ADMISSION REVIEW     Payor: RENAN MENENDEZ [facility]  Subscriber #:  [id number]  Authorization Number: [id number]    Admit date: 5/2/24  Admit time:  5:18 AM       REVIEW DOCUMENTATION:     ED Provider Notes      Patient Seen in: [facility] Emergency Department      History     Chief Complaint   Patient presents with    Hyperglycemia    Nausea/Vomiting/Diarrhea     Stated Complaint: abd pain    Subjective:   HPI    Pt is 74 yo F who p/w 1 day of vomiting and diarrhea. +weakness and fatigue. No fevers, cough, headaches or body aches. No abdominal pain. Hospitalized two weeks ago for sepsis/UTI. Currently not on antibiotics. States glucoses have been running high despite insulin.     Review of Systems    Positive for stated complaint: abd pain  Other systems are as noted in HPI.  Constitutional and vital signs reviewed.      All other systems reviewed and negative except as noted above.    Physical Exam     ED Triage Vitals   BP 05/01/24 2306 158/76   Pulse 05/01/24 2306 118   Resp 05/01/24 2306 22   Temp 05/01/24 2306 98.3 °F (36.8 °C)   Temp src --    SpO2 05/01/24 2306 96 %   O2 Device 05/02/24 0034 None (Room air)       Current:/66   Pulse 97   Temp 98.3 °F (36.8 °C)   Resp 16   SpO2 98%         Physical Exam    GENERAL: No acute distress, awake and alert  HEENT: EOMI, PERRL, MMM  Neck: supple  CV: tachycardic no murmurs  Resp: CTAB, no wheezes or retractions  Ab: soft, nontender, no distension, no cvat  Extremities: FROM of all extremities  Neuro: CN intact, normal speech, 5/5 motor strength in all extremities, no focal deficits  SKIN: warm, dry, no rashes      ED Course     Labs Reviewed   BASIC METABOLIC PANEL (8) - Abnormal; Notable for the following components:       Result Value    Glucose 390 (*)     Sodium 129 (*)     BUN/CREA Ratio 21.4 (*)     All other components within normal limits   URINALYSIS WITH CULTURE REFLEX - Abnormal; Notable for the following components:    Clarity Urine  Turbid (*)     Glucose Urine >1000 (*)     Ketones Urine 20 (*)     Blood Urine 1+ (*)     Protein Urine 30 (*)     Nitrite Urine 2+ (*)     Leukocyte Esterase Urine 500 (*)     WBC Urine >50 (*)     RBC Urine >10 (*)     Bacteria Urine Rare (*)     Squamous Epi. Cells Few (*)     All other components within normal limits   POCT GLUCOSE - Abnormal; Notable for the following components:    POC Glucose  390 (*)     All other components within normal limits   CBC W/ DIFFERENTIAL - Abnormal; Notable for the following components:    WBC 19.5 (*)     Neutrophil Absolute Prelim 16.86 (*)     Neutrophil Absolute 16.86 (*)     All other components within normal limits   LACTIC ACID, PLASMA - Normal   REDRAW BMP - Normal   CBC WITH DIFFERENTIAL WITH PLATELET    Narrative:     The following orders were created for panel order CBC With Differential With Platelet.  Procedure                               Abnormality         Status                     ---------                               -----------         ------                     CBC W/ DIFFERENTIAL[[id number]]          Abnormal            Final result                 Please view results for these tests on the individual orders.   BLOOD CULTURE   BLOOD CULTURE   URINE CULTURE, ROUTINE       MDM         Medical Decision Making  Pt given 2 L IVF  Labs remarkable for low NA, high glucose, leukocytosis. Blood cultures pending    Pt started on IV meropenem given resistances in past cultures.     Amount and/or Complexity of Data Reviewed  External Data Reviewed: labs and notes.     Details: Recent pcp notes, lab results reviewed  Labs: ordered.  Discussion of management or test interpretation with external provider(s): D/w dr [place]    Risk  Decision regarding hospitalization.        Disposition and Plan     Clinical Impression:  1. Hyponatremia    2. Hyperglycemia    3. Leukocytosis, unspecified type    4. Urinary tract infection without hematuria, site unspecified          Disposition:  Admit  2024  3:35 am       Hospital Problems       Present on Admission  Date Reviewed: 2024            ICD-10-CM Noted POA    * (Principal) Hyponatremia E87.1 2024 Unknown             Signed by Brittany Jaimes MD on 2024  3:36 AM         History & Physical           Ananya Dowling Patient Status:  Emergency    1950 MRN [id number]   Location [facility] EMERGENCY DEPARTMENT Attending Brittany Jaimes MD   Hosp Day # 0 PCP Jermaine Monson MD      Date:  2024  Date of Admission:  2024     Chief Complaint:      Chief Complaint   Patient presents with    Hyperglycemia    Nausea/Vomiting/Diarrhea         Assessment and Plan:     73-year-old female presents for evaluation of fatigue/nausea/elevated blood glucose.  Patient with recent brief hospitalization for E. coli septicemia, on presentation found to have asymptomatic urinary tract infection as well as elevated blood glucose of 390.  Patient admitted for IV antibiotics and management of blood glucose.     Recent E. coli septicemia 2024  Asymptomatic urinary tract infection  -Urinalysis grossly positive for UTI.  Per chart review, previous urine culture from 3/18/2023 with Enterobacter, resistant to Rocephin.  Patient received a dose of meropenem in the ED  -Given her recent history of E. coli septicemia and previous resistance, will continue with meropenem pending urine cultures, de-escalate IV antibiotic therapy once sensitivities become available     Fatigue  Hyperglycemia  Uncontrolled IDDM type II  -Most recent hemoglobin A1c 10 2023.  Blood glucose on presentation noted to be elevated 390.  -Hold home dose of glipizide  -Resume home dose Tresiba 36 units and initiate diabetic diet.  Check hemoglobin A1c.  Possibly noncompliant with diet/insulin administration.  Initiate diabetic diet.  Insulin sliding scale.  Hypoglycemia protocol.     Pseudohyponatremia  -Sodium level noted to be 129 on  presentation but when corrected for elevated blood glucose it is actually 134     Other medical conditions  Essential hypertension  Hyperlipidemia  Depression  Arthritis     Prophylaxis  Lovenox     History of Present Illness:  Ananya Dowling is a(n) 73 year old female, who presents for evaluation of nausea, diarrhea, elevated blood glucose.  Patient reports that she was recently hospitalized at an outside facility but unclear of why and per chart review, patient was actually hospitalized for E. coli septicemia 2 weeks ago.  Patient was discharged home and was doing okay but has had difficulty controlling her blood glucose.  Every time she checks her sugar is in the 300s.  She reports nausea but no vomiting.  She had 1 episode of watery diarrhea today.  She does report chills but no fever.  Denies any chest pain or shortness of breath.  Denies any lightheadedness or dizziness.  Patient denies symptoms of dysuria or increased frequency or urgency on urination.  Denies any numbness or tingling sensation in her extremities.  On presentation to the ED, initial vital signs reveal temp 98.3, heart rate 118, blood pressure 158/76, respiratory rate 22.  Lab work reveals blood glucose 390, sodium level 129 but when corrected for elevated blood glucose, it is actually 134.  Urinalysis grossly positive for UTI.  Patient received 2 L IV fluid and started on meropenem.  Patient admitted under hospitalist service for further evaluation management.      MEDICATIONS ADMINISTERED IN LAST 1 DAY:  buPROPion ER (Wellbutrin XL) 24 hr tab 150 mg       Date Action Dose Route User    5/3/2024 0933 Given 150 mg Oral Dalia Long RN          enoxaparin (Lovenox) 40 MG/0.4ML SUBQ injection 40 mg       Date Action Dose Route User    5/3/2024 0933 Given 40 mg Subcutaneous (Left Lower Abdomen) Dalia Long RN          gabapentin (Neurontin) cap 300 mg       Date Action Dose Route User    5/3/2024 0933 Given 300 mg Oral Dalia Long RN     5/2/2024 2051 Given 300 mg Oral Deborah Smyth RN    5/2/2024 1705 Given 300 mg Oral Pepe Alanis RN          insulin aspart (NovoLOG) 100 Units/mL FlexPen 1-7 Units       Date Action Dose Route User    5/2/2024 1834 Given 6 Units Subcutaneous (Left Lower Abdomen) Pepe Alanis RN    5/2/2024 1301 Given 5 Units Subcutaneous (Right Lower Abdomen) Pepe Alanis RN          insulin aspart (NovoLOG) 100 Units/mL FlexPen 1-7 Units       Date Action Dose Route User    5/3/2024 0932 Given 4 Units Subcutaneous (Right Upper Arm) Dalia Long RN    5/2/2024 2225 Given 7 Units Subcutaneous (Right Lower Abdomen) Deborah Smyth RN          insulin degludec 100 UNIT/ML vial SOPN 36 Units       Date Action Dose Route User    5/3/2024 0931 Given 36 Units Subcutaneous (Right Upper Arm) Dalia Long RN          losartan (Cozaar) tab 25 mg       Date Action Dose Route User    5/3/2024 0933 Given 25 mg Oral Dalia Long RN          meropenem (Merrem) 500 mg in sodium chloride 0.9% 100 mL IVPB-MBP       Date Action Dose Route User    5/3/2024 0931 New Bag 500 mg Intravenous Dalia Long RN    5/3/2024 0145 New Bag 500 mg Intravenous Marjan Adams RN    5/2/2024 1705 New Bag 500 mg Intravenous Pepe Alanis RN          pravastatin (Pravachol) tab 10 mg       Date Action Dose Route User    5/2/2024 2051 Given 10 mg Oral Deborah Smyth RN          pregabalin (Lyrica) cap 50 mg       Date Action Dose Route User    5/3/2024 0933 Given 50 mg Oral Dalia Long RN    5/2/2024 2051 Given 50 mg Oral Deborah Smyth RN          sertraline (Zoloft) tab 100 mg       Date Action Dose Route User    5/3/2024 0933 Given 100 mg Oral Dalia Long RN            Vitals (last day)       Date/Time Temp Pulse Resp BP SpO2 Weight O2 Device O2 Flow Rate (L/min) [facility]    05/03/24 0926 98.1 °F (36.7 °C) 72 18 162/59 97 % -- None (Room air) --     05/03/24 0700 -- -- -- -- -- 132 lb -- --     05/03/24 0357  98.5 °F (36.9 °C) -- 18 159/59 93 % -- -- -- SR    05/02/24 2002 98.9 °F (37.2 °C) -- 18 156/50 95 % -- None (Room air) -- SR    05/02/24 1145 98.5 °F (36.9 °C) 75 18 111/44 94 % -- None (Room air) -- JR    05/02/24 0901 98 °F (36.7 °C) 80 20 159/57 98 % -- None (Room air) -- AR    05/02/24 0639 -- 88 -- -- -- -- -- -- KD    05/02/24 0520 -- -- -- -- -- 126 lb -- -- EK    05/02/24 0500 -- 79 16 131/51 94 % -- None (Room air) --     05/02/24 0415 -- 79 16 135/51 92 % -- None (Room air) --     05/02/24 0215 -- 97 16 152/66 98 % -- None (Room air) -- ZE    05/02/24 0100 -- 97 18 145/62 94 % -- None (Room air) --     05/02/24 0034 -- 103 22 150/64 96 % -- None (Room air) --

## 2024-05-03 NOTE — DISCHARGE INSTRUCTIONS
Diabetes:  Your A1C level is greater than 8%.  It is recommended that you attend an outpatient diabetes education program.  Please discuss with your Primary Care Provider at your next visit to obtain a referral.  If you wish to make an appointment at [facility], call [phone].

## 2024-05-03 NOTE — HOME CARE LIAISON
Received referral via [facility] for Home Health services. Patient is agreeable with [facility]. Contact information placed on AVS.

## 2024-05-03 NOTE — PROGRESS NOTES
[facility]  part of [facility]    Progress Note    Ananya Dowling Patient Status:  Inpatient    1950 MRN [id number]   Location [facility] 4W/SW/SE Attending Esdras Eastman MD   Hosp Day # 1 PCP Jermaine Monson MD     Chief Complaint:   Chief Complaint   Patient presents with    Hyperglycemia    Nausea/Vomiting/Diarrhea       Subjective:   Ananya Dowling is feeling better. Still gets dizzy when up to side of bed or when standing lightheaded feeling. No abd pain no more diarrhea no N/V tolerating diet.       Objective:   Objective:    Blood pressure 160/79, pulse 79, temperature 97.9 °F (36.6 °C), temperature source Oral, resp. rate 18, weight 132 lb (59.9 kg), SpO2 97%.    Physical Exam:    General: No acute distress.   Respiratory: Clear to auscultation bilaterally. No wheezes. No rhonchi.  Cardiovascular: S1, S2. Regular rate and rhythm. No murmurs, rubs or gallops.   Abdomen: Soft, nontender, nondistended.  Positive bowel sounds. No rebound or guarding.  Neurologic: No focal neurological deficits.   Musculoskeletal: Moves all extremities.  Extremities: No edema.      Results:   Results:    Labs:  Recent Labs   Lab 24  0957 24  0711   WBC 19.5* 16.4* 13.1*   HGB 12.5 13.0 11.3*   MCV 82.5 84.5 83.7   .0 236.0 200.0       Recent Labs   Lab 24  1155 24  0051 24  0957 24  0711   * 390*  --  218* 269*   BUN 25* 21 21 12 14   CREATSERUM 1.00 0.98  --  0.72 0.78   CA 9.6 9.4  --  9.2 9.0   ALB 4.1  --   --   --   --    * 129*  --  136 134*   K 4.3  --  4.8 3.7 4.3   CL 99 98  --  106 103   CO2 26.0 25.0  --  27.0 25.0   ALKPHO 83  --   --   --   --    AST 31  --   --   --   --    ALT 24  --   --   --   --    BILT 0.3  --   --   --   --    TP 7.5  --   --   --   --        Estimated Creatinine Clearance: 49.2 mL/min (based on SCr of 0.78 mg/dL).    No results for input(s): \"PTP\", \"INR\" in the last 168  hours.         Culture:  Hospital Encounter on 05/02/24   1. Urine Culture, Routine     Status: Abnormal (Preliminary result)    Collection Time: 05/02/24  2:57 AM    Specimen: Urine, clean catch   Result Value Ref Range    Urine Culture >100,000 CFU/ML Gram Negative Josr (A) N/A   2. Blood Culture     Status: None (Preliminary result)    Collection Time: 05/02/24 12:51 AM    Specimen: Blood,peripheral   Result Value Ref Range    Blood Culture Result No Growth 1 Day N/A       Cardiac  No results for input(s): \"TROP\", \"PBNP\" in the last 168 hours.      Imaging: Imaging data reviewed in Epic.  No results found.    Medications:    buPROPion ER  150 mg Oral Daily    gabapentin  300 mg Oral TID    losartan  25 mg Oral Daily    pregabalin  50 mg Oral BID    sertraline  100 mg Oral Daily    pravastatin  10 mg Oral Nightly    enoxaparin  40 mg Subcutaneous Daily    insulin degludec  36 Units Subcutaneous Daily    meropenem  500 mg Intravenous Q8H    insulin aspart  1-7 Units Subcutaneous TID CC and HS         Assessment and Plan:   Assessment & Plan:        UTI   Ucx GNR. ID and sens pending   IV meropenem empirically await sens   WBC 19.5 on admit. Lactic acid normal. WBC trending down. Afebrile.   Improving   Blood cx x 2 NGTD  DM II, uncontrolled   A1c 12.4  Diabetes education   Tresiba 36 units at bedtime. Add mealtime novolog 12 units TID.   ISS     Essential HTN   Increase losartan to 50mg daily   4.    Dizziness   Check orthostatic V/S   PT/OT - LARA - pt refused agreeable to [facility].     Other medical problems   Hyperlipidemia  Depression   OA            >55min spent, >50% spent counseling and coordinating care in the form of educating pt/family and d/w consultants and staff. Most of the time spent discussing the above plan.        Plan of care discussed with patient or family at bedside.    Esdras Eastman MD  Hospitalist          Supplementary Documentation:     Quality:  DVT Prophylaxis: heparin   CODE status:  Full  Dispo: per clinical course           Estimated date of discharge: TBD  Discharge is dependent on: clinical stability  At this point Ms. Dowling is expected to be discharge to: home with [facility]         **Certification      PHYSICIAN Certification of Need for Inpatient Hospitalization - Initial Certification    Patient will require inpatient services that will reasonably be expected to span two midnight's based on the clinical documentation in H+P.   Based on patients current state of illness, I anticipate that, after discharge, patient will require TBD.

## 2024-05-03 NOTE — PLAN OF CARE
Patient is alert and orientated x4. Room air. Remote tele in place. IV abx contiued. ACHS accuchecks. No complaints of any pain. Call light with in reach. All safety measures in place.  Problem: Patient Centered Care  Goal: Patient preferences are identified and integrated in the patient's plan of care  Description: Interventions:  -   - Provide timely, complete, and accurate information to patient/family  - Incorporate patient and family knowledge, values, beliefs, and cultural backgrounds into the planning and delivery of care  - Encourage patient/family to participate in care and decision-making at the level they choose  - Honor patient and family perspectives and choices  Outcome: Progressing     - See additional Care Plan goals for specific interventions  Outcome: Progressing     Problem: Diabetes/Glucose Control  Goal: Glucose maintained within prescribed range  Description: INTERVENTIONS:  - Monitor Blood Glucose as ordered  - Assess for signs and symptoms of hyperglycemia and hypoglycemia  - Administer ordered medications to maintain glucose within target range  - Assess barriers to adequate nutritional intake and initiate nutrition consult as needed  - Instruct patient on self management of diabetes  Outcome: Progressing     Problem: PAIN - ADULT  Goal: Verbalizes/displays adequate comfort level or patient's stated pain goal  Description: INTERVENTIONS:  - Encourage pt to monitor pain and request assistance  - Assess pain using appropriate pain scale  - Administer analgesics based on type and severity of pain and evaluate response  - Implement non-pharmacological measures as appropriate and evaluate response  - Consider cultural and social influences on pain and pain management  - Manage/alleviate anxiety  - Utilize distraction and/or relaxation techniques  - Monitor for opioid side effects  - Notify MD/LIP if interventions unsuccessful or patient reports new pain  - Anticipate increased pain with activity  and pre-medicate as appropriate  Outcome: Progressing     Problem: RISK FOR INFECTION - ADULT  Goal: Absence of fever/infection during anticipated neutropenic period  Description: INTERVENTIONS  - Monitor WBC  - Administer growth factors as ordered  - Implement neutropenic guidelines  Outcome: Progressing     Problem: SAFETY ADULT - FALL  Goal: Free from fall injury  Description: INTERVENTIONS:  - Assess pt frequently for physical needs  - Identify cognitive and physical deficits and behaviors that affect risk of falls.  - Bond fall precautions as indicated by assessment.  - Educate pt/family on patient safety including physical limitations  - Instruct pt to call for assistance with activity based on assessment  - Modify environment to reduce risk of injury  - Provide assistive devices as appropriate  - Consider OT/PT consult to assist with strengthening/mobility  - Encourage toileting schedule  Outcome: Progressing     Problem: DISCHARGE PLANNING  Goal: Discharge to home or other facility with appropriate resources  Description: INTERVENTIONS:  - Identify barriers to discharge w/pt and caregiver  - Include patient/family/discharge partner in discharge planning  - Arrange for needed discharge resources and transportation as appropriate  - Identify discharge learning needs (meds, wound care, etc)  - Arrange for interpreters to assist at discharge as needed  - Consider post-discharge preferences of patient/family/discharge partner  - Complete POLST form as appropriate  - Assess patient's ability to be responsible for managing their own health  - Refer to Case Management Department for coordinating discharge planning if the patient needs post-hospital services based on physician/LIP order or complex needs related to functional status, cognitive ability or social support system  Outcome: Progressing

## 2024-05-03 NOTE — CONGREGATE LIVING REVIEW
[facility] Living Authorization    The [facility] Review Committee has reviewed this case and the patient IS APPROVED for discharge to a facility for Short Term Skilled once the following procedure is followed:     - The physician discharge instructions (contained within the DARREN note for SNF) must inlcude the below appropriate and approved COVID instructions to the facility    For questions regarding CLRC approval process, please contact the CM assigned to the case.  For questions regarding RN discharge workflow, please contact the unit Clinical Leader.

## 2024-05-04 ENCOUNTER — APPOINTMENT (OUTPATIENT)
Dept: CT IMAGING | Facility: HOSPITAL | Age: 74
DRG: 690 | End: 2024-05-04
Attending: HOSPITALIST
Payer: MEDICARE

## 2024-05-04 LAB
ANION GAP SERPL CALC-SCNC: 5 MMOL/L (ref 0–18)
BASOPHILS # BLD AUTO: 0.08 X10(3) UL (ref 0–0.2)
BASOPHILS NFR BLD AUTO: 0.8 %
BUN BLD-MCNC: 12 MG/DL (ref 9–23)
BUN/CREAT SERPL: 16.9 (ref 10–20)
CALCIUM BLD-MCNC: 9 MG/DL (ref 8.7–10.4)
CHLORIDE SERPL-SCNC: 107 MMOL/L (ref 98–112)
CO2 SERPL-SCNC: 24 MMOL/L (ref 21–32)
CREAT BLD-MCNC: 0.71 MG/DL
DEPRECATED RDW RBC AUTO: 40.1 FL (ref 35.1–46.3)
EGFRCR SERPLBLD CKD-EPI 2021: 90 ML/MIN/1.73M2 (ref 60–?)
EOSINOPHIL # BLD AUTO: 0.17 X10(3) UL (ref 0–0.7)
EOSINOPHIL NFR BLD AUTO: 1.7 %
ERYTHROCYTE [DISTWIDTH] IN BLOOD BY AUTOMATED COUNT: 13 % (ref 11–15)
GLUCOSE BLD-MCNC: 199 MG/DL (ref 70–99)
GLUCOSE BLDC GLUCOMTR-MCNC: 172 MG/DL (ref 70–99)
GLUCOSE BLDC GLUCOMTR-MCNC: 197 MG/DL (ref 70–99)
GLUCOSE BLDC GLUCOMTR-MCNC: 203 MG/DL (ref 70–99)
GLUCOSE BLDC GLUCOMTR-MCNC: 269 MG/DL (ref 70–99)
HCT VFR BLD AUTO: 32.5 %
HGB BLD-MCNC: 11.2 G/DL
IMM GRANULOCYTES # BLD AUTO: 0.07 X10(3) UL (ref 0–1)
IMM GRANULOCYTES NFR BLD: 0.7 %
LYMPHOCYTES # BLD AUTO: 1.91 X10(3) UL (ref 1–4)
LYMPHOCYTES NFR BLD AUTO: 18.8 %
MCH RBC QN AUTO: 29.2 PG (ref 26–34)
MCHC RBC AUTO-ENTMCNC: 34.5 G/DL (ref 31–37)
MCV RBC AUTO: 84.6 FL
MONOCYTES # BLD AUTO: 0.69 X10(3) UL (ref 0.1–1)
MONOCYTES NFR BLD AUTO: 6.8 %
NEUTROPHILS # BLD AUTO: 7.24 X10 (3) UL (ref 1.5–7.7)
NEUTROPHILS # BLD AUTO: 7.24 X10(3) UL (ref 1.5–7.7)
NEUTROPHILS NFR BLD AUTO: 71.2 %
OSMOLALITY SERPL CALC.SUM OF ELEC: 287 MOSM/KG (ref 275–295)
PLATELET # BLD AUTO: 217 10(3)UL (ref 150–450)
POTASSIUM SERPL-SCNC: 4.3 MMOL/L (ref 3.5–5.1)
RBC # BLD AUTO: 3.84 X10(6)UL
SODIUM SERPL-SCNC: 136 MMOL/L (ref 136–145)
WBC # BLD AUTO: 10.2 X10(3) UL (ref 4–11)

## 2024-05-04 PROCEDURE — 70450 CT HEAD/BRAIN W/O DYE: CPT | Performed by: HOSPITALIST

## 2024-05-04 PROCEDURE — 99233 SBSQ HOSP IP/OBS HIGH 50: CPT | Performed by: HOSPITALIST

## 2024-05-04 RX ORDER — INSULIN DEGLUDEC 100 U/ML
40 INJECTION, SOLUTION SUBCUTANEOUS DAILY
Status: DISCONTINUED | OUTPATIENT
Start: 2024-05-04 | End: 2024-05-07

## 2024-05-04 RX ORDER — CEFAZOLIN SODIUM/WATER 2 G/20 ML
2 SYRINGE (ML) INTRAVENOUS EVERY 8 HOURS
Status: DISCONTINUED | OUTPATIENT
Start: 2024-05-04 | End: 2024-05-07

## 2024-05-04 NOTE — PLAN OF CARE
Patient alert and oriented x4. Up with 1 assist. General carb controlled diet, tolerating well. Accuchecks ACHS. Remote tele in place. Continuing to experience dizziness. Orthostatic blood pressures performed, patient positive for hypotension. Call light in reach. Frequent rounding performed.      Problem: Patient Centered Care  Goal: Patient preferences are identified and integrated in the patient's plan of care  Description: Interventions:  - What would you like us to know as we care for you? I was recently here  - Provide timely, complete, and accurate information to patient/family  - Incorporate patient and family knowledge, values, beliefs, and cultural backgrounds into the planning and delivery of care  - Encourage patient/family to participate in care and decision-making at the level they choose  - Honor patient and family perspectives and choices  Outcome: Progressing     Problem: Patient/Family Goals  Goal: Patient/Family Long Term Goal  Description: Patient's Long Term Goal: To return home    Interventions:  - See additional Care Plan goals for specific interventions  Outcome: Progressing  Goal: Patient/Family Short Term Goal  Description: Patient's Short Term Goal: Improve my dizziness    Interventions:   - See additional Care Plan goals for specific interventions  Outcome: Progressing     Problem: Diabetes/Glucose Control  Goal: Glucose maintained within prescribed range  Description: INTERVENTIONS:  - Monitor Blood Glucose as ordered  - Assess for signs and symptoms of hyperglycemia and hypoglycemia  - Administer ordered medications to maintain glucose within target range  - Assess barriers to adequate nutritional intake and initiate nutrition consult as needed  - Instruct patient on self management of diabetes  Outcome: Progressing     Problem: PAIN - ADULT  Goal: Verbalizes/displays adequate comfort level or patient's stated pain goal  Description: INTERVENTIONS:  - Encourage pt to monitor pain and  request assistance  - Assess pain using appropriate pain scale  - Administer analgesics based on type and severity of pain and evaluate response  - Implement non-pharmacological measures as appropriate and evaluate response  - Consider cultural and social influences on pain and pain management  - Manage/alleviate anxiety  - Utilize distraction and/or relaxation techniques  - Monitor for opioid side effects  - Notify MD/LIP if interventions unsuccessful or patient reports new pain  - Anticipate increased pain with activity and pre-medicate as appropriate  Outcome: Progressing     Problem: RISK FOR INFECTION - ADULT  Goal: Absence of fever/infection during anticipated neutropenic period  Description: INTERVENTIONS  - Monitor WBC  - Administer growth factors as ordered  - Implement neutropenic guidelines  Outcome: Progressing     Problem: SAFETY ADULT - FALL  Goal: Free from fall injury  Description: INTERVENTIONS:  - Assess pt frequently for physical needs  - Identify cognitive and physical deficits and behaviors that affect risk of falls.  - [place] fall precautions as indicated by assessment.  - Educate pt/family on patient safety including physical limitations  - Instruct pt to call for assistance with activity based on assessment  - Modify environment to reduce risk of injury  - Provide assistive devices as appropriate  - Consider OT/PT consult to assist with strengthening/mobility  - Encourage toileting schedule  Outcome: Progressing     Problem: DISCHARGE PLANNING  Goal: Discharge to home or other facility with appropriate resources  Description: INTERVENTIONS:  - Identify barriers to discharge w/pt and caregiver  - Include patient/family/discharge partner in discharge planning  - Arrange for needed discharge resources and transportation as appropriate  - Identify discharge learning needs (meds, wound care, etc)  - Arrange for interpreters to assist at discharge as needed  - Consider post-discharge preferences  of patient/family/discharge partner  - Complete POLST form as appropriate  - Assess patient's ability to be responsible for managing their own health  - Refer to Case Management Department for coordinating discharge planning if the patient needs post-hospital services based on physician/LIP order or complex needs related to functional status, cognitive ability or social support system  Outcome: Progressing

## 2024-05-04 NOTE — PLAN OF CARE
+orthostatics. Lazaro hose on. Now on 0.9 NaCl at 100 ml/hr. On merrem.   Ambulating with assist and walker. Meals in chair.   +Voiding. +BM.   Tolerating carb controlled diet. Denies nausea or vomiting. AC/HS.   Refusing LARA.   Plan to discharge home with [facility].

## 2024-05-04 NOTE — PLAN OF CARE
Patient is alert and oriented x4. Room air.Remote tele. IVF 0.9 NaCl reduced to 75 ml/hr. IV ABX merrem continued. Purewick in place. Call light with in reach. All safety measures in place.  Problem: Patient Centered Care  Goal: Patient preferences are identified and integrated in the patient's plan of care  Description: Interventions:    - Provide timely, complete, and accurate information to patient/family  - Incorporate patient and family knowledge, values, beliefs, and cultural backgrounds into the planning and delivery of care  - Encourage patient/family to participate in care and decision-making at the level they choose  - Honor patient and family perspectives and choices  Outcome: Progressing      Problem: Diabetes/Glucose Control  Goal: Glucose maintained within prescribed range  Description: INTERVENTIONS:  - Monitor Blood Glucose as ordered  - Assess for signs and symptoms of hyperglycemia and hypoglycemia  - Administer ordered medications to maintain glucose within target range  - Assess barriers to adequate nutritional intake and initiate nutrition consult as needed  - Instruct patient on self management of diabetes  Outcome: Progressing     Problem: PAIN - ADULT  Goal: Verbalizes/displays adequate comfort level or patient's stated pain goal  Description: INTERVENTIONS:  - Encourage pt to monitor pain and request assistance  - Assess pain using appropriate pain scale  - Administer analgesics based on type and severity of pain and evaluate response  - Implement non-pharmacological measures as appropriate and evaluate response  - Consider cultural and social influences on pain and pain management  - Manage/alleviate anxiety  - Utilize distraction and/or relaxation techniques  - Monitor for opioid side effects  - Notify MD/LIP if interventions unsuccessful or patient reports new pain  - Anticipate increased pain with activity and pre-medicate as appropriate  Outcome: Progressing     Problem: RISK FOR  INFECTION - ADULT  Goal: Absence of fever/infection during anticipated neutropenic period  Description: INTERVENTIONS  - Monitor WBC  - Administer growth factors as ordered  - Implement neutropenic guidelines  Outcome: Progressing     Problem: SAFETY ADULT - FALL  Goal: Free from fall injury  Description: INTERVENTIONS:  - Assess pt frequently for physical needs  - Identify cognitive and physical deficits and behaviors that affect risk of falls.  - [place] fall precautions as indicated by assessment.  - Educate pt/family on patient safety including physical limitations  - Instruct pt to call for assistance with activity based on assessment  - Modify environment to reduce risk of injury  - Provide assistive devices as appropriate  - Consider OT/PT consult to assist with strengthening/mobility  - Encourage toileting schedule  Outcome: Progressing     Problem: DISCHARGE PLANNING  Goal: Discharge to home or other facility with appropriate resources  Description: INTERVENTIONS:  - Identify barriers to discharge w/pt and caregiver  - Include patient/family/discharge partner in discharge planning  - Arrange for needed discharge resources and transportation as appropriate  - Identify discharge learning needs (meds, wound care, etc)  - Arrange for interpreters to assist at discharge as needed  - Consider post-discharge preferences of patient/family/discharge partner  - Complete POLST form as appropriate  - Assess patient's ability to be responsible for managing their own health  - Refer to Case Management Department for coordinating discharge planning if the patient needs post-hospital services based on physician/LIP order or complex needs related to functional status, cognitive ability or social support system  Outcome: Progressing

## 2024-05-04 NOTE — DIABETES ED
[facility], stating she was tired and has lunch coming up. Will attempt to revisit at a later time.

## 2024-05-04 NOTE — PROGRESS NOTES
[facility]  part of [facility]    Progress Note    Ananya Dowling Patient Status:  Inpatient    1950 MRN [id number]   Location [facility] 4W/SW/SE Attending Esdras Eastman MD   Hosp Day # 2 PCP Jermaine Monson MD     Chief Complaint:   Chief Complaint   Patient presents with    Hyperglycemia    Nausea/Vomiting/Diarrhea       Subjective:   Ananya Dowling is doing better. No further N/V no abd pain no diarrhea. She still feels lightheaded mostly when getting up. No F/C.   Per night RN no events overnight   Objective:   Objective:    Blood pressure 151/50, pulse 90, temperature 98 °F (36.7 °C), temperature source Oral, resp. rate 18, weight 136 lb (61.7 kg), SpO2 97%.    Physical Exam:    General: No acute distress.   Respiratory: Clear to auscultation bilaterally. No wheezes. No rhonchi.  Cardiovascular: S1, S2. Regular rate and rhythm. No murmurs, rubs or gallops.   Abdomen: Soft, nontender, nondistended.  Positive bowel sounds. No rebound or guarding.  Neurologic: No focal neurological deficits.   Musculoskeletal: Moves all extremities.  Extremities: No edema.      Results:   Results:    Labs:  Recent Labs   Lab 24  23524  0957 24  0711 24  0529   WBC 19.5* 16.4* 13.1* 10.2   HGB 12.5 13.0 11.3* 11.2*   MCV 82.5 84.5 83.7 84.6   .0 236.0 200.0 217.0       Recent Labs   Lab 24  1155 24  23524  0957 24  0711 24  0529   *   < > 218* 269* 199*   BUN 25*   < > 12 14 12   CREATSERUM 1.00   < > 0.72 0.78 0.71   CA 9.6   < > 9.2 9.0 9.0   ALB 4.1  --   --   --   --    *   < > 136 134* 136   K 4.3   < > 3.7 4.3 4.3   CL 99   < > 106 103 107   CO2 26.0   < > 27.0 25.0 24.0   ALKPHO 83  --   --   --   --    AST 31  --   --   --   --    ALT 24  --   --   --   --    BILT 0.3  --   --   --   --    TP 7.5  --   --   --   --     < > = values in this interval not displayed.       Estimated Creatinine Clearance: 54  mL/min (based on SCr of 0.71 mg/dL).    No results for input(s): \"PTP\", \"INR\" in the last 168 hours.         Culture:  Hospital Encounter on 05/02/24   1. Urine Culture, Routine     Status: Abnormal    Collection Time: 05/02/24  2:57 AM    Specimen: Urine, clean catch   Result Value Ref Range    Urine Culture >100,000 CFU/ML Escherichia coli (A) N/A       Susceptibility    Escherichia coli -  (no method available)     Ampicillin 4 Sensitive      Cefazolin <=4 Sensitive      Ciprofloxacin <=0.25 Sensitive      Gentamicin <=1 Sensitive      Meropenem <=0.25 Sensitive      Levofloxacin <=0.12 Sensitive      Nitrofurantoin <=16 Sensitive      Piperacillin + Tazobactam <=4 Sensitive      Trimethoprim/Sulfa <=20 Sensitive    2. Blood Culture     Status: None (Preliminary result)    Collection Time: 05/02/24 12:51 AM    Specimen: Blood,peripheral   Result Value Ref Range    Blood Culture Result No Growth 2 Days N/A       Cardiac  No results for input(s): \"TROP\", \"PBNP\" in the last 168 hours.      Imaging: Imaging data reviewed in Epic.  No results found.    Medications:    insulin aspart  12 Units Subcutaneous TID CC    losartan  50 mg Oral Daily    buPROPion ER  150 mg Oral Daily    gabapentin  300 mg Oral TID    pregabalin  50 mg Oral BID    sertraline  100 mg Oral Daily    pravastatin  10 mg Oral Nightly    enoxaparin  40 mg Subcutaneous Daily    insulin degludec  36 Units Subcutaneous Daily    meropenem  500 mg Intravenous Q8H    insulin aspart  1-7 Units Subcutaneous TID CC and HS         Assessment and Plan:   Assessment & Plan:        UTI   Ucx E.coli. Pan sensitive.    Stop meropenem. Start IV ancef.    WBC 19.5 on admit now normal.  Lactic acid normal Afebrile.   Improving   Blood cx x 2 NGTD  DM II, uncontrolled   A1c 12.4  Diabetes education   Tresiba 36 units at bedtime. Add mealtime novolog 12 units TID.   ISS     Essential HTN   Increase losartan to 50mg daily   4.    Dizziness   Orthostatic V/S + --> IVF.  Place CHANDLER hose.   CT brain to r/o other pathology.Neuro exam normal  PT/OT - LARA - pt refused agreeable to [facility].     Other medical problems   Hyperlipidemia  Depression   OA            >55min spent, >50% spent counseling and coordinating care in the form of educating pt/family and d/w consultants and staff. Most of the time spent discussing the above plan.        Plan of care discussed with patient or family at bedside.    Esdras Eastman MD  Hospitalist          Supplementary Documentation:     Quality:  DVT Prophylaxis: heparin   CODE status: Full  Dispo: per clinical course           Estimated date of discharge: TBD  Discharge is dependent on: clinical stability  At this point Ms. Dowling is expected to be discharge to: home with [facility]

## 2024-05-05 ENCOUNTER — APPOINTMENT (OUTPATIENT)
Dept: MRI IMAGING | Facility: HOSPITAL | Age: 74
DRG: 690 | End: 2024-05-05
Attending: HOSPITALIST
Payer: MEDICARE

## 2024-05-05 PROBLEM — G23.8 MULTIPLE SYSTEM ATROPHY (HCC): Status: ACTIVE | Noted: 2024-05-05

## 2024-05-05 PROBLEM — G90.3 MULTIPLE SYSTEM ATROPHY (HCC): Status: ACTIVE | Noted: 2024-05-05

## 2024-05-05 LAB
ANION GAP SERPL CALC-SCNC: 4 MMOL/L (ref 0–18)
BASOPHILS # BLD AUTO: 0.03 X10(3) UL (ref 0–0.2)
BASOPHILS NFR BLD AUTO: 0.4 %
BUN BLD-MCNC: 16 MG/DL (ref 9–23)
BUN/CREAT SERPL: 22.5 (ref 10–20)
CALCIUM BLD-MCNC: 8.8 MG/DL (ref 8.7–10.4)
CHLORIDE SERPL-SCNC: 107 MMOL/L (ref 98–112)
CHOLEST SERPL-MCNC: 136 MG/DL (ref ?–200)
CO2 SERPL-SCNC: 28 MMOL/L (ref 21–32)
CREAT BLD-MCNC: 0.71 MG/DL
DEPRECATED RDW RBC AUTO: 40.7 FL (ref 35.1–46.3)
EGFRCR SERPLBLD CKD-EPI 2021: 90 ML/MIN/1.73M2 (ref 60–?)
EOSINOPHIL # BLD AUTO: 0.19 X10(3) UL (ref 0–0.7)
EOSINOPHIL NFR BLD AUTO: 2.2 %
ERYTHROCYTE [DISTWIDTH] IN BLOOD BY AUTOMATED COUNT: 13.1 % (ref 11–15)
GLUCOSE BLD-MCNC: 150 MG/DL (ref 70–99)
GLUCOSE BLDC GLUCOMTR-MCNC: 129 MG/DL (ref 70–99)
GLUCOSE BLDC GLUCOMTR-MCNC: 169 MG/DL (ref 70–99)
GLUCOSE BLDC GLUCOMTR-MCNC: 179 MG/DL (ref 70–99)
GLUCOSE BLDC GLUCOMTR-MCNC: 287 MG/DL (ref 70–99)
HCT VFR BLD AUTO: 32.4 %
HDLC SERPL-MCNC: 26 MG/DL (ref 40–59)
HGB BLD-MCNC: 11 G/DL
IMM GRANULOCYTES # BLD AUTO: 0.04 X10(3) UL (ref 0–1)
IMM GRANULOCYTES NFR BLD: 0.5 %
LDLC SERPL CALC-MCNC: 85 MG/DL (ref ?–100)
LYMPHOCYTES # BLD AUTO: 2.23 X10(3) UL (ref 1–4)
LYMPHOCYTES NFR BLD AUTO: 26.1 %
MCH RBC QN AUTO: 28.8 PG (ref 26–34)
MCHC RBC AUTO-ENTMCNC: 34 G/DL (ref 31–37)
MCV RBC AUTO: 84.8 FL
MONOCYTES # BLD AUTO: 0.63 X10(3) UL (ref 0.1–1)
MONOCYTES NFR BLD AUTO: 7.4 %
NEUTROPHILS # BLD AUTO: 5.42 X10 (3) UL (ref 1.5–7.7)
NEUTROPHILS # BLD AUTO: 5.42 X10(3) UL (ref 1.5–7.7)
NEUTROPHILS NFR BLD AUTO: 63.4 %
NONHDLC SERPL-MCNC: 110 MG/DL (ref ?–130)
OSMOLALITY SERPL CALC.SUM OF ELEC: 292 MOSM/KG (ref 275–295)
PLATELET # BLD AUTO: 240 10(3)UL (ref 150–450)
POTASSIUM SERPL-SCNC: 4.1 MMOL/L (ref 3.5–5.1)
RBC # BLD AUTO: 3.82 X10(6)UL
SODIUM SERPL-SCNC: 139 MMOL/L (ref 136–145)
TRIGL SERPL-MCNC: 138 MG/DL (ref 30–149)
VLDLC SERPL CALC-MCNC: 22 MG/DL (ref 0–30)
WBC # BLD AUTO: 8.5 X10(3) UL (ref 4–11)

## 2024-05-05 PROCEDURE — 99223 1ST HOSP IP/OBS HIGH 75: CPT | Performed by: OTHER

## 2024-05-05 PROCEDURE — 70551 MRI BRAIN STEM W/O DYE: CPT | Performed by: HOSPITALIST

## 2024-05-05 PROCEDURE — 99233 SBSQ HOSP IP/OBS HIGH 50: CPT | Performed by: HOSPITALIST

## 2024-05-05 RX ORDER — ASPIRIN 325 MG
325 TABLET, DELAYED RELEASE (ENTERIC COATED) ORAL DAILY
Status: DISCONTINUED | OUTPATIENT
Start: 2024-05-05 | End: 2024-05-07

## 2024-05-05 NOTE — PLAN OF CARE
Pt A&Ox4. Room air. Pt still reported dizziness when ambulating. Tele monitor in place. Accuchecks monitored AC/HS, coverage provided HS. No reported pain. IVF infusing. Call light within reach.     Problem: Patient Centered Care  Goal: Patient preferences are identified and integrated in the patient's plan of care  Description: Interventions:  - What would you like us to know as we care for you? From home with daughter   - Provide timely, complete, and accurate information to patient/family  - Incorporate patient and family knowledge, values, beliefs, and cultural backgrounds into the planning and delivery of care  - Encourage patient/family to participate in care and decision-making at the level they choose  - Honor patient and family perspectives and choices  Outcome: Progressing     Problem: Patient/Family Goals  Goal: Patient/Family Long Term Goal  Description: Patient's Long Term Goal:     Interventions:  - See additional Care Plan goals for specific interventions  Outcome: Progressing  Goal: Patient/Family Short Term Goal  Description: Patient's Short Term Goal:     Interventions:   - See additional Care Plan goals for specific interventions  Outcome: Progressing     Problem: Diabetes/Glucose Control  Goal: Glucose maintained within prescribed range  Description: INTERVENTIONS:  - Monitor Blood Glucose as ordered  - Assess for signs and symptoms of hyperglycemia and hypoglycemia  - Administer ordered medications to maintain glucose within target range  - Assess barriers to adequate nutritional intake and initiate nutrition consult as needed  - Instruct patient on self management of diabetes  Outcome: Progressing     Problem: PAIN - ADULT  Goal: Verbalizes/displays adequate comfort level or patient's stated pain goal  Description: INTERVENTIONS:  - Encourage pt to monitor pain and request assistance  - Assess pain using appropriate pain scale  - Administer analgesics based on type and severity of pain and  evaluate response  - Implement non-pharmacological measures as appropriate and evaluate response  - Consider cultural and social influences on pain and pain management  - Manage/alleviate anxiety  - Utilize distraction and/or relaxation techniques  - Monitor for opioid side effects  - Notify MD/LIP if interventions unsuccessful or patient reports new pain  - Anticipate increased pain with activity and pre-medicate as appropriate  Outcome: Progressing     Problem: RISK FOR INFECTION - ADULT  Goal: Absence of fever/infection during anticipated neutropenic period  Description: INTERVENTIONS  - Monitor WBC  - Administer growth factors as ordered  - Implement neutropenic guidelines  Outcome: Progressing     Problem: SAFETY ADULT - FALL  Goal: Free from fall injury  Description: INTERVENTIONS:  - Assess pt frequently for physical needs  - Identify cognitive and physical deficits and behaviors that affect risk of falls.  - [place] fall precautions as indicated by assessment.  - Educate pt/family on patient safety including physical limitations  - Instruct pt to call for assistance with activity based on assessment  - Modify environment to reduce risk of injury  - Provide assistive devices as appropriate  - Consider OT/PT consult to assist with strengthening/mobility  - Encourage toileting schedule  Outcome: Progressing     Problem: DISCHARGE PLANNING  Goal: Discharge to home or other facility with appropriate resources  Description: INTERVENTIONS:  - Identify barriers to discharge w/pt and caregiver  - Include patient/family/discharge partner in discharge planning  - Arrange for needed discharge resources and transportation as appropriate  - Identify discharge learning needs (meds, wound care, etc)  - Arrange for interpreters to assist at discharge as needed  - Consider post-discharge preferences of patient/family/discharge partner  - Complete POLST form as appropriate  - Assess patient's ability to be responsible for  managing their own health  - Refer to Case Management Department for coordinating discharge planning if the patient needs post-hospital services based on physician/LIP order or complex needs related to functional status, cognitive ability or social support system  Outcome: Progressing

## 2024-05-05 NOTE — OCCUPATIONAL THERAPY NOTE
OCCUPATIONAL THERAPY TREATMENT NOTE - INPATIENT        Room Number: 455/455-A     Presenting Problem: hyperglycemia, UTI    Problem List  Principal Problem:    Hyperglycemia  Active Problems:    Leukocytosis, unspecified type    Urinary tract infection without hematuria, site unspecified      OCCUPATIONAL THERAPY ASSESSMENT   Patient demonstrates limited progress this session, goals remain in progress.    Patient continues to function below baseline with ADls and functional mobility.   Contributing factors to remaining limitations include impaired sit and stand balance, cognitive deficits (impaired insight, problem solving), decreased insight to deficits, and decreased safety awareness.  Occupational Therapy will continue to follow patient for duration of hospitalization.    Patient continues to benefit from continued skilled OT services: to promote return to prior level of function and safety with continuous assistance and gradual rehabilitative therapy .     PLAN  OT Treatment Plan: Balance activities;ADL training;Functional transfer training;Endurance training;Patient/Family education;Patient/Family training;Compensatory technique education  OT Device Recommendations: TBD    SUBJECTIVE  \"What road would you take to get here from [place]\"    OBJECTIVE  Precautions: Bed/chair alarm    PAIN ASSESSMENT  Ratin    ACTIVITY TOLERANCE  BP:  (148/53 seated EOB prior to activity; 121/82 post activity)[id number]  BP Location: Right arm  BP Method: Automatic  Patient Position: Sitting    ACTIVITIES OF DAILY LIVING ASSESSMENT  AM-PAC ‘6-Clicks’ Inpatient Daily Activity Short Form  How much help from another person does the patient currently need…  -   Putting on and taking off regular lower body clothing?: A Lot  -   Bathing (including washing, rinsing, drying)?: A Lot  -   Toileting, which includes using toilet, bedpan or urinal? : A Lot  -   Putting on and taking off regular upper body clothing?: A Little  -   Taking care  of personal grooming such as brushing teeth?: A Lot  -   Eating meals?: None    AM-PAC Score:  Score: 15  Approx Degree of Impairment: 56.46%  Standardized Score (AM-PAC Scale): 34.69  CMS Modifier (G-Code): CK    FUNCTIONAL TRANSFER ASSESSMENT  Sit to Stand: Edge of Bed; Chair  Edge of Bed: Moderate Assist  Chair: Moderate Assist  Toilet Transfer: Moderate Assist    BED MOBILITY  Supine to Sit : Moderate Assist  Sit to Supine (OT): Not Tested    BALANCE ASSESSMENT  Static Sitting: Minimal Assist  Static Standing: Moderate Assist (min-mod A, initially with posterior lean requiring mod A for balance and progressing to min A with cues for posture/positioning)    FUNCTIONAL ADL ASSESSMENT  Grooming Seated: Supervision  LB Dressing Seated: Moderate Assist  Toileting Standing: Minimal Assist (standing with simulated reaching)    Skilled Therapy Provided: Pt received in bed. No family present. Pt without c/o.   Pt continues to demo impaired sit and stand balance in sitting and standing. Pt requires mod to max A to correct posterior LOB multile times during session.   Pt demo poor insight with limited carry over of provided techniques.  Pt able to locate clock and read correctly after self correction.  Pt incontinent of bladder and bowel seemingly unaware of the mess she was making while performing toileting.   Communicated with nurse. PCT, and hospitalist regarding concerns in pt presentation.   Recommend use of gait belt and R/W for limited mobility tasks.     EDUCATION PROVIDED  Patient: Role of Occupational Therapy; Discharge Recommendations; Fall Prevention; Compensatory ADL Techniques  Patient's Response to Education: Verbalized Understanding; Requires Further Education    The patient's Approx Degree of Impairment: 56.46% has been calculated based on documentation in the [facility] '6 clicks' Inpatient Daily Activity Short Form.  Research supports that patients with this level of impairment may benefit from LARA.  Final  disposition will be made by interdisciplinary medical team.    Patient End of Session: Up in chair;Needs met;Call light within reach;RN aware of session/findings;All patient questions and concerns addressed;Alarm set    OT Goals:  OT Goals  Patients self stated goal is: improved strength for ADLs     Patient will complete functional transfer with mod I  Comment: NOT MET    Patient will complete toileting with mod I  Comment: NOT MET    Patient will tolerate standing for 5 minutes in prep for adls with mod I   Comment:progressing    Patient will complete LB dressing with mod I and AE PRN  Comment:NOT MET          Goals  on: 2024  Frequency: 3-5x/wk    Self-Care Home Management: 30 minutes

## 2024-05-05 NOTE — PROGRESS NOTES
[facility]  part of [facility]    Progress Note    Ananya Dowling Patient Status:  Inpatient    1950 MRN [id number]   Location [facility] 4W/SW/SE Attending Esdras Eastman MD   Hosp Day # 3 PCP Jermaine Monson MD     Chief Complaint:   Chief Complaint   Patient presents with    Hyperglycemia    Nausea/Vomiting/Diarrhea       Subjective:   Ananya Dowling worked with PT today and she was leaning back and wasn't able to stand long fell into chair behind her. She doesn't feel dizzy at rest but when she gets up she gets lightheaded lasts 5 min better when she sits down. No HA. Had one yesterday afternoon she says in posterior head. No vision changes. No N/V. No further diarrhea. No focal weakness. Has some tingling in both feet today     Objective:   Objective:    Blood pressure 144/55, pulse 88, temperature 98.1 °F (36.7 °C), temperature source Oral, resp. rate 20, weight 136 lb (61.7 kg), SpO2 91%.    Physical Exam:    General: No acute distress.   Respiratory: Clear to auscultation bilaterally. No wheezes. No rhonchi.  Cardiovascular: S1, S2. Regular rate and rhythm. No murmurs, rubs or gallops.   Abdomen: Soft, nontender, nondistended.  Positive bowel sounds. No rebound or guarding.  Neurologic: No focal neurological deficits. CN II-XII GI, motor 5/5 sensation intact no pronator drift finger to nose intact.   Musculoskeletal: Moves all extremities.  Extremities: No edema.      Results:   Results:    Labs:  Recent Labs   Lab 24  0957 24  0711 24  0529 24  0606   WBC 19.5* 16.4* 13.1* 10.2 8.5   HGB 12.5 13.0 11.3* 11.2* 11.0*   MCV 82.5 84.5 83.7 84.6 84.8   .0 236.0 200.0 217.0 240.0       Recent Labs   Lab 24  1155 24  2359 24  0711 24  0529 24  0606   *   < > 269* 199* 150*   BUN 25*   < > 14 12 16   CREATSERUM 1.00   < > 0.78 0.71 0.71   CA 9.6   < > 9.0 9.0 8.8   ALB 4.1  --   --   --   --     *   < > 134* 136 139   K 4.3   < > 4.3 4.3 4.1   CL 99   < > 103 107 107   CO2 26.0   < > 25.0 24.0 28.0   ALKPHO 83  --   --   --   --    AST 31  --   --   --   --    ALT 24  --   --   --   --    BILT 0.3  --   --   --   --    TP 7.5  --   --   --   --     < > = values in this interval not displayed.       Estimated Creatinine Clearance: 54 mL/min (based on SCr of 0.71 mg/dL).    No results for input(s): \"PTP\", \"INR\" in the last 168 hours.         Culture:  Hospital Encounter on 05/02/24   1. Urine Culture, Routine     Status: Abnormal    Collection Time: 05/02/24  2:57 AM    Specimen: Urine, clean catch   Result Value Ref Range    Urine Culture >100,000 CFU/ML Escherichia coli (A) N/A       Susceptibility    Escherichia coli -  (no method available)     Ampicillin 4 Sensitive      Cefazolin <=4 Sensitive      Ciprofloxacin <=0.25 Sensitive      Gentamicin <=1 Sensitive      Meropenem <=0.25 Sensitive      Levofloxacin <=0.12 Sensitive      Nitrofurantoin <=16 Sensitive      Piperacillin + Tazobactam <=4 Sensitive      Trimethoprim/Sulfa <=20 Sensitive    2. Blood Culture     Status: None (Preliminary result)    Collection Time: 05/02/24 12:51 AM    Specimen: Blood,peripheral   Result Value Ref Range    Blood Culture Result No Growth 3 Days N/A       Cardiac  No results for input(s): \"TROP\", \"PBNP\" in the last 168 hours.      Imaging: Imaging data reviewed in [facility].  CT BRAIN OR HEAD ([id number])    Result Date: 5/4/2024  CONCLUSION:   No acute intracranial abnormality.  Generalized atrophy with mild-to-moderate chronic microvascular white matter ischemia.  Aerated secretions left maxillary sinus suggesting sinusitis.    Dictated by (CST): Kody Escobar MD on 5/04/2024 at 11:26 AM     Finalized by (CST): Kody Escobar MD on 5/04/2024 at 11:29 AM           Medications:    ceFAZolin  2 g Intravenous Q8H    insulin degludec  40 Units Subcutaneous Daily    insulin aspart  12 Units Subcutaneous TID CC     losartan  50 mg Oral Daily    buPROPion ER  150 mg Oral Daily    gabapentin  300 mg Oral TID    pregabalin  50 mg Oral BID    sertraline  100 mg Oral Daily    pravastatin  10 mg Oral Nightly    enoxaparin  40 mg Subcutaneous Daily    insulin aspart  1-7 Units Subcutaneous TID CC and HS         Assessment and Plan:   Assessment & Plan:        UTI   Ucx E.coli. Pan sensitive.    IV ancef day#3  WBC 19.5 on admit now normal.  Lactic acid normal Afebrile.   Improving   Blood cx x 2 NGTD  DM II, uncontrolled   A1c 12.4  Diabetes education   Tresiba 40 units at bedtime. Add mealtime novolog 12 units TID.   ISS   Essential HTN   losartan to 50mg daily   4.    Dizziness   Orthostatic V/S + --> can stop IVF. CHANDLER hose.   CT brain negative.   MRI brain today r/o posterior circulation CVA  Neuro consult.   ASA full dose today x 1   PT/OT - [facility]ARA - pt refused agreeable to [facility].     Other medical problems   Hyperlipidemia  Depression   OA            >55min spent, >50% spent counseling and coordinating care in the form of educating pt/family and d/w consultants and staff. Most of the time spent discussing the above plan.        Plan of care discussed with patient or family at bedside.    Esdras Eastman MD  Hospitalist          Supplementary Documentation:     Quality:  DVT Prophylaxis: heparin   CODE status: Full  Dispo: per clinical course           Estimated date of discharge: TBD  Discharge is dependent on: clinical stability  At this point Ms. Dowling is expected to be discharge to: home with [facility]

## 2024-05-05 NOTE — PLAN OF CARE
Patient alert and oriented x4. Up with 1 assist. General carb controlled diet, tolerating well. Accuchecks ACHS. Remote tele in place. Continuing to experience dizziness. Patient worked with PT/OT today.  MRI performed this AM. Neurology consulted.  Call light in reach. Frequent rounding performed.     Problem: Patient Centered Care  Goal: Patient preferences are identified and integrated in the patient's plan of care  Description: Interventions:  - What would you like us to know as we care for you? I was recently here  - Provide timely, complete, and accurate information to patient/family  - Incorporate patient and family knowledge, values, beliefs, and cultural backgrounds into the planning and delivery of care  - Encourage patient/family to participate in care and decision-making at the level they choose  - Honor patient and family perspectives and choices  Outcome: Progressing     Problem: Patient/Family Goals  Goal: Patient/Family Long Term Goal  Description: Patient's Long Term Goal: to return home    Interventions:  - See additional Care Plan goals for specific interventions  Outcome: Progressing  Goal: Patient/Family Short Term Goal  Description: Patient's Short Term Goal: to improve dizziness    Interventions:   - See additional Care Plan goals for specific interventions  Outcome: Progressing     Problem: Diabetes/Glucose Control  Goal: Glucose maintained within prescribed range  Description: INTERVENTIONS:  - Monitor Blood Glucose as ordered  - Assess for signs and symptoms of hyperglycemia and hypoglycemia  - Administer ordered medications to maintain glucose within target range  - Assess barriers to adequate nutritional intake and initiate nutrition consult as needed  - Instruct patient on self management of diabetes  Outcome: Progressing     Problem: PAIN - ADULT  Goal: Verbalizes/displays adequate comfort level or patient's stated pain goal  Description: INTERVENTIONS:  - Encourage pt to monitor pain  and request assistance  - Assess pain using appropriate pain scale  - Administer analgesics based on type and severity of pain and evaluate response  - Implement non-pharmacological measures as appropriate and evaluate response  - Consider cultural and social influences on pain and pain management  - Manage/alleviate anxiety  - Utilize distraction and/or relaxation techniques  - Monitor for opioid side effects  - Notify MD/LIP if interventions unsuccessful or patient reports new pain  - Anticipate increased pain with activity and pre-medicate as appropriate  Outcome: Progressing     Problem: RISK FOR INFECTION - ADULT  Goal: Absence of fever/infection during anticipated neutropenic period  Description: INTERVENTIONS  - Monitor WBC  - Administer growth factors as ordered  - Implement neutropenic guidelines  Outcome: Progressing     Problem: SAFETY ADULT - FALL  Goal: Free from fall injury  Description: INTERVENTIONS:  - Assess pt frequently for physical needs  - Identify cognitive and physical deficits and behaviors that affect risk of falls.  - [place] fall precautions as indicated by assessment.  - Educate pt/family on patient safety including physical limitations  - Instruct pt to call for assistance with activity based on assessment  - Modify environment to reduce risk of injury  - Provide assistive devices as appropriate  - Consider OT/PT consult to assist with strengthening/mobility  - Encourage toileting schedule  Outcome: Progressing     Problem: DISCHARGE PLANNING  Goal: Discharge to home or other facility with appropriate resources  Description: INTERVENTIONS:  - Identify barriers to discharge w/pt and caregiver  - Include patient/family/discharge partner in discharge planning  - Arrange for needed discharge resources and transportation as appropriate  - Identify discharge learning needs (meds, wound care, etc)  - Arrange for interpreters to assist at discharge as needed  - Consider post-discharge  preferences of patient/family/discharge partner  - Complete POLST form as appropriate  - Assess patient's ability to be responsible for managing their own health  - Refer to Case Management Department for coordinating discharge planning if the patient needs post-hospital services based on physician/LIP order or complex needs related to functional status, cognitive ability or social support system  Outcome: Progressing

## 2024-05-05 NOTE — CONSULTS
[facility]  [address] [place] [address], [address]  [facility] [address]  [phone]            Ananya Dowling Patient Status:  Inpatient    1950 MRN [id number]   Location [facility] 4W/SW/SE Attending Esdras Eastman MD   Hosp Day # 3 PCP Jermaine Monson MD     Date of Admission:  2024  Date of Consult:  2024  Reason for Consultation:   Balance problems, lightheadedness, urinary incontinence.    History of Present Illness:   Patient is a 73 year old female who was admitted to the hospital for Hyperglycemia:  Patient with a history of hypertension, diabetes, history of few years of balance problems that are intermittently getting worse especially when she gets UTI or hyperglycemia.  History of urinary incontinence, requiring to wear depends, sometimes she does not know she needs to urinate.  That has been going on for couple of years as well.  Also she keeps getting lightheaded when she stands up quickly.  On hospitalization in May 2024 drop of orthostatic hypotension was over 40.  Also some history of tremors intermittently for few months.  History of some cognitive decline as well.    Past Medical History  Past Medical History:    Arthritis    Back problem    Depression    Diabetes (HCC)    Essential hypertension    High blood pressure    High cholesterol    Visual impairment       Past Surgical History  History reviewed. No pertinent surgical history.    Family History  Family History   Problem Relation Age of Onset    Heart Attack Father     Stroke Mother     Heart Disorder Son     Diabetes Son        Social History  Pediatric History   Patient Parents    Not on file     Other Topics Concern    Not on file   Social History Narrative    Not on file           Current Medications:  Current Facility-Administered Medications   Medication Dose Route Frequency    aspirin DR tab 325 mg  325 mg Oral Daily    ceFAZolin (Ancef) 2 g in 20mL IV syringe premix  2 g  Intravenous Q8H    insulin degludec 100 units/mL flextouch 40 Units  40 Units Subcutaneous Daily    insulin aspart (NovoLOG) 100 Units/mL FlexPen 12 Units  12 Units Subcutaneous TID CC    losartan (Cozaar) tab 50 mg  50 mg Oral Daily    buPROPion ER (Wellbutrin XL) 24 hr tab 150 mg  150 mg Oral Daily    gabapentin (Neurontin) cap 300 mg  300 mg Oral TID    pregabalin (Lyrica) cap 50 mg  50 mg Oral BID    sertraline (Zoloft) tab 100 mg  100 mg Oral Daily    pravastatin (Pravachol) tab 10 mg  10 mg Oral Nightly    ondansetron (Zofran) 4 MG/2ML injection 4 mg  4 mg Intravenous Q6H PRN    enoxaparin (Lovenox) 40 MG/0.4ML SUBQ injection 40 mg  40 mg Subcutaneous Daily    acetaminophen (Tylenol Extra Strength) tab 500 mg  500 mg Oral Q4H PRN    polyethylene glycol (PEG 3350) (Miralax) 17 g oral packet 17 g  17 g Oral Daily PRN    sennosides (Senokot) tab 17.2 mg  17.2 mg Oral Nightly PRN    bisacodyl (Dulcolax) 10 MG rectal suppository 10 mg  10 mg Rectal Daily PRN    fleet enema (Fleet) 7-19 GM/118ML rectal enema 133 mL  1 enema Rectal Once PRN    glucose (Dex4) 15 GM/59ML oral liquid 15 g  15 g Oral Q15 Min PRN    Or    glucose (Glutose) 40% oral gel 15 g  15 g Oral Q15 Min PRN    Or    glucose-vitamin C (Dex-4) chewable tab 4 tablet  4 tablet Oral Q15 Min PRN    Or    dextrose 50% injection 50 mL  50 mL Intravenous Q15 Min PRN    Or    glucose (Dex4) 15 GM/59ML oral liquid 30 g  30 g Oral Q15 Min PRN    Or    glucose (Glutose) 40% oral gel 30 g  30 g Oral Q15 Min PRN    Or    glucose-vitamin C (Dex-4) chewable tab 8 tablet  8 tablet Oral Q15 Min PRN    insulin aspart (NovoLOG) 100 Units/mL FlexPen 1-7 Units  1-7 Units Subcutaneous TID CC and HS     Medications Prior to Admission   Medication Sig    Glucose Blood (CONTOUR NEXT TEST) In Vitro Strip Check BG 3x daily DX code:E11.65    gabapentin 300 MG Oral Cap Take 1 capsule (300 mg total) by mouth 3 (three) times daily.    lidocaine 4 % External Patch Place 1 patch onto  the skin daily.    buPROPion  MG Oral Tablet 24 Hr Take 1 tablet (150 mg total) by mouth daily. (Patient not taking: Reported on 2024)    Insulin Degludec (TRESIBA FLEXTOUCH) 100 UNIT/ML Subcutaneous Solution Pen-injector Inject 36 Units into the skin daily.    [] glipiZIDE 10 MG Oral Tab Take 1 tablet (10 mg total) by mouth 2 (two) times daily before meals.    Insulin Lispro, 1 Unit Dial, 100 UNIT/ML Subcutaneous Solution Pen-injector Patient will take 14 units with small carb meals and 18-20 units with larger carb meals.    Insulin Pen Needle (PEN NEEDLES) 32G X 4 MM Does not apply Misc 1 pen  4 (four) times daily. Use  New pen needle  With each injection    ergocalciferol 1.25 MG (42443 UT) Oral Cap Take 1 capsule (50,000 Units total) by mouth once a week.    Glucose Blood (ACCU-CHEK FITO PLUS) In Vitro Strip Use as directed to check blood glucose 3 times/day - DX code:E11.65 using insulin    simvastatin 20 MG Oral Tab Take 1 tablet (20 mg total) by mouth daily.    sertraline 100 MG Oral Tab Take 1 tablet (100 mg total) by mouth daily.    losartan 50 MG Oral Tab Take 0.5 tablets (25 mg total) by mouth daily.    pregabalin 50 MG Oral Cap Take 1 capsule (50 mg total) by mouth 2 (two) times daily.       Allergies  No Known Allergies    Review of Systems:   As in HPI, the rest of the 14 system review was done and was negative    Physical Exam:     Vitals:    24 0538 24 0823 24 0936 24 1152   BP: 144/55  121/82 158/65   Pulse:  88  76   Resp: 18 20  18   Temp: 98.1 °F (36.7 °C)   98 °F (36.7 °C)   TempSrc: Oral   Oral   SpO2: 97% 91%  97%   Weight:           General: No apparent distress, well nourished, well groomed.  Head- Normocephalic, atraumatic  Eyes- No redness or swelling  ENT- Hearing intake, smell preserved, normal glutition  Neck- No masses or adenopathy  Cv: pulses were palpable and normal, no cyanosis or edema     Neurological:     Mental Status- Alert and  oriented x3.  Normal attention span and concentration  Thought process intact  Memory intact- recent and remote  Mood intact  Fund of knowledge appropriate for education and age    Language intact including: comprehension, naming, repetition, vocabulary    Cranial Nerves:  II.- Visual fields full to confrontation  III, IV, VI- EOM intact, QUINTON, mild endpoint nystagmus bilaterally  V. Facial sensation intact  VII. Face symmetric, no facial weakness  VIII. Hearing intact.  IX. Pallet elevates symmetrically.  XI. Shoulder shrug is intact  XII. Tongue is midline    Motor Exam:  Muscle tone normal,  No atrophy or fasciculations  Strength- upper extremities 5/5 proximally and distally                  - lower  extremities 5/5 proximally and distally    Sensory Exam:  Light touch sensation- intact in all 4 extremities    Deep Tendon Reflexes:  Trace in upper extremities, absent in lower extremities    No clonus  No Babinski sign    Coordination:  Finger to nose mildly ataxic  Rapid alternating movements clumsy bilaterally      Results:     Laboratory Data:  Lab Results   Component Value Date    WBC 8.5 05/05/2024    HGB 11.0 (L) 05/05/2024    HCT 32.4 (L) 05/05/2024    .0 05/05/2024    CREATSERUM 0.71 05/05/2024    BUN 16 05/05/2024     05/05/2024    K 4.1 05/05/2024     05/05/2024    CO2 28.0 05/05/2024     (H) 05/05/2024    CA 8.8 05/05/2024    ALB 4.1 04/30/2024    ALKPHO 83 04/30/2024    TP 7.5 04/30/2024    AST 31 04/30/2024    ALT 24 04/30/2024    T4F 1.0 04/19/2022    TSH 4.010 (H) 04/19/2022    ETOH <3 03/18/2023         Imaging:    MRI BRAIN (CPT=70551)    Result Date: 5/5/2024  CONCLUSION:  1. No acute infarct or hemorrhage. 2. Mild-to-moderate changes of chronic small vessel disease in cerebral white matter. 3. Mild changes of chronic small vessel disease in the [place]. 4. Left maxillary sinusitis is probably chronic.    Dictated by (CST): Darren Lopez MD on 5/05/2024 at 12:05 PM      Finalized by (CST): Darren Lopez MD on 5/05/2024 at 12:08 PM          CT BRAIN OR HEAD ([id number])    Result Date: 5/4/2024  CONCLUSION:   No acute intracranial abnormality.  Generalized atrophy with mild-to-moderate chronic microvascular white matter ischemia.  Aerated secretions left maxillary sinus suggesting sinusitis.    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  Possibility of multiple system atrophy could be entertained, considering prolonged history of urinary incontinence, orthostatic hypotension and balance problems.  At the same time clinical picture may be confounded by UTI.  Therefore a patient will need to follow-up in the clinic in 1 month, once she is improved with the current UTI.  To be reassessed at that time.  I have discussed the case with the daughter over the phone.  Most likely patient will require extensive rehabilitation and support going forward.  MRI of the brain and cervical spine was done last year and the MRI of the brain was repeated again this time, no other etiology were identified.    Thank you for allowing me to participate in the care of your patient.    Martin Foy MD  5/5/2024

## 2024-05-05 NOTE — PHYSICAL THERAPY NOTE
PHYSICAL THERAPY TREATMENT NOTE - INPATIENT     Room Number: 455/455-A       Presenting Problem: hyperglycemia , nausea, vomiting, diarrhea  Co-Morbidities : recent hx of UTI    Problem List  Principal Problem:    Hyperglycemia  Active Problems:    Leukocytosis, unspecified type    Urinary tract infection without hematuria, site unspecified      PHYSICAL THERAPY ASSESSMENT   Patient demonstrates limited progress this session, goals  remain in progress.    Patient continues to function below baseline with transfers, gait, standing prolonged periods, and performing household tasks.  Contributing factors to remaining limitations include decreased functional strength, impaired standing/dynamic balance, impaired coordination, and cognitive deficits (delayed responses at times, brief vacant stares).  Next session anticipate patient to progress bed mobility, transfers, and gait.  Physical Therapy will continue to follow patient for duration of hospitalization.    Patient continues to benefit from continued skilled PT services: to promote return to prior level of function and safety with continuous assistance and gradual rehabilitative therapy .    PLAN  PT Treatment Plan: Bed mobility;Body mechanics;Endurance;Energy conservation;Patient education;Family education;Gait training;Neuromuscular re-educate;Strengthening;Transfer training;Balance training  Frequency (Obs): 3-5x/week    SUBJECTIVE  \"I do tend to \"zone\" out at random.\"    OBJECTIVE  Precautions: Bed/chair alarm    WEIGHT BEARING RESTRICTION                PAIN ASSESSMENT   Ratin          BALANCE  Static Sitting: Fair +  Dynamic Sitting: Fair  Static Standing: Poor +  Dynamic Standing: Poor    ACTIVITY TOLERANCE           BP: 121/82 (pre-activity 148/53)  BP Location: Right arm  BP Method: Automatic  Patient Position: Sitting     O2 WALK       AM-PAC '6-Clicks' INPATIENT SHORT FORM - BASIC MOBILITY  How much difficulty does the patient currently  have...  Patient Difficulty: Turning over in bed (including adjusting bedclothes, sheets and blankets)?: A Little   Patient Difficulty: Sitting down on and standing up from a chair with arms (e.g., wheelchair, bedside commode, etc.): A Lot   Patient Difficulty: Moving from lying on back to sitting on the side of the bed?: A Little   How much help from another person does the patient currently need...   Help from Another: Moving to and from a bed to a chair (including a wheelchair)?: A Lot   Help from Another: Need to walk in hospital room?: A Lot   Help from Another: Climbing 3-5 steps with a railing?: A Lot     AM-PAC Score:  Raw Score: 14   Approx Degree of Impairment: 61.29%   Standardized Score (AM-PAC Scale): 38.1   CMS Modifier (G-Code): CL    FUNCTIONAL ABILITY STATUS  Functional Mobility/Gait Assessment  Gait Assistance: Moderate assistance  Distance (ft): 15', 20'  Assistive Device: Rolling walker  Pattern:  (Slow cammie, frequent posterior LOB requiring Mod A to correct, narrow DUC)  Supine to Sit: supervision  Sit to Stand: minimal assist    Additional information: Pt supine upon arrival, agreeable to PT treatment. Pt ambulates from bed to bathroom and back to bedside chair. Requires multiple episodes of Mod A to recover posterior LOB. Pt reports needing to have BM thus ambulated to bathroom however pt had soiled pull ups and was unaware - OT staff present and assisted with cleaning pt and donning new pull ups. Pt has multiple episodes of vacant staring and states she \"zones\" out. Needs reminders with each sit > stand to find middle DUC as she has strong posterior lean/LOB. Pt ended session up in chair with alarm on, all needs in reach and RN updated. Expressed concerns to RN and MD regarding pt's balance/cognition and concern for neurologic involvement.     The patient's Approx Degree of Impairment: 61.29% has been calculated based on documentation in the [facility] '6 clicks' Inpatient Daily Activity Short  Form.  Research supports that patients with this level of impairment may benefit from gradual rehabilitation.  Final disposition will be made by interdisciplinary medical team.    Patient End of Session: Up in chair;Needs met;Call light within reach;RN aware of session/findings;All patient questions and concerns addressed;Alarm set    CURRENT GOALS   Goals to be met by: 24  Patient Goal Patient's self-stated goal is: go home   Goal #1 Patient is able to demonstrate supine - sit EOB @ level: supervision     Goal #1   Current Status SBA   Goal #2 Patient is able to demonstrate transfers Sit to/from Stand at assistance level: supervision with walker - rolling     Goal #2  Current Status Mod A with RW   Goal #3 Patient is able to ambulate 100 feet with assist device: walker - rolling at assistance level: supervision   Goal #3   Current Status 20' with Mod A and RW (Min to Mod with LOB)   Goal #4 Patient will negotiate 4 stairs/one curb w/ assistive device and supervision   Goal #4   Current Status NT   Goal #5 Patient to demonstrate independence with home activity/exercise instructions provided to patient in preparation for discharge.   Goal #5   Current Status Ongoing     Gait Trainin minutes  Therapeutic Activity: 15 minutes

## 2024-05-06 LAB
GLUCOSE BLDC GLUCOMTR-MCNC: 104 MG/DL (ref 70–99)
GLUCOSE BLDC GLUCOMTR-MCNC: 130 MG/DL (ref 70–99)
GLUCOSE BLDC GLUCOMTR-MCNC: 149 MG/DL (ref 70–99)
GLUCOSE BLDC GLUCOMTR-MCNC: 258 MG/DL (ref 70–99)

## 2024-05-06 PROCEDURE — 99232 SBSQ HOSP IP/OBS MODERATE 35: CPT | Performed by: HOSPITALIST

## 2024-05-06 RX ORDER — CEFADROXIL 500 MG/1
500 CAPSULE ORAL 2 TIMES DAILY
Qty: 6 CAPSULE | Refills: 0 | Status: SHIPPED | OUTPATIENT
Start: 2024-05-06 | End: 2024-05-09

## 2024-05-06 NOTE — CM/SW NOTE
CM was notified by MD that pt is now agreeable to [facility] and is dc ready id bed can be secured today.    CM req DSC to send upd to the pending [facility] ref and start auth if managed by Len    5/7 1100  Pt choice is [facility] CM reserved in aidin and req DSC add to auth.    CM notified liaison if auth is approved pt is dc ready today    [facility] on wc, pcs done    1240  Auth approved  Facility bed avail    Plan  Kristen Staplesbora BERMUDEZ 3pm  [facility], pcs done  RN report [phone]    / to remain available for support and/or discharge planning.     Catia Gil RN    Ext [phone]

## 2024-05-06 NOTE — DIABETES ED
[facility]    Diabetes Education  Note    Ananya Dowling Patient Status:  Inpatient   1950 MRN [id number]  Location [facility] 4W/SW/SE Attending Esdras Eastman MD  Hosp Day # 4 PCP Jermaine Monson MD      Labs:    HEMOGLOBIN A1C (%)   Date Value   2023 10.0 (A)     HgbA1C (%)   Date Value   2024 12.4 (H)         Reason for Visit:MD ang  Met with patient in room to discuss diabetes management at home. She reports she lives with family and prepare or provide meals.  She consumes 3 meals per day, snacks on fruits between meals and consumes concentrated sweets.    Instructed on Basic meal planning and provided Balanced Plate handout for review.  Discussed typical meals and strategies to limit carbohydrate portions with each meal.  Instructed her to consumes fruits with her meals and snack on low calories vegetables between the meals.  She administers long and rapid acting insulin to assist with diabetes management. Instructed on actions of insulins and correct timing of medications. She indicates she does not adhere to long acting insulin due to forgetting to administer insulin or fear of low blood sugar. She often administers the long acting insulin later in the day and various times. Reinforced the importance of administer insulin as directed.      She would benefit from outpatient diabetes education and encouraged her to discuss with PCP.        Education Provided:  Hypoglycemia symptoms/treatment/prevention  Actions of long and rapid acting insulins  Basic Diet Guidelines  Importance of close follow up with PCP and medical team    Patient verbalized understanding and was receptive to information provided.      Recommendations:  Administer medications as directed.  Attend outpatient diabetes education          Mackenzie Zarate RN  Diabetes Educator  2024  12:47 PM

## 2024-05-06 NOTE — CM/SW NOTE
Submitted clinical via [employer] portal.  [employer] Case/Auth ID is [id number].  Final insurance authorization is pending at this time.      SW/CM assigned to the case will continue to follow auth status.      Glendy Bauitsta, DSC

## 2024-05-06 NOTE — PLAN OF CARE
Pt A&Ox4. Room air. Tele monitor in place. Accuchecks monitored AC/HS, coverage provided HS. No reported pain. Saline locked. Call light within reach     Problem: Patient Centered Care  Goal: Patient preferences are identified and integrated in the patient's plan of care  Description: Interventions:  - What would you like us to know as we care for you?   - Provide timely, complete, and accurate information to patient/family  - Incorporate patient and family knowledge, values, beliefs, and cultural backgrounds into the planning and delivery of care  - Encourage patient/family to participate in care and decision-making at the level they choose  - Honor patient and family perspectives and choices  Outcome: Progressing     Problem: Patient/Family Goals  Goal: Patient/Family Long Term Goal  Description: Patient's Long Term Goal:    Interventions:  - See additional Care Plan goals for specific interventions  Outcome: Progressing  Goal: Patient/Family Short Term Goal  Description: Patient's Short Term Goal:     Interventions:  - See additional Care Plan goals for specific interventions  Outcome: Progressing     Problem: Diabetes/Glucose Control  Goal: Glucose maintained within prescribed range  Description: INTERVENTIONS:  - Monitor Blood Glucose as ordered  - Assess for signs and symptoms of hyperglycemia and hypoglycemia  - Administer ordered medications to maintain glucose within target range  - Assess barriers to adequate nutritional intake and initiate nutrition consult as needed  - Instruct patient on self management of diabetes  Outcome: Progressing     Problem: PAIN - ADULT  Goal: Verbalizes/displays adequate comfort level or patient's stated pain goal  Description: INTERVENTIONS:  - Encourage pt to monitor pain and request assistance  - Assess pain using appropriate pain scale  - Administer analgesics based on type and severity of pain and evaluate response  - Implement non-pharmacological measures as appropriate  and evaluate response  - Consider cultural and social influences on pain and pain management  - Manage/alleviate anxiety  - Utilize distraction and/or relaxation techniques  - Monitor for opioid side effects  - Notify MD/LIP if interventions unsuccessful or patient reports new pain  - Anticipate increased pain with activity and pre-medicate as appropriate  Outcome: Progressing     Problem: RISK FOR INFECTION - ADULT  Goal: Absence of fever/infection during anticipated neutropenic period  Description: INTERVENTIONS  - Monitor WBC  - Administer growth factors as ordered  - Implement neutropenic guidelines  Outcome: Progressing     Problem: SAFETY ADULT - FALL  Goal: Free from fall injury  Description: INTERVENTIONS:  - Assess pt frequently for physical needs  - Identify cognitive and physical deficits and behaviors that affect risk of falls.  - [place] fall precautions as indicated by assessment.  - Educate pt/family on patient safety including physical limitations  - Instruct pt to call for assistance with activity based on assessment  - Modify environment to reduce risk of injury  - Provide assistive devices as appropriate  - Consider OT/PT consult to assist with strengthening/mobility  - Encourage toileting schedule  Outcome: Progressing     Problem: DISCHARGE PLANNING  Goal: Discharge to home or other facility with appropriate resources  Description: INTERVENTIONS:  - Identify barriers to discharge w/pt and caregiver  - Include patient/family/discharge partner in discharge planning  - Arrange for needed discharge resources and transportation as appropriate  - Identify discharge learning needs (meds, wound care, etc)  - Arrange for interpreters to assist at discharge as needed  - Consider post-discharge preferences of patient/family/discharge partner  - Complete POLST form as appropriate  - Assess patient's ability to be responsible for managing their own health  - Refer to Case Management Department for  coordinating discharge planning if the patient needs post-hospital services based on physician/LIP order or complex needs related to functional status, cognitive ability or social support system  Outcome: Progressing

## 2024-05-06 NOTE — PAYOR COMM NOTE
--------------RECONSIDERATION REQUEST FOR INPATIENT SERVICES     **As a reminder, Medicare Advantage plans are instructed to provide, at a minimum, the same coverage that a traditional Medicare beneficiary would receive. Beyond any doubt, a traditional Medicare recipient in this same situation would have received Medicare coverage for these inpatient medically necessary services. **     CONTINUED STAY REVIEW    Payor: RENAN MENENDEZ [facility] – [place], [place]  Subscriber #:  [id number]  Authorization Number: [id number]    Admit date: 5/2/24  Admit time:  5:18 AM      5/3  Chief Complaint:       Chief Complaint   Patient presents with    Hyperglycemia    Nausea/Vomiting/Diarrhea         Subjective:   Ananya Dowling is feeling better. Still gets dizzy when up to side of bed or when standing lightheaded feeling. No abd pain no more diarrhea no N/V tolerating diet.         Objective:   Objective:    Blood pressure 160/79, pulse 79, temperature 97.9 °F (36.6 °C), temperature source Oral, resp. rate 18, weight 132 lb (59.9 kg), SpO2 97%.     Physical Exam:    General: No acute distress.   Respiratory: Clear to auscultation bilaterally. No wheezes. No rhonchi.  Cardiovascular: S1, S2. Regular rate and rhythm. No murmurs, rubs or gallops.   Abdomen: Soft, nontender, nondistended.  Positive bowel sounds. No rebound or guarding.  Neurologic: No focal neurological deficits.   Musculoskeletal: Moves all extremities.  Extremities: No edema.        Results:   Results:    Labs:        Recent Labs   Lab 05/01/24  2359 05/02/24  0957 05/03/24  0711   WBC 19.5* 16.4* 13.1*   HGB 12.5 13.0 11.3*   MCV 82.5 84.5 83.7   .0 236.0 200.0                 Recent Labs   Lab 04/30/24  1155 05/01/24  2359 05/02/24  0051 05/02/24  0957 05/03/24  0711   * 390*  --  218* 269*   BUN 25* 21 21 12 14   CREATSERUM 1.00 0.98  --  0.72 0.78   CA 9.6 9.4  --  9.2 9.0   ALB 4.1  --   --   --   --    * 129*  --  136 134*   K 4.3  --  4.8 3.7 4.3   CL 99 98  --  106  103   CO2 26.0 25.0  --  27.0 25.0   ALKPHO 83  --   --   --   --    AST 31  --   --   --   --    ALT 24  --   --   --   --    BILT 0.3  --   --   --   --    TP 7.5  --   --   --   --           Culture:        Hospital Encounter on 05/02/24   1. Urine Culture, Routine     Status: Abnormal (Preliminary result)     Collection Time: 05/02/24  2:57 AM     Specimen: Urine, clean catch   Result Value Ref Range     Urine Culture >100,000 CFU/ML Gram Negative Josr (A) N/A      Medications:    buPROPion ER  150 mg Oral Daily    gabapentin  300 mg Oral TID    losartan  25 mg Oral Daily    pregabalin  50 mg Oral BID    sertraline  100 mg Oral Daily    pravastatin  10 mg Oral Nightly    enoxaparin  40 mg Subcutaneous Daily    insulin degludec  36 Units Subcutaneous Daily    meropenem  500 mg Intravenous Q8H    insulin aspart  1-7 Units Subcutaneous TID CC and HS            Assessment and Plan:   Assessment & Plan:          UTI   Ucx GNR. ID and sens pending   IV meropenem empirically await sens   WBC 19.5 on admit. Lactic acid normal. WBC trending down. Afebrile.   Improving   Blood cx x 2 NGTD  DM II, uncontrolled   A1c 12.4  Diabetes education   Tresiba 36 units at bedtime. Add mealtime novolog 12 units TID.   ISS      Essential HTN   Increase losartan to 50mg daily   4.    Dizziness   Check orthostatic V/S   PT/OT - LARA - pt refused agreeable to [facility].      Other medical problems   Hyperlipidemia  Depression   OA       Esdras Eastman MD  Hospitalist           5/4  Chief Complaint:       Chief Complaint   Patient presents with    Hyperglycemia    Nausea/Vomiting/Diarrhea         Subjective:   Ananay Dowling is doing better. No further N/V no abd pain no diarrhea. She still feels lightheaded mostly when getting up. No F/C.   Per night RN no events overnight   Objective:   Objective:    Blood pressure 151/50, pulse 90, temperature 98 °F (36.7 °C), temperature source Oral, resp. rate 18, weight 136 lb (61.7 kg), SpO2 97%.      Physical Exam:    General: No acute distress.   Respiratory: Clear to auscultation bilaterally. No wheezes. No rhonchi.  Cardiovascular: S1, S2. Regular rate and rhythm. No murmurs, rubs or gallops.   Abdomen: Soft, nontender, nondistended.  Positive bowel sounds. No rebound or guarding.  Neurologic: No focal neurological deficits.   Musculoskeletal: Moves all extremities.  Extremities: No edema.        Results:   Results:    Labs:         Recent Labs   Lab 05/01/24 2359 05/02/24 0957 05/03/24  0711 05/04/24  0529   WBC 19.5* 16.4* 13.1* 10.2   HGB 12.5 13.0 11.3* 11.2*   MCV 82.5 84.5 83.7 84.6   .0 236.0 200.0 217.0                 Recent Labs   Lab 04/30/24  1155 05/01/24 2359 05/02/24 0957 05/03/24  0711 05/04/24  0529   *   < > 218* 269* 199*   BUN 25*   < > 12 14 12   CREATSERUM 1.00   < > 0.72 0.78 0.71   CA 9.6   < > 9.2 9.0 9.0   ALB 4.1  --   --   --   --    *   < > 136 134* 136   K 4.3   < > 3.7 4.3 4.3   CL 99   < > 106 103 107   CO2 26.0   < > 27.0 25.0 24.0   ALKPHO 83  --   --   --   --    AST 31  --   --   --   --    ALT 24  --   --   --   --    BILT 0.3  --   --   --   --    TP 7.5  --   --   --   --     < > = values in this interval not displayed.         Estimated Creatinine Clearance: 54 mL/min (based on SCr of 0.71 mg/dL).     No results for input(s): \"PTP\", \"INR\" in the last 168 hours.        Culture:          Hospital Encounter on 05/02/24   1. Urine Culture, Routine     Status: Abnormal     Collection Time: 05/02/24  2:57 AM     Specimen: Urine, clean catch   Result Value Ref Range     Urine Culture >100,000 CFU/ML Escherichia coli (A) N/A       Susceptibility     Escherichia coli -  (no method available)       Ampicillin 4 Sensitive         Cefazolin <=4 Sensitive         Ciprofloxacin <=0.25 Sensitive         Gentamicin <=1 Sensitive         Meropenem <=0.25 Sensitive         Levofloxacin <=0.12 Sensitive         Nitrofurantoin <=16 Sensitive         Piperacillin +  Tazobactam <=4 Sensitive         Trimethoprim/Sulfa <=20 Sensitive           Medications:    insulin aspart  12 Units Subcutaneous TID CC    losartan  50 mg Oral Daily    buPROPion ER  150 mg Oral Daily    gabapentin  300 mg Oral TID    pregabalin  50 mg Oral BID    sertraline  100 mg Oral Daily    pravastatin  10 mg Oral Nightly    enoxaparin  40 mg Subcutaneous Daily    insulin degludec  36 Units Subcutaneous Daily    meropenem  500 mg Intravenous Q8H    insulin aspart  1-7 Units Subcutaneous TID CC and HS            Assessment and Plan:   Assessment & Plan:          UTI   Ucx E.coli. Pan sensitive.    Stop meropenem. Start IV ancef.    WBC 19.5 on admit now normal.  Lactic acid normal Afebrile.   Improving   Blood cx x 2 NGTD  DM II, uncontrolled   A1c 12.4  Diabetes education   Tresiba 36 units at bedtime. Add mealtime novolog 12 units TID.   ISS      Essential HTN   Increase losartan to 50mg daily   4.    Dizziness   Orthostatic V/S + --> IVF. Place CHANDLER hose.   CT brain to r/o other pathology.Neuro exam normal  PT/OT - LARA - pt refused agreeable to [facility].      Other medical problems   Hyperlipidemia  Depression   OA      5/5  Chief Complaint:       Chief Complaint   Patient presents with    Hyperglycemia    Nausea/Vomiting/Diarrhea         Subjective:   Ananya Dowling worked with PT today and she was leaning back and wasn't able to stand long fell into chair behind her. She doesn't feel dizzy at rest but when she gets up she gets lightheaded lasts 5 min better when she sits down. No HA. Had one yesterday afternoon she says in posterior head. No vision changes. No N/V. No further diarrhea. No focal weakness. Has some tingling in both feet today      Objective:   Objective:    Blood pressure 144/55, pulse 88, temperature 98.1 °F (36.7 °C), temperature source Oral, resp. rate 20, weight 136 lb (61.7 kg), SpO2 91%.     Physical Exam:    General: No acute distress.   Respiratory: Clear to auscultation bilaterally. No  wheezes. No rhonchi.  Cardiovascular: S1, S2. Regular rate and rhythm. No murmurs, rubs or gallops.   Abdomen: Soft, nontender, nondistended.  Positive bowel sounds. No rebound or guarding.  Neurologic: No focal neurological deficits. CN II-XII GI, motor 5/5 sensation intact no pronator drift finger to nose intact.   Musculoskeletal: Moves all extremities.  Extremities: No edema.        Results:   Results:    Labs:          Recent Labs   Lab 05/01/24  2359 05/02/24  0957 05/03/24  0711 05/04/24  0529 05/05/24  0606   WBC 19.5* 16.4* 13.1* 10.2 8.5   HGB 12.5 13.0 11.3* 11.2* 11.0*   MCV 82.5 84.5 83.7 84.6 84.8   .0 236.0 200.0 217.0 240.0                 Recent Labs   Lab 04/30/24  1155 05/01/24 2359 05/03/24  0711 05/04/24  0529 05/05/24  0606   *   < > 269* 199* 150*   BUN 25*   < > 14 12 16   CREATSERUM 1.00   < > 0.78 0.71 0.71   CA 9.6   < > 9.0 9.0 8.8   ALB 4.1  --   --   --   --    *   < > 134* 136 139   K 4.3   < > 4.3 4.3 4.1   CL 99   < > 103 107 107   CO2 26.0   < > 25.0 24.0 28.0   ALKPHO 83  --   --   --   --    AST 31  --   --   --   --    ALT 24  --   --   --   --    BILT 0.3  --   --   --   --    TP 7.5  --   --   --   --     < > = values in this interval not displayed.         Estimated Creatinine Clearance: 54 mL/min (based on SCr of 0.71 mg/dL).     No results for input(s): \"PTP\", \"INR\" in the last 168 hours.        Culture:          Hospital Encounter on 05/02/24   1. Urine Culture, Routine     Status: Abnormal     Collection Time: 05/02/24  2:57 AM     Specimen: Urine, clean catch   Result Value Ref Range     Urine Culture >100,000 CFU/ML Escherichia coli (A) N/A       Susceptibility     Escherichia coli -  (no method available)       Ampicillin 4 Sensitive         Cefazolin <=4 Sensitive         Ciprofloxacin <=0.25 Sensitive         Gentamicin <=1 Sensitive         Meropenem <=0.25 Sensitive         Levofloxacin <=0.12 Sensitive         Nitrofurantoin <=16 Sensitive          Piperacillin + Tazobactam <=4 Sensitive         Trimethoprim/Sulfa <=20 Sensitive       Result Date: 5/4/2024  CONCLUSION:   No acute intracranial abnormality.  Generalized atrophy with mild-to-moderate chronic microvascular white matter ischemia.  Aerated secretions left maxillary sinus suggesting sinusitis.    Dictated by (CST): Kody Escobar MD on 5/04/2024 at 11:26 AM     Finalized by (CST): Kody Escobar MD on 5/04/2024 at 11:29 AM            Medications:    ceFAZolin  2 g Intravenous Q8H    insulin degludec  40 Units Subcutaneous Daily    insulin aspart  12 Units Subcutaneous TID CC    losartan  50 mg Oral Daily    buPROPion ER  150 mg Oral Daily    gabapentin  300 mg Oral TID    pregabalin  50 mg Oral BID    sertraline  100 mg Oral Daily    pravastatin  10 mg Oral Nightly    enoxaparin  40 mg Subcutaneous Daily    insulin aspart  1-7 Units Subcutaneous TID CC and HS            Assessment and Plan:   Assessment & Plan:          UTI   Ucx E.coli. Pan sensitive.    IV ancef day#3  WBC 19.5 on admit now normal.  Lactic acid normal Afebrile.   Improving   Blood cx x 2 NGTD  DM II, uncontrolled   A1c 12.4  Diabetes education   Tresiba 40 units at bedtime. Add mealtime novolog 12 units TID.   ISS   Essential HTN   losartan to 50mg daily   4.    Dizziness   Orthostatic V/S + --> can stop IVF. CHANDLER hose.   CT brain negative.   MRI brain today r/o posterior circulation CVA  Neuro consult.   ASA full dose today x 1   PT/OT - LARA - pt refused agreeable to [facility].      Other medical problems   Hyperlipidemia  Depression   OA       MEDICATIONS ADMINISTERED IN LAST 1 DAY:  aspirin DR tab 325 mg       Date Action Dose Route User    5/6/2024 0921 Given 325 mg Oral Mariana Patel RN          buPROPion ER (Wellbutrin XL) 24 hr tab 150 mg       Date Action Dose Route User    5/6/2024 0921 Given 150 mg Oral Mariana Patel RN          ceFAZolin (Ancef) 2 g in 20mL IV syringe premix       Date Action Dose Route User     5/6/2024 0921 Given 2 g Intravenous Mariana Patel RN    5/6/2024 0054 Given 2 g Intravenous Barbara Rico RN    5/5/2024 1643 Given 2 g Intravenous Phuong Mcwilliams RN          enoxaparin (Lovenox) 40 MG/0.4ML SUBQ injection 40 mg       Date Action Dose Route User    5/6/2024 0921 Given 40 mg Subcutaneous (Left Lower Abdomen) Mariana Patel RN          gabapentin (Neurontin) cap 300 mg       Date Action Dose Route User    5/6/2024 0921 Given 300 mg Oral Mariana Patel RN    5/5/2024 2122 Given 300 mg Oral Barbara Rico RN    5/5/2024 1644 Given 300 mg Oral Phuong Mcwilliams RN          insulin aspart (NovoLOG) 100 Units/mL FlexPen 1-7 Units       Date Action Dose Route User    5/5/2024 2122 Given 5 Units Subcutaneous (Left Upper Arm) Barbara Rico RN    5/5/2024 1720 Given 1 Units Subcutaneous (Left Upper Abdomen) Phuong Mcwilliams RN    5/5/2024 1230 Given 1 Units Subcutaneous (Left Lower Abdomen) Phuong Mcwilliams RN          insulin aspart (NovoLOG) 100 Units/mL FlexPen 12 Units       Date Action Dose Route User    5/6/2024 1143 Given 12 Units Subcutaneous (Left Lower Arm) Mariana Patel RN    5/5/2024 1720 Given 12 Units Subcutaneous (Left Upper Abdomen) Phuong Mcwilliams RN    5/5/2024 1229 Given 12 Units Subcutaneous (Left Lower Abdomen) Phuong Mcwilliams RN          insulin degludec 100 units/mL flextouch 40 Units       Date Action Dose Route User    5/6/2024 0924 Given 40 Units Subcutaneous (Left Lower Abdomen) Mariana Patel RN          losartan (Cozaar) tab 50 mg       Date Action Dose Route User    5/6/2024 0921 Given 50 mg Oral Mariana Patel RN          ondansetron (Zofran) 4 MG/2ML injection 4 mg       Date Action Dose Route User    5/6/2024 0949 Given 4 mg Intravenous Mariana Patel RN          pravastatin (Pravachol) tab 10 mg       Date Action Dose Route User    5/5/2024 2122 Given 10 mg Oral Barbara Rico, RN          pregabalin (Lyrica) cap 50 mg       Date Action Dose Route User    5/6/2024 0921 Given 50 mg  Oral Mariana Patel RN    5/5/2024 2122 Given 50 mg Oral Barbara Rico RN          sertraline (Zoloft) tab 100 mg       Date Action Dose Route User    5/6/2024 0921 Given 100 mg Oral Mariana Patel RN            Vitals (last day)       Date/Time Temp Pulse Resp BP SpO2 Weight O2 Device O2 Flow Rate (L/min) [facility]    05/06/24 0926 -- 75 18 154/56 95 % -- None (Room air) --     05/06/24 0507 -- 68 -- -- -- -- -- --     05/06/24 0507 97.8 °F (36.6 °C) -- 18 100/49 96 % -- None (Room air) --     05/05/24 2002 98.5 °F (36.9 °C) -- 18 123/51 94 % -- None (Room air) --     05/05/24 1152 98 °F (36.7 °C) 76 18 158/65 97 % -- None (Room air) --     05/05/24 0936 -- -- -- 121/82 -- -- -- -- RS    05/05/24 0823 -- 88 20 -- 91 % -- None (Room air) --     05/05/24 0538 98.1 °F (36.7 °C) -- 18 144/55 97 % -- None (Room air) --

## 2024-05-06 NOTE — PLAN OF CARE
A/O x 4. Denies dizziness. Up to chair this afternoon. Blood sugars monitored AC/HS. Denies pain. Ancef per orders. Purewick in place. Plan to discharge to rehab when insurance auth is approved. Bed in lowest position, call light in reach, frequent rounding, nonskid footwear, fall precautions in place.

## 2024-05-07 VITALS
DIASTOLIC BLOOD PRESSURE: 49 MMHG | SYSTOLIC BLOOD PRESSURE: 108 MMHG | WEIGHT: 139.19 LBS | RESPIRATION RATE: 18 BRPM | BODY MASS INDEX: 26 KG/M2 | HEART RATE: 66 BPM | TEMPERATURE: 99 F | OXYGEN SATURATION: 92 %

## 2024-05-07 LAB — GLUCOSE BLDC GLUCOMTR-MCNC: 113 MG/DL (ref 70–99)

## 2024-05-07 PROCEDURE — 99239 HOSP IP/OBS DSCHRG MGMT >30: CPT | Performed by: HOSPITALIST

## 2024-05-07 NOTE — PLAN OF CARE
Problem: Patient Centered Care  Goal: Patient preferences are identified and integrated in the patient's plan of care  Description: Interventions:  - What would you like us to know as we care for you? - Provide timely, complete, and accurate information to patient/family  - Incorporate patient and family knowledge, values, beliefs, and cultural backgrounds into the planning and delivery of care  - Encourage patient/family to participate in care and decision-making at the level they choose  - Honor patient and family perspectives and choices  Otcome: Adequate for Discharge     Problem: Patient/Family Goals  Goal: Patient/Family Long Term Goal  Description: Patient's Long Term Goal:     Interventions:  -   - See additional Care Plan goals for specific interventions  Outcome: Adequate for Discharge  Goal: Patient/Family Short Term Goal  Description: Patient's Short Term Goal:     Interventions:   -   - See additional Care Plan goals for specific interventions  Outcome: Adequate for Discharge     Problem: Diabetes/Glucose Control  Goal: Glucose maintained within prescribed range  Description: INTERVENTIONS:  - Monitor Blood Glucose as ordered  - Assess for signs and symptoms of hyperglycemia and hypoglycemia  - Administer ordered medications to maintain glucose within target range  - Assess barriers to adequate nutritional intake and initiate nutrition consult as needed  - Instruct patient on self management of diabetes  Outcome: Adequate for Discharge     Problem: PAIN - ADULT  Goal: Verbalizes/displays adequate comfort level or patient's stated pain goal  Description: INTERVENTIONS:  - Encourage pt to monitor pain and request assistance  - Assess pain using appropriate pain scale  - Administer analgesics based on type and severity of pain and evaluate response  - Implement non-pharmacological measures as appropriate and evaluate response  - Consider cultural and social influences on pain and pain management  -  Manage/alleviate anxiety  - Utilize distraction and/or relaxation techniques  - Monitor for opioid side effects  - Notify MD/LIP if interventions unsuccessful or patient reports new pain  - Anticipate increased pain with activity and pre-medicate as appropriate  Outcome: Adequate for Discharge     Problem: RISK FOR INFECTION - ADULT  Goal: Absence of fever/infection during anticipated neutropenic period  Description: INTERVENTIONS  - Monitor WBC  - Administer growth factors as ordered  - Implement neutropenic guidelines  Outcome: Adequate for Discharge     Problem: SAFETY ADULT - FALL  Goal: Free from fall injury  Description: INTERVENTIONS:  - Assess pt frequently for physical needs  - Identify cognitive and physical deficits and behaviors that affect risk of falls.  - Palmer fall precautions as indicated by assessment.  - Educate pt/family on patient safety including physical limitations  - Instruct pt to call for assistance with activity based on assessment  - Modify environment to reduce risk of injury  - Provide assistive devices as appropriate  - Consider OT/PT consult to assist with strengthening/mobility  - Encourage toileting schedule  Outcome: Adequate for Discharge     Problem: DISCHARGE PLANNING  Goal: Discharge to home or other facility with appropriate resources  Description: INTERVENTIONS:  - Identify barriers to discharge w/pt and caregiver  - Include patient/family/discharge partner in discharge planning  - Arrange for needed discharge resources and transportation as appropriate  - Identify discharge learning needs (meds, wound care, etc)  - Arrange for interpreters to assist at discharge as needed  - Consider post-discharge preferences of patient/family/discharge partner  - Complete POLST form as appropriate  - Assess patient's ability to be responsible for managing their own health  - Refer to Case Management Department for coordinating discharge planning if the patient needs post-hospital  services based on physician/LIP order or complex needs related to functional status, cognitive ability or social support system  Outcome: Adequate for Discharge    Patient alert and oriented x4. Ancef per order. Blood sugar monitored. Prn tylenol given for headache. Clear for discharged to [facility]. Report was given to RN at [facility]. Left via medicar at 1530.

## 2024-05-07 NOTE — DISCHARGE SUMMARY
[facility] Hospitalist Discharge Summary   Patient ID:  Ananya Dowling  [id number]  73 year old  11/9/1950    Admit date: 5/2/2024  Discharge date: 5/7/2024  Primary Care Physician: Jermaine Monson MD   Attending Physician: Esdras Eastman MD   Consults:   Consultants         Provider   Role Specialty     Martin Foy MD      Consulting Physician NEUROLOGY            Discharge Diagnoses:   Hyperglycemia    Reason for admission  Copied from admission H&P: ***    Hospital Course:  ***    EXAM:   GENERAL: no apparent distress, comfortable  NEURO: A/A Ox3, no focal deficits  RESP: non labored, CTAB/L  CARDIO: Regular, no murmur  ABD: soft, NT, ND  EXTREMITIES: no edema, no calf tenderness    Operative Procedures:     Discharge Instructions     Medication List        START taking these medications      cefadroxil 500 MG Caps  Commonly known as: DURICEF  Take 1 capsule (500 mg total) by mouth 2 (two) times daily for 3 days.            CONTINUE taking these medications      * Accu-Chek Vickie Plus Strp  Use as directed to check blood glucose 3 times/day - DX code:E11.65 using insulin     * Contour Next Test Strp  Generic drug: Glucose Blood  Check BG 3x daily DX code:E11.65     ergocalciferol 1.25 MG (62901 UT) Caps  Commonly known as: Vitamin D2  Take 1 capsule (50,000 Units total) by mouth once a week.     gabapentin 300 MG Caps  Commonly known as: Neurontin     Insulin Lispro (1 Unit Dial) 100 UNIT/ML Sopn  Patient will take 14 units with small carb meals and 18-20 units with larger carb meals.  Notes to patient: Give 12 units with meals and additional per blood glucose parameters.     Give 1 unit for blood glucose 150-180 mg/dL  Give 2 units for blood glucose 181-210 mg/dL  Give 3 units for blood glucose 211-240 mg/dL  Give 4 units for blood glucose 241-270 mg/dL  Give 5 units for blood glucose 271-300 mg/dL  Give 6 units for blood glucose 301-330 mg/dL  Give 7 units for blood glucose 331-360 mg/dL       lidocaine 4 % Ptch  Commonly known as: LIDODERM     losartan 50 MG Tabs  Commonly known as: Cozaar  Take 0.5 tablets (25 mg total) by mouth daily.     Pen Needles 32G X 4 MM Misc  1 pen  4 (four) times daily. Use  New pen needle  With each injection     pregabalin 50 MG Caps  Commonly known as: Lyrica  Take 1 capsule (50 mg total) by mouth 2 (two) times daily.     sertraline 100 MG Tabs  Commonly known as: Zoloft  Take 1 tablet (100 mg total) by mouth daily.     simvastatin 20 MG Tabs  Commonly known as: Zocor  Take 1 tablet (20 mg total) by mouth daily.     Tresiba FlexTouch 100 UNIT/ML Sopn  Generic drug: insulin degludec           * This list has 2 medication(s) that are the same as other medications prescribed for you. Read the directions carefully, and ask your doctor or other care provider to review them with you.                STOP taking these medications      buPROPion  MG Tb24  Commonly known as: Wellbutrin XL     glipiZIDE 10 MG Tabs  Commonly known as: Glucotrol               Where to Get Your Medications        These medications were sent to [id number] - [place] [id number] [facility] AT [address], [phone], [phone]  [address] [place] [id number]      Phone: [phone]   cefadroxil 500 MG Caps         Activity: {discharge activity:73139}  Diet: {diet:77863}  Wound Care: NA  Code Status: No Order        Discharge Instructions             Diabetes:  Your A1C level is greater than 8%.  It is recommended that you attend an outpatient diabetes education program.  Please discuss with your Primary Care Provider at your next visit to obtain a referral.  If you wish to make an appointment at [facility], call [phone].          Discharge References/Attachments    Cefadroxil Oral Capsule (English)           Important follow up:   Follow-up Information       Jermaine Monson MD Follow up in 1 week(s).     Specialty: Internal Medicine  Contact information:  [address]  [facility] [address]  [phone]               Martin Foy MD Follow up in 2 week(s).    Specialty: NEUROLOGY  Contact information:  [address]  [place] [address]  [phone]                             -PCP in [] within 7 days [] within 14 days [] other     Disposition: {disposition:77462}  Discharged Condition: {condition:33436}    Hospital Discharge Diagnoses: {Enter hospital discharge diagnoses here (please select the wildcards and then replace with free text; this allows us to save this data discretely for reporting purposes:5961::\"***\"}    Lace+ Score: 75  59-90 High Risk  29-58 Medium Risk  0-28   Low Risk.    TCM Follow-Up Recommendation:  {Care Managers will evaluate the need for follow-up for all patients ages 50+, and high/moderate risk patients ages 25-49. Low risk patients (LACE < 29) will only be evaluated if the \"Still recommend for TCM follow-up\" option is selected from this list.:0996}            Total Time Coordinating Care: Greater than 30 minutes    Patient had opportunity to ask questions, state understanding, and agree with therapeutic plan as outlined    Esdras Eastman MD  Hospitalist  5/7/2024

## 2024-05-07 NOTE — PROGRESS NOTES
[facility]  part of [facility]    Progress Note    Ananya Dowling Patient Status:  Inpatient    1950 MRN [id number]   Location [facility] 4W/SW/SE Attending Esdras Eastman MD   Hosp Day # 4 PCP Jermaine Monson MD     Chief Complaint:   Chief Complaint   Patient presents with    Hyperglycemia    Nausea/Vomiting/Diarrhea       Subjective:   Ananya Dowling is doing the same. Still gets dizzy when she stands up. No HA. Agreeable to LARA.   Objective:   Objective:    Blood pressure 119/50, pulse 75, temperature 98.4 °F (36.9 °C), temperature source Oral, resp. rate 18, weight 139 lb 3.2 oz (63.1 kg), SpO2 94%.    Physical Exam:    General: No acute distress.   Respiratory: Clear to auscultation bilaterally. No wheezes. No rhonchi.  Cardiovascular: S1, S2. Regular rate and rhythm. No murmurs, rubs or gallops.   Abdomen: Soft, nontender, nondistended.  Positive bowel sounds. No rebound or guarding.  Neurologic: No focal neurological deficits. CN II-XII GI, motor 5/5 sensation intact no pronator drift finger to nose intact.   Musculoskeletal: Moves all extremities.  Extremities: No edema.      Results:   Results:    Labs:  Recent Labs   Lab 24  0957 24  0711 24  0524  0606   WBC 19.5* 16.4* 13.1* 10.2 8.5   HGB 12.5 13.0 11.3* 11.2* 11.0*   MCV 82.5 84.5 83.7 84.6 84.8   .0 236.0 200.0 217.0 240.0       Recent Labs   Lab 24  1155 24  23524  0711 24  0529 24  0606   *   < > 269* 199* 150*   BUN 25*   < > 14 12 16   CREATSERUM 1.00   < > 0.78 0.71 0.71   CA 9.6   < > 9.0 9.0 8.8   ALB 4.1  --   --   --   --    *   < > 134* 136 139   K 4.3   < > 4.3 4.3 4.1   CL 99   < > 103 107 107   CO2 26.0   < > 25.0 24.0 28.0   ALKPHO 83  --   --   --   --    AST 31  --   --   --   --    ALT 24  --   --   --   --    BILT 0.3  --   --   --   --    TP 7.5  --   --   --   --     < > = values in this interval not  displayed.       Estimated Creatinine Clearance: 54 mL/min (based on SCr of 0.71 mg/dL).    No results for input(s): \"PTP\", \"INR\" in the last 168 hours.         Culture:  Hospital Encounter on 05/02/24   1. Urine Culture, Routine     Status: Abnormal    Collection Time: 05/02/24  2:57 AM    Specimen: Urine, clean catch   Result Value Ref Range    Urine Culture >100,000 CFU/ML Escherichia coli (A) N/A       Susceptibility    Escherichia coli -  (no method available)     Ampicillin 4 Sensitive      Cefazolin <=4 Sensitive      Ciprofloxacin <=0.25 Sensitive      Gentamicin <=1 Sensitive      Meropenem <=0.25 Sensitive      Levofloxacin <=0.12 Sensitive      Nitrofurantoin <=16 Sensitive      Piperacillin + Tazobactam <=4 Sensitive      Trimethoprim/Sulfa <=20 Sensitive    2. Blood Culture     Status: None (Preliminary result)    Collection Time: 05/02/24 12:51 AM    Specimen: Blood,peripheral   Result Value Ref Range    Blood Culture Result No Growth 4 Days N/A       Cardiac  No results for input(s): \"TROP\", \"PBNP\" in the last 168 hours.      Imaging: Imaging data reviewed in [facility].  MRI BRAIN (CPT=70551)    Result Date: 5/5/2024  CONCLUSION:  1. No acute infarct or hemorrhage. 2. Mild-to-moderate changes of chronic small vessel disease in cerebral white matter. 3. Mild changes of chronic small vessel disease in the timbo. 4. Left maxillary sinusitis is probably chronic.    Dictated by (CST): Darren Lopez MD on 5/05/2024 at 12:05 PM     Finalized by (CST): Darren Lopez MD on 5/05/2024 at 12:08 PM           Medications:    aspirin  325 mg Oral Daily    ceFAZolin  2 g Intravenous Q8H    insulin degludec  40 Units Subcutaneous Daily    insulin aspart  12 Units Subcutaneous TID CC    losartan  50 mg Oral Daily    buPROPion ER  150 mg Oral Daily    gabapentin  300 mg Oral TID    pregabalin  50 mg Oral BID    sertraline  100 mg Oral Daily    pravastatin  10 mg Oral Nightly    enoxaparin  40 mg Subcutaneous Daily     insulin aspart  1-7 Units Subcutaneous TID CC and HS         Assessment and Plan:   Assessment & Plan:        UTI   Ucx E.coli. Pan sensitive.    IV ancef day#4. PO cefadroxil on discharge   WBC 19.5 on admit now normal.  Lactic acid normal Afebrile.   Improving   Blood cx x 2 NGTD  DM II, uncontrolled   A1c 12.4  Diabetes education   Tresiba 40 units at bedtime. novolog 12 units TID.   ISS   Essential HTN   losartan to 50mg daily   4.    Dizziness   Orthostatic V/S + --> can stop IVF. CHANDLER hose.   CT brain negative.   MRI brain no CVA.   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 FU in 1 month once UTI cleared with neuro   LARA on discharge.   Neuro on consult.     Other medical problems   Hyperlipidemia  Depression   OA      Medically stable for discharge Orders entered 5/6 SW aware.       >55min spent, >50% spent counseling and coordinating care in the form of educating pt/family and d/w consultants and staff. Most of the time spent discussing the above plan.        Plan of care discussed with patient or family at bedside.    Esdras Eastman MD  Hospitalist          Supplementary Documentation:     Quality:  DVT Prophylaxis: heparin   CODE status: Full  Dispo: per clinical course           Estimated date of discharge: TBD  Discharge is dependent on: clinical stability  At this point Ms. Dowling is expected to be discharge to: home with [facility]

## 2024-05-07 NOTE — PLAN OF CARE
Problem: Patient Centered Care  Goal: Patient preferences are identified and integrated in the patient's plan of care  Description: Interventions:  - What would you like us to know as we care for you?   - Provide timely, complete, and accurate information to patient/family  - Incorporate patient and family knowledge, values, beliefs, and cultural backgrounds into the planning and delivery of care  - Encourage patient/family to participate in care and decision-making at the level they choose  - Honor patient and family perspectives and choices  Outcome: Progressing     Problem: Patient/Family Goals  Goal: Patient/Family Long Term Goal  Description: Patient's Long Term Goal:     Interventions:  - See additional Care Plan goals for specific interventions  Outcome: Progressing  Goal: Patient/Family Short Term Goal  Description: Patient's Short Term Goal:     Interventions:   - See additional Care Plan goals for specific interventions  Outcome: Progressing     Problem: Diabetes/Glucose Control  Goal: Glucose maintained within prescribed range  Description: INTERVENTIONS:  - Monitor Blood Glucose as ordered  - Assess for signs and symptoms of hyperglycemia and hypoglycemia  - Administer ordered medications to maintain glucose within target range  - Assess barriers to adequate nutritional intake and initiate nutrition consult as needed  - Instruct patient on self management of diabetes  Outcome: Progressing     Problem: PAIN - ADULT  Goal: Verbalizes/displays adequate comfort level or patient's stated pain goal  Description: INTERVENTIONS:  - Encourage pt to monitor pain and request assistance  - Assess pain using appropriate pain scale  - Administer analgesics based on type and severity of pain and evaluate response  - Implement non-pharmacological measures as appropriate and evaluate response  - Consider cultural and social influences on pain and pain management  - Manage/alleviate anxiety  - Utilize distraction and/or  relaxation techniques  - Monitor for opioid side effects  - Notify MD/LIP if interventions unsuccessful or patient reports new pain  - Anticipate increased pain with activity and pre-medicate as appropriate  Outcome: Progressing     Problem: RISK FOR INFECTION - ADULT  Goal: Absence of fever/infection during anticipated neutropenic period  Description: INTERVENTIONS  - Monitor WBC  - Administer growth factors as ordered  - Implement neutropenic guidelines  Outcome: Progressing     Problem: SAFETY ADULT - FALL  Goal: Free from fall injury  Description: INTERVENTIONS:  - Assess pt frequently for physical needs  - Identify cognitive and physical deficits and behaviors that affect risk of falls.  - [place] fall precautions as indicated by assessment.  - Educate pt/family on patient safety including physical limitations  - Instruct pt to call for assistance with activity based on assessment  - Modify environment to reduce risk of injury  - Provide assistive devices as appropriate  - Consider OT/PT consult to assist with strengthening/mobility  - Encourage toileting schedule  Outcome: Progressing     Problem: DISCHARGE PLANNING  Goal: Discharge to home or other facility with appropriate resources  Description: INTERVENTIONS:  - Identify barriers to discharge w/pt and caregiver  - Include patient/family/discharge partner in discharge planning  - Arrange for needed discharge resources and transportation as appropriate  - Identify discharge learning needs (meds, wound care, etc)  - Arrange for interpreters to assist at discharge as needed  - Consider post-discharge preferences of patient/family/discharge partner  - Complete POLST form as appropriate  - Assess patient's ability to be responsible for managing their own health  - Refer to Case Management Department for coordinating discharge planning if the patient needs post-hospital services based on physician/LIP order or complex needs related to functional status,  cognitive ability or social support system  Outcome: Progressing   Ananya is A/Ox4, on room air. Remote tele in place. Accucheck AC/HS. Denies pain.  IV Ancef for abx coverage as ordered. Voiding via purewick. Fall and safety precautions maintained. Bed locked in the lowest position. Call light and personal belongings within reach, frequent rounding done. Plan to DC to rehab pending insurance auth.

## 2024-05-07 NOTE — CM/SW NOTE
Department  notified care team CM (MS) of Len approval, see below.    Assigned SW/CM to follow up with patient/family on discharge plan.     -- [facility] ID [id number], approved 5/6 to 5/8.    Glendy Bautista, DSC

## 2024-05-08 ENCOUNTER — INITIAL APN SNF VISIT (OUTPATIENT)
Dept: INTERNAL MEDICINE CLINIC | Facility: SKILLED NURSING FACILITY | Age: 74
End: 2024-05-08

## 2024-05-08 ENCOUNTER — PATIENT OUTREACH (OUTPATIENT)
Dept: CASE MANAGEMENT | Age: 74
End: 2024-05-08

## 2024-05-08 VITALS
SYSTOLIC BLOOD PRESSURE: 115 MMHG | BODY MASS INDEX: 26 KG/M2 | DIASTOLIC BLOOD PRESSURE: 59 MMHG | OXYGEN SATURATION: 98 % | WEIGHT: 139 LBS | RESPIRATION RATE: 18 BRPM | TEMPERATURE: 98 F | HEART RATE: 84 BPM

## 2024-05-08 DIAGNOSIS — M79.2 NERVE PAIN: ICD-10-CM

## 2024-05-08 DIAGNOSIS — E11.9 TYPE 2 DIABETES MELLITUS WITHOUT COMPLICATION, UNSPECIFIED WHETHER LONG TERM INSULIN USE (HCC): ICD-10-CM

## 2024-05-08 DIAGNOSIS — R73.9 HYPERGLYCEMIA: Primary | ICD-10-CM

## 2024-05-08 DIAGNOSIS — N39.0 URINARY TRACT INFECTION WITHOUT HEMATURIA, SITE UNSPECIFIED: ICD-10-CM

## 2024-05-08 DIAGNOSIS — R42 DIZZINESS: ICD-10-CM

## 2024-05-08 PROCEDURE — 1160F RVW MEDS BY RX/DR IN RCRD: CPT | Performed by: NURSE PRACTITIONER

## 2024-05-08 PROCEDURE — 1126F AMNT PAIN NOTED NONE PRSNT: CPT | Performed by: NURSE PRACTITIONER

## 2024-05-08 PROCEDURE — 3078F DIAST BP <80 MM HG: CPT | Performed by: NURSE PRACTITIONER

## 2024-05-08 PROCEDURE — 3074F SYST BP LT 130 MM HG: CPT | Performed by: NURSE PRACTITIONER

## 2024-05-08 PROCEDURE — 99310 SBSQ NF CARE HIGH MDM 45: CPT | Performed by: NURSE PRACTITIONER

## 2024-05-08 PROCEDURE — 1159F MED LIST DOCD IN RCRD: CPT | Performed by: NURSE PRACTITIONER

## 2024-05-08 PROCEDURE — 1111F DSCHRG MED/CURRENT MED MERGE: CPT | Performed by: NURSE PRACTITIONER

## 2024-05-08 PROCEDURE — 1123F ACP DISCUSS/DSCN MKR DOCD: CPT | Performed by: NURSE PRACTITIONER

## 2024-05-08 NOTE — PAYOR COMM NOTE
--------------  DISCHARGE REVIEW    Payor: [employer] MA [facility]  Subscriber #:  [id number]  Authorization Number: [id number]    Admit date: 5/2/24  Admit time:   5:18 AM  Discharge Date: 5/7/2024  3:30 PM     Admitting Physician: Rosemarie Patricio MD  Attending Physician:  No att. providers found  Primary Care Physician: Jermaine Mnoson MD

## 2024-05-08 NOTE — PROGRESS NOTES
Ananya Dowling  : 1950  Age 73 year old  female patient is admitted to [facility] 24 for rehab and strengthening     Chief complaint: Hyperglycemia and nausea/vomiting /diarrhea      [facility] Admission : 24 to 24     HPI  73-year-old female presented to [facility] ED 24  for evaluation of fatigue/nausea/elevated blood glucose.  Patient with recent brief hospitalization  at an outside hospital for E. coli septicemia, on presentation  to [facility] ED was found to have asymptomatic urinary tract infection as well as elevated blood glucose of 390.  Patient  was admitted for IV antibiotics and management of blood glucose. Was treated for uti with IV meropenem in the ED. Recent Hemoglobin A1C 10.  Diet and insulin adjusted , possibly was non compliant.  Patient was stabilized and transferred to [facility] for continued monitoring and conditioning .     Patient seen as a new admission ,VSS, no reported fevers or chills. Patient lying in bed, was evaluated by PT/OT this afternoon.  Denies changes in bowels or bladder.  No longer experiencing nausea/vomiting or diarrhea. Accucheck qid, in the 200's. Denies pain or discomfort, just overall feeling weak . No other new issues or concerns .  Patient oriented x 3 , cooperative on exam .     Reviewed labs- cbc - wbc 9.60, rbc 4.35, hgb 12.1, hematocrit 38.8, plts 353; bmp - glucose 80, bun 23, creat 0.76, tbili 0,21, tpro 6.7, alb 4.0, ald 4.0, 134, k 4.6, chl 104, co2 29, sgpt 10, sgot 34, alk phos 78, ca 9.4, eGFR>60.    Past Medical History:    Arthritis    Back problem    Depression    Diabetes (HCC)    Essential hypertension    High blood pressure    High cholesterol    Visual impairment     No past surgical history on file.  Family History   Problem Relation Age of Onset    Heart Attack Father     Stroke Mother     Heart Disorder Son     Diabetes Son      Social History     Socioeconomic History    Marital status:    Tobacco Use    Smoking status: Never     Smokeless tobacco: Never   Vaping Use    Vaping status: Never Used   Substance and Sexual Activity    Alcohol use: Yes     Comment: very infrequent, every couple of months    Drug use: Never     Social Determinants of Health     Financial Resource Strain: Low Risk  (3/29/2024)    Financial Resource Strain     Difficulty of Paying Living Expenses: Somewhat hard     Med Affordability: No   Food Insecurity: No Food Insecurity (5/2/2024)    Food Insecurity     Food Insecurity: Never true   Transportation Needs: No Transportation Needs (5/2/2024)    Transportation Needs     Lack of Transportation: No   Recent Concern: Transportation Needs - Unmet Transportation Needs (3/29/2024)    Transportation Needs     Lack of Transportation: Yes   Physical Activity: Inactive (2/16/2023)    Exercise Vital Sign     Days of Exercise per Week: 0 days     Minutes of Exercise per Session: 0 min   Stress: No Stress Concern Present (2/16/2023)    Stress     Feeling of Stress : No   Social Connections: Socially Integrated (2/16/2023)    Social Connections     Frequency of Socialization with Friends and Family: 3   Housing Stability: Low Risk  (5/2/2024)    Housing Stability     Housing Instability: No       ALLERGIES:  No Known Allergies    CODE STATUS:  Full Code    ADVANCED CARE PLANNING TEAM: will need family care plan       CURRENT MEDICATIONS - reviewed and updated     Current Outpatient Medications   Medication Sig Dispense Refill    cefadroxil 500 MG Oral Cap Take 1 capsule (500 mg total) by mouth 2 (two) times daily for 3 days. 6 capsule 0    Glucose Blood (CONTOUR NEXT TEST) In Vitro Strip Check BG 3x daily DX code:E11.65 300 each 1    gabapentin 300 MG Oral Cap Take 1 capsule (300 mg total) by mouth 3 (three) times daily.      lidocaine 4 % External Patch Place 1 patch onto the skin daily.      Insulin Degludec (TRESIBA FLEXTOUCH) 100 UNIT/ML Subcutaneous Solution Pen-injector Inject 36 Units into the skin daily.      Insulin  Lispro, 1 Unit Dial, 100 UNIT/ML Subcutaneous Solution Pen-injector Patient will take 14 units with small carb meals and 18-20 units with larger carb meals. 54 mL 1    Insulin Pen Needle (PEN NEEDLES) 32G X 4 MM Does not apply Misc 1 pen  4 (four) times daily. Use  New pen needle  With each injection 400 each 0    ergocalciferol 1.25 MG (17558 UT) Oral Cap Take 1 capsule (50,000 Units total) by mouth once a week. 12 capsule 0    Glucose Blood (ACCU-CHEK FITO PLUS) In Vitro Strip Use as directed to check blood glucose 3 times/day - DX code:E11.65 using insulin 300 strip 0    simvastatin 20 MG Oral Tab Take 1 tablet (20 mg total) by mouth daily. 90 tablet 2    sertraline 100 MG Oral Tab Take 1 tablet (100 mg total) by mouth daily. 90 tablet 2    losartan 50 MG Oral Tab Take 0.5 tablets (25 mg total) by mouth daily. 90 tablet 2    pregabalin 50 MG Oral Cap Take 1 capsule (50 mg total) by mouth 2 (two) times daily. 60 capsule 0       VITALS:  /59   Pulse 84   Temp 98.1 °F (36.7 °C)   Resp 18   Wt 139 lb (63 kg)   SpO2 98%   BMI 26.26 kg/m²      REVIEW OF SYSTEMS:  GENERAL HEALTH:feels well otherwise  SKIN: denies any unusual skin lesions or rashes  WOUNDS: none  EYES:no visual complaints or deficits  HENT: denies nasal congestion, sinus pain or sore throat;  RESPIRATORY: denies shortness of breath, wheezing or cough   CARDIOVASCULAR:denies chest pain, no palpitations   GI: denies nausea, vomiting, constipation, diarrhea; no rectal bleeding; no heartburn  :no dysuria, urgency or frequency; no vaginal discharge; no urinary incontinence; no hematuria  MUSCULOSKELETAL:generalized weakness   NEURO:no sensory or motor complaint  PSYCHE: no symptoms of depression or anxiety  HEMATOLOGY:denies hx anemia  ENDOCRINE: denies excessive thirst or urination; denies unexpected wt gain or wt loss  ALLERGY/IMM.: denies food or seasonal allergies      PHYSICAL EXAM:  GENERAL HEALTH: well developed, well nourished, in no  apparent distress  LINES, TUBES, DRAINS:  none  SKIN: no rashes, no suspicious lesions  WOUND: none  EYES: PERRLA, EOMI, sclera anicteric, conjunctiva normal; there is no nystagmus, no drainage from eyes  HENT: normocephalic; normal nose, no nasal drainage, mucous membranes pink, moist, pharynx no exudate, no visible cerumen.  NECK: supple; FROM; no JVD, no TMG, no carotid bruits  BREAST: deferred  RESPIRATORY:clear to percussion and auscultation  CARDIOVASCULAR: S1, S2 normal, RRR; no S3, no S4;   ABDOMEN:  normal active BS+, soft, nondistended; no organomegaly, no masses; no bruits; nontender, no guarding, no rebound tenderness.  :no suprapubic distension  LYMPHATIC:no lymphedema  MUSCULOSKELETAL: no acute synovitis upper or lower extremity  EXTREMITIES/VASCULAR:no cyanosis, clubbing or edema  NEUROLOGIC: follows commands  PSYCHIATRIC: alert and oriented x 3; affect appropriate      MEDICAL DECISION MAKING  Unclear     DIAGNOSTICS REVIEWED AT THIS VISIT:  Reviewed [facility] records     SEE PLAN BELOW  UTI   Ucx E.coli. Pan sensitive.     Completed IV ancef , now on PO cefadroxil 500mg po bid completes 5/10/24    WBC 19.5 on admit now normal.  Lactic acid normal Afebrile.   Improving - see hpi for recent cbc and bmp 5/8/24  Blood cx x 2 NGTD in hospital   DM II, uncontrolled   A1c 12.4  Diabetes education done in [facility] 36 units at bedtime.  Novolog  ss TID.   Accucheck qid , 200's   Essential HTN               -vs q shift   losartan to 50mg daily   4.    Dizziness   Orthostatic V/S + --> can stop IVF. CAHNDLER hose.   CT brain negative.   MRI brain no CVA.   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 FU in 1 month once UTI cleared with neuro   Neuro  was on consult.   5.  Nerve Pain    -cont lyrica 50mg po bid  -gabapentin 300mg po tid  -lidocaine patch 5%  to painful area q 12 hrs on    6. Hyperlipidemia  -simvastatin 20mg po daily   7. Depression    -sertraline 100m gpo daily       This is a 35 minute visit and greater than 50% of the time was spent counseling the patient and/or coordinating care.    EMERY Jamison  05/08/24   3:34 PM

## 2024-05-09 ENCOUNTER — EXTERNAL FACILITY (OUTPATIENT)
Dept: INTERNAL MEDICINE CLINIC | Facility: CLINIC | Age: 74
End: 2024-05-09

## 2024-05-09 DIAGNOSIS — N30.00 ACUTE CYSTITIS WITHOUT HEMATURIA: ICD-10-CM

## 2024-05-09 DIAGNOSIS — E11.00 TYPE 2 DIABETES MELLITUS WITH HYPEROSMOLARITY WITHOUT COMA, WITH LONG-TERM CURRENT USE OF INSULIN (HCC): Primary | ICD-10-CM

## 2024-05-09 DIAGNOSIS — Z79.4 TYPE 2 DIABETES MELLITUS WITH HYPEROSMOLARITY WITHOUT COMA, WITH LONG-TERM CURRENT USE OF INSULIN (HCC): Primary | ICD-10-CM

## 2024-05-09 PROCEDURE — 99305 1ST NF CARE MODERATE MDM 35: CPT | Performed by: INTERNAL MEDICINE

## 2024-05-09 PROCEDURE — 1111F DSCHRG MED/CURRENT MED MERGE: CPT | Performed by: INTERNAL MEDICINE

## 2024-05-09 NOTE — PROGRESS NOTES
hpi  73-year-old female presented to [facility] ED 5/2/24 for evaluation of fatigue/nausea/elevated blood glucose. Patient with recent brief hospitalization at an outside hospital for E. coli septicemia, on presentation to ED was found to have asymptomatic urinary tract infection as well as elevated blood glucose of 390. Patient was admitted for IV antibiotics and management of blood glucose.         Past Medical History:   Arthritis   Back problem   Depression   Diabetes (HCC)   Essential hypertension   High blood pressure   High cholesterol   Visual impairment    Social History    Socioeconomic History   Marital status:   Tobacco Use   Smoking status: Never   Smokeless tobacco: Never  Vaping Use   Vaping status: Never Used  Substance and Sexual Activity   Alcohol use: Yes   Comment: very infrequent, every couple of months   Drug use: Never    Social Determinants of Health        ALLERGIES:  No Known Allergies        CURRENT MEDICATIONS - reviewed         REVIEW OF SYSTEMS:  she is weak and tired , denies any nausea or vomiting     PHYSICAL EXAM:  GENERAL HEALTH: well developed, well nourished, in no apparent distress    SKIN: no rashes, no suspicious lesions  WOUND: none  EYES: PERRLA, EOMI, sclera anicteric, conjunctiva normal; there is no nystagmus, no drainage from eyes  HENT: normocephalic; normal nose, no nasal drainage, mucous membranes pink, moist, pharynx no exudate, no visible cerumen.  NECK: supple; FROM; no JVD, no TMG, no carotid bruits  BREAST: deferred  RESPIRATORY:clear to percussion and auscultation  CARDIOVASCULAR: S1, S2 normal, RRR; no S3, no S4;  ABDOMEN: normal active BS+, soft, nondistended; no organomegaly, no masses; no bruits; nontender, no guarding, no rebound tenderness.  :no suprapubic distension  LYMPHATIC:no lymphedema  MUSCULOSKELETAL: no acute synovitis upper or lower extremity  EXTREMITIES/VASCULAR:no cyanosis, clubbing or edema  NEUROLOGIC: follows commands  PSYCHIATRIC: alert  and oriented x 3; affect appropriate    a/p    1. UTI   o Ucx E.coli. Pan sensitive.   o Completed IV ancef ,  on po abx     2. DM II, uncontrolled   o A1c 12.4  continue with accucheck , continue with current meds    3. Essential HTN   monitor bp    4. Dizziness    ? CT brain negative.  ? MRI brain no CVA.  ? 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  follow up with neurology as outpatient    5. Nerve Pain    -cont lyrica 50mg po bid  -gabapentin 300mg po tid    6. Hyperlipidemia  -continue with statin    7. Depression  -continue with sertraline    ptot

## 2024-05-10 ENCOUNTER — SNF VISIT (OUTPATIENT)
Dept: INTERNAL MEDICINE CLINIC | Facility: SKILLED NURSING FACILITY | Age: 74
End: 2024-05-10

## 2024-05-10 ENCOUNTER — PATIENT OUTREACH (OUTPATIENT)
Dept: CASE MANAGEMENT | Age: 74
End: 2024-05-10

## 2024-05-10 DIAGNOSIS — I10 PRIMARY HYPERTENSION: ICD-10-CM

## 2024-05-10 DIAGNOSIS — N39.0 URINARY TRACT INFECTION WITHOUT HEMATURIA, SITE UNSPECIFIED: ICD-10-CM

## 2024-05-10 DIAGNOSIS — Z79.4 TYPE 2 DIABETES MELLITUS WITH HYPERGLYCEMIA, WITH LONG-TERM CURRENT USE OF INSULIN (HCC): Primary | ICD-10-CM

## 2024-05-10 DIAGNOSIS — E11.22 TYPE 2 DIABETES MELLITUS WITH STAGE 3B CHRONIC KIDNEY DISEASE, WITH LONG-TERM CURRENT USE OF INSULIN (HCC): ICD-10-CM

## 2024-05-10 DIAGNOSIS — E78.5 DYSLIPIDEMIA: ICD-10-CM

## 2024-05-10 DIAGNOSIS — N18.32 TYPE 2 DIABETES MELLITUS WITH STAGE 3B CHRONIC KIDNEY DISEASE, WITH LONG-TERM CURRENT USE OF INSULIN (HCC): ICD-10-CM

## 2024-05-10 DIAGNOSIS — F33.0 MILD RECURRENT MAJOR DEPRESSION (HCC): ICD-10-CM

## 2024-05-10 DIAGNOSIS — E11.65 TYPE 2 DIABETES MELLITUS WITH HYPERGLYCEMIA, WITH LONG-TERM CURRENT USE OF INSULIN (HCC): Primary | ICD-10-CM

## 2024-05-10 DIAGNOSIS — Z79.4 TYPE 2 DIABETES MELLITUS WITH STAGE 3B CHRONIC KIDNEY DISEASE, WITH LONG-TERM CURRENT USE OF INSULIN (HCC): ICD-10-CM

## 2024-05-10 DIAGNOSIS — N30.00 ACUTE CYSTITIS WITHOUT HEMATURIA: ICD-10-CM

## 2024-05-10 DIAGNOSIS — E78.5 DYSLIPIDEMIA: Primary | ICD-10-CM

## 2024-05-10 NOTE — PROGRESS NOTES
Ananya Dowling  : 1950  Age 73 year old  female patient is admitted to [facility] for rehabilitation and strengthening     Chief complaint: Hyperglycemia and nausea/vomiting /diarrhea      [facility] Admission : 24 to 24      HPI  73-year-old female presented to [facility] ED 24  for evaluation of fatigue/nausea/elevated blood glucose.  Patient with recent brief hospitalization  at an outside hospital for E. coli septicemia, on presentation  to [facility] ED was found to have asymptomatic urinary tract infection as well as elevated blood glucose of 390.  Patient  was admitted for IV antibiotics and management of blood glucose. Was treated for uti with IV meropenem in the ED. Recent Hemoglobin A1C 10.  Diet and insulin adjusted , possibly was non compliant.  Patient was stabilized and transferred to [facility] for continued monitoring and conditioning .     Patient seen in follow up, up in the chair. States she has been working with therapy but has been too weak to walk. She has no current complaints. No shortness of breath or chest pain, no nausea or vomiting. Eating well. VSS      ALLERGIES:  No Known Allergies    IMMUNIZATIONS  Immunization History   Administered Date(s) Administered    Covid-19 Vaccine Moderna 100 mcg/0.5 ml 2021, 04/15/2021    Pneumococcal (Prevnar 13) 2016    Pneumovax 23 2015, 09/10/2020    TD 2008    TDAP 2018, 10/27/2021    Zoster Vaccine Recombinant Adjuvanted (Shingrix) 2021, 10/12/2021   Deferred Date(s) Deferred    FLU VAC High Dose 65 YRS & Older PRSV Free (35223) 2023        CODE STATUS:  Full Code    ADVANCED CARE PLANNING TEAM: Will need family care plan    CURRENT MEDICATIONS - reviewed and updated on [facility] EMR     SUBJECTIVE    Patient seen in follow up, up in the chair. States she has been working with therapy but has been too weak to walk. She has no current complaints. No shortness of breath or chest pain, no nausea or vomiting. Eating well.  VSS    PHYSICAL EXAM:  GENERAL HEALTH: well developed, well nourished, in no apparent distress  LINES, TUBES, DRAINS:  none  SKIN: no rashes, no suspicious lesions  WOUND: none  EYES: PERRLA, EOMI, sclera anicteric, conjunctiva normal; there is no nystagmus, no drainage from eyes  HENT: normocephalic; normal nose, no nasal drainage, mucous membranes pink, moist, pharynx no exudate, no visible cerumen.  NECK: supple; FROM; no JVD, no TMG, no carotid bruits  BREAST: deferred  RESPIRATORY:clear to percussion and auscultation  CARDIOVASCULAR: S1, S2 normal, RRR; no S3, no S4;   ABDOMEN:  normal active BS+, soft, nondistended; no organomegaly, no masses; no bruits; nontender, no guarding, no rebound tenderness.  :no suprapubic distension  LYMPHATIC:no lymphedema  MUSCULOSKELETAL: no acute synovitis upper or lower extremity  EXTREMITIES/VASCULAR:no cyanosis, clubbing or edema  NEUROLOGIC: follows commands  PSYCHIATRIC: alert and oriented x 3; affect appropriate    SEE PLAN BELOW  UTI   - Ucx E.coli. Pan sensitive.    - Completed IV ancef , now on PO cefadroxil 500mg po bid completes 5/10/24    - WBC 19.5 on admit now normal.  Lactic acid normal Afebrile.   - Improving - see hpi for recent cbc and bmp 5/8/24  - Blood cx x 2 NGTD in hospital     DM II, uncontrolled   - A1c 12.4  - Diabetes education done in Hospital  - [facility] 36 units at bedtime.  Novolog  ss TID.   - Accucheck qid , 200's     Essential HTN   - vs q shift   - losartan to 50mg daily   Dizziness   - Orthostatic V/S + --> can stop IVF. CHANDLER hose.   - CT brain negative.   - MRI brain no CVA.   - Possible parkinsonian like disease with urinary incontinence, orthostatic hypotension and balance problems. FU in 1 month once UTI cleared with neuro   - monitor     Nerve Pain    -cont lyrica 50mg po bid  -gabapentin 300mg po tid  -lidocaine patch 5%  to painful area q 12 hrs on      Hyperlipidemia  -simvastatin 20mg po daily     Depression   -sertraline 100m gpo  daily          FOLLOW UP APPOINTMENTS  Future Appointments   Date Time Provider Department [place]   5/21/2024  4:00 PM Jermaine Monson MD [facility]5 [facility] 5 [facility]        This is a 25 minute visit and greater than 50% of the time was spent counseling the patient and/or coordinating care.    Note to patient: The 21st Century Cures Act makes medical notes like these available to patients in the interest of transparency. However, this is a medical document intended as peer to peer communication. It is written in medical language and may contain abbreviations or verbiage that are unfamiliar. It may appear blunt or direct. Medical documents are intended to carry relevant information, facts as evident, and the clinical opinion of the practitioner who signs the document.     Nazia Estrella, EMERY  05/10/24

## 2024-05-10 NOTE — PROGRESS NOTES
Spoke to Ananya for CCM.      Updates to patient care team/comments: None  Patient reported changes in medications: The patient reports she is not sure if her medications have changed at this time.     Med Adherence  Comment: Pt confirms she is taking medications as instructed by [facility]..    Health Maintenance: We will discuss updating preventative care once she is home and feeling better.  Health Maintenance   Topic Date Due    DEXA Scan  Never done    Colorectal Cancer Screening  02/22/2022    Diabetes Care: Microalb/Creat Ratio  04/20/2023    Influenza Vaccine (Season Ended) 01/15/2025 (Originally 10/1/2024)    COVID-19 Vaccine (3 - 2023-24 season) 01/15/2025 (Originally 9/1/2023)    Diabetes Care A1C  08/01/2024    Diabetes Care Dilated Eye Exam  09/29/2024    Diabetes Care Foot Exam  01/25/2025    Mammogram  04/30/2025    Diabetes Care: GFR  05/05/2025    MA Annual Health Assessment  Completed    Annual Depression Screening  Completed    Fall Risk Screening (Annual)  Completed    Pneumococcal Vaccine: 65+ Years  Completed    Zoster Vaccines  Completed       Patient updates/concerns:     The patient reports that she is currently at [facility] following a visit to the emergency room where she was diagnosed with a urinary tract infection and elevated blood sugar above 300. She explains that her eating habits were not in excessive rather, she was skipping meals due to frequent conflict with her daughter, which made her avoid the kitchen. However, when her daughter prepared meals, she would send her children to call the patient for dinner but the patient would decline.    Currently, she feels okay in rehab but acknowledges an increased risk of falling,which restricts her ability to walk to the bathroom independently. She has a call light for assistance, she started PT this week, However, expects to be discharged home next week because of insurance coverage.     Goals/Action  Plan:    Active goal from previous outreach:   Follow up with Daughter about scheduling appointment with Dr. Meza    Patient reported progress towards goals: None, new goal set  NEW GOAL:               - What: Strengthen my legs            - Where/When/How: She will continue with PT and inquire about PT at home upon discharge.  Patient Reported Barriers and Concerns: None, at this time.                   - Plan for overcoming barriers: n/a    Care Managers Interventions:     I encouraged the patient to continue with physical therapy exercises and to communicate with the staff and nurses at [facility] if she has any questions or concerns.     When asked if she felt safe when at home. The patient stated she feels safe living at her daughters home.However,there is always tension because they frequently clash.     Care every where updated      Provided support. Encouraged patient to call as needed.    Reviewed Future Appointments:   Future Appointments   Date Time Provider Department Center   5/21/2024  4:00 PM Jermaine Monson MD [facility]5 [facility] 5 [facility]     Next Care Manager Follow Up Date: One month. Encouraged patient to call as needed.    Reason For Follow Up: review progress and or barriers towards patient's goals.     Time Spent This Encounter Total: 35 min medical record review, telephone communication, care plan updates where needed, education, goals, and action plan recreation/update. Provided acknowledgment and validation to patient's concerns.   Monthly Minute Total including today: 35  Physical assessment, complete health history, and need for CCM established by Jermaine Monson MD.

## 2024-05-13 ENCOUNTER — SNF VISIT (OUTPATIENT)
Dept: INTERNAL MEDICINE CLINIC | Facility: SKILLED NURSING FACILITY | Age: 74
End: 2024-05-13
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
## 2024-05-13 DIAGNOSIS — E11.65 TYPE 2 DIABETES MELLITUS WITH HYPERGLYCEMIA, WITH LONG-TERM CURRENT USE OF INSULIN (HCC): Primary | ICD-10-CM

## 2024-05-13 DIAGNOSIS — Z79.4 TYPE 2 DIABETES MELLITUS WITH HYPERGLYCEMIA, WITH LONG-TERM CURRENT USE OF INSULIN (HCC): Primary | ICD-10-CM

## 2024-05-13 DIAGNOSIS — E78.5 DYSLIPIDEMIA: ICD-10-CM

## 2024-05-13 DIAGNOSIS — G23.8 MULTIPLE SYSTEM ATROPHY (HCC): ICD-10-CM

## 2024-05-13 DIAGNOSIS — F33.0 MILD RECURRENT MAJOR DEPRESSION (HCC): Chronic | ICD-10-CM

## 2024-05-13 DIAGNOSIS — G90.3 MULTIPLE SYSTEM ATROPHY (HCC): ICD-10-CM

## 2024-05-13 DIAGNOSIS — N39.0 URINARY TRACT INFECTION WITHOUT HEMATURIA, SITE UNSPECIFIED: ICD-10-CM

## 2024-05-13 NOTE — PROGRESS NOTES
Ananya Dowling  : 1950  Age 73 year old  female patient is admitted to [facility] for rehabilitation and strengthening     Chief complaint: Hyperglycemia and nausea/vomiting /diarrhea      [facility] Admission : 24 to 24      HPI  73-year-old female presented to [facility] ED 24  for evaluation of fatigue/nausea/elevated blood glucose.  Patient with recent brief hospitalization  at an outside hospital for E. coli septicemia, on presentation  to [facility] ED was found to have asymptomatic urinary tract infection as well as elevated blood glucose of 390.  Patient  was admitted for IV antibiotics and management of blood glucose. Was treated for uti with IV meropenem in the ED. Recent Hemoglobin A1C 10.  Diet and insulin adjusted , possibly was non compliant.  Patient was stabilized and transferred to [facility] for continued monitoring and conditioning .     Patient seen in follow up, fell morning of . Hit the right knee, states she didn't hit her head. UA/UC ordered, xray of the right knee ordered. Continue to monitors. VSS.       ALLERGIES:  No Known Allergies    IMMUNIZATIONS  Immunization History   Administered Date(s) Administered    Covid-19 Vaccine Moderna 100 mcg/0.5 ml 2021, 04/15/2021    Pneumococcal (Prevnar 13) 2016    Pneumovax 23 2015, 09/10/2020    TD 2008    TDAP 2018, 10/27/2021    Zoster Vaccine Recombinant Adjuvanted (Shingrix) 2021, 10/12/2021   Deferred Date(s) Deferred    FLU VAC High Dose 65 YRS & Older PRSV Free (56905) 2023        CODE STATUS:  Full Code    ADVANCED CARE PLANNING TEAM: Will need family care plan    CURRENT MEDICATIONS - reviewed and updated on [facility] EMR     SUBJECTIVE      Patient seen in follow up, fell morning of . Hit the right knee, states she didn't hit her head. UA/UC ordered, xray of the right knee ordered. Continue to monitors. VSS.     PHYSICAL EXAM:  GENERAL HEALTH: well developed, well nourished, in no apparent  distress  LINES, TUBES, DRAINS:  none  SKIN: no rashes, no suspicious lesions  WOUND: none  EYES: PERRLA, EOMI, sclera anicteric, conjunctiva normal; there is no nystagmus, no drainage from eyes  HENT: normocephalic; normal nose, no nasal drainage, mucous membranes pink, moist, pharynx no exudate, no visible cerumen.  NECK: supple; FROM; no JVD, no TMG, no carotid bruits  BREAST: deferred  RESPIRATORY:clear to percussion and auscultation  CARDIOVASCULAR: S1, S2 normal, RRR; no S3, no S4;   ABDOMEN:  normal active BS+, soft, nondistended; no organomegaly, no masses; no bruits; nontender, no guarding, no rebound tenderness.  :no suprapubic distension  LYMPHATIC:no lymphedema  MUSCULOSKELETAL: no acute synovitis upper or lower extremity  EXTREMITIES/VASCULAR:no cyanosis, clubbing or edema  NEUROLOGIC: follows commands  PSYCHIATRIC: alert and oriented x 3; affect appropriate     SEE PLAN BELOW  Fall on 05/13  - Xray of Right knee, pending - patient refused   - UA/UC ordered on 05/13, pending  - monitor     UTI   - Ucx E.coli. Pan sensitive.    - Completed IV ancef , now on PO cefadroxil 500mg po bid completes 5/10/24    - WBC 19.5 on admit now normal.  Lactic acid normal Afebrile.   - Improving - see hpi for recent cbc and bmp 5/8/24  - Blood cx x 2 NGTD in hospital      DM II, uncontrolled   - A1c 12.4  - Diabetes education done in [facility] 36 units at bedtime.  Novolog  ss TID.   - Accucheck qid , 200's      Essential HTN   - vs q shift   - losartan to 50mg daily   Dizziness   - Orthostatic V/S + --> can stop IVF. CHANDLER hose.   - CT brain negative.   - MRI brain no CVA.   - Possible parkinsonian like disease with urinary incontinence, orthostatic hypotension and balance problems. FU in 1 month once UTI cleared with neuro   - monitor      Nerve Pain    -cont lyrica 50mg po bid  -gabapentin 300mg po tid  -lidocaine patch 5%  to painful area q 12 hrs on       Hyperlipidemia  -simvastatin 20mg po daily       Depression   -sertraline 100m gpo daily       FOLLOW UP APPOINTMENTS  Future Appointments   Date Time Provider Department Center   5/21/2024  4:00 PM Jermaine Monson MD [facility]5 [facility] 5 [facility]        This is a 35 minute visit and greater than 50% of the time was spent counseling the patient and/or coordinating care.    Note to patient: The 21st Century Cures Act makes medical notes like these available to patients in the interest of transparency. However, this is a medical document intended as peer to peer communication. It is written in medical language and may contain abbreviations or verbiage that are unfamiliar. It may appear blunt or direct. Medical documents are intended to carry relevant information, facts as evident, and the clinical opinion of the practitioner who signs the document.     Nazia Estrella, APRN  05/13/24

## 2024-05-14 ENCOUNTER — EXTERNAL FACILITY (OUTPATIENT)
Dept: INTERNAL MEDICINE CLINIC | Facility: CLINIC | Age: 74
End: 2024-05-14

## 2024-05-14 DIAGNOSIS — E11.00 TYPE 2 DIABETES MELLITUS WITH HYPEROSMOLARITY WITHOUT COMA, WITHOUT LONG-TERM CURRENT USE OF INSULIN (HCC): ICD-10-CM

## 2024-05-14 DIAGNOSIS — M79.2 NERVE PAIN: ICD-10-CM

## 2024-05-14 DIAGNOSIS — N30.00 ACUTE CYSTITIS WITHOUT HEMATURIA: Primary | ICD-10-CM

## 2024-05-14 PROCEDURE — 1111F DSCHRG MED/CURRENT MED MERGE: CPT | Performed by: INTERNAL MEDICINE

## 2024-05-14 PROCEDURE — 99309 SBSQ NF CARE MODERATE MDM 30: CPT | Performed by: INTERNAL MEDICINE

## 2024-05-14 NOTE — PROGRESS NOTES
follow up       Past Medical History:   Arthritis   Back problem   Depression   Diabetes (HCC)   Essential hypertension   High blood pressure   High cholesterol   Visual impairment    Social History    Socioeconomic History   Marital status:   Tobacco Use   Smoking status: Never   Smokeless tobacco: Never  Vaping Use   Vaping status: Never Used  Substance and Sexual Activity   Alcohol use: Yes   Comment: very infrequent, every couple of months   Drug use: Never    Social Determinants of Health    ALLERGIES:  No Known Allergies    CURRENT MEDICATIONS - reviewed    REVIEW OF SYSTEMS:  she is w doing ok, knee pain is better    PHYSICAL EXAM:  GENERAL HEALTH: well developed, well nourished, in no apparent distress    SKIN: no rashes, no suspicious lesions    EYES: PERRLA, EOMI, sclera anicteric, conjunctiva normal; there is no nystagmus, no drainage from eyes  HENT: normocephalic; normal nose, no nasal drainage, mucous membranes pink, moist, pharynx no exudate, no visible cerumen.  NECK: supple; FROM; no JVD, no TMG, no carotid bruits    RESPIRATORY:clear to percussion and auscultation  CARDIOVASCULAR: S1, S2 normal, RRR; no S3, no S4;  ABDOMEN: normal active BS+, soft, nondistended; no organomegaly, no masses; no bruits; nontender, no guarding, no rebound tenderness.  :no suprapubic distension  LYMPHATIC:no lymphedema  MUSCULOSKELETAL: no acute synovitis upper or lower extremity  EXTREMITIES/VASCULAR:no cyanosis, clubbing or edema  NEUROLOGIC: follows commands  PSYCHIATRIC: alert and oriented x 3; affect appropriate    a/p  PLAN:   s/p fall- knee pain improved, seen by np, ptot , pain meds as needed, fall precautions,uti- resolved , monitor bs        1. UTI   o Ucx E.coli. Pan sensitive.   o Completed IV ancef , on po abx    2. DM II, uncontrolled   o A1c 12.4  continue with accucheck , continue with current meds    3. Essential HTN   monitor bp    4. Dizziness    ? CT brain negative.  ? MRI brain no CVA.  ?  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 follow up with neurology as outpatient    5. Nerve Pain    -cont lyrica 50mg po bid  -gabapentin 300mg po tid    6. Hyperlipidemia  -continue with statin    7. Depression  -continue with sertraline    ptot

## 2024-05-16 ENCOUNTER — EXTERNAL FACILITY (OUTPATIENT)
Dept: INTERNAL MEDICINE CLINIC | Facility: CLINIC | Age: 74
End: 2024-05-16

## 2024-05-16 DIAGNOSIS — E11.00 TYPE 2 DIABETES MELLITUS WITH HYPEROSMOLARITY WITHOUT COMA, WITHOUT LONG-TERM CURRENT USE OF INSULIN (HCC): ICD-10-CM

## 2024-05-16 DIAGNOSIS — N30.00 ACUTE CYSTITIS WITHOUT HEMATURIA: Primary | ICD-10-CM

## 2024-05-16 DIAGNOSIS — I10 PRIMARY HYPERTENSION: ICD-10-CM

## 2024-05-16 PROCEDURE — 99308 SBSQ NF CARE LOW MDM 20: CPT | Performed by: INTERNAL MEDICINE

## 2024-05-16 PROCEDURE — 1111F DSCHRG MED/CURRENT MED MERGE: CPT | Performed by: INTERNAL MEDICINE

## 2024-05-16 NOTE — PROGRESS NOTES
follow up    Past Medical History:   Arthritis   Back problem   Depression   Diabetes (HCC)   Essential hypertension   High blood pressure   High cholesterol   Visual impairment    Social History    Socioeconomic History   Marital status:   Tobacco Use   Smoking status: Never   Smokeless tobacco: Never  Vaping Use   Vaping status: Never Used  Substance and Sexual Activity   Alcohol use: Yes   Comment: very infrequent, every couple of months   Drug use: Never    Social Determinants of Health    ALLERGIES:  No Known Allergies    CURRENT MEDICATIONS - reviewed    REVIEW OF SYSTEMS:  she is ok, she has slight pain on her but is better     PHYSICAL EXAM:  GENERAL HEALTH: well developed, well nourished, in no apparent distress    SKIN: no rashes, no suspicious lesions    EYES: PERRLA, EOMI, sclera anicteric, conjunctiva normal; there is no nystagmus, no drainage from eyes  HENT: normocephalic; normal nose, no nasal drainage, mucous membranes pink, moist, pharynx no exudate, no visible cerumen.  NECK: supple; FROM; no JVD, no TMG, no carotid bruits    RESPIRATORY:clear to percussion and auscultation  CARDIOVASCULAR: S1, S2 normal, RRR; no S3, no S4;  ABDOMEN: normal active BS+, soft, nondistended; no organomegaly, no masses; no bruits; nontender, no guarding, no rebound tenderness.  :no suprapubic distension  LYMPHATIC:no lymphedema  MUSCULOSKELETAL: no acute synovitis upper or lower extremity  EXTREMITIES/VASCULAR:no cyanosis, clubbing or edema  NEUROLOGIC: follows commands  PSYCHIATRIC: alert and oriented x 3; affect appropriate    a/p  PLAN:  left knee pain better , continue with ptot , pain meds as needed, labs reviewed , continue to monitor , continue with ptot      1. UTI   o Ucx E.coli. Pan sensitive.   o Completed IV ancef , on po abx    2. DM II, uncontrolled   o A1c 12.4  continue with accucheck , continue with current meds    3. Essential HTN   monitor bp    4. Dizziness    ? CT brain negative.  ? MRI  brain no CVA.  ? 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 follow up with neurology as outpatient    5. Nerve Pain    -cont lyrica 50mg po bid  -gabapentin 300mg po tid    6. Hyperlipidemia  -continue with statin    7. Depression  -continue with sertraline    ptot

## 2024-05-20 ENCOUNTER — SNF VISIT (OUTPATIENT)
Dept: INTERNAL MEDICINE CLINIC | Facility: SKILLED NURSING FACILITY | Age: 74
End: 2024-05-20

## 2024-05-20 DIAGNOSIS — F33.0 MILD RECURRENT MAJOR DEPRESSION (HCC): Chronic | ICD-10-CM

## 2024-05-20 DIAGNOSIS — N39.0 URINARY TRACT INFECTION WITHOUT HEMATURIA, SITE UNSPECIFIED: ICD-10-CM
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
## 2024-05-20 DIAGNOSIS — E11.65 TYPE 2 DIABETES MELLITUS WITH HYPERGLYCEMIA, WITH LONG-TERM CURRENT USE OF INSULIN (HCC): ICD-10-CM

## 2024-05-20 DIAGNOSIS — N18.32 TYPE 2 DIABETES MELLITUS WITH STAGE 3B CHRONIC KIDNEY DISEASE, WITH LONG-TERM CURRENT USE OF INSULIN (HCC): ICD-10-CM

## 2024-05-20 DIAGNOSIS — Z79.4 TYPE 2 DIABETES MELLITUS WITH HYPERGLYCEMIA, WITH LONG-TERM CURRENT USE OF INSULIN (HCC): ICD-10-CM

## 2024-05-20 DIAGNOSIS — E11.22 TYPE 2 DIABETES MELLITUS WITH STAGE 3B CHRONIC KIDNEY DISEASE, WITH LONG-TERM CURRENT USE OF INSULIN (HCC): ICD-10-CM

## 2024-05-20 DIAGNOSIS — Z79.4 TYPE 2 DIABETES MELLITUS WITH STAGE 3B CHRONIC KIDNEY DISEASE, WITH LONG-TERM CURRENT USE OF INSULIN (HCC): ICD-10-CM

## 2024-05-20 DIAGNOSIS — E78.5 DYSLIPIDEMIA: ICD-10-CM

## 2024-05-20 NOTE — PROGRESS NOTES
Ananya Dowling  : 1950  Age 73 year old  female patient is admitted to [facility] for rehabilitation and strengthening     Chief complaint: Hyperglycemia and nausea/vomiting /diarrhea      [facility] Admission : 24 to 24      HPI  73-year-old female presented to [facility] ED 24  for evaluation of fatigue/nausea/elevated blood glucose.  Patient with recent brief hospitalization  at an outside hospital for E. coli septicemia, on presentation  to [facility] ED was found to have asymptomatic urinary tract infection as well as elevated blood glucose of 390.  Patient  was admitted for IV antibiotics and management of blood glucose. Was treated for uti with IV meropenem in the ED. Recent Hemoglobin A1C 10.  Diet and insulin adjusted , possibly was non compliant.  Patient was stabilized and transferred to [facility] for continued monitoring and conditioning .      Patient seen in follow up, she refused the xray of the knee. She is doing fine. No complaints of pain, working with therapy. Wants to go home soon. No shortness of breath or chest pain, no nausea or vomiting. VSS    ALLERGIES:  No Known Allergies    IMMUNIZATIONS  Immunization History   Administered Date(s) Administered    Covid-19 Vaccine Moderna 100 mcg/0.5 ml 2021, 04/15/2021    Pneumococcal (Prevnar 13) 2016    Pneumovax 23 2015, 09/10/2020    TD 2008    TDAP 2018, 10/27/2021    Zoster Vaccine Recombinant Adjuvanted (Shingrix) 2021, 10/12/2021   Deferred Date(s) Deferred    FLU VAC High Dose 65 YRS & Older PRSV Free (33835) 2023        CODE STATUS:  Full Code    ADVANCED CARE PLANNING TEAM: Will need family care plan    CURRENT MEDICATIONS - reviewed and updated on SNF EMR     SUBJECTIVE    Patient seen in follow up, she refused the xray of the knee. She is doing fine. No complaints of pain, working with therapy. Wants to go home soon. No shortness of breath or chest pain, no nausea or vomiting. VSS    PHYSICAL  EXAM:  GENERAL HEALTH: well developed, well nourished, in no apparent distress  LINES, TUBES, DRAINS:  none  SKIN: no rashes, no suspicious lesions  WOUND: none  EYES: PERRLA, EOMI, sclera anicteric, conjunctiva normal; there is no nystagmus, no drainage from eyes  HENT: normocephalic; normal nose, no nasal drainage, mucous membranes pink, moist, pharynx no exudate, no visible cerumen.  NECK: supple; FROM; no JVD, no TMG, no carotid bruits  BREAST: deferred  RESPIRATORY:clear to percussion and auscultation  CARDIOVASCULAR: S1, S2 normal, RRR; no S3, no S4;   ABDOMEN:  normal active BS+, soft, nondistended; no organomegaly, no masses; no bruits; nontender, no guarding, no rebound tenderness.  :no suprapubic distension  LYMPHATIC:no lymphedema  MUSCULOSKELETAL: no acute synovitis upper or lower extremity  EXTREMITIES/VASCULAR:no cyanosis, clubbing or edema  NEUROLOGIC: follows commands  PSYCHIATRIC: alert and oriented x 3; affect appropriate     SEE PLAN BELOW  Fall on 05/13  - Xray of Right knee, pending - patient refused   - UA/UC ordered on 05/13, pending  - monitor      UTI   - Ucx E.coli. Pan sensitive.    - Completed IV ancef , now on PO cefadroxil 500mg po bid completes 5/10/24    - WBC 19.5 on admit now normal.  Lactic acid normal Afebrile.   - Improving - see hpi for recent cbc and bmp 5/8/24  - Blood cx x 2 NGTD in hospital      DM II, uncontrolled   - A1c 12.4  - Diabetes education done in [facility] 36 units at bedtime.  Novolog  ss TID.   - Accucheck qid , 200's      Essential HTN   - vs q shift   - losartan to 50mg daily   Dizziness   - Orthostatic V/S + --> can stop IVF. CHANDLER hose.   - CT brain negative.   - MRI brain no CVA.   - Possible parkinsonian like disease with urinary incontinence, orthostatic hypotension and balance problems. FU in 1 month once UTI cleared with neuro   - monitor      Nerve Pain    -cont lyrica 50mg po bid  -gabapentin 300mg po tid  -lidocaine patch 5%  to painful  area q 12 hrs on       Hyperlipidemia  -simvastatin 20mg po daily      Depression   -sertraline 100m gpo daily          FOLLOW UP APPOINTMENTS  Future Appointments   Date Time Provider Department Center   5/21/2024  4:00 PM Jermaine Monson MD [id number]        This is a 25 minute visit and greater than 50% of the time was spent counseling the patient and/or coordinating care.    Note to patient: The 21st Century Cures Act makes medical notes like these available to patients in the interest of transparency. However, this is a medical document intended as peer to peer communication. It is written in medical language and may contain abbreviations or verbiage that are unfamiliar. It may appear blunt or direct. Medical documents are intended to carry relevant information, facts as evident, and the clinical opinion of the practitioner who signs the document.     Nazia Estrella, EMERY  05/20/24

## 2024-05-21 ENCOUNTER — EXTERNAL FACILITY (OUTPATIENT)
Dept: INTERNAL MEDICINE CLINIC | Facility: CLINIC | Age: 74
End: 2024-05-21

## 2024-05-21 DIAGNOSIS — N30.00 ACUTE CYSTITIS WITHOUT HEMATURIA: ICD-10-CM

## 2024-05-21 DIAGNOSIS — I10 PRIMARY HYPERTENSION: Primary | ICD-10-CM

## 2024-05-21 PROCEDURE — 99308 SBSQ NF CARE LOW MDM 20: CPT | Performed by: INTERNAL MEDICINE

## 2024-05-21 PROCEDURE — 1111F DSCHRG MED/CURRENT MED MERGE: CPT | Performed by: INTERNAL MEDICINE

## 2024-05-21 NOTE — PROGRESS NOTES
follow up    Past Medical History:   Arthritis   Back problem   Depression   Diabetes (HCC)   Essential hypertension   High blood pressure   High cholesterol   Visual impairment    Social History    Socioeconomic History   Marital status:   Tobacco Use   Smoking status: Never   Smokeless tobacco: Never  Vaping Use   Vaping status: Never Used  Substance and Sexual Activity   Alcohol use: Yes   Comment: very infrequent, every couple of months   Drug use: Never    Social Determinants of Health    ALLERGIES:  No Known Allergies    CURRENT MEDICATIONS - reviewed    REVIEW OF SYSTEMS:  she is feeling better     PHYSICAL EXAM:  GENERAL HEALTH: well developed, well nourished, in no apparent distress    SKIN: no rashes, no suspicious lesions    EYES: PERRLA, EOMI, sclera anicteric, conjunctiva normal; there is no nystagmus, no drainage from eyes  HENT: normocephalic; normal nose, no nasal drainage, mucous membranes pink, moist, pharynx no exudate, no visible cerumen.  NECK: supple; FROM; no JVD, no TMG, no carotid bruits  RESPIRATORY:clear to percussion and auscultation  CARDIOVASCULAR: S1, S2 normal, RRR; no S3, no S4;  ABDOMEN: normal active BS+, soft, nondistended; no organomegaly, no masses; no bruits; nontender, no guarding, no rebound tenderness.  :no suprapubic distension  LYMPHATIC:no lymphedema  MUSCULOSKELETAL: no acute synovitis upper or lower extremity  EXTREMITIES/VASCULAR:no cyanosis, clubbing or edema  NEUROLOGIC: follows commands  PSYCHIATRIC: alert and oriented x 3; affect appropriate    a/p  PLAN:  doing ok, continue with ptot     1. UTI   o Ucx E.coli. Pan sensitive.   o Completed IV ancef , on po abx    2. DM II, uncontrolled   o A1c 12.4  continue with accucheck , continue with current meds    3. Essential HTN   monitor bp    4. Dizziness    ? CT brain negative.  ? MRI brain no CVA.  ? 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  follow up with neurology as outpatient    5. Nerve Pain    -cont lyrica 50mg po bid  -gabapentin 300mg po tid    6. Hyperlipidemia  -continue with statin    7. Depression  -continue with sertraline    ptot

## 2024-05-23 ENCOUNTER — EXTERNAL FACILITY (OUTPATIENT)
Dept: INTERNAL MEDICINE CLINIC | Facility: CLINIC | Age: 74
End: 2024-05-23

## 2024-05-23 DIAGNOSIS — R53.1 GENERALIZED WEAKNESS: Primary | ICD-10-CM

## 2024-05-23 DIAGNOSIS — I10 PRIMARY HYPERTENSION: ICD-10-CM

## 2024-05-23 PROCEDURE — 99308 SBSQ NF CARE LOW MDM 20: CPT | Performed by: INTERNAL MEDICINE

## 2024-05-23 PROCEDURE — 1111F DSCHRG MED/CURRENT MED MERGE: CPT | Performed by: INTERNAL MEDICINE

## 2024-05-23 NOTE — PROGRESS NOTES
follow up    Past Medical History:   Arthritis   Back problem   Depression   Diabetes (HCC)   Essential hypertension   High blood pressure   High cholesterol   Visual impairment      Socioeconomic History   Marital status:   Tobacco Use   Smoking status: Never   Smokeless tobacco: Never  Vaping Use   Vaping status: Never Used  Substance and Sexual Activity   Alcohol use: Yes   Comment: very infrequent, every couple of months   Drug use: Never    Social Determinants of Health    ALLERGIES:  No Known Allergies    CURRENT MEDICATIONS - reviewed    REVIEW OF SYSTEMS:  she is feeling ok, no complains    PHYSICAL EXAM:  GENERAL HEALTH: well developed, well nourished, in no apparent distress    SKIN: no rashes, no suspicious lesions    EYES: PERRLA, EOMI, sclera anicteric, conjunctiva normal; there is no nystagmus, no drainage from eyes  HENT: normocephalic; normal nose, no nasal drainage, mucous membranes pink, moist, pharynx no exudate, no visible cerumen.  NECK: supple; FROM; no JVD, no TMG, no carotid bruits  RESPIRATORY:clear to percussion and auscultation  CARDIOVASCULAR: S1, S2 normal, RRR; no S3, no S4;  ABDOMEN: normal active BS+, soft, nondistended; no organomegaly, no masses; no bruits; nontender, no guarding, no rebound tenderness.  :no suprapubic distension  LYMPHATIC:no lymphedema  MUSCULOSKELETAL: no acute synovitis upper or lower extremity  EXTREMITIES/VASCULAR:no cyanosis, clubbing or edema  NEUROLOGIC: follows commands  PSYCHIATRIC: alert and oriented x 3; affect appropriate    a/p  PLAN:  generalized weakness - continue with ptot, improving    1. UTI   o Ucx E.coli. Pan sensitive.   o Completed IV ancef , on po abx    2. DM II, uncontrolled   o A1c 12.4  continue with accucheck , continue with current meds    3. Essential HTN   monitor bp    4. Dizziness    ? CT brain negative.  ? MRI brain no CVA.  ? Possible parkinsonian like disease with urinary incontinence, orthostatic hypotension and  balance problems. follow up with neurology as outpatient    5. Nerve Pain    -cont lyrica 50mg po bid  -gabapentin 300mg po tid    6. Hyperlipidemia  -continue with statin    7. Depression  -continue with sertraline    ptot

## 2024-05-24 ENCOUNTER — SNF DISCHARGE (OUTPATIENT)
Dept: INTERNAL MEDICINE CLINIC | Facility: SKILLED NURSING FACILITY | Age: 74
End: 2024-05-24

## 2024-05-24 DIAGNOSIS — E78.5 DYSLIPIDEMIA: ICD-10-CM

## 2024-05-24 DIAGNOSIS — I10 PRIMARY HYPERTENSION: ICD-10-CM

## 2024-05-24 DIAGNOSIS — R13.10 DYSPHAGIA, UNSPECIFIED TYPE: ICD-10-CM

## 2024-05-24 DIAGNOSIS — N39.0 URINARY TRACT INFECTION WITHOUT HEMATURIA, SITE UNSPECIFIED: Primary | ICD-10-CM

## 2024-05-24 DIAGNOSIS — E11.65 TYPE 2 DIABETES MELLITUS WITH HYPERGLYCEMIA, WITH LONG-TERM CURRENT USE OF INSULIN (HCC): ICD-10-CM

## 2024-05-24 DIAGNOSIS — Z79.4 TYPE 2 DIABETES MELLITUS WITH HYPERGLYCEMIA, WITH LONG-TERM CURRENT USE OF INSULIN (HCC): ICD-10-CM
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
## 2024-05-24 PROCEDURE — 1111F DSCHRG MED/CURRENT MED MERGE: CPT | Performed by: NURSE PRACTITIONER

## 2024-05-24 PROCEDURE — 99316 NF DSCHRG MGMT 30 MIN+: CPT | Performed by: NURSE PRACTITIONER

## 2024-05-24 PROCEDURE — 1160F RVW MEDS BY RX/DR IN RCRD: CPT | Performed by: NURSE PRACTITIONER

## 2024-05-24 PROCEDURE — 1159F MED LIST DOCD IN RCRD: CPT | Performed by: NURSE PRACTITIONER

## 2024-05-24 NOTE — PROGRESS NOTES
Ananya Dowling, 11/9/1950, 73 year old, female is being discharged from [facility] to home      DISCHARGE SUMMARY    Date of Admission to [facility]: 5/7/24    Date of Discharge from [facility]: 5/24/24                              Admitting Diagnoses: Hyperglycemia and nausea/vomiting/diarrhea     Reason for Admission to [facility]: Rehabilitation and strengthening      PHYSICAL EXAM:  GENERAL HEALTH: lying in bed comfortably in no acute distress   LINES, TUBES, DRAINS:  none  SKIN: no rashes, no suspicious lesions  WOUND: see wound assessment,   EYES: PERRLA, EOMI, sclera anicteric, conjunctiva normal; there is no nystagmus, no drainage from eyes  HENT: normocephalic; normal nose, no nasal drainage, mucous membranes pink, moist, pharynx no exudate, no visible cerumen.   NECK:supple, FROM; no JVD, no TMG, no carotid bruits   BREAST: deferred exam   RESPIRATORY:diminished at the bases   CARDIOVASCULAR: S1, S2 normal, RRR; no S3, no S4  ABDOMEN:  normal active BS+, soft, nondistended; no organomegaly, no masses; no bruits; nontender, no guarding, no rebound tenderness.  :Deferred  LYMPHATIC:no lymphedema  MUSCULOSKELETAL: no acute synovitis upper or lower extremity   EXTREMITIES/VASCULAR:no cyanosis, clubbing or edema  NEUROLOGIC:intact; no sensorimotor deficit and follows commands  PSYCHIATRIC: alert and oriented x 3; affect appropriate     CURRENT MANAGEMENT AT [facility]  Swallowing complaints  - patient c/o some difficulty swallowing her food and thin liquids  - patient denies coughing with meals or drinking  - no active s/s of asp pna   - will have  set up ST at home at discharge     Fall on 05/13  - Xray of Right knee, pending - patient refused   - UA/UC ordered on 05/13. Urine culture +Ecoli. See below  - monitor      UTI   - Ucx E.coli. Pan sensitive.    - Completed IV ancef  - Completed po cefadroxil 500mg po bid on 5/10/24    - WBC 19.5 on admit now normal.  Lactic acid normal Afebrile.   -  Improving - see hpi for recent cbc and bmp 5/8/24  - Blood cx x 2 NGTD in hospital      DM II, uncontrolled   - A1c 12.4  - Diabetes education done in Hospital  - Tresiba 36 units at bedtime.  Novolog  ss TID.   - Accucheck qid , 200's      Essential HTN   - vs q shift   - losartan to 50mg daily     Dizziness   - Orthostatic V/S + --> can stop IVF. CHANDLER hose.   - CT brain negative.   - MRI brain no CVA.   - Possible parkinsonian like disease with urinary incontinence, orthostatic hypotension and balance problems. FU in 1 month once UTI cleared with neuro   - monitor      Nerve Pain    -cont lyrica 50mg po bid  -gabapentin 300mg po tid  -lidocaine patch 5%  to painful area q 12 hrs on       Hyperlipidemia  -simvastatin 20mg po daily      Depression   -sertraline 100m gpo daily       Medication Reconciliation Completed:  Yes - All medications and side effects reviewed with patient. Patient denied any need for medication rx at discharge.     FOLLOW UP APPOINTMENTS  Future Appointments   Date Time Provider Department Center   6/4/2024  6:40 PM Jermaine Monson MD [facility]        This is a 35 minutes visit and greater than 50% of the time was spent counseling the patient and/or coordinating care.    Note to patient: The 21st Century Cures Act makes medical notes like these available to patients in the interest of transparency. However, this is a medical document intended as peer to peer communication. It is written in medical language and may contain abbreviations or verbiage that are unfamiliar. It may appear blunt or direct. Medical documents are intended to carry relevant information, facts as evident, and the clinical opinion of the practitioner who signs the document.     Nazia Estrella, APRN  5/24/2024

## 2024-05-28 ENCOUNTER — TELEPHONE (OUTPATIENT)
Dept: INTERNAL MEDICINE CLINIC | Facility: CLINIC | Age: 74
End: 2024-05-28

## 2024-05-28 NOTE — TELEPHONE ENCOUNTER
Message from [facility] abt initiation of Nursing, PT and OT services was routed to Dr Monson/ PCP as notification.

## 2024-05-28 NOTE — TELEPHONE ENCOUNTER
Mary from [facility] called to inform pcp that pt has been initiated to home health services for nurse, PT, OT

## 2024-05-30 DIAGNOSIS — G62.9 POLYNEUROPATHY: ICD-10-CM

## 2024-05-30 DIAGNOSIS — E55.9 VITAMIN D DEFICIENCY: ICD-10-CM

## 2024-05-30 NOTE — TELEPHONE ENCOUNTER
Patient is calling because she needs refills on pregablin and vitamin D. She says there were prescribed by someone else but needs refills until following appt in 6/4

## 2024-05-31 ENCOUNTER — TELEPHONE (OUTPATIENT)
Dept: INTERNAL MEDICINE CLINIC | Facility: CLINIC | Age: 74
End: 2024-05-31

## 2024-05-31 RX ORDER — ERGOCALCIFEROL 1.25 MG/1
50000 CAPSULE ORAL WEEKLY
Qty: 12 CAPSULE | Refills: 0 | Status: SHIPPED | OUTPATIENT
Start: 2024-05-31

## 2024-05-31 RX ORDER — PREGABALIN 50 MG/1
50 CAPSULE ORAL 2 TIMES DAILY
Qty: 60 CAPSULE | Refills: 3 | Status: SHIPPED | OUTPATIENT
Start: 2024-05-31

## 2024-05-31 NOTE — TELEPHONE ENCOUNTER
[facility] is calling stating that evaluated pt above and is requesting a verbal for 3 additional visit.      Please call [facility] at [phone].

## 2024-05-31 NOTE — TELEPHONE ENCOUNTER
You have not prescribed these before.   Requested Prescriptions     Pending Prescriptions Disp Refills    ergocalciferol 1.25 MG (62501 UT) Oral Cap 12 capsule 0     Sig: Take 1 capsule (50,000 Units total) by mouth once a week.    pregabalin 50 MG Oral Cap 60 capsule 0     Sig: Take 1 capsule (50 mg total) by mouth 2 (two) times daily.     LAST REFILL DATE    QUANTITY REQUESTED    DAY SUPPLY    DIAGNOSIS    LAST OFFICE VISIT  4/17/24   FOLLOW UP DUE      Future Appointments   Date Time Provider Department Center   6/4/2024  6:40 PM Jermaine Monson MD [facility]

## 2024-05-31 NOTE — TELEPHONE ENCOUNTER
Left a message on [facility] at [phone] voice mail authorization given for 3 additional OT visits.

## 2024-06-03 ENCOUNTER — NURSE TRIAGE (OUTPATIENT)
Dept: INTERNAL MEDICINE CLINIC | Facility: CLINIC | Age: 74
End: 2024-06-03

## 2024-06-03 ENCOUNTER — HOSPITAL ENCOUNTER (EMERGENCY)
Facility: HOSPITAL | Age: 74
Discharge: HOME | End: 2024-06-04
Attending: EMERGENCY MEDICINE
Payer: MEDICARE

## 2024-06-03 ENCOUNTER — APPOINTMENT (OUTPATIENT)
Dept: GENERAL RADIOLOGY | Facility: HOSPITAL | Age: 74
End: 2024-06-03
Attending: EMERGENCY MEDICINE
Payer: MEDICARE

## 2024-06-03 VITALS
OXYGEN SATURATION: 96 % | SYSTOLIC BLOOD PRESSURE: 170 MMHG | RESPIRATION RATE: 16 BRPM | HEART RATE: 82 BPM | DIASTOLIC BLOOD PRESSURE: 60 MMHG | TEMPERATURE: 98 F

## 2024-06-03 DIAGNOSIS — N30.01 ACUTE CYSTITIS WITH HEMATURIA: Primary | ICD-10-CM

## 2024-06-03 DIAGNOSIS — I95.9 HYPOTENSION, UNSPECIFIED HYPOTENSION TYPE: ICD-10-CM

## 2024-06-03 LAB
ALBUMIN SERPL-MCNC: 4.2 G/DL (ref 3.2–4.8)
ALP LIVER SERPL-CCNC: 85 U/L
ALT SERPL-CCNC: 31 U/L
ANION GAP SERPL CALC-SCNC: 5 MMOL/L (ref 0–18)
AST SERPL-CCNC: 31 U/L (ref ?–34)
BASOPHILS # BLD AUTO: 0.03 X10(3) UL (ref 0–0.2)
BASOPHILS NFR BLD AUTO: 0.3 %
BILIRUB DIRECT SERPL-MCNC: <0.1 MG/DL (ref ?–0.3)
BILIRUB SERPL-MCNC: 0.3 MG/DL (ref 0.2–1.1)
BILIRUB UR QL: NEGATIVE
BUN BLD-MCNC: 25 MG/DL (ref 9–23)
BUN/CREAT SERPL: 18.5 (ref 10–20)
CALCIUM BLD-MCNC: 9.5 MG/DL (ref 8.7–10.4)
CHLORIDE SERPL-SCNC: 106 MMOL/L (ref 98–112)
CO2 SERPL-SCNC: 29 MMOL/L (ref 21–32)
COLOR UR: YELLOW
CREAT BLD-MCNC: 1.35 MG/DL
DEPRECATED RDW RBC AUTO: 41.9 FL (ref 35.1–46.3)
EGFRCR SERPLBLD CKD-EPI 2021: 42 ML/MIN/1.73M2 (ref 60–?)
EOSINOPHIL # BLD AUTO: 0.14 X10(3) UL (ref 0–0.7)
EOSINOPHIL NFR BLD AUTO: 1.3 %
ERYTHROCYTE [DISTWIDTH] IN BLOOD BY AUTOMATED COUNT: 13.3 % (ref 11–15)
GLUCOSE BLD-MCNC: 75 MG/DL (ref 70–99)
GLUCOSE UR-MCNC: 200 MG/DL
HCT VFR BLD AUTO: 37.1 %
HGB BLD-MCNC: 12.3 G/DL
IMM GRANULOCYTES # BLD AUTO: 0.06 X10(3) UL (ref 0–1)
IMM GRANULOCYTES NFR BLD: 0.5 %
KETONES UR-MCNC: NEGATIVE MG/DL
LEUKOCYTE ESTERASE UR QL STRIP.AUTO: 500
LYMPHOCYTES # BLD AUTO: 2.36 X10(3) UL (ref 1–4)
LYMPHOCYTES NFR BLD AUTO: 21.5 %
MCH RBC QN AUTO: 28.5 PG (ref 26–34)
MCHC RBC AUTO-ENTMCNC: 33.2 G/DL (ref 31–37)
MCV RBC AUTO: 85.9 FL
MONOCYTES # BLD AUTO: 0.73 X10(3) UL (ref 0.1–1)
MONOCYTES NFR BLD AUTO: 6.7 %
NEUTROPHILS # BLD AUTO: 7.64 X10 (3) UL (ref 1.5–7.7)
NEUTROPHILS # BLD AUTO: 7.64 X10(3) UL (ref 1.5–7.7)
NEUTROPHILS NFR BLD AUTO: 69.7 %
NITRITE UR QL STRIP.AUTO: NEGATIVE
OSMOLALITY SERPL CALC.SUM OF ELEC: 293 MOSM/KG (ref 275–295)
PH UR: 5.5 [PH] (ref 5–8)
PLATELET # BLD AUTO: 281 10(3)UL (ref 150–450)
POTASSIUM SERPL-SCNC: 4.4 MMOL/L (ref 3.5–5.1)
PROCALCITONIN SERPL-MCNC: 0.07 NG/ML (ref ?–0.05)
PROT SERPL-MCNC: 7.8 G/DL (ref 5.7–8.2)
PROT UR-MCNC: 70 MG/DL
RBC # BLD AUTO: 4.32 X10(6)UL
SODIUM SERPL-SCNC: 140 MMOL/L (ref 136–145)
SP GR UR STRIP: 1.01 (ref 1–1.03)
TROPONIN I SERPL HS-MCNC: 28 NG/L
UROBILINOGEN UR STRIP-ACNC: NORMAL
WBC # BLD AUTO: 11 X10(3) UL (ref 4–11)
WBC #/AREA URNS AUTO: >50 /HPF
WBC CLUMPS UR QL AUTO: PRESENT /HPF

## 2024-06-03 PROCEDURE — 99285 EMERGENCY DEPT VISIT HI MDM: CPT

## 2024-06-03 PROCEDURE — 84145 PROCALCITONIN (PCT): CPT | Performed by: EMERGENCY MEDICINE

## 2024-06-03 PROCEDURE — 96361 HYDRATE IV INFUSION ADD-ON: CPT

## 2024-06-03 PROCEDURE — 87186 SC STD MICRODIL/AGAR DIL: CPT | Performed by: EMERGENCY MEDICINE

## 2024-06-03 PROCEDURE — 81001 URINALYSIS AUTO W/SCOPE: CPT | Performed by: EMERGENCY MEDICINE

## 2024-06-03 PROCEDURE — 80048 BASIC METABOLIC PNL TOTAL CA: CPT | Performed by: EMERGENCY MEDICINE

## 2024-06-03 PROCEDURE — 93005 ELECTROCARDIOGRAM TRACING: CPT

## 2024-06-03 PROCEDURE — 87086 URINE CULTURE/COLONY COUNT: CPT | Performed by: EMERGENCY MEDICINE

## 2024-06-03 PROCEDURE — 71045 X-RAY EXAM CHEST 1 VIEW: CPT | Performed by: EMERGENCY MEDICINE

## 2024-06-03 PROCEDURE — 96365 THER/PROPH/DIAG IV INF INIT: CPT

## 2024-06-03 PROCEDURE — 80076 HEPATIC FUNCTION PANEL: CPT | Performed by: EMERGENCY MEDICINE

## 2024-06-03 PROCEDURE — 87088 URINE BACTERIA CULTURE: CPT | Performed by: EMERGENCY MEDICINE

## 2024-06-03 PROCEDURE — 93010 ELECTROCARDIOGRAM REPORT: CPT

## 2024-06-03 PROCEDURE — 84484 ASSAY OF TROPONIN QUANT: CPT | Performed by: EMERGENCY MEDICINE

## 2024-06-03 PROCEDURE — 85025 COMPLETE CBC W/AUTO DIFF WBC: CPT | Performed by: EMERGENCY MEDICINE

## 2024-06-03 RX ORDER — CEPHALEXIN 500 MG/1
500 CAPSULE ORAL 3 TIMES DAILY
Qty: 27 CAPSULE | Refills: 0 | Status: SHIPPED | OUTPATIENT
Start: 2024-06-03 | End: 2024-06-12

## 2024-06-03 NOTE — ED INITIAL ASSESSMENT (HPI)
Patient arrives ambulatory through triage with c/o of hypotension that started today. Patient's daughter states that today at physical therapist they noticed that her blood pressure was low.

## 2024-06-03 NOTE — TELEPHONE ENCOUNTER
Spoke with Snehal MARCELINO of [facility] stated she had taken the patient's blood  pressure 3 times with systolic consistently in the 80s and diastolic 50s. She reported blood pressure as 86/50 with no symptoms. Blood pressure re-check is 123/60 with pulse 80.     RN will call the patient for triage.    Spoke with Lucho (HIPAA authorization) daughter who denied dizziness, lightheadedness, or > 12 hours since last void. Patient had not eaten today but the daughter reported she will be eating in 20 minutes. Patient is alert and responsive.     Disposition: Seen in office 3 days    Patient has prior scheduled appointment 6/4/24 at 6:40 PM; appt note updated with low blood pressure.     RN informed the daughter to recheck blood pressure this evening if has systolic < 90 or the patient feels lightheaded or dizzy to take her to the ED. Daughter and patient voiced understanding.    Reason for Disposition   Fall in systolic BP > 20 mm Hg from normal and NOT dizzy, lightheaded, or weak    Answer Assessment - Initial Assessment Questions  1. BLOOD PRESSURE: \"What is your blood pressure?\" \"Did you take at least two measurements 5 minutes apart?\"      86/50  2. ONSET: \"When did you take your blood pressure?\"      Today  3. HOW: \"How did you take your blood pressure?\" (e.g., visiting nurse, automatic home BP monitor)      PT blood pressure monitor.  4. HISTORY: \"Do you have a history of low blood pressure?\" \"What is your blood pressure normally?\"      Denied  5. MEDICINES: \"Are you taking any medicines for blood pressure?\" If Yes, ask: \"Have they been changed recently?\"      Denied  6. PULSE RATE: \"Do you know what your pulse rate is?\"         7. OTHER SYMPTOMS: \"Have you been sick recently?\" \"Have you had a recent injury?\" Denied        8. PREGNANCY: \"Is there any chance you are pregnant?\" \"When was your last menstrual period?\"  N/A    Protocols used: Blood Pressure - Low-A-OH

## 2024-06-03 NOTE — ED PROVIDER NOTES
Patient Seen in: [facility] Emergency Department      History     Chief Complaint   Patient presents with    Hypotension     Stated Complaint: low bp    Subjective:   73-year-old female presents with low blood pressure readings that were obtained by the physical therapist that came to the house around 230 this afternoon.  Patient was not symptomatic during this time.  They spoke to the doctor's office who encouraged them to drink some electrolyte fluids and her blood pressure improved then about an hour before our evaluation patient was feeling a little lightheaded so the daughter rechecked the blood pressure and it was in the 80s again.  Patient says she feels fine sitting on the bed right now.  Denies cough or congestion.  No fever.  No vomiting or diarrhea or melena.  No focal weakness or numbness or confusion.  No rash.  The patient's been home for about 10 days from rehab after she was hospitalized for urosepsis.  No reported fevers.            Objective:   Past Medical History:    Arthritis    Back problem    Depression    Diabetes (HCC)    Essential hypertension    High blood pressure    High cholesterol    Visual impairment              History reviewed. No pertinent surgical history.             Social History     Socioeconomic History    Marital status:    Tobacco Use    Smoking status: Never    Smokeless tobacco: Never   Vaping Use    Vaping status: Never Used   Substance and Sexual Activity    Alcohol use: Yes     Comment: very infrequent, every couple of months    Drug use: Never     Social Determinants of Health     Financial Resource Strain: Low Risk  (3/29/2024)    Financial Resource Strain     Difficulty of Paying Living Expenses: Somewhat hard     Med Affordability: No   Food Insecurity: No Food Insecurity (5/2/2024)    Food Insecurity     Food Insecurity: Never true   Transportation Needs: No Transportation Needs (5/2/2024)    Transportation Needs     Lack of Transportation: No    Recent Concern: Transportation Needs - Unmet Transportation Needs (3/29/2024)    Transportation Needs     Lack of Transportation: Yes   Physical Activity: Inactive (2/16/2023)    Exercise Vital Sign     Days of Exercise per Week: 0 days     Minutes of Exercise per Session: 0 min   Stress: No Stress Concern Present (2/16/2023)    Stress     Feeling of Stress : No   Social Connections: Socially Integrated (2/16/2023)    Social Connections     Frequency of Socialization with Friends and Family: 3   Housing Stability: Low Risk  (5/2/2024)    Housing Stability     Housing Instability: No              Review of Systems    Positive for stated complaint: low bp  Other systems are as noted in HPI.  Constitutional and vital signs reviewed.      All other systems reviewed and negative except as noted above.    Physical Exam     ED Triage Vitals   BP 06/03/24 1819 (!) 75/37   Pulse 06/03/24 1819 86   Resp 06/03/24 1819 20   Temp 06/03/24 1819 98.4 °F (36.9 °C)   Temp src --    SpO2 06/03/24 1819 93 %   O2 Device 06/03/24 1930 None (Room air)       Current Vitals:   Vital Signs  BP: 158/61  Pulse: 81  Resp: 17  Temp: 98.4 °F (36.9 °C)  MAP (mmHg): 87    Oxygen Therapy  SpO2: 97 %  O2 Device: None (Room air)            Physical Exam  HENT:      Head: Normocephalic.      Mouth/Throat:      Mouth: Mucous membranes are moist.      Pharynx: Oropharynx is clear.   Eyes:      Extraocular Movements: Extraocular movements intact.      Pupils: Pupils are equal, round, and reactive to light.   Cardiovascular:      Rate and Rhythm: Normal rate and regular rhythm.      Heart sounds: Normal heart sounds.   Pulmonary:      Effort: Pulmonary effort is normal.      Breath sounds: Normal breath sounds.   Abdominal:      Palpations: Abdomen is soft.      Tenderness: There is no abdominal tenderness.   Musculoskeletal:         General: No swelling or tenderness. Normal range of motion.      Cervical back: Normal range of motion.    Lymphadenopathy:      Cervical: No cervical adenopathy.   Skin:     General: Skin is warm.      Capillary Refill: Capillary refill takes less than 2 seconds.   Neurological:      General: No focal deficit present.      Mental Status: She is alert.               ED Course     Labs Reviewed   BASIC METABOLIC PANEL (8) - Abnormal; Notable for the following components:       Result Value    BUN 25 (*)     Creatinine 1.35 (*)     eGFR-Cr 42 (*)     All other components within normal limits   URINALYSIS WITH CULTURE REFLEX - Abnormal; Notable for the following components:    Clarity Urine Ex.Turbid (*)     Glucose Urine 200 (*)     Blood Urine 2+ (*)     Protein Urine 70 (*)     Leukocyte Esterase Urine 500 (*)     WBC Urine >50 (*)     RBC Urine 6-10 (*)     Bacteria Urine 3+ (*)     Squamous Epi. Cells Many (*)     Renal Tubular Epithelial Cells Many (*)     WBC Clump Present (*)     All other components within normal limits   PROCALCITONIN - Abnormal; Notable for the following components:    Procalcitonin 0.07 (*)     All other components within normal limits   HEPATIC FUNCTION PANEL (7) - Normal   TROPONIN I HIGH SENSITIVITY - Normal   CBC WITH DIFFERENTIAL WITH PLATELET    Narrative:     The following orders were created for panel order CBC With Differential With Platelet.  Procedure                               Abnormality         Status                     ---------                               -----------         ------                     CBC W/ DIFFERENTIAL[[id number]]                              Final result                 Please view results for these tests on the individual orders.   RAINBOW DRAW LAVENDER   RAINBOW DRAW LIGHT GREEN   RAINBOW DRAW BLUE   URINE CULTURE, ROUTINE   CBC W/ DIFFERENTIAL     EKG    Rate, intervals and axes as noted on EKG Report.  Rate: 82  Rhythm: Sinus Rhythm  Reading: Rightward axis, no ST elevation.  QTc 429.  Abnormal EKG read by myself              ED Course as of  06/03/24 2328  ------------------------------------------------------------  Time: 06/03 2255  Comment: Interpreted by me.  CBC normal, BMP shows mild renal insufficiency.  Troponin normal, hepatic profile normal, Pro-Andrea unremarkable.  The UA does show possible infection however there was squamous epithelial cells and renal tubular epithelial cells.  I explained this to the patient as well as the daughter and recommended repeating but gave him the option of just being treated for probable UTI.  I checked an old culture from earlier in May and it was E. coli pansensitive to everything.  They have an appointment with Dr. Monson tomorrow morning.  The patient and her daughter prefer she goes home and they do not want to be admitted.  She is alert and oriented and decisional.  They will return for changes or worsening and they can have vitals rechecked at the office.  I recommended drinking plenty of fluids that she was orthostatic here.  Blood pressure is more than adequate at this time.  Patient and daughter voiced understanding of the instructions and will return if needed.  ------------------------------------------------------------  Time: 06/03 2256  Comment: Patient was never tachycardic and is not likely septic although I explained to them this is possible.              MDM      XR CHEST AP PORTABLE  (CPT=71045)    Result Date: 6/3/2024  CONCLUSION:  1. No acute cardiopulmonary finding.    Dictated by (CST): Darren Lopez MD on 6/03/2024 at 7:27 PM     Finalized by (CST): Darren Lopez MD on 6/03/2024 at 7:28 PM                                            Medical Decision Making  Patient here with low blood pressure readings and had brief dizziness at 1 point but otherwise no new symptoms.  She has no pain or urinary symptoms.  No fevers.  No vomiting or diarrhea.  No new medications.  Differential could include but is not limited to dehydration, renal failure, electrolyte disturbance, infectious etiology,  cardiac etiology and many other possibilities.  Workup in progress.    Amount and/or Complexity of Data Reviewed  External Data Reviewed: notes.     Details: DC from SNF:  Ucx E.coli. Pan sensitive.    - Completed IV ancef  - Completed po cefadroxil 500mg po bid on 5/10/24    - WBC 19.5 on admit now normal.  Lactic acid normal Afebrile.     Labs: ordered. Decision-making details documented in ED Course.  Radiology: ordered and independent interpretation performed. Decision-making details documented in ED Course.     Details: Chest x-ray clear  ECG/medicine tests: ordered and independent interpretation performed. Decision-making details documented in ED Course.  Discussion of management or test interpretation with external provider(s): The ED course.  Communicated with Dr. Monson who will be seeing the patient tomorrow.    Risk  Prescription drug management.        Disposition and Plan     Clinical Impression:  1. Acute cystitis with hematuria    2. Hypotension, unspecified hypotension type         Disposition:  Discharge  6/3/2024 11:21 pm    Follow-up:  Jermaine Monson MD  [address] [address]  [phone]    Follow up      We recommend that you schedule follow up care with a primary care provider within the next three months to obtain basic health screening including reassessment of your blood pressure.      Medications Prescribed:  Current Discharge Medication List        START taking these medications    Details   cephalexin 500 MG Oral Cap Take 1 capsule (500 mg total) by mouth 3 (three) times daily for 9 days.  Qty: 27 capsule, Refills: 0

## 2024-06-04 ENCOUNTER — OFFICE VISIT (OUTPATIENT)
Dept: INTERNAL MEDICINE CLINIC | Facility: CLINIC | Age: 74
End: 2024-06-04
Payer: MEDICARE

## 2024-06-04 ENCOUNTER — TELEPHONE (OUTPATIENT)
Dept: INTERNAL MEDICINE CLINIC | Facility: CLINIC | Age: 74
End: 2024-06-04

## 2024-06-04 VITALS
WEIGHT: 133 LBS | HEART RATE: 80 BPM | BODY MASS INDEX: 25.11 KG/M2 | SYSTOLIC BLOOD PRESSURE: 110 MMHG | OXYGEN SATURATION: 95 % | HEIGHT: 61 IN | DIASTOLIC BLOOD PRESSURE: 50 MMHG

## 2024-06-04 DIAGNOSIS — N39.0 URINARY TRACT INFECTION WITHOUT HEMATURIA, SITE UNSPECIFIED: Primary | ICD-10-CM

## 2024-06-04 DIAGNOSIS — G23.8 MULTIPLE SYSTEM ATROPHY (HCC): ICD-10-CM

## 2024-06-04 DIAGNOSIS — E11.3551 TYPE 2 DIABETES MELLITUS WITH STABLE PROLIFERATIVE RETINOPATHY OF RIGHT EYE, WITH LONG-TERM CURRENT USE OF INSULIN (HCC): ICD-10-CM

## 2024-06-04 DIAGNOSIS — E11.65 TYPE 2 DIABETES MELLITUS WITH HYPERGLYCEMIA, WITH LONG-TERM CURRENT USE OF INSULIN (HCC): ICD-10-CM

## 2024-06-04 DIAGNOSIS — G90.3 MULTIPLE SYSTEM ATROPHY (HCC): ICD-10-CM

## 2024-06-04 DIAGNOSIS — G62.9 POLYNEUROPATHY: ICD-10-CM

## 2024-06-04 DIAGNOSIS — Z79.4 TYPE 2 DIABETES MELLITUS WITH STABLE PROLIFERATIVE RETINOPATHY OF RIGHT EYE, WITH LONG-TERM CURRENT USE OF INSULIN (HCC): ICD-10-CM

## 2024-06-04 DIAGNOSIS — Z79.4 TYPE 2 DIABETES MELLITUS WITH HYPERGLYCEMIA, WITH LONG-TERM CURRENT USE OF INSULIN (HCC): ICD-10-CM

## 2024-06-04 DIAGNOSIS — R09.89 LABILE HYPERTENSION: ICD-10-CM

## 2024-06-04 DIAGNOSIS — Z87.440 HISTORY OF RECURRENT UTIS: ICD-10-CM

## 2024-06-04 LAB
ATRIAL RATE: 82 BPM
P AXIS: 57 DEGREES
P-R INTERVAL: 140 MS
Q-T INTERVAL: 368 MS
QRS DURATION: 68 MS
QTC CALCULATION (BEZET): 429 MS
R AXIS: 91 DEGREES
T AXIS: 79 DEGREES
VENTRICULAR RATE: 82 BPM

## 2024-06-04 PROCEDURE — 3008F BODY MASS INDEX DOCD: CPT | Performed by: INTERNAL MEDICINE

## 2024-06-04 PROCEDURE — 99214 OFFICE O/P EST MOD 30 MIN: CPT | Performed by: INTERNAL MEDICINE

## 2024-06-04 PROCEDURE — 1111F DSCHRG MED/CURRENT MED MERGE: CPT | Performed by: INTERNAL MEDICINE

## 2024-06-04 PROCEDURE — 1160F RVW MEDS BY RX/DR IN RCRD: CPT | Performed by: INTERNAL MEDICINE

## 2024-06-04 PROCEDURE — 3046F HEMOGLOBIN A1C LEVEL >9.0%: CPT | Performed by: INTERNAL MEDICINE

## 2024-06-04 PROCEDURE — 1159F MED LIST DOCD IN RCRD: CPT | Performed by: INTERNAL MEDICINE

## 2024-06-04 PROCEDURE — 3078F DIAST BP <80 MM HG: CPT | Performed by: INTERNAL MEDICINE

## 2024-06-04 PROCEDURE — 3074F SYST BP LT 130 MM HG: CPT | Performed by: INTERNAL MEDICINE

## 2024-06-04 NOTE — PROGRESS NOTES
Ananya Dowling female 73 year old    Here with daughter.     Chief Complaint   Patient presents with    ER F/U       In ER  for  lightheaded and  low  bp -  also  chest  tight -her chest tightness resolved.     sugars  are  good lately  < 200  -  no sweets anymore.      ER eval  was neg for sepsis or serious cardiopulmonary issues.;   troponin negative, procalcitonin  -  ??UTI - rx  kelex  500 tid      Was in hospital in  early may  for  UTI  -  has had a lot  of  uti 's -has no fever chills or back pain.    Her blood pressure has been very labile.  It went from very low to very high.  Today its good    Reviewed her last hospital records neurology was concerned about having multiple system atrophy.  We discussed the diagnosis.  Her weakness has become significantly worse since March.  She was using a cane but now seems to be dependent on a walker.    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, with long-term current use of insulin (HCC)    Chronic left-sided low back pain without sciatica    Mild recurrent major depression (HCC)    Hyperglycemia    Leukocytosis, unspecified type    Urinary tract infection without hematuria, site unspecified    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HCC)          Current Outpatient Medications on File Prior to Visit   Medication Sig Dispense Refill    cephalexin 500 MG Oral Cap Take 1 capsule (500 mg total) by mouth 3 (three) times daily for 9 days. 27 capsule 0    ergocalciferol 1.25 MG (02995 UT) Oral Cap Take 1 capsule (50,000 Units total) by mouth once a week. 12 capsule 0    pregabalin 50 MG Oral Cap Take 1 capsule (50 mg total) by mouth 2 (two) times daily. 60 capsule 3    Glucose Blood (CONTOUR NEXT TEST) In Vitro Strip Check BG 3x daily DX code:E11.65  300 each 1    gabapentin 300 MG Oral Cap Take 1 capsule (300 mg total) by mouth 3 (three) times daily.      lidocaine 4 % External Patch Place 1 patch onto the skin daily.      Insulin Degludec (TRESIBA FLEXTOUCH) 100 UNIT/ML Subcutaneous Solution Pen-injector Inject 36 Units into the skin daily.      Insulin Lispro, 1 Unit Dial, 100 UNIT/ML Subcutaneous Solution Pen-injector Patient will take 14 units with small carb meals and 18-20 units with larger carb meals. 54 mL 1    Insulin Pen Needle (PEN NEEDLES) 32G X 4 MM Does not apply Misc 1 pen  4 (four) times daily. Use  New pen needle  With each injection 400 each 0    Glucose Blood (ACCU-CHEK FITO PLUS) In Vitro Strip Use as directed to check blood glucose 3 times/day - DX code:E11.65 using insulin 300 strip 0    simvastatin 20 MG Oral Tab Take 1 tablet (20 mg total) by mouth daily. 90 tablet 2    sertraline 100 MG Oral Tab Take 1 tablet (100 mg total) by mouth daily. 90 tablet 2    losartan 50 MG Oral Tab Take 0.5 tablets (25 mg total) by mouth daily. 90 tablet 2     No current facility-administered medications on file prior to visit.          Vitals:    06/04/24 1732   BP: 110/50   Pulse: 80   VITALSBody mass index is 25.13 kg/m².    Pertinent findings on the physical exam; her mood and affect seem much better.  She is actually smiling so is the daughter does not look sick or toxic.  Vital signs are stable today.  Heart is regular lungs clear no CVA tenderness I watched her walk she does move very slowly needs to use a walker for both balance and strength.    Ananya was seen today for er f/u.    Diagnoses and all orders for this visit:    Urinary tract infection without hematuria, site unspecified    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HCC)  -     NEURO - INTERNAL; Future    History of recurrent UTIs    Labile hypertension    Polyneuropathy    Type 2 diabetes mellitus with hyperglycemia, with long-term current use of insulin (HCC)    Type 2 diabetes mellitus with stable  proliferative retinopathy of right eye, with long-term current use of insulin (HCC)    Other orders  -     conjugated estrogens 0.625 MG/GM Vaginal Cream; Place 0.5 g vaginally twice a week.           Will rx  estogen vag  cream   twice a week for recurrent UTIs.,  Finish off cephalexin for current UTI.    Considered  methenamine  but  ckd contraindicated      Keep sugars  controlled continue Tresiba and lispro.    She has no symptoms of pancreatitis.    See neuro for multiple systemic atrophy.  I am not sure diagnosis will .  But it would be nice from a prognostic standpoint    Has labile  bp   at this time we will continue present medicine which is only losartan    Is on sertraline for depression.  On Lyrica for neuropathic pain  This note was prepared using Dragon Medical voice recognition dictation software and as a result, errors may occur. When identified, these errors have been corrected. While every attempt is made to correct errors during dictation, discrepancies may still exist

## 2024-06-04 NOTE — DISCHARGE INSTRUCTIONS
Drink plenty of fluids.  Start the antibiotics tomorrow as directed.  Continue normal medications.  Read all instructions.  Follow-up with Dr. Monson in the morning as planned.

## 2024-06-04 NOTE — TELEPHONE ENCOUNTER
[facility] therapist called to notify us that she was at [facility] hous 6/3/24 and her daughter says patient fell twice and landed on her knees. Her knees are bruised. If you have any questions or concerns can be reached at [phone]

## 2024-06-05 ENCOUNTER — TELEPHONE (OUTPATIENT)
Dept: INTERNAL MEDICINE CLINIC | Facility: CLINIC | Age: 74
End: 2024-06-05

## 2024-06-05 PROBLEM — E11.3551 TYPE 2 DIABETES MELLITUS WITH STABLE PROLIFERATIVE RETINOPATHY OF RIGHT EYE, WITH LONG-TERM CURRENT USE OF INSULIN (HCC): Status: ACTIVE | Noted: 2023-04-12

## 2024-06-05 PROBLEM — Z79.4 TYPE 2 DIABETES MELLITUS WITH STABLE PROLIFERATIVE RETINOPATHY OF RIGHT EYE, WITH LONG-TERM CURRENT USE OF INSULIN (HCC): Status: ACTIVE | Noted: 2023-04-12

## 2024-06-05 NOTE — TELEPHONE ENCOUNTER
----- Message from Jermaine Monson sent at 6/5/2024 12:34 PM CDT -----  Please make sure not taking Lyrica and gabapentin at the same time.  They are both on the list should be on 1 medicine only

## 2024-06-06 ENCOUNTER — TELEPHONE (OUTPATIENT)
Dept: INTERNAL MEDICINE CLINIC | Facility: CLINIC | Age: 74
End: 2024-06-06

## 2024-06-06 NOTE — TELEPHONE ENCOUNTER
Spoke to Ananya, notified her of Dr Monson's recommendation to stop gabapentin. She voiced understanding.

## 2024-06-06 NOTE — TELEPHONE ENCOUNTER
Zoe from [facility] home health called   per family request will see patient next week instead of this week.

## 2024-06-07 NOTE — PROGRESS NOTES
ED Culture Callback Results Review    Pharmacist reviewed culture results from ED visit .    Final urine culture positive for proteus and 10-50k cfu/mL MRSA. Patient was prescribed Cephalexin (Keflex) on discharge. Current therapy is appropriate based on reported susceptibilities as discussed with Dr. Lepe. No further intervention required at this time.      Elizabeth Snider, PharmD  Emergency Medicine Pharmacist Specialist  06/07/24; 2:57 PM

## 2024-06-11 ENCOUNTER — PATIENT OUTREACH (OUTPATIENT)
Dept: CASE MANAGEMENT | Age: 74
End: 2024-06-11

## 2024-06-11 ENCOUNTER — TELEPHONE (OUTPATIENT)
Dept: INTERNAL MEDICINE CLINIC | Facility: CLINIC | Age: 74
End: 2024-06-11

## 2024-06-11 DIAGNOSIS — E78.5 DYSLIPIDEMIA: ICD-10-CM

## 2024-06-11 DIAGNOSIS — Z79.4 TYPE 2 DIABETES MELLITUS WITH STAGE 3B CHRONIC KIDNEY DISEASE, WITH LONG-TERM CURRENT USE OF INSULIN (HCC): Primary | ICD-10-CM

## 2024-06-11 DIAGNOSIS — N18.32 TYPE 2 DIABETES MELLITUS WITH STAGE 3B CHRONIC KIDNEY DISEASE, WITH LONG-TERM CURRENT USE OF INSULIN (HCC): Primary | ICD-10-CM

## 2024-06-11 DIAGNOSIS — N39.0 URINARY TRACT INFECTION WITHOUT HEMATURIA, SITE UNSPECIFIED: ICD-10-CM

## 2024-06-11 DIAGNOSIS — E11.22 TYPE 2 DIABETES MELLITUS WITH STAGE 3B CHRONIC KIDNEY DISEASE, WITH LONG-TERM CURRENT USE OF INSULIN (HCC): Primary | ICD-10-CM

## 2024-06-11 DIAGNOSIS — I73.9 PERIPHERAL VASCULAR DISEASE (HCC): ICD-10-CM

## 2024-06-11 NOTE — TELEPHONE ENCOUNTER
Elaine from [facility] says that the daughter is going next week for the nurse visit due to daughter not being able to be seen this week.   P:[phone]

## 2024-06-11 NOTE — PROGRESS NOTES
Spoke to Ananya for CCM.      Updates to patient care team/comments: None   Patient reported changes in medications: The patient is not sure if her medications have changed since [facility] discharge. She reports her daughter manages all her medications.     Med Adherence  Comment: Pt confirms she is taking medications as instructed by providers.     Health Maintenance:I reviewed health maintenance with the patient.  Health Maintenance   Topic Date Due    DEXA Scan  Never done    Colorectal Cancer Screening  02/22/2022    Diabetes Care: Microalb/Creat Ratio  04/20/2023    Influenza Vaccine (Season Ended) 01/15/2025 (Originally 10/1/2024)    COVID-19 Vaccine (3 - 2023-24 season) 01/15/2025 (Originally 9/1/2023)    Diabetes Care A1C  08/01/2024    Diabetes Care Dilated Eye Exam  09/29/2024    Diabetes Care Foot Exam  01/25/2025    Mammogram  04/30/2025    Diabetes Care: GFR  06/03/2025    MA Annual Health Assessment  Completed    Annual Depression Screening  Completed    Fall Risk Screening (Annual)  Completed    Pneumococcal Vaccine: 65+ Years  Completed    Zoster Vaccines  Completed     Patient updates/concerns:     The patient reports that she is at her daughters home. She says she's overall doing okay and that her dizziness has improved somewhat. However, she occasionally experiences episodes of lightheadedness. The patient reports her daughter is checking her blood pressure at home, and the patient reports that it is pretty much always normal. The patient does sound a little confused, and her daughter, speaking in the background, indicates that her blood pressure has been stable. The patient does not recall if home health services are visiting her, but her daughter states the patient is receiving PT from [facility] once or twice a week and a nurse has gone once a week.     The patient daughter states that the patient is spending a large sum of money on Depends and chucks. She is inquiring about insurance coverage as well as is  interested in coverage for a shower chair.     Goals/Action Plan:    Active goal from previous outreach:   What: Strengthen my legs            - Where/When/How: She will continue with PT and inquire about PT at home upon discharge.  Patient reported progress towards goals:                - What: The patient was discharged home and continues to work towards improving her leg strength.            - Where/When/How: The patient is receiving PT at home.  Patient Reported Barriers and Concerns: Incontinence has worsened                   - Plan for overcoming barriers: The patient will continue with Depend and chucks. She will also speak with RN  nurse about symtoms.  She is currently on abx for a UTI of symtoms do not improve after abx is complete daughter will reach out to Dr. Monson triage.     Care Managers Interventions:     I advised the patients and her daughter that most insurances will not cover chucks or Depends unless the patient is on Medicaid. However, I encouraged the patient to contact her insurance company about coverage.    Additionally, I offered to put an order through for a shower chair. However, the patient daughter is inquiring about coverage. I suggested tat she contact her insurance company to find out about coverage for the shower chair and, if they still need an order, to call back. I also suggested they contact the [facility] department as they have an over stock of DME supplies and have been giving it out for free.     Provided support. Encouraged patient to call as needed.    Reviewed Future Appointments:   Future Appointments   Date Time Provider Department Center   7/2/2024  6:20 PM Jermaine Monson MD [facility]     Next Care Manager Follow Up Date: One month. Encouraged patient to call as needed.    Reason For Follow Up: review progress and or barriers towards patient's goals.     Time Spent This Encounter Total: 32 min medical record review, telephone communication, care plan  updates where needed, education, goals, and action plan recreation/update. Provided acknowledgment and validation to patient's concerns.   Monthly Minute Total including today: 32 min  Physical assessment, complete health history, and need for CCM established by Jermaine Monson MD.

## 2024-06-12 NOTE — TELEPHONE ENCOUNTER
Returned call to [facility] Elaine to explain what she needs as message left was confusing.    She states that she attempted to schedule her nurse visit w/ pt this week however Dtr refused it, will not be home. Dtr asked for visit to be rescheduled for next wk, 6/18. HHA RN just wanted PCP to be aware.    Message routed to Dr Monson as an FYI.

## 2024-06-17 ENCOUNTER — TELEPHONE (OUTPATIENT)
Dept: ENDOCRINOLOGY CLINIC | Facility: CLINIC | Age: 74
End: 2024-06-17

## 2024-06-17 DIAGNOSIS — E11.65 TYPE 2 DIABETES MELLITUS WITH HYPERGLYCEMIA, WITH LONG-TERM CURRENT USE OF INSULIN (HCC): Primary | ICD-10-CM

## 2024-06-17 DIAGNOSIS — Z79.4 TYPE 2 DIABETES MELLITUS WITH HYPERGLYCEMIA, WITH LONG-TERM CURRENT USE OF INSULIN (HCC): Primary | ICD-10-CM

## 2024-06-17 RX ORDER — BLOOD SUGAR DIAGNOSTIC
STRIP MISCELLANEOUS
Qty: 300 STRIP | Refills: 1 | Status: SHIPPED | OUTPATIENT
Start: 2024-06-17

## 2024-06-17 RX ORDER — LANCETS
EACH MISCELLANEOUS
Qty: 300 EACH | Refills: 1 | Status: SHIPPED | OUTPATIENT
Start: 2024-06-17

## 2024-06-17 RX ORDER — BLOOD-GLUCOSE METER
EACH MISCELLANEOUS
Qty: 1 KIT | Refills: 0 | Status: SHIPPED | OUTPATIENT
Start: 2024-06-17

## 2024-06-17 NOTE — TELEPHONE ENCOUNTER
Daughter states that pt needs refill on test strips for contour next is out of it please follow up

## 2024-06-19 ENCOUNTER — HOSPITAL ENCOUNTER (EMERGENCY)
Facility: HOSPITAL | Age: 74
Discharge: HOME | End: 2024-06-20
Attending: STUDENT IN AN ORGANIZED HEALTH CARE EDUCATION/TRAINING PROGRAM

## 2024-06-19 ENCOUNTER — TELEPHONE (OUTPATIENT)
Dept: ENDOCRINOLOGY CLINIC | Facility: CLINIC | Age: 74
End: 2024-06-19

## 2024-06-19 DIAGNOSIS — S00.01XA ABRASION OF SCALP, INITIAL ENCOUNTER: ICD-10-CM

## 2024-06-19 DIAGNOSIS — S09.90XA CLOSED HEAD INJURY, INITIAL ENCOUNTER: Primary | ICD-10-CM

## 2024-06-19 PROCEDURE — 99284 EMERGENCY DEPT VISIT MOD MDM: CPT

## 2024-06-19 RX ORDER — ACETAMINOPHEN 500 MG
1000 TABLET ORAL ONCE
Status: COMPLETED | OUTPATIENT
Start: 2024-06-19 | End: 2024-06-19

## 2024-06-19 NOTE — TELEPHONE ENCOUNTER
Daughter states patient took more insulin then needed and is having symptoms did not give details. Attempting Rn please call

## 2024-06-19 NOTE — TELEPHONE ENCOUNTER
Dr. Lees,  Spoke to patient's daughter - patient took 40 units of humalog (dose should have been 15 units) - daughter stated understanding for patient to eat carb heavy meal now and continue to monitor BG - symptoms of low BG reviewed with daughter - she stated understanding and stated patient is not experiencing any symptoms     BG readings:   Fasting Before lunch Before dinner  6/19 260 249  6/18 366 181  248  6/17 6/16  230  238    DM meds:  Tresiba 36 units every morning  Humalog 15 units TID with meals    Please advise -thanks

## 2024-06-20 ENCOUNTER — APPOINTMENT (OUTPATIENT)
Dept: CT IMAGING | Facility: HOSPITAL | Age: 74
End: 2024-06-20
Attending: STUDENT IN AN ORGANIZED HEALTH CARE EDUCATION/TRAINING PROGRAM

## 2024-06-20 VITALS
DIASTOLIC BLOOD PRESSURE: 49 MMHG | OXYGEN SATURATION: 96 % | TEMPERATURE: 98 F | HEIGHT: 60 IN | WEIGHT: 143 LBS | RESPIRATION RATE: 20 BRPM | HEART RATE: 66 BPM | SYSTOLIC BLOOD PRESSURE: 109 MMHG | BODY MASS INDEX: 28.07 KG/M2

## 2024-06-20 VITALS
BODY MASS INDEX: 26.68 KG/M2 | HEIGHT: 62 IN | DIASTOLIC BLOOD PRESSURE: 61 MMHG | WEIGHT: 145 LBS | TEMPERATURE: 98 F | SYSTOLIC BLOOD PRESSURE: 183 MMHG | HEART RATE: 81 BPM | OXYGEN SATURATION: 98 % | RESPIRATION RATE: 18 BRPM

## 2024-06-20 DIAGNOSIS — N30.00 ACUTE CYSTITIS WITHOUT HEMATURIA: Primary | ICD-10-CM

## 2024-06-20 LAB
ANION GAP SERPL CALC-SCNC: 7 MMOL/L (ref 0–18)
ATRIAL RATE: 68 BPM
BASOPHILS # BLD AUTO: 0.03 X10(3) UL (ref 0–0.2)
BASOPHILS NFR BLD AUTO: 0.3 %
BILIRUB UR QL: NEGATIVE
BUN BLD-MCNC: 25 MG/DL (ref 9–23)
BUN/CREAT SERPL: 23.1 (ref 10–20)
CALCIUM BLD-MCNC: 9.5 MG/DL (ref 8.7–10.4)
CHLORIDE SERPL-SCNC: 105 MMOL/L (ref 98–112)
CO2 SERPL-SCNC: 28 MMOL/L (ref 21–32)
CREAT BLD-MCNC: 1.08 MG/DL
DEPRECATED RDW RBC AUTO: 43.9 FL (ref 35.1–46.3)
EGFRCR SERPLBLD CKD-EPI 2021: 55 ML/MIN/1.73M2 (ref 60–?)
EOSINOPHIL # BLD AUTO: 0.22 X10(3) UL (ref 0–0.7)
EOSINOPHIL NFR BLD AUTO: 2.3 %
ERYTHROCYTE [DISTWIDTH] IN BLOOD BY AUTOMATED COUNT: 13.6 % (ref 11–15)
GLUCOSE BLD-MCNC: 105 MG/DL (ref 70–99)
GLUCOSE UR-MCNC: NORMAL MG/DL
HCT VFR BLD AUTO: 35.8 %
HGB BLD-MCNC: 11.5 G/DL
HGB UR QL STRIP.AUTO: NEGATIVE
IMM GRANULOCYTES # BLD AUTO: 0.04 X10(3) UL (ref 0–1)
IMM GRANULOCYTES NFR BLD: 0.4 %
KETONES UR-MCNC: NEGATIVE MG/DL
LEUKOCYTE ESTERASE UR QL STRIP.AUTO: 500
LYMPHOCYTES # BLD AUTO: 2.26 X10(3) UL (ref 1–4)
LYMPHOCYTES NFR BLD AUTO: 23.4 %
MCH RBC QN AUTO: 28.2 PG (ref 26–34)
MCHC RBC AUTO-ENTMCNC: 32.1 G/DL (ref 31–37)
MCV RBC AUTO: 87.7 FL
MONOCYTES # BLD AUTO: 0.85 X10(3) UL (ref 0.1–1)
MONOCYTES NFR BLD AUTO: 8.8 %
NEUTROPHILS # BLD AUTO: 6.26 X10 (3) UL (ref 1.5–7.7)
NEUTROPHILS # BLD AUTO: 6.26 X10(3) UL (ref 1.5–7.7)
NEUTROPHILS NFR BLD AUTO: 64.8 %
NITRITE UR QL STRIP.AUTO: NEGATIVE
OSMOLALITY SERPL CALC.SUM OF ELEC: 295 MOSM/KG (ref 275–295)
P-R INTERVAL: 148 MS
PH UR: 5.5 [PH] (ref 5–8)
PLATELET # BLD AUTO: 289 10(3)UL (ref 150–450)
POTASSIUM SERPL-SCNC: 3.7 MMOL/L (ref 3.5–5.1)
PROT UR-MCNC: NEGATIVE MG/DL
Q-T INTERVAL: 410 MS
QRS DURATION: 70 MS
QTC CALCULATION (BEZET): 435 MS
R AXIS: 147 DEGREES
RBC # BLD AUTO: 4.08 X10(6)UL
SODIUM SERPL-SCNC: 140 MMOL/L (ref 136–145)
SP GR UR STRIP: 1.01 (ref 1–1.03)
T AXIS: 144 DEGREES
UROBILINOGEN UR STRIP-ACNC: NORMAL
VENTRICULAR RATE: 68 BPM
WBC # BLD AUTO: 9.7 X10(3) UL (ref 4–11)
WBC #/AREA URNS AUTO: >50 /HPF

## 2024-06-20 PROCEDURE — 80048 BASIC METABOLIC PNL TOTAL CA: CPT | Performed by: STUDENT IN AN ORGANIZED HEALTH CARE EDUCATION/TRAINING PROGRAM

## 2024-06-20 PROCEDURE — 81001 URINALYSIS AUTO W/SCOPE: CPT | Performed by: STUDENT IN AN ORGANIZED HEALTH CARE EDUCATION/TRAINING PROGRAM

## 2024-06-20 PROCEDURE — 87184 SC STD DISK METHOD PER PLATE: CPT | Performed by: STUDENT IN AN ORGANIZED HEALTH CARE EDUCATION/TRAINING PROGRAM

## 2024-06-20 PROCEDURE — 85025 COMPLETE CBC W/AUTO DIFF WBC: CPT | Performed by: STUDENT IN AN ORGANIZED HEALTH CARE EDUCATION/TRAINING PROGRAM

## 2024-06-20 PROCEDURE — 99284 EMERGENCY DEPT VISIT MOD MDM: CPT

## 2024-06-20 PROCEDURE — 96361 HYDRATE IV INFUSION ADD-ON: CPT

## 2024-06-20 PROCEDURE — 87186 SC STD MICRODIL/AGAR DIL: CPT | Performed by: STUDENT IN AN ORGANIZED HEALTH CARE EDUCATION/TRAINING PROGRAM

## 2024-06-20 PROCEDURE — 87086 URINE CULTURE/COLONY COUNT: CPT | Performed by: STUDENT IN AN ORGANIZED HEALTH CARE EDUCATION/TRAINING PROGRAM

## 2024-06-20 PROCEDURE — 93010 ELECTROCARDIOGRAM REPORT: CPT

## 2024-06-20 PROCEDURE — 70450 CT HEAD/BRAIN W/O DYE: CPT | Performed by: STUDENT IN AN ORGANIZED HEALTH CARE EDUCATION/TRAINING PROGRAM

## 2024-06-20 PROCEDURE — 87077 CULTURE AEROBIC IDENTIFY: CPT | Performed by: STUDENT IN AN ORGANIZED HEALTH CARE EDUCATION/TRAINING PROGRAM

## 2024-06-20 PROCEDURE — 96360 HYDRATION IV INFUSION INIT: CPT

## 2024-06-20 PROCEDURE — 93005 ELECTROCARDIOGRAM TRACING: CPT

## 2024-06-20 RX ORDER — SULFAMETHOXAZOLE AND TRIMETHOPRIM 800; 160 MG/1; MG/1
1 TABLET ORAL 2 TIMES DAILY
Qty: 14 TABLET | Refills: 0 | Status: SHIPPED | OUTPATIENT
Start: 2024-06-20 | End: 2024-06-27

## 2024-06-20 NOTE — ED QUICK NOTES
Discharge instructions reviewed with patient's daughter. Verbalized understanding. Assisted patient out in wheelchair.

## 2024-06-20 NOTE — ED INITIAL ASSESSMENT (HPI)
Pt arrives through triage with family      complaints of small head lac and bump. Pts granddaughter states pt fell twice today. Denies Blurry/ double vision. Pt states she tripped over her feet. Denies LOC      Denies blood thinners        Hx of DM2, HTN

## 2024-06-20 NOTE — ED PROVIDER NOTES
[place] Emergency Department Note  Patient: Ananya Dowling Age: 73 year old Sex: female      MRN: [id number]  : 1950    Patient Seen in: [facility] Emergency Department    History     Chief Complaint   Patient presents with    Altered Mental Status     Stated Complaint: Low blood presure    History obtained from: Patient's daughter    73-year-old female with past medical history of hypertension, hyperlipidemia, type 2 diabetes, arthritis, depression presenting with her daughter who brought her to the emergency department for concern for altered mental status.  The patient was seen earlier this evening in the emergency department after mechanical fall with closed head injury with a normal CT brain.  Patient's daughter states that upon returning home, several hours later, the patient seemed confused.  Was staring at her daughter and not responding to questions.  The daughter assumed that it was because the patient may have low blood sugar as she had not eaten in several hours.  States that they gave the patient 12 units of subcutaneous insulin and then had her eat.  States that after eating, the patient again had an episode of confusion.  Took her blood sugar noted to be 87.  Took her blood pressure noted to be low.  Patient denies any associate numbness, tingling, weakness, slurred speech or vision changes.  Denies any chest pain or shortness of breath.  Denies any abdominal pain, nausea or vomiting.  Patient does state that she recently completed a course of oral antibiotics for urinary tract infection.  States that since leaving the emergency department, she had recurrence of her urinary symptoms.  States that she has itching when urinating.    Review of Systems:  Review of Systems  Positive for stated complaint: Low blood presure. Constitutional and vital signs reviewed. All other systems reviewed and negative except as noted above.    Patient History:  Past Medical History:    Arthritis    Back  problem    Depression    Diabetes (HCC)    Essential hypertension    High blood pressure    High cholesterol    Visual impairment       No past surgical history on file.     Family History   Problem Relation Age of Onset    Heart Attack Father     Stroke Mother     Heart Disorder Son     Diabetes Son        Specific Social Determinants of Health:   Social History     Socioeconomic History    Marital status:    Tobacco Use    Smoking status: Never    Smokeless tobacco: Never   Vaping Use    Vaping status: Never Used   Substance and Sexual Activity    Alcohol use: Yes     Comment: very infrequent, every couple of months    Drug use: Never     Social Determinants of Health     Financial Resource Strain: Low Risk  (3/29/2024)    Financial Resource Strain     Difficulty of Paying Living Expenses: Somewhat hard     Med Affordability: No   Food Insecurity: No Food Insecurity (5/2/2024)    Food Insecurity     Food Insecurity: Never true   Transportation Needs: No Transportation Needs (5/2/2024)    Transportation Needs     Lack of Transportation: No   Recent Concern: Transportation Needs - Unmet Transportation Needs (3/29/2024)    Transportation Needs     Lack of Transportation: Yes   Physical Activity: Inactive (2/16/2023)    Exercise Vital Sign     Days of Exercise per Week: 0 days     Minutes of Exercise per Session: 0 min   Stress: No Stress Concern Present (2/16/2023)    Stress     Feeling of Stress : No   Social Connections: Socially Integrated (2/16/2023)    Social Connections     Frequency of Socialization with Friends and Family: 3   Housing Stability: Low Risk  (5/2/2024)    Housing Stability     Housing Instability: No           PSFH elements reviewed from today and agreed except as otherwise stated in HPI.    Physical Exam     ED Triage Vitals [06/20/24 0507]   /63   Pulse 68   Resp 20   Temp 97.9 °F (36.6 °C)   Temp src Temporal   SpO2 99 %   O2 Device None (Room air)       Current:/49    Pulse 66   Temp 97.9 °F (36.6 °C) (Temporal)   Resp 20   Ht 152.4 cm (5')   Wt 64.9 kg   SpO2 96%   BMI 27.93 kg/m²         Physical Exam  Constitutional:       General: She is not in acute distress.  HENT:      Head: Normocephalic and atraumatic.      Mouth/Throat:      Mouth: Mucous membranes are moist.   Eyes:      Extraocular Movements: Extraocular movements intact.   Cardiovascular:      Rate and Rhythm: Normal rate and regular rhythm.      Heart sounds: Normal heart sounds.   Pulmonary:      Effort: Pulmonary effort is normal. No respiratory distress.      Breath sounds: Normal breath sounds.   Abdominal:      Palpations: Abdomen is soft.      Tenderness: There is no abdominal tenderness.   Skin:     General: Skin is warm and dry.      Capillary Refill: Capillary refill takes less than 2 seconds.      Findings: No rash.   Neurological:      General: No focal deficit present.      Mental Status: She is alert and oriented to person, place, and time.   Psychiatric:         Mood and Affect: Mood normal.         Behavior: Behavior normal.         ED Course   Labs:   Labs Reviewed   BASIC METABOLIC PANEL (8) - Abnormal; Notable for the following components:       Result Value    Glucose 105 (*)     BUN 25 (*)     Creatinine 1.08 (*)     BUN/CREA Ratio 23.1 (*)     eGFR-Cr 55 (*)     All other components within normal limits   URINALYSIS WITH CULTURE REFLEX - Abnormal; Notable for the following components:    Clarity Urine Turbid (*)     Leukocyte Esterase Urine 500 (*)     WBC Urine >50 (*)     RBC Urine 3-5 (*)     Bacteria Urine Rare (*)     All other components within normal limits   CBC W/ DIFFERENTIAL - Abnormal; Notable for the following components:    HGB 11.5 (*)     All other components within normal limits   CBC WITH DIFFERENTIAL WITH PLATELET    Narrative:     The following orders were created for panel order CBC With Differential With Platelet.  Procedure                               Abnormality          Status                     ---------                               -----------         ------                     CBC W/ DIFFERENTIAL[[id number]]          Abnormal            Final result                 Please view results for these tests on the individual orders.   URINE CULTURE, ROUTINE     Radiology findings:  I personally reviewed the images.   CT BRAIN OR HEAD ([id number])    Result Date: 6/20/2024  CONCLUSION:   No acute intracranial abnormality.    Dictated by (CST): Kody Escobar MD on 6/20/2024 at 6:42 AM     Finalized by (CST): Kody Escobar MD on 6/20/2024 at 6:46 AM           EKG as interpreted by me: Ventricular rate 68, normal sinus rhythm, rightward axis, no ID interval prolongation, narrow QRS, QTc 435 ms, no ST segment elevations or depressions, no abnormal T wave inversions  Cardiac Monitor: Interpreted by me.   Pulse Readings from Last 1 Encounters:   06/20/24 66   , sinus,     External non-ED records reviewed independently by me: Prior CT head from earlier this evening with no evidence of acute intracranial hemorrhage.    MDM   73-year-old female with past medical history of hypertension, hyperlipidemia, type 2 diabetes, arthritis, depression presenting for evaluation of confusion after recent ED visit for head trauma after mechanical fall.  Upon arrival emergency department, she is well-appearing and in no acute distress.  Vitals are within normal limits.  No focal deficits.  No abdominal tenderness.    Differential diagnoses considered includes, but is not limited to: Delayed head trauma injury, UTI, electrolyte metabolic derangement    Will obtain the following tests: CBC, BMP, urinalysis, CT brain  Please see ED course for my independent review of these tests/imaging results.    Initial Medications/Therapeutics administered: 1 L IV fluid bolus    Chronic conditions affecting care: Hypertension, hyperlipidemia, type 2 diabetes, arthritis, depression    Workup and medications  considered but not ordered: Considered admission    Social Determinants of Health that impacted care: None     ED course: Labs reassuring.  Urinalysis with evidence of urinary tract infection.  I reviewed patient's prior urine cultures growing Mirabelis and MRSA susceptible to Bactrim.  Upon reevaluation, noted to have complete resolution of all symptoms.  Remains well-appearing with no complaints.  Vitals within normal limits.  No recurrence of hypotension.  Discussed additional course of oral antibiotics for urinary tract infection.  Recommend close PCP follow-up.  Return precautions discussed and all questions answered.  Patient and patient's daughter expressed understanding and agreement with plan.      Disposition and Plan     Clinical Impression:  1. Acute cystitis without hematuria        Disposition:  Discharge    Follow-up:  Jermaine Monson  [address] [id number] [phone]    Schedule an appointment as soon as possible for a visit in 2 day(s)  As needed, If symptoms worsen      Medications Prescribed:  Current Discharge Medication List        START taking these medications    Details   sulfamethoxazole-trimethoprim -160 MG Oral Tab per tablet Take 1 tablet by mouth 2 (two) times daily for 7 days.  Qty: 14 tablet, Refills: 0               This note may have been created using voice dictation technology and may include inadvertent errors.      Randa Marroquin MD  Emergency Medicine         4 = No assist / stand by assistance

## 2024-06-20 NOTE — ED INITIAL ASSESSMENT (HPI)
Pt to ED with daughter with c/o \"confusion\" and low BP noted 1 hr PTA.  Pt  A&Ox4. No neuro deficits noted.

## 2024-06-20 NOTE — DISCHARGE INSTRUCTIONS
Thank you for seeking care at [facility] Emergency Department.  You have been seen and evaluated.We reviewed the results from your visit in the emergency department. You were found to have an urinary tract infection.  Please read the instructions provided, and if given prescriptions, take as instructed.     Remember, your care process does not end after your visit today. Please follow-up with your doctor within 1-2 days for a follow-up check to ensure you are  improving, to see if you need any further evaluation/testing, or to evaluate for any alternate diagnoses.     You should return to the emergency department if you develop severe nausea and vomiting AND are unable to keep liquids down, if you develop severe back/flank or stomach pain, or if your symptoms are not clearly improving at home.     We hope you feel better.

## 2024-06-20 NOTE — DISCHARGE INSTRUCTIONS
Thank you for seeking care at [facility] Emergency Department.  You have been seen and evaluated for a headache after a closed head injury.    We reviewed the results from your visit in the emergency department.    Please read the instructions provided   If provided, take prescriptions as instructed.     Remember, your care process does not end after your visit today. Please follow-up with your doctor within 1-2 days for a follow-up check to ensure you are  improving, to see if you need any further evaluation/testing, or to evaluate for any alternate diagnoses.     Please return to the emergency department if you develop worsening of your headache or a new headache which is severe, associated with vision changes, difficulty with walking or coordination, difficulty with speech, numbness, tingling or weakness, associated with neck stiffness or fever, nausea or vomiting, if the headache is different from any other headache that you have had before, confusion, or if you develop any other new or concerning symptoms as these could be signs of more serious medical illness.    We hope you feel better.

## 2024-06-20 NOTE — ED PROVIDER NOTES
[place] Emergency Department Note  Patient: Ananya Dowling Age: 73 year old Sex: female      MRN: [id number]  : 1950    Patient Seen in: [facility] Emergency Department    History     Chief Complaint   Patient presents with    Fall     Stated Complaint: fall    History obtained from: Patient    73-year-old female with past medical history of hypertension, hyperlipidemia, diabetes, arthritis presenting today for evaluation after mechanical fall.  States that she was getting up and walking towards her walker when she lost her balance causing her to fall backwards.  She states that she hit the back of her head against the bed denies any loss of consciousness.  Denies any neck or back pain.  She denies any visual changes, numbness, tingling, weakness, slurred speech or vision changes.  Denies any other injury.  Denies any neck or back pain.  Denies any chest pain, abdominal pain, or extremity pain.  States that she is not on anticoagulation or antiplatelet medications    Review of Systems:  Review of Systems  Positive for stated complaint: fall. Constitutional and vital signs reviewed. All other systems reviewed and negative except as noted above.    Patient History:  Past Medical History:    Arthritis    Back problem    Depression    Diabetes (HCC)    Essential hypertension    High blood pressure    High cholesterol    Visual impairment       No past surgical history on file.     Family History   Problem Relation Age of Onset    Heart Attack Father     Stroke Mother     Heart Disorder Son     Diabetes Son        Specific Social Determinants of Health:   Social History     Socioeconomic History    Marital status:    Tobacco Use    Smoking status: Never    Smokeless tobacco: Never   Vaping Use    Vaping status: Never Used   Substance and Sexual Activity    Alcohol use: Yes     Comment: very infrequent, every couple of months    Drug use: Never     Social Determinants of Health     Financial  Resource Strain: Low Risk  (3/29/2024)    Financial Resource Strain     Difficulty of Paying Living Expenses: Somewhat hard     Med Affordability: No   Food Insecurity: No Food Insecurity (5/2/2024)    Food Insecurity     Food Insecurity: Never true   Transportation Needs: No Transportation Needs (5/2/2024)    Transportation Needs     Lack of Transportation: No   Recent Concern: Transportation Needs - Unmet Transportation Needs (3/29/2024)    Transportation Needs     Lack of Transportation: Yes   Physical Activity: Inactive (2/16/2023)    Exercise Vital Sign     Days of Exercise per Week: 0 days     Minutes of Exercise per Session: 0 min   Stress: No Stress Concern Present (2/16/2023)    Stress     Feeling of Stress : No   Social Connections: Socially Integrated (2/16/2023)    Social Connections     Frequency of Socialization with Friends and Family: 3   Housing Stability: Low Risk  (5/2/2024)    Housing Stability     Housing Instability: No           PSFH elements reviewed from today and agreed except as otherwise stated in HPI.    Physical Exam     ED Triage Vitals [06/19/24 2251]   BP (!) 188/72   Pulse 84   Resp 18   Temp 97.7 °F (36.5 °C)   Temp src Temporal   SpO2 99 %   O2 Device None (Room air)       Current:BP (!) 183/61   Pulse 81   Temp 97.7 °F (36.5 °C) (Temporal)   Resp 18   Ht 157.5 cm (5' 2\")   Wt 65.8 kg   SpO2 98%   BMI 26.52 kg/m²         Physical Exam  Constitutional:       General: She is not in acute distress.  HENT:      Head: Normocephalic.      Comments: Approximately 2 cm x 2 cm hematoma to the posterior scalp with overlying superficial abrasion with no active bleeding, no gross contamination or obvious foreign body no skull step-offs or deformities     Mouth/Throat:      Mouth: Mucous membranes are moist.   Eyes:      Extraocular Movements: Extraocular movements intact.   Cardiovascular:      Rate and Rhythm: Normal rate and regular rhythm.      Heart sounds: Normal heart sounds.    Pulmonary:      Effort: Pulmonary effort is normal. No respiratory distress.      Breath sounds: Normal breath sounds.   Abdominal:      Palpations: Abdomen is soft.      Tenderness: There is no abdominal tenderness.   Musculoskeletal:      Cervical back: Neck supple. No tenderness.   Skin:     General: Skin is warm and dry.      Capillary Refill: Capillary refill takes less than 2 seconds.      Findings: No rash.   Neurological:      General: No focal deficit present.      Mental Status: She is alert and oriented to person, place, and time.   Psychiatric:         Mood and Affect: Mood normal.         Behavior: Behavior normal.         ED Course   Labs:   Labs Reviewed - No data to display  Radiology findings:  I personally reviewed the images.   No results found.    Noncontrast head CT  COMPARISON: 5/4/2024    IMPRESSION:   No acute intracranial hemorrhage.  Chronic small vessel disease.  Age-related involutional changes.  Stable ventricular size.  Right posterior scalp swelling.  Chronic nasal bone deformity.  Mucosal thickening in the left maxillary sinus which appears atelectatic, no acute fracture.      Cardiac Monitor: Interpreted by me.   Pulse Readings from Last 1 Encounters:   06/20/24 81   , sinus,         MDM   73-year-old female presenting for evaluation of headache after mechanical fall.  Upon arrival emergency department, she is hypertensive otherwise vitals are within normal limits.  She has no focal neurologic deficit.  Small posterior scalp hematoma with overlying abrasion.  No active bleeding, gross contamination, or foreign body.    Differential diagnoses considered includes, but is not limited to: Closed head injury, skull fracture, intracranial hemorrhage.    Will obtain the following tests: CT brain  Please see ED course for my independent review of these tests/imaging results.    Initial Medications/Therapeutics administered: Tylenol    Chronic conditions affecting care: Hypertension,  hyperlipidemia, diabetes, arthritis     ED course: I independently reviewed the CT brain images that show no evidence of acute intracranial hemorrhage.  Agree with radiology read above.  Upon reevaluation, notes have resolution of headache.  Stable for discharge home at this time with continued Tylenol and Motrin as needed for headaches, ice packs as needed for swelling.  Return precautions were discussed and all questions answered.  Patient and patient's family expressed understanding and agreement with plan.    Disposition and Plan     Clinical Impression:  1. Closed head injury, initial encounter    2. Abrasion of scalp, initial encounter        Disposition:  Discharge    Follow-up:  Jermaine Monson  [facility] [address]  [facility] [address]  [phone]    Schedule an appointment as soon as possible for a visit in 2 day(s)  As needed, If symptoms worsen      Medications Prescribed:  Discharge Medication List as of 6/20/2024  1:16 AM            This note may have been created using voice dictation technology and may include inadvertent errors.      Randa Marroquin MD  Emergency Medicine

## 2024-06-21 ENCOUNTER — PATIENT OUTREACH (OUTPATIENT)
Dept: CASE MANAGEMENT | Age: 74
End: 2024-06-21

## 2024-06-21 NOTE — PROGRESS NOTES
1st attempt ED f/up appt request :      Jermaine Monson MD  [address] [facility]  [address]  [place] [facility] [address]  [phone]  Tuesday 7/2 @6:20pm w/ Dr. Jermaine Monson (existing appt)      Called & spoke w/ pt's daughter Xochitl, who declined scheduling her mom a sooner appt.  She state's that her mom is perfectly fine with her standing appt on 7/2 w/ her PCP.    No further assistance needed.      Closing encounter

## 2024-06-22 NOTE — PROGRESS NOTES
ED Culture Callback Results Review    Pharmacist reviewed culture results from ED visit .    Final urine culture positive for kleb aerogenes. Patient was prescribed Sulfamethoxazole/Trimethoprim (Bactrim) on discharge. Current therapy is appropriate based on reported susceptibilities. No further intervention required at this time.      Elizabeth Snider PharmD  Emergency Medicine Pharmacist Specialist  06/22/24; 1:06 PM

## 2024-06-24 NOTE — TELEPHONE ENCOUNTER
EUGENE Britton    Called pt and she states she has been feeling well. She denies symptoms of hypo/hyperglycemia. Pt was not able to provide exact BG readings but gave range.    BG range 150-175   175 was after meal    Advised pt to contact office if BG are persistently elevated or are less than 80. Pt verbalized understanding.

## 2024-06-25 ENCOUNTER — TELEPHONE (OUTPATIENT)
Dept: INTERNAL MEDICINE CLINIC | Facility: CLINIC | Age: 74
End: 2024-06-25

## 2024-06-25 NOTE — TELEPHONE ENCOUNTER
Marla from [facility] OT. Calling per patient request to move OT  visit for next week 07/01/24. Verbal order.     Marla (OT nurse)  Ph.[phone] can leave voicemail

## 2024-06-25 NOTE — TELEPHONE ENCOUNTER
Call returned to  Marla/ Occ Therapist at [facility] to acknowledge change of date for OT visit.  Per OT, patient requested session date change as spouse having surgery- needs appt change.

## 2024-07-01 ENCOUNTER — TELEPHONE (OUTPATIENT)
Dept: INTERNAL MEDICINE CLINIC | Facility: CLINIC | Age: 74
End: 2024-07-01

## 2024-07-01 NOTE — TELEPHONE ENCOUNTER
Niecy physical therapist from [facility] says she went today to see patient. Family says over the past week patient has had 4 falls; did hit head for a couple of falls but does not have any symptoms and she injured her right ankle which is now bruised and swollen.     If you have any questions or concerns Niecy Can be reached at [phone]

## 2024-07-01 NOTE — TELEPHONE ENCOUNTER
Left a message on [facility] Niecy MARCELINO's voice mail RN will call the patient and triage her symptoms.

## 2024-07-02 ENCOUNTER — HOSPITAL ENCOUNTER (OUTPATIENT)
Dept: GENERAL RADIOLOGY | Facility: HOSPITAL | Age: 74
Discharge: HOME | End: 2024-07-02
Attending: INTERNAL MEDICINE
Payer: MEDICARE

## 2024-07-02 ENCOUNTER — OFFICE VISIT (OUTPATIENT)
Dept: INTERNAL MEDICINE CLINIC | Facility: CLINIC | Age: 74
End: 2024-07-02
Payer: MEDICARE

## 2024-07-02 VITALS
OXYGEN SATURATION: 98 % | HEIGHT: 60 IN | SYSTOLIC BLOOD PRESSURE: 110 MMHG | DIASTOLIC BLOOD PRESSURE: 48 MMHG | HEART RATE: 75 BPM | WEIGHT: 133 LBS | BODY MASS INDEX: 26.11 KG/M2

## 2024-07-02 DIAGNOSIS — R29.6 FREQUENT FALLS: Primary | ICD-10-CM

## 2024-07-02 DIAGNOSIS — F32.1 MODERATE MAJOR DEPRESSION (HCC): ICD-10-CM

## 2024-07-02 DIAGNOSIS — G90.3 MULTIPLE SYSTEM ATROPHY (HCC): ICD-10-CM

## 2024-07-02 DIAGNOSIS — Z86.73 HISTORY OF RECURRENT TIAS: ICD-10-CM

## 2024-07-02 DIAGNOSIS — M25.471 RIGHT ANKLE SWELLING: ICD-10-CM

## 2024-07-02 DIAGNOSIS — Z79.4 TYPE 2 DIABETES MELLITUS WITH HYPERGLYCEMIA, WITH LONG-TERM CURRENT USE OF INSULIN (HCC): ICD-10-CM

## 2024-07-02 DIAGNOSIS — G23.8 MULTIPLE SYSTEM ATROPHY (HCC): ICD-10-CM

## 2024-07-02 DIAGNOSIS — E11.65 TYPE 2 DIABETES MELLITUS WITH HYPERGLYCEMIA, WITH LONG-TERM CURRENT USE OF INSULIN (HCC): ICD-10-CM

## 2024-07-02 DIAGNOSIS — B35.9 RINGWORM: ICD-10-CM

## 2024-07-02 PROCEDURE — 73610 X-RAY EXAM OF ANKLE: CPT | Performed by: INTERNAL MEDICINE

## 2024-07-02 PROCEDURE — 3008F BODY MASS INDEX DOCD: CPT | Performed by: INTERNAL MEDICINE

## 2024-07-02 PROCEDURE — 1160F RVW MEDS BY RX/DR IN RCRD: CPT | Performed by: INTERNAL MEDICINE

## 2024-07-02 PROCEDURE — 99215 OFFICE O/P EST HI 40 MIN: CPT | Performed by: INTERNAL MEDICINE

## 2024-07-02 PROCEDURE — 3074F SYST BP LT 130 MM HG: CPT | Performed by: INTERNAL MEDICINE

## 2024-07-02 PROCEDURE — 3078F DIAST BP <80 MM HG: CPT | Performed by: INTERNAL MEDICINE

## 2024-07-02 PROCEDURE — 1159F MED LIST DOCD IN RCRD: CPT | Performed by: INTERNAL MEDICINE

## 2024-07-02 RX ORDER — CLOTRIMAZOLE 1 %
1 CREAM (GRAM) TOPICAL 2 TIMES DAILY
Qty: 60 G | Refills: 1 | Status: SHIPPED | OUTPATIENT
Start: 2024-07-02

## 2024-07-02 NOTE — PROGRESS NOTES
Ananya Dowling female 73 year old    Here with daughter and granddaughter.  Has had several ER visits since last visit with me     Chief Complaint   Patient presents with    Fall    Derm Problem     Bumps on both Arms and legs      S/p several falls  -      UTI, DM - in ER  several times  3 CT last 2 mos      Discussed reasons for falling.  She clearly has some poor judgment per daughter and granddaughter  Not using  walker.  Has been in physical therapy and has been instructed what to do.    Fell again recently not using her walker on the stairs has a swollen right ankle    Sugars  good no low or  high -have hyperglycemia in the past but it has been well-controlled recently       lightheadedness ??  Patient denies it but granddaughter states that she has complained of it.  Seems to have labile blood pressure sometimes very low in the low 90s in the ER can shoot up.      3 diff UTI  species; E. coli Proteus and Klebsiella 3 separate occasions was not able to get the estrogen cream apparently insurance would not cover it.    No new focal neurological symptoms other than the episodes of being out of it when she has had the last UTI.    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 300 each 1     Blood Glucose Monitoring Suppl (ACCU-CHEK GUIDE) w/Device Does not apply Kit Use to check blood sugars 3 times daily. 1 kit 0    Glucose Blood (ACCU-CHEK GUIDE) In Vitro Strip Use to check blood sugars 3 times daily. 300 strip 1    ergocalciferol 1.25 MG (23958 UT) Oral Cap Take 1 capsule (50,000 Units total) by mouth once a week. 12 capsule 0    gabapentin 300 MG Oral Cap Take 1 capsule (300 mg total) by mouth 3 (three) times daily.      lidocaine 4 % External Patch Place 1 patch onto the skin daily.      Insulin Degludec (TRESIBA FLEXTOUCH) 100 UNIT/ML Subcutaneous Solution Pen-injector Inject 36 Units into the skin daily.      Insulin Lispro, 1 Unit Dial, 100 UNIT/ML Subcutaneous Solution Pen-injector Patient will take 14 units with small carb meals and 18-20 units with larger carb meals. 54 mL 1    Insulin Pen Needle (PEN NEEDLES) 32G X 4 MM Does not apply Misc 1 pen  4 (four) times daily. Use  New pen needle  With each injection 400 each 0    Glucose Blood (ACCU-CHEK FITO PLUS) In Vitro Strip Use as directed to check blood glucose 3 times/day - DX code:E11.65 using insulin 300 strip 0    simvastatin 20 MG Oral Tab Take 1 tablet (20 mg total) by mouth daily. 90 tablet 2    sertraline 100 MG Oral Tab Take 1 tablet (100 mg total) by mouth daily. 90 tablet 2    losartan 50 MG Oral Tab Take 0.5 tablets (25 mg total) by mouth daily. 90 tablet 2    conjugated estrogens 0.625 MG/GM Vaginal Cream Place 0.5 g vaginally twice a week. (Patient not taking: Reported on 7/2/2024) 30 g 5     No current facility-administered medications on file prior to visit.          Vitals:    07/02/24 1827   BP: 110/48   Pulse: 75   VITALSBody mass index is 25.97 kg/m².    Pertinent findings on the physical exam; does not appear in any distress.  Has a swollen ankle.  There is some bruising on the lateral aspect and tenderness.  Heart is regular vital signs are stable affect as usual is hard to read.  We have done dementia testing in  the past.  Not appear depressed.  He has well circumcised patches that look like ringworm    Ananya was seen today for fall and derm problem.    Diagnoses and all orders for this visit:    Frequent falls    History of recurrent TIAs  -     UROLOGY - INTERNAL; Future    Right ankle swelling  -     XR ANKLE (2 VIEW), RIGHT (CPT=73600); Future    Ringworm    Type 2 diabetes mellitus with hyperglycemia, with long-term current use of insulin (HCC)    Moderate major depression (HCC)    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HCC)    Other orders  -     clotrimazole 1 % External Cream; Apply 1 Application topically 2 (two) times daily.    We discussed frequent falls.  Seems to be due to judgment as well as underlying illness such as UTIs and possible hypotension.    Freq bp checks  to assess bp see if low side  and  when c.]/o of  sxs     Cut losartan in half      Use walker  -  has done and doing  PT      Urology  referral     Stop  lyrica on her med reconciliation on both Lyrica and gabapentin-    I think she has ringworm will prescribe Lotrimin    Obtain x-ray for ankle    Would like her to follow-up with neurology for possible multiple system atrophy  This note was prepared using Dragon Medical voice recognition dictation software and as a result, errors may occur. When identified, these errors have been corrected. While every attempt is made to correct errors during dictation, discrepancies may still exist

## 2024-07-09 ENCOUNTER — OFFICE VISIT (OUTPATIENT)
Dept: SURGERY | Facility: CLINIC | Age: 74
End: 2024-07-09

## 2024-07-09 VITALS — SYSTOLIC BLOOD PRESSURE: 98 MMHG | DIASTOLIC BLOOD PRESSURE: 61 MMHG | HEART RATE: 83 BPM

## 2024-07-09 DIAGNOSIS — N95.8 GENITOURINARY SYNDROME OF MENOPAUSE: Primary | ICD-10-CM

## 2024-07-09 DIAGNOSIS — N39.0 RECURRENT UTI: ICD-10-CM

## 2024-07-09 PROCEDURE — 3078F DIAST BP <80 MM HG: CPT | Performed by: PHYSICIAN ASSISTANT

## 2024-07-09 PROCEDURE — 1159F MED LIST DOCD IN RCRD: CPT | Performed by: PHYSICIAN ASSISTANT

## 2024-07-09 PROCEDURE — 1160F RVW MEDS BY RX/DR IN RCRD: CPT | Performed by: PHYSICIAN ASSISTANT

## 2024-07-09 PROCEDURE — 99204 OFFICE O/P NEW MOD 45 MIN: CPT | Performed by: PHYSICIAN ASSISTANT

## 2024-07-09 PROCEDURE — 3074F SYST BP LT 130 MM HG: CPT | Performed by: PHYSICIAN ASSISTANT

## 2024-07-09 RX ORDER — ESTRADIOL 0.1 MG/G
CREAM VAGINAL
Qty: 42.5 G | Refills: 11 | Status: SHIPPED | OUTPATIENT
Start: 2024-07-09

## 2024-07-09 NOTE — PATIENT INSTRUCTIONS
Urinary tract infections can affect your general health and your quality of life.  Some UTI’s can be prevented.  Here are some suggestions that my help prevent frequent UTI’s.     Good Hygiene: Always wipe front to back.  Don’t use perfumed soaps.  Plain soaps like Ivory are the best.  Don’t use washcloths.  Place soap directly on your hands and clean the genital area.  Rinse thoroughly.  Soap can be very irritating to the vaginal mucosa.     Urination: Urinate every 2-3 hours during the day.  Empty your bladder just before going to bed and  first thing when you wake up.  Make sure you go to the bathroom when you have a strong urge. Don't hover over the toilet to urinate.  The bladder may not be completely emptying when using this technique.  Sometimes you may need to \"double-void\".  After you finish urinating, stand up, then sit back down to urinate again.     Clothing: Wear cotton underwear. Change daily.  Avoid tight jeans.       [place]: Sometimes UTI’s are related to intercourse.  Wash genital area before and rinse afterwards.  Empty your bladder before and after intercourse.  Use of vaginal lubricants may be helpful.  Condoms and some lubricants may be irritating to the vaginal mucosa.  Spermicide should be avoided.  Talk to your doctor, you may require a suppressive antibiotic to take after intercourse.     Supplements: Take Vitamin C 500 or 1000mg daily.  Cranberry pills 400mg daily or if symptomatic 400mg two times per day.  Eat yogurt or consider taking a probiotic.  D-mannose 1 gram is another supplement that can help prevent infections.  This one is harder to find, but can be found at stores such as [employer], [employer], or a specialized vitamin store.  Fluid intake should be at least 48oz daily with the goal of 64oz daily.  Water is preferable.      Constipation: Constipation has been linked to UTI’s.  You should be having a soft BM daily.  Dietary fiber should be between 20-25 grams daily.   Flaxseed, All-Bran cereal, Fiber One bars, fruits and vegetables are a good sources of fiber.  Alternatively, Metamucil can be used daily.  Gentle laxatives and stool softeners may be added on a daily or as needed basis if necessary (Miralax, Colace, Pericolace).      Vaginal creams and moisturizers: It may be recommended you try vaginal creams, moisturizers or lubricants as a prevention method.  Over-the-Counter products:  Replens:  1 applicator three times per week  Luvena prebiotic vaginal moisturizer and lubricant:  package of 6.  1 tube every three days at bedtime.  Helps restore pH balance, decrease vaginal dryness, odor and itchiness.  KY liquibeads  KY silk - moisture enhancer  MeAgain Vaginal Moisturizer.  8 per package.  Use 1 daily for 7 days then 1 daily two times per week.  Astroglide  Prescriptions:  Estrace Cream  Premarin Cream  E-string     Prescription medications: Your doctor may recommend daily or as needed prescription medications including antibiotics to prevent urinary tract infection as well.

## 2024-07-09 NOTE — PROGRESS NOTES
[facility] Urology  Initial Office Consultation    HPI:   Ananya Dowling is a 73 year old female here today with her daughter for consultation at the request of Dr. Pinto for recurrent UTIs. Three UTI's in the past 3 months in our system. The patient's daughter reports that they had positive UTI's at [facility] starting January 2024 with a total of 5 since the beginning of the year.  The patient was prescribed Estrace cream in the past but insurance would not cover it.  She has had 4 pregnancies 4 live births.  Has never done pelvic floor therapy before.  She denies fever, chills, abdominal pain, back pain, hematuria, dysuria  UTI history:   06/25/2024: Klebsiella >100,00 CFU/mL resistant to cefazolin and ceftriaxone  6/3/2024: Greater than 100,000 CFU/mL Proteus Mirabella's resistant to Macrobid and 10-50,000 CFU per mL Staphylococcus aureus MRSA resistant to tetracycline oxacillin, levofloxacin, cefazolin.  5/2/2024: Greater than 100,000 CFU per mL E. coli, pansensitive  3/18/2023: Greater than 100,000 CFU per mL Klebsiella resistant to cefazolin, ceftriaxone.  2/28/2023: Greater than 100,000 CFU per mL E. coli.  Resistant to ampicillin.  1/3/2022: Greater than 100,000 CFU per mL E. coli pansensitive.  Past Medical History:    Arthritis    Back problem    Depression    Diabetes (HCC)    Essential hypertension    High blood pressure    High cholesterol    Visual impairment     No past surgical history on file.  Family History   Problem Relation Age of Onset    Heart Attack Father     Stroke Mother     Heart Disorder Son     Diabetes Son      Social History     Socioeconomic History    Marital status:    Tobacco Use    Smoking status: Never    Smokeless tobacco: Never   Vaping Use    Vaping status: Never Used   Substance and Sexual Activity    Alcohol use: Yes     Comment: very infrequent, every couple of months    Drug use: Never     Social Determinants of Health     Financial Resource Strain: Low Risk   (3/29/2024)    Financial Resource Strain     Difficulty of Paying Living Expenses: Somewhat hard     Med Affordability: No   Food Insecurity: No Food Insecurity (5/2/2024)    Food Insecurity     Food Insecurity: Never true   Transportation Needs: No Transportation Needs (5/2/2024)    Transportation Needs     Lack of Transportation: No   Recent Concern: Transportation Needs - Unmet Transportation Needs (3/29/2024)    Transportation Needs     Lack of Transportation: Yes   Physical Activity: Inactive (2/16/2023)    Exercise Vital Sign     Days of Exercise per Week: 0 days     Minutes of Exercise per Session: 0 min   Stress: No Stress Concern Present (2/16/2023)    Stress     Feeling of Stress : No   Social Connections: Socially Integrated (2/16/2023)    Social Connections     Frequency of Socialization with Friends and Family: 3   Housing Stability: Low Risk  (5/2/2024)    Housing Stability     Housing Instability: No     Current Outpatient Medications   Medication Sig Dispense Refill    estradiol (ESTRACE) 0.1 MG/GM Vaginal Cream Apply fingertip amount of cream to the vagina every night for 1 week and then every other night indefinitely 42.5 g 11    clotrimazole 1 % External Cream Apply 1 Application topically 2 (two) times daily. 60 g 1    Accu-Chek FastClix Lancets Does not apply Misc Use to check blood sugars 3 times daily. 300 each 1    Blood Glucose Monitoring Suppl (ACCU-CHEK GUIDE) w/Device Does not apply Kit Use to check blood sugars 3 times daily. 1 kit 0    Glucose Blood (ACCU-CHEK GUIDE) In Vitro Strip Use to check blood sugars 3 times daily. 300 strip 1    conjugated estrogens 0.625 MG/GM Vaginal Cream Place 0.5 g vaginally twice a week. (Patient not taking: Reported on 7/2/2024) 30 g 5    ergocalciferol 1.25 MG (96806 UT) Oral Cap Take 1 capsule (50,000 Units total) by mouth once a week. 12 capsule 0    gabapentin 300 MG Oral Cap Take 1 capsule (300 mg total) by mouth 3 (three) times daily.      lidocaine  4 % External Patch Place 1 patch onto the skin daily.      Insulin Degludec (TRESIBA FLEXTOUCH) 100 UNIT/ML Subcutaneous Solution Pen-injector Inject 36 Units into the skin daily.      Insulin Lispro, 1 Unit Dial, 100 UNIT/ML Subcutaneous Solution Pen-injector Patient will take 14 units with small carb meals and 18-20 units with larger carb meals. 54 mL 1    Insulin Pen Needle (PEN NEEDLES) 32G X 4 MM Does not apply Misc 1 pen  4 (four) times daily. Use  New pen needle  With each injection 400 each 0    Glucose Blood (ACCU-CHEK FITO PLUS) In Vitro Strip Use as directed to check blood glucose 3 times/day - DX code:E11.65 using insulin 300 strip 0    simvastatin 20 MG Oral Tab Take 1 tablet (20 mg total) by mouth daily. 90 tablet 2    sertraline 100 MG Oral Tab Take 1 tablet (100 mg total) by mouth daily. 90 tablet 2    losartan 50 MG Oral Tab Take 0.5 tablets (25 mg total) by mouth daily. 90 tablet 2       Allergies: Patient has no known allergies.    REVIEW OF SYSTEMS:    A comprehensive 10 point review of systems was completed.  Pertinent positives and negatives noted in the the HPI.     EXAM:  BP 98/61 (BP Location: Right arm, Patient Position: Sitting, Cuff Size: adult)   Pulse 83     Physical Exam  Constitutional:       Appearance: Normal appearance.   HENT:      Head: Normocephalic and atraumatic.   Eyes:      General: No scleral icterus.     Conjunctiva/sclera: Conjunctivae normal.   Pulmonary:      Effort: Pulmonary effort is normal.   Abdominal:      General: There is no distension.      Palpations: Abdomen is soft.      Tenderness: There is no right CVA tenderness or left CVA tenderness.   Genitourinary:     Comments: 333 ml bladder scan was unable to urinate.   Musculoskeletal:      Comments: Ambulates with the use of a walker.    Skin:     General: Skin is warm and dry.   Neurological:      Mental Status: She is alert and oriented to person, place, and time.   Psychiatric:         Mood and Affect: Mood  normal.         Behavior: Behavior normal.          LABS:  No results found for: \"PSA\", \"QPSA\", \"TOTPSASCREEN\"  Lab Results   Component Value Date    WBC 9.7 06/20/2024    RBC 4.08 06/20/2024    HGB 11.5 (L) 06/20/2024    HCT 35.8 06/20/2024    MCV 87.7 06/20/2024    MCH 28.2 06/20/2024    MCHC 32.1 06/20/2024    RDW 13.6 06/20/2024    .0 06/20/2024     Lab Results   Component Value Date     (H) 06/20/2024    BUN 25 (H) 06/20/2024    BUNCREA 23.1 (H) 06/20/2024    CREATSERUM 1.08 (H) 06/20/2024    ANIONGAP 7 06/20/2024    GFRNAA 42 (L) 04/19/2022    GFRAA 48 (L) 04/19/2022    CA 9.5 06/20/2024     06/20/2024    K 3.7 06/20/2024     06/20/2024    CO2 28.0 06/20/2024     No results found for: \"PTP\", \"PT\", \"INR\"    IMAGING:  XR ANKLE (MIN 3 VIEWS), RIGHT (CPT=73610)    Result Date: 7/3/2024  PROCEDURE: XR ANKLE (MIN 3 VIEWS), RIGHT (CPT=73610)  COMPARISON: None.  INDICATIONS: Right lateral ankle pain and swelling post fall x3 days.  TECHNIQUE: 3 views were obtained.   FINDINGS:  BONES: Tibiotalar joint is anatomic.  Calcaneal spur at the insertion of the plantar fascia.  Otherwise, no significant arthropathy, fracture or acute abnormality. SOFT TISSUES: Calcific atherosclerosis in the lower extremity.  Soft tissue inflammation in the lower extremity extending into the ankle. EFFUSION: None visible. OTHER: Negative.         CONCLUSION: Soft tissue inflammation around the ankle compatible with soft tissue injury/sprain.  No acute osseous abnormality.   Dictated by (CST): Joe Salmon MD on 7/03/2024 at 11:13 AM     Finalized by (CST): Joe Salmon MD on 7/03/2024 at 11:15 AM          CT BRAIN OR HEAD (06442)    Result Date: 6/20/2024  PROCEDURE: CT BRAIN OR HEAD (CPT=70450)  COMPARISON: [facility], MRI BRAIN (CPT=70551), 5/05/2024, 11:19 AM.  [facility], CT BRAIN OR HEAD (CPT=70450), 5/04/2024, 10:59 AM.  [facility], CT BRAIN OR HEAD (CPT=70450),  8/13/2023, 11:58 PM.  INDICATIONS: fall  TECHNIQUE: CT images were obtained without contrast material.  Automated exposure control for dose reduction was used.  Dose information is transmitted to the ACR (American College of Radiology) NRDR (National Radiology Data Registry) which includes the Dose Index Registry.  FINDINGS:  CSF SPACES: The ventricles, cisterns, and sulci are commensurate in caliber and appropriate for age. No hydrocephalus, subarachnoid hemorrhage, or effacement of the basal cisterns is appreciated. There is no extra-axial fluid collection. CEREBRUM: No acute intraparenchymal hemorrhage, edema, or cortical sulcal effacement is apparent. There is no space-occupying lesion, mass effect, or shift of midline structures. The gray-white matter junction is preserved and bilaterally symmetric in appearance.  Decreased attenuation bilateral white matter noted. CEREBELLUM: No edema, hemorrhage, mass, acute infarction, or significant atrophy.  BRAINSTEM: No edema, hemorrhage, mass, acute infarction, or significant atrophy.  CALVARIUM: There is no apparent depressed fracture, mass, or other significant visible lesion.  SINUSES: Asymmetrically small visualized left maxillary sinus with mucosal thickening and/or fluid. ORBITS: Prior bilateral cataract surgery. OTHER: Mild right posterior occipital scalp soft tissue swelling.         CONCLUSION:   No evidence of acute intracranial abnormality.  Generalized atrophy with chronic microvascular white matter ischemia.  Asymmetrically small left maxillary sinus with mucosal thickening.  Possible fluid in the left maxillary sinus which may relate to acute sinusitis.  Mild right posterior scalp soft tissue swelling.  A preliminary report was issued by the [facility] teleradiology service. There are no major discrepancies.    Dictated by (CST): Kody Escobar MD on 6/20/2024 at 7:56 AM     Finalized by (CST): Kody Escobar MD on 6/20/2024 at 8:00 AM           CT BRAIN OR HEAD (21935)    Result Date: 6/20/2024  PROCEDURE: CT BRAIN OR HEAD (CPT=70450)  COMPARISON: [facility], CT BRAIN OR HEAD (CPT=70450), 6/19/2024, 11:59 PM.  [facility], MRI BRAIN (CPT=70551), 5/05/2024, 11:19 AM.  [facility], CT BRAIN OR HEAD (CPT=70450), 5/04/2024, 10:59 AM.  INDICATIONS: Low blood pressure  TECHNIQUE: CT images were obtained without contrast material.  Automated exposure control for dose reduction was used.  Dose information is transmitted to the ACR (American College of Radiology) NRDR (National Radiology Data Registry) which includes the Dose Index Registry.  FINDINGS:  CSF SPACES: The ventricles, cisterns, and sulci are commensurate in caliber and appropriate for age. No hydrocephalus, subarachnoid hemorrhage, or effacement of the basal cisterns is appreciated. There is no extra-axial fluid collection. CEREBRUM: No acute intraparenchymal hemorrhage, edema, or cortical sulcal effacement is apparent. There is no space-occupying lesion, mass effect, or shift of midline structures. The gray-white matter junction is preserved and bilaterally symmetric in appearance.  Bilateral white matter decreased attenuation again noted likely chronic microvascular white matter ischemia. CEREBELLUM: No edema, hemorrhage, mass, acute infarction, or significant atrophy.  BRAINSTEM: No edema, hemorrhage, mass, acute infarction, or significant atrophy.  CALVARIUM: There is no apparent depressed fracture, mass, or other significant visible lesion.  SINUSES: Asymmetrically small left maxillary sinus with suspected mucosal thickening again noted similar to the prior study.  ORBITS: Prior bilateral cataract surgery. OTHER: Partially empty sella.         CONCLUSION:   No acute intracranial abnormality.    Dictated by (CST): Kody Escobar MD on 6/20/2024 at 6:42 AM     Finalized by (CST): Kody Escobar MD on 6/20/2024 at 6:46 AM             IMPRESSION:  73-year-old female with recurrent urinary tract infections as well as incontinence.  We discussed with the patient and her daughter things that we can try for recurrent urinary tract infections as well as incontinence we discussed trying pelvic floor therapy along with Estrace cream first, if this does not work we discussed trying other medications to help with incontinence.  In addition we discussed potentially adding suppressive antibiotics.  At this time we will start with pelvic floor therapy and Estrace cream first.  We reviewed the cost of the medication with good Rx if it still not covered by insurance.  Daughter is agreeable to starting.  We also discussed trying cranberry supplements along with d-mannose.  In addition we discussed hygiene and discussed changing diapers regularly.    Regarding bladder scan discussed that the bladder scan showed 333mL the patient was unable to void. Advised voiding every two hours, discussed double voiding. Discussed ways to completely empty bladder. Will evaluate if [facility] test returns positive. Will treat patient if urine is positive. Will recheck a bladder scan in two weeks.     PLAN:  1) start Estrace cream nightly for 1 week followed by every other night indefinitely.  2) start pelvic floor therapy  3) urinalysis and urine to be sent to [facility] for PCR testing.  4) follow-up in 2 weeks for a nurse visit/bladder scan.   5) follow up with me in 4 to 6 weeks.    Eliana Correia PA-C  7/9/2024 2:00 PM

## 2024-07-10 ENCOUNTER — TELEPHONE (OUTPATIENT)
Dept: INTERNAL MEDICINE CLINIC | Facility: CLINIC | Age: 74
End: 2024-07-10

## 2024-07-10 NOTE — TELEPHONE ENCOUNTER
Unable to reach patient for medication adherence consult. LVM for patient to call me back at [phone].

## 2024-07-16 ENCOUNTER — PATIENT OUTREACH (OUTPATIENT)
Dept: CASE MANAGEMENT | Age: 74
End: 2024-07-16

## 2024-07-16 NOTE — PROGRESS NOTES
Reviewed pt's chart including recent visits.  Attempted to contact pt for monthly outreach no answer left detailed message for pt to call back.     Reconciled chart using care everywhere.     Pt is due for:   Health Maintenance   Topic Date Due    DEXA Scan  Never done    Colorectal Cancer Screening  02/22/2022    Diabetes Care: Microalb/Creat Ratio  04/20/2023    Diabetes Care A1C  08/01/2024    Diabetes Care Dilated Eye Exam  09/29/2024    COVID-19 Vaccine (3 - 2023-24 season) 01/15/2025 (Originally 9/1/2023)    Influenza Vaccine (1) 10/01/2024    Diabetes Care Foot Exam  01/25/2025    Mammogram  04/30/2025    Diabetes Care: GFR  06/20/2025    MA Annual Health Assessment  Completed    Annual Depression Screening  Completed    Fall Risk Screening (Annual)  Completed    Pneumococcal Vaccine: 65+ Years  Completed    Zoster Vaccines  Completed     Future Appointments   Date Time Provider Department Center   7/24/2024 11:20 AM [facility]   8/7/2024  3:00 PM Eliana Correia, PA-C [facility]   8/12/2024  1:15 PM [facility]   9/5/2024  2:45 PM Martin Foy MD [facility] [place] [facility]   9/10/2024  6:40 PM Jermaine Monson MD E[facility]G5 [facility] 5 [facility]     Total time - 3 min  Total Monthly time- 3 min

## 2024-07-18 ENCOUNTER — TELEPHONE (OUTPATIENT)
Dept: INTERNAL MEDICINE CLINIC | Facility: CLINIC | Age: 74
End: 2024-07-18

## 2024-07-18 DIAGNOSIS — E55.9 VITAMIN D DEFICIENCY: ICD-10-CM

## 2024-07-18 NOTE — TELEPHONE ENCOUNTER
Dillon from [facility] is calling stating that pt is not doing home nurse visit this week due to pt is on vacation.       If any questions please call dillon at [phone].

## 2024-07-19 ENCOUNTER — TELEPHONE (OUTPATIENT)
Dept: INTERNAL MEDICINE CLINIC | Facility: CLINIC | Age: 74
End: 2024-07-19

## 2024-07-19 NOTE — TELEPHONE ENCOUNTER
Left a message on Graciela's voice mail from [facility] at [phone] that Dr. Monson approved 2 additional OT visits.

## 2024-07-19 NOTE — TELEPHONE ENCOUNTER
Michael DIAL from [facility] called requesting a verbal order for 2 additional visits   To please call her back at [phone]

## 2024-07-20 RX ORDER — ERGOCALCIFEROL 1.25 MG/1
50000 CAPSULE ORAL WEEKLY
Qty: 12 CAPSULE | Refills: 0 | Status: SHIPPED | OUTPATIENT
Start: 2024-07-20

## 2024-07-23 ENCOUNTER — TELEPHONE (OUTPATIENT)
Dept: INTERNAL MEDICINE CLINIC | Facility: CLINIC | Age: 74
End: 2024-07-23

## 2024-07-23 NOTE — TELEPHONE ENCOUNTER
Elaine from residential home [facility] requesting a verbal order to recertify patient for home health with a skilled nurse,PT and OT.      Ph. [phone] (voicemail confidential)

## 2024-07-23 NOTE — TELEPHONE ENCOUNTER
Spoke with Elaine [facility] the patient was out of town 2 weeks so they need resume skilled nursing, PT, and OT. She also needs  for resources.     Is it OK to re-certify and add a  consult?

## 2024-07-23 NOTE — TELEPHONE ENCOUNTER
Spoke with Elaine COSTA [facility] informed Dr. Monson authorized re-certification for skilled nursing. PT, OT, and  consult.

## 2024-07-25 NOTE — TELEPHONE ENCOUNTER
You have not prescribed this before.   Requested Prescriptions     Pending Prescriptions Disp Refills    gabapentin 300 MG Oral Cap  0     Sig: Take 1 capsule (300 mg total) by mouth 3 (three) times daily.     LAST REFILL DATE    QUANTITY REQUESTED    DAY SUPPLY    DIAGNOSIS    LAST OFFICE VISIT  7/2/24   FOLLOW UP DUE      Future Appointments   Date Time Provider Department Center   8/7/2024  3:00 PM Eliana Correia PA-C [facility]   9/5/2024  2:45 PM Martin Foy MD [facility]   9/10/2024  6:40 PM Jermaine Monson MD [facility]

## 2024-07-26 ENCOUNTER — TELEPHONE (OUTPATIENT)
Dept: INTERNAL MEDICINE CLINIC | Facility: CLINIC | Age: 74
End: 2024-07-26

## 2024-07-26 RX ORDER — GABAPENTIN 300 MG/1
300 CAPSULE ORAL 3 TIMES DAILY
Qty: 90 CAPSULE | Refills: 3 | Status: SHIPPED | OUTPATIENT
Start: 2024-07-26

## 2024-07-26 NOTE — TELEPHONE ENCOUNTER
Attempted to contact patient to inquire about fall. Unable to reach or leave message due to full mailbox.

## 2024-07-26 NOTE — TELEPHONE ENCOUNTER
Niecy is calling wanting to let dr. Monson know that saw pt yesterday and pt mention that had another fall on Sunday doing laundry. Niecy mention that pt notified her that theres was no injuries.         If any questions please call Niecy at [phone]

## 2024-07-26 NOTE — TELEPHONE ENCOUNTER
Attempted to contact Milla from [facility]. Unable to reach. Message left on Voicemail to chrissie call and advise the reason for the 2 additional visit of OT.

## 2024-08-05 ENCOUNTER — HOSPITAL ENCOUNTER (EMERGENCY)
Facility: HOSPITAL | Age: 74
Discharge: LEFT BEFORE DISCHARGE | End: 2024-08-05
Payer: MEDICARE

## 2024-08-05 ENCOUNTER — TELEPHONE (OUTPATIENT)
Dept: INTERNAL MEDICINE CLINIC | Facility: CLINIC | Age: 74
End: 2024-08-05

## 2024-08-05 VITALS
OXYGEN SATURATION: 96 % | RESPIRATION RATE: 16 BRPM | HEART RATE: 78 BPM | DIASTOLIC BLOOD PRESSURE: 63 MMHG | SYSTOLIC BLOOD PRESSURE: 97 MMHG

## 2024-08-05 LAB — GLUCOSE BLDC GLUCOMTR-MCNC: 296 MG/DL (ref 70–99)

## 2024-08-05 PROCEDURE — 82962 GLUCOSE BLOOD TEST: CPT

## 2024-08-05 NOTE — ED QUICK NOTES
Blood sugar was checked in triage, 296. Pts family member reports they have protocol at home for this level. Pt wishes to leave ED.

## 2024-08-05 NOTE — ED INITIAL ASSESSMENT (HPI)
Pt had blood sugar reading of 257 at 1300, then 700 at 1400. Pt denies complaints. Bld sugar reading of 296 in triage

## 2024-08-05 NOTE — TELEPHONE ENCOUNTER
Alan is calling from [facility] stating that she was in the middle of doing ot for pt above when she checked pt blood sugar and got 692. Alan states that she advise pt to go to er.    If any questions please call Alan at [phone]

## 2024-08-05 NOTE — TELEPHONE ENCOUNTER
Attempted to contact patient. Unable to reach. Message left on voicemail to proceed to the ED. Attempted to contact  Alan at [phone]. voicemail was full unable to leave message..

## 2024-08-07 ENCOUNTER — OFFICE VISIT (OUTPATIENT)
Dept: SURGERY | Facility: CLINIC | Age: 74
End: 2024-08-07

## 2024-08-07 ENCOUNTER — TELEPHONE (OUTPATIENT)
Dept: INTERNAL MEDICINE CLINIC | Facility: CLINIC | Age: 74
End: 2024-08-07

## 2024-08-07 VITALS — BODY MASS INDEX: 26.11 KG/M2 | HEIGHT: 60 IN | WEIGHT: 133 LBS

## 2024-08-07 DIAGNOSIS — N39.0 RECURRENT UTI: ICD-10-CM

## 2024-08-07 DIAGNOSIS — R30.0 DYSURIA: Primary | ICD-10-CM

## 2024-08-07 DIAGNOSIS — N95.8 GENITOURINARY SYNDROME OF MENOPAUSE: ICD-10-CM

## 2024-08-07 PROCEDURE — 1160F RVW MEDS BY RX/DR IN RCRD: CPT | Performed by: PHYSICIAN ASSISTANT

## 2024-08-07 PROCEDURE — 1126F AMNT PAIN NOTED NONE PRSNT: CPT | Performed by: PHYSICIAN ASSISTANT

## 2024-08-07 PROCEDURE — 3008F BODY MASS INDEX DOCD: CPT | Performed by: PHYSICIAN ASSISTANT

## 2024-08-07 PROCEDURE — 1159F MED LIST DOCD IN RCRD: CPT | Performed by: PHYSICIAN ASSISTANT

## 2024-08-07 PROCEDURE — 99214 OFFICE O/P EST MOD 30 MIN: CPT | Performed by: PHYSICIAN ASSISTANT

## 2024-08-07 RX ORDER — TAMSULOSIN HYDROCHLORIDE 0.4 MG/1
0.4 CAPSULE ORAL DAILY
Qty: 90 CAPSULE | Refills: 0 | Status: SHIPPED | OUTPATIENT
Start: 2024-08-07 | End: 2024-11-05

## 2024-08-07 NOTE — PROGRESS NOTES
[facility]  Initial Office Consultation    HPI:   Ananya Dowling is a 73 year old female here today with her daughter for follow up. She was last seen on 07/09/2024 for recurrent UTIs. At that visit we had discussed ordering a urine PCR, however the patient was unable to provide us with a urine sample.   We discussed starting estrace cream. The patient has not been using estrace cream   We discussed pelvic floor therapy she has not started pelvic floor therapy.   We discussed a two week nursing visit for bladder scan this was not completed.  She denies fever, chills, abdominal pain, back pain, hematuria, dysuria  Daughter reports the patient has foul smelling urine.   Patient reports she is not able to provide a sample today.     Urologic history:   Three UTI's in the past 3 months in our system. The patient's daughter reports that they had positive UTI's at [facility] starting January 2024 with a total of 5 since the beginning of the year.  The patient was prescribed Estrace cream in the past but insurance would not cover it.  She has had 4 pregnancies 4 live births.  Has never done pelvic floor therapy before.  She denies fever, chills, abdominal pain, back pain, hematuria, dysuria      UTI history:   06/25/2024: Klebsiella >100,00 CFU/mL resistant to cefazolin and ceftriaxone  6/3/2024: Greater than 100,000 CFU/mL Proteus Mirabella's resistant to Macrobid and 10-50,000 CFU per mL Staphylococcus aureus MRSA resistant to tetracycline oxacillin, levofloxacin, cefazolin.  5/2/2024: Greater than 100,000 CFU per mL E. coli, pansensitive  3/18/2023: Greater than 100,000 CFU per mL Klebsiella resistant to cefazolin, ceftriaxone.  2/28/2023: Greater than 100,000 CFU per mL E. coli.  Resistant to ampicillin.  1/3/2022: Greater than 100,000 CFU per mL E. coli pansensitive.  Past Medical History:    Arthritis    Back problem    Depression    Diabetes (HCC)    Essential hypertension    High blood pressure    High  cholesterol    Visual impairment     No past surgical history on file.  Family History   Problem Relation Age of Onset    Heart Attack Father     Stroke Mother     Heart Disorder Son     Diabetes Son      Social History     Socioeconomic History    Marital status:    Tobacco Use    Smoking status: Never    Smokeless tobacco: Never   Vaping Use    Vaping status: Never Used   Substance and Sexual Activity    Alcohol use: Yes     Comment: very infrequent, every couple of months    Drug use: Never     Social Determinants of Health     Financial Resource Strain: Low Risk  (3/29/2024)    Financial Resource Strain     Difficulty of Paying Living Expenses: Somewhat hard     Med Affordability: No   Food Insecurity: No Food Insecurity (5/2/2024)    Food Insecurity     Food Insecurity: Never true   Transportation Needs: No Transportation Needs (5/2/2024)    Transportation Needs     Lack of Transportation: No   Recent Concern: Transportation Needs - Unmet Transportation Needs (3/29/2024)    Transportation Needs     Lack of Transportation: Yes   Physical Activity: Inactive (2/16/2023)    Exercise Vital Sign     Days of Exercise per Week: 0 days     Minutes of Exercise per Session: 0 min   Stress: No Stress Concern Present (2/16/2023)    Stress     Feeling of Stress : No   Social Connections: Socially Integrated (2/16/2023)    Social Connections     Frequency of Socialization with Friends and Family: 3   Housing Stability: Low Risk  (5/2/2024)    Housing Stability     Housing Instability: No     Current Outpatient Medications   Medication Sig Dispense Refill    gabapentin 300 MG Oral Cap Take 1 capsule (300 mg total) by mouth 3 (three) times daily. 90 capsule 3    ERGOCALCIFEROL 1.25 MG (82043 UT) Oral Cap TAKE 1 CAPSULE BY MOUTH 1 TIME A WEEK 12 capsule 0    estradiol (ESTRACE) 0.1 MG/GM Vaginal Cream Apply fingertip amount of cream to the vagina every night for 1 week and then every other night indefinitely 42.5 g 11     clotrimazole 1 % External Cream Apply 1 Application topically 2 (two) times daily. 60 g 1    Accu-Chek FastClix Lancets Does not apply Misc Use to check blood sugars 3 times daily. 300 each 1    Blood Glucose Monitoring Suppl (ACCU-CHEK GUIDE) w/Device Does not apply Kit Use to check blood sugars 3 times daily. 1 kit 0    Glucose Blood (ACCU-CHEK GUIDE) In Vitro Strip Use to check blood sugars 3 times daily. 300 strip 1    conjugated estrogens 0.625 MG/GM Vaginal Cream Place 0.5 g vaginally twice a week. (Patient not taking: Reported on 7/2/2024) 30 g 5    lidocaine 4 % External Patch Place 1 patch onto the skin daily.      Insulin Degludec (TRESIBA FLEXTOUCH) 100 UNIT/ML Subcutaneous Solution Pen-injector Inject 36 Units into the skin daily.      Insulin Lispro, 1 Unit Dial, 100 UNIT/ML Subcutaneous Solution Pen-injector Patient will take 14 units with small carb meals and 18-20 units with larger carb meals. 54 mL 1    Insulin Pen Needle (PEN NEEDLES) 32G X 4 MM Does not apply Misc 1 pen  4 (four) times daily. Use  New pen needle  With each injection 400 each 0    simvastatin 20 MG Oral Tab Take 1 tablet (20 mg total) by mouth daily. 90 tablet 2    sertraline 100 MG Oral Tab Take 1 tablet (100 mg total) by mouth daily. 90 tablet 2    losartan 50 MG Oral Tab Take 0.5 tablets (25 mg total) by mouth daily. 90 tablet 2       Allergies: Patient has no known allergies.    REVIEW OF SYSTEMS:    A comprehensive 5 point review of systems was completed.  Pertinent positives and negatives noted in the the HPI.     EXAM:  There were no vitals taken for this visit.    Physical Exam  Constitutional:       Appearance: Normal appearance.   HENT:      Head: Normocephalic and atraumatic.   Eyes:      General: No scleral icterus.     Conjunctiva/sclera: Conjunctivae normal.   Pulmonary:      Effort: Pulmonary effort is normal.   Abdominal:      General: There is no distension.      Palpations: Abdomen is soft.      Tenderness: There  is no right CVA tenderness or left CVA tenderness.   Musculoskeletal:      Comments: Ambulates with the use of a walker.    Skin:     General: Skin is warm and dry.   Neurological:      Mental Status: She is alert and oriented to person, place, and time.   Psychiatric:         Mood and Affect: Mood normal.         Behavior: Behavior normal.          LABS:  No results found for: \"PSA\", \"QPSA\", \"TOTPSASCREEN\"  Lab Results   Component Value Date    WBC 9.7 06/20/2024    RBC 4.08 06/20/2024    HGB 11.5 (L) 06/20/2024    HCT 35.8 06/20/2024    MCV 87.7 06/20/2024    MCH 28.2 06/20/2024    MCHC 32.1 06/20/2024    RDW 13.6 06/20/2024    .0 06/20/2024     Lab Results   Component Value Date     (H) 06/20/2024    BUN 25 (H) 06/20/2024    BUNCREA 23.1 (H) 06/20/2024    CREATSERUM 1.08 (H) 06/20/2024    ANIONGAP 7 06/20/2024    GFRNAA 42 (L) 04/19/2022    GFRAA 48 (L) 04/19/2022    CA 9.5 06/20/2024     06/20/2024    K 3.7 06/20/2024     06/20/2024    CO2 28.0 06/20/2024     No results found for: \"PTP\", \"PT\", \"INR\"    IMAGING:  XR ANKLE (MIN 3 VIEWS), RIGHT (CPT=73610)    Result Date: 7/3/2024  PROCEDURE: XR ANKLE (MIN 3 VIEWS), RIGHT (CPT=73610)  COMPARISON: None.  INDICATIONS: Right lateral ankle pain and swelling post fall x3 days.  TECHNIQUE: 3 views were obtained.   FINDINGS:  BONES: Tibiotalar joint is anatomic.  Calcaneal spur at the insertion of the plantar fascia.  Otherwise, no significant arthropathy, fracture or acute abnormality. SOFT TISSUES: Calcific atherosclerosis in the lower extremity.  Soft tissue inflammation in the lower extremity extending into the ankle. EFFUSION: None visible. OTHER: Negative.         CONCLUSION: Soft tissue inflammation around the ankle compatible with soft tissue injury/sprain.  No acute osseous abnormality.   Dictated by (CST): Joe Salmon MD on 7/03/2024 at 11:13 AM     Finalized by (CST): Joe Salmon MD on 7/03/2024 at 11:15 AM          CT BRAIN OR HEAD  (17045)    Result Date: 6/20/2024  PROCEDURE: CT BRAIN OR HEAD (CPT=70450)  COMPARISON: [facility], MRI BRAIN (CPT=70551), 5/05/2024, 11:19 AM.  [facility], CT BRAIN OR HEAD (CPT=70450), 5/04/2024, 10:59 AM.  [facility], CT BRAIN OR HEAD (CPT=70450), 8/13/2023, 11:58 PM.  INDICATIONS: fall  TECHNIQUE: CT images were obtained without contrast material.  Automated exposure control for dose reduction was used.  Dose information is transmitted to the ACR (American College of Radiology) NRDR (National Radiology Data Registry) which includes the Dose Index Registry.  FINDINGS:  CSF SPACES: The ventricles, cisterns, and sulci are commensurate in caliber and appropriate for age. No hydrocephalus, subarachnoid hemorrhage, or effacement of the basal cisterns is appreciated. There is no extra-axial fluid collection. CEREBRUM: No acute intraparenchymal hemorrhage, edema, or cortical sulcal effacement is apparent. There is no space-occupying lesion, mass effect, or shift of midline structures. The gray-white matter junction is preserved and bilaterally symmetric in appearance.  Decreased attenuation bilateral white matter noted. CEREBELLUM: No edema, hemorrhage, mass, acute infarction, or significant atrophy.  BRAINSTEM: No edema, hemorrhage, mass, acute infarction, or significant atrophy.  CALVARIUM: There is no apparent depressed fracture, mass, or other significant visible lesion.  SINUSES: Asymmetrically small visualized left maxillary sinus with mucosal thickening and/or fluid. ORBITS: Prior bilateral cataract surgery. OTHER: Mild right posterior occipital scalp soft tissue swelling.         CONCLUSION:   No evidence of acute intracranial abnormality.  Generalized atrophy with chronic microvascular white matter ischemia.  Asymmetrically small left maxillary sinus with mucosal thickening.  Possible fluid in the left maxillary sinus which may relate to acute sinusitis.  Mild  right posterior scalp soft tissue swelling.  A preliminary report was issued by the [facility] teleradiology service. There are no major discrepancies.    Dictated by (CST): Kody Escobar MD on 6/20/2024 at 7:56 AM     Finalized by (CST): Kody Escobar MD on 6/20/2024 at 8:00 AM          CT BRAIN OR HEAD ([id number])    Result Date: 6/20/2024  PROCEDURE: CT BRAIN OR HEAD (CPT=70450)  COMPARISON: [facility], CT BRAIN OR HEAD (CPT=70450), 6/19/2024, 11:59 PM.  [facility], MRI BRAIN (CPT=70551), 5/05/2024, 11:19 AM.  [facility], CT BRAIN OR HEAD (CPT=70450), 5/04/2024, 10:59 AM.  INDICATIONS: Low blood pressure  TECHNIQUE: CT images were obtained without contrast material.  Automated exposure control for dose reduction was used.  Dose information is transmitted to the ACR (American College of Radiology) NRDR (National Radiology Data Registry) which includes the Dose Index Registry.  FINDINGS:  CSF SPACES: The ventricles, cisterns, and sulci are commensurate in caliber and appropriate for age. No hydrocephalus, subarachnoid hemorrhage, or effacement of the basal cisterns is appreciated. There is no extra-axial fluid collection. CEREBRUM: No acute intraparenchymal hemorrhage, edema, or cortical sulcal effacement is apparent. There is no space-occupying lesion, mass effect, or shift of midline structures. The gray-white matter junction is preserved and bilaterally symmetric in appearance.  Bilateral white matter decreased attenuation again noted likely chronic microvascular white matter ischemia. CEREBELLUM: No edema, hemorrhage, mass, acute infarction, or significant atrophy.  BRAINSTEM: No edema, hemorrhage, mass, acute infarction, or significant atrophy.  CALVARIUM: There is no apparent depressed fracture, mass, or other significant visible lesion.  SINUSES: Asymmetrically small left maxillary sinus with suspected mucosal thickening again noted similar to the  prior study.  ORBITS: Prior bilateral cataract surgery. OTHER: Partially empty sella.         CONCLUSION:   No acute intracranial abnormality.    Dictated by (CST): Kody Escobar MD on 6/20/2024 at 6:42 AM     Finalized by (CST): Kody Escobar MD on 6/20/2024 at 6:46 AM            IMPRESSION:  73-year-old female with recurrent urinary tract infections as well as incontinence.  Here today for follow up. Has not followed recommendations we suggested at the last visit. Advised that we would obtain a straight cath urine sample today, will send off for [facility] testing. I was present in the room when Madison RN was straight catherized the patient. It appeared like there could be uterine prolapse. Madison advised that placement of gonzalez was difficult, as if there was a stricture. Discussed with patient and the daughter obtaining a cysto/pelvic exam. In addition added flomax to medication list. Reiterated with the patient and her daughter things that we can try for recurrent urinary tract infections as well as incontinence we discussed trying pelvic floor therapy along with Estrace cream first, if this does not work we discussed trying other medications to help with incontinence.  In addition we discussed potentially adding suppressive antibiotics.  At this time we will start with pelvic floor therapy, flomax and Estrace cream. We also discussed trying cranberry supplements along with d-mannose.  In addition we discussed hygiene and discussed changing diapers regularly.    PLAN:  1) start Estrace cream nightly for 1 week followed by every other night indefinitely.  2) start pelvic floor therapy  3 Start Flomax 0.4 mg PO daily.   4) urinalysis and urine to be sent to [facility] for PCR testing.  5) Cysto/Pelvic exam   5) follow up with me in 12 weeks    Eliana Correia PA-C  August 07, 2024

## 2024-08-07 NOTE — TELEPHONE ENCOUNTER
Maria Alejandra arizmendi [facility] is calling in stating social work evaluation will be rescheduled due to scheduling conflicts.

## 2024-08-07 NOTE — PROGRESS NOTES
I was asked by CHERYL to collect a cathed urine sample for urine C&S to send to [facility]. I went to the exam room and I introduced myself to pt and her dtr and I asked pt to undress from the waist down and get on the exam table and I gave her a sheet to cover herself. When I came back into the exam room I placed pt in the lithotomy position and I proceeded to prep and drape her in sterile fashion and I then inserted a 14 FR strait tipped catheter into her urethra which was a little difficult to do as her urethra was tight but I reached the bladder and only drained 60 ml of yellow. PT and her dtr verbalized understanding and compliance.

## 2024-08-09 ENCOUNTER — TELEPHONE (OUTPATIENT)
Dept: SURGERY | Facility: CLINIC | Age: 74
End: 2024-08-09

## 2024-08-09 RX ORDER — SULFAMETHOXAZOLE AND TRIMETHOPRIM 800; 160 MG/1; MG/1
1 TABLET ORAL 2 TIMES DAILY
Qty: 10 TABLET | Refills: 0 | Status: SHIPPED | OUTPATIENT
Start: 2024-08-09 | End: 2024-08-14

## 2024-08-13 ENCOUNTER — TELEPHONE (OUTPATIENT)
Dept: INTERNAL MEDICINE CLINIC | Facility: CLINIC | Age: 74
End: 2024-08-13

## 2024-08-13 NOTE — TELEPHONE ENCOUNTER
Carmen  with [facility] is calling to get another order for her to see Ananya. Patient can't complete visit until week of 8/19 due to patient being out of town.  P:[phone]

## 2024-08-19 ENCOUNTER — PROCEDURE (OUTPATIENT)
Dept: SURGERY | Facility: CLINIC | Age: 74
End: 2024-08-19

## 2024-08-19 VITALS
HEART RATE: 77 BPM | HEIGHT: 61 IN | BODY MASS INDEX: 25.11 KG/M2 | DIASTOLIC BLOOD PRESSURE: 68 MMHG | SYSTOLIC BLOOD PRESSURE: 147 MMHG | WEIGHT: 133 LBS

## 2024-08-19 DIAGNOSIS — N39.0 RECURRENT UTI: Primary | ICD-10-CM

## 2024-08-19 PROCEDURE — 3008F BODY MASS INDEX DOCD: CPT | Performed by: UROLOGY

## 2024-08-19 PROCEDURE — 1160F RVW MEDS BY RX/DR IN RCRD: CPT | Performed by: UROLOGY

## 2024-08-19 PROCEDURE — 1159F MED LIST DOCD IN RCRD: CPT | Performed by: UROLOGY

## 2024-08-19 PROCEDURE — 3078F DIAST BP <80 MM HG: CPT | Performed by: UROLOGY

## 2024-08-19 PROCEDURE — 3074F SYST BP LT 130 MM HG: CPT | Performed by: UROLOGY

## 2024-08-19 PROCEDURE — 52000 CYSTOURETHROSCOPY: CPT | Performed by: UROLOGY

## 2024-08-19 NOTE — PROCEDURES
CYSTOSCOPY (FEMALE)    PRE-OP DIAGNOSIS: Martina    POST-OP DIAGNOSIS: same    PROCEDURE: Cystsocopy    SURGEON: Jory York MD    ASSISTANT: none     EBL: minimal    FINDINGS:   Urethra: No urethral lesions, no urethral strictures  Bladder: Bilateral ureteral orifices in orthotopic position with efflux, no suspicious or concerning erythematous lesions, no papillary bladder tumors, no stones   Retroflexion: no abnormalities   Other findings: no abnormaltiies    INDICATIONS: Martina. Amal Dudar PAC patient.    PROCEDURE: Patient was brought to the procedure suite and a timeout was performed identifying the patient,  and procedure being performed.  The risks of the procedure were once again detailed to the patient including bleeding, infection, dysuria.  The patient agreed to proceed.  The patient had a negative urinalysis.  No antibiotics were given to patient prior to this procedure.     She was placed in a supine position on the table and a flexible cystoscope was inserted per urethra.  There were no obvious urethral lesions or strictures. Once in the bladder we performed a full diagnostic/surveillance cystoscopy which demonstrated no abnormalities.  On retroflexion we noted no abnormalities.  The scope was then carefully removed and once again no urethral abnormalities were noted.    There were no complications after this procedure and the patient tolerated the procedure without issue.    IMPRESSION: cysto negative.    PLAN:    Followup with PAC as planned

## 2024-08-21 ENCOUNTER — PATIENT OUTREACH (OUTPATIENT)
Dept: CASE MANAGEMENT | Age: 74
End: 2024-08-21

## 2024-08-21 DIAGNOSIS — E78.5 DYSLIPIDEMIA: ICD-10-CM

## 2024-08-21 DIAGNOSIS — I73.9 PERIPHERAL VASCULAR DISEASE (HCC): ICD-10-CM

## 2024-08-21 DIAGNOSIS — Z79.4 TYPE 2 DIABETES MELLITUS WITH STABLE PROLIFERATIVE RETINOPATHY OF RIGHT EYE, WITH LONG-TERM CURRENT USE OF INSULIN (HCC): Primary | ICD-10-CM

## 2024-08-21 DIAGNOSIS — F33.0 MILD RECURRENT MAJOR DEPRESSION (HCC): ICD-10-CM

## 2024-08-21 DIAGNOSIS — E11.3551 TYPE 2 DIABETES MELLITUS WITH STABLE PROLIFERATIVE RETINOPATHY OF RIGHT EYE, WITH LONG-TERM CURRENT USE OF INSULIN (HCC): Primary | ICD-10-CM

## 2024-08-21 NOTE — PROGRESS NOTES
Spoke to Ananya for CCM.      Updates to patient care team/comments: None   Patient reported changes in medications: None     Med Adherence  Comment: Pt confirmed she is taking medications as instructed by their providers.     Health Maintenance:   Health Maintenance   Topic Date Due    DEXA Scan  Never done    Colorectal Cancer Screening  02/22/2022    Diabetes Care: Microalb/Creat Ratio  04/20/2023    Diabetes Care A1C  08/01/2024    HTN: BP Follow-Up  09/19/2024    Diabetes Care Dilated Eye Exam  09/29/2024    COVID-19 Vaccine (3 - 2023-24 season) 01/15/2025 (Originally 9/1/2023)    Influenza Vaccine (1) 10/01/2024    Diabetes Care Foot Exam  01/25/2025    Mammogram  04/30/2025    Diabetes Care: GFR  06/20/2025    MA Annual Health Assessment  Completed    Annual Depression Screening  Completed    Fall Risk Screening (Annual)  Completed    Pneumococcal Vaccine: 65+ Years  Completed    Zoster Vaccines  Completed     Patient updates/concerns:     The patient reports that she is doing okay but feels very confused, especially   regarding her relationship with her daughter.  She reports that her daughter frequently tells her to leave her house, making her feels as though she is being kicked out. The patient has no alternative housing options and expressed feeling like a significant burden. She also mentioned that she has to be very cautious about everything she says and does in the home.    Goals/Action Plan:    Active goal from previous outreach:   What: Strengthen my legs            - Where/When/How: She will continue with PT and inquire about PT at home upon discharge.  Patient reported progress towards goals:                - What: She continues with PT           - Where/When/How: The patient reports receiving PT at home 1-2 times a week and also has HH nurse going to her home once a week.   Patient Reported Barriers and Concerns: The patient reports she does not have any concerns associated with PT at this time.                    - Plan for overcoming barriers: n/a    Care Managers Interventions:   I spoke to Elaine, the residential home health nurse at the patients home. During our call, Elaine expressed concerns that prompted her to involve a . She observed that the patient mainly whispers and provides limited information when the patient daughter is present. However, today, the patient's daughter was out and the patient voiced concerns about her daughter constantly threatening to kick her out of her home  amongst other observations. Elaine plans to communicate her observations and concerns to her manager and the  on staff, with the goal of scheduling a session with the  when the daughter is not present and contacting adult protective services if necessary.     Nutrition and exercise counseling: I encouraged the patient to consume three balanced meals daily and discussed the importance of incorporating 20-30 minutes of daily exercise, including gentle stretches. To improve overall health.    Chart review and documentation: I thoroughly reviewed the patient chart and updated Care Everywhere.     Immunization Status: I conducted an IDPH immunization query, Tb test and Flu vaccine reconciled.     Patient support: I actively listened to the patient's concern's, provided emotional support and encouraged the patient to reach out, if she needs further assistance.     Reviewed Future Appointments:   Future Appointments   Date Time Provider Department Center   9/5/2024  2:45 PM Martin Foy MD [facility]   9/10/2024  6:40 PM Jermaine Monson MD [facility]     Next Care Manager Follow Up Date: One month. Encouraged patient to call as needed.    Reason For Follow Up: review progress and or barriers towards patient's goals.     Time Spent This Encounter Total: 28 min medical record review, telephone communication, care plan updates where needed, education, goals, and  action plan recreation/update. Provided acknowledgment and validation to patient's concerns.   Monthly Minute Total including today: 28 min  Physical assessment, complete health history, and need for CCM established by Jermaine Monson MD.

## 2024-08-23 ENCOUNTER — TELEPHONE (OUTPATIENT)
Dept: INTERNAL MEDICINE CLINIC | Facility: CLINIC | Age: 74
End: 2024-08-23

## 2024-08-24 ENCOUNTER — APPOINTMENT (OUTPATIENT)
Dept: CT IMAGING | Facility: HOSPITAL | Age: 74
End: 2024-08-24
Attending: EMERGENCY MEDICINE
Payer: MEDICARE

## 2024-08-24 ENCOUNTER — APPOINTMENT (OUTPATIENT)
Dept: GENERAL RADIOLOGY | Facility: HOSPITAL | Age: 74
End: 2024-08-24
Attending: EMERGENCY MEDICINE
Payer: MEDICARE

## 2024-08-24 ENCOUNTER — HOSPITAL ENCOUNTER (INPATIENT)
Facility: HOSPITAL | Age: 74
LOS: 2 days | Discharge: HOME HEALTH | End: 2024-08-26
Attending: EMERGENCY MEDICINE | Admitting: INTERNAL MEDICINE
Payer: MEDICARE

## 2024-08-24 DIAGNOSIS — A41.9 SEPSIS DUE TO URINARY TRACT INFECTION (HCC): Primary | ICD-10-CM

## 2024-08-24 DIAGNOSIS — N39.0 SEPSIS DUE TO URINARY TRACT INFECTION (HCC): Primary | ICD-10-CM

## 2024-08-24 LAB
ALBUMIN SERPL-MCNC: 4.2 G/DL (ref 3.2–4.8)
ALP LIVER SERPL-CCNC: 76 U/L
ALT SERPL-CCNC: 15 U/L
ANION GAP SERPL CALC-SCNC: 9 MMOL/L (ref 0–18)
AST SERPL-CCNC: 23 U/L (ref ?–34)
BASE EXCESS BLD CALC-SCNC: 1.1 MMOL/L (ref ?–2)
BASOPHILS # BLD AUTO: 0.03 X10(3) UL (ref 0–0.2)
BASOPHILS NFR BLD AUTO: 0.3 %
BILIRUB DIRECT SERPL-MCNC: 0.1 MG/DL (ref ?–0.3)
BILIRUB SERPL-MCNC: 0.3 MG/DL (ref 0.2–1.1)
BILIRUB UR QL: NEGATIVE
BUN BLD-MCNC: 21 MG/DL (ref 9–23)
BUN/CREAT SERPL: 16.8 (ref 10–20)
CALCIUM BLD-MCNC: 10.1 MG/DL (ref 8.7–10.4)
CHLORIDE SERPL-SCNC: 105 MMOL/L (ref 98–112)
CO2 SERPL-SCNC: 24 MMOL/L (ref 21–32)
CREAT BLD-MCNC: 1.25 MG/DL
DEPRECATED RDW RBC AUTO: 37.9 FL (ref 35.1–46.3)
EGFRCR SERPLBLD CKD-EPI 2021: 46 ML/MIN/1.73M2 (ref 60–?)
EOSINOPHIL # BLD AUTO: 0.07 X10(3) UL (ref 0–0.7)
EOSINOPHIL NFR BLD AUTO: 0.7 %
ERYTHROCYTE [DISTWIDTH] IN BLOOD BY AUTOMATED COUNT: 12.5 % (ref 11–15)
EST. AVERAGE GLUCOSE BLD GHB EST-MCNC: 223 MG/DL (ref 68–126)
GLUCOSE BLD-MCNC: 255 MG/DL (ref 70–99)
GLUCOSE BLDC GLUCOMTR-MCNC: 220 MG/DL (ref 70–99)
GLUCOSE BLDC GLUCOMTR-MCNC: 238 MG/DL (ref 70–99)
GLUCOSE UR-MCNC: NORMAL MG/DL
HBA1C MFR BLD: 9.4 % (ref ?–5.7)
HCT VFR BLD AUTO: 37.1 %
HGB BLD-MCNC: 12.9 G/DL
HGB UR QL STRIP.AUTO: NEGATIVE
IMM GRANULOCYTES # BLD AUTO: 0.04 X10(3) UL (ref 0–1)
IMM GRANULOCYTES NFR BLD: 0.4 %
KETONES UR-MCNC: NEGATIVE MG/DL
LACTATE SERPL-SCNC: 1.6 MMOL/L (ref 0.5–2)
LACTATE SERPL-SCNC: 2.9 MMOL/L (ref 0.5–2)
LEUKOCYTE ESTERASE UR QL STRIP.AUTO: 500
LYMPHOCYTES # BLD AUTO: 1.37 X10(3) UL (ref 1–4)
LYMPHOCYTES NFR BLD AUTO: 14 %
MCH RBC QN AUTO: 28.5 PG (ref 26–34)
MCHC RBC AUTO-ENTMCNC: 34.8 G/DL (ref 31–37)
MCV RBC AUTO: 82.1 FL
MONOCYTES # BLD AUTO: 0.54 X10(3) UL (ref 0.1–1)
MONOCYTES NFR BLD AUTO: 5.5 %
NEUTROPHILS # BLD AUTO: 7.73 X10 (3) UL (ref 1.5–7.7)
NEUTROPHILS # BLD AUTO: 7.73 X10(3) UL (ref 1.5–7.7)
NEUTROPHILS NFR BLD AUTO: 79.1 %
NITRITE UR QL STRIP.AUTO: NEGATIVE
O2/TOTAL GAS SETTING VFR VENT: 21 %
OSMOLALITY SERPL CALC.SUM OF ELEC: 298 MOSM/KG (ref 275–295)
PCO2 BLDV: 37 MM HG (ref 38–50)
PH BLDV: 7.44 [PH] (ref 7.32–7.43)
PH UR: 6 [PH] (ref 5–8)
PLATELET # BLD AUTO: 273 10(3)UL (ref 150–450)
POTASSIUM SERPL-SCNC: 4.4 MMOL/L (ref 3.5–5.1)
PROT SERPL-MCNC: 7.8 G/DL (ref 5.7–8.2)
PROT UR-MCNC: 20 MG/DL
PUNCTURE CHARGE: NO
RBC # BLD AUTO: 4.52 X10(6)UL
SAO2 % BLDV: 71.8 % (ref 60–85)
SODIUM SERPL-SCNC: 138 MMOL/L (ref 136–145)
SP GR UR STRIP: 1.01 (ref 1–1.03)
UROBILINOGEN UR STRIP-ACNC: NORMAL
WBC # BLD AUTO: 9.8 X10(3) UL (ref 4–11)
WBC #/AREA URNS AUTO: >50 /HPF

## 2024-08-24 PROCEDURE — 70450 CT HEAD/BRAIN W/O DYE: CPT | Performed by: EMERGENCY MEDICINE

## 2024-08-24 PROCEDURE — 71045 X-RAY EXAM CHEST 1 VIEW: CPT | Performed by: EMERGENCY MEDICINE

## 2024-08-24 PROCEDURE — 99223 1ST HOSP IP/OBS HIGH 75: CPT | Performed by: INTERNAL MEDICINE

## 2024-08-24 RX ORDER — ATORVASTATIN CALCIUM 10 MG/1
10 TABLET, FILM COATED ORAL NIGHTLY
Status: DISCONTINUED | OUTPATIENT
Start: 2024-08-24 | End: 2024-08-26

## 2024-08-24 RX ORDER — ONDANSETRON 2 MG/ML
4 INJECTION INTRAMUSCULAR; INTRAVENOUS EVERY 6 HOURS PRN
Status: DISCONTINUED | OUTPATIENT
Start: 2024-08-24 | End: 2024-08-26

## 2024-08-24 RX ORDER — CIPROFLOXACIN 2 MG/ML
400 INJECTION, SOLUTION INTRAVENOUS EVERY 12 HOURS
Status: DISCONTINUED | OUTPATIENT
Start: 2024-08-25 | End: 2024-08-26

## 2024-08-24 RX ORDER — VANCOMYCIN HYDROCHLORIDE
25 ONCE
Status: COMPLETED | OUTPATIENT
Start: 2024-08-24 | End: 2024-08-24

## 2024-08-24 RX ORDER — HEPARIN SODIUM 5000 [USP'U]/ML
5000 INJECTION, SOLUTION INTRAVENOUS; SUBCUTANEOUS EVERY 8 HOURS SCHEDULED
Status: DISCONTINUED | OUTPATIENT
Start: 2024-08-24 | End: 2024-08-26

## 2024-08-24 RX ORDER — ACETAMINOPHEN 325 MG/1
650 TABLET ORAL EVERY 4 HOURS PRN
Status: DISCONTINUED | OUTPATIENT
Start: 2024-08-24 | End: 2024-08-26

## 2024-08-24 RX ORDER — DEXTROSE MONOHYDRATE 25 G/50ML
50 INJECTION, SOLUTION INTRAVENOUS
Status: DISCONTINUED | OUTPATIENT
Start: 2024-08-24 | End: 2024-08-26

## 2024-08-24 RX ORDER — CIPROFLOXACIN 2 MG/ML
400 INJECTION, SOLUTION INTRAVENOUS ONCE
Status: COMPLETED | OUTPATIENT
Start: 2024-08-24 | End: 2024-08-24

## 2024-08-24 RX ORDER — INSULIN DEGLUDEC 100 U/ML
15 INJECTION, SOLUTION SUBCUTANEOUS DAILY
Status: DISCONTINUED | OUTPATIENT
Start: 2024-08-24 | End: 2024-08-26

## 2024-08-24 RX ORDER — SERTRALINE HYDROCHLORIDE 100 MG/1
100 TABLET, FILM COATED ORAL DAILY
Status: DISCONTINUED | OUTPATIENT
Start: 2024-08-25 | End: 2024-08-26

## 2024-08-24 RX ORDER — HYDROCODONE BITARTRATE AND ACETAMINOPHEN 5; 325 MG/1; MG/1
1 TABLET ORAL EVERY 4 HOURS PRN
Status: DISCONTINUED | OUTPATIENT
Start: 2024-08-24 | End: 2024-08-26

## 2024-08-24 RX ORDER — SODIUM CHLORIDE 9 MG/ML
INJECTION, SOLUTION INTRAVENOUS CONTINUOUS
Status: DISCONTINUED | OUTPATIENT
Start: 2024-08-24 | End: 2024-08-26

## 2024-08-24 RX ORDER — HYDROCODONE BITARTRATE AND ACETAMINOPHEN 5; 325 MG/1; MG/1
2 TABLET ORAL EVERY 4 HOURS PRN
Status: DISCONTINUED | OUTPATIENT
Start: 2024-08-24 | End: 2024-08-26

## 2024-08-24 RX ORDER — GABAPENTIN 300 MG/1
300 CAPSULE ORAL 3 TIMES DAILY
Status: DISCONTINUED | OUTPATIENT
Start: 2024-08-24 | End: 2024-08-26

## 2024-08-24 RX ORDER — ACETAMINOPHEN 500 MG
500 TABLET ORAL EVERY 4 HOURS PRN
Status: DISCONTINUED | OUTPATIENT
Start: 2024-08-24 | End: 2024-08-26

## 2024-08-24 RX ORDER — NICOTINE POLACRILEX 4 MG
30 LOZENGE BUCCAL
Status: DISCONTINUED | OUTPATIENT
Start: 2024-08-24 | End: 2024-08-26

## 2024-08-24 RX ORDER — NICOTINE POLACRILEX 4 MG
15 LOZENGE BUCCAL
Status: DISCONTINUED | OUTPATIENT
Start: 2024-08-24 | End: 2024-08-26

## 2024-08-24 NOTE — ED INITIAL ASSESSMENT (HPI)
Family member reports pt in blank stare while talking, usually more awake. Reports glucose levels in 400 today with glucose monitor alarming.     Since January, had 6 recurrent uti's with odorous urine.    Denies fever or chills, denies n/v/d    Glucose 238 in triage. Hypotensive with sys in 70's. Map 55   show

## 2024-08-25 LAB
ALBUMIN SERPL-MCNC: 3.2 G/DL (ref 3.2–4.8)
ALBUMIN/GLOB SERPL: 1.1 {RATIO} (ref 1–2)
ALP LIVER SERPL-CCNC: 58 U/L
ALT SERPL-CCNC: 10 U/L
ANION GAP SERPL CALC-SCNC: 7 MMOL/L (ref 0–18)
AST SERPL-CCNC: 16 U/L (ref ?–34)
ATRIAL RATE: 73 BPM
BASOPHILS # BLD AUTO: 0.02 X10(3) UL (ref 0–0.2)
BASOPHILS NFR BLD AUTO: 0.2 %
BILIRUB SERPL-MCNC: 0.2 MG/DL (ref 0.2–1.1)
BUN BLD-MCNC: 20 MG/DL (ref 9–23)
BUN/CREAT SERPL: 18.9 (ref 10–20)
CALCIUM BLD-MCNC: 8.5 MG/DL (ref 8.7–10.4)
CHLORIDE SERPL-SCNC: 108 MMOL/L (ref 98–112)
CO2 SERPL-SCNC: 27 MMOL/L (ref 21–32)
CREAT BLD-MCNC: 1.06 MG/DL
DEPRECATED RDW RBC AUTO: 39.4 FL (ref 35.1–46.3)
EGFRCR SERPLBLD CKD-EPI 2021: 56 ML/MIN/1.73M2 (ref 60–?)
EOSINOPHIL # BLD AUTO: 0.1 X10(3) UL (ref 0–0.7)
EOSINOPHIL NFR BLD AUTO: 1.2 %
ERYTHROCYTE [DISTWIDTH] IN BLOOD BY AUTOMATED COUNT: 12.6 % (ref 11–15)
GLOBULIN PLAS-MCNC: 2.8 G/DL (ref 2–3.5)
GLUCOSE BLD-MCNC: 218 MG/DL (ref 70–99)
GLUCOSE BLDC GLUCOMTR-MCNC: 138 MG/DL (ref 70–99)
GLUCOSE BLDC GLUCOMTR-MCNC: 157 MG/DL (ref 70–99)
GLUCOSE BLDC GLUCOMTR-MCNC: 203 MG/DL (ref 70–99)
GLUCOSE BLDC GLUCOMTR-MCNC: 310 MG/DL (ref 70–99)
HCT VFR BLD AUTO: 32.5 %
HGB BLD-MCNC: 10.7 G/DL
IMM GRANULOCYTES # BLD AUTO: 0.02 X10(3) UL (ref 0–1)
IMM GRANULOCYTES NFR BLD: 0.2 %
LYMPHOCYTES # BLD AUTO: 2.26 X10(3) UL (ref 1–4)
LYMPHOCYTES NFR BLD AUTO: 26.8 %
MAGNESIUM SERPL-MCNC: 2 MG/DL (ref 1.6–2.6)
MCH RBC QN AUTO: 28.1 PG (ref 26–34)
MCHC RBC AUTO-ENTMCNC: 32.9 G/DL (ref 31–37)
MCV RBC AUTO: 85.3 FL
MONOCYTES # BLD AUTO: 0.62 X10(3) UL (ref 0.1–1)
MONOCYTES NFR BLD AUTO: 7.4 %
NEUTROPHILS # BLD AUTO: 5.41 X10 (3) UL (ref 1.5–7.7)
NEUTROPHILS # BLD AUTO: 5.41 X10(3) UL (ref 1.5–7.7)
NEUTROPHILS NFR BLD AUTO: 64.2 %
OSMOLALITY SERPL CALC.SUM OF ELEC: 303 MOSM/KG (ref 275–295)
P AXIS: 55 DEGREES
P-R INTERVAL: 138 MS
PLATELET # BLD AUTO: 228 10(3)UL (ref 150–450)
POTASSIUM SERPL-SCNC: 3.6 MMOL/L (ref 3.5–5.1)
PROT SERPL-MCNC: 6 G/DL (ref 5.7–8.2)
Q-T INTERVAL: 394 MS
QRS DURATION: 70 MS
QTC CALCULATION (BEZET): 434 MS
R AXIS: 88 DEGREES
RBC # BLD AUTO: 3.81 X10(6)UL
SODIUM SERPL-SCNC: 142 MMOL/L (ref 136–145)
T AXIS: 73 DEGREES
VENTRICULAR RATE: 73 BPM
WBC # BLD AUTO: 8.4 X10(3) UL (ref 4–11)

## 2024-08-25 PROCEDURE — 99233 SBSQ HOSP IP/OBS HIGH 50: CPT | Performed by: INTERNAL MEDICINE

## 2024-08-25 RX ORDER — LOSARTAN POTASSIUM 25 MG/1
25 TABLET ORAL DAILY
Status: DISCONTINUED | OUTPATIENT
Start: 2024-08-25 | End: 2024-08-26

## 2024-08-25 RX ORDER — HYDRALAZINE HYDROCHLORIDE 20 MG/ML
10 INJECTION INTRAMUSCULAR; INTRAVENOUS EVERY 6 HOURS PRN
Status: DISCONTINUED | OUTPATIENT
Start: 2024-08-25 | End: 2024-08-26

## 2024-08-25 NOTE — PLAN OF CARE
Problem: Patient Centered Care  Goal: Patient preferences are identified and integrated in the patient's plan of care  Description: Interventions:  - What would you like us to know as we care for you?   - Provide timely, complete, and accurate information to patient/family  - Incorporate patient and family knowledge, values, beliefs, and cultural backgrounds into the planning and delivery of care  - Encourage patient/family to participate in care and decision-making at the level they choose  - Honor patient and family perspectives and choices  Outcome: Progressing     Problem: Patient/Family Goals  Goal: Patient/Family Long Term Goal  Description: Patient's Long Term Goal: treat UTI     Interventions:  - Monitor vitals  - Monitor appropriate labs  - Administer medications as ordered  - Follow MD's orders  - Update patient on plan of care   - Discharge planning     - See additional Care Plan goals for specific interventions  Outcome: Progressing  Goal: Patient/Family Short Term Goal  Description: Patient's Short Term Goal: Monitor blood sugar    Interventions:   - Monitor vitals  - Monitor appropriate labs  - Administer medications as ordered  - Follow MD's orders  - Update patient on plan of care   - Discharge planning     - See additional Care Plan goals for specific interventions  Outcome: Progressing     Problem: PAIN - ADULT  Goal: Verbalizes/displays adequate comfort level or patient's stated pain goal  Description: INTERVENTIONS:  - Encourage pt to monitor pain and request assistance  - Assess pain using appropriate pain scale  - Administer analgesics based on type and severity of pain and evaluate response  - Implement non-pharmacological measures as appropriate and evaluate response  - Consider cultural and social influences on pain and pain management  - Manage/alleviate anxiety  - Utilize distraction and/or relaxation techniques  - Monitor for opioid side effects  - Notify MD/LIP if interventions  unsuccessful or patient reports new pain  - Anticipate increased pain with activity and pre-medicate as appropriate  Outcome: Progressing     Problem: RISK FOR INFECTION - ADULT  Goal: Absence of fever/infection during anticipated neutropenic period  Description: INTERVENTIONS  - Monitor WBC  - Administer growth factors as ordered  - Implement neutropenic guidelines  Outcome: Progressing     Problem: SAFETY ADULT - FALL  Goal: Free from fall injury  Description: INTERVENTIONS:  - Assess pt frequently for physical needs  - Identify cognitive and physical deficits and behaviors that affect risk of falls.  - [place] fall precautions as indicated by assessment.  - Educate pt/family on patient safety including physical limitations  - Instruct pt to call for assistance with activity based on assessment  - Modify environment to reduce risk of injury  - Provide assistive devices as appropriate  - Consider OT/PT consult to assist with strengthening/mobility  - Encourage toileting schedule  Outcome: Progressing     Problem: DISCHARGE PLANNING  Goal: Discharge to home or other facility with appropriate resources  Description: INTERVENTIONS:  - Identify barriers to discharge w/pt and caregiver  - Include patient/family/discharge partner in discharge planning  - Arrange for needed discharge resources and transportation as appropriate  - Identify discharge learning needs (meds, wound care, etc)  - Arrange for interpreters to assist at discharge as needed  - Consider post-discharge preferences of patient/family/discharge partner  - Complete POLST form as appropriate  - Assess patient's ability to be responsible for managing their own health  - Refer to Case Management Department for coordinating discharge planning if the patient needs post-hospital services based on physician/LIP order or complex needs related to functional status, cognitive ability or social support system  Outcome: Progressing     Problem: CARDIOVASCULAR -  ADULT  Goal: Maintains optimal cardiac output and hemodynamic stability  Description: INTERVENTIONS:  - Monitor vital signs, rhythm, and trends  - Monitor for bleeding, hypotension and signs of decreased cardiac output  - Evaluate effectiveness of vasoactive medications to optimize hemodynamic stability  - Monitor arterial and/or venous puncture sites for bleeding and/or hematoma  - Assess quality of pulses, skin color and temperature  - Assess for signs of decreased coronary artery perfusion - ex. Angina  - Evaluate fluid balance, assess for edema, trend weights  Outcome: Progressing  Goal: Absence of cardiac arrhythmias or at baseline  Description: INTERVENTIONS:  - Continuous cardiac monitoring, monitor vital signs, obtain 12 lead EKG if indicated  - Evaluate effectiveness of antiarrhythmic and heart rate control medications as ordered  - Initiate emergency measures for life threatening arrhythmias  - Monitor electrolytes and administer replacement therapy as ordered  Outcome: Progressing     Problem: METABOLIC/FLUID AND ELECTROLYTES - ADULT  Goal: Glucose maintained within prescribed range  Description: INTERVENTIONS:  - Monitor Blood Glucose as ordered  - Assess for signs and symptoms of hyperglycemia and hypoglycemia  - Administer ordered medications to maintain glucose within target range  - Assess barriers to adequate nutritional intake and initiate nutrition consult as needed  - Instruct patient on self management of diabetes  Outcome: Progressing  Goal: Electrolytes maintained within normal limits  Description: INTERVENTIONS:  - Monitor labs and rhythm and assess patient for signs and symptoms of electrolyte imbalances  - Administer electrolyte replacement as ordered  - Monitor response to electrolyte replacements, including rhythm and repeat lab results as appropriate  - Fluid restriction as ordered  - Instruct patient on fluid and nutrition restrictions as appropriate  Outcome: Progressing  Goal:  Hemodynamic stability and optimal renal function maintained  Description: INTERVENTIONS:  - Monitor labs and assess for signs and symptoms of volume excess or deficit  - Monitor intake, output and patient weight  - Monitor urine specific gravity, serum osmolarity and serum sodium as indicated or ordered  - Monitor response to interventions for patient's volume status, including labs, urine output, blood pressure (other measures as available)  - Encourage oral intake as appropriate  - Instruct patient on fluid and nutrition restrictions as appropriate  Outcome: Progressing

## 2024-08-25 NOTE — PLAN OF CARE
A&OX4. From home with daughter. Up with one and walker. Fluids infusing. Iv antibiotics.   Problem: Patient Centered Care  Goal: Patient preferences are identified and integrated in the patient's plan of care  Description: Interventions:  - What would you like us to know as we care for you? From home with daughter   - Provide timely, complete, and accurate information to patient/family  - Incorporate patient and family knowledge, values, beliefs, and cultural backgrounds into the planning and delivery of care  - Encourage patient/family to participate in care and decision-making at the level they choose  - Honor patient and family perspectives and choices  Outcome: Progressing     Problem: Patient/Family Goals  Goal: Patient/Family Long Term Goal  Description: Patient's Long Term Goal: treat UTI     Interventions:  - Monitor vitals  - Monitor appropriate labs  - Administer medications as ordered  - Follow MD's orders  - Update patient on plan of care   - Discharge planning     - See additional Care Plan goals for specific interventions  Outcome: Progressing  Goal: Patient/Family Short Term Goal  Description: Patient's Short Term Goal: Monitor blood sugar    Interventions:   - Monitor vitals  - Monitor appropriate labs  - Administer medications as ordered  - Follow MD's orders  - Update patient on plan of care   - Discharge planning     - See additional Care Plan goals for specific interventions  Outcome: Progressing     Problem: PAIN - ADULT  Goal: Verbalizes/displays adequate comfort level or patient's stated pain goal  Description: INTERVENTIONS:  - Encourage pt to monitor pain and request assistance  - Assess pain using appropriate pain scale  - Administer analgesics based on type and severity of pain and evaluate response  - Implement non-pharmacological measures as appropriate and evaluate response  - Consider cultural and social influences on pain and pain management  - Manage/alleviate anxiety  - Utilize  distraction and/or relaxation techniques  - Monitor for opioid side effects  - Notify MD/LIP if interventions unsuccessful or patient reports new pain  - Anticipate increased pain with activity and pre-medicate as appropriate  Outcome: Progressing     Problem: RISK FOR INFECTION - ADULT  Goal: Absence of fever/infection during anticipated neutropenic period  Description: INTERVENTIONS  - Monitor WBC  - Administer growth factors as ordered  - Implement neutropenic guidelines  Outcome: Progressing     Problem: SAFETY ADULT - FALL  Goal: Free from fall injury  Description: INTERVENTIONS:  - Assess pt frequently for physical needs  - Identify cognitive and physical deficits and behaviors that affect risk of falls.  - [place] fall precautions as indicated by assessment.  - Educate pt/family on patient safety including physical limitations  - Instruct pt to call for assistance with activity based on assessment  - Modify environment to reduce risk of injury  - Provide assistive devices as appropriate  - Consider OT/PT consult to assist with strengthening/mobility  - Encourage toileting schedule  Outcome: Progressing     Problem: DISCHARGE PLANNING  Goal: Discharge to home or other facility with appropriate resources  Description: INTERVENTIONS:  - Identify barriers to discharge w/pt and caregiver  - Include patient/family/discharge partner in discharge planning  - Arrange for needed discharge resources and transportation as appropriate  - Identify discharge learning needs (meds, wound care, etc)  - Arrange for interpreters to assist at discharge as needed  - Consider post-discharge preferences of patient/family/discharge partner  - Complete POLST form as appropriate  - Assess patient's ability to be responsible for managing their own health  - Refer to Case Management Department for coordinating discharge planning if the patient needs post-hospital services based on physician/LIP order or complex needs related to  functional status, cognitive ability or social support system  Outcome: Progressing     Problem: CARDIOVASCULAR - ADULT  Goal: Maintains optimal cardiac output and hemodynamic stability  Description: INTERVENTIONS:  - Monitor vital signs, rhythm, and trends  - Monitor for bleeding, hypotension and signs of decreased cardiac output  - Evaluate effectiveness of vasoactive medications to optimize hemodynamic stability  - Monitor arterial and/or venous puncture sites for bleeding and/or hematoma  - Assess quality of pulses, skin color and temperature  - Assess for signs of decreased coronary artery perfusion - ex. Angina  - Evaluate fluid balance, assess for edema, trend weights  Outcome: Progressing  Goal: Absence of cardiac arrhythmias or at baseline  Description: INTERVENTIONS:  - Continuous cardiac monitoring, monitor vital signs, obtain 12 lead EKG if indicated  - Evaluate effectiveness of antiarrhythmic and heart rate control medications as ordered  - Initiate emergency measures for life threatening arrhythmias  - Monitor electrolytes and administer replacement therapy as ordered  Outcome: Progressing     Problem: METABOLIC/FLUID AND ELECTROLYTES - ADULT  Goal: Glucose maintained within prescribed range  Description: INTERVENTIONS:  - Monitor Blood Glucose as ordered  - Assess for signs and symptoms of hyperglycemia and hypoglycemia  - Administer ordered medications to maintain glucose within target range  - Assess barriers to adequate nutritional intake and initiate nutrition consult as needed  - Instruct patient on self management of diabetes  Outcome: Progressing  Goal: Electrolytes maintained within normal limits  Description: INTERVENTIONS:  - Monitor labs and rhythm and assess patient for signs and symptoms of electrolyte imbalances  - Administer electrolyte replacement as ordered  - Monitor response to electrolyte replacements, including rhythm and repeat lab results as appropriate  - Fluid restriction as  ordered  - Instruct patient on fluid and nutrition restrictions as appropriate  Outcome: Progressing  Goal: Hemodynamic stability and optimal renal function maintained  Description: INTERVENTIONS:  - Monitor labs and assess for signs and symptoms of volume excess or deficit  - Monitor intake, output and patient weight  - Monitor urine specific gravity, serum osmolarity and serum sodium as indicated or ordered  - Monitor response to interventions for patient's volume status, including labs, urine output, blood pressure (other measures as available)  - Encourage oral intake as appropriate  - Instruct patient on fluid and nutrition restrictions as appropriate  Outcome: Progressing

## 2024-08-25 NOTE — PROGRESS NOTES
[facility]     Ananya Dowling Patient Status:  Inpatient    1950 MRN [id number]   Location [facility] 5SW/SE Attending Keaton Drew MD   Hosp Day # 1 PCP Jermaine Monson MD     Chief Complaint:   Chief Complaint   Patient presents with    Altered Mental Status    Hyperglycemia        Subjective:     Patient seen lying in bed.  No family at bedside.  Patient denies acute events overnight.  Patient reports feeling better today.  Patient states her confusion seems to be improving.  Patient remains without urinary burning or frequency.  Patient otherwise denies current chest pain, shortness of breath, abdominal pain, nausea vomit, fevers or chills.    Objective:      Vital signs:  Vitals:    24 2130 24 2138 24 0527 24 0858   BP:   123/53 151/56   BP Location:   Right arm Right arm   Pulse:  91 66 64   Resp:   16 16   Temp:   97.6 °F (36.4 °C) 97.5 °F (36.4 °C)   TempSrc:   Oral Oral   SpO2:   96% 100%   Weight: 128 lb 1.6 oz (58.1 kg)          Intake/Output Summary (Last 24 hours) at 2024 0956  Last data filed at 2024 0534  Gross per 24 hour   Intake 2200 ml   Output 400 ml   Net 1800 ml           Physical Exam:    GENERAL:  Awake and alert, in no acute distress.  HEART:  Regular rhythm.  Regular rate  LUNGS:  Air entry was minimally decreased.  No crackles or wheezes   ABDOMEN: Soft and non-tender.    NEUROLOGICAL: Awake and alert.  Minimal confusion noted.  There was no new focal motor or sensory deficit.  SKIN:  Warm and well perfused  PSYCHIATRIC: Normal mood    Diagnostic Data:    Labs:    Recent Labs   Lab 24  1843 24  0605   WBC 9.8 8.4   HGB 12.9 10.7*   MCV 82.1 85.3   .0 228.0       Recent Labs   Lab 24  1843 24  0605   * 218*   BUN 21 20   CREATSERUM 1.25* 1.06*   CA 10.1 8.5*   ALB 4.2 3.2    142   K 4.4 3.6    108   CO2 24.0 27.0    ALKPHO 76 58   AST 23 16   ALT 15 10   BILT 0.3 0.2   TP 7.8 6.0           Estimated Creatinine Clearance: 36.2 mL/min (A) (based on SCr of 1.06 mg/dL (H)).    No results for input(s): \"PTP\", \"INR\" in the last 168 hours.         COVID-19  Lab Results   Component Value Date    COVID19 Not Detected 03/18/2023    COVID19 Not Detected 03/03/2023    COVID19 Not Detected 02/28/2023       Pro-Calcitonin  No results for input(s): \"PCT\" in the last 168 hours.    Cardiac  No results for input(s): \"TROP\", \"PBNP\" in the last 168 hours.    Inflammatory Markers  No results for input(s): \"CRP\", \"EUSEBIA\", \"LDH\", \"DDIMER\" in the last 168 hours.    Culture:  Hospital Encounter on 08/24/24   1. Urine Culture, Routine     Status: Abnormal (Preliminary result)    Collection Time: 08/24/24  6:52 PM    Specimen: Urine, clean catch   Result Value Ref Range    Urine Culture >100,000 CFU/ML Escherichia coli (A) N/A       CT BRAIN OR HEAD (CPT=70450)    Result Date: 8/24/2024  CONCLUSION:   No acute intracranial hemorrhage, hydrocephalus, or mass effect.  Aerated debris within the left maxillary sinus.  Correlate with symptoms of acute sinusitis.      Dictated by (CST): Tamanna Macedo MD on 8/24/2024 at 7:53 PM     Finalized by (CST): Tamanna Macedo MD on 8/24/2024 at 7:56 PM          XR CHEST AP PORTABLE  (CPT=71045)    Result Date: 8/24/2024  CONCLUSION:   No radiographic evidence of acute cardiopulmonary process.    Dictated by (CST): Tamanna Macedo MD on 8/24/2024 at 7:34 PM     Finalized by (CST): Tamanna Macedo MD on 8/24/2024 at 7:35 PM           EKG    Result Date: 8/25/2024  Normal sinus rhythm Normal ECG When compared with ECG of 20-JUN-2024 05:23, No significant change was found     Medications:    heparin  5,000 Units Subcutaneous Q8H ELLI    insulin aspart  1-11 Units Subcutaneous TID CC and HS    ciprofloxacin  400 mg Intravenous Q12H    gabapentin  300 mg Oral TID    insulin degludec  15 Units Subcutaneous Daily     sertraline  100 mg Oral Daily    atorvastatin  10 mg Oral Nightly       Assessment & Plan:       Sepsis   -Resolving  -Likely secondary to urinary tract infection  -Patient presenting with altered mental status  -Noted to have elevated creatinine, tachycardia, lactic acidosis, hypotension  -UA with signs of infection  -Continue broad-spectrum antibiotics, ciprofloxacin based on previous cultures.  -Follow-up blood cultures and urine culture, preliminary urine culture with E. coli.  Follow-up sensitivities.  -Continue IV fluids  -Continue to monitor     Hypertension   -Initially with hypotension, now resolved  -Likely related to infection and sepsis as above  -Now having elevated blood pressure  -Restart losartan as SHUN resolving.  -Continue to monitor and add agents as needed     Acute Kidney Injury   -Likely related to sepsis and hypotension as above  -Resolving with IVF  - Avoiding Nephrotoxic agents  - Cont to monitor        Type 2 DM with hyperglycemia  -Uncontrolled  -Previous A1c of 12.4.  Recheck noted be 9.4  - Monitoring Blood glucose with POC checks  - SSI to cover hyperglycemia  - Hypoglycemia protocol  - Will monitor and adjust agents as needed.       Plan of care discussed with patient  at bedside . Discussed management/test result(s) with Rn     Quality:  DVT Prophylaxis: Heparin  CODE status: Full  Estimated date of discharge: TBD  Discharge is dependent on: clinical stability    Keaton Drew MD          This note was prepared using Dragon Medical voice recognition dictation software. As a result errors may occur. When identified these errors have been corrected. While every attempt is made to correct errors during dictation discrepancies may still exist

## 2024-08-25 NOTE — H&P
[facility]  part of [facility]  HISTORY AND PHYSICAL       Ananya Dowling Patient Status:  Inpatient    1950  73 year old CSN [id number]   Location 554/554-A Attending Keaton Drew MD     PCP Jermaine Monson MD         DATE OF ADMISSION: 2024     CHIEF COMPLAINT: Altered mental status    HISTORY OF PRESENT ILLNESS  This is a 73 year oldfemale who is sent in by family after they noted change in mental status.  No family at bedside at time of interview.  History limited from patient due to acuity of condition.  Patient was awake and alert.  Patient did state she noted some development of confusion.  Patient denied current chest pain, shortness of breath, abdominal pain, nausea vomiting, fevers or chills.  Patient denied urinary burning, frequency.  Further history from chart review and per ED provider.  ED physician reported family had noted progressive worsening of patient's behavior over the past several days.  Family then noted patient having episode of staring off into space.  Family also reported patient with difficult to control blood glucose levels, as high as 400s.  Of note, patient has history of recurrent UTIs.    PAST MEDICAL HISTORY  Past Medical History:    Arthritis    Back problem    Depression    Diabetes (HCC)    Essential hypertension    High blood pressure    High cholesterol    Visual impairment        PAST SURGICAL HISTORY  History reviewed. No pertinent surgical history.    ALLERGIES   Patient has no known allergies.    MEDICATIONS  Current Discharge Medication List        CONTINUE these medications which have NOT CHANGED    Details   gabapentin 300 MG Oral Cap Take 1 capsule (300 mg total) by mouth 3 (three) times daily.  Qty: 90 capsule, Refills: 3      ERGOCALCIFEROL 1.25 MG (80738 UT) Oral Cap TAKE 1 CAPSULE BY MOUTH 1 TIME A WEEK  Qty: 12 capsule, Refills: 0    Associated Diagnoses: Vitamin D deficiency      estradiol (ESTRACE) 0.1 MG/GM Vaginal Cream  Apply fingertip amount of cream to the vagina every night for 1 week and then every other night indefinitely  Qty: 42.5 g, Refills: 11    Associated Diagnoses: Genitourinary syndrome of menopause; Recurrent UTI      clotrimazole 1 % External Cream Apply 1 Application topically 2 (two) times daily.  Qty: 60 g, Refills: 1      lidocaine 4 % External Patch Place 1 patch onto the skin daily.      Insulin Degludec (TRESIBA FLEXTOUCH) 100 UNIT/ML Subcutaneous Solution Pen-injector Inject 36 Units into the skin daily.      simvastatin 20 MG Oral Tab Take 1 tablet (20 mg total) by mouth daily.  Qty: 90 tablet, Refills: 2      sertraline 100 MG Oral Tab Take 1 tablet (100 mg total) by mouth daily.  Qty: 90 tablet, Refills: 2      losartan 50 MG Oral Tab Take 0.5 tablets (25 mg total) by mouth daily.  Qty: 90 tablet, Refills: 2      tamsulosin 0.4 MG Oral Cap Take 1 capsule (0.4 mg total) by mouth daily. Take 1/2 hour following the same meal each day  Qty: 90 capsule, Refills: 0      Accu-Chek FastClix Lancets Does not apply Misc Use to check blood sugars 3 times daily.  Qty: 300 each, Refills: 1    Associated Diagnoses: Type 2 diabetes mellitus with hyperglycemia, with long-term current use of insulin ([facility])      Blood Glucose Monitoring Suppl (ACCU-CHEK GUIDE) w/Device Does not apply Kit Use to check blood sugars 3 times daily.  Qty: 1 kit, Refills: 0    Associated Diagnoses: Type 2 diabetes mellitus with hyperglycemia, with long-term current use of insulin ([facility])      Glucose Blood (ACCU-CHEK GUIDE) In Vitro Strip Use to check blood sugars 3 times daily.  Qty: 300 strip, Refills: 1    Associated Diagnoses: Type 2 diabetes mellitus with hyperglycemia, with long-term current use of insulin ([facility])      conjugated estrogens 0.625 MG/GM Vaginal Cream Place 0.5 g vaginally twice a week.  Qty: 30 g, Refills: 5      Insulin Lispro, 1 Unit Dial, 100 UNIT/ML Subcutaneous Solution Pen-injector Patient will take 14 units with small carb  meals and 18-20 units with larger carb meals.  Qty: 54 mL, Refills: 1    Associated Diagnoses: Type 2 diabetes mellitus with hyperglycemia, with long-term current use of insulin (HCC)      Insulin Pen Needle (PEN NEEDLES) 32G X 4 MM Does not apply Misc 1 pen  4 (four) times daily. Use  New pen needle  With each injection  Qty: 400 each, Refills: 0           STOP taking these medications       sulfamethoxazole-trimethoprim DS (BACTRIM DS) 800-160 MG Oral Tab per tablet Comments:   Reason for Stopping:         sulfamethoxazole-trimethoprim -160 MG Oral Tab per tablet Comments:   Reason for Stopping:         cephalexin 500 MG Oral Cap Comments:   Reason for Stopping:               SOCIAL HISTORY  Social History     Socioeconomic History    Marital status:    Tobacco Use    Smoking status: Never    Smokeless tobacco: Never   Vaping Use    Vaping status: Never Used   Substance and Sexual Activity    Alcohol use: Yes     Comment: very infrequent, every couple of months    Drug use: Never       FAMILY HISTORY  Family History   Problem Relation Age of Onset    Heart Attack Father     Stroke Mother     Heart Disorder Son     Diabetes Son        PHYSICAL EXAM  Vital signs:  BP (!) 162/89 (BP Location: Right arm)   Pulse 97   Temp 97.9 °F (36.6 °C) (Oral)   Resp 16   Wt 128 lb 1.6 oz (58.1 kg)   SpO2 98%   BMI 24.20 kg/m²      GENERAL:  Awake and alert, in no acute distress.  HEART:  Regular rhythm.  Tachycardia  LUNGS:  Air entry was minimally decreased.  No crackles or wheezes   ABDOMEN: Soft and non-tender.    NEUROLOGICAL: Awake and alert.  Confusion noted.  There was no new focal motor or sensory deficit.  SKIN:  Warm and well perfused  PSYCHIATRIC: Normal mood      IMAGING  CT BRAIN OR HEAD (CPT=70450)    Result Date: 8/24/2024  CONCLUSION:   No acute intracranial hemorrhage, hydrocephalus, or mass effect.  Aerated debris within the left maxillary sinus.  Correlate with symptoms of acute sinusitis.       Dictated by (CST): Tamanna Macedo MD on 8/24/2024 at 7:53 PM     Finalized by (CST): Tamanna Macedo MD on 8/24/2024 at 7:56 PM          XR CHEST AP PORTABLE  (CPT=71045)    Result Date: 8/24/2024  CONCLUSION:   No radiographic evidence of acute cardiopulmonary process.    Dictated by (CST): Tamanna Macedo MD on 8/24/2024 at 7:34 PM     Finalized by (CST): Tamanna Macedo MD on 8/24/2024 at 7:35 PM           Data:  Recent Labs   Lab 08/24/24  1843   RBC 4.52   HGB 12.9   HCT 37.1   MCV 82.1   MCH 28.5   MCHC 34.8   RDW 12.5   NEPRELIM 7.73*   WBC 9.8   .0     Recent Labs   Lab 08/24/24  1843   *   BUN 21   CREATSERUM 1.25*   CA 10.1   ALB 4.2      K 4.4      CO2 24.0   ALKPHO 76   AST 23   ALT 15   BILT 0.3   TP 7.8       ASSESSMENT/PLAN      Sepsis   -Likely secondary to urinary tract infection  -Patient presenting with altered mental status  -Noted to have elevated creatinine, tachycardia, lactic acidosis, hypotension  -UA with signs of infection  -Starting broad-spectrum antibiotics, ciprofloxacin based on previous cultures.  -Follow-up blood cultures and urine culture  -Starting IV fluids  -Repeat lactic acid level  -Continue to monitor    Hypotension  -Likely related to infection and sepsis as above  -Resolving with IV fluids  -Now having elevated blood pressure  -Lose control the patient with recent hypotension  -Continue to monitor and add agents as needed    Acute Kidney Injury   -Likely related to sepsis and hypotension as above  - Starting IVF  - Avoiding Nephrotoxic agents  - Cont to monitor      Type 2 DM with hyperglycemia  -Uncontrolled  -Previous A1c of 12.4.  Recheck.  - Monitoring Blood glucose with POC checks  - SSI to cover hyperglycemia  - Hypoglycemia protocol  - Will monitor and adjust agents as needed.          Plan of care discussed with patient at bedside.  Discussed with ED physician and RN. Decision made that pt needs hospitalization for further  management/monitoring.      Keaton Drew MD    This note was prepared using Dragon Medical voice recognition dictation software. As a result errors may occur. When identified these errors have been corrected. While every attempt is made to correct errors during dictation discrepancies may still exist

## 2024-08-25 NOTE — ED QUICK NOTES
Orders for admission, patient is aware of plan and ready to go upstairs. Any questions, please call ED RN Verito at extension [phone].     Patient Covid vaccination status: Fully vaccinated     COVID Test Ordered in ED: None    COVID Suspicion at Admission: N/A    Running Infusions:      Mental Status/LOC at time of transport: A&Ox4    Other pertinent information: From home with family, incontinent    CIWA score: N/A   NIH score:  N/A

## 2024-08-25 NOTE — OCCUPATIONAL THERAPY NOTE
OCCUPATIONAL THERAPY EVALUATION - INPATIENT     Room Number: 554/554-A  Evaluation Date: 8/25/2024  Type of Evaluation: Initial  Presenting Problem: OF PRESENT ILLNESS  This is a 73 year oldfemale who is sent in by family after they noted change in mental status.  No family at bedside at time of interview.  History limited from patient due to acuity of condition.  Patient was awake and alert.  Patient did state she noted some development of confusion.    Physician Order: IP Consult to Occupational Therapy  Reason for Therapy: ADL/IADL Dysfunction and Discharge Planning    OCCUPATIONAL THERAPY ASSESSMENT   Patient is a 73 year old female admitted 8/24/2024 for AMS and dec endurance .  Prior to admission, patient's baseline is mod I .  Patient is currently functioning near baseline with  All self care tasks and functional transfers .  Patient is requiring contact guard assist as a result of the following impairments: decreased functional reach, decreased endurance, and decreased muscular endurance. Occupational Therapy will continue to follow for duration of hospitalization.    Patient will benefit from continued skilled OT Services at discharge to promote prior level of function.  Anticipate patient will return home with home health OT    PLAN  OT Treatment Plan: ADL training;UE strengthening/ROM;Endurance training;Cognitive reorientation;Patient/Family education  OT Device Recommendations: None    OCCUPATIONAL THERAPY MEDICAL/SOCIAL HISTORY   Problem List  Principal Problem:    Sepsis due to urinary tract infection (HCC)  Active Problems:    Sepsis (HCC)    HOME SITUATION  Type of Home: House  Home Layout: Two level  Lives With: Daughter  Toilet and Equipment: Standard height toilet  Shower/Tub and Equipment: Walk-in shower; Shower chair  Drives: No    Stairs in Home: 1 flight  Use of Assistive Device(s): rolling walker    Prior Level of [place]: mod I     SUBJECTIVE  \"I need help at my house but no one is  home. \"    OCCUPATIONAL THERAPY EXAMINATION    OBJECTIVE  Precautions: Bed/chair alarm; Low vision  Fall Risk: High fall risk    PAIN ASSESSMENT  Ratin    ACTIVITY TOLERANCE                         O2 SATURATIONS       COGNITION  Impaired short term memory     VISION  Dec acuity noted - blurred vision    New onset of double vision per patient report.  MD aware       Communication: able to communicate all wants and needs    Behavioral/Emotional/Social: agreeable     RANGE OF MOTION   Upper extremity ROM is within functional limits     STRENGTH ASSESSMENT  Upper extremity strength is within functional limits     COORDINATION  Gross Motor: WFL   Fine Motor: WFL     ACTIVITIES OF DAILY LIVING ASSESSMENT  AM-PAC ‘6-Clicks’ Inpatient Daily Activity Short Form  How much help from another person does the patient currently need…  -   Putting on and taking off regular lower body clothing?: A Little  -   Bathing (including washing, rinsing, drying)?: A Little  -   Toileting, which includes using toilet, bedpan or urinal? : A Little  -   Putting on and taking off regular upper body clothing?: None  -   Taking care of personal grooming such as brushing teeth?: None  -   Eating meals?: None    AM-PAC Score:  Score: 21  Approx Degree of Impairment: 32.79%  Standardized Score (AM-PAC Scale): 44.27  CMS Modifier (G-Code): CJ    FUNCTIONAL TRANSFER ASSESSMENT  Sit to Stand: Edge of Bed  Edge of Bed: Contact Guard Assist  Toilet Transfer: Contact Guard Assist    BED MOBILITY  Rolling: Supervision  Supine to Sit : Supervision  Sit to Supine (OT): Supervision    BALANCE ASSESSMENT     FUNCTIONAL ADL ASSESSMENT     THERAPEUTIC EXERCISE     Skilled Therapy Provided: Role of OT, t/f safety and energy conservation     EDUCATION PROVIDED  Patient: Role of Occupational Therapy; Plan of Care; Discharge Recommendations; Adaptive Equipment Recommendations; DME Recommendations; Functional Transfer Techniques  Patient's Response to Education:  Verbalized Understanding; Requires Further Education    The patient's Approx Degree of Impairment: 32.79% has been calculated based on documentation in the [facility] '6 clicks' Inpatient Daily Activity Short Form.  Research supports that patients with this level of impairment may benefit from inc therapy while in house and hhot upon discharge. .  Final disposition will be made by interdisciplinary medical team.     Patient End of Session: Up in chair;Needs met;Call light within reach;RN aware of session/findings;All patient questions and concerns addressed    OT Goals  Patients self stated goal is: to go home     Patient will complete functional transfer at mod I level   Comment:     Patient will complete toileting at mod I level   Comment:     Patient will tolerate standing for 10 minutes in prep for adls with supervision    Comment:    Patient will complete item retrieval with supervision   Comment:          Goals  on: 9/10/24  Frequency: 3x a week     Patient Evaluation Complexity Level:   Occupational Profile/Medical History LOW - Brief history including review of medical or therapy records    Specific performance deficits impacting engagement in ADL/IADL LOW  1 - 3 performance deficits    Client Assessment/Performance Deficits LOW - No comorbidities nor modifications of tasks    Clinical Decision Making LOW - Analysis of occupational profile, problem-focused assessments, limited treatment options    Overall Complexity LOW     OT Session Time: 20 minutes  Self-Care Home Management: 20 minutes

## 2024-08-25 NOTE — CM/SW NOTE
MDO to [facility] for HH, SW confirmed pt is current w/[facility] for (PT/OT/RN and MSW).  FERMIN uploaded.    SW/CM to remain available for support and/or discharge planning.      Sonia DOTSON, LSW  Social Work/Case Management

## 2024-08-25 NOTE — ED PROVIDER NOTES
Patient Seen in: [facility] Emergency Department      History     Chief Complaint   Patient presents with    Altered Mental Status    Hyperglycemia     Stated Complaint: AMS, elevated glucose levels, urinary symptoms    Subjective:   HPI        Objective:   Past Medical History:    Arthritis    Back problem    Depression    Diabetes (HCC)    Essential hypertension    High blood pressure    High cholesterol    Visual impairment              History reviewed. No pertinent surgical history.             Social History     Socioeconomic History    Marital status:    Tobacco Use    Smoking status: Never    Smokeless tobacco: Never   Vaping Use    Vaping status: Never Used   Substance and Sexual Activity    Alcohol use: Yes     Comment: very infrequent, every couple of months    Drug use: Never     Social Determinants of Health     Financial Resource Strain: Low Risk  (3/29/2024)    Financial Resource Strain     Difficulty of Paying Living Expenses: Somewhat hard     Med Affordability: No   Food Insecurity: No Food Insecurity (5/2/2024)    Food Insecurity     Food Insecurity: Never true   Transportation Needs: No Transportation Needs (5/2/2024)    Transportation Needs     Lack of Transportation: No   Recent Concern: Transportation Needs - Unmet Transportation Needs (3/29/2024)    Transportation Needs     Lack of Transportation: Yes   Physical Activity: Inactive (2/16/2023)    Exercise Vital Sign     Days of Exercise per Week: 0 days     Minutes of Exercise per Session: 0 min   Stress: No Stress Concern Present (2/16/2023)    Stress     Feeling of Stress : No   Social Connections: Socially Integrated (2/16/2023)    Social Connections     Frequency of Socialization with Friends and Family: 3   Housing Stability: Low Risk  (5/2/2024)    Housing Stability     Housing Instability: No              Review of Systems    Positive for stated Chief Complaint: Altered Mental Status and Hyperglycemia    Other systems  are as noted in HPI.  Constitutional and vital signs reviewed.      All other systems reviewed and negative except as noted above.    Physical Exam     ED Triage Vitals [08/24/24 1817]   BP (!) 79/49   Pulse 85   Resp 20   Temp 97.6 °F (36.4 °C)   Temp src Oral   SpO2 100 %   O2 Device None (Room air)       Current Vitals:   Vital Signs  BP: (!) 171/77  Pulse: 98  Resp: 23  Temp: 97.6 °F (36.4 °C)  Temp src: Oral  MAP (mmHg): 98    Oxygen Therapy  SpO2: 100 %  O2 Device: None (Room air)            Physical Exam          ED Course     Labs Reviewed   BASIC METABOLIC PANEL (8) - Abnormal; Notable for the following components:       Result Value    Glucose 255 (*)     Creatinine 1.25 (*)     Calculated Osmolality 298 (*)     eGFR-Cr 46 (*)     All other components within normal limits   CBC WITH DIFFERENTIAL WITH PLATELET - Abnormal; Notable for the following components:    Neutrophil Absolute Prelim 7.73 (*)     Neutrophil Absolute 7.73 (*)     All other components within normal limits   LACTIC ACID, PLASMA - Abnormal; Notable for the following components:    Lactic Acid 2.9 (*)     All other components within normal limits   VENOUS BLOOD GAS - Abnormal; Notable for the following components:    Venous pH 7.44 (*)     Venous pCO2 37 (*)     All other components within normal limits   URINALYSIS WITH CULTURE REFLEX - Abnormal; Notable for the following components:    Clarity Urine Turbid (*)     Protein Urine 20 (*)     Leukocyte Esterase Urine 500 (*)     WBC Urine >50 (*)     Bacteria Urine 1+ (*)     Squamous Epi. Cells Few (*)     All other components within normal limits   POCT GLUCOSE - Abnormal; Notable for the following components:    POC Glucose  238 (*)     All other components within normal limits   HEPATIC FUNCTION PANEL (7) - Normal   LACTIC ACID REFLEX POST POSTIVE   BLOOD CULTURE   BLOOD CULTURE   URINE CULTURE, ROUTINE     EKG    Rate, intervals and axes as noted on EKG Report.  Rate: 73  Rhythm: Sinus  Rhythm  Reading: Normal sinus rhythm without signs of acute ischemia or abnormal intervals.                            MDM      73-year-old female history of diabetes, hyperlipidemia, hypertension presents today with altered level of consciousness.  Per family, in the last few days she has been acting somewhat strange in and out of her normal behavior.  Today, after getting out of the shower, she was staring off into space.  They note she has had difficulty controlling her blood sugars recently with levels as high as 400.  They also note that she has been having recurrent UTIs over the last 6 months including admissions for septicemia and they have noted an abnormal odor to her urine.  They deny new medications or abnormal ingestions.  Denies fevers, shortness of breath, headache, focal weakness, or other symptoms.    On exam, hypotensive 70s over 40s, somewhat altered with difficulty providing a full history, clear lungs, soft and nontender abdomen, PERRLA, EOMI, no focal weakness.    Differential: Hypotension with concern for septic shock given her history of recurrent UTIs.  Altered mental status.    Sepsis evaluation started with empiric IV fluids and antibiotics.  Giving ciprofloxacin and vancomycin per recent culture growth on record with sensitivities.    Labs show an elevated lactic acid, hyperglycemia with normal anion gap, VBG with normal pH, urinalysis concerning for infection.    Blood pressures improved significantly with IV lactated Ringer's.  Will admit for further management.    I completed the sepsis reassessment exam at 2018    I spent a total of 35 minutes of critical care time in obtaining history, performing a physical exam, bedside monitoring of interventions, collecting and interpreting tests and discussion with consultants but not including time spent performing procedures.  .        Admission disposition: 8/24/2024  7:42 PM                                        [facility]    Disposition and Plan      Clinical Impression:  1. Sepsis due to urinary tract infection (HCC)         Disposition:  Admit  8/24/2024  7:42 pm    Follow-up:  No follow-up provider specified.  We recommend that you schedule follow up care with a primary care provider within the next three months to obtain basic health screening including reassessment of your blood pressure.      Medications Prescribed:  Current Discharge Medication List                            Hospital Problems       Present on Admission  Date Reviewed: 8/19/2024            ICD-10-CM Noted POA    Sepsis (HCC) A41.9 8/24/2024 Unknown

## 2024-08-25 NOTE — HOME CARE LIAISON
Patient is current with [facility].  Please enter FERMIN order upon discharge.  RN PT OT MSW.

## 2024-08-26 ENCOUNTER — TELEPHONE (OUTPATIENT)
Dept: SURGERY | Facility: CLINIC | Age: 74
End: 2024-08-26

## 2024-08-26 VITALS
HEART RATE: 86 BPM | WEIGHT: 128.13 LBS | BODY MASS INDEX: 24 KG/M2 | DIASTOLIC BLOOD PRESSURE: 47 MMHG | TEMPERATURE: 98 F | SYSTOLIC BLOOD PRESSURE: 130 MMHG | RESPIRATION RATE: 18 BRPM | OXYGEN SATURATION: 97 %

## 2024-08-26 LAB
GLUCOSE BLDC GLUCOMTR-MCNC: 172 MG/DL (ref 70–99)
GLUCOSE BLDC GLUCOMTR-MCNC: 281 MG/DL (ref 70–99)

## 2024-08-26 PROCEDURE — 99239 HOSP IP/OBS DSCHRG MGMT >30: CPT | Performed by: INTERNAL MEDICINE

## 2024-08-26 RX ORDER — CEFADROXIL 500 MG/1
500 CAPSULE ORAL 2 TIMES DAILY
Qty: 10 CAPSULE | Refills: 0 | Status: SHIPPED | OUTPATIENT
Start: 2024-08-26 | End: 2024-08-31

## 2024-08-26 NOTE — PLAN OF CARE
Problem: Patient Centered Care  Goal: Patient preferences are identified and integrated in the patient's plan of care  Description: Interventions:  - What would you like us to know as we care for you? Pt from home with daughter, would like to go to rehab  - Provide timely, complete, and accurate information to patient/family  - Incorporate patient and family knowledge, values, beliefs, and cultural backgrounds into the planning and delivery of care  - Encourage patient/family to participate in care and decision-making at the level they choose  - Honor patient and family perspectives and choices  Outcome: Progressing     Problem: Patient/Family Goals  Goal: Patient/Family Long Term Goal  Description: Patient's Long Term Goal: treat UTI     Interventions:  - Monitor vitals  - Monitor appropriate labs  - Administer medications as ordered  - Follow MD's orders  - Update patient on plan of care   - Discharge planning     - See additional Care Plan goals for specific interventions  Outcome: Progressing  Goal: Patient/Family Short Term Goal  Description: Patient's Short Term Goal: Monitor blood sugar    Interventions:   - Monitor vitals  - Monitor appropriate labs  - Administer medications as ordered  - Follow MD's orders  - Update patient on plan of care   - Discharge planning     - See additional Care Plan goals for specific interventions  Outcome: Progressing     Problem: PAIN - ADULT  Goal: Verbalizes/displays adequate comfort level or patient's stated pain goal  Description: INTERVENTIONS:  - Encourage pt to monitor pain and request assistance  - Assess pain using appropriate pain scale  - Administer analgesics based on type and severity of pain and evaluate response  - Implement non-pharmacological measures as appropriate and evaluate response  - Consider cultural and social influences on pain and pain management  - Manage/alleviate anxiety  - Utilize distraction and/or relaxation techniques  - Monitor for opioid  side effects  - Notify MD/LIP if interventions unsuccessful or patient reports new pain  - Anticipate increased pain with activity and pre-medicate as appropriate  Outcome: Progressing     Problem: RISK FOR INFECTION - ADULT  Goal: Absence of fever/infection during anticipated neutropenic period  Description: INTERVENTIONS  - Monitor WBC  - Administer growth factors as ordered  - Implement neutropenic guidelines  Outcome: Progressing     Problem: SAFETY ADULT - FALL  Goal: Free from fall injury  Description: INTERVENTIONS:  - Assess pt frequently for physical needs  - Identify cognitive and physical deficits and behaviors that affect risk of falls.  - [place] fall precautions as indicated by assessment.  - Educate pt/family on patient safety including physical limitations  - Instruct pt to call for assistance with activity based on assessment  - Modify environment to reduce risk of injury  - Provide assistive devices as appropriate  - Consider OT/PT consult to assist with strengthening/mobility  - Encourage toileting schedule  Outcome: Progressing     Problem: DISCHARGE PLANNING  Goal: Discharge to home or other facility with appropriate resources  Description: INTERVENTIONS:  - Identify barriers to discharge w/pt and caregiver  - Include patient/family/discharge partner in discharge planning  - Arrange for needed discharge resources and transportation as appropriate  - Identify discharge learning needs (meds, wound care, etc)  - Arrange for interpreters to assist at discharge as needed  - Consider post-discharge preferences of patient/family/discharge partner  - Complete POLST form as appropriate  - Assess patient's ability to be responsible for managing their own health  - Refer to Case Management Department for coordinating discharge planning if the patient needs post-hospital services based on physician/LIP order or complex needs related to functional status, cognitive ability or social support  system  Outcome: Progressing     Problem: CARDIOVASCULAR - ADULT  Goal: Maintains optimal cardiac output and hemodynamic stability  Description: INTERVENTIONS:  - Monitor vital signs, rhythm, and trends  - Monitor for bleeding, hypotension and signs of decreased cardiac output  - Evaluate effectiveness of vasoactive medications to optimize hemodynamic stability  - Monitor arterial and/or venous puncture sites for bleeding and/or hematoma  - Assess quality of pulses, skin color and temperature  - Assess for signs of decreased coronary artery perfusion - ex. Angina  - Evaluate fluid balance, assess for edema, trend weights  Outcome: Progressing  Goal: Absence of cardiac arrhythmias or at baseline  Description: INTERVENTIONS:  - Continuous cardiac monitoring, monitor vital signs, obtain 12 lead EKG if indicated  - Evaluate effectiveness of antiarrhythmic and heart rate control medications as ordered  - Initiate emergency measures for life threatening arrhythmias  - Monitor electrolytes and administer replacement therapy as ordered  Outcome: Progressing     Problem: METABOLIC/FLUID AND ELECTROLYTES - ADULT  Goal: Glucose maintained within prescribed range  Description: INTERVENTIONS:  - Monitor Blood Glucose as ordered  - Assess for signs and symptoms of hyperglycemia and hypoglycemia  - Administer ordered medications to maintain glucose within target range  - Assess barriers to adequate nutritional intake and initiate nutrition consult as needed  - Instruct patient on self management of diabetes  Outcome: Progressing  Goal: Electrolytes maintained within normal limits  Description: INTERVENTIONS:  - Monitor labs and rhythm and assess patient for signs and symptoms of electrolyte imbalances  - Administer electrolyte replacement as ordered  - Monitor response to electrolyte replacements, including rhythm and repeat lab results as appropriate  - Fluid restriction as ordered  - Instruct patient on fluid and nutrition  restrictions as appropriate  Outcome: Progressing  Goal: Hemodynamic stability and optimal renal function maintained  Description: INTERVENTIONS:  - Monitor labs and assess for signs and symptoms of volume excess or deficit  - Monitor intake, output and patient weight  - Monitor urine specific gravity, serum osmolarity and serum sodium as indicated or ordered  - Monitor response to interventions for patient's volume status, including labs, urine output, blood pressure (other measures as available)  - Encourage oral intake as appropriate  - Instruct patient on fluid and nutrition restrictions as appropriate  Outcome: Progressing

## 2024-08-26 NOTE — DISCHARGE SUMMARY
[facility]  part of [facility]    Discharge Summary    Ananya Dowling Patient Status:  Inpatient    1950 MRN [id number]   Location [facility] 5SW/SE Attending Keaton Drew MD   Hosp Day # 2 PCP Jermaine Monson MD     Date of Admission: 2024     Date of Discharge: 24      Lace+ Score: 67  59-90 High Risk  29-58 Medium Risk  0-28   Low Risk.    TCM Follow-Up Recommendation:  LACE > 58: High Risk of readmission after discharge from the hospital.    DISCHARGE DX: Principal Problem:    Sepsis due to urinary tract infection (HCC)  Active Problems:    Sepsis (HCC)       The patient was seen and examined on day of discharge and this discharge summary is in conjunction with any daily progress note from day of discharge.    HPI per admitting physician: \"This is a 73 year oldfemale who is sent in by family after they noted change in mental status.  No family at bedside at time of interview.  History limited from patient due to acuity of condition.  Patient was awake and alert.  Patient did state she noted some development of confusion.  Patient denied current chest pain, shortness of breath, abdominal pain, nausea vomiting, fevers or chills.  Patient denied urinary burning, frequency.  Further history from chart review and per ED provider.  ED physician reported family had noted progressive worsening of patient's behavior over the past several days.  Family then noted patient having episode of staring off into space.  Family also reported patient with difficult to control blood glucose levels, as high as 400s.  Of note, patient has history of recurrent UTIs. \"    Hospital Course:      Sepsis   -Resolving  -Likely secondary to urinary tract infection  -Patient presenting with altered mental status  -Noted to have elevated creatinine, tachycardia, lactic acidosis, hypotension  -UA with signs of infection  -Started on broad-spectrum antibiotics, ciprofloxacin based on previous  cultures.  - blood cultures NGTD  - Urine culture with pansensitive E. coli.   -Complete course of PO abx as outpt     Hypertension   -Initially with hypotension, now resolved  -Likely related to infection and sepsis as above  -Now having elevated blood pressure  -Restarted losartan   -Resume home regimen     Acute Kidney Injury   -Likely related to sepsis and hypotension as above  -Improved with IVF  -Encouraging PO intake  - Avoiding Nephrotoxic agents  - Cont to monitor        Type 2 DM with hyperglycemia  -Uncontrolled  -Previous A1c of 12.4.  Recheck noted be 9.4  - Monitoring Blood glucose with POC checks  - Resume home regimen      Physical Exam:    Vitals:    08/25/24 2357 08/26/24 0506 08/26/24 0835 08/26/24 0950   BP: 139/46 119/48 132/54 130/47   BP Location:  Right arm Right arm Right arm   Pulse:  72 69 86   Resp:  18 18    Temp:  97.8 °F (36.6 °C) 97.7 °F (36.5 °C)    TempSrc:  Oral Oral    SpO2:  96% 97%    Weight:         Patient Weight for the past 72 hrs:   Weight   08/24/24 1817 128 lb (58.1 kg)   08/24/24 2130 128 lb 1.6 oz (58.1 kg)       Intake/Output Summary (Last 24 hours) at 8/26/2024 1334  Last data filed at 8/26/2024 0950  Gross per 24 hour   Intake 1725 ml   Output 2400 ml   Net -675 ml       GENERAL:  Awake and alert, in no acute distress.  HEART:  Regular rhythm.  Regular rate  LUNGS:  Air entry was minimally decreased.  No crackles or wheezes   ABDOMEN: Soft and non-tender.    NEUROLOGICAL: Awake and alert.  Confusion resolving.  There was no new focal motor or sensory deficit.  SKIN:  Warm and well perfused  PSYCHIATRIC: Normal mood    CULTURE:   Hospital Encounter on 08/24/24   1. Urine Culture, Routine     Status: Abnormal    Collection Time: 08/24/24  6:52 PM    Specimen: Urine, clean catch   Result Value Ref Range    Urine Culture >100,000 CFU/ML Escherichia coli (A) N/A       Susceptibility    Escherichia coli -  (no method available)     Ampicillin 4 Sensitive      Cefazolin <=4  Sensitive      Ciprofloxacin <=0.25 Sensitive      Gentamicin <=1 Sensitive      Meropenem <=0.25 Sensitive      Levofloxacin <=0.12 Sensitive      Nitrofurantoin <=16 Sensitive      Piperacillin + Tazobactam <=4 Sensitive      Trimethoprim/Sulfa <=20 Sensitive    2. Blood Culture     Status: None (Preliminary result)    Collection Time: 08/24/24  6:51 PM    Specimen: Blood,peripheral   Result Value Ref Range    Blood Culture Result No Growth 1 Day N/A       IMAGING STUDIES: SOME MAY NEED FOLLOW UP WITH PCP   CT BRAIN OR HEAD (CPT=70450)    Result Date: 8/24/2024  CONCLUSION:   No acute intracranial hemorrhage, hydrocephalus, or mass effect.  Aerated debris within the left maxillary sinus.  Correlate with symptoms of acute sinusitis.      Dictated by (CST): Tamanna Macedo MD on 8/24/2024 at 7:53 PM     Finalized by (CST): Tamanna Macedo MD on 8/24/2024 at 7:56 PM          XR CHEST AP PORTABLE  (CPT=71045)    Result Date: 8/24/2024  CONCLUSION:   No radiographic evidence of acute cardiopulmonary process.    Dictated by (CST): Tamanna Macedo MD on 8/24/2024 at 7:34 PM     Finalized by (CST): Tamanna Macedo MD on 8/24/2024 at 7:35 PM           LABS :     Lab Results   Component Value Date    WBC 8.4 08/25/2024    HGB 10.7 (L) 08/25/2024    HCT 32.5 (L) 08/25/2024    .0 08/25/2024    CREATSERUM 1.06 (H) 08/25/2024    BUN 20 08/25/2024     08/25/2024    K 3.6 08/25/2024     08/25/2024    CO2 27.0 08/25/2024     (H) 08/25/2024    CA 8.5 (L) 08/25/2024    ALB 3.2 08/25/2024    ALKPHO 58 08/25/2024    BILT 0.2 08/25/2024    TP 6.0 08/25/2024    AST 16 08/25/2024    ALT 10 08/25/2024    T4F 1.0 04/19/2022    TSH 4.010 (H) 04/19/2022    MG 2.0 08/25/2024    ETOH <3 03/18/2023       Recent Labs   Lab 08/24/24  1843 08/25/24  0605   RBC 4.52 3.81   HGB 12.9 10.7*   HCT 37.1 32.5*   MCV 82.1 85.3   MCH 28.5 28.1   MCHC 34.8 32.9   RDW 12.5 12.6   NEPRELIM 7.73* 5.41   WBC 9.8 8.4   .0  228.0     Recent Labs   Lab 08/24/24  1843 08/25/24  0605   * 218*   BUN 21 20   CREATSERUM 1.25* 1.06*   CA 10.1 8.5*   ALB 4.2 3.2    142   K 4.4 3.6    108   CO2 24.0 27.0   ALKPHO 76 58   AST 23 16   ALT 15 10   BILT 0.3 0.2   TP 7.8 6.0     No results found for: \"PT\", \"INR\"    Disposition: Discharge to Home    Condition at Discharge: Stable     Discharge Medications:      Discharge Medications        START taking these medications        Instructions Prescription details   cefadroxil 500 MG Caps  Commonly known as: DURICEF      Take 1 capsule (500 mg total) by mouth 2 (two) times daily for 5 days.   Stop taking on: August 31, 2024  Quantity: 10 capsule  Refills: 0            CHANGE how you take these medications        Instructions Prescription details   Accu-Chek Guide Strp  What changed: Another medication with the same name was removed. Continue taking this medication, and follow the directions you see here.      Use to check blood sugars 3 times daily.   Quantity: 300 strip  Refills: 1     Insulin Lispro (1 Unit Dial) 100 UNIT/ML Sopn  What changed:   how much to take  how to take this  when to take this  additional instructions      Patient will take 14 units with small carb meals and 18-20 units with larger carb meals.   Quantity: 54 mL  Refills: 1            CONTINUE taking these medications        Instructions Prescription details   Accu-Chek FastClix Lancets Misc      Use to check blood sugars 3 times daily.   Quantity: 300 each  Refills: 1     Accu-Chek Guide w/Device Kit      Use to check blood sugars 3 times daily.   Quantity: 1 kit  Refills: 0     clotrimazole 1 % Crea  Commonly known as: Lotrimin      Apply 1 Application topically 2 (two) times daily.   Quantity: 60 g  Refills: 1     ergocalciferol 1.25 MG (85400 UT) Caps  Commonly known as: Vitamin D2      TAKE 1 CAPSULE BY MOUTH 1 TIME A WEEK   Quantity: 12 capsule  Refills: 0     estradiol 0.1 MG/GM Crea  Commonly known as:  Estrace      Apply fingertip amount of cream to the vagina every night for 1 week and then every other night indefinitely   Quantity: 42.5 g  Refills: 11     gabapentin 300 MG Caps  Commonly known as: Neurontin      Take 1 capsule (300 mg total) by mouth 3 (three) times daily.   Quantity: 90 capsule  Refills: 3     lidocaine 4 % Ptch  Commonly known as: LIDODERM      Place 1 patch onto the skin daily.   Refills: 0     losartan 50 MG Tabs  Commonly known as: Cozaar      Take 0.5 tablets (25 mg total) by mouth daily.   Quantity: 90 tablet  Refills: 2     Pen Needles 32G X 4 MM Misc      1 pen  4 (four) times daily. Use  New pen needle  With each injection   Quantity: 400 each  Refills: 0     sertraline 100 MG Tabs  Commonly known as: Zoloft      Take 1 tablet (100 mg total) by mouth daily.   Quantity: 90 tablet  Refills: 2     simvastatin 20 MG Tabs  Commonly known as: Zocor      Take 1 tablet (20 mg total) by mouth daily.   Quantity: 90 tablet  Refills: 2     Tresiba FlexTouch 100 UNIT/ML Sopn  Generic drug: insulin degludec      Inject 36 Units into the skin daily.   Refills: 0            STOP taking these medications      cephalexin 500 MG Caps  Commonly known as: Keflex        conjugated estrogens 0.625 MG/GM Crea  Commonly known as: Premarin        sulfamethoxazole-trimethoprim -160 MG Tabs per tablet  Commonly known as: Bactrim DS        tamsulosin 0.4 MG Caps  Commonly known as: Flomax                  Where to Get Your Medications        These medications were sent to [id number] - [facility] [address] [facility] [address] [facility], [phone], [phone]  [facility] [facility] [address]      Phone: [phone]   cefadroxil 500 MG Caps         Follow up Visits  Jermaine Monson MD  [facility] [address] [facility] [address]  [facility] [address] [phone]    Schedule an appointment as soon as possible for a visit in 1 week(s)      Jermaine Monson MD          Other  Discharge Instructions:       ----------------------------------------------------  35 MIN SPENT ON THIS DC   Keaton Drew MD    8/26/2024

## 2024-08-26 NOTE — PAYOR COMM NOTE
--------------  ADMISSION REVIEW     Payor: RENAN MENENDEZ [facility] [place]  Subscriber #:  [id number]  Authorization Number: [id number]    Admit date: 8/24/24  Admit time:  9:27 PM       History     ED Triage Vitals [08/24/24 1817]   BP (!) 79/49   Pulse 85   Resp 20   Temp 97.6 °F (36.4 °C)   Temp src Oral   SpO2 100 %   O2 Device None (Room air)     Labs Reviewed   BASIC METABOLIC PANEL (8) - Abnormal; Notable for the following components:       Result Value    Glucose 255 (*)     Creatinine 1.25 (*)     Calculated Osmolality 298 (*)     eGFR-Cr 46 (*)     All other components within normal limits   CBC WITH DIFFERENTIAL WITH PLATELET - Abnormal; Notable for the following components:    Neutrophil Absolute Prelim 7.73 (*)     Neutrophil Absolute 7.73 (*)     All other components within normal limits   LACTIC ACID, PLASMA - Abnormal; Notable for the following components:    Lactic Acid 2.9 (*)     All other components within normal limits   VENOUS BLOOD GAS - Abnormal; Notable for the following components:    Venous pH 7.44 (*)     Venous pCO2 37 (*)     All other components within normal limits   URINALYSIS WITH CULTURE REFLEX - Abnormal; Notable for the following components:    Clarity Urine Turbid (*)     Protein Urine 20 (*)     Leukocyte Esterase Urine 500 (*)     WBC Urine >50 (*)     Bacteria Urine 1+ (*)     Squamous Epi. Cells Few (*)     All other components within normal limits   POCT GLUCOSE - Abnormal; Notable for the following components:    POC Glucose  238 (*)        MDM    73-year-old female history of diabetes, hyperlipidemia, hypertension presents today with altered level of consciousness.  Per family, in the last few days she has been acting somewhat strange in and out of her normal behavior.  Today, after getting out of the shower, she was staring off into space.  They note she has had difficulty controlling her blood sugars recently with levels as high as 400.  They also note that she has been having recurrent  UTIs over the last 6 months including admissions for septicemia and they have noted an abnormal odor to her urine.  They deny new medications or abnormal ingestions.  Denies fevers, shortness of breath, headache, focal weakness, or other symptoms.  Admission disposition: 8/24/2024  7:42 PM  Disposition and Plan   Clinical Impression:  1. Sepsis due to urinary tract infection (HCC)       Disposition:  Admit  8/24/2024  7:42 pm    HISTORY AND PHYSICAL      HISTORY OF PRESENT ILLNESS  This is a 73 year oldfemale who is sent in by family after they noted change in mental status.  No family at bedside at time of interview.  History limited from patient due to acuity of condition.  Patient was awake and alert.  Patient did state she noted some development of confusion.  Patient denied current chest pain, shortness of breath, abdominal pain, nausea vomiting, fevers or chills.  Patient denied urinary burning, frequency.  Further history from chart review and per ED provider.  ED physician reported family had noted progressive worsening of patient's behavior over the past several days.  Family then noted patient having episode of staring off into space.  Family also reported patient with difficult to control blood glucose levels, as high as 400s.  Of note, patient has history of recurrent UTIs.    Lab 08/24/24  1843   RBC 4.52   HGB 12.9   HCT 37.1   MCV 82.1   MCH 28.5   MCHC 34.8   RDW 12.5   NEPRELIM 7.73*   WBC 9.8   .0      Lab 08/24/24  1843   *   BUN 21   CREATSERUM 1.25*   CA 10.1   ALB 4.2      K 4.4      CO2 24.0   ALKPHO 76   AST 23   ALT 15   BILT 0.3   TP 7.8     ASSESSMENT/PLAN       Sepsis   -Likely secondary to urinary tract infection  -Patient presenting with altered mental status  -Noted to have elevated creatinine, tachycardia, lactic acidosis, hypotension  -UA with signs of infection  -Starting broad-spectrum antibiotics, ciprofloxacin based on previous cultures.  -Follow-up  blood cultures and urine culture  -Starting IV fluids  -Repeat lactic acid level  -Continue to monitor     Hypotension  -Likely related to infection and sepsis as above  -Resolving with IV fluids  -Now having elevated blood pressure  -Lose control the patient with recent hypotension  -Continue to monitor and add agents as needed     Acute Kidney Injury   -Likely related to sepsis and hypotension as above  - Starting IVF  - Avoiding Nephrotoxic agents  - Cont to monitor      Type 2 DM with hyperglycemia  -Uncontrolled  -Previous A1c of 12.4.  Recheck.  - Monitoring Blood glucose with POC checks  - SSI to cover hyperglycemia  - Hypoglycemia protocol  - Will monitor and adjust agents as needed.           8/25/24  LUNGS:  Air entry was minimally decreased.  No crackles or wheezes   ABDOMEN: Soft and non-tender.    NEUROLOGICAL: Awake and alert.  Minimal confusion noted.  There was no new focal motor or sensory deficit.  SKIN:  Warm and well perfused  PSYCHIATRIC: Normal mood     Lab 08/24/24  1843 08/25/24  0605   WBC 9.8 8.4   HGB 12.9 10.7*   MCV 82.1 85.3   .0 228.0      Lab 08/24/24  1843 08/25/24  0605   * 218*   BUN 21 20   CREATSERUM 1.25* 1.06*   CA 10.1 8.5*   ALB 4.2 3.2    142   K 4.4 3.6    108   CO2 24.0 27.0   ALKPHO 76 58   AST 23 16   ALT 15 10   BILT 0.3 0.2   TP 7.8 6.0    Culture:        Hospital Encounter on 08/24/24   1. Urine Culture, Routine     Status: Abnormal (Preliminary result)     Collection Time: 08/24/24  6:52 PM     Specimen: Urine, clean catch   Result Value Ref Range     Urine Culture >100,000 CFU/ML Escherichia coli (A) N/A    Medications:    heparin  5,000 Units Subcutaneous Q8H ELLI    insulin aspart  1-11 Units Subcutaneous TID CC and HS    ciprofloxacin  400 mg Intravenous Q12H    gabapentin  300 mg Oral TID    insulin degludec  15 Units Subcutaneous Daily    sertraline  100 mg Oral Daily    atorvastatin  10 mg Oral Nightly         Assessment & Plan:       Sepsis   -Likely secondary to urinary tract infection  -Patient presenting with altered mental status  -Noted to have elevated creatinine, tachycardia, lactic acidosis, hypotension  -UA with signs of infection  -Continue broad-spectrum antibiotics, ciprofloxacin based on previous cultures.  -Follow-up blood cultures and urine culture, preliminary urine culture with E. coli.  Follow-up sensitivities.  -Continue IV fluids  -Continue to monitor     Hypertension   -Initially with hypotension, now resolved  -Likely related to infection and sepsis as above  -Now having elevated blood pressure  -Restart losartan as SHUN resolving.  -Continue to monitor and add agents as needed     Acute Kidney Injury   -Likely related to sepsis and hypotension as above  -Resolving with IVF  - Avoiding Nephrotoxic agents  - Cont to monitor        Type 2 DM with hyperglycemia  -Uncontrolled  -Previous A1c of 12.4.  Recheck noted be 9.4  - Monitoring Blood glucose with POC checks  - SSI to cover hyperglycemia  - Hypoglycemia protocol  - Will monitor and adjust agents as needed.           MEDICATIONS ADMINISTERED IN LAST 1 DAY:  atorvastatin (Lipitor) tab 10 mg       Date Action Dose Route User    8/25/2024 2138 Given 10 mg Oral Valarie Ramirez          ciprofloxacin in dextrose 5% (Cipro) 400 mg/200mL IVPB premix 400 mg       Date Action Dose Route User    8/26/2024 0837 New Bag 400 mg Intravenous Cecilia Shaw RN    8/25/2024 2135 New Bag 400 mg Intravenous Valarie Ramirez          gabapentin (Neurontin) cap 300 mg       Date Action Dose Route User    8/26/2024 0837 Given 300 mg Oral Cecilia Shaw RN    8/25/2024 2138 Given 300 mg Oral Valarie Ramirez    8/25/2024 1617 Given 300 mg Oral Antonia Tian, RN          heparin (Porcine) 5000 UNIT/ML injection 5,000 Units       Date Action Dose Route User    8/26/2024 0510 Given 5,000 Units Subcutaneous (Right Lower Abdomen) Ramirez Valarie    8/25/2024 1341 Given 5,000 Units Subcutaneous (Left  Lower Abdomen) Antonia Tian RN          hydrALAzine (Apresoline) 20 mg/mL injection 10 mg       Date Action Dose Route User    8/25/2024 2244 Given 10 mg Intravenous Valarie Ramirez          insulin aspart (NovoLOG) 100 Units/mL FlexPen 1-11 Units       Date Action Dose Route User    8/26/2024 1240 Given 8 Units Subcutaneous (Right Lower Abdomen) Daniela Lynch RN    8/26/2024 1019 Given 2 Units Subcutaneous (Right Upper Arm) Cecilia Shaw RN    8/25/2024 2138 Given 1 Units Subcutaneous (Right Lower Abdomen) Valarie Ramirez    8/25/2024 1729 Given 4 Units Subcutaneous (Left Lower Abdomen) Antonia Tian RN    8/25/2024 1341 Given 9 Units Subcutaneous (Left Lower Abdomen) Antonia Tian RN          insulin degludec (Tresiba) 100 units/mL flextouch 15 Units       Date Action Dose Route User    8/25/2024 2138 Given 15 Units Subcutaneous (Left Lower Abdomen) Valarie Ramirez          losartan (Cozaar) tab 25 mg       Date Action Dose Route User    8/26/2024 0837 Given 25 mg Oral Cecilia Shaw RN    8/25/2024 1617 Given 25 mg Oral Antonia Tian RN          sertraline (Zoloft) tab 100 mg       Date Action Dose Route User    8/26/2024 0837 Given 100 mg Oral Cecilia Shaw RN          sodium chloride 0.9% infusion       Date Action Dose Route User    8/25/2024 1503 Rate/Dose Change (none) Intravenous Antonia Tain RN    8/25/2024 1341 New Bag (none) Intravenous Antonia Tian RN            Vitals (last day)       Date/Time Temp Pulse Resp BP SpO2 Weight O2 Device O2 Flow Rate (L/min) Who    08/26/24 0950 -- 86 -- 130/47 -- -- -- -- GR    08/26/24 0835 97.7 °F (36.5 °C) 69 18 132/54 97 % -- None (Room air) -- AA    08/26/24 0506 97.8 °F (36.6 °C) 72 18 119/48 96 % -- None (Room air) -- JT    08/25/24 2357 -- -- -- 139/46 -- -- -- -- J    08/25/24 2232 -- -- -- 199/60 -- -- -- -- JT    08/25/24 2126 98 °F (36.7 °C) 72 18 169/92 98 % -- None (Room air) --     08/25/24 1614 97.8 °F (36.6 °C) 72 18 118/45 100 % --  None (Room air) -- AR    08/25/24 0858 97.5 °F (36.4 °C) 64 16 151/56 100 % -- None (Room air) -- AR    08/25/24 0527 97.6 °F (36.4 °C) 66 16 123/53 96 % -- None (Room air) -- JT

## 2024-08-26 NOTE — TELEPHONE ENCOUNTER
Received fax from [facility] requesting signature for Assessment and plan of care for outpatient physical therapy.     Will place on Amals desk.

## 2024-08-26 NOTE — PLAN OF CARE
Problem: Patient Centered Care  Goal: Patient preferences are identified and integrated in the patient's plan of care  Description: Interventions:  - What would you like us to know as we care for you?   - Provide timely, complete, and accurate information to patient/family  - Incorporate patient and family knowledge, values, beliefs, and cultural backgrounds into the planning and delivery of care  - Encourage patient/family to participate in care and decision-making at the level they choose  - Honor patient and family perspectives and choices  Outcome: Progressing     Problem: Patient/Family Goals  Goal: Patient/Family Long Term Goal  Description: Patient's Long Term Goal: treat UTI     Interventions:  - Monitor vitals  - Monitor appropriate labs  - Administer medications as ordered  - Follow MD's orders  - Update patient on plan of care   - Discharge planning     - See additional Care Plan goals for specific interventions  Outcome: Progressing  Goal: Patient/Family Short Term Goal  Description: Patient's Short Term Goal: Monitor blood sugar    Interventions:   - Monitor vitals  - Monitor appropriate labs  - Administer medications as ordered  - Follow MD's orders  - Update patient on plan of care   - Discharge planning     - See additional Care Plan goals for specific interventions  Outcome: Progressing     Problem: PAIN - ADULT  Goal: Verbalizes/displays adequate comfort level or patient's stated pain goal  Description: INTERVENTIONS:  - Encourage pt to monitor pain and request assistance  - Assess pain using appropriate pain scale  - Administer analgesics based on type and severity of pain and evaluate response  - Implement non-pharmacological measures as appropriate and evaluate response  - Consider cultural and social influences on pain and pain management  - Manage/alleviate anxiety  - Utilize distraction and/or relaxation techniques  - Monitor for opioid side effects  - Notify MD/LIP if interventions  unsuccessful or patient reports new pain  - Anticipate increased pain with activity and pre-medicate as appropriate  Outcome: Progressing     Problem: RISK FOR INFECTION - ADULT  Goal: Absence of fever/infection during anticipated neutropenic period  Description: INTERVENTIONS  - Monitor WBC  - Administer growth factors as ordered  - Implement neutropenic guidelines  Outcome: Progressing     Problem: SAFETY ADULT - FALL  Goal: Free from fall injury  Description: INTERVENTIONS:  - Assess pt frequently for physical needs  - Identify cognitive and physical deficits and behaviors that affect risk of falls.  - [place] fall precautions as indicated by assessment.  - Educate pt/family on patient safety including physical limitations  - Instruct pt to call for assistance with activity based on assessment  - Modify environment to reduce risk of injury  - Provide assistive devices as appropriate  - Consider OT/PT consult to assist with strengthening/mobility  - Encourage toileting schedule  Outcome: Progressing     Problem: DISCHARGE PLANNING  Goal: Discharge to home or other facility with appropriate resources  Description: INTERVENTIONS:  - Identify barriers to discharge w/pt and caregiver  - Include patient/family/discharge partner in discharge planning  - Arrange for needed discharge resources and transportation as appropriate  - Identify discharge learning needs (meds, wound care, etc)  - Arrange for interpreters to assist at discharge as needed  - Consider post-discharge preferences of patient/family/discharge partner  - Complete POLST form as appropriate  - Assess patient's ability to be responsible for managing their own health  - Refer to Case Management Department for coordinating discharge planning if the patient needs post-hospital services based on physician/LIP order or complex needs related to functional status, cognitive ability or social support system  Outcome: Progressing     Problem: CARDIOVASCULAR -  ADULT  Goal: Maintains optimal cardiac output and hemodynamic stability  Description: INTERVENTIONS:  - Monitor vital signs, rhythm, and trends  - Monitor for bleeding, hypotension and signs of decreased cardiac output  - Evaluate effectiveness of vasoactive medications to optimize hemodynamic stability  - Monitor arterial and/or venous puncture sites for bleeding and/or hematoma  - Assess quality of pulses, skin color and temperature  - Assess for signs of decreased coronary artery perfusion - ex. Angina  - Evaluate fluid balance, assess for edema, trend weights  Outcome: Progressing  Goal: Absence of cardiac arrhythmias or at baseline  Description: INTERVENTIONS:  - Continuous cardiac monitoring, monitor vital signs, obtain 12 lead EKG if indicated  - Evaluate effectiveness of antiarrhythmic and heart rate control medications as ordered  - Initiate emergency measures for life threatening arrhythmias  - Monitor electrolytes and administer replacement therapy as ordered  Outcome: Progressing     Problem: METABOLIC/FLUID AND ELECTROLYTES - ADULT  Goal: Glucose maintained within prescribed range  Description: INTERVENTIONS:  - Monitor Blood Glucose as ordered  - Assess for signs and symptoms of hyperglycemia and hypoglycemia  - Administer ordered medications to maintain glucose within target range  - Assess barriers to adequate nutritional intake and initiate nutrition consult as needed  - Instruct patient on self management of diabetes  Outcome: Progressing  Goal: Electrolytes maintained within normal limits  Description: INTERVENTIONS:  - Monitor labs and rhythm and assess patient for signs and symptoms of electrolyte imbalances  - Administer electrolyte replacement as ordered  - Monitor response to electrolyte replacements, including rhythm and repeat lab results as appropriate  - Fluid restriction as ordered  - Instruct patient on fluid and nutrition restrictions as appropriate  Outcome: Progressing  Goal:  Hemodynamic stability and optimal renal function maintained  Description: INTERVENTIONS:  - Monitor labs and assess for signs and symptoms of volume excess or deficit  - Monitor intake, output and patient weight  - Monitor urine specific gravity, serum osmolarity and serum sodium as indicated or ordered  - Monitor response to interventions for patient's volume status, including labs, urine output, blood pressure (other measures as available)  - Encourage oral intake as appropriate  - Instruct patient on fluid and nutrition restrictions as appropriate  Outcome: Progressing

## 2024-08-26 NOTE — CM/SW NOTE
08/26/24 1100   CM/SW Referral Data   Referral Source Social Work (self-referral)   Reason for Referral Discharge planning   Informant Patient   Medical Hx   Does patient have an established PCP? Yes  (Jermaine Monson MD)   Patient Info   Patient's Current Mental Status at Time of Assessment Alert;Oriented   Patient's Home Environment House   Number of Levels in Home 2   Patient lives with Daughter;Grandchild   Patient Status Prior to Admission   Independent with ADLs and Mobility Yes   Services in place prior to admission DME/Supplies at home;Home Health Care   Home Health Provider Info [facility]   Type of DME/Supplies Standard Walker;Wheelchair   Discharge Needs   Anticipated D/C needs Home health care     SW initiated self referral for discharge planning. SW introduced self and role to patient. Pt is alert and oriented at time of assessment. Pt provided above information. Patient lives at address on file with daughter. Patient stays in basement. Patient is ambulatory and independent with RW. Patient has walker cane and wheelchair at home. Pt is current with [facility], FERMIN is entered. Pt inquired about [facility]. Anticipated therapy need: Home with Home Healthcare. SW updated patient that her final barriers that will authorize for patient to go to [facility] is her insurance. Sw informed patient that her insurance will likely deny patient due to her being close to her baseline. Patient is agreeable to go home with .        08/26/24 1300   Discharge disposition   Expected discharge disposition Home or Self   Post Acute Care Provider Residential   Discharge transportation Private car     Pt received MDO for discharge. Patient will be going home with [facility]. [facility] liaison aware of DC today.     SW/CM to remain available for support and/or discharge planning.     Rosalva Lynn, MSW, LSW   x [phone]

## 2024-08-26 NOTE — PHYSICAL THERAPY NOTE
PHYSICAL THERAPY EVALUATION - INPATIENT     Room Number: 554/554-A  Evaluation Date: 8/26/2024  Type of Evaluation: Initial   Physician Order: PT Eval and Treat    Presenting Problem: UTI, sepsis  Co-Morbidities : HTN, HLD, arthritis, depression, diabetes  Reason for Therapy: Mobility Dysfunction and Discharge Planning    PHYSICAL THERAPY ASSESSMENT   Patient is a 73 year old female admitted 8/24/2024 for UTI, sepsis.  Prior to admission, patient's baseline is [place] using a rolling walker.  Patient is currently functioning near baseline with transfers and gait.  Patient is requiring stand-by assist and contact guard assist as a result of the following impairments: decreased muscular endurance and medical status.  Physical Therapy will continue to follow for duration of hospitalization.    Patient will benefit from continued skilled PT Services at discharge to promote prior level of function and safety with additional support and return home with home health PT.    PLAN  PT Treatment Plan: Bed mobility;Body mechanics;Endurance;Energy conservation;Patient education;Gait training;Strengthening;Stair training;Transfer training  Rehab Potential : Good  Frequency (Obs): 5x/week    PHYSICAL THERAPY MEDICAL/SOCIAL HISTORY     Problem List  Principal Problem:    Sepsis due to urinary tract infection (HCC)  Active Problems:    Sepsis (HCC)      HOME SITUATION  Home Situation  Type of Home: House  Home Layout: Two level  Stairs to Enter : 0  Stairs to Bedroom: 5  Railing: No  Lives With: Daughter;Family  Drives: No  Patient Owned Equipment: Rolling walker  Patient Regularly Uses: Glasses     Prior Level of [place]: Rafael with rolling walker, independent with ADLs, assist from family with IADLs    SUBJECTIVE  \"I do this myself usually, my daughter is busy with other things.\"    PHYSICAL THERAPY EXAMINATION   OBJECTIVE  Precautions: Bed/chair alarm;Low vision;Other (Comment) (contact precautions)  Fall Risk: Standard fall  risk    WEIGHT BEARING RESTRICTION  Weight Bearing Restriction: None                PAIN ASSESSMENT  Ratin  Location: denies       COGNITION  Overall Cognitive Status:  WFL - within functional limits    RANGE OF MOTION AND STRENGTH ASSESSMENT  Upper extremity ROM and strength are within functional limits  BUEs  Lower extremity ROM is within functional limits  BLEs  Lower extremity strength is within functional limits  BLEs    BALANCE  Static Sitting: Normal  Dynamic Sitting: Good  Static Standing: Good  Dynamic Standing: Fair +    ACTIVITY TOLERANCE  Pulse: 86  Heart Rate Source: Monitor     BP: 130/47 (sitting, 85/64 standing with lightheadedness)  BP Location: Right arm  BP Method: Automatic  Patient Position: Sitting    O2 WALK       AM-PAC '6-Clicks' INPATIENT SHORT FORM - BASIC MOBILITY  How much difficulty does the patient currently have...  Patient Difficulty: Turning over in bed (including adjusting bedclothes, sheets and blankets)?: A Little   Patient Difficulty: Sitting down on and standing up from a chair with arms (e.g., wheelchair, bedside commode, etc.): A Little   Patient Difficulty: Moving from lying on back to sitting on the side of the bed?: A Little   How much help from another person does the patient currently need...   Help from Another: Moving to and from a bed to a chair (including a wheelchair)?: A Little   Help from Another: Need to walk in hospital room?: A Little   Help from Another: Climbing 3-5 steps with a railing?: A Little     AM-PAC Score:  Raw Score: 18   Approx Degree of Impairment: 46.58%   Standardized Score (AM-PAC Scale): 43.63   CMS Modifier (G-Code): CK    FUNCTIONAL ABILITY STATUS  Functional Mobility/Gait Assessment  Gait Assistance: Contact guard assist;Other (Comment) (SBA)  Distance (ft): 60  Assistive Device: Rolling walker  Pattern: Within Functional Limits  Sit to Stand: stand-by assist and contact guard assist - from toilet, bedside chair    Exercise/Education  Provided:  Body mechanics  Energy conservation  Functional activity tolerated  Gait training  Posture  Transfer training    Skilled Therapy Provided: Pt tolerating household distances using rolling walker without loss of balance. Patient received in bathroom in direct handoff from nursing staff , agreeable to physical therapy evaluation. Vital signs monitored as noted above, patient experiencing lightheadedness with standing and ambulation, found to be orthostatic, RN notified . Education with patient provided verbally on physical therapy plan of care and physiological benefits of out of bed mobility. Next session anticipate to progress gait and stair negotiation.    Patient history and/or personal factors that may impact the plan of care include home accessibility concerns and co-morbidities (HTN, HLD, arthritis, depression, diabetes) affecting endurance, medical status . Based on the physical therapy examination of the noted systems and functional activity/participation limitations, the patient presentation is evolving given the patient demonstrates worsening of previously stable condition, presents with unstable vital signs, and is experiencing fluctuating levels of dizziness.      The patient's Approx Degree of Impairment: 46.58% has been calculated based on documentation in the [facility] '6 clicks' Inpatient Basic Mobility Short Form.  Research supports that patients with this level of impairment may benefit from home-health PT.  Final disposition will be made by interdisciplinary medical team.    Patient End of Session: Up in chair;Needs met;RN aware of session/findings;Call light within reach;All patient questions and concerns addressed    CURRENT GOALS  Goals to be met by: 9/16/24  Patient Goal Patient's self-stated goal is: return home safely   Goal #1 Patient is able to demonstrate supine - sit EOB @ level: modified independent     Goal #1   Current Status    Goal #2 Patient is able to demonstrate transfers  EOB to/from [facility] at assistance level: modified independent with walker - rolling     Goal #2  Current Status    Goal #3 Patient is able to ambulate 100 feet with assist device: walker - rolling at assistance level: modified independent   Goal #3   Current Status    Goal #4 Patient will negotiate 5 stairs/one curb w/ assistive device and supervision   Goal #4   Current Status    Goal #5 Patient to demonstrate independence with home activity/exercise instructions provided to patient in preparation for discharge.   Goal #5   Current Status    Goal #6    Goal #6  Current Status      Patient Evaluation Complexity Level:  History High - 3 or more personal factors and/or co-morbidities   Examination of body systems Moderate - addressing a total of 3 or more elements   Clinical Presentation  Moderate - Evolving   Clinical Decision Making  Moderate Complexity     Gait Trainin minutes      Kae Green, PT, DPT  [facility]  Rehab Services - Physical Therapy  [id number]

## 2024-08-26 NOTE — PLAN OF CARE
Discharge. VSS. Discharge paperwork provided. In route to home with son.     Problem: Patient Centered Care  Goal: Patient preferences are identified and integrated in the patient's plan of care  Description: Interventions:  - What would you like us to know as we care for you? Pt from home with daughter  - Provide timely, complete, and accurate information to patient/family  - Incorporate patient and family knowledge, values, beliefs, and cultural backgrounds into the planning and delivery of care  - Encourage patient/family to participate in care and decision-making at the level they choose  - Honor patient and family perspectives and choices  8/26/2024 1410 by Cecilia Shaw RN  Outcome: Adequate for Discharge  8/26/2024 1054 by Cecilia Shaw RN  Outcome: Progressing     Problem: Patient/Family Goals  Goal: Patient/Family Long Term Goal  Description: Patient's Long Term Goal: treat UTI     Interventions:  - Monitor vitals  - Monitor appropriate labs  - Administer medications as ordered  - Follow MD's orders  - Update patient on plan of care   - Discharge planning     - See additional Care Plan goals for specific interventions  8/26/2024 1410 by Cecilia Shaw RN  Outcome: Adequate for Discharge  8/26/2024 1054 by Cecilia Shaw RN  Outcome: Progressing  Goal: Patient/Family Short Term Goal  Description: Patient's Short Term Goal: Monitor blood sugar    Interventions:   - Monitor vitals  - Monitor appropriate labs  - Administer medications as ordered  - Follow MD's orders  - Update patient on plan of care   - Discharge planning     - See additional Care Plan goals for specific interventions  8/26/2024 1410 by Cecilia Shaw RN  Outcome: Adequate for Discharge  8/26/2024 1054 by Cecilia Shaw RN  Outcome: Progressing     Problem: PAIN - ADULT  Goal: Verbalizes/displays adequate comfort level or patient's stated pain goal  Description: INTERVENTIONS:  - Encourage pt to monitor pain and request  assistance  - Assess pain using appropriate pain scale  - Administer analgesics based on type and severity of pain and evaluate response  - Implement non-pharmacological measures as appropriate and evaluate response  - Consider cultural and social influences on pain and pain management  - Manage/alleviate anxiety  - Utilize distraction and/or relaxation techniques  - Monitor for opioid side effects  - Notify MD/LIP if interventions unsuccessful or patient reports new pain  - Anticipate increased pain with activity and pre-medicate as appropriate  8/26/2024 1410 by Cecilia Shaw RN  Outcome: Adequate for Discharge  8/26/2024 1054 by Cecilia Shaw RN  Outcome: Progressing     Problem: RISK FOR INFECTION - ADULT  Goal: Absence of fever/infection during anticipated neutropenic period  Description: INTERVENTIONS  - Monitor WBC  - Administer growth factors as ordered  - Implement neutropenic guidelines  8/26/2024 1410 by Cecilia Shaw RN  Outcome: Adequate for Discharge  8/26/2024 1054 by Cecilia Shaw RN  Outcome: Progressing     Problem: SAFETY ADULT - FALL  Goal: Free from fall injury  Description: INTERVENTIONS:  - Assess pt frequently for physical needs  - Identify cognitive and physical deficits and behaviors that affect risk of falls.  - [place] fall precautions as indicated by assessment.  - Educate pt/family on patient safety including physical limitations  - Instruct pt to call for assistance with activity based on assessment  - Modify environment to reduce risk of injury  - Provide assistive devices as appropriate  - Consider OT/PT consult to assist with strengthening/mobility  - Encourage toileting schedule  8/26/2024 1410 by Cecilia Shaw RN  Outcome: Adequate for Discharge  8/26/2024 1054 by Cecilia Shaw RN  Outcome: Progressing     Problem: DISCHARGE PLANNING  Goal: Discharge to home or other facility with appropriate resources  Description: INTERVENTIONS:  - Identify barriers to  discharge w/pt and caregiver  - Include patient/family/discharge partner in discharge planning  - Arrange for needed discharge resources and transportation as appropriate  - Identify discharge learning needs (meds, wound care, etc)  - Arrange for interpreters to assist at discharge as needed  - Consider post-discharge preferences of patient/family/discharge partner  - Complete POLST form as appropriate  - Assess patient's ability to be responsible for managing their own health  - Refer to Case Management Department for coordinating discharge planning if the patient needs post-hospital services based on physician/LIP order or complex needs related to functional status, cognitive ability or social support system  8/26/2024 1410 by Cecilia Shaw, RN  Outcome: Adequate for Discharge  8/26/2024 1054 by Cecilia Shaw, RN  Outcome: Progressing     Problem: CARDIOVASCULAR - ADULT  Goal: Maintains optimal cardiac output and hemodynamic stability  Description: INTERVENTIONS:  - Monitor vital signs, rhythm, and trends  - Monitor for bleeding, hypotension and signs of decreased cardiac output  - Evaluate effectiveness of vasoactive medications to optimize hemodynamic stability  - Monitor arterial and/or venous puncture sites for bleeding and/or hematoma  - Assess quality of pulses, skin color and temperature  - Assess for signs of decreased coronary artery perfusion - ex. Angina  - Evaluate fluid balance, assess for edema, trend weights  8/26/2024 1410 by Cecilia Shaw, RN  Outcome: Adequate for Discharge  8/26/2024 1054 by Cecilia Shaw, RN  Outcome: Progressing  Goal: Absence of cardiac arrhythmias or at baseline  Description: INTERVENTIONS:  - Continuous cardiac monitoring, monitor vital signs, obtain 12 lead EKG if indicated  - Evaluate effectiveness of antiarrhythmic and heart rate control medications as ordered  - Initiate emergency measures for life threatening arrhythmias  - Monitor electrolytes and administer  replacement therapy as ordered  8/26/2024 1410 by Cecilia Shaw RN  Outcome: Adequate for Discharge  8/26/2024 1054 by Cecilia Shaw RN  Outcome: Progressing     Problem: METABOLIC/FLUID AND ELECTROLYTES - ADULT  Goal: Glucose maintained within prescribed range  Description: INTERVENTIONS:  - Monitor Blood Glucose as ordered  - Assess for signs and symptoms of hyperglycemia and hypoglycemia  - Administer ordered medications to maintain glucose within target range  - Assess barriers to adequate nutritional intake and initiate nutrition consult as needed  - Instruct patient on self management of diabetes  8/26/2024 1410 by Cecilia Shaw RN  Outcome: Adequate for Discharge  8/26/2024 1054 by Cecilia Shaw RN  Outcome: Progressing  Goal: Electrolytes maintained within normal limits  Description: INTERVENTIONS:  - Monitor labs and rhythm and assess patient for signs and symptoms of electrolyte imbalances  - Administer electrolyte replacement as ordered  - Monitor response to electrolyte replacements, including rhythm and repeat lab results as appropriate  - Fluid restriction as ordered  - Instruct patient on fluid and nutrition restrictions as appropriate  8/26/2024 1410 by Cecilia Shaw RN  Outcome: Adequate for Discharge  8/26/2024 1054 by Cecilia Shaw RN  Outcome: Progressing  Goal: Hemodynamic stability and optimal renal function maintained  Description: INTERVENTIONS:  - Monitor labs and assess for signs and symptoms of volume excess or deficit  - Monitor intake, output and patient weight  - Monitor urine specific gravity, serum osmolarity and serum sodium as indicated or ordered  - Monitor response to interventions for patient's volume status, including labs, urine output, blood pressure (other measures as available)  - Encourage oral intake as appropriate  - Instruct patient on fluid and nutrition restrictions as appropriate  8/26/2024 1410 by Cecilia Shaw RN  Outcome: Adequate for  Discharge  8/26/2024 1054 by Cecilia Shaw, RN  Outcome: Progressing

## 2024-08-27 ENCOUNTER — PATIENT OUTREACH (OUTPATIENT)
Dept: CASE MANAGEMENT | Age: 74
End: 2024-08-27

## 2024-08-27 DIAGNOSIS — N39.0 SEPSIS DUE TO URINARY TRACT INFECTION (HCC): Primary | ICD-10-CM

## 2024-08-27 DIAGNOSIS — A41.9 SEPSIS DUE TO URINARY TRACT INFECTION (HCC): Primary | ICD-10-CM

## 2024-08-27 PROCEDURE — 1111F DSCHRG MED/CURRENT MED MERGE: CPT

## 2024-08-27 NOTE — PAYOR COMM NOTE
--------------  DISCHARGE REVIEW    Payor: [employer]DE[employer] [facility]  Subscriber #:  [id number]  Authorization Number: [id number]    Admit date: 8/24/24  Admit time:   9:27 PM  Discharge Date: 8/26/2024  2:38 PM     Discharge Summary Notes        Discharge Summary  Date of Admission: 8/24/2024     Date of Discharge: 08/26/24      DISCHARGE DX: Principal Problem:    Sepsis due to urinary tract infection (HCC)  Active Problems:    Sepsis (HCC)     HPI per admitting physician: \"This is a 73 year oldfemale who is sent in by family after they noted change in mental status.  No family at bedside at time of interview.  History limited from patient due to acuity of condition.  Patient was awake and alert.  Patient did state she noted some development of confusion.  Patient denied current chest pain, shortness of breath, abdominal pain, nausea vomiting, fevers or chills.  Patient denied urinary burning, frequency.  Further history from chart review and per ED provider.  ED physician reported family had noted progressive worsening of patient's behavior over the past several days.  Family then noted patient having episode of staring off into space.  Family also reported patient with difficult to control blood glucose levels, as high as 400s.  Of note, patient has history of recurrent UTIs. \"    Hospital Course:      Sepsis   -Resolving  -Likely secondary to urinary tract infection  -Patient presenting with altered mental status  -Noted to have elevated creatinine, tachycardia, lactic acidosis, hypotension  -UA with signs of infection  -Started on broad-spectrum antibiotics, ciprofloxacin based on previous cultures.  - blood cultures NGTD  - Urine culture with pansensitive E. coli.   -Complete course of PO abx as outpt     Hypertension   -Initially with hypotension, now resolved  -Likely related to infection and sepsis as above  -Now having elevated blood pressure  -Restarted losartan   -Resume home regimen     Acute Kidney Injury   -Likely  related to sepsis and hypotension as above  -Improved with IVF  -Encouraging PO intake  - Avoiding Nephrotoxic agents  - Cont to monitor      Type 2 DM with hyperglycemia  -Uncontrolled  -Previous A1c of 12.4.  Recheck noted be 9.4  - Monitoring Blood glucose with POC checks  - Resume home regimen    Vitals:    08/25/24 2357 08/26/24 0506 08/26/24 0835 08/26/24 0950   BP: 139/46 119/48 132/54 130/47   BP Location:  Right arm Right arm Right arm   Pulse:  72 69 86   Resp:  18 18    Temp:  97.8 °F (36.6 °C) 97.7 °F (36.5 °C)    TempSrc:  Oral Oral    SpO2:  96% 97%      GENERAL:  Awake and alert, in no acute distress.  HEART:  Regular rhythm.  Regular rate  LUNGS:  Air entry was minimally decreased.  No crackles or wheezes   ABDOMEN: Soft and non-tender.    NEUROLOGICAL: Awake and alert.  Confusion resolving.  There was no new focal motor or sensory deficit.  SKIN:  Warm and well perfused  PSYCHIATRIC: Normal mood

## 2024-08-27 NOTE — PROGRESS NOTES
Transitions of Care Navigation  Discharge Date: 24  Contact Date: 2024    DISCHARGE DX: Principal Problem:    Sepsis due to urinary tract infection (HCC)  Active Problems:    Sepsis (HCC)      Transitions of Care Assessment:  JYOTI Initial Assessment    General:  Assessment completed with: Patient  Patient Subjective: Pt feeling better, since hospital discharge--tolerating Cefadroxil, as prescribed, blood sugar this morning at 0930 147, per pt. Pt denies fever, chills, headache, confusion, vision changes, dizziness, nausea, vomiting, diarrhea, UTI symptoms, hematuria, low back/flank pain, abdominal pain, chest pain or shortness of breath at this time. Appetite good, staying hydrated, ambulation with walker at home. Pt has not yet heard from [facility] for date to resume care.  Chief Complaint: DISCHARGE DX: Principal Problem:    Sepsis due to urinary tract infection (HCC)  Active Problems:    Sepsis (HCC)  Verify patient name and  with patient/ caregiver: Yes    Hospital Stay/Discharge:  Tell me what you understand of why you were in the hospital or emergency department: Family member reports pt in blank stare while talking, usually more awake. Reports glucose levels in 400 today with glucose monitor alarming.      Since January, had 6 recurrent uti's with odorous urine.     Denies fever or chills, denies n/v/d  Prior to leaving the hospital were your Discharge Instructions reviewed with you?: Yes  Did you receive a copy of your written Discharge Instructions?: Yes  What questions do you have about your Discharge Instructions?: none  Do you feel better or worse since you left the hospital or emergency department?: Better    Follow - Up Appointment:  Do you have a follow-up appointment?: No  Are there any barriers to getting to your follow-up appointment?: No    Home Health/DME:  Prior to leaving the hospital was Home Health (HH) arranged for you?: Yes  Has HH been out or set up a visit to see you?: No      Prior to leaving the hospital or emergency department was Durable Medical Equipment (DME), medical supplies, or infusions arranged for you?: N/A  Are DME/medical supply/infusions needs identified by staff during this assessment?: No     Medications/Diet:  Did any of your medications change, during or after your hospital stay or ED visit?: Yes  Do you have your new or updated medications?: Yes  Do you understand what your medications are for and possible side effects?: Yes  Are there any reasons that keep you from taking your medication as prescribed?: No  Any concerns about medication refills?: No    Were you given a different diet per your Discharge Instructions?: No  Reason: not required     Questions/Concerns:  Do you have any questions or concerns that have not been discussed?: No     JYOTI Follow-up Assessment    General:  Assessment completed with: Patient  Patient Subjective: Pt feeling better, since hospital discharge--tolerating Cefadroxil, as prescribed, blood sugar this morning at 0930 147, per pt. Pt denies fever, chills, headache, confusion, vision changes, dizziness, nausea, vomiting, diarrhea, UTI symptoms, hematuria, low back/flank pain, abdominal pain, chest pain or shortness of breath at this time. Appetite good, staying hydrated, ambulation with walker at home. Pt has not yet heard from [facility] for date to resume care.  Chief Complaint: DISCHARGE DX: Principal Problem:    Sepsis due to urinary tract infection (HCC)  Active Problems:    Sepsis (HCC)  Community Resources: Home Health ([facility]ume [facility])    Progress/Care Plan:  Is the patient progressing as planned?: Yes       Nursing Interventions: Discussed diet, activity, medications and need for f/u visits. TCM/JYOTI appt made for 9/03/24 with Dr. Monson--pt declines appt this week.  Patient aware when to contact PCP/specialists and when to seek emergency care. No further questions/concerns at this time.    This NCM left message for  [facility] to return call for date to resume care--awaiting return call.    Medications:  Current Outpatient Medications   Medication Sig Dispense Refill    cefadroxil 500 MG Oral Cap Take 1 capsule (500 mg total) by mouth 2 (two) times daily for 5 days. 10 capsule 0    gabapentin 300 MG Oral Cap Take 1 capsule (300 mg total) by mouth 3 (three) times daily. 90 capsule 3    ERGOCALCIFEROL 1.25 MG (15827 UT) Oral Cap TAKE 1 CAPSULE BY MOUTH 1 TIME A WEEK 12 capsule 0    estradiol (ESTRACE) 0.1 MG/GM Vaginal Cream Apply fingertip amount of cream to the vagina every night for 1 week and then every other night indefinitely 42.5 g 11    clotrimazole 1 % External Cream Apply 1 Application topically 2 (two) times daily. 60 g 1    Accu-Chek FastClix Lancets Does not apply Misc Use to check blood sugars 3 times daily. 300 each 1    Blood Glucose Monitoring Suppl (ACCU-CHEK GUIDE) w/Device Does not apply Kit Use to check blood sugars 3 times daily. 1 kit 0    Glucose Blood (ACCU-CHEK GUIDE) In Vitro Strip Use to check blood sugars 3 times daily. 300 strip 1    lidocaine 4 % External Patch Place 1 patch onto the skin daily.      Insulin Degludec (TRESIBA FLEXTOUCH) 100 UNIT/ML Subcutaneous Solution Pen-injector Inject 36 Units into the skin daily.      Insulin Lispro, 1 Unit Dial, 100 UNIT/ML Subcutaneous Solution Pen-injector Patient will take 14 units with small carb meals and 18-20 units with larger carb meals. (Patient taking differently: Inject 12 Units into the skin in the morning, at noon, and at bedtime. Patient takes 12 units with every meal and sliding scale on top of that) 54 mL 1    Insulin Pen Needle (PEN NEEDLES) 32G X 4 MM Does not apply Misc 1 pen  4 (four) times daily. Use  New pen needle  With each injection 400 each 0    simvastatin 20 MG Oral Tab Take 1 tablet (20 mg total) by mouth daily. 90 tablet 2    sertraline 100 MG Oral Tab Take 1 tablet (100 mg total) by mouth daily. 90 tablet 2    losartan 50 MG Oral Tab  Take 0.5 tablets (25 mg total) by mouth daily. 90 tablet 2   START taking:  cefadroxil (DURICEF)  CHANGE how you take:  Accu-Chek Guide  STOP taking:  cephalexin 500 MG Caps (Keflex)  conjugated estrogens 0.625 MG/GM Crea (Premarin)  sulfamethoxazole-trimethoprim -160 MG Tabs  per tablet (Bactrim DS)  tamsulosin 0.4 MG Caps (Flomax)    Follow-up Appointments:  This patient's most recent A1C was >= 8%; please consider scheduling a follow-up appointment for Diabetes  Last A1C Value: 9.4% Date: [8/24/2024]     Follow-up Information    Follow up With Specialties Details Why Contact Info   Jermaine Monson MD Internal Medicine Schedule an appointment as soon as possible for a visit in 1 week(s)  [address]  [facility] [address]  [phone]     Your appointments       Date & Time Appointment Department ([place])    Sep 05, 2024 2:45 PM CDT Exam - New Patient with Martin Foy MD [facility] ([facility])        Sep 10, 2024 6:40 PM CDT Follow Up Visit with Jermaine Monson MD [facility] ([facility] )              [place] [facility]   [address]  [facility] [id number] [phone] [facility]  [address]  [facility] [id number] [phone]            Transitional [facility] Clinic  Was TCC Ordered: No    Primary Care Provider (If no TCC appointment)  Does patient already have a PCP appointment scheduled? No  Nurse Care Manager Scheduled PCP office TCM/JYOTI appointment with patient for 9/03/24 with PCP--pt declines sooner appt     Specialist  Does the patient have any other follow-up appointment(s) need to be scheduled? No    [x]  Patient verbally agrees to additional follow-up  calls from Nurse Care Manager    Book By Date: 9/02/2024

## 2024-09-05 ENCOUNTER — OFFICE VISIT (OUTPATIENT)
Dept: NEUROLOGY | Facility: CLINIC | Age: 74
End: 2024-09-05
Payer: MEDICARE

## 2024-09-05 DIAGNOSIS — G23.8 MULTIPLE SYSTEM ATROPHY (HCC): ICD-10-CM

## 2024-09-05 DIAGNOSIS — G90.3 MULTIPLE SYSTEM ATROPHY (HCC): ICD-10-CM

## 2024-09-05 PROCEDURE — 99214 OFFICE O/P EST MOD 30 MIN: CPT | Performed by: OTHER

## 2024-09-05 PROCEDURE — 1159F MED LIST DOCD IN RCRD: CPT | Performed by: OTHER

## 2024-09-05 PROCEDURE — 1111F DSCHRG MED/CURRENT MED MERGE: CPT | Performed by: OTHER

## 2024-09-05 PROCEDURE — 1160F RVW MEDS BY RX/DR IN RCRD: CPT | Performed by: OTHER

## 2024-09-05 RX ORDER — MIDODRINE HYDROCHLORIDE 2.5 MG/1
2.5 TABLET ORAL 3 TIMES DAILY
Qty: 270 TABLET | Refills: 3 | Status: SHIPPED | OUTPATIENT
Start: 2024-09-05

## 2024-09-05 NOTE — PROGRESS NOTES
[facility] NEUROSCIENCES [address], [address]  [facility] [address]  [phone]              Neurology Follow up Visit     Referred By: Dr. Monson    Chief Complaint:   Chief Complaint   Patient presents with    Neurologic Problem     NPT Patient presents here today to establish care, patient was referred by Jermaine Monson MD to evaluate and treat Multiple system atrophy.         HPI:     Ananya Dowling is a 73 year old female, who presents for follow-up of hospitalization in summer 2024.  Patient with a history of hypertension, diabetes, history of few years of balance problems that are intermittently getting worse especially when she gets UTI or hyperglycemia.  History of urinary incontinence, requiring to wear depends, sometimes she does not know she needs to urinate.  That has been going on for couple of years as well prior to the discussion some of 2024.  Also she keeps getting lightheaded when she stands up quickly.  On hospitalization in May 2024 drop of orthostatic hypotension was over 40.  Also some history of tremors intermittently for few months.  History of some cognitive decline as well.  Possibility of multiple system atrophy was entertained.    Patient came back for follow-up in September 2024.  Continued with balance problem, urinary incontinence, lightheadedness when she stands up.    Past Medical History:    Arthritis    Back problem    Depression    Diabetes (HCC)    Essential hypertension    High blood pressure    High cholesterol    Visual impairment       No past surgical history on file.    Social history:  History   Smoking Status    Never   Smokeless Tobacco    Never     History   Alcohol Use    Yes     Comment: very infrequent, every couple of months     History   Drug Use Unknown       Family History   Problem Relation Age of Onset    Heart Attack Father     Stroke Mother     Heart Disorder Son     Diabetes Son          Current Outpatient Medications:      gabapentin 300 MG Oral Cap, Take 1 capsule (300 mg total) by mouth 3 (three) times daily., Disp: 90 capsule, Rfl: 3    ERGOCALCIFEROL 1.25 MG (37306 UT) Oral Cap, TAKE 1 CAPSULE BY MOUTH 1 TIME A WEEK, Disp: 12 capsule, Rfl: 0    estradiol (ESTRACE) 0.1 MG/GM Vaginal Cream, Apply fingertip amount of cream to the vagina every night for 1 week and then every other night indefinitely, Disp: 42.5 g, Rfl: 11    clotrimazole 1 % External Cream, Apply 1 Application topically 2 (two) times daily., Disp: 60 g, Rfl: 1    Accu-Chek FastClix Lancets Does not apply Misc, Use to check blood sugars 3 times daily., Disp: 300 each, Rfl: 1    Blood Glucose Monitoring Suppl (ACCU-CHEK GUIDE) w/Device Does not apply Kit, Use to check blood sugars 3 times daily., Disp: 1 kit, Rfl: 0    Glucose Blood (ACCU-CHEK GUIDE) In Vitro Strip, Use to check blood sugars 3 times daily., Disp: 300 strip, Rfl: 1    lidocaine 4 % External Patch, Place 1 patch onto the skin daily., Disp: , Rfl:     Insulin Degludec (TRESIBA FLEXTOUCH) 100 UNIT/ML Subcutaneous Solution Pen-injector, Inject 36 Units into the skin daily., Disp: , Rfl:     Insulin Lispro, 1 Unit Dial, 100 UNIT/ML Subcutaneous Solution Pen-injector, Patient will take 14 units with small carb meals and 18-20 units with larger carb meals. (Patient taking differently: Inject 12 Units into the skin in the morning, at noon, and at bedtime. Patient takes 12 units with every meal and sliding scale on top of that), Disp: 54 mL, Rfl: 1    Insulin Pen Needle (PEN NEEDLES) 32G X 4 MM Does not apply Misc, 1 pen  4 (four) times daily. Use  New pen needle  With each injection, Disp: 400 each, Rfl: 0    simvastatin 20 MG Oral Tab, Take 1 tablet (20 mg total) by mouth daily., Disp: 90 tablet, Rfl: 2    sertraline 100 MG Oral Tab, Take 1 tablet (100 mg total) by mouth daily., Disp: 90 tablet, Rfl: 2    losartan 50 MG Oral Tab, Take 0.5 tablets (25 mg total) by mouth daily., Disp: 90 tablet, Rfl: 2    No  Known Allergies    ROS:   As in HPI, the rest of the 14 system review was done and was negative      Physical Exam:  There were no vitals filed for this visit.    General: No apparent distress, well nourished, well groomed.  Head- Normocephalic, atraumatic  Eyes- No redness or swelling  ENT- Hearing intake, normal glutition  Neck- No masses or adenopathy  Cv: pulses were palpable and normal, no cyanosis or edema     Neurological:     Mental Status- Alert some cognitive impairment    Cranial Nerves:    VII. Face symmetric, no facial weakness  VIII. Hearing intact to whisper.  IX. Pallet elevates symmetrically.  XI. Shoulder shrug is intact  XII. Tongue is midline    Motor Exam:  Muscle tone increased bilaterally, some bradykinesia.  No tremors  No atrophy or fasciculations  Strength- upper extremities 5/5 proximally and distally                   Unsteady gait, requiring a walker.  Stooped posture, shuffling gait.    Labs:    Lab Results   Component Value Date    TSH 4.010 (H) 04/19/2022     Lab Results   Component Value Date    HDL 26 (L) 05/05/2024    LDL 85 05/05/2024    TRIG 138 05/05/2024     Lab Results   Component Value Date    HGB 10.7 (L) 08/25/2024    HCT 32.5 (L) 08/25/2024    MCV 85.3 08/25/2024    WBC 8.4 08/25/2024    .0 08/25/2024      Lab Results   Component Value Date    BUN 20 08/25/2024    CA 8.5 (L) 08/25/2024    ALT 10 08/25/2024    AST 16 08/25/2024    ALB 3.2 08/25/2024     08/25/2024    K 3.6 08/25/2024     08/25/2024    CO2 27.0 08/25/2024      I have reviewed labs.      Assessment   1. 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HCC)  Patient with a strong suspicion for multiple system atrophy.  We have discussed expectations and prognosis.  Midodrine will be trialed for orthostatic hypotension.  Balance therapy will be ordered.  Emphasized importance of exercise regimen walking daily  - NEURO - INTERNAL           Education and counseling provided to patient. Instructed patient to call my  office or seek medical attention immediately if symptoms worsen.  Patient verbalized understanding of information given. All questions were answered. All side effects of drugs were discussed.       Return to clinic in: No follow-ups on file.    Martin Foy MD

## 2024-09-09 ENCOUNTER — TELEPHONE (OUTPATIENT)
Dept: INTERNAL MEDICINE CLINIC | Facility: CLINIC | Age: 74
End: 2024-09-09

## 2024-09-09 NOTE — TELEPHONE ENCOUNTER
Marla from [facility] is calling to request two additional OT visits. She would like a verbal ok. Please advise  P:[phone]

## 2024-09-09 NOTE — TELEPHONE ENCOUNTER
Left a message on Marla's voice mail OT from [facility] at [phone] Dr. Ermias Farias'd 2 additional OT visits.

## 2024-09-09 NOTE — TELEPHONE ENCOUNTER
Elaine from [facility] called requesting verbal order to re-certify pt for skilled nurse  To please call her back at [phone], it is a confidential voicemail

## 2024-09-09 NOTE — TELEPHONE ENCOUNTER
Notified Elaine COSTA from [facility] at [phone] informed OK to re-certify. Elaine COSTA voiced understanding.

## 2024-09-09 NOTE — TELEPHONE ENCOUNTER
Spoke with Elaine COSTA of [facility] patient currently   Working with Ananya regarding checking her blood sugars since she does not want her daughter to be involved in her care. Patient continues to     OK to re-certify?

## 2024-09-10 ENCOUNTER — OFFICE VISIT (OUTPATIENT)
Dept: INTERNAL MEDICINE CLINIC | Facility: CLINIC | Age: 74
End: 2024-09-10
Payer: MEDICARE

## 2024-09-10 VITALS
HEIGHT: 60 IN | DIASTOLIC BLOOD PRESSURE: 72 MMHG | WEIGHT: 128 LBS | HEART RATE: 98 BPM | SYSTOLIC BLOOD PRESSURE: 120 MMHG | OXYGEN SATURATION: 98 % | BODY MASS INDEX: 25.13 KG/M2

## 2024-09-10 DIAGNOSIS — G90.3 MULTIPLE SYSTEM ATROPHY (HCC): ICD-10-CM

## 2024-09-10 DIAGNOSIS — G23.8 MULTIPLE SYSTEM ATROPHY (HCC): ICD-10-CM

## 2024-09-10 DIAGNOSIS — Z79.4 TYPE 2 DIABETES MELLITUS WITH HYPERGLYCEMIA, WITH LONG-TERM CURRENT USE OF INSULIN (HCC): ICD-10-CM

## 2024-09-10 DIAGNOSIS — Z87.440 HISTORY OF RECURRENT UTIS: ICD-10-CM

## 2024-09-10 DIAGNOSIS — E11.65 TYPE 2 DIABETES MELLITUS WITH HYPERGLYCEMIA, WITH LONG-TERM CURRENT USE OF INSULIN (HCC): ICD-10-CM

## 2024-09-10 DIAGNOSIS — N39.0 RECURRENT UTI: Primary | ICD-10-CM

## 2024-09-10 PROCEDURE — 99214 OFFICE O/P EST MOD 30 MIN: CPT | Performed by: INTERNAL MEDICINE

## 2024-09-10 PROCEDURE — 1159F MED LIST DOCD IN RCRD: CPT | Performed by: INTERNAL MEDICINE

## 2024-09-10 PROCEDURE — 3046F HEMOGLOBIN A1C LEVEL >9.0%: CPT | Performed by: INTERNAL MEDICINE

## 2024-09-10 PROCEDURE — 3078F DIAST BP <80 MM HG: CPT | Performed by: INTERNAL MEDICINE

## 2024-09-10 PROCEDURE — 3008F BODY MASS INDEX DOCD: CPT | Performed by: INTERNAL MEDICINE

## 2024-09-10 PROCEDURE — 1160F RVW MEDS BY RX/DR IN RCRD: CPT | Performed by: INTERNAL MEDICINE

## 2024-09-10 PROCEDURE — G2211 COMPLEX E/M VISIT ADD ON: HCPCS | Performed by: INTERNAL MEDICINE

## 2024-09-10 PROCEDURE — 3074F SYST BP LT 130 MM HG: CPT | Performed by: INTERNAL MEDICINE

## 2024-09-10 PROCEDURE — 1111F DSCHRG MED/CURRENT MED MERGE: CPT | Performed by: INTERNAL MEDICINE

## 2024-09-10 RX ORDER — SERTRALINE HYDROCHLORIDE 100 MG/1
100 TABLET, FILM COATED ORAL DAILY
Qty: 90 TABLET | Refills: 0 | Status: SHIPPED | OUTPATIENT
Start: 2024-09-10

## 2024-09-10 RX ORDER — METHENAMINE HIPPURATE 1000 MG/1
1 TABLET ORAL 2 TIMES DAILY
Qty: 60 TABLET | Refills: 5 | Status: SHIPPED | OUTPATIENT
Start: 2024-09-10

## 2024-09-10 NOTE — PROGRESS NOTES
Ananya Dowling female 73 year old    Is here today with daughter and granddaughter.  Had recent hospitalization     Chief Complaint   Patient presents with    Follow - Up     F/u sepsis  , uti  ecoli ,   DM      Had confusion and low bp presentation.  Was found to have E. coli.  Was treated with IV antibiotics.  Never had significant symptoms suggestive of urinary tract infection.  She has had several UTIs.        Finished  cipro had no side effects no diarrhea.      Saw  uro - getting  pelvic floor therapy       On  estrogen vag  cream        Discussed her diabetes she still has some variance from lows to highs.    Recently saw neurology and has been diagnosed with multiple system atrophy.  Was started on midodrine.  Is still on losartan blood pressure is stable    Is on sertraline for depression seems to be helping.  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, with long-term current use of insulin ([facility])    Chronic left-sided low back pain without sciatica    Mild recurrent major depression (HCC)    Hyperglycemia    Leukocytosis, unspecified type    Urinary tract infection without hematuria, site unspecified    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HCC)    Sepsis (HCC)    Sepsis due to urinary tract infection (HCC)          Current Outpatient Medications on File Prior to Visit   Medication Sig Dispense Refill    midodrine 2.5 MG Oral Tab Take 1 tablet (2.5 mg total) by mouth in the morning and 1 tablet (2.5 mg total) at noon and 1 tablet (2.5 mg total) in the evening. 270 tablet 3    gabapentin 300 MG Oral Cap Take 1 capsule (300 mg total) by mouth 3 (three) times daily. 90 capsule 3    ERGOCALCIFEROL 1.25 MG (10070 UT) Oral Cap TAKE 1 CAPSULE BY MOUTH 1 TIME A WEEK 12 capsule 0     estradiol (ESTRACE) 0.1 MG/GM Vaginal Cream Apply fingertip amount of cream to the vagina every night for 1 week and then every other night indefinitely 42.5 g 11    clotrimazole 1 % External Cream Apply 1 Application topically 2 (two) times daily. 60 g 1    Accu-Chek FastClix Lancets Does not apply Misc Use to check blood sugars 3 times daily. 300 each 1    Blood Glucose Monitoring Suppl (ACCU-CHEK GUIDE) w/Device Does not apply Kit Use to check blood sugars 3 times daily. 1 kit 0    Glucose Blood (ACCU-CHEK GUIDE) In Vitro Strip Use to check blood sugars 3 times daily. 300 strip 1    lidocaine 4 % External Patch Place 1 patch onto the skin daily.      Insulin Degludec (TRESIBA FLEXTOUCH) 100 UNIT/ML Subcutaneous Solution Pen-injector Inject 36 Units into the skin daily.      Insulin Lispro, 1 Unit Dial, 100 UNIT/ML Subcutaneous Solution Pen-injector Patient will take 14 units with small carb meals and 18-20 units with larger carb meals. (Patient taking differently: Inject 12 Units into the skin in the morning, at noon, and at bedtime. Patient takes 12 units with every meal and sliding scale on top of that) 54 mL 1    Insulin Pen Needle (PEN NEEDLES) 32G X 4 MM Does not apply Misc 1 pen  4 (four) times daily. Use  New pen needle  With each injection 400 each 0    simvastatin 20 MG Oral Tab Take 1 tablet (20 mg total) by mouth daily. 90 tablet 2     No current facility-administered medications on file prior to visit.          Vitals:    09/10/24 1845   BP: 120/72   Pulse: 98   VITALSBody mass index is 25 kg/m².    Pertinent findings on the physical exam; cognition and affect seem improved.  She asked appropriate questions.  No overt neurological findings noted blood pressure stable not toxic pulse was 98 when she came in but came down to the 80s.    Ananya was seen today for follow - up.    Diagnoses and all orders for this visit:    Recurrent UTI  -     methenamine 1 g Oral Tab; Take 1 tablet (1 g total) by mouth 2  (two) times daily.    Type 2 diabetes mellitus with hyperglycemia, with long-term current use of insulin (HCC)    History of recurrent UTIs    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HCC)          Hold losartan  since on midorine       Start  methanamine  to prevent UTI follow-up with pelvic floor therapy.  If symptoms persist may consider chronic antibiotic suppression    Keep on vag  estrogen     DM  still labile -  try more  regimental meals discussed trying to eat the same amount of calories specifically carbs every meal.    Would like her to follow-up with endocrinology.      Discussed her multiple system atrophy.  This note was prepared using Dragon Medical voice recognition dictation software and as a result, errors may occur. When identified, these errors have been corrected. While every attempt is made to correct errors during dictation, discrepancies may still exist

## 2024-09-12 ENCOUNTER — PATIENT OUTREACH (OUTPATIENT)
Dept: CASE MANAGEMENT | Age: 74
End: 2024-09-12

## 2024-09-12 NOTE — PROGRESS NOTES
Spoke to Everardo for CCM. She states she has not started taking Methanamine, she plans to do so once her daughter pics up the medication. She mentioned seeing Dr. Mariano and being diagnosed with Alzheimer's. She had to disconnect at she had another call coming in. She indicated she would like me to call her daughter for an update.     I left a message on her daughters telephone asking for a return call.       Time Spent This Encounter Total: 6 min medical record review, telephone communication, care plan updates where needed, education, goals, and action plan recreation/update. Provided acknowledgment and validation to patient's concerns.   Monthly Minute Total including today: 6  Physical assessment, complete health history, and need for CCM established by Jermaine Monson MD.

## 2024-09-17 ENCOUNTER — PATIENT OUTREACH (OUTPATIENT)
Dept: CASE MANAGEMENT | Age: 74
End: 2024-09-17

## 2024-09-17 NOTE — PROGRESS NOTES
Left message on mailbox for patient to call nurse care manager back for transitions of care call.  Nurse care  information [phone] included in message.          OCT 15  2024 11:15 AM - Appointment  [facility] Maira Venegas PT; Brian Lo MD      OCT 22  2024 12:45 PM - Appointment  [facility] Maira Venegas PT; Brian Lo MD     OCT 31  2024 12:45 PM - Appointment  [facility] Maira Venegas PT; Brian Lo MD     NOV 5 2024 12:45 PM - Appointment  [facility] Maira Venegas PT; Brian Lo MD     NOV 12 2024 12:45 PM - Appointment  [facility] Maira Venegas PT; Brian Lo MD          Future Appointments   Date Time Provider Department Center   11/12/2024  6:40 PM Jermaine Monson MD [facility]   12/5/2024  1:30 PM [facility] F[facility]

## 2024-09-20 ENCOUNTER — TELEPHONE (OUTPATIENT)
Dept: ENDOCRINOLOGY CLINIC | Facility: CLINIC | Age: 74
End: 2024-09-20

## 2024-09-21 DIAGNOSIS — E55.9 VITAMIN D DEFICIENCY: ICD-10-CM

## 2024-09-23 RX ORDER — SIMVASTATIN 20 MG
20 TABLET ORAL DAILY
Qty: 90 TABLET | Refills: 1 | Status: SHIPPED | OUTPATIENT
Start: 2024-09-23

## 2024-09-23 RX ORDER — ERGOCALCIFEROL 1.25 MG/1
50000 CAPSULE, LIQUID FILLED ORAL WEEKLY
Qty: 12 CAPSULE | Refills: 0 | Status: SHIPPED | OUTPATIENT
Start: 2024-09-23

## 2024-09-23 RX ORDER — CLOTRIMAZOLE 1 %
1 CREAM (GRAM) TOPICAL 2 TIMES DAILY
Qty: 60 G | Refills: 1 | Status: SHIPPED | OUTPATIENT
Start: 2024-09-23

## 2024-09-27 NOTE — PROGRESS NOTES
JYOTI Graduation Assessment    General:  Assessment completed with: Patient    Care Plan/Instructions:   Care Plan Summary (Recap of navigation period including # of ED & Hospital Admission, and if goals met or unmet): Pt did see PCP 9/10/24--taking Methenamine, as prescribed. Pt confirms f/u with PCP 11/12/24, neuro f/u 12/5/24 and [facility], as scheduled.  Patient Graduation Instructions (Ongoing barriers to care identified, Areas of Need, Areas of Progress): Pt continues to improve, since hospital discharge, appetite good, staying hydrated, ambulating with walker at home. Pt denies fever, chills, headache, confusion, vision changes, dizziness, nausea, vomiting, diarrhea, UTI symptoms, hematuria, low back/flank pain, abdominal pain, chest pain or shortness of breath at this time. Patient aware when to contact PCP/specialists and when to seek emergency care. No further questions/concerns at this time.     Future Appointments   Date Time Provider Department Center   11/12/2024  6:40 PM Jermaine Monson MD [facility]   12/5/2024  1:30 PM Martin oFy MD [facility] [place]     OCT 15  2024 11:15 AM - Appointment  [facility] Maira Venegas, PT; Brian Lo MD      OCT 22  2024 12:45 PM - Appointment  [facility] Maira Venegas, PT; Brian Lo MD     OCT 31  2024 12:45 PM - Appointment  [facility] Maira Venegas, PT; Brian Lo MD     NOV 5 2024 12:45 PM - Appointment  [facility] Maira Venegas PT; Brian Lo MD     NOV 12 2024 12:45 PM - Appointment  [facility] Maira Venegas PT; Brian Lo MD

## 2024-10-29 RX ORDER — LOSARTAN POTASSIUM 50 MG/1
25 TABLET ORAL DAILY
Qty: 90 TABLET | Refills: 2 | OUTPATIENT
Start: 2024-10-29

## 2024-10-31 ENCOUNTER — PATIENT OUTREACH (OUTPATIENT)
Dept: CASE MANAGEMENT | Age: 74
End: 2024-10-31

## 2024-10-31 ENCOUNTER — TELEPHONE (OUTPATIENT)
Dept: INTERNAL MEDICINE CLINIC | Facility: CLINIC | Age: 74
End: 2024-10-31

## 2024-10-31 DIAGNOSIS — E11.3551 TYPE 2 DIABETES MELLITUS WITH STABLE PROLIFERATIVE RETINOPATHY OF RIGHT EYE, WITH LONG-TERM CURRENT USE OF INSULIN (HCC): Primary | ICD-10-CM

## 2024-10-31 DIAGNOSIS — E78.5 DYSLIPIDEMIA: ICD-10-CM

## 2024-10-31 DIAGNOSIS — G23.8 MULTIPLE SYSTEM ATROPHY (HCC): ICD-10-CM

## 2024-10-31 DIAGNOSIS — G90.3 MULTIPLE SYSTEM ATROPHY (HCC): ICD-10-CM

## 2024-10-31 DIAGNOSIS — Z79.4 TYPE 2 DIABETES MELLITUS WITH STABLE PROLIFERATIVE RETINOPATHY OF RIGHT EYE, WITH LONG-TERM CURRENT USE OF INSULIN (HCC): Primary | ICD-10-CM

## 2024-10-31 DIAGNOSIS — I73.9 PERIPHERAL VASCULAR DISEASE (HCC): ICD-10-CM

## 2024-10-31 PROCEDURE — 1159F MED LIST DOCD IN RCRD: CPT

## 2024-10-31 PROCEDURE — 99490 CHRNC CARE MGMT STAFF 1ST 20: CPT

## 2024-10-31 NOTE — PROGRESS NOTES
Spoke to Abby for CCM.      Updates to patient care team/comments: None   Patient reported changes in medications: None     Med Adherence  Comment: Pt confirmed she is taking medications as instructed by their providers.     Health Maintenance: The patient declined Flu and COVID vaccine, colonoscopy and Dexa scan.   Health Maintenance   Topic Date Due    DEXA Scan  Never done    Colorectal Cancer Screening  02/22/2022    Diabetes Care: Microalb/Creat Ratio  04/20/2023    COVID-19 Vaccine (3 - 2023-24 season) 09/01/2024    Diabetes Care Dilated Eye Exam  09/29/2024    Influenza Vaccine (1) Never done    Diabetes Care A1C  11/24/2024    Diabetes Care Foot Exam  01/25/2025    Mammogram  04/30/2025    Diabetes Care: GFR  08/25/2025    MA Annual Health Assessment  Completed    Annual Depression Screening  Completed    Fall Risk Screening (Annual)  Completed    Pneumococcal Vaccine: 65+ Years  Completed    Zoster Vaccines  Completed     Patient updates/concerns:     The patient daughter Xochitl reports calling the answering service on Saturday regarding the patient's symtoms of a possible UTI. However, they have not received a return call. The patient is experiencing frequent urination, excessive thirst and a fishy odor in the urine.    [facility]. She mentioned feeling overwhelmed  after 6 recent family losses and managing her mothers health and finances. .    Goals/Action Plan:    Active goal from previous outreach:   What: Strengthen my legs            - Where/When/How: She will continue with PT and inquire about PT at home upon discharge.  Patient reported progress towards goals: The patient is refusing to continue with any PT services.   NEW GOAL:                - What:  Seek medical attentions for UTI symtoms.           - Where/When/How: CCM will assist by sending a TE to the PCP office for a nurse to contact the patient  to triage her symtoms.   Patient Reported Barriers and Concerns: The patient does not want to continue with pelvic exercise PT.                   - Plan for overcoming barriers: Xochitl will call Stephanie to stop PT.    Care Managers Interventions:   TE routed to Dr. Monson triage team.    Reviewed information below with Xochitl.   Miscellaneous Notes  - documented in this encounter   Telephone Encounter - Maira Venegas PT - 10/31/2024 12:53 PM CDT  Formatting of this note might be different from the original.  Called and left VM with patient regarding therapy appointment today. Let patient know since this is her 3rd No show appointment and we have not heard from her we will cancel remaining appointments and discharge from therapy at this time. Encouraged patient to call clinic if she has any questions or concerns or would like to reschedule therapy appointments (one day at a time). Callback number to clinic provided.  Maira Venegas PT, DPT    Electronically signed by Maira Venegas PT at 10/31/2024 1:00 PM CDT  Nutrition and exercise counseling: I encouraged the patient to consume three balanced meals daily and discussed the importance of incorporating daily exercise, including gentle stretches. To improve overall health.    Chat review and documentation: I thoroughly reviewed the patient chart and updated Care Everywhere.    Immunization Status: I conducted an [employer] immunization query, which confirmed that no updates were necessary at this time.     Social determinants of health: I updated social determinants of health to reflect any recent changes.    Patient support: I actively listened to the patient's concern's, provided emotional support and encouraged the patient to reach out, if she needs further assistance.     Reviewed Future Appointments:   Future Appointments   Date Time Provider Department Center   11/12/2024  6:40 PM Jermaine Monson MD [facility]   12/5/2024  1:30 PM Martin Foy MD [facility]  [facility]       Next Care Manager Follow Up Date: One month. Encouraged patient to call as needed.    Reason For Follow Up: review progress and or barriers towards patient's goals.     Time Spent This Encounter Total: 28 min medical record review, telephone communication, care plan updates where needed, education, goals, and action plan recreation/update. Provided acknowledgment and validation to patient's concerns.   Monthly Minute Total including today: 28 min  Physical assessment, complete health history, and need for CCM established by Jermaine Monson MD.  Friendly reminder - 2025 Benefits Open Enrollment is here!   We encourage you to review your current Medicare coverage and explore your options during this period. We have developed a Medicare Information Page on our website to serve as a resource for guidance and answers to any questions you might have.   You can access it by visiting, [email or web address]

## 2024-10-31 NOTE — TELEPHONE ENCOUNTER
The patient daughter Xochitl reports calling the answering service on Saturday regarding the patient's symtoms of a possible UTI. However, they have not received a return call. The patient is experiencing frequent urination, excessive thirst and a fishy odor in the urine.

## 2024-11-01 ENCOUNTER — OFFICE VISIT (OUTPATIENT)
Dept: INTERNAL MEDICINE CLINIC | Facility: CLINIC | Age: 74
End: 2024-11-01
Payer: MEDICARE

## 2024-11-01 VITALS
WEIGHT: 131.19 LBS | HEART RATE: 51 BPM | SYSTOLIC BLOOD PRESSURE: 98 MMHG | OXYGEN SATURATION: 96 % | RESPIRATION RATE: 18 BRPM | BODY MASS INDEX: 25.76 KG/M2 | DIASTOLIC BLOOD PRESSURE: 40 MMHG | HEIGHT: 60 IN

## 2024-11-01 DIAGNOSIS — E11.65 TYPE 2 DIABETES MELLITUS WITH HYPERGLYCEMIA, WITH LONG-TERM CURRENT USE OF INSULIN (HCC): ICD-10-CM

## 2024-11-01 DIAGNOSIS — Z79.4 TYPE 2 DIABETES MELLITUS WITH HYPERGLYCEMIA, WITH LONG-TERM CURRENT USE OF INSULIN (HCC): ICD-10-CM

## 2024-11-01 DIAGNOSIS — N39.0 RECURRENT UTI: Primary | ICD-10-CM

## 2024-11-01 DIAGNOSIS — I95.9 HYPOTENSION, UNSPECIFIED HYPOTENSION TYPE: ICD-10-CM

## 2024-11-01 DIAGNOSIS — G90.3 MULTIPLE SYSTEM ATROPHY (HCC): ICD-10-CM

## 2024-11-01 DIAGNOSIS — G23.8 MULTIPLE SYSTEM ATROPHY (HCC): ICD-10-CM

## 2024-11-01 DIAGNOSIS — F32.1 MODERATE MAJOR DEPRESSION (HCC): ICD-10-CM

## 2024-11-01 PROCEDURE — 1170F FXNL STATUS ASSESSED: CPT | Performed by: INTERNAL MEDICINE

## 2024-11-01 PROCEDURE — G2211 COMPLEX E/M VISIT ADD ON: HCPCS | Performed by: INTERNAL MEDICINE

## 2024-11-01 PROCEDURE — 1160F RVW MEDS BY RX/DR IN RCRD: CPT | Performed by: INTERNAL MEDICINE

## 2024-11-01 PROCEDURE — 1125F AMNT PAIN NOTED PAIN PRSNT: CPT | Performed by: INTERNAL MEDICINE

## 2024-11-01 PROCEDURE — 3078F DIAST BP <80 MM HG: CPT | Performed by: INTERNAL MEDICINE

## 2024-11-01 PROCEDURE — 3008F BODY MASS INDEX DOCD: CPT | Performed by: INTERNAL MEDICINE

## 2024-11-01 PROCEDURE — 3074F SYST BP LT 130 MM HG: CPT | Performed by: INTERNAL MEDICINE

## 2024-11-01 PROCEDURE — 1159F MED LIST DOCD IN RCRD: CPT | Performed by: INTERNAL MEDICINE

## 2024-11-01 PROCEDURE — 99214 OFFICE O/P EST MOD 30 MIN: CPT | Performed by: INTERNAL MEDICINE

## 2024-11-01 RX ORDER — MIDODRINE HYDROCHLORIDE 5 MG/1
5 TABLET ORAL 3 TIMES DAILY
Qty: 90 TABLET | Refills: 1 | Status: SHIPPED | OUTPATIENT
Start: 2024-11-01

## 2024-11-01 RX ORDER — CEPHALEXIN 500 MG/1
500 CAPSULE ORAL 2 TIMES DAILY
Qty: 14 CAPSULE | Refills: 0 | Status: SHIPPED | OUTPATIENT
Start: 2024-11-01

## 2024-11-01 NOTE — PROGRESS NOTES
Ananya Dowling female 73 year old         Chief Complaint   Patient presents with    Follow - Up     Today  felt lightheaded and  saw  white denies syncope.    Has hx of  low  bp  -denies orthostatic symptoms    Daughter  thinks  has  UTI   states that last time she had a UTI she did not have dysuria but was kind of spacey like she is today.      Feels  lightheaded after takes meds  -  for 5 min     Suagrs  are  good no highs or lows.    No focal neurological symptoms.  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    Pertinent findings on the physical exam; she seems herself to me.  I do see the spacey look the daughter is describing but patient was able to get up and go to the bathroom several times to try to urinate.  She was unable to do so.  Heart was regular lungs clear abdomen unremarkable    Ananya was seen today for follow - up.    Diagnoses and all orders for this visit:    Recurrent UTI  -     URINALYSIS NONAUTO W/O SCOPE    Type 2 diabetes mellitus with hyperglycemia, with long-term current use of insulin (HCC)    Moderate major depression (HCC)    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HCC)    Hypotension, unspecified hypotension type    Other orders  -     midodrine 5 MG  Oral Tab; Take 1 tablet (5 mg total) by mouth in the morning and 1 tablet (5 mg total) at noon and 1 tablet (5 mg total) in the evening.  -     cephALEXin 500 MG Oral Cap; Take 1 capsule (500 mg total) by mouth 2 (two) times daily.         Patient had an episode today of being spacey no obvious seizure or syncope.  Because of previous symptoms similar to this with UTI we will treat with cephalexin.  Unable to give us a urine after giving her several glasses of water.  Discussed treating her empirically seems to be the right thing to do would like to get urine obviously.    She has some chronic low blood pressure I do not think she has ongoing bleeding sepsis etc. suspect she has either some diabetic autonomic insufficiency or multiple system atrophy will increase midodrine from 2.5 to 5 mg 3 times daily.    She has severe depression and is always hard to figure out.  I have checked her for dementia in the past.    Diabetes control seems good continue present medication need to watch closely will need close follow-up            This note was prepared using Dragon Medical voice recognition dictation software and as a result, errors may occur. When identified, these errors have been corrected. While every attempt is made to correct errors during dictation, discrepancies may still exist            [1]   Current Outpatient Medications on File Prior to Visit   Medication Sig Dispense Refill    simvastatin 20 MG Oral Tab Take 1 tablet (20 mg total) by mouth daily. 90 tablet 1    clotrimazole 1 % External Cream Apply 1 Application topically 2 (two) times daily. 60 g 1    ergocalciferol 1.25 MG (33224 UT) Oral Cap Take 1 capsule (50,000 Units total) by mouth once a week. 12 capsule 0    sertraline 100 MG Oral Tab Take 1 tablet (100 mg total) by mouth daily. 90 tablet 0    methenamine 1 g Oral Tab Take 1 tablet (1 g total) by mouth 2 (two) times daily. 60 tablet 5    gabapentin 300 MG Oral Cap Take 1 capsule (300 mg total) by  mouth 3 (three) times daily. 90 capsule 3    estradiol (ESTRACE) 0.1 MG/GM Vaginal Cream Apply fingertip amount of cream to the vagina every night for 1 week and then every other night indefinitely 42.5 g 11    Accu-Chek FastClix Lancets Does not apply Misc Use to check blood sugars 3 times daily. 300 each 1    Blood Glucose Monitoring Suppl (ACCU-CHEK GUIDE) w/Device Does not apply Kit Use to check blood sugars 3 times daily. 1 kit 0    Glucose Blood (ACCU-CHEK GUIDE) In Vitro Strip Use to check blood sugars 3 times daily. 300 strip 1    lidocaine 4 % External Patch Place 1 patch onto the skin daily.      Insulin Degludec (TRESIBA FLEXTOUCH) 100 UNIT/ML Subcutaneous Solution Pen-injector Inject 36 Units into the skin daily.      Insulin Lispro, 1 Unit Dial, 100 UNIT/ML Subcutaneous Solution Pen-injector Patient will take 14 units with small carb meals and 18-20 units with larger carb meals. (Patient taking differently: Inject 12 Units into the skin in the morning, at noon, and at bedtime. Patient takes 12 units with every meal and sliding scale on top of that) 54 mL 1    Insulin Pen Needle (PEN NEEDLES) 32G X 4 MM Does not apply Misc 1 pen  4 (four) times daily. Use  New pen needle  With each injection 400 each 0     No current facility-administered medications on file prior to visit.

## 2024-11-13 NOTE — TELEPHONE ENCOUNTER
Called pharmacy and was notified that due to [employer], glucometer must be Accucheck and truemetrix. Called pt daughter per HERB and was notified to send for Accucheck. Rx sent to pharmacy per protocol.     Endocrine Refill protocol for Glucose testing supplies     Protocol Criteria: PASSED  Appointment with Endocrinology completed in the last 12 months or scheduled in the next 6 months     Verify appointment has been completed or scheduled in the appropriate timeline. If so can send a 90 day supply with 1 refill.     Last completed office visit: 11/21/2023 Martha Lees MD   Next scheduled Follow up: 7/1/2024   Yes - the patient is able to be screened

## 2024-11-15 ENCOUNTER — HOSPITAL ENCOUNTER (EMERGENCY)
Facility: HOSPITAL | Age: 74
Discharge: HOME | End: 2024-11-16
Attending: EMERGENCY MEDICINE
Payer: MEDICARE

## 2024-11-15 ENCOUNTER — APPOINTMENT (OUTPATIENT)
Dept: CT IMAGING | Facility: HOSPITAL | Age: 74
End: 2024-11-15
Attending: EMERGENCY MEDICINE
Payer: MEDICARE

## 2024-11-15 ENCOUNTER — APPOINTMENT (OUTPATIENT)
Dept: GENERAL RADIOLOGY | Facility: HOSPITAL | Age: 74
End: 2024-11-15
Attending: EMERGENCY MEDICINE
Payer: MEDICARE

## 2024-11-15 DIAGNOSIS — M25.512 ACUTE PAIN OF BOTH SHOULDERS: ICD-10-CM

## 2024-11-15 DIAGNOSIS — M25.551 BILATERAL HIP PAIN: ICD-10-CM

## 2024-11-15 DIAGNOSIS — M25.562 ACUTE PAIN OF BOTH KNEES: ICD-10-CM

## 2024-11-15 DIAGNOSIS — M25.552 BILATERAL HIP PAIN: ICD-10-CM

## 2024-11-15 DIAGNOSIS — Z79.4 TYPE 2 DIABETES MELLITUS WITH HYPERGLYCEMIA, WITH LONG-TERM CURRENT USE OF INSULIN (HCC): ICD-10-CM

## 2024-11-15 DIAGNOSIS — E11.65 TYPE 2 DIABETES MELLITUS WITH HYPERGLYCEMIA, WITH LONG-TERM CURRENT USE OF INSULIN (HCC): ICD-10-CM

## 2024-11-15 DIAGNOSIS — M54.50 BACK PAIN OF THORACOLUMBAR REGION: ICD-10-CM
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
## 2024-11-15 DIAGNOSIS — I10 ELEVATED BLOOD PRESSURE READING WITH DIAGNOSIS OF HYPERTENSION: ICD-10-CM

## 2024-11-15 DIAGNOSIS — M25.561 ACUTE PAIN OF BOTH KNEES: ICD-10-CM

## 2024-11-15 DIAGNOSIS — M25.511 ACUTE PAIN OF BOTH SHOULDERS: ICD-10-CM

## 2024-11-15 DIAGNOSIS — M54.6 BACK PAIN OF THORACOLUMBAR REGION: ICD-10-CM

## 2024-11-15 LAB
ALBUMIN SERPL-MCNC: 4.3 G/DL (ref 3.2–4.8)
ALBUMIN/GLOB SERPL: 1.2 {RATIO} (ref 1–2)
ALP LIVER SERPL-CCNC: 92 U/L
ALT SERPL-CCNC: 10 U/L
ANION GAP SERPL CALC-SCNC: 7 MMOL/L (ref 0–18)
AST SERPL-CCNC: 15 U/L (ref ?–34)
BASOPHILS # BLD AUTO: 0.03 X10(3) UL (ref 0–0.2)
BASOPHILS NFR BLD AUTO: 0.3 %
BILIRUB SERPL-MCNC: 0.2 MG/DL (ref 0.2–1.1)
BUN BLD-MCNC: 18 MG/DL (ref 9–23)
BUN/CREAT SERPL: 19.8 (ref 10–20)
CALCIUM BLD-MCNC: 9.9 MG/DL (ref 8.7–10.4)
CHLORIDE SERPL-SCNC: 105 MMOL/L (ref 98–112)
CO2 SERPL-SCNC: 26 MMOL/L (ref 21–32)
CREAT BLD-MCNC: 0.91 MG/DL
DEPRECATED RDW RBC AUTO: 39.6 FL (ref 35.1–46.3)
EGFRCR SERPLBLD CKD-EPI 2021: 67 ML/MIN/1.73M2 (ref 60–?)
EOSINOPHIL # BLD AUTO: 0.13 X10(3) UL (ref 0–0.7)
EOSINOPHIL NFR BLD AUTO: 1.2 %
ERYTHROCYTE [DISTWIDTH] IN BLOOD BY AUTOMATED COUNT: 13.1 % (ref 11–15)
GLOBULIN PLAS-MCNC: 3.5 G/DL (ref 2–3.5)
GLUCOSE BLD-MCNC: 367 MG/DL (ref 70–99)
GLUCOSE BLDC GLUCOMTR-MCNC: 314 MG/DL (ref 70–99)
GLUCOSE BLDC GLUCOMTR-MCNC: 352 MG/DL (ref 70–99)
HCT VFR BLD AUTO: 37.9 %
HGB BLD-MCNC: 12.6 G/DL
IMM GRANULOCYTES # BLD AUTO: 0.05 X10(3) UL (ref 0–1)
IMM GRANULOCYTES NFR BLD: 0.5 %
LYMPHOCYTES # BLD AUTO: 1.89 X10(3) UL (ref 1–4)
LYMPHOCYTES NFR BLD AUTO: 18.1 %
MCH RBC QN AUTO: 27.6 PG (ref 26–34)
MCHC RBC AUTO-ENTMCNC: 33.2 G/DL (ref 31–37)
MCV RBC AUTO: 83.1 FL
MONOCYTES # BLD AUTO: 0.59 X10(3) UL (ref 0.1–1)
MONOCYTES NFR BLD AUTO: 5.7 %
NEUTROPHILS # BLD AUTO: 7.75 X10 (3) UL (ref 1.5–7.7)
NEUTROPHILS # BLD AUTO: 7.75 X10(3) UL (ref 1.5–7.7)
NEUTROPHILS NFR BLD AUTO: 74.2 %
OSMOLALITY SERPL CALC.SUM OF ELEC: 303 MOSM/KG (ref 275–295)
PLATELET # BLD AUTO: 295 10(3)UL (ref 150–450)
POTASSIUM SERPL-SCNC: 4 MMOL/L (ref 3.5–5.1)
PROT SERPL-MCNC: 7.8 G/DL (ref 5.7–8.2)
RBC # BLD AUTO: 4.56 X10(6)UL
SODIUM SERPL-SCNC: 138 MMOL/L (ref 136–145)
WBC # BLD AUTO: 10.4 X10(3) UL (ref 4–11)

## 2024-11-15 PROCEDURE — 70450 CT HEAD/BRAIN W/O DYE: CPT | Performed by: EMERGENCY MEDICINE

## 2024-11-15 PROCEDURE — 73560 X-RAY EXAM OF KNEE 1 OR 2: CPT | Performed by: EMERGENCY MEDICINE

## 2024-11-15 PROCEDURE — 85025 COMPLETE CBC W/AUTO DIFF WBC: CPT | Performed by: EMERGENCY MEDICINE

## 2024-11-15 PROCEDURE — 99284 EMERGENCY DEPT VISIT MOD MDM: CPT

## 2024-11-15 PROCEDURE — 72125 CT NECK SPINE W/O DYE: CPT | Performed by: EMERGENCY MEDICINE

## 2024-11-15 PROCEDURE — 82962 GLUCOSE BLOOD TEST: CPT

## 2024-11-15 PROCEDURE — 72170 X-RAY EXAM OF PELVIS: CPT | Performed by: EMERGENCY MEDICINE

## 2024-11-15 PROCEDURE — 72131 CT LUMBAR SPINE W/O DYE: CPT | Performed by: EMERGENCY MEDICINE

## 2024-11-15 PROCEDURE — 99285 EMERGENCY DEPT VISIT HI MDM: CPT

## 2024-11-15 PROCEDURE — 72128 CT CHEST SPINE W/O DYE: CPT | Performed by: EMERGENCY MEDICINE

## 2024-11-15 PROCEDURE — 80053 COMPREHEN METABOLIC PANEL: CPT | Performed by: EMERGENCY MEDICINE

## 2024-11-15 PROCEDURE — 36415 COLL VENOUS BLD VENIPUNCTURE: CPT

## 2024-11-15 RX ORDER — ACETAMINOPHEN 500 MG
1000 TABLET ORAL ONCE
Status: COMPLETED | OUTPATIENT
Start: 2024-11-15 | End: 2024-11-15

## 2024-11-15 RX ORDER — LOSARTAN POTASSIUM 25 MG/1
25 TABLET ORAL ONCE
Status: COMPLETED | OUTPATIENT
Start: 2024-11-15 | End: 2024-11-15

## 2024-11-15 RX ORDER — INSULIN ASPART 100 [IU]/ML
0.15 INJECTION, SOLUTION INTRAVENOUS; SUBCUTANEOUS ONCE
Status: COMPLETED | OUTPATIENT
Start: 2024-11-15 | End: 2024-11-15

## 2024-11-16 ENCOUNTER — APPOINTMENT (OUTPATIENT)
Dept: GENERAL RADIOLOGY | Facility: HOSPITAL | Age: 74
End: 2024-11-16
Attending: EMERGENCY MEDICINE
Payer: MEDICARE

## 2024-11-16 VITALS
DIASTOLIC BLOOD PRESSURE: 71 MMHG | WEIGHT: 128 LBS | TEMPERATURE: 98 F | RESPIRATION RATE: 20 BRPM | HEART RATE: 78 BPM | HEIGHT: 61 IN | BODY MASS INDEX: 24.17 KG/M2 | OXYGEN SATURATION: 97 % | SYSTOLIC BLOOD PRESSURE: 171 MMHG

## 2024-11-16 LAB — GLUCOSE BLDC GLUCOMTR-MCNC: 276 MG/DL (ref 70–99)

## 2024-11-16 PROCEDURE — 73030 X-RAY EXAM OF SHOULDER: CPT | Performed by: EMERGENCY MEDICINE

## 2024-11-16 PROCEDURE — 73560 X-RAY EXAM OF KNEE 1 OR 2: CPT | Performed by: EMERGENCY MEDICINE

## 2024-11-16 PROCEDURE — 82962 GLUCOSE BLOOD TEST: CPT

## 2024-11-16 RX ORDER — LIDOCAINE 50 MG/G
1 PATCH TOPICAL EVERY 24 HOURS
Qty: 14 PATCH | Refills: 0 | Status: SHIPPED | OUTPATIENT
Start: 2024-11-16

## 2024-11-16 RX ORDER — ACETAMINOPHEN 325 MG/1
650 TABLET ORAL EVERY 6 HOURS PRN
Qty: 30 TABLET | Refills: 0 | Status: SHIPPED | OUTPATIENT
Start: 2024-11-16

## 2024-11-16 NOTE — HOME CARE LIAISON
Received referral via [facility] for Home Health services. Spoke w/ patient daughter who is agreeable with [facility]. Contact information placed on AVS.

## 2024-11-16 NOTE — CM/SW NOTE
EDCM WAS ASKED BY DR ESPINOSA TO REFER PT FOR [facility]    EDCM PLACED REFERRAL TO [facility] FOR [facility]  EDCM PLACED FACE TO FACE ORDER AND SENT TO [facility]  EDCM PLACED SOCIAL WORK ORDER AND SENT TO [facility]  EDCM SENT ER MD NOTE TO [facility]    ONCE PATIENT IS PICKED UP BY AN [facility], HHC WILL BE ACCEPTED AND THE [facility] COMPANY WILL CALL PATIENT AND OR DAUGHTER TO SCHEDULE APPOINTMENT.         Mehnaz De León MSN, KATJA, RN  Emergency Department   Extension [phone]

## 2024-11-16 NOTE — ED INITIAL ASSESSMENT (HPI)
C/o multiple falls today. Patient reports her legs gave out when she fell. Per granddaughter patient is supposed to use a walker and does not. Denies urinary symptoms or eagle. C/o bilateral hip and knee pain. + head injury, denies LOC

## 2024-11-16 NOTE — CM/SW NOTE
Patient reserved with [facility]. They will contact the daughter as directed.     [phone]-called the daughter and left a message regarding the above

## 2024-11-16 NOTE — DISCHARGE INSTRUCTIONS
Thank you for seeking care at [facility] Emergency Department.  You have been seen and evaluated. The x-rays today did not show any broken bones or other acute findings.    We discussed the results of your workup   Please read the instructions provided   If given prescriptions, take as instructed    Remember, your care process does not end after your visit today. Please follow-up with your doctor within 1-2 days for a follow-up check to ensure you are  improving, to see if you need any further evaluation/testing, or to evaluate for any alternate diagnoses.     Please return to the emergency department immediately if you develop chest pain, difficulty breathing, inability to drink liquids without vomiting, one sided numbness or weakness, slurred speech, severe headache, or if you develop any other new or concerning symptoms as these could be signs of more serious medical illness.    We hope you feel better.

## 2024-11-16 NOTE — ED PROVIDER NOTES
[facility] Note  Patient: Ananya Dowling Age: 74 year old Sex: female      MRN: [id number]  : 1950    Patient Seen in: [facility] Emergency Department    History     Chief Complaint   Patient presents with    Fall     Stated Complaint: fall    History obtained from: patient, family     This is a 74-year-old history of diabetes, hypertension, hyperlipidemia, arthritis, depression, presents to the ER for evaluation after falls today. Pt granddaughter is at cartside. Pt admits she doesn't always use her walker that she is supposed to use to walk. States that prior to arrival to ED today she tripped over her feet and her knees gave out, causing her to fall at home. She did hit her head but did not pass out. Denies chest pain, shortness of breath, lightheadedness or other symptoms prior to falling. She has been feeling well in her baseline health recently, denies fever, cough, vomiting, diarrhea, change in appetite, dysuria, hematuria or other complaints. Currently however she does complain of pain in her shoulders, hips and knees as well as neck and low back pain. Denies numbness/tingling in her extremities. No other complaints at this time.     Review of Systems:  Review of Systems  Positive for stated complaint: fall. Constitutional and vital signs reviewed. All other systems reviewed and negative except as noted above.    Patient History:  Past Medical History:    Arthritis    Back problem    Depression    Diabetes (HCC)    Essential hypertension    High blood pressure    High cholesterol    Visual impairment       History reviewed. No pertinent surgical history.     Family History   Problem Relation Age of Onset    Heart Attack Father     Stroke Mother     Heart Disorder Son     Diabetes Son        Specific Social Determinants of Health:   Social History     Socioeconomic History    Marital status:    Tobacco Use    Smoking status: Never    Smokeless tobacco: Never   Vaping  Use    Vaping status: Never Used   Substance and Sexual Activity    Alcohol use: Yes     Comment: very infrequent, every couple of months    Drug use: Never     Social Drivers of Health     Financial Resource Strain: Low Risk  (8/27/2024)    Financial Resource Strain     Difficulty of Paying Living Expenses: Not very hard     Med Affordability: No   Food Insecurity: No Food Insecurity (8/24/2024)    Food Insecurity     Food Insecurity: Never true   Transportation Needs: No Transportation Needs (8/27/2024)    Transportation Needs     Lack of Transportation: No   Physical Activity: Inactive (2/16/2023)    Exercise Vital Sign     Days of Exercise per Week: 0 days     Minutes of Exercise per Session: 0 min   Stress: No Stress Concern Present (2/16/2023)    Stress     Feeling of Stress : No   Social Connections: Socially Integrated (2/16/2023)    Social Connections     Frequency of Socialization with Friends and Family: 3   Housing Stability: Low Risk  (8/24/2024)    Housing Stability     Housing Instability: No           PSFH elements reviewed from today and agreed except as otherwise stated in HPI.    Physical Exam     ED Triage Vitals [11/15/24 2026]   BP (!) 165/70   Pulse 79   Resp 18   Temp 98.2 °F (36.8 °C)   Temp src Oral   SpO2 99 %   O2 Device None (Room air)       Current:BP (!) 171/71   Pulse 78   Temp 98.2 °F (36.8 °C) (Oral)   Resp 20   Ht 154.9 cm (5' 1\")   Wt 58.1 kg   SpO2 97%   BMI 24.19 kg/m²         Physical Exam  Vitals and nursing note reviewed.   Constitutional:       General: She is not in acute distress.     Appearance: She is not ill-appearing.   HENT:      Head: Normocephalic and atraumatic.      Right Ear: External ear normal.      Left Ear: External ear normal.      Nose: Nose normal.      Mouth/Throat:      Mouth: Mucous membranes are moist.   Eyes:      Conjunctiva/sclera: Conjunctivae normal.   Cardiovascular:      Rate and Rhythm: Normal rate and regular rhythm.      Heart sounds:  No murmur heard.  Pulmonary:      Effort: Pulmonary effort is normal. No respiratory distress.      Breath sounds: No wheezing or rales.   Abdominal:      General: There is no distension.      Palpations: Abdomen is soft.      Tenderness: There is no abdominal tenderness. There is no guarding or rebound.   Musculoskeletal:         General: No deformity.      Cervical back: Normal range of motion and neck supple. Tenderness present.      Comments: There is diffuse midline tenderness to the C/T/L spine, no stepoff or deformity. No paraspinal muscle tenderness throughout. There is no tenderness to the chest wall, clavicles or scalpulae bilaterally. There is no tenderness throughout the bilateral upper extremities at shoulders, arms, elbows, forearms, wrists, hands. The pelvis is stable. There is no tenderness on palpation of bilateral anterior hip, thigh, knee, shin, calf, ankle or feet. There are no deformities of major joints noted. Grossly ROM is preserved at all major joints of bilateral upper and lower extremities. Compartments are soft throughout the bilateral upper and lower extremities.      Skin:     General: Skin is warm and dry.      Capillary Refill: Capillary refill takes less than 2 seconds.   Neurological:      General: No focal deficit present.      Mental Status: She is alert and oriented to person, place, and time.      Comments: Strength is 5/5 bilateral upper extremities on handgrip, elbow flexion/extension, shoulder lateral abduction/adduction, bilateral lower extremities on hip flexion and ankle plantar/dorsiflexion. SILT bilat UE and LE throughout and symmetric.          ED Course   Labs:   Labs Reviewed   CBC WITH DIFFERENTIAL WITH PLATELET - Abnormal; Notable for the following components:       Result Value    Neutrophil Absolute Prelim 7.75 (*)     Neutrophil Absolute 7.75 (*)     All other components within normal limits   COMP METABOLIC PANEL (14) - Abnormal; Notable for the following  components:    Glucose 367 (*)     Calculated Osmolality 303 (*)     All other components within normal limits   POCT GLUCOSE - Abnormal; Notable for the following components:    POC Glucose  352 (*)     All other components within normal limits   POCT GLUCOSE - Abnormal; Notable for the following components:    POC Glucose  314 (*)     All other components within normal limits   POCT GLUCOSE - Abnormal; Notable for the following components:    POC Glucose  276 (*)     All other components within normal limits     Radiology findings:  I personally reviewed the images.   CT SPINE THORACIC (CPT=72128)    Result Date: 11/16/2024  CONCLUSION:  1. No acute fracture or subluxation. 2. Marked coronary artery calcification.    Dictated by (CST): Darren Lopez MD on 11/16/2024 at 6:05 AM     Finalized by (CST): Darren Lopez MD on 11/16/2024 at 6:09 AM          CT SPINE CERVICAL (CPT=72125)    Result Date: 11/16/2024  CONCLUSION:  1. No acute fracture or subluxation.    Dictated by (CST): Darren Lopez MD on 11/16/2024 at 6:00 AM     Finalized by (CST): Darren Lopez MD on 11/16/2024 at 6:05 AM          CT SPINE LUMBAR (CPT=72131)    Result Date: 11/16/2024  CONCLUSION:  1. No acute fracture or subluxation. 2. No major discrepancy with preliminary [facility] radiology report.     Dictated by (CST): Darren Lopez MD on 11/16/2024 at 5:52 AM     Finalized by (CST): Darren Lopez MD on 11/16/2024 at 6:00 AM          CT BRAIN OR HEAD (CPT=70450)    Result Date: 11/16/2024  CONCLUSION:  1. No acute intracranial finding. 2. Changes of chronic small vessel disease in cerebral white matter. 3. Large vessel intracranial atherosclerosis. 4. Large nasal septal perforation. 5. Old nasal bone fracture.    Dictated by (CST): Darren Lopez MD on 11/16/2024 at 5:48 AM     Finalized by (CST): Darren Lopez MD on 11/16/2024 at 5:52 AM             Cardiac Monitor: Interpreted by me.   Pulse Readings from Last 1 Encounters:   11/16/24 78    , sinus,         MDM   This is a 74-year-old history of diabetes, hypertension, hyperlipidemia, arthritis, depression, presents to the ER for evaluation after falls today. Pt well appearing at this time, multiple areas of tenderness on exam though no deformity or other major abnormalities on exam    Ddx  ICH or C spine injury or L spine injury given mechanical fall, head trauma age >65, and neck/back pain   Hip or knee or shoulder fracture vs likely osteoarthritis   Pt denies chest pain, SOB, lightheadedness or other symptoms to suggest other cause of fall, suspect likely 2/2 pt chronic baseline difficulties with ambulating for which she uses walker     Will obtain CT head, C spine, L spine, xr shoulders, knees, and pelvis   Tylenol and lido patch for pain     I spoke with patient's granddaughter who states pt used to get home health and home PT, she feels the patient benefitted from this previously. I do feel the patient would benefit from home health and PT based on family discussion and description of her baseline issues with ambulating. I will discuss with  to assist with outpatient follow up pending ER workup.       ED Course as of 11/16/24 0839  ------------------------------------------------------------  Time: 11/15 2309  Value: Glucose(!): 367  Comment: Hyperglycemic without dka.   ------------------------------------------------------------  Time: 11/15 2335  Value: CT BRAIN OR HEAD (CPT=70450)  Comment: CT head without contrast    CT C-spine    CT T-spine    CT L-spine      IMPRESSION:    CT head:    No intracranial hemorrhage, mass effect, or acute fracture identified.  There is a stable old right nasal bone fracture.  Diffuse deep white matter changes consistent with chronic small vessel ischemic disease.        CT C-spine:    No fracture identified.        CT T-spine:    No fracture identified.    Diffuse atherosclerotic calcifications are present.        CT L-spine:     No fracture  identified.    Diffuse atherosclerotic calcifications are present.    ------------------------------------------------------------  Time: 11/16 0100  Value: XR KNEE (1 OR 2 VIEWS), RIGHT (CPT=73560)  Comment: Right shoulder 3 views    Left shoulder 3 views    Right knee 2 views    Left knee 2 views    AP pelvis      IMPRESSION:    Bilateral shoulders:    No fracture or dislocation is identified.        Bilateral knees:     No fracture or joint effusion is identified.    Vascular calcifications are present.        Pelvis:    No fracture identified.  No hip dislocation.    There is a partially visualized vascular stent in the proximal right medial thigh.  Vascular calcifications are also noted along the left medial thigh.    ------------------------------------------------------------  Time: 11/16 0141  Comment: Pt ambulatory with walker without other assistance.   ------------------------------------------------------------  Time: 11/16 0155  Comment: Patient reassessed updated with results, in no distress and asking when she can go home.  I updated her with all results.  She has been ambulatory with a walker.  Counseled her to follow-up with primary doctor as an outpatient.  will contact pt's family members to assist with outpatient home health eval/PT. Counseled pt on BP recheck with PMD and recheck of blood sugar given asymptomatic hypertension and hyperglycemia without dka. Pt comfortable with plan for DC.  will reach out to family on Monday or Tuesday.             Procedures:  Procedures        Disposition and Plan     Clinical Impression:  1. Fall, initial encounter    2. Acute pain of both knees    3. Bilateral hip pain    4. Acute pain of both shoulders    5. Back pain of thoracolumbar region    6. Elevated blood pressure reading with diagnosis of hypertension    7. Type 2 diabetes mellitus with hyperglycemia, with long-term current use of insulin (HCC)         Disposition:  Discharge    Follow-up:  Jermaine Monson MD  [address]  [address]  [facility] [id number] [phone]    Schedule an appointment as soon as possible for a visit in 2 day(s)      [facility] Emergency Department  [address]  [facility] [id number] [phone]  Go to  If symptoms worsen, immediately      Medications Prescribed:  Discharge Medication List as of 11/16/2024  1:57 AM        START taking these medications    Details   acetaminophen (TYLENOL) 325 MG Oral Tab Take 2 tablets (650 mg total) by mouth every 6 (six) hours as needed., Normal, Disp-30 tablet, R-0      lidocaine 5 % External Patch Place 1 patch onto the skin daily., Normal, Disp-14 patch, R-0               This note may have been created using voice dictation technology and may include inadvertent errors.      Ashlee Otero,   Attending Physician   Emergency Medicine

## 2024-11-18 ENCOUNTER — PATIENT OUTREACH (OUTPATIENT)
Dept: CASE MANAGEMENT | Age: 74
End: 2024-11-18

## 2024-11-18 NOTE — PROGRESS NOTES
Reviewed pt's chart including recent visits.  Attempted to contact pt  and her daughter Xochitl for monthly outreach and to follow up on her recent ED visit due to a fall, no answer left detailed message for pt to call back.     Reconciled chart using care everywhere.     Medications: Lidocaine patches and Tylenol 325 mg prescribed at discharge 11/16/24  Immunization: No updates.    Pt is due for:   Health Maintenance   Topic Date Due    DEXA Scan  Never done    Colorectal Cancer Screening  02/22/2022    Diabetes Care: Microalb/Creat Ratio  04/20/2023    COVID-19 Vaccine (3 - 2024-25 season) 09/01/2024    Diabetes Care Dilated Eye Exam  09/29/2024    Influenza Vaccine (1) Never done    Diabetes Care A1C  11/24/2024    Diabetes Care Foot Exam  01/25/2025    Mammogram  04/30/2025    Diabetes Care: GFR  11/15/2025    MA Annual Health Assessment  Completed    Annual Depression Screening  Completed    Fall Risk Screening (Annual)  Completed    Pneumococcal Vaccine: 65+ Years  Completed    Zoster Vaccines  Completed     Future Appointments   Date Time Provider Department Center   12/5/2024  1:30 PM Martin Foy MD [facility]     Total time - 4 min  Total Monthly time- 4 min

## 2024-11-19 NOTE — PROGRESS NOTES
NCM spoke with pt briefly states this is not a good time to talk, prefers call back later today. NCM will try calling back later.

## 2024-12-03 ENCOUNTER — TELEPHONE (OUTPATIENT)
Dept: INTERNAL MEDICINE CLINIC | Facility: CLINIC | Age: 74
End: 2024-12-03

## 2024-12-03 NOTE — TELEPHONE ENCOUNTER
Marla from home health is calling wanting to let dr. Monson know that ot for pt above is going to be moved to 12/10 per caregiver request.      If any questions please call Marla at [phone].

## 2024-12-05 ENCOUNTER — HOSPITAL ENCOUNTER (INPATIENT)
Facility: HOSPITAL | Age: 74
LOS: 5 days | Discharge: NURSING, REHAB OR LONG-TERM CARE | End: 2024-12-10
Attending: EMERGENCY MEDICINE | Admitting: HOSPITALIST
Payer: MEDICARE

## 2024-12-05 ENCOUNTER — APPOINTMENT (OUTPATIENT)
Dept: GENERAL RADIOLOGY | Facility: HOSPITAL | Age: 74
End: 2024-12-05
Attending: EMERGENCY MEDICINE
Payer: MEDICARE

## 2024-12-05 ENCOUNTER — APPOINTMENT (OUTPATIENT)
Dept: CT IMAGING | Facility: HOSPITAL | Age: 74
End: 2024-12-05
Attending: EMERGENCY MEDICINE
Payer: MEDICARE

## 2024-12-05 DIAGNOSIS — N39.0 URINARY TRACT INFECTION WITHOUT HEMATURIA, SITE UNSPECIFIED: Primary | ICD-10-CM

## 2024-12-05 PROBLEM — E87.1 HYPONATREMIA: Status: ACTIVE | Noted: 2024-12-05

## 2024-12-05 PROBLEM — R79.89 AZOTEMIA: Status: ACTIVE | Noted: 2024-12-05

## 2024-12-05 LAB
ANION GAP SERPL CALC-SCNC: 6 MMOL/L (ref 0–18)
ATRIAL RATE: 78 BPM
BASOPHILS # BLD AUTO: 0.03 X10(3) UL (ref 0–0.2)
BASOPHILS NFR BLD AUTO: 0.3 %
BILIRUB UR QL: NEGATIVE
BUN BLD-MCNC: 24 MG/DL (ref 9–23)
BUN/CREAT SERPL: 25.3 (ref 10–20)
CALCIUM BLD-MCNC: 9.6 MG/DL (ref 8.7–10.4)
CHLORIDE SERPL-SCNC: 101 MMOL/L (ref 98–112)
CO2 SERPL-SCNC: 28 MMOL/L (ref 21–32)
COLOR UR: YELLOW
CREAT BLD-MCNC: 0.95 MG/DL
DEPRECATED RDW RBC AUTO: 42.8 FL (ref 35.1–46.3)
EGFRCR SERPLBLD CKD-EPI 2021: 63 ML/MIN/1.73M2 (ref 60–?)
EOSINOPHIL # BLD AUTO: 0.12 X10(3) UL (ref 0–0.7)
EOSINOPHIL NFR BLD AUTO: 1.2 %
ERYTHROCYTE [DISTWIDTH] IN BLOOD BY AUTOMATED COUNT: 15.1 % (ref 11–15)
EST. AVERAGE GLUCOSE BLD GHB EST-MCNC: 278 MG/DL (ref 68–126)
GLUCOSE BLD-MCNC: 299 MG/DL (ref 70–99)
GLUCOSE BLDC GLUCOMTR-MCNC: 244 MG/DL (ref 70–99)
GLUCOSE BLDC GLUCOMTR-MCNC: 273 MG/DL (ref 70–99)
GLUCOSE BLDC GLUCOMTR-MCNC: 376 MG/DL (ref 70–99)
GLUCOSE BLDC GLUCOMTR-MCNC: 384 MG/DL (ref 70–99)
GLUCOSE BLDC GLUCOMTR-MCNC: 386 MG/DL (ref 70–99)
GLUCOSE UR-MCNC: >1000 MG/DL
HBA1C MFR BLD: 11.3 % (ref ?–5.7)
HCT VFR BLD AUTO: 35 %
HGB BLD-MCNC: 12.1 G/DL
HGB UR QL STRIP.AUTO: NEGATIVE
IMM GRANULOCYTES # BLD AUTO: 0.07 X10(3) UL (ref 0–1)
IMM GRANULOCYTES NFR BLD: 0.7 %
LEUKOCYTE ESTERASE UR QL STRIP.AUTO: 500
LYMPHOCYTES # BLD AUTO: 1.81 X10(3) UL (ref 1–4)
LYMPHOCYTES NFR BLD AUTO: 17.7 %
MCH RBC QN AUTO: 27.9 PG (ref 26–34)
MCHC RBC AUTO-ENTMCNC: 34.6 G/DL (ref 31–37)
MCV RBC AUTO: 80.6 FL
MONOCYTES # BLD AUTO: 0.64 X10(3) UL (ref 0.1–1)
MONOCYTES NFR BLD AUTO: 6.3 %
NEUTROPHILS # BLD AUTO: 7.54 X10 (3) UL (ref 1.5–7.7)
NEUTROPHILS # BLD AUTO: 7.54 X10(3) UL (ref 1.5–7.7)
NEUTROPHILS NFR BLD AUTO: 73.8 %
NITRITE UR QL STRIP.AUTO: NEGATIVE
OSMOLALITY SERPL CALC.SUM OF ELEC: 295 MOSM/KG (ref 275–295)
P-R INTERVAL: 152 MS
PH UR: 5.5 [PH] (ref 5–8)
PLATELET # BLD AUTO: 303 10(3)UL (ref 150–450)
POTASSIUM SERPL-SCNC: 3.8 MMOL/L (ref 3.5–5.1)
PROT UR-MCNC: 50 MG/DL
Q-T INTERVAL: 378 MS
QRS DURATION: 66 MS
QTC CALCULATION (BEZET): 430 MS
R AXIS: 132 DEGREES
RBC # BLD AUTO: 4.34 X10(6)UL
SODIUM SERPL-SCNC: 135 MMOL/L (ref 136–145)
SP GR UR STRIP: 1.02 (ref 1–1.03)
T AXIS: 134 DEGREES
TROPONIN I SERPL HS-MCNC: 10 NG/L
TROPONIN I SERPL HS-MCNC: 8 NG/L
UROBILINOGEN UR STRIP-ACNC: NORMAL
VENTRICULAR RATE: 78 BPM
WBC # BLD AUTO: 10.2 X10(3) UL (ref 4–11)

## 2024-12-05 PROCEDURE — 70450 CT HEAD/BRAIN W/O DYE: CPT | Performed by: EMERGENCY MEDICINE

## 2024-12-05 PROCEDURE — 99222 1ST HOSP IP/OBS MODERATE 55: CPT | Performed by: INTERNAL MEDICINE

## 2024-12-05 PROCEDURE — 73560 X-RAY EXAM OF KNEE 1 OR 2: CPT | Performed by: EMERGENCY MEDICINE

## 2024-12-05 PROCEDURE — 72125 CT NECK SPINE W/O DYE: CPT | Performed by: EMERGENCY MEDICINE

## 2024-12-05 RX ORDER — NICOTINE POLACRILEX 4 MG
30 LOZENGE BUCCAL
Status: DISCONTINUED | OUTPATIENT
Start: 2024-12-05 | End: 2024-12-10

## 2024-12-05 RX ORDER — SODIUM CHLORIDE 9 MG/ML
INJECTION, SOLUTION INTRAVENOUS CONTINUOUS
Status: DISCONTINUED | OUTPATIENT
Start: 2024-12-05 | End: 2024-12-09

## 2024-12-05 RX ORDER — GABAPENTIN 300 MG/1
300 CAPSULE ORAL 3 TIMES DAILY
Status: DISCONTINUED | OUTPATIENT
Start: 2024-12-05 | End: 2024-12-10

## 2024-12-05 RX ORDER — LOSARTAN POTASSIUM 50 MG/1
50 TABLET ORAL DAILY
Status: DISCONTINUED | OUTPATIENT
Start: 2024-12-05 | End: 2024-12-10

## 2024-12-05 RX ORDER — ACETAMINOPHEN 500 MG
500 TABLET ORAL EVERY 4 HOURS PRN
Status: DISCONTINUED | OUTPATIENT
Start: 2024-12-05 | End: 2024-12-10

## 2024-12-05 RX ORDER — HYDRALAZINE HYDROCHLORIDE 20 MG/ML
10 INJECTION INTRAMUSCULAR; INTRAVENOUS EVERY 6 HOURS PRN
Status: DISCONTINUED | OUTPATIENT
Start: 2024-12-05 | End: 2024-12-10

## 2024-12-05 RX ORDER — MIDODRINE HYDROCHLORIDE 5 MG/1
5 TABLET ORAL 3 TIMES DAILY
Status: DISCONTINUED | OUTPATIENT
Start: 2024-12-05 | End: 2024-12-10

## 2024-12-05 RX ORDER — METHENAMINE HIPPURATE 1000 MG/1
1 TABLET ORAL 2 TIMES DAILY
Status: DISCONTINUED | OUTPATIENT
Start: 2024-12-05 | End: 2024-12-10

## 2024-12-05 RX ORDER — ACETAMINOPHEN 500 MG
1000 TABLET ORAL ONCE
Status: COMPLETED | OUTPATIENT
Start: 2024-12-05 | End: 2024-12-05

## 2024-12-05 RX ORDER — ENOXAPARIN SODIUM 100 MG/ML
40 INJECTION SUBCUTANEOUS DAILY
Status: DISCONTINUED | OUTPATIENT
Start: 2024-12-05 | End: 2024-12-10

## 2024-12-05 RX ORDER — DEXTROSE MONOHYDRATE 25 G/50ML
50 INJECTION, SOLUTION INTRAVENOUS
Status: DISCONTINUED | OUTPATIENT
Start: 2024-12-05 | End: 2024-12-10

## 2024-12-05 RX ORDER — PRAVASTATIN SODIUM 20 MG
20 TABLET ORAL NIGHTLY
Status: DISCONTINUED | OUTPATIENT
Start: 2024-12-05 | End: 2024-12-10

## 2024-12-05 RX ORDER — INSULIN DEGLUDEC 100 U/ML
35 INJECTION, SOLUTION SUBCUTANEOUS DAILY
Status: DISCONTINUED | OUTPATIENT
Start: 2024-12-06 | End: 2024-12-10

## 2024-12-05 RX ORDER — SERTRALINE HYDROCHLORIDE 100 MG/1
100 TABLET, FILM COATED ORAL DAILY
Status: DISCONTINUED | OUTPATIENT
Start: 2024-12-05 | End: 2024-12-10

## 2024-12-05 RX ORDER — LOSARTAN POTASSIUM 50 MG/1
50 TABLET ORAL DAILY
COMMUNITY

## 2024-12-05 RX ORDER — INSULIN DEGLUDEC 100 U/ML
20 INJECTION, SOLUTION SUBCUTANEOUS DAILY
Status: DISCONTINUED | OUTPATIENT
Start: 2024-12-05 | End: 2024-12-05

## 2024-12-05 RX ORDER — NICOTINE POLACRILEX 4 MG
15 LOZENGE BUCCAL
Status: DISCONTINUED | OUTPATIENT
Start: 2024-12-05 | End: 2024-12-10

## 2024-12-05 RX ORDER — SIMVASTATIN 20 MG
20 TABLET ORAL DAILY
Status: DISCONTINUED | OUTPATIENT
Start: 2024-12-05 | End: 2024-12-05 | Stop reason: RX

## 2024-12-05 NOTE — CM/SW NOTE
12/05/24 1300   CM/SW Referral Data   Referral Source Social Work (self-referral)   Reason for Referral Discharge planning   Informant Patient   Medical Hx   Does patient have an established PCP? Yes   Patient Info   Patient's Current Mental Status at Time of Assessment Alert;Oriented   Patient's Home Environment House   Patient lives with Daughter   Patient Status Prior to Admission   Independent with ADLs and Mobility Yes   Services in place prior to admission Home Health Care;DME/Supplies at home   Type of DME/Supplies Wheeled Walker   Discharge Needs   Anticipated D/C needs Home health care   Choice of Post-Acute Provider   Informed patient of right to choose their preferred provider Yes     SW received MDO for HH and Made need placement. SW met with patient introduced self and role to patient. Patient is alert and oriented at time of assessment. Patient lives at address on file with daughter. Patient reports that that is she able to do some things on her own. Pt has a walker at home. Patient has hx of HH, [facility]. Patient is currently pending with [facility]. F2f placed. Anticipated therapy need: Home with Home Healthcare. Patient is agreeable and wishes to return home.     Plan: Pending medical clearance, DC to Home with [facility]    SW/CM to remain available for support and/or discharge planning.     Rosalva Lynn, MSW, LSW   x [phone]

## 2024-12-05 NOTE — ED PROVIDER NOTES
[facility]  Emergency Department Attending Note     Chief Complaint:   Chief Complaint   Patient presents with    Fall     HISTORY OF PRESENT ILLNESS:   Ananya Dowling is a 74 year old female who presents to the ED with medical history including hypertension, hyperlipidemia, diabetes, multiple system atrophy presents after a fall today.  She says she was going up the stairs, felt unsteady on her feet and thought she had steadied herself but then fell forward and struck her face against a countertop.  She did lose consciousness and this was witnessed by her daughter.  Her daughter is at the bedside and provides additional history that the patient has now fallen 6 out of the last 7 days.  She says it happens more frequently when the patient has urinary tract infection.  Patient denies any urinary complaints.  Denies feeling lightheaded however she does feel generally weak.  She is quite noncompliant with her medication and does have a walker on each floor of her home but she does not use it often     MEDICAL & SOCIAL HISTORY:   Past Medical History:    Arthritis    Back problem    Depression    Diabetes (HCC)    Essential hypertension    High blood pressure    High cholesterol    Visual impairment    History reviewed. No pertinent surgical history.   Social History     Socioeconomic History    Marital status:    Tobacco Use    Smoking status: Never    Smokeless tobacco: Never   Vaping Use    Vaping status: Never Used   Substance and Sexual Activity    Alcohol use: Yes     Comment: very infrequent, every couple of months    Drug use: Never     Social Drivers of Health     Financial Resource Strain: Low Risk  (8/27/2024)    Financial Resource Strain     Difficulty of Paying Living Expenses: Not very hard     Med Affordability: No   Food Insecurity: No Food Insecurity (8/24/2024)    Food Insecurity     Food Insecurity: Never true   Transportation Needs: No Transportation Needs (8/27/2024)     Transportation Needs     Lack of Transportation: No   Physical Activity: Inactive (2/16/2023)    Exercise Vital Sign     Days of Exercise per Week: 0 days     Minutes of Exercise per Session: 0 min   Stress: No Stress Concern Present (2/16/2023)    Stress     Feeling of Stress : No   Social Connections: Socially Integrated (2/16/2023)    Social Connections     Frequency of Socialization with Friends and Family: 3   Housing Stability: Low Risk  (8/24/2024)    Housing Stability     Housing Instability: No    Allergies[1]   Current Outpatient Medications   Medication Sig Dispense Refill    acetaminophen (TYLENOL) 325 MG Oral Tab Take 2 tablets (650 mg total) by mouth every 6 (six) hours as needed. 30 tablet 0    lidocaine 5 % External Patch Place 1 patch onto the skin daily. 14 patch 0    midodrine 5 MG Oral Tab Take 1 tablet (5 mg total) by mouth in the morning and 1 tablet (5 mg total) at noon and 1 tablet (5 mg total) in the evening. 90 tablet 1    cephALEXin 500 MG Oral Cap Take 1 capsule (500 mg total) by mouth 2 (two) times daily. 14 capsule 0    simvastatin 20 MG Oral Tab Take 1 tablet (20 mg total) by mouth daily. 90 tablet 1    clotrimazole 1 % External Cream Apply 1 Application topically 2 (two) times daily. 60 g 1    ergocalciferol 1.25 MG (96369 UT) Oral Cap Take 1 capsule (50,000 Units total) by mouth once a week. 12 capsule 0    sertraline 100 MG Oral Tab Take 1 tablet (100 mg total) by mouth daily. 90 tablet 0    methenamine 1 g Oral Tab Take 1 tablet (1 g total) by mouth 2 (two) times daily. 60 tablet 5    gabapentin 300 MG Oral Cap Take 1 capsule (300 mg total) by mouth 3 (three) times daily. 90 capsule 3    estradiol (ESTRACE) 0.1 MG/GM Vaginal Cream Apply fingertip amount of cream to the vagina every night for 1 week and then every other night indefinitely 42.5 g 11    Accu-Chek FastClix Lancets Does not apply Misc Use to check blood sugars 3 times daily. 300 each 1    Blood Glucose Monitoring Suppl  (ACCU-CHEK GUIDE) w/Device Does not apply Kit Use to check blood sugars 3 times daily. 1 kit 0    Glucose Blood (ACCU-CHEK GUIDE) In Vitro Strip Use to check blood sugars 3 times daily. 300 strip 1    lidocaine 4 % External Patch Place 1 patch onto the skin daily.      Insulin Degludec (TRESIBA FLEXTOUCH) 100 UNIT/ML Subcutaneous Solution Pen-injector Inject 36 Units into the skin daily.      Insulin Lispro, 1 Unit Dial, 100 UNIT/ML Subcutaneous Solution Pen-injector Patient will take 14 units with small carb meals and 18-20 units with larger carb meals. (Patient taking differently: Inject 12 Units into the skin in the morning, at noon, and at bedtime. Patient takes 12 units with every meal and sliding scale on top of that) 54 mL 1    Insulin Pen Needle (PEN NEEDLES) 32G X 4 MM Does not apply Misc 1 pen  4 (four) times daily. Use  New pen needle  With each injection 400 each 0          REVIEW OF SYSTEMS   A 10 point review of systems was completed and is negative except as listed in history of present illness      PHYSICAL EXAM   Vitals: BP (!) 162/63   Pulse 75   Temp 98.1 °F (36.7 °C) (Temporal)   Resp 17   Ht 154.9 cm (5' 1\")   Wt 61.2 kg   SpO2 96%   BMI 25.51 kg/m²   BP (!) 162/63   Pulse 75   Temp 98.1 °F (36.7 °C) (Temporal)   Resp 17   Ht 154.9 cm (5' 1\")   Wt 61.2 kg   SpO2 96%   BMI 25.51 kg/m²     General: A&Ox3, NAD  Constitutional: Well developed, well nourished, nontoxic  Head: No Hernandez sign or raccoon eyes or depressible fracture no midline C-spine pain or tenderness on passive or active range of motion, no septal hematoma, some bruising to the bridge of the nose and to the forehead  Eyes: conjuctiva clear, no icterus, PERRL, EOMI, vision grossly normal  Ears: normal external appearance, no drainage  Nose:  Atraumatic, no swelling, no drainage, nares patent  Throat:  Moist pink mucous membranes, airway is patent  Neck:  Soft supple, no masses, no tracheal deviation, no stridor  Chest:   No bruising or abrasions, no tenderness, no deformity  Cardiac:  Regular rate and rhythm, no murmurs rubs or gallops.  Lung:  No distress, no retractions. Clear to auscultation bilaterally, no w/r/r  Abdomen:  Soft, nontender, nondistended, normal BS  Back: No stepoff/deformity  Extremities: FROM all ext, no deformities, intact equal peripheral pulses, no cyanosis or edema abrasion to the anterior knees bilaterally  Neuro: No facial droop, no slurred speech, moving all extremities freely, SILT to the bilateral upper and lower extremities  Psych: A&Ox3, normal affect, cooperative, calm  Skin: No rash, no petechiae/purpura, warm, dry      RESULTS  LABS:   Results for orders placed or performed during the hospital encounter of 12/05/24   EKG    Collection Time: 12/05/24  1:30 AM   Result Value Ref Range    Ventricular rate 78 BPM    Atrial rate 78 BPM    P-R Interval 152 ms    QRS Duration 66 ms    Q-T Interval 378 ms    QTC Calculation (Bezet) 430 ms    P Axis  degrees    R Axis 132 degrees    T Axis 134 degrees   CBC With Differential With Platelet    Collection Time: 12/05/24  1:39 AM   Result Value Ref Range    WBC 10.2 4.0 - 11.0 x10(3) uL    RBC 4.34 3.80 - 5.30 x10(6)uL    HGB 12.1 12.0 - 16.0 g/dL    HCT 35.0 35.0 - 48.0 %    MCV 80.6 80.0 - 100.0 fL    MCH 27.9 26.0 - 34.0 pg    MCHC 34.6 31.0 - 37.0 g/dL    RDW-SD 42.8 35.1 - 46.3 fL    RDW 15.1 (H) 11.0 - 15.0 %    .0 150.0 - 450.0 10(3)uL    Neutrophil Absolute Prelim 7.54 1.50 - 7.70 x10 (3) uL    Neutrophil Absolute 7.54 1.50 - 7.70 x10(3) uL    Lymphocyte Absolute 1.81 1.00 - 4.00 x10(3) uL    Monocyte Absolute 0.64 0.10 - 1.00 x10(3) uL    Eosinophil Absolute 0.12 0.00 - 0.70 x10(3) uL    Basophil Absolute 0.03 0.00 - 0.20 x10(3) uL    Immature Granulocyte Absolute 0.07 0.00 - 1.00 x10(3) uL    Neutrophil % 73.8 %    Lymphocyte % 17.7 %    Monocyte % 6.3 %    Eosinophil % 1.2 %    Basophil % 0.3 %    Immature Granulocyte % 0.7 %   Basic Metabolic  Panel (8)    Collection Time: 12/05/24  2:15 AM   Result Value Ref Range    Glucose 299 (H) 70 - 99 mg/dL    Sodium 135 (L) 136 - 145 mmol/L    Potassium 3.8 3.5 - 5.1 mmol/L    Chloride 101 98 - 112 mmol/L    CO2 28.0 21.0 - 32.0 mmol/L    Anion Gap 6 0 - 18 mmol/L    BUN 24 (H) 9 - 23 mg/dL    Creatinine 0.95 0.55 - 1.02 mg/dL    BUN/CREA Ratio 25.3 (H) 10.0 - 20.0    Calcium, Total 9.6 8.7 - 10.4 mg/dL    Calculated Osmolality 295 275 - 295 mOsm/kg    eGFR-Cr 63 >=60 mL/min/1.73m2   Troponin I (High Sensitivity)    Collection Time: 12/05/24  2:15 AM   Result Value Ref Range    Troponin I (High Sensitivity) 8 <=34 ng/L   Troponin I (High Sensitivity)    Collection Time: 12/05/24  4:07 AM   Result Value Ref Range    Troponin I (High Sensitivity) 10 <=34 ng/L   Urinalysis with Culture Reflex    Collection Time: 12/05/24  4:09 AM    Specimen: Urine, clean catch   Result Value Ref Range    Urine Color Yellow Yellow    Clarity Urine Turbid (A) Clear    Spec Gravity 1.016 1.005 - 1.030    Glucose Urine >1000 (A) Normal mg/dL    Bilirubin Urine Negative Negative    Ketones Urine Trace (A) Negative mg/dL    Blood Urine Negative Negative    pH Urine 5.5 5.0 - 8.0    Protein Urine 50 (A) Negative mg/dL    Urobilinogen Urine Normal Normal    Nitrite Urine Negative Negative    Leukocyte Esterase Urine 500 (A) Negative    WBC Urine 21-50 (A) 0 - 5 /HPF    RBC Urine 0-2 0 - 2 /HPF    Bacteria Urine 1+ (A) None Seen /HPF    Squamous Epi. Cells Few (A) None Seen /HPF    Renal Tubular Epithelial Cells None Seen None Seen /HPF    Transitional Cells None Seen None Seen /HPF    Yeast Urine None Seen None Seen /HPF         IMAGING: No results found.    I personally reviewed the radiology study and my finding were no acute intracranial process      Procedures:   Procedures    EKG: Normal sinus rhythm with a normal rate of 78, normal intervals, normal QRS, unremarkable ST segments without overt signs of acute ischemia, no immediately  available comparison     ED COURSE          Re-Evaluation: improved      Disposition & Plan:   Clinical Impression/Final Diagnosis:   1. Urinary tract infection without hematuria, site unspecified        Medical Decision Making: CT of the head without acute intracranial process, C-spine is cleared per Nexus criteria.  Urinalysis is concerning for possible urinary tract infection.  Patient has had multiple falls over the last few days.  I discussed why she keeps falling with her and her daughter and together we discussed admission and even potential placement after this admission.  The patient states that she has a walker on 2 floors of her house, but she forgets to use a walker and this is when she typically falls.    Medical Decision Making  Amount and/or Complexity of Data Reviewed  Independent Historian:      Details: Daughter at the bedside  External Data Reviewed: notes.  Labs: ordered. Decision-making details documented in ED Course.  Radiology: ordered and independent interpretation performed. Decision-making details documented in ED Course.  ECG/medicine tests: ordered and independent interpretation performed. Decision-making details documented in ED Course.  Discussion of management or test interpretation with external provider(s): Discussed with hospitalist who agrees with treatment plan    Risk  OTC drugs.  Prescription drug management.  Decision regarding hospitalization.  Diagnosis or treatment significantly limited by social determinants of health.  Risk Details: Increased risk due to multiple falls lately, history of MSA        Disposition: Admit  There are no disposition comments on file for this visit.     This note was generated in part using voice recognition dictation technology. The report was reviewed by this physician but still may have unintentional errors due to inherent limitations of voice recognition technology. All times are estimates.         [1] No Known Allergies

## 2024-12-05 NOTE — ED QUICK NOTES
Patient unable to provide urine sample @ this time - aware one is needed - patient daughter leaving bedside - contact info in demographic information.

## 2024-12-05 NOTE — DISCHARGE INSTRUCTIONS
Sometimes managing your health at home requires assistance.  The [facility] team has recognized your preference to use [facility].  They can be reached by phone at [phone].  The fax number for your reference is [phone]. A representative from the home health agency will contact you or your family to schedule your first visit

## 2024-12-05 NOTE — ED QUICK NOTES
Orders for admission, patient is aware of plan and ready to go upstairs. Any questions, please call ED RN Santa at extension [phone].     Patient Covid vaccination status: Fully vaccinated     COVID Test Ordered in ED: None    COVID Suspicion at Admission: N/A    Running Infusions:  None    Mental Status/LOC at time of transport: A+O x4

## 2024-12-05 NOTE — DIETARY NOTE
[facility]  part of [facility]   CLINICAL NUTRITION    Ananya Dowling     Admitting diagnosis:  Urinary tract infection without hematuria, site unspecified [N39.0]    Ht: 154.9 cm (5' 1\")  Wt: 56.1 kg (123 lb 9.6 oz).   Body mass index is 23.35 kg/m².  IBW: 47.7 kg    Wt Readings from Last 6 Encounters:   12/05/24 56.1 kg (123 lb 9.6 oz)   11/15/24 58.1 kg (128 lb)   11/01/24 59.5 kg (131 lb 3.2 oz)   09/10/24 58.1 kg (128 lb)   08/24/24 58.1 kg (128 lb 1.6 oz)   08/19/24 60.3 kg (133 lb)        Labs/Meds reviewed    Diet:       Procedures    Carbohydrate controlled diet 1800 kcal/60 grams; Is Patient on Accuchecks? No     Percent Meals Eaten (last 3 days)       Date/Time Percent Meals Eaten (%)    12/05/24 0934 100 %              Pt chart reviewed d/t A1c 11.3% (taken on 12/5/2024). 73 y/o female with PMH T2DM, HTN presents after a fall at home. Pt visited, reports she takes insulin at home. Pt given handout on carbohydrate counting for diabetes management, discussed balancing out carbohydrate foods throughout the day and adding protein foods to meals to help with management. Pt reports she lives with her daughter. Daughter cooks for her most of the time or will bring food home from restaurants- pt states it is hard to regulate carbohydrate intake when daughter chooses meals for her. Suggested pt to give daughter handout to look over and apply practices to cooking. Pt understands importance of monitoring diet to control blood sugars and diabetes, is willing to look over handouts and offer to daughter. Answered all questions at this time. Will follow per protocol.    Patient reports good appetite at this time.  Nursing notes reports Percent Meals Eaten (%): 100 % intake for last meal.  Tolerating po diet without diarrhea, emesis, or constipation.   No significant weight changes noted.     Patient is at low nutrition risk at this time.    Please consult if patient status changes or nutrition  issues arise.      Samantha Isabel, MS, RDN, LDN  Clinical Dietitian

## 2024-12-05 NOTE — OCCUPATIONAL THERAPY NOTE
OCCUPATIONAL THERAPY EVALUATION - INPATIENT     Room Number: 549/549-A  Evaluation Date: 12/5/2024  Type of Evaluation: Initial       Physician Order: IP Consult to Occupational Therapy  Reason for Therapy: ADL/IADL Dysfunction and Discharge Planning    OCCUPATIONAL THERAPY ASSESSMENT   RN cleared pt for participation in OT session, which was completed in collaboration with PT. Upon arrival, pt was supine in bed and agreeable to activity. No visitors present during session. Pt was left in chair.Call light and all needs left in reach. Handoff given to RN.    Patient is a 74 year old female admitted 12/5/2024 for UTI.  Prior to admission, pt was independent with RW.  Patient is currently requiring up to min A for ADLs overall along with min A for supine to sit, CGA for sitting at EOB, CGA for STS, and CGA for functional transfer at RW level. Pt tolerated about 1 minutes of supported standing.  Pt has the following impairments: weakness.         Education provided  Educated pt about role of OT and hospital therapy process as well as proper safety techniques including proper hand placement, body mechanics, safety techniques. Pt verbalized/demonstrated fair carryover.    Patient will benefit from continued skilled OT Services at discharge to promote prior level of function and safety with additional support and return home with home health OT    PLAN  OT Treatment Plan: Balance activities;Energy conservation/work simplification techniques;Continued evaluation;Compensatory technique education;Fine motor coordination activities;Neuromuscluar reeducation;Equipment eval/education;Patient/Family training;Patient/Family education;Cognitive reorientation;Endurance training;UE strengthening/ROM;Functional transfer training;Visual perceptual training;IADL training;ADL training      OCCUPATIONAL THERAPY MEDICAL/SOCIAL HISTORY     Problem List  Principal Problem:    Urinary tract infection without hematuria, site  unspecified  Active Problems:    Hyponatremia    Azotemia      Past Medical History  Past Medical History:    Arthritis    Back problem    Depression    Diabetes (HCC)    Essential hypertension    High blood pressure    High cholesterol    Visual impairment       Past Surgical History  History reviewed. No pertinent surgical history.    HOME SITUATION  Type of Home: House  Home Layout: Multi-level  Lives With: Family (daughter, son in law, granddaughter)                      Drives: No  Patient Regularly Uses: Rolling walker    SUBJECTIVE  \"I should use the bathroom.\"    OCCUPATIONAL THERAPY EXAMINATION      OBJECTIVE  Precautions: Bed/chair alarm  Fall Risk: Standard fall risk    PAIN ASSESSMENT  Ratin         RANGE OF MOTION   Upper extremity ROM is within functional limits     STRENGTH ASSESSMENT  Upper extremity strength is within functional limits     COORDINATION  Gross Motor: WFL   Fine Motor: WFL     ACTIVITIES OF DAILY LIVING ASSESSMENT  AM-PAC ‘6-Clicks’ Inpatient Daily Activity Short Form  How much help from another person does the patient currently need…  -   Putting on and taking off regular lower body clothing?: A Lot  -   Bathing (including washing, rinsing, drying)?: A Little  -   Toileting, which includes using toilet, bedpan or urinal? : A Little  -   Putting on and taking off regular upper body clothing?: A Little  -   Taking care of personal grooming such as brushing teeth?: A Little  -   Eating meals?: None    AM-PAC Score:  Score: 18  Approx Degree of Impairment: 46.65%  Standardized Score (AM-PAC Scale): 38.66  CMS Modifier (G-Code): CK      FUNCTIONAL TRANSFER ASSESSMENT     Supine to sit: min A     Sit to stand: CGA  Toilet Transfer: CGA  Chair Transfer: CGA    FUNCTIONAL ADL ASSESSMENT  Overall min A    The patient's Approx Degree of Impairment: 46.65% has been calculated based on documentation in the [facility] '6 clicks' Inpatient Daily Activity Short Form.  Research supports that patients  with this level of impairment may benefit from home health. Final disposition will be made by interdisciplinary medical team.    OT Goals  Patients self stated goal is: to go home     Patient will complete functional transfer with mod I  Comment:     Patient will complete toileting with mod I  Comment:     Patient will tolerate standing for 3 minutes in prep for adls with mod I   Comment:    Patient will complete item retrieval with mod I  Comment:          Goals  on:   Frequency: 3-5x/wk      Patient Evaluation Complexity Level:   Occupational Profile/Medical History MODERATE - Expanded review of history including review of medical or therapy record   Specific performance deficits impacting engagement in ADL/IADL MODERATE  3 - 5 performance deficits   Client Assessment/Performance Deficits MODERATE - Comorbidities and min to mod modifications of tasks    Clinical Decision Making MODERATE - Analysis of occupational profile, detailed assessments, several treatment options    Overall Complexity MODERATE     OT Session Time: 20 minutes  Self-Care Home Management: 10 minutes           Susan Gaspar OT  [facility]  Inpatient Rehabilitation  Occupational Therapy  [phone]

## 2024-12-05 NOTE — HOME CARE LIAISON
Patient currently pending [facility] services - RN,PT, OT - will need face to face at discharge.

## 2024-12-05 NOTE — ED QUICK NOTES
Patient arrived to ED with main c.o. witnessed fall @ home per daughter - per daughter patient was standing when she suddenly fell towards her left side patient stumbled then hit her head against the kitchen counter - erythema and swelling noticed along mid forehead, nose, and patient upper lip. + LOC - patient denies neck, head, and back pain. Denies BT use. Confirms pain in the left knee - patient breathing non-labored on RA + pt hemodynamically stable.    Per daughter, patient has recently been diagnosed with MSA - has had about 6 falls in the last 7 days.

## 2024-12-05 NOTE — CM/SW NOTE
Pt discussed in RN DC rounds. Sw was informed that patient is pending with [facility], due to family delaying SOC. SW entered a f2f [facility] liaison aware.      SW/CM to remain available for support and/or discharge planning.     Rosalva Lynn, MSW, LSW   x [phone]

## 2024-12-05 NOTE — PAYOR COMM NOTE
--------------  ADMISSION REVIEW     Payor: RENAN MENENDEZ [facility] [place]  Subscriber #:  [id number]  Authorization Number: [id number]    Admit date: 12/5/24  Admit time:  6:13 AM       REVIEW DOCUMENTATION:     ED Provider Notes        ED Provider Notes signed by Nico Landeros MD at 12/5/2024  5:16 AM       Author: Nico Landeros MD Service: -- Author Type: Physician    Filed: 12/5/2024  5:16 AM Date of Service: 12/5/2024  4:55 AM Status: Signed    : Nico Landeros MD (Physician)           [facility]  Emergency Department Attending Note     Chief Complaint:   Chief Complaint   Patient presents with    Fall     HISTORY OF PRESENT ILLNESS:   Ananya Dowling is a 74 year old female who presents to the ED with medical history including hypertension, hyperlipidemia, diabetes, multiple system atrophy presents after a fall today.  She says she was going up the stairs, felt unsteady on her feet and thought she had steadied herself but then fell forward and struck her face against a countertop.  She did lose consciousness and this was witnessed by her daughter.  Her daughter is at the bedside and provides additional history that the patient has now fallen 6 out of the last 7 days.  She says it happens more frequently when the patient has urinary tract infection.  Patient denies any urinary complaints.  Denies feeling lightheaded however she does feel generally weak.  She is quite noncompliant with her medication and does have a walker on each floor of her home but she does not use it often     MEDICAL & SOCIAL HISTORY:   Past Medical History:    Arthritis    Back problem    Depression    Diabetes (HCC)    Essential hypertension    High blood pressure    High cholesterol    Visual impairment    History reviewed. No pertinent surgical history.   Social History     Socioeconomic History    Marital status:    Tobacco Use    Smoking status: Never    Smokeless tobacco: Never   Vaping Use    Vaping status: Never Used    Substance and Sexual Activity    Alcohol use: Yes     Comment: very infrequent, every couple of months    Drug use: Never     Social Drivers of Health     Financial Resource Strain: Low Risk  (8/27/2024)    Financial Resource Strain     Difficulty of Paying Living Expenses: Not very hard     Med Affordability: No   Food Insecurity: No Food Insecurity (8/24/2024)    Food Insecurity     Food Insecurity: Never true   Transportation Needs: No Transportation Needs (8/27/2024)    Transportation Needs     Lack of Transportation: No   Physical Activity: Inactive (2/16/2023)    Exercise Vital Sign     Days of Exercise per Week: 0 days     Minutes of Exercise per Session: 0 min   Stress: No Stress Concern Present (2/16/2023)    Stress     Feeling of Stress : No   Social Connections: Socially Integrated (2/16/2023)    Social Connections     Frequency of Socialization with Friends and Family: 3   Housing Stability: Low Risk  (8/24/2024)    Housing Stability     Housing Instability: No    Allergies[1]   Current Outpatient Medications   Medication Sig Dispense Refill    acetaminophen (TYLENOL) 325 MG Oral Tab Take 2 tablets (650 mg total) by mouth every 6 (six) hours as needed. 30 tablet 0    lidocaine 5 % External Patch Place 1 patch onto the skin daily. 14 patch 0    midodrine 5 MG Oral Tab Take 1 tablet (5 mg total) by mouth in the morning and 1 tablet (5 mg total) at noon and 1 tablet (5 mg total) in the evening. 90 tablet 1    cephALEXin 500 MG Oral Cap Take 1 capsule (500 mg total) by mouth 2 (two) times daily. 14 capsule 0    simvastatin 20 MG Oral Tab Take 1 tablet (20 mg total) by mouth daily. 90 tablet 1    clotrimazole 1 % External Cream Apply 1 Application topically 2 (two) times daily. 60 g 1    ergocalciferol 1.25 MG (43054 UT) Oral Cap Take 1 capsule (50,000 Units total) by mouth once a week. 12 capsule 0    sertraline 100 MG Oral Tab Take 1 tablet (100 mg total) by mouth daily. 90 tablet 0    methenamine 1 g  Oral Tab Take 1 tablet (1 g total) by mouth 2 (two) times daily. 60 tablet 5    gabapentin 300 MG Oral Cap Take 1 capsule (300 mg total) by mouth 3 (three) times daily. 90 capsule 3    estradiol (ESTRACE) 0.1 MG/GM Vaginal Cream Apply fingertip amount of cream to the vagina every night for 1 week and then every other night indefinitely 42.5 g 11    Accu-Chek FastClix Lancets Does not apply Misc Use to check blood sugars 3 times daily. 300 each 1    Blood Glucose Monitoring Suppl (ACCU-CHEK GUIDE) w/Device Does not apply Kit Use to check blood sugars 3 times daily. 1 kit 0    Glucose Blood (ACCU-CHEK GUIDE) In Vitro Strip Use to check blood sugars 3 times daily. 300 strip 1    lidocaine 4 % External Patch Place 1 patch onto the skin daily.      Insulin Degludec (TRESIBA FLEXTOUCH) 100 UNIT/ML Subcutaneous Solution Pen-injector Inject 36 Units into the skin daily.      Insulin Lispro, 1 Unit Dial, 100 UNIT/ML Subcutaneous Solution Pen-injector Patient will take 14 units with small carb meals and 18-20 units with larger carb meals. (Patient taking differently: Inject 12 Units into the skin in the morning, at noon, and at bedtime. Patient takes 12 units with every meal and sliding scale on top of that) 54 mL 1    Insulin Pen Needle (PEN NEEDLES) 32G X 4 MM Does not apply Misc 1 pen  4 (four) times daily. Use  New pen needle  With each injection 400 each 0          REVIEW OF SYSTEMS   A 10 point review of systems was completed and is negative except as listed in history of present illness      PHYSICAL EXAM   Vitals: BP (!) 162/63   Pulse 75   Temp 98.1 °F (36.7 °C) (Temporal)   Resp 17   Ht 154.9 cm (5' 1\")   Wt 61.2 kg   SpO2 96%   BMI 25.51 kg/m²   BP (!) 162/63   Pulse 75   Temp 98.1 °F (36.7 °C) (Temporal)   Resp 17   Ht 154.9 cm (5' 1\")   Wt 61.2 kg   SpO2 96%   BMI 25.51 kg/m²     General: A&Ox3, NAD  Constitutional: Well developed, well nourished, nontoxic  Head: No Hernandez sign or raccoon eyes or  depressible fracture no midline C-spine pain or tenderness on passive or active range of motion, no septal hematoma, some bruising to the bridge of the nose and to the forehead  Eyes: conjuctiva clear, no icterus, PERRL, EOMI, vision grossly normal  Ears: normal external appearance, no drainage  Nose:  Atraumatic, no swelling, no drainage, nares patent  Throat:  Moist pink mucous membranes, airway is patent  Neck:  Soft supple, no masses, no tracheal deviation, no stridor  Chest:  No bruising or abrasions, no tenderness, no deformity  Cardiac:  Regular rate and rhythm, no murmurs rubs or gallops.  Lung:  No distress, no retractions. Clear to auscultation bilaterally, no w/r/r  Abdomen:  Soft, nontender, nondistended, normal BS  Back: No stepoff/deformity  Extremities: FROM all ext, no deformities, intact equal peripheral pulses, no cyanosis or edema abrasion to the anterior knees bilaterally  Neuro: No facial droop, no slurred speech, moving all extremities freely, SILT to the bilateral upper and lower extremities  Psych: A&Ox3, normal affect, cooperative, calm  Skin: No rash, no petechiae/purpura, warm, dry      RESULTS  LABS:   Results for orders placed or performed during the hospital encounter of 12/05/24   EKG    Collection Time: 12/05/24  1:30 AM   Result Value Ref Range    Ventricular rate 78 BPM    Atrial rate 78 BPM    P-R Interval 152 ms    QRS Duration 66 ms    Q-T Interval 378 ms    QTC Calculation (Bezet) 430 ms    P Axis  degrees    R Axis 132 degrees    T Axis 134 degrees   CBC With Differential With Platelet    Collection Time: 12/05/24  1:39 AM   Result Value Ref Range    WBC 10.2 4.0 - 11.0 x10(3) uL    RBC 4.34 3.80 - 5.30 x10(6)uL    HGB 12.1 12.0 - 16.0 g/dL    HCT 35.0 35.0 - 48.0 %    MCV 80.6 80.0 - 100.0 fL    MCH 27.9 26.0 - 34.0 pg    MCHC 34.6 31.0 - 37.0 g/dL    RDW-SD 42.8 35.1 - 46.3 fL    RDW 15.1 (H) 11.0 - 15.0 %    .0 150.0 - 450.0 10(3)uL    Neutrophil Absolute Prelim 7.54  1.50 - 7.70 x10 (3) uL    Neutrophil Absolute 7.54 1.50 - 7.70 x10(3) uL    Lymphocyte Absolute 1.81 1.00 - 4.00 x10(3) uL    Monocyte Absolute 0.64 0.10 - 1.00 x10(3) uL    Eosinophil Absolute 0.12 0.00 - 0.70 x10(3) uL    Basophil Absolute 0.03 0.00 - 0.20 x10(3) uL    Immature Granulocyte Absolute 0.07 0.00 - 1.00 x10(3) uL    Neutrophil % 73.8 %    Lymphocyte % 17.7 %    Monocyte % 6.3 %    Eosinophil % 1.2 %    Basophil % 0.3 %    Immature Granulocyte % 0.7 %   Basic Metabolic Panel (8)    Collection Time: 12/05/24  2:15 AM   Result Value Ref Range    Glucose 299 (H) 70 - 99 mg/dL    Sodium 135 (L) 136 - 145 mmol/L    Potassium 3.8 3.5 - 5.1 mmol/L    Chloride 101 98 - 112 mmol/L    CO2 28.0 21.0 - 32.0 mmol/L    Anion Gap 6 0 - 18 mmol/L    BUN 24 (H) 9 - 23 mg/dL    Creatinine 0.95 0.55 - 1.02 mg/dL    BUN/CREA Ratio 25.3 (H) 10.0 - 20.0    Calcium, Total 9.6 8.7 - 10.4 mg/dL    Calculated Osmolality 295 275 - 295 mOsm/kg    eGFR-Cr 63 >=60 mL/min/1.73m2   Troponin I (High Sensitivity)    Collection Time: 12/05/24  2:15 AM   Result Value Ref Range    Troponin I (High Sensitivity) 8 <=34 ng/L   Troponin I (High Sensitivity)    Collection Time: 12/05/24  4:07 AM   Result Value Ref Range    Troponin I (High Sensitivity) 10 <=34 ng/L   Urinalysis with Culture Reflex    Collection Time: 12/05/24  4:09 AM    Specimen: Urine, clean catch   Result Value Ref Range    Urine Color Yellow Yellow    Clarity Urine Turbid (A) Clear    Spec Gravity 1.016 1.005 - 1.030    Glucose Urine >1000 (A) Normal mg/dL    Bilirubin Urine Negative Negative    Ketones Urine Trace (A) Negative mg/dL    Blood Urine Negative Negative    pH Urine 5.5 5.0 - 8.0    Protein Urine 50 (A) Negative mg/dL    Urobilinogen Urine Normal Normal    Nitrite Urine Negative Negative    Leukocyte Esterase Urine 500 (A) Negative    WBC Urine 21-50 (A) 0 - 5 /HPF    RBC Urine 0-2 0 - 2 /HPF    Bacteria Urine 1+ (A) None Seen /HPF    Squamous Epi. Cells Few (A)  None Seen /HPF    Renal Tubular Epithelial Cells None Seen None Seen /HPF    Transitional Cells None Seen None Seen /HPF    Yeast Urine None Seen None Seen /HPF         IMAGING: No results found.    I personally reviewed the radiology study and my finding were no acute intracranial process      Procedures:   Procedures    EKG: Normal sinus rhythm with a normal rate of 78, normal intervals, normal QRS, unremarkable ST segments without overt signs of acute ischemia, no immediately available comparison     ED COURSE          Re-Evaluation: improved      Disposition & Plan:   Clinical Impression/Final Diagnosis:   1. Urinary tract infection without hematuria, site unspecified        Medical Decision Making: CT of the head without acute intracranial process, C-spine is cleared per Nexus criteria.  Urinalysis is concerning for possible urinary tract infection.  Patient has had multiple falls over the last few days.  I discussed why she keeps falling with her and her daughter and together we discussed admission and even potential placement after this admission.  The patient states that she has a walker on 2 floors of her house, but she forgets to use a walker and this is when she typically falls.    Medical Decision Making  Amount and/or Complexity of Data Reviewed  Independent Historian:      Details: Daughter at the bedside  External Data Reviewed: notes.  Labs: ordered. Decision-making details documented in ED Course.  Radiology: ordered and independent interpretation performed. Decision-making details documented in ED Course.  ECG/medicine tests: ordered and independent interpretation performed. Decision-making details documented in ED Course.  Discussion of management or test interpretation with external provider(s): Discussed with hospitalist who agrees with treatment plan    Risk  OTC drugs.  Prescription drug management.  Decision regarding hospitalization.  Diagnosis or treatment significantly limited by social  determinants of health.  Risk Details: Increased risk due to multiple falls lately, history of MSA        Disposition: Admit  There are no disposition comments on file for this visit.     This note was generated in part using voice recognition dictation technology. The report was reviewed by this physician but still may have unintentional errors due to inherent limitations of voice recognition technology. All times are estimates.      Signed by Nico Landeros MD on 12/5/2024  5:16 AM         MEDICATIONS ADMINISTERED IN LAST 1 DAY:  acetaminophen (Tylenol Extra Strength) tab 1,000 mg       Date Action Dose Route User    12/5/2024 0127 Given 1,000 mg Oral Santa Riley RN          cefTRIAXone (Rocephin) 1 g in sodium chloride 0.9% 100 mL IVPB-ADDV       Date Action Dose Route User    12/5/2024 0509 New Bag 1 g Intravenous Santa Riley RN          enoxaparin (Lovenox) 40 MG/0.4ML SUBQ injection 40 mg       Date Action Dose Route User    12/5/2024 0935 Given 40 mg Subcutaneous (Right Lower Abdomen) Deann Carrington RN          gabapentin (Neurontin) cap 300 mg       Date Action Dose Route User    12/5/2024 1424 Given 300 mg Oral Deann Carrington RN          insulin aspart (NovoLOG) 100 Units/mL FlexPen 1-5 Units       Date Action Dose Route User    12/5/2024 1302 Given 5 Units Subcutaneous (Right Lower Abdomen) Deann Carrington RN    12/5/2024 0937 Given 3 Units Subcutaneous (Right Upper Arm) Deann Carrington RN          insulin aspart (NovoLOG) 100 Units/mL FlexPen 1-7 Units       Date Action Dose Route User    12/5/2024 1427 Given 2 Units Subcutaneous (Right Upper Arm) Deann Carrington RN          losartan (Cozaar) tab 50 mg       Date Action Dose Route User    12/5/2024 1424 Given 50 mg Oral Deann Carrington RN          methenamine (Hiprex) tab 1 g       Date Action Dose Route User    12/5/2024 1425 Given 1 g Oral Deann Carrington RN          midodrine (ProAmatine) tab 5 mg       Date Action Dose Route  User    2024 1424 Given 5 mg Oral Deann Carrington RN          sertraline (Zoloft) tab 100 mg       Date Action Dose Route User    2024 1425 Given 100 mg Oral Deann Carrington RN          sodium chloride 0.9% infusion       Date Action Dose Route User    2024 0654 New Bag (none) Intravenous Ramirez, Valarie            Vitals (last day)       Date/Time Temp Pulse Resp BP SpO2 Weight O2 Device O2 Flow Rate (L/min) [facility]    24 0934 97.4 °F (36.3 °C) 90 18 131/68 100 % -- None (Room air) -- GA    24 0812 -- 72 -- -- -- -- -- -- KP    24 0615 97.9 °F (36.6 °C) 73 16 163/62 97 % 123 lb 9.6 oz (56.1 kg) None (Room air) -- ROB    24 0545 -- 70 15 152/58 96 % -- -- -- PK    24 0544 -- 71 22 -- 96 % -- -- -- PK    24 0540 -- 71 22 -- 96 % -- -- -- PK    24 0536 -- 74 13 -- 99 % -- -- -- PK    24 0535 -- 75 21 -- 100 % -- -- -- PK    24 0530 -- 75 25 156/71 100 % -- -- -- PK    24 0515 -- 75 14 155/93 96 % -- -- -- PK    24 0500 -- 75 17 162/63 96 % -- None (Room air) -- AH    24 0444 -- 73 13 -- 96 % -- -- -- PK    24 0429 -- 72 10 182/64 96 % -- -- -- PK    24 0414 -- 75 15 183/64 94 % -- -- -- PK    24 0359 -- 80 -- -- 95 % -- -- -- PK    24 0344 -- 79 -- 188/79 96 % -- -- -- PK    24 0244 -- 79 -- 191/69 98 % -- -- -- PK    24 0229 -- 77 -- 180/61 98 % -- -- -- PK    24 0214 -- 78 -- 160/54 97 % -- -- -- PK    244 -- 78 -- -- 98 % -- -- -- PK    244 -- 78 -- -- 99 % -- -- -- PK    24 -- 81 -- -- 98 % -- -- -- PK    24 00:44:53 -- -- -- -- -- -- None (Room air) -- PK    24 -- 88 -- -- 96 % -- -- -- PK    24 22:35:12 -- -- -- -- 97 % -- -- -- LT    24 2229 98.1 °F (36.7 °C) 84 18 102/52 97 % 135 lb (61.2 kg) None (Room air) -- LT          2024    History and Physical            Ananya Dowling Patient Status:  Emergency    1950 MRN  [id number]   Location [facility] EMERGENCY DEPARTMENT Attending Nico Landeros MD   Hosp Day # 0 PCP Jermaine Monson MD      History from patient and chart review.  No family at bedside at this time.     Chief Complaint: Recurrent fall        Subjective:  Ananya Dowling is a 74 year old female with history of depression, DM, HTN, HLD, arthritis, multiple system atrophy who presented to the ED following a fall.  Fall was witnessed by daughter.  Episode occurred 1 hour PTA.  She reportedly lost balance and fell hitting her face on kitchen counter and landing on her knees.  There was brief LOC.  Family reported that this was a 6th fall in the last week.  She appears to have more falls when she has a UTI.  Denies any other urinary symptoms.  No dizziness, chest pain, palpitations.  Per chart review, she has also had episodes of orthostatic hypotension, placed on trial of midodrine.     Vital stable with HTN  Labs fairly stable with UA positive for leukocyte esterase.     She has been started on ceftriaxone and will be admitted for further evaluation with possible placement.        History/Other:     Past Medical History:  Past Medical History       Past Medical History:    Arthritis    Back problem    Depression    Diabetes (HCC)    Essential hypertension    High blood pressure    High cholesterol    Visual impairment            Past Surgical History:   Past Surgical History   History reviewed. No pertinent surgical history.        Social History:  reports that she has never smoked. She has never used smokeless tobacco. She reports current alcohol use. She reports that she does not use drugs.     Family History:   Family History         Family History   Problem Relation Age of Onset    Heart Attack Father      Stroke Mother      Heart Disorder Son      Diabetes Son              Allergies: [Allergies]    [Allergies]  No Known Allergies     Medications:  [Medications Ordered Prior to Encounter]    [Medications  Ordered Prior to Encounter]            Current Facility-Administered Medications on File Prior to Encounter   Medication Dose Route Frequency Provider Last Rate Last Admin    [COMPLETED] acetaminophen (Tylenol Extra Strength) tab 1,000 mg  1,000 mg Oral Once Sapadin, Ashlee, DO   1,000 mg at 11/15/24 2303    [COMPLETED] insulin aspart (NovoLOG) 100 Units/mL vial 9 Units  0.15 Units/kg Subcutaneous Once Sapadin, Ashlee, DO   9 Units at 11/15/24 2328    [COMPLETED] losartan (Cozaar) tab 25 mg  25 mg Oral Once Sapadin, Ashlee, DO   25 mg at 11/15/24 2351             Current Outpatient Medications on File Prior to Encounter   Medication Sig Dispense Refill    acetaminophen (TYLENOL) 325 MG Oral Tab Take 2 tablets (650 mg total) by mouth every 6 (six) hours as needed. 30 tablet 0    lidocaine 5 % External Patch Place 1 patch onto the skin daily. 14 patch 0    midodrine 5 MG Oral Tab Take 1 tablet (5 mg total) by mouth in the morning and 1 tablet (5 mg total) at noon and 1 tablet (5 mg total) in the evening. 90 tablet 1    cephALEXin 500 MG Oral Cap Take 1 capsule (500 mg total) by mouth 2 (two) times daily. 14 capsule 0    simvastatin 20 MG Oral Tab Take 1 tablet (20 mg total) by mouth daily. 90 tablet 1    clotrimazole 1 % External Cream Apply 1 Application topically 2 (two) times daily. 60 g 1    ergocalciferol 1.25 MG (41790 UT) Oral Cap Take 1 capsule (50,000 Units total) by mouth once a week. 12 capsule 0    sertraline 100 MG Oral Tab Take 1 tablet (100 mg total) by mouth daily. 90 tablet 0    methenamine 1 g Oral Tab Take 1 tablet (1 g total) by mouth 2 (two) times daily. 60 tablet 5    gabapentin 300 MG Oral Cap Take 1 capsule (300 mg total) by mouth 3 (three) times daily. 90 capsule 3    estradiol (ESTRACE) 0.1 MG/GM Vaginal Cream Apply fingertip amount of cream to the vagina every night for 1 week and then every other night indefinitely 42.5 g 11    Accu-Chek FastClix Lancets Does not apply Misc Use to check blood  sugars 3 times daily. 300 each 1    Blood Glucose Monitoring Suppl (ACCU-CHEK GUIDE) w/Device Does not apply Kit Use to check blood sugars 3 times daily. 1 kit 0    Glucose Blood (ACCU-CHEK GUIDE) In Vitro Strip Use to check blood sugars 3 times daily. 300 strip 1    lidocaine 4 % External Patch Place 1 patch onto the skin daily.        Insulin Degludec (TRESIBA FLEXTOUCH) 100 UNIT/ML Subcutaneous Solution Pen-injector Inject 36 Units into the skin daily.        Insulin Lispro, 1 Unit Dial, 100 UNIT/ML Subcutaneous Solution Pen-injector Patient will take 14 units with small carb meals and 18-20 units with larger carb meals. (Patient taking differently: Inject 12 Units into the skin in the morning, at noon, and at bedtime. Patient takes 12 units with every meal and sliding scale on top of that) 54 mL 1    Insulin Pen Needle (PEN NEEDLES) 32G X 4 MM Does not apply Misc 1 pen  4 (four) times daily. Use  New pen needle  With each injection 400 each 0        Review of Systems:   A comprehensive 14 point review of systems was completed.    Pertinent positives and negatives noted in the HPI.        Objective:     /58   Pulse 70   Temp 98.1 °F (36.7 °C) (Temporal)   Resp 15   Ht 5' 1\" (1.549 m)   Wt 135 lb (61.2 kg)   SpO2 96%   BMI 25.51 kg/m²   General: No acute distress.    HEENT: Normocephalic.  Bruise to bridge of nose and upper lip.  Neck: Supple.  Respiratory: Normal effort, CTAB  Cardiovascular: S1, S2 regular.  Normal rate.  No murmur.   Abdomen: Soft, nontender distended.  Neurologic: Alert, oriented x 3.  Nonfocal.  Musculoskeletal: No tenderness or deformity.  Extremities: No edema  Psychiatric: Calm and cooperative        Results:     Labs:  Labs Last 24 Hours:                     BMP         CBC       Other      Na 135 Cl 101 BUN 24 Glu 299     Hb 12.1     PTT - Procal -   K 3.8 CO2 28.0 Cr 0.95     WBC 10.2 >< .0   INR - CRP -   Renal Lytes Endo       Hct 35.0     Trop - D dim -   eGFR -  Ca 9.6 POC Gluc  -       LFT     pBNP - Lactic -   eGFR AA - PO4 - A1c -     AST - APk - Prot -   LDL -       Mg - TSH -     ALT - T sarah - Alb -               COVID-19 Lab Results     COVID-19        Lab Results   Component Value Date     COVID19 Not Detected 03/18/2023     COVID19 Not Detected 03/03/2023     COVID19 Not Detected 02/28/2023         Pro-Calcitonin  No results for input(s): \"PCT\" in the last 168 hours.     Cardiac  No results for input(s): \"TROP\", \"PBNP\" in the last 168 hours.     Creatinine Kinase  No results for input(s): \"CK\" in the last 168 hours.     Inflammatory Markers  No results for input(s): \"CRP\", \"EUSEBIA\", \"LDH\", \"DDIMER\" in the last 168 hours.     Imaging: Imaging data reviewed in Epic.        Assessment & Plan:  Recurrent falls  History of multiple system atrophy  History of orthostatic hypotension  -Admit  -Check orthostatic vital signs  -Midodrine if positive  -PT/OT  -May need placement, social work consult     Azotemia  Hyponatremia  -Gentle hydration     UTI, uncomplicated  -Previous culture with pansensitive E. Coli  -Continue ceftriaxone     DM  -Carb controlled diet  -Insulin coverage     HTN  HLD  -Home medications as appropriate        Quality:  DVT Prophylaxis: Heparin  CODE status: Full code  MIRIAM: TBD        Plan of care discussed with patient. Acknowledged understanding and agrees to plan. Also discussed with the ED physician.     >55 minutes spent on this admission - examining patient, obtaining history, reviewing previous medical records, going over test results/imaging and discussing plan of care. More than 50% of the time was spent in counseling and/or coordination of care related to recurrent falls.   All questions answered.      Sierra Estrella MD  12/5/2024               [1] No Known Allergies

## 2024-12-05 NOTE — ED INITIAL ASSESSMENT (HPI)
Pt arrives to triage with c/o fall that happened at home approx 1 hr PTA. States lost balance and hit face ( forehead and mouth) on kitchen counter and then landed on knees.    + LOC  + swelling and bruising to face  No thinners  Bilateral knee pain  Denies any head pain    Per pt, History of Multiple system atrophy which causes balance issues.

## 2024-12-05 NOTE — H&P
[facility]  part of [facility]                                                                                                          History and Physical     Ananya Dowling Patient Status:  Emergency    1950 MRN [id number]   Location [facility] EMERGENCY DEPARTMENT Attending Nico Landeros MD   Hosp Day # 0 PCP Jermaine Monson MD     History from patient and chart review.  No family at bedside at this time.    Chief Complaint: Recurrent fall    Subjective:    Ananya Dowling is a 74 year old female with history of depression, DM, HTN, HLD, arthritis, multiple system atrophy who presented to the ED following a fall.  Fall was witnessed by daughter.  Episode occurred 1 hour PTA.  She reportedly lost balance and fell hitting her face on kitchen counter and landing on her knees.  There was brief LOC.  Family reported that this was a 6th fall in the last week.  She appears to have more falls when she has a UTI.  Denies any other urinary symptoms.  No dizziness, chest pain, palpitations.  Per chart review, she has also had episodes of orthostatic hypotension, placed on trial of midodrine.    Vital stable with HTN  Labs fairly stable with UA positive for leukocyte esterase.    She has been started on ceftriaxone and will be admitted for further evaluation with possible placement.    History/Other:      Past Medical History:  Past Medical History:    Arthritis    Back problem    Depression    Diabetes (HCC)    Essential hypertension    High blood pressure    High cholesterol    Visual impairment        Past Surgical History: History reviewed. No pertinent surgical history.    Social History:  reports that she has never smoked. She has never used smokeless tobacco. She reports current alcohol use. She reports that she does not use drugs.    Family History:   Family History   Problem Relation Age of Onset    Heart Attack Father     Stroke Mother     Heart Disorder Son     Diabetes  Son        Allergies: Allergies[1]    Medications:  Medications Ordered Prior to Encounter[2]    Review of Systems:   A comprehensive 14 point review of systems was completed.    Pertinent positives and negatives noted in the HPI.    Objective:     /58   Pulse 70   Temp 98.1 °F (36.7 °C) (Temporal)   Resp 15   Ht 5' 1\" (1.549 m)   Wt 135 lb (61.2 kg)   SpO2 96%   BMI 25.51 kg/m²   General: No acute distress.    HEENT: Normocephalic.  Bruise to bridge of nose and upper lip.  Neck: Supple.  Respiratory: Normal effort, CTAB  Cardiovascular: S1, S2 regular.  Normal rate.  No murmur.   Abdomen: Soft, nontender distended.  Neurologic: Alert, oriented x 3.  Nonfocal.  Musculoskeletal: No tenderness or deformity.  Extremities: No edema  Psychiatric: Calm and cooperative    Results:      Labs:  Labs Last 24 Hours:   BMP     CBC    Other     Na 135 Cl 101 BUN 24 Glu 299   Hb 12.1   PTT - Procal -   K 3.8 CO2 28.0 Cr 0.95   WBC 10.2 >< .0  INR - CRP -   Renal Lytes Endo    Hct 35.0   Trop - D dim -   eGFR - Ca 9.6 POC Gluc  -    LFT   pBNP - Lactic -   eGFR AA - PO4 - A1c -   AST - APk - Prot -  LDL -     Mg - TSH -   ALT - T sarah - Alb -          COVID-19 Lab Results    COVID-19  Lab Results   Component Value Date    COVID19 Not Detected 03/18/2023    COVID19 Not Detected 03/03/2023    COVID19 Not Detected 02/28/2023       Pro-Calcitonin  No results for input(s): \"PCT\" in the last 168 hours.    Cardiac  No results for input(s): \"TROP\", \"PBNP\" in the last 168 hours.    Creatinine Kinase  No results for input(s): \"CK\" in the last 168 hours.    Inflammatory Markers  No results for input(s): \"CRP\", \"EUSEBIA\", \"LDH\", \"DDIMER\" in the last 168 hours.    Imaging: Imaging data reviewed in Epic.    Assessment & Plan:    Recurrent falls  History of multiple system atrophy  History of orthostatic hypotension  -Admit  -Check orthostatic vital signs  -Midodrine if positive  -PT/OT  -May need placement, social work  consult    Azotemia  Hyponatremia  -Gentle hydration    UTI, uncomplicated  -Previous culture with pansensitive E. Coli  -Continue ceftriaxone    DM  -Carb controlled diet  -Insulin coverage    HTN  HLD  -Home medications as appropriate      Quality:  DVT Prophylaxis: Heparin  CODE status: Full code  MIRIAM: TBD      Plan of care discussed with patient. Acknowledged understanding and agrees to plan. Also discussed with the ED physician.    >55 minutes spent on this admission - examining patient, obtaining history, reviewing previous medical records, going over test results/imaging and discussing plan of care. More than 50% of the time was spent in counseling and/or coordination of care related to recurrent falls.   All questions answered.     Sierra Estrella MD  12/5/2024                   [1] No Known Allergies  [2]   Current Facility-Administered Medications on File Prior to Encounter   Medication Dose Route Frequency Provider Last Rate Last Admin    [COMPLETED] acetaminophen (Tylenol Extra Strength) tab 1,000 mg  1,000 mg Oral Once Sapadin, Ashlee, DO   1,000 mg at 11/15/24 2303    [COMPLETED] insulin aspart (NovoLOG) 100 Units/mL vial 9 Units  0.15 Units/kg Subcutaneous Once Sapadin, Ashlee, DO   9 Units at 11/15/24 2328    [COMPLETED] losartan (Cozaar) tab 25 mg  25 mg Oral Once Sapadin, Ashlee, DO   25 mg at 11/15/24 2351     Current Outpatient Medications on File Prior to Encounter   Medication Sig Dispense Refill    acetaminophen (TYLENOL) 325 MG Oral Tab Take 2 tablets (650 mg total) by mouth every 6 (six) hours as needed. 30 tablet 0    lidocaine 5 % External Patch Place 1 patch onto the skin daily. 14 patch 0    midodrine 5 MG Oral Tab Take 1 tablet (5 mg total) by mouth in the morning and 1 tablet (5 mg total) at noon and 1 tablet (5 mg total) in the evening. 90 tablet 1    cephALEXin 500 MG Oral Cap Take 1 capsule (500 mg total) by mouth 2 (two) times daily. 14 capsule 0    simvastatin 20 MG Oral Tab Take 1  tablet (20 mg total) by mouth daily. 90 tablet 1    clotrimazole 1 % External Cream Apply 1 Application topically 2 (two) times daily. 60 g 1    ergocalciferol 1.25 MG (53193 UT) Oral Cap Take 1 capsule (50,000 Units total) by mouth once a week. 12 capsule 0    sertraline 100 MG Oral Tab Take 1 tablet (100 mg total) by mouth daily. 90 tablet 0    methenamine 1 g Oral Tab Take 1 tablet (1 g total) by mouth 2 (two) times daily. 60 tablet 5    gabapentin 300 MG Oral Cap Take 1 capsule (300 mg total) by mouth 3 (three) times daily. 90 capsule 3    estradiol (ESTRACE) 0.1 MG/GM Vaginal Cream Apply fingertip amount of cream to the vagina every night for 1 week and then every other night indefinitely 42.5 g 11    Accu-Chek FastClix Lancets Does not apply Misc Use to check blood sugars 3 times daily. 300 each 1    Blood Glucose Monitoring Suppl (ACCU-CHEK GUIDE) w/Device Does not apply Kit Use to check blood sugars 3 times daily. 1 kit 0    Glucose Blood (ACCU-CHEK GUIDE) In Vitro Strip Use to check blood sugars 3 times daily. 300 strip 1    lidocaine 4 % External Patch Place 1 patch onto the skin daily.      Insulin Degludec (TRESIBA FLEXTOUCH) 100 UNIT/ML Subcutaneous Solution Pen-injector Inject 36 Units into the skin daily.      Insulin Lispro, 1 Unit Dial, 100 UNIT/ML Subcutaneous Solution Pen-injector Patient will take 14 units with small carb meals and 18-20 units with larger carb meals. (Patient taking differently: Inject 12 Units into the skin in the morning, at noon, and at bedtime. Patient takes 12 units with every meal and sliding scale on top of that) 54 mL 1    Insulin Pen Needle (PEN NEEDLES) 32G X 4 MM Does not apply Misc 1 pen  4 (four) times daily. Use  New pen needle  With each injection 400 each 0

## 2024-12-05 NOTE — PHYSICAL THERAPY NOTE
PHYSICAL THERAPY EVALUATION - INPATIENT     Room Number: 549/549-A  Evaluation Date: 12/5/2024  Type of Evaluation: Initial   Physician Order: PT Eval and Treat    Presenting Problem: fall  Co-Morbidities : depression, DM, HTN, HLD, arthritis, multiple system atrophy  Reason for Therapy: Mobility Dysfunction and Discharge Planning    PHYSICAL THERAPY ASSESSMENT   Patient is a 74 year old female admitted 12/5/2024 for falls.  Prior to admission, patient's baseline is modified independent with a rolling walker.  Patient is currently functioning near baseline with bed mobility, transfers, gait, stair negotiation, maintaining seated position, standing prolonged periods, and performing household tasks.  Patient is requiring contact guard assist as a result of the following impairments: decreased functional strength, decreased endurance/aerobic capacity, pain, impaired standing balance, decreased muscular endurance, and medical status.  Physical Therapy will continue to follow for duration of hospitalization.    Patient will benefit from continued skilled PT Services at discharge to promote prior level of function and safety with additional support and return home with home health PT.    PLAN DURING HOSPITALIZATION  Nursing Mobility Recommendation : 1 Assist  PT Device Recommendation: Rolling walker  PT Treatment Plan: Body mechanics;Bed mobility;Coordination;Endurance;Energy conservation;Gait training;Strengthening;Transfer training;Balance training  Rehab Potential : Fair  Frequency (Obs): 5x/week     PHYSICAL THERAPY MEDICAL/SOCIAL HISTORY   History related to current admission: Ananya Dowling is a 74 year old female with history of depression, DM, HTN, HLD, arthritis, multiple system atrophy who presented to the ED following a fall.  Fall was witnessed by daughter.      Problem List  Principal Problem:    Urinary tract infection without hematuria, site unspecified  Active Problems:    Hyponatremia    Azotemia    HOME  SITUATION  Type of Home: House  Home Layout: Multi-level  Stairs to Enter : 0   Stairs to Bedroom: 6    Lives With: Family (daughter, son in law, granddaughter)    Drives: No   Patient Regularly Uses: Rolling walker     Prior Level of [place]: Prior to admission patient was ambulatory with a rolling walker. Lived at home with daughter and family who would assist intermittently. Able to perform ADL's on her own.    SUBJECTIVE  \"My daughter helps me sometimes\"    PHYSICAL THERAPY EXAMINATION   OBJECTIVE  Precautions: Bed/chair alarm  Fall Risk: Standard fall risk    PAIN ASSESSMENT  Ratin    COGNITION  Overall Cognitive Status:  WFL - within functional limits  Arousal/Alertness:  delayed responses to stimuli  Safety Judgement:  decreased awareness of need for safety    RANGE OF MOTION AND STRENGTH ASSESSMENT  Upper extremity ROM and strength are within functional limits BUE.  Lower extremity ROM is within functional limits BLE.  Lower extremity strength is within functional limits BLE.    BALANCE  Static Sitting: Good  Dynamic Sitting: Fair  Static Standing: Fair  Dynamic Standing: Poor    AM-PAC '6-Clicks' INPATIENT SHORT FORM - BASIC MOBILITY  How much difficulty does the patient currently have...  Patient Difficulty: Turning over in bed (including adjusting bedclothes, sheets and blankets)?: A Little   Patient Difficulty: Sitting down on and standing up from a chair with arms (e.g., wheelchair, bedside commode, etc.): A Little   Patient Difficulty: Moving from lying on back to sitting on the side of the bed?: A Little   How much help from another person does the patient currently need...   Help from Another: Moving to and from a bed to a chair (including a wheelchair)?: A Little   Help from Another: Need to walk in hospital room?: A Little   Help from Another: Climbing 3-5 steps with a railing?: A Little     AM-PAC Score:  Raw Score: 18   Approx Degree of Impairment: 46.58%   Standardized Score (AM-PAC  Scale): 43.63   CMS Modifier (G-Code): CK    FUNCTIONAL ABILITY STATUS  Functional Mobility/Gait Assessment  Gait Assistance: Contact guard assist  Distance (ft): 15 feet x 2  Assistive Device: Rolling walker  Pattern:  (decreased cammie, decreased step length, forward flexed posture, narrow base of support, verbal cues for sequencing and rolling walker management.)  Rolling: stand-by assist  Supine to Sit: minimal assist  Sit to Supine:  not tested  Sit to Stand: contact guard assist    Exercise/Education Provided:  Education Provided To: Patient Patient Education: Role of Physical Therapy;Plan of Care;Discharge Recommendations;DME Recommendations;Functional Transfer Techniques;Fall Prevention;Weight Bear Status;Posture/Positioning;Energy Conservation;Gait Training Patient's Response to Education: Verbalized Understanding;Requires Further Education     Skilled Therapy Provided: Patient received supine in bed at initiation of session agreeable to participation in PT. Patient tolerates treatment well, able to perform transfers and ambulation with CGA. No overt loss of balance noted during ambulation. Patient left in bedside recliner, lines intact, needs in reach and handoff to RN.    The patient's Approx Degree of Impairment: 46.58% has been calculated based on documentation in the [facility] '6 clicks' Inpatient Basic Mobility Short Form.  Research supports that patients with this level of impairment may benefit from home.  Final disposition will be made by interdisciplinary medical team.    Patient End of Session: Up in chair;Needs met;Call light within reach;RN aware of session/findings;All patient questions and concerns addressed;Hospital anti-slip socks;Alarm set    CURRENT GOALS  Goals to be met by: 12/19/24  Patient Goal Patient's self-stated goal is: to return home   Goal #1 Patient is able to demonstrate supine - sit EOB @ level: modified independent     Goal #1   Current Status    Goal #2 Patient is able to  demonstrate transfers Sit to/from Stand at assistance level: modified independent with  least restrictive device     Goal #2  Current Status    Goal #3 Patient is able to ambulate >100 feet with assist device:  least restrictive device  at assistance level: modified independent   Goal #3   Current Status    Goal #4 Patient will negotiate 6 stairs/one curb w/ assistive device and supervision   Goal #4   Current Status    Goal #5 Patient to demonstrate independence with home activity/exercise instructions provided to patient in preparation for discharge.   Goal #5   Current Status    Goal #6    Goal #6  Current Status      Patient Evaluation Complexity Level:  History High - 3 or more personal factors and/or co-morbidities   Examination of body systems Moderate - addressing a total of 3 or more elements   Clinical Presentation  Moderate - Evolving   Clinical Decision Making  Moderate Complexity     Gait Training: 10 minutes    Leticia Gandhi PT, DPT

## 2024-12-06 LAB
ANION GAP SERPL CALC-SCNC: 6 MMOL/L (ref 0–18)
BASOPHILS # BLD AUTO: 0.04 X10(3) UL (ref 0–0.2)
BASOPHILS NFR BLD AUTO: 0.6 %
BUN BLD-MCNC: 16 MG/DL (ref 9–23)
BUN/CREAT SERPL: 20.8 (ref 10–20)
CALCIUM BLD-MCNC: 9.3 MG/DL (ref 8.7–10.4)
CHLORIDE SERPL-SCNC: 109 MMOL/L (ref 98–112)
CO2 SERPL-SCNC: 25 MMOL/L (ref 21–32)
CREAT BLD-MCNC: 0.77 MG/DL
DEPRECATED RDW RBC AUTO: 40.1 FL (ref 35.1–46.3)
EGFRCR SERPLBLD CKD-EPI 2021: 81 ML/MIN/1.73M2 (ref 60–?)
EOSINOPHIL # BLD AUTO: 0.17 X10(3) UL (ref 0–0.7)
EOSINOPHIL NFR BLD AUTO: 2.4 %
ERYTHROCYTE [DISTWIDTH] IN BLOOD BY AUTOMATED COUNT: 13.1 % (ref 11–15)
GLUCOSE BLD-MCNC: 184 MG/DL (ref 70–99)
GLUCOSE BLDC GLUCOMTR-MCNC: 159 MG/DL (ref 70–99)
GLUCOSE BLDC GLUCOMTR-MCNC: 177 MG/DL (ref 70–99)
GLUCOSE BLDC GLUCOMTR-MCNC: 248 MG/DL (ref 70–99)
GLUCOSE BLDC GLUCOMTR-MCNC: 262 MG/DL (ref 70–99)
GLUCOSE BLDC GLUCOMTR-MCNC: 54 MG/DL (ref 70–99)
GLUCOSE BLDC GLUCOMTR-MCNC: 89 MG/DL (ref 70–99)
HCT VFR BLD AUTO: 35.2 %
HGB BLD-MCNC: 11.7 G/DL
IMM GRANULOCYTES # BLD AUTO: 0.03 X10(3) UL (ref 0–1)
IMM GRANULOCYTES NFR BLD: 0.4 %
LYMPHOCYTES # BLD AUTO: 1.79 X10(3) UL (ref 1–4)
LYMPHOCYTES NFR BLD AUTO: 25.3 %
MCH RBC QN AUTO: 28.2 PG (ref 26–34)
MCHC RBC AUTO-ENTMCNC: 33.2 G/DL (ref 31–37)
MCV RBC AUTO: 84.8 FL
MONOCYTES # BLD AUTO: 0.58 X10(3) UL (ref 0.1–1)
MONOCYTES NFR BLD AUTO: 8.2 %
MRSA DNA SPEC QL NAA+PROBE: POSITIVE
NEUTROPHILS # BLD AUTO: 4.46 X10 (3) UL (ref 1.5–7.7)
NEUTROPHILS # BLD AUTO: 4.46 X10(3) UL (ref 1.5–7.7)
NEUTROPHILS NFR BLD AUTO: 63.1 %
OSMOLALITY SERPL CALC.SUM OF ELEC: 296 MOSM/KG (ref 275–295)
PLATELET # BLD AUTO: 228 10(3)UL (ref 150–450)
POTASSIUM SERPL-SCNC: 3.9 MMOL/L (ref 3.5–5.1)
RBC # BLD AUTO: 4.15 X10(6)UL
SODIUM SERPL-SCNC: 140 MMOL/L (ref 136–145)
WBC # BLD AUTO: 7.1 X10(3) UL (ref 4–11)

## 2024-12-06 PROCEDURE — 99233 SBSQ HOSP IP/OBS HIGH 50: CPT | Performed by: INTERNAL MEDICINE

## 2024-12-06 NOTE — PLAN OF CARE
Problem: Patient Centered Care  Goal: Patient preferences are identified and integrated in the patient's plan of care  Description: Interventions:  - What would you like us to know as we care for you?  - Provide timely, complete, and accurate information to patient/family  - Incorporate patient and family knowledge, values, beliefs, and cultural backgrounds into the planning and delivery of care  - Encourage patient/family to participate in care and decision-making at the level they choose  - Honor patient and family perspectives and choices  Outcome: Progressing     Problem: Diabetes/Glucose Control  Goal: Glucose maintained within prescribed range  Description: INTERVENTIONS:  - Monitor Blood Glucose as ordered  - Assess for signs and symptoms of hyperglycemia and hypoglycemia  - Administer ordered medications to maintain glucose within target range  - Assess barriers to adequate nutritional intake and initiate nutrition consult as needed  - Instruct patient on self management of diabetes  Outcome: Progressing     Problem: Patient/Family Goals  Goal: Patient/Family Long Term Goal  Description: Patient's Long Term Goal: work with PT/OT    Interventions:  - Monitor vitals  - Monitor appropriate labs  - Administer medications as ordered  - Follow MD's orders  - Update patient on plan of care   - Discharge planning   - See additional Care Plan goals for specific interventions  Outcome: Progressing  Goal: Patient/Family Short Term Goal  Description: Patient's Short Term Goal: Treat urinary infection    Interventions:   - Monitor vitals  - Monitor appropriate labs  - Administer medications as ordered  - Follow MD's orders  - Update patient on plan of care   - Discharge planning     - See additional Care Plan goals for specific interventions  Outcome: Progressing     Problem: CARDIOVASCULAR - ADULT  Goal: Absence of cardiac arrhythmias or at baseline  Description: INTERVENTIONS:  - Continuous cardiac monitoring,  monitor vital signs, obtain 12 lead EKG if indicated  - Evaluate effectiveness of antiarrhythmic and heart rate control medications as ordered  - Initiate emergency measures for life threatening arrhythmias  - Monitor electrolytes and administer replacement therapy as ordered  Outcome: Progressing     Problem: METABOLIC/FLUID AND ELECTROLYTES - ADULT  Goal: Glucose maintained within prescribed range  Description: INTERVENTIONS:  - Monitor Blood Glucose as ordered  - Assess for signs and symptoms of hyperglycemia and hypoglycemia  - Administer ordered medications to maintain glucose within target range  - Assess barriers to adequate nutritional intake and initiate nutrition consult as needed  - Instruct patient on self management of diabetes  Outcome: Progressing  Goal: Hemodynamic stability and optimal renal function maintained  Description: INTERVENTIONS:  - Monitor labs and assess for signs and symptoms of volume excess or deficit  - Monitor intake, output and patient weight  - Monitor urine specific gravity, serum osmolarity and serum sodium as indicated or ordered  - Monitor response to interventions for patient's volume status, including labs, urine output, blood pressure (other measures as available)  - Encourage oral intake as appropriate  - Instruct patient on fluid and nutrition restrictions as appropriate  Outcome: Progressing     Problem: MUSCULOSKELETAL - ADULT  Goal: Return mobility to safest level of function  Description: INTERVENTIONS:  - Assess patient stability and activity tolerance for standing, transferring and ambulating w/ or w/o assistive devices  - Assist with transfers and ambulation using safe patient handling equipment as needed  - Ensure adequate protection for wounds/incisions during mobilization  - Obtain PT/OT consults as needed  - Advance activity as appropriate  - Communicate ordered activity level and limitations with patient/family  Outcome: Progressing     Problem: SAFETY ADULT -  FALL  Goal: Free from fall injury  Description: INTERVENTIONS:  - Assess pt frequently for physical needs  - Identify cognitive and physical deficits and behaviors that affect risk of falls.  - [place] fall precautions as indicated by assessment.  - Educate pt/family on patient safety including physical limitations  - Instruct pt to call for assistance with activity based on assessment  - Modify environment to reduce risk of injury  - Provide assistive devices as appropriate  - Consider OT/PT consult to assist with strengthening/mobility  - Encourage toileting schedule  Outcome: Progressing     Problem: DISCHARGE PLANNING  Goal: Discharge to home or other facility with appropriate resources  Description: INTERVENTIONS:  - Identify barriers to discharge w/pt and caregiver  - Include patient/family/discharge partner in discharge planning  - Arrange for needed discharge resources and transportation as appropriate  - Identify discharge learning needs (meds, wound care, etc)  - Arrange for interpreters to assist at discharge as needed  - Consider post-discharge preferences of patient/family/discharge partner  - Complete POLST form as appropriate  - Assess patient's ability to be responsible for managing their own health  - Refer to Case Management Department for coordinating discharge planning if the patient needs post-hospital services based on physician/LIP order or complex needs related to functional status, cognitive ability or social support system  Outcome: Progressing

## 2024-12-06 NOTE — PLAN OF CARE
Problem: Patient Centered Care  Goal: Patient preferences are identified and integrated in the patient's plan of care  Description: Interventions:  - What would you like us to know as we care for you?   - Provide timely, complete, and accurate information to patient/family  - Incorporate patient and family knowledge, values, beliefs, and cultural backgrounds into the planning and delivery of care  - Encourage patient/family to participate in care and decision-making at the level they choose  - Honor patient and family perspectives and choices  Outcome: Progressing     Problem: Diabetes/Glucose Control  Goal: Glucose maintained within prescribed range  Description: INTERVENTIONS:  - Monitor Blood Glucose as ordered  - Assess for signs and symptoms of hyperglycemia and hypoglycemia  - Administer ordered medications to maintain glucose within target range  - Assess barriers to adequate nutritional intake and initiate nutrition consult as needed  - Instruct patient on self management of diabetes  Outcome: Progressing     Problem: Patient/Family Goals  Goal: Patient/Family Long Term Goal  Description: Patient's Long Term Goal:     Interventions:  - See additional Care Plan goals for specific interventions  Outcome: Progressing  Goal: Patient/Family Short Term Goal  Description: Patient's Short Term Goal:    Interventions:   -   - See additional Care Plan goals for specific interventions  Outcome: Progressing

## 2024-12-06 NOTE — PAYOR COMM NOTE
--------------  CONTINUED STAY REVIEW    Payor: RENAN MENENDEZ [facility]  Subscriber #:  [id number]  Authorization Number: [id number]    Admit date: 24  Admit time:  6:13 AM    REVIEW DOCUMENTATION:   Progress Note            Ananya Dowling Patient Status:  Inpatient    1950 MRN [id number]   Location [facility] 5SW/SE Attending Anne Feldman MD   Hosp Day # 1 PCP Jermaine Monson MD      Chief Complaint: UTI, weakness        Subjective:  S: Patient doing well  No acute complaints     No other acute complaints or other nursing concerns or overnight events        Review of Systems:   10 point ROS completed and was negative, except for pertinent positive and negatives stated in subjective.        Objective:  Vital signs:  Temp:  [97.4 °F (36.3 °C)-98.5 °F (36.9 °C)] 98.5 °F (36.9 °C)  Pulse:  [75-90] 75  Resp:  [17-18] 17  BP: (120-191)/(46-92) 133/66  SpO2:  [97 %-100 %] 97 %         Wt Readings from Last 6 Encounters:   24 123 lb 9.6 oz (56.1 kg)   11/15/24 128 lb (58.1 kg)   24 131 lb 3.2 oz (59.5 kg)   09/10/24 128 lb (58.1 kg)   24 128 lb 1.6 oz (58.1 kg)   24 133 lb (60.3 kg)            Physical Exam:    General: No acute distress. Alert ,         Respiratory: Clear to auscultation bilaterally. No wheezes. No rhonchi.  Cardiovascular: S1, S2. Regular rate and rhythm. No murmurs, rubs or gallops.   Abdomen: Soft, nontender, nondistended.  Positive bowel sounds. No rebound or guarding.  Neurologic: No focal neurological deficits.   Musculoskeletal: Moves all extremities.  Extremities: No edema.        Results:  Diagnostic Data:       Labs:     Labs Last 24 Hours:                      BMP         CBC       Other      Na 140 Cl 109 BUN 16 Glu 184     Hb 11.7     PTT - Procal -    K 3.9 CO2 25.0 Cr 0.77     WBC 7.1 >< .0   INR - CRP -    Renal Lytes Endo       Hct 35.2     Trop - D dim -    eGFR - Ca 9.3 POC Gluc  386       LFT     pBNP - Lactic -    eGFR AA - PO4 - A1c -      AST - APk - Prot -   LDL -        Mg - TSH -     ALT - T sarah - Alb -             COVID-19 Lab Results     COVID-19        Lab Results   Component Value Date     COVID19 Not Detected 03/18/2023     COVID19 Not Detected 03/03/2023     COVID19 Not Detected 02/28/2023         Pro-Calcitonin  No results for input(s): \"PCT\" in the last 168 hours.     Cardiac  No results for input(s): \"TROP\", \"PBNP\" in the last 168 hours.     Creatinine Kinase  No results for input(s): \"CK\" in the last 168 hours.     Inflammatory Markers  No results for input(s): \"CRP\", \"EUSEBIA\", \"LDH\", \"DDIMER\" in the last 168 hours.     Imaging: Imaging data reviewed in Epic.     Medications:   Scheduled Medications    cefTRIAXone  1 g Intravenous Q24H    enoxaparin  40 mg Subcutaneous Daily    gabapentin  300 mg Oral TID    losartan  50 mg Oral Daily    methenamine  1 g Oral BID    midodrine  5 mg Oral TID    sertraline  100 mg Oral Daily    insulin aspart  1-7 Units Subcutaneous TID CC and HS    pravastatin  20 mg Oral Nightly    insulin degludec  35 Units Subcutaneous Daily               Assessment & Plan:  ASSESSMENT / PLAN:      #Recurrent falls  History of multiple system atrophy  History of orthostatic hypotension  -Midodrine if positive  -PT/OT  -social work consult: plan to DC to Home with [facility]      Azotemia  Hyponatremia  -Gentle hydration     UTI, uncomplicated  -Previous culture with pansensitive E. Coli  -Continue ceftriaxone  -UCX ecoli sen pending     DM: OOC  A1C 11.3  Pt had discontinue long acting insulin as OP unclear why- resume 36 U tresiba, increase SSI to MD  -Carb controlled diet  -Insulin coverage     HTN  HLD  -Home medications as appropriate        Quality:  DVT Prophylaxis: Heparin  CODE status: Full code  MIRIAM: TBD              Coordinated care with providers and counseling re: treatment plan and workup     Anne Feldman MD        Supplementary Documentation:  **Certification        PHYSICIAN Certification of Need for  Inpatient Hospitalization - Initial Certification     Patient will require inpatient services that will reasonably be expected to span two midnight's based on the clinical documentation in H+P.   Based on patients current state of illness, I anticipate that, after discharge, patient will require TBD.                  MEDICATIONS ADMINISTERED IN LAST 1 DAY:  cefTRIAXone (Rocephin) 1 g in sodium chloride 0.9% 100 mL [id number]       Date Action Dose Route User    12/6/2024 0439 New Bag 1 g Intravenous [email or web address]          enoxaparin (Lovenox) 40 MG/0.4ML SUBQ injection 40 mg       Date Action Dose Route User    12/6/2024 0902 Given 40 mg Subcutaneous (Right Lower Abdomen) Deann Carrington RN          gabapentin (Neurontin) cap 300 mg       Date Action Dose Route User    12/6/2024 0901 Given 300 mg Oral Deann Carrington RN    12/5/2024 2052 Given 300 mg Oral Valarie Ramirez          hydrALAzine (Apresoline) 20 mg/mL injection 10 mg       Date Action Dose Route User    12/5/2024 2212 Given 10 mg Intravenous Ramirez, Valarie          insulin aspart (NovoLOG) 100 Units/mL FlexPen 1-7 Units       Date Action Dose Route User    12/6/2024 1244 Given 1 Units Subcutaneous (Right Lower Abdomen) Deann Carrington RN    12/6/2024 0909 Given 1 Units Subcutaneous (Right Upper Abdomen) Deann Carrington RN    12/5/2024 1845 Given 7 Units Subcutaneous (Left Upper Arm) Deann Carrington RN          insulin aspart (NovoLOG) 100 Units/mL FlexPen 8 Units       Date Action Dose Route User    12/5/2024 1908 Given 8 Units Subcutaneous (Left Lower Abdomen) Deann Carrington RN          insulin aspart (NovoLOG) 100 Units/mL FlexPen 8 Units       Date Action Dose Route User    12/5/2024 2209 Given 8 Units Subcutaneous (Left Lower Abdomen) Ramirez, Valarie          insulin degludec (Tresiba) 100 units/mL flextouch 20 Units       Date Action Dose Route User    12/5/2024 1709 Given 20 Units Subcutaneous (Left Lower Abdomen) Deann Carrington RN          insulin  degludec (Tresiba) 100 units/mL flextouch 35 Units       Date Action Dose Route User    12/6/2024 0910 Given 35 Units Subcutaneous (Left Upper Abdomen) Deann Carrington RN          losartan (Cozaar) tab 50 mg       Date Action Dose Route User    12/6/2024 0901 Given 50 mg Oral Deann Carrington RN          methenamine (Hiprex) tab 1 g       Date Action Dose Route User    12/6/2024 0901 Given 1 g Oral Deann Carrington RN    12/5/2024 2052 Given 1 g Oral Valarie Ramirez          midodrine (ProAmatine) tab 5 mg       Date Action Dose Route User    12/6/2024 1244 Given 5 mg Oral Deann Carrington RN    12/6/2024 0607 Given 5 mg Oral Valarie Ramirez          pravastatin (Pravachol) tab 20 mg       Date Action Dose Route User    12/5/2024 2052 Given 20 mg Oral RamirezValarie          sertraline (Zoloft) tab 100 mg       Date Action Dose Route User    12/6/2024 0901 Given 100 mg Oral Deann Carrington RN          sodium chloride 0.9% infusion       Date Action Dose Route User    12/6/2024 0607 New Bag (none) Intravenous Valarie Ramirez    12/5/2024 1845 New Bag (none) Intravenous Deann Carrington RN            Vitals (last day)       Date/Time Temp Pulse Resp BP SpO2 Weight O2 Device O2 Flow Rate (L/min) [facility]    12/06/24 0900 98.2 °F (36.8 °C) 71 18 119/66 96 % -- None (Room air) -- GA    12/06/24 0603 98.5 °F (36.9 °C) 75 17 133/66 97 % -- None (Room air) --     12/05/24 2253 -- -- -- 120/46 -- -- -- --     12/05/24 2211 -- 75 -- 191/67 -- -- -- --     12/05/24 2206 -- 79 -- 187/68 -- -- -- --     12/05/24 2046 98 °F (36.7 °C) 77 17 162/92 100 % -- None (Room air) -- JT    12/05/24 1706 98.4 °F (36.9 °C) 79 18 169/79 97 % -- None (Room air) -- GA    12/05/24 0934 97.4 °F (36.3 °C) 90 18 131/68 100 % -- None (Room air) -- GA    12/05/24 0812 -- 72 -- -- -- -- -- -- KP    12/05/24 0615 97.9 °F (36.6 °C) 73 16 163/62 97 % 123 lb 9.6 oz (56.1 kg) None (Room air) -- ROB    12/05/24 0545 -- 70 15 152/58 96 % -- -- -- PK    12/05/24  0544 -- 71 22 -- 96 % -- -- -- PK    12/05/24 0540 -- 71 22 -- 96 % -- -- -- PK    12/05/24 0536 -- 74 13 -- 99 % -- -- -- PK    12/05/24 0535 -- 75 21 -- 100 % -- -- -- PK    12/05/24 0530 -- 75 25 156/71 100 % -- -- -- PK    12/05/24 0515 -- 75 14 155/93 96 % -- -- -- PK    12/05/24 0500 -- 75 17 162/63 96 % -- None (Room air) -- AH    12/05/24 0444 -- 73 13 -- 96 % -- -- -- PK    12/05/24 0429 -- 72 10 182/64 96 % -- -- -- PK    12/05/24 0414 -- 75 15 183/64 94 % -- -- -- PK    12/05/24 0359 -- 80 -- -- 95 % -- -- -- PK    12/05/24 0344 -- 79 -- 188/79 96 % -- -- -- PK    12/05/24 0244 -- 79 -- 191/69 98 % -- -- -- PK    12/05/24 0229 -- 77 -- 180/61 98 % -- -- -- PK    12/05/24 0214 -- 78 -- 160/54 97 % -- -- -- PK    12/05/24 0144 -- 78 -- -- 98 % -- -- -- PK    12/05/24 0114 -- 78 -- -- 99 % -- -- -- PK    12/05/24 0045 -- 81 -- -- 98 % -- -- -- PK    12/05/24 00:44:53 -- -- -- -- -- -- None (Room air) -- PK    12/05/24 0044 -- 88 -- -- 96 % -- -- -- PK          CIWA Scores (since admission)       None

## 2024-12-06 NOTE — PROGRESS NOTES
Progress Note     Ananya Dowling Patient Status:  Inpatient    1950 MRN [id number]   Location [facility] 5SW/SE Attending Anne Feldman MD   Hosp Day # 1 PCP Jermaine Monson MD     Chief Complaint: UTI, weakness    Subjective:   S: Patient doing well  No acute complaints    No other acute complaints or other nursing concerns or overnight events      Review of Systems:   10 point ROS completed and was negative, except for pertinent positive and negatives stated in subjective.    Objective:   Vital signs:  Temp:  [97.4 °F (36.3 °C)-98.5 °F (36.9 °C)] 98.5 °F (36.9 °C)  Pulse:  [75-90] 75  Resp:  [17-18] 17  BP: (120-191)/(46-92) 133/66  SpO2:  [97 %-100 %] 97 %    Wt Readings from Last 6 Encounters:   24 123 lb 9.6 oz (56.1 kg)   11/15/24 128 lb (58.1 kg)   24 131 lb 3.2 oz (59.5 kg)   09/10/24 128 lb (58.1 kg)   24 128 lb 1.6 oz (58.1 kg)   24 133 lb (60.3 kg)         Physical Exam:    General: No acute distress. Alert ,         Respiratory: Clear to auscultation bilaterally. No wheezes. No rhonchi.  Cardiovascular: S1, S2. Regular rate and rhythm. No murmurs, rubs or gallops.   Abdomen: Soft, nontender, nondistended.  Positive bowel sounds. No rebound or guarding.  Neurologic: No focal neurological deficits.   Musculoskeletal: Moves all extremities.  Extremities: No edema.    Results:   Diagnostic Data:      Labs:    Labs Last 24 Hours:   BMP     CBC    Other     Na 140 Cl 109 BUN 16 Glu 184   Hb 11.7   PTT - Procal -   K 3.9 CO2 25.0 Cr 0.77   WBC 7.1 >< .0  INR - CRP -   Renal Lytes Endo    Hct 35.2   Trop - D dim -   eGFR - Ca 9.3 POC Gluc  386    LFT   pBNP - Lactic -   eGFR AA - PO4 - A1c -   AST - APk - Prot -  LDL -     Mg - TSH -   ALT - T sarah - Alb -        COVID-19 Lab Results    COVID-19  Lab Results   Component Value Date    COVID19 Not Detected 2023    COVID19 Not Detected 2023    COVID19 Not Detected 2023       Pro-Calcitonin  No  results for input(s): \"PCT\" in the last 168 hours.    Cardiac  No results for input(s): \"TROP\", \"PBNP\" in the last 168 hours.    Creatinine Kinase  No results for input(s): \"CK\" in the last 168 hours.    Inflammatory Markers  No results for input(s): \"CRP\", \"EUSEBIA\", \"LDH\", \"DDIMER\" in the last 168 hours.    Imaging: Imaging data reviewed in Epic.    Medications:    cefTRIAXone  1 g Intravenous Q24H    enoxaparin  40 mg Subcutaneous Daily    gabapentin  300 mg Oral TID    losartan  50 mg Oral Daily    methenamine  1 g Oral BID    midodrine  5 mg Oral TID    sertraline  100 mg Oral Daily    insulin aspart  1-7 Units Subcutaneous TID CC and HS    pravastatin  20 mg Oral Nightly    insulin degludec  35 Units Subcutaneous Daily       Assessment & Plan:   ASSESSMENT / PLAN:     #Recurrent falls  History of multiple system atrophy  History of orthostatic hypotension  -Midodrine if positive  -PT/OT  -social work consult: plan to DC to Home with [facility]      Azotemia  Hyponatremia  -Gentle hydration     UTI, uncomplicated  -Previous culture with pansensitive E. Coli  -Continue ceftriaxone  -UCX ecoli sen pending     DM: OOC  A1C 11.3  Pt had discontinue long acting insulin as OP unclear why- resume 36 U tresiba, increase SSI to [place]  -Carb controlled diet  -Insulin coverage     HTN  HLD  -Home medications as appropriate        Quality:  DVT Prophylaxis: Heparin  CODE status: Full code  MIRIAM: TBD          Coordinated care with providers and counseling re: treatment plan and workup     Anne Feldman MD    Supplementary Documentation:         **Certification      PHYSICIAN Certification of Need for Inpatient Hospitalization - Initial Certification    Patient will require inpatient services that will reasonably be expected to span two midnight's based on the clinical documentation in H+P.   Based on patients current state of illness, I anticipate that, after discharge, patient will require TBD.

## 2024-12-06 NOTE — PLAN OF CARE
Problem: Patient Centered Care  Goal: Patient preferences are identified and integrated in the patient's plan of care  Description: Interventions:  - What would you like us to know as we care for you?   - Provide timely, complete, and accurate information to patient/family  - Incorporate patient and family knowledge, values, beliefs, and cultural backgrounds into the planning and delivery of care  - Encourage patient/family to participate in care and decision-making at the level they choose  - Honor patient and family perspectives and choices  Outcome: Progressing     Problem: Diabetes/Glucose Control  Goal: Glucose maintained within prescribed range  Description: INTERVENTIONS:  - Monitor Blood Glucose as ordered  - Assess for signs and symptoms of hyperglycemia and hypoglycemia  - Administer ordered medications to maintain glucose within target range  - Assess barriers to adequate nutritional intake and initiate nutrition consult as needed  - Instruct patient on self management of diabetes  Outcome: Progressing     Problem: Patient/Family Goals  Goal: Patient/Family Long Term Goal  Description: Patient's Long Term Goal:     Interventions:  - See additional Care Plan goals for specific interventions  Outcome: Progressing  Goal: Patient/Family Short Term Goal  Description: Patient's Short Term Goal:     Interventions:     - See additional Care Plan goals for specific interventions  Outcome: Progressing     Problem: CARDIOVASCULAR - ADULT  Goal: Absence of cardiac arrhythmias or at baseline  Description: INTERVENTIONS:  - Continuous cardiac monitoring, monitor vital signs, obtain 12 lead EKG if indicated  - Evaluate effectiveness of antiarrhythmic and heart rate control medications as ordered  - Initiate emergency measures for life threatening arrhythmias  - Monitor electrolytes and administer replacement therapy as ordered  Outcome: Progressing     Problem: METABOLIC/FLUID AND ELECTROLYTES - ADULT  Goal: Glucose  maintained within prescribed range  Description: INTERVENTIONS:  - Monitor Blood Glucose as ordered  - Assess for signs and symptoms of hyperglycemia and hypoglycemia  - Administer ordered medications to maintain glucose within target range  - Assess barriers to adequate nutritional intake and initiate nutrition consult as needed  - Instruct patient on self management of diabetes  Outcome: Progressing  Goal: Hemodynamic stability and optimal renal function maintained  Description: INTERVENTIONS:  - Monitor labs and assess for signs and symptoms of volume excess or deficit  - Monitor intake, output and patient weight  - Monitor urine specific gravity, serum osmolarity and serum sodium as indicated or ordered  - Monitor response to interventions for patient's volume status, including labs, urine output, blood pressure (other measures as available)  - Encourage oral intake as appropriate  - Instruct patient on fluid and nutrition restrictions as appropriate  Outcome: Progressing     Problem: MUSCULOSKELETAL - ADULT  Goal: Return mobility to safest level of function  Description: INTERVENTIONS:  - Assess patient stability and activity tolerance for standing, transferring and ambulating w/ or w/o assistive devices  - Assist with transfers and ambulation using safe patient handling equipment as needed  - Ensure adequate protection for wounds/incisions during mobilization  - Obtain PT/OT consults as needed  - Advance activity as appropriate  - Communicate ordered activity level and limitations with patient/family  Outcome: Progressing     Problem: SAFETY ADULT - FALL  Goal: Free from fall injury  Description: INTERVENTIONS:  - Assess pt frequently for physical needs  - Identify cognitive and physical deficits and behaviors that affect risk of falls.  - [place] fall precautions as indicated by assessment.  - Educate pt/family on patient safety including physical limitations  - Instruct pt to call for assistance with  activity based on assessment  - Modify environment to reduce risk of injury  - Provide assistive devices as appropriate  - Consider OT/PT consult to assist with strengthening/mobility  - Encourage toileting schedule  Outcome: Progressing     Problem: DISCHARGE PLANNING  Goal: Discharge to home or other facility with appropriate resources  Description: INTERVENTIONS:  - Identify barriers to discharge w/pt and caregiver  - Include patient/family/discharge partner in discharge planning  - Arrange for needed discharge resources and transportation as appropriate  - Identify discharge learning needs (meds, wound care, etc)  - Arrange for interpreters to assist at discharge as needed  - Consider post-discharge preferences of patient/family/discharge partner  - Complete POLST form as appropriate  - Assess patient's ability to be responsible for managing their own health  - Refer to Case Management Department for coordinating discharge planning if the patient needs post-hospital services based on physician/LIP order or complex needs related to functional status, cognitive ability or social support system  Outcome: Progressing

## 2024-12-07 LAB
GLUCOSE BLDC GLUCOMTR-MCNC: 236 MG/DL (ref 70–99)
GLUCOSE BLDC GLUCOMTR-MCNC: 300 MG/DL (ref 70–99)
GLUCOSE BLDC GLUCOMTR-MCNC: 333 MG/DL (ref 70–99)
GLUCOSE BLDC GLUCOMTR-MCNC: 340 MG/DL (ref 70–99)

## 2024-12-07 PROCEDURE — 99233 SBSQ HOSP IP/OBS HIGH 50: CPT | Performed by: HOSPITALIST

## 2024-12-07 RX ORDER — CEFUROXIME AXETIL 500 MG/1
500 TABLET ORAL 2 TIMES DAILY
Qty: 10 TABLET | Refills: 0 | Status: SHIPPED | OUTPATIENT
Start: 2024-12-07 | End: 2024-12-12

## 2024-12-07 NOTE — PLAN OF CARE
Problem: Patient Centered Care  Goal: Patient preferences are identified and integrated in the patient's plan of care  Description: Interventions:  - What would you like us to know as we care for you?   - Provide timely, complete, and accurate information to patient/family  - Incorporate patient and family knowledge, values, beliefs, and cultural backgrounds into the planning and delivery of care  - Encourage patient/family to participate in care and decision-making at the level they choose  - Honor patient and family perspectives and choices  Outcome: Progressing     Problem: Diabetes/Glucose Control  Goal: Glucose maintained within prescribed range  Description: INTERVENTIONS:  - Monitor Blood Glucose as ordered  - Assess for signs and symptoms of hyperglycemia and hypoglycemia  - Administer ordered medications to maintain glucose within target range  - Assess barriers to adequate nutritional intake and initiate nutrition consult as needed  - Instruct patient on self management of diabetes  Outcome: Progressing    Problem: CARDIOVASCULAR - ADULT  Goal: Absence of cardiac arrhythmias or at baseline  Description: INTERVENTIONS:  - Continuous cardiac monitoring, monitor vital signs, obtain 12 lead EKG if indicated  - Evaluate effectiveness of antiarrhythmic and heart rate control medications as ordered  - Initiate emergency measures for life threatening arrhythmias  - Monitor electrolytes and administer replacement therapy as ordered  Outcome: Progressing     Problem: METABOLIC/FLUID AND ELECTROLYTES - ADULT  Goal: Glucose maintained within prescribed range  Description: INTERVENTIONS:  - Monitor Blood Glucose as ordered  - Assess for signs and symptoms of hyperglycemia and hypoglycemia  - Administer ordered medications to maintain glucose within target range  - Assess barriers to adequate nutritional intake and initiate nutrition consult as needed  - Instruct patient on self management of diabetes  Outcome:  Progressing  Goal: Hemodynamic stability and optimal renal function maintained  Description: INTERVENTIONS:  - Monitor labs and assess for signs and symptoms of volume excess or deficit  - Monitor intake, output and patient weight  - Monitor urine specific gravity, serum osmolarity and serum sodium as indicated or ordered  - Monitor response to interventions for patient's volume status, including labs, urine output, blood pressure (other measures as available)  - Encourage oral intake as appropriate  - Instruct patient on fluid and nutrition restrictions as appropriate  Outcome: Progressing     Problem: MUSCULOSKELETAL - ADULT  Goal: Return mobility to safest level of function  Description: INTERVENTIONS:  - Assess patient stability and activity tolerance for standing, transferring and ambulating w/ or w/o assistive devices  - Assist with transfers and ambulation using safe patient handling equipment as needed  - Ensure adequate protection for wounds/incisions during mobilization  - Obtain PT/OT consults as needed  - Advance activity as appropriate  - Communicate ordered activity level and limitations with patient/family  Outcome: Progressing     Problem: SAFETY ADULT - FALL  Goal: Free from fall injury  Description: INTERVENTIONS:  - Assess pt frequently for physical needs  - Identify cognitive and physical deficits and behaviors that affect risk of falls.  - [place] fall precautions as indicated by assessment.  - Educate pt/family on patient safety including physical limitations  - Instruct pt to call for assistance with activity based on assessment  - Modify environment to reduce risk of injury  - Provide assistive devices as appropriate  - Consider OT/PT consult to assist with strengthening/mobility  - Encourage toileting schedule  Outcome: Progressing     Problem: DISCHARGE PLANNING  Goal: Discharge to home or other facility with appropriate resources  Description: INTERVENTIONS:  - Identify barriers to  discharge w/pt and caregiver  - Include patient/family/discharge partner in discharge planning  - Arrange for needed discharge resources and transportation as appropriate  - Identify discharge learning needs (meds, wound care, etc)  - Arrange for interpreters to assist at discharge as needed  - Consider post-discharge preferences of patient/family/discharge partner  - Complete POLST form as appropriate  - Assess patient's ability to be responsible for managing their own health  - Refer to Case Management Department for coordinating discharge planning if the patient needs post-hospital services based on physician/LIP order or complex needs related to functional status, cognitive ability or social support system  Outcome: Progressing

## 2024-12-07 NOTE — PHYSICAL THERAPY NOTE
PHYSICAL THERAPY TREATMENT NOTE - INPATIENT     Room Number: 549/549-A       Presenting Problem: fall  Co-Morbidities : depression, DM, HTN, HLD, arthritis, multiple system atrophy    Problem List  Principal Problem:    Urinary tract infection without hematuria, site unspecified  Active Problems:    Hyponatremia    Azotemia      PHYSICAL THERAPY ASSESSMENT   Patient demonstrates limited progress this session, goals  remain in progress.      Patient is requiring minimal assist as a result of the following impairments: decreased functional strength, decreased endurance/aerobic capacity, impaired standing static and dynamic balance, impaired coordination, decreased muscular endurance, and medical status.     Patient continues to function below baseline with bed mobility, transfers, gait, and stair negotiation.  Next session anticipate patient to progress transfers, gait, stair negotiation, and standing prolonged periods.  Physical Therapy will continue to follow patient for duration of hospitalization.    Patient continues to benefit from continued skilled PT services: to promote return to prior level of function and safety with continuous assistance and gradual rehabilitative therapy  and at discharge to promote prior level of function and safety with additional support and return home with home health PT. Patient benefit from cont skill therapy at rehab before return home. She unsteady when amb with RW, req cues for safety with out of bed activity, she still high risk of fall. Patient may return home when progressing to supervision and inc family assist at is needed.     PLAN DURING HOSPITALIZATION  Nursing Mobility Recommendation : 1 Assist  PT Device Recommendation: Rolling walker  PT Treatment Plan: Body mechanics;Endurance;Gait training;Stair training;Transfer training;Balance training  Frequency (Obs): 3x/week     SUBJECTIVE  coopetive    OBJECTIVE  Precautions: Bed/chair alarm    WEIGHT BEARING RESTRICTION        PAIN ASSESSMENT   Ratin  Location: no complains       BALANCE  Static Sitting: Good  Dynamic Sitting: Fair  Static Standing: Fair  Dynamic Standing: Poor    ACTIVITY TOLERANCE  Pulse: 73  Heart Rate Source: Monitor  Resp: 16  BP: 152/55  BP Location: Right arm  BP Method: Automatic  Patient Position: Semi-Melton     O2 WALK  Oxygen Therapy  SPO2% on Room Air at Rest: 95    AM-PAC '6-Clicks' INPATIENT SHORT FORM - BASIC MOBILITY  How much difficulty does the patient currently have...  Patient Difficulty: Turning over in bed (including adjusting bedclothes, sheets and blankets)?: A Little   Patient Difficulty: Sitting down on and standing up from a chair with arms (e.g., wheelchair, bedside commode, etc.): A Little   Patient Difficulty: Moving from lying on back to sitting on the side of the bed?: A Little   How much help from another person does the patient currently need...   Help from Another: Moving to and from a bed to a chair (including a wheelchair)?: A Little   Help from Another: Need to walk in hospital room?: A Little   Help from Another: Climbing 3-5 steps with a railing?: A Little     AM-PAC Score:  Raw Score: 18   Approx Degree of Impairment: 46.58%   Standardized Score (AM-PAC Scale): 43.63   CMS Modifier (G-Code): CK    FUNCTIONAL ABILITY STATUS  Functional Mobility/Gait Assessment  Gait Assistance: Minimum assistance  Distance (ft): 25  Assistive Device: Rolling walker  Pattern: Shuffle  Rolling: minimal assist  Supine to Sit: minimal assist  Sit to Supine:  NT  Sit to Stand: minimal assist    Skilled Therapy Provided: patient eating lunch 1st visit and agreeable for therapy on 2nd visit. Patient inc time to complete her task noted. Standing she lean on bed and on chair, She amb with small and slow cammie with RW. Patient stood with RW ~ 1 min x 2 for standing balance activity. Patient educated on safety with mobility d/t history of fall and still unsteadiness noted.  Patient is ready for  stairs training yet.    The patient's Approx Degree of Impairment: 46.58% has been calculated based on documentation in the [facility] '6 clicks' Inpatient Daily Activity Short Form.  Research supports that patients with this level of impairment may benefit from GR or HH PT with inc family assist at home.  Final disposition will be made by interdisciplinary medical team.    THERAPEUTIC EXERCISES  Lower Extremity Alternating marching  Ankle pumps  Heel slides  Knee extension  Leg slides     Position Sitting and Standing       Patient End of Session: Up in chair;Hospital anti-slip socks;All patient questions and concerns addressed;RN aware of session/findings;Call light within reach;Needs met    CURRENT GOALS   Goals to be met by: 24  Patient Goal Patient's self-stated goal is: to return home   Goal #1 Patient is able to demonstrate supine - sit EOB @ level: modified independent     Goal #1   Current Status    Goal #2 Patient is able to demonstrate transfers Sit to/from Stand at assistance level: modified independent with least restrictive device     Goal #2  Current Status    Goal #3 Patient is able to ambulate >100 feet with assist device: least restrictive device at assistance level: modified independent   Goal #3   Current Status    Goal #4 Patient will negotiate 6 stairs/one curb w/ assistive device and supervision   Goal #4   Current Status    Goal #5 Patient to demonstrate independence with home activity/exercise instructions provided to patient in preparation for discharge.   Goal #5   Current Status    Goal #6    Goal #6  Current Status        Gait Trainin minutes  Therapeutic Activity: 18 minutes  Neuromuscular Re-education:  minutes  Therapeutic Exercise:  minutes  Canalith Repositioning:  minutes  Manual Therapy: minutes  Can add/delete any of these

## 2024-12-07 NOTE — CM/SW NOTE
WAQAS received MD order that pt requesting to go to a facility.    Anticipated therapy need: Gradual Rehabilitative Therapy pending progress.     SW met with pt bedside to discuss.    Per pt, she has been staying with her daughter Xochitl for the past 2 years and \"her daughter does not want her there anymore\".      SW confirmed w/pt she is paying rent- SW explained [place] in [place] you cannot \"kick out\" a paying tenant from a home- legal process would need to ensue.  Pt v/u.    Pt has hx with [facility].  Pt asked if SW can \"get her an apartment\".  SW explained that this is not a service hospital can aid with.  Pt said she makes $2500 in SSI-over-income for Medicaid.  She reported no savings.    Pt open to speaking to [facility] to see about a spend-down.    SW stated there are some more cost-effective senior living buildings ([facility], [facility]) that might be worth looking into.    Pt agreeable to [facility] referral for more assistance in the community (likely over-income for in-home care).    WAQAS sent [facility] referrals as a tentative plan.     [facility] submitted. PASRR completed and queued  for review.    PLAN: DC home w/[facility] vs. [facility] pending progress/list/choice/auth     / to remain available for support and/or discharge planning.     Pamela Gomez MSW, LSW [id number]

## 2024-12-08 LAB
GLUCOSE BLDC GLUCOMTR-MCNC: 164 MG/DL (ref 70–99)
GLUCOSE BLDC GLUCOMTR-MCNC: 288 MG/DL (ref 70–99)
GLUCOSE BLDC GLUCOMTR-MCNC: 339 MG/DL (ref 70–99)
GLUCOSE BLDC GLUCOMTR-MCNC: 387 MG/DL (ref 70–99)

## 2024-12-08 PROCEDURE — 99233 SBSQ HOSP IP/OBS HIGH 50: CPT | Performed by: HOSPITALIST

## 2024-12-08 NOTE — PLAN OF CARE
Problem: CARDIOVASCULAR - ADULT  Goal: Absence of cardiac arrhythmias or at baseline  Description: INTERVENTIONS:  - Continuous cardiac monitoring, monitor vital signs, obtain 12 lead EKG if indicated  - Evaluate effectiveness of antiarrhythmic and heart rate control medications as ordered  - Initiate emergency measures for life threatening arrhythmias  - Monitor electrolytes and administer replacement therapy as ordered  Outcome: Progressing

## 2024-12-08 NOTE — CM/SW NOTE
12/05/24 1300   Services Requested   Submitted to [facility] Yes   PASRR Level 1 Submitted Yes   Choice of Post-Acute Provider   Informed patient of right to choose their preferred provider Yes   List of appropriate post-acute services provided to patient/family with quality data Yes   Patient/family choice [facility]     WAQAS followed up on discharge planning.    PASRR completed and uploaded to referral.    SW met with pt bedside- she stated she has been feeling weak and had to brace herself using grab bars in bathroom this AM.     Pt agreeable to [facility]- list of accepting sites w/Medicare quality data provided to her bedside- has hx at [facility] and would like to return there.      WAQAS rsvd [facility] in [place].    Auth through [facility]- DSC to request 12/9.    PLAN: DC to [facility] pending auth    / to remain available for support and/or discharge planning.     Pamela Gomez MSW, LSW [id number]

## 2024-12-08 NOTE — PROGRESS NOTES
[facility]  part of [facility]    Progress Note    Ananya Dowling Patient Status:  Inpatient    1950 MRN [id number]   Location [facility] 5SW/SE Attending Td Chaney MD   Hosp Day # 3 PCP Jermaine Monson MD     Chief Complaint:     UTI    Subjective:   Subjective:    Patient seen and examined  Endorsing a headache  Does not want to go home with her daughter.   Hyperglycemic and hypertensive    Objective:   Blood pressure (!) 170/57, pulse 74, temperature 97.8 °F (36.6 °C), temperature source Oral, resp. rate 16, height 5' 1\" (1.549 m), weight 123 lb 9.6 oz (56.1 kg), SpO2 95%.  Physical Exam    General: Patient is alert and oriented x3 does not appear to be in acute distress at this time  HEENT: EOMI PERRLA, atraumatic normocephalic  Cardiac: S1-S2 appreciated  Lungs: Good air entry bilaterally clear to auscultation  Abdomen: Soft nontender nondistended positive bowel sounds  Ext: Peripheral pulses are positive  Neuro: No focal deficits noted  Psych: Normal mood  Skin: No rashes noted  MSK: Full range of motion intact      Results:   Lab Results   Component Value Date    WBC 7.1 2024    HGB 11.7 (L) 2024    HCT 35.2 2024    .0 2024    CREATSERUM 0.77 2024    BUN 16 2024     2024    K 3.9 2024     2024    CO2 25.0 2024     (H) 2024    CA 9.3 2024    ALB 4.3 11/15/2024    ALKPHO 92 11/15/2024    BILT 0.2 11/15/2024    TP 7.8 11/15/2024    AST 15 11/15/2024    ALT 10 11/15/2024    T4F 1.0 2022    TSH 4.010 (H) 2022    MG 2.0 2024    TROPHS 10 2024    ETOH <3 2023       No results found.        Assessment & Plan:       #Recurrent falls  History of multiple system atrophy  History of orthostatic hypotension  -Midodrine if positive  -PT/OT  -social work consult: plan to DC to Home with [facility]      Azotemia  Hyponatremia  -Gentle hydration     UTI,  uncomplicated  -Previous culture with pansensitive E. Coli  -Continue ceftriaxone  -UCX ecoli - dc on Ceftin      DM: OOC  A1C 11.3  Pt had discontinue long acting insulin as OP unclear why- resume 36 U tresiba, increase SSI to MD  -Carb controlled diet  -Insulin coverage     HTN  HLD  -Home medications as appropriate        Quality:  DVT Prophylaxis: Heparin  CODE status: Full code    Global A/P  -sw to assist in dc   -hypertensive - prn IV hydralazine.   -patient requesting a facility  -ucx finalized, continue IV rocephin while Inhouse will de-escalate   -hyperglycemic - optimize diabetic meds upon dc.  -Reviewed previous consultant notes  -Reviewed CBC, BMP, Mag, and Phos  -Reviewed tests ordered  -Repeat labs in am  -MDM: High, severe exacerbation of chronic illness posing a threat to life. IV medications requiring close inpatient monitoring.       Td Chaney MD

## 2024-12-08 NOTE — PROGRESS NOTES
[facility]  part of [facility]    Progress Note    Ananya Dowling Patient Status:  Inpatient    1950 MRN [id number]   Location [facility] 5SW/SE Attending Td Chaney MD   Hosp Day # 3 PCP Jermaine Monson MD     Chief Complaint:     UTI    Subjective:   Subjective:    Patient seen and examined  Endorsing a headache  Does not want to go home with her daughter.     Objective:   Blood pressure (!) 170/57, pulse 74, temperature 97.8 °F (36.6 °C), temperature source Oral, resp. rate 16, height 5' 1\" (1.549 m), weight 123 lb 9.6 oz (56.1 kg), SpO2 95%.  Physical Exam    General: Patient is alert and oriented x3 does not appear to be in acute distress at this time  HEENT: EOMI PERRLA, atraumatic normocephalic  Cardiac: S1-S2 appreciated  Lungs: Good air entry bilaterally clear to auscultation  Abdomen: Soft nontender nondistended positive bowel sounds  Ext: Peripheral pulses are positive  Neuro: No focal deficits noted  Psych: Normal mood  Skin: No rashes noted  MSK: Full range of motion intact      Results:   Lab Results   Component Value Date    WBC 7.1 2024    HGB 11.7 (L) 2024    HCT 35.2 2024    .0 2024    CREATSERUM 0.77 2024    BUN 16 2024     2024    K 3.9 2024     2024    CO2 25.0 2024     (H) 2024    CA 9.3 2024    ALB 4.3 11/15/2024    ALKPHO 92 11/15/2024    BILT 0.2 11/15/2024    TP 7.8 11/15/2024    AST 15 11/15/2024    ALT 10 11/15/2024    T4F 1.0 2022    TSH 4.010 (H) 2022    MG 2.0 2024    TROPHS 10 2024    ETOH <3 2023       No results found.        Assessment & Plan:       #Recurrent falls  History of multiple system atrophy  History of orthostatic hypotension  -Midodrine if positive  -PT/OT  -social work consult: plan to DC to Home with [facility]      Azotemia  Hyponatremia  -Gentle hydration     UTI, uncomplicated  -Previous culture with  pansensitive E. Coli  -Continue ceftriaxone  -UCX ecoli - dc on Ceftin      DM: OOC  A1C 11.3  Pt had discontinue long acting insulin as OP unclear why- resume 36 U tresiba, increase SSI to MD  -Carb controlled diet  -Insulin coverage     HTN  HLD  -Home medications as appropriate        Quality:  DVT Prophylaxis: Heparin  CODE status: Full code    Global A/P  -sw to assist in dc   -patient requesting a facility  -ucx finalized, continue IV rocephin while Inhouse will de-escalate tomorrow.  -Reviewed previous consultant notes  -Reviewed CBC, BMP, Mag, and Phos  -Reviewed tests ordered  -Repeat labs in am  -MDM: High, severe exacerbation of chronic illness posing a threat to life. IV medications requiring close inpatient monitoring.       Td Chaney MD

## 2024-12-08 NOTE — PLAN OF CARE
Problem: Diabetes/Glucose Control  Goal: Glucose maintained within prescribed range  Description: INTERVENTIONS:  - Monitor Blood Glucose as ordered  - Assess for signs and symptoms of hyperglycemia and hypoglycemia  - Administer ordered medications to maintain glucose within target range  - Assess barriers to adequate nutritional intake and initiate nutrition consult as needed  - Instruct patient on self management of diabetes  Outcome: Progressing     Problem: CARDIOVASCULAR - ADULT  Goal: Absence of cardiac arrhythmias or at baseline  Description: INTERVENTIONS:  - Continuous cardiac monitoring, monitor vital signs, obtain 12 lead EKG if indicated  - Evaluate effectiveness of antiarrhythmic and heart rate control medications as ordered  - Initiate emergency measures for life threatening arrhythmias  - Monitor electrolytes and administer replacement therapy as ordered  Outcome: Progressing     Problem: METABOLIC/FLUID AND ELECTROLYTES - ADULT  Goal: Glucose maintained within prescribed range  Description: INTERVENTIONS:  - Monitor Blood Glucose as ordered  - Assess for signs and symptoms of hyperglycemia and hypoglycemia  - Administer ordered medications to maintain glucose within target range  - Assess barriers to adequate nutritional intake and initiate nutrition consult as needed  - Instruct patient on self management of diabetes  Outcome: Progressing  Goal: Hemodynamic stability and optimal renal function maintained  Description: INTERVENTIONS:  - Monitor labs and assess for signs and symptoms of volume excess or deficit  - Monitor intake, output and patient weight  - Monitor urine specific gravity, serum osmolarity and serum sodium as indicated or ordered  - Monitor response to interventions for patient's volume status, including labs, urine output, blood pressure (other measures as available)  - Encourage oral intake as appropriate  - Instruct patient on fluid and nutrition restrictions as  appropriate  Outcome: Progressing

## 2024-12-08 NOTE — PLAN OF CARE
Problem: Patient Centered Care  Goal: Patient preferences are identified and integrated in the patient's plan of care  Description: Interventions:  - What would you like us to know as we care for you? Lives with daughter but doesn't want to go back with her  - Provide timely, complete, and accurate information to patient/family  - Incorporate patient and family knowledge, values, beliefs, and cultural backgrounds into the planning and delivery of care  - Encourage patient/family to participate in care and decision-making at the level they choose  - Honor patient and family perspectives and choices  Outcome: Progressing     Problem: Diabetes/Glucose Control  Goal: Glucose maintained within prescribed range  Description: INTERVENTIONS:  - Monitor Blood Glucose as ordered  - Assess for signs and symptoms of hyperglycemia and hypoglycemia  - Administer ordered medications to maintain glucose within target range  - Assess barriers to adequate nutritional intake and initiate nutrition consult as needed  - Instruct patient on self management of diabetes  Outcome: Progressing        Problem: CARDIOVASCULAR - ADULT  Goal: Absence of cardiac arrhythmias or at baseline  Description: INTERVENTIONS:  - Continuous cardiac monitoring, monitor vital signs, obtain 12 lead EKG if indicated  - Evaluate effectiveness of antiarrhythmic and heart rate control medications as ordered  - Initiate emergency measures for life threatening arrhythmias  - Monitor electrolytes and administer replacement therapy as ordered  Outcome: Progressing     Problem: METABOLIC/FLUID AND ELECTROLYTES - ADULT  Goal: Glucose maintained within prescribed range  Description: INTERVENTIONS:  - Monitor Blood Glucose as ordered  - Assess for signs and symptoms of hyperglycemia and hypoglycemia  - Administer ordered medications to maintain glucose within target range  - Assess barriers to adequate nutritional intake and initiate nutrition consult as needed  -  Instruct patient on self management of diabetes  Outcome: Progressing  Goal: Hemodynamic stability and optimal renal function maintained  Description: INTERVENTIONS:  - Monitor labs and assess for signs and symptoms of volume excess or deficit  - Monitor intake, output and patient weight  - Monitor urine specific gravity, serum osmolarity and serum sodium as indicated or ordered  - Monitor response to interventions for patient's volume status, including labs, urine output, blood pressure (other measures as available)  - Encourage oral intake as appropriate  - Instruct patient on fluid and nutrition restrictions as appropriate  Outcome: Progressing     Problem: MUSCULOSKELETAL - ADULT  Goal: Return mobility to safest level of function  Description: INTERVENTIONS:  - Assess patient stability and activity tolerance for standing, transferring and ambulating w/ or w/o assistive devices  - Assist with transfers and ambulation using safe patient handling equipment as needed  - Ensure adequate protection for wounds/incisions during mobilization  - Obtain PT/OT consults as needed  - Advance activity as appropriate  - Communicate ordered activity level and limitations with patient/family  Outcome: Progressing     Problem: SAFETY ADULT - FALL  Goal: Free from fall injury  Description: INTERVENTIONS:  - Assess pt frequently for physical needs  - Identify cognitive and physical deficits and behaviors that affect risk of falls.  - [place] fall precautions as indicated by assessment.  - Educate pt/family on patient safety including physical limitations  - Instruct pt to call for assistance with activity based on assessment  - Modify environment to reduce risk of injury  - Provide assistive devices as appropriate  - Consider OT/PT consult to assist with strengthening/mobility  - Encourage toileting schedule  Outcome: Progressing     Problem: DISCHARGE PLANNING  Goal: Discharge to home or other facility with appropriate  resources  Description: INTERVENTIONS:  - Identify barriers to discharge w/pt and caregiver  - Include patient/family/discharge partner in discharge planning  - Arrange for needed discharge resources and transportation as appropriate  - Identify discharge learning needs (meds, wound care, etc)  - Arrange for interpreters to assist at discharge as needed  - Consider post-discharge preferences of patient/family/discharge partner  - Complete POLST form as appropriate  - Assess patient's ability to be responsible for managing their own health  - Refer to Case Management Department for coordinating discharge planning if the patient needs post-hospital services based on physician/LIP order or complex needs related to functional status, cognitive ability or social support system  Outcome: Progressing

## 2024-12-09 LAB
ANION GAP SERPL CALC-SCNC: 7 MMOL/L (ref 0–18)
BASOPHILS # BLD AUTO: 0.02 X10(3) UL (ref 0–0.2)
BASOPHILS NFR BLD AUTO: 0.3 %
BUN BLD-MCNC: 14 MG/DL (ref 9–23)
BUN/CREAT SERPL: 18.7 (ref 10–20)
CALCIUM BLD-MCNC: 9.4 MG/DL (ref 8.7–10.4)
CHLORIDE SERPL-SCNC: 105 MMOL/L (ref 98–112)
CO2 SERPL-SCNC: 28 MMOL/L (ref 21–32)
CREAT BLD-MCNC: 0.75 MG/DL
DEPRECATED RDW RBC AUTO: 40.6 FL (ref 35.1–46.3)
EGFRCR SERPLBLD CKD-EPI 2021: 84 ML/MIN/1.73M2 (ref 60–?)
EOSINOPHIL # BLD AUTO: 0.22 X10(3) UL (ref 0–0.7)
EOSINOPHIL NFR BLD AUTO: 2.9 %
ERYTHROCYTE [DISTWIDTH] IN BLOOD BY AUTOMATED COUNT: 13.2 % (ref 11–15)
GLUCOSE BLD-MCNC: 202 MG/DL (ref 70–99)
GLUCOSE BLDC GLUCOMTR-MCNC: 157 MG/DL (ref 70–99)
GLUCOSE BLDC GLUCOMTR-MCNC: 271 MG/DL (ref 70–99)
GLUCOSE BLDC GLUCOMTR-MCNC: 308 MG/DL (ref 70–99)
GLUCOSE BLDC GLUCOMTR-MCNC: 389 MG/DL (ref 70–99)
HCT VFR BLD AUTO: 31.8 %
HGB BLD-MCNC: 11 G/DL
IMM GRANULOCYTES # BLD AUTO: 0.04 X10(3) UL (ref 0–1)
IMM GRANULOCYTES NFR BLD: 0.5 %
LYMPHOCYTES # BLD AUTO: 2.01 X10(3) UL (ref 1–4)
LYMPHOCYTES NFR BLD AUTO: 26.3 %
MCH RBC QN AUTO: 29.3 PG (ref 26–34)
MCHC RBC AUTO-ENTMCNC: 34.6 G/DL (ref 31–37)
MCV RBC AUTO: 84.6 FL
MONOCYTES # BLD AUTO: 0.55 X10(3) UL (ref 0.1–1)
MONOCYTES NFR BLD AUTO: 7.2 %
NEUTROPHILS # BLD AUTO: 4.79 X10 (3) UL (ref 1.5–7.7)
NEUTROPHILS # BLD AUTO: 4.79 X10(3) UL (ref 1.5–7.7)
NEUTROPHILS NFR BLD AUTO: 62.8 %
OSMOLALITY SERPL CALC.SUM OF ELEC: 296 MOSM/KG (ref 275–295)
PLATELET # BLD AUTO: 224 10(3)UL (ref 150–450)
POTASSIUM SERPL-SCNC: 3.6 MMOL/L (ref 3.5–5.1)
RBC # BLD AUTO: 3.76 X10(6)UL
SODIUM SERPL-SCNC: 140 MMOL/L (ref 136–145)
WBC # BLD AUTO: 7.6 X10(3) UL (ref 4–11)

## 2024-12-09 PROCEDURE — 99233 SBSQ HOSP IP/OBS HIGH 50: CPT | Performed by: HOSPITALIST

## 2024-12-09 RX ORDER — CEFUROXIME AXETIL 500 MG/1
500 TABLET ORAL EVERY 12 HOURS SCHEDULED
Status: DISCONTINUED | OUTPATIENT
Start: 2024-12-09 | End: 2024-12-10

## 2024-12-09 RX ORDER — AMLODIPINE BESYLATE 5 MG/1
5 TABLET ORAL DAILY
Status: DISCONTINUED | OUTPATIENT
Start: 2024-12-09 | End: 2024-12-10

## 2024-12-09 NOTE — CONGREGATE LIVING REVIEW
[facility] Living Authorization    The [facility] Review Committee has reviewed this case and the patient IS APPROVED for discharge to a facility for Short Term Skilled once the following procedure is followed:     - The physician discharge instructions (contained within the DARREN note for SNF) must inlcude the below appropriate and approved COVID instructions to the facility    For questions regarding CLRC approval process, please contact the CM assigned to the case.  For questions regarding RN discharge workflow, please contact the unit Clinical Leader.

## 2024-12-09 NOTE — CM/SW NOTE
Prior Authorization - Destination   Destination Type: Skilled nursing facility  Service Provider: Kristen Lindsey  Payer Communication Destination Comments: [facility] [id number]  Prior Authorization Status: Submitted/Pending     Alexandria Lucio DSC

## 2024-12-09 NOTE — OCCUPATIONAL THERAPY NOTE
OCCUPATIONAL THERAPY TREATMENT NOTE - INPATIENT        Room Number: 549/549-A          Problem List  Principal Problem:    Urinary tract infection without hematuria, site unspecified  Active Problems:    Hyponatremia    Azotemia      OCCUPATIONAL THERAPY ASSESSMENT   Patient demonstrates limited progress this session, goals remain in progress.    Patient is requiring minimal assist and moderate assist as a result of the following impairments: decreased functional strength, decreased functional reach, and impaired standing and sitting balance.    Patient continues to function below baseline with  self care/ADLs .  Next session anticipate patient to progress toileting, lower body dressing, transfers, static sitting balance, dynamic sitting balance, static standing balance, dynamic standing balance, and functional standing tolerance.  Occupational Therapy will continue to follow patient for duration of hospitalization.    Patient continues to benefit from continued skilled OT services: to promote return to prior level of function and safety with continuous assistance and gradual rehabilitative therapy.     PLAN DURING HOSPITALIZATION  OT Device Recommendations: Grab bars;Transfer tub bench        SUBJECTIVE  \"I do that. I sit up and just fall over\"    OBJECTIVE  Precautions: Bed/chair alarm    WEIGHT BEARING RESTRICTION     PAIN ASSESSMENT  Ratin  Location: headache  Management Techniques: Activity promotion; Relaxation    ACTIVITY TOLERANCE  Pulse: 87  Heart Rate Source: Monitor      [id number]             O2 SATURATIONS  Oxygen Therapy  SPO2% Ambulation on Room Air: 97    ACTIVITIES OF DAILY LIVING ASSESSMENT  AM-PAC ‘6-Clicks’ Inpatient Daily Activity Short Form  How much help from another person does the patient currently need…  -   Putting on and taking off regular lower body clothing?: A Lot  -   Bathing (including washing, rinsing, drying)?: A Lot  -   Toileting, which includes using toilet, bedpan or urinal? :  A Lot  -   Putting on and taking off regular upper body clothing?: A Little  -   Taking care of personal grooming such as brushing teeth?: A Little  -   Eating meals?: None    AM-PAC Score:  Score: 16  Approx Degree of Impairment: 53.32%  Standardized Score (AM-PAC Scale): 35.96  CMS Modifier (G-Code): CK    FUNCTIONAL TRANSFER ASSESSMENT  Sit to Stand: Edge of Bed; Chair  Edge of Bed: Moderate Assist  Chair: Moderate Assist  Toilet Transfer: Moderate Assist    BED MOBILITY  Supine to Sit : Minimal Assist    BALANCE ASSESSMENT  Static Sitting: Minimal Assist (initially Min A progressing to SBA/CGA. right lateral lean)  Static Standing: Minimal Assist    FUNCTIONAL ADL ASSESSMENT  Eating: Independent  Grooming Seated: Minimal Assist  Grooming Standing: Minimal Assist (several LOB when standing at the sink to wash her hands. Cued pt to prop her elbows on sink counter or lean against counter however did not improve balance.)  Bathing Seated: Moderate Assist  LB Dressing Seated: Minimal Assist (Min A to thread brief in sitting and hike up in standing)  Toileting Standing: Minimal Assist    THERAPEUTIC EXERCISE       Skilled Therapy Provided: RN approved session. Pt received in the bed, agreeable to tx. Pt reports \"8/10\" headache. She required Min A for supine to sit. Pt with impaired static sitting balance->right lateral LOB with limited righting reaction. Worked on core stabilization exercises at EOB. Pt fatigues easily. She requires Min A to adjust her socks at EOB.  OT facilitated the following: functional transfers, functional mobility with RW, toilet transfer, toileting, grooming standing at the sink. Pt required Min to Mod A for dynamic standing activities with posterior LOB and fluctuating level of assist. She remains up in chair with all needs in reach at end of session.     EDUCATION PROVIDED     The patient's Approx Degree of Impairment: 53.32% has been calculated based on documentation in the [facility] '6 clicks'  Inpatient Daily Activity Short Form.  Research supports that patients with this level of impairment may benefit from GR.  Final disposition will be made by interdisciplinary medical team.    Patient End of Session: Up in chair;Needs met;Call light within reach;RN aware of session/findings;All patient questions and concerns addressed;Hospital anti-slip socks;Alarm set    OT Goals:     Patient will complete functional transfer with mod I  Comment: Min to Mod A    Patient will complete toileting with mod I  Comment: Min/Mod A    Patient will tolerate standing for 3 minutes in prep for adls with mod I   Comment:1 minute with Min to Mod A    Patient will complete item retrieval with mod I  Comment:ongoing          Goals  on:   Frequency: 3-5x/wk      OT Session   Self-Care Home Management: 15 minutes  Therapeutic Activity: 15 minutes

## 2024-12-09 NOTE — PROGRESS NOTES
[facility]  part of [facility]    Progress Note    Ananya Dowling Patient Status:  Inpatient    1950 MRN [id number]   Location [facility] 5SW/SE Attending Td Chaney MD   Hosp Day # 4 PCP Jermaine Monson MD     Chief Complaint:     UTI    Subjective:   Subjective:    Patient seen and examined  Endorsing a headache  Does not want to go home with her daughter.   Hyperglycemic and hypertensive    Objective:   Blood pressure (!) 171/65, pulse 77, temperature 97.7 °F (36.5 °C), temperature source Oral, resp. rate 18, height 5' 1\" (1.549 m), weight 123 lb 9.6 oz (56.1 kg), SpO2 97%.  Physical Exam    General: Patient is alert and oriented x3 does not appear to be in acute distress at this time  HEENT: EOMI PERRLA, atraumatic normocephalic  Cardiac: S1-S2 appreciated  Lungs: Good air entry bilaterally clear to auscultation  Abdomen: Soft nontender nondistended positive bowel sounds  Ext: Peripheral pulses are positive  Neuro: No focal deficits noted  Psych: Normal mood  Skin: No rashes noted  MSK: Full range of motion intact      Results:   Lab Results   Component Value Date    WBC 7.6 2024    HGB 11.0 (L) 2024    HCT 31.8 (L) 2024    .0 2024    CREATSERUM 0.75 2024    BUN 14 2024     2024    K 3.6 2024     2024    CO2 28.0 2024     (H) 2024    CA 9.4 2024    ALB 4.3 11/15/2024    ALKPHO 92 11/15/2024    BILT 0.2 11/15/2024    TP 7.8 11/15/2024    AST 15 11/15/2024    ALT 10 11/15/2024    T4F 1.0 2022    TSH 4.010 (H) 2022    MG 2.0 2024    TROPHS 10 2024    ETOH <3 2023       No results found.        Assessment & Plan:       #Recurrent falls  History of multiple system atrophy  History of orthostatic hypotension  -Midodrine if positive  -PT/OT  -social work consult: plan to DC to Home with [facility]      Azotemia  Hyponatremia  -Gentle hydration     UTI,  uncomplicated  -Previous culture with pansensitive E. Coli  -Continue ceftriaxone  -UCX ecoli - dc on Ceftin      DM: OOC  A1C 11.3  Pt had discontinue long acting insulin as OP unclear why- resume 36 U tresiba, increase SSI to MD  -Carb controlled diet  -Insulin coverage     HTN  HLD  -Home medications as appropriate        Quality:  DVT Prophylaxis: Heparin  CODE status: Full code    Global A/P  -sw to assist in dc - plan for placement - pending insurance auth  -dc fluids  -will order Amlodpine 5mg daily  -hypertensive - prn IV hydralazine.   -patient requesting a facility  -ucx finalized, continue IV rocephin while Inhouse will de-escalate   -hyperglycemic - optimize diabetic meds upon dc.  -Reviewed previous consultant notes  -Reviewed CBC, BMP, Mag, and Phos  -Reviewed tests ordered  -Repeat labs in am  -MDM: High, severe exacerbation of chronic illness posing a threat to life. IV medications requiring close inpatient monitoring.       Td Chaney MD

## 2024-12-09 NOTE — CM/SW NOTE
Per chart review, patient wishes for [facility]- [facility]. SW asked [facility] to initiate auth to [facility].     Plan: Pending medical clearance, DC to [facility], *[facility] pending, *auth     SW/CM to remain available for support and/or discharge planning.     Rosalva Lynn, MSW, LSW   x [phone]

## 2024-12-09 NOTE — PLAN OF CARE
Problem: Diabetes/Glucose Control  Goal: Glucose maintained within prescribed range  Description: INTERVENTIONS:  - Monitor Blood Glucose as ordered  - Assess for signs and symptoms of hyperglycemia and hypoglycemia  - Administer ordered medications to maintain glucose within target range  - Assess barriers to adequate nutritional intake and initiate nutrition consult as needed  - Instruct patient on self management of diabetes  Outcome: Progressing        Problem: CARDIOVASCULAR - ADULT  Goal: Absence of cardiac arrhythmias or at baseline  Description: INTERVENTIONS:  - Continuous cardiac monitoring, monitor vital signs, obtain 12 lead EKG if indicated  - Evaluate effectiveness of antiarrhythmic and heart rate control medications as ordered  - Initiate emergency measures for life threatening arrhythmias  - Monitor electrolytes and administer replacement therapy as ordered  Outcome: Progressing     Problem: METABOLIC/FLUID AND ELECTROLYTES - ADULT  Goal: Glucose maintained within prescribed range  Description: INTERVENTIONS:  - Monitor Blood Glucose as ordered  - Assess for signs and symptoms of hyperglycemia and hypoglycemia  - Administer ordered medications to maintain glucose within target range  - Assess barriers to adequate nutritional intake and initiate nutrition consult as needed  - Instruct patient on self management of diabetes  Outcome: Progressing  Goal: Hemodynamic stability and optimal renal function maintained  Description: INTERVENTIONS:  - Monitor labs and assess for signs and symptoms of volume excess or deficit  - Monitor intake, output and patient weight  - Monitor urine specific gravity, serum osmolarity and serum sodium as indicated or ordered  - Monitor response to interventions for patient's volume status, including labs, urine output, blood pressure (other measures as available)  - Encourage oral intake as appropriate  - Instruct patient on fluid and nutrition restrictions as  appropriate  Outcome: Progressing     Problem: MUSCULOSKELETAL - ADULT  Goal: Return mobility to safest level of function  Description: INTERVENTIONS:  - Assess patient stability and activity tolerance for standing, transferring and ambulating w/ or w/o assistive devices  - Assist with transfers and ambulation using safe patient handling equipment as needed  - Ensure adequate protection for wounds/incisions during mobilization  - Obtain PT/OT consults as needed  - Advance activity as appropriate  - Communicate ordered activity level and limitations with patient/family  Outcome: Progressing     Problem: SAFETY ADULT - FALL  Goal: Free from fall injury  Description: INTERVENTIONS:  - Assess pt frequently for physical needs  - Identify cognitive and physical deficits and behaviors that affect risk of falls.  - [place] fall precautions as indicated by assessment.  - Educate pt/family on patient safety including physical limitations  - Instruct pt to call for assistance with activity based on assessment  - Modify environment to reduce risk of injury  - Provide assistive devices as appropriate  - Consider OT/PT consult to assist with strengthening/mobility  - Encourage toileting schedule  Outcome: Progressing     Problem: DISCHARGE PLANNING  Goal: Discharge to home or other facility with appropriate resources  Description: INTERVENTIONS:  - Identify barriers to discharge w/pt and caregiver  - Include patient/family/discharge partner in discharge planning  - Arrange for needed discharge resources and transportation as appropriate  - Identify discharge learning needs (meds, wound care, etc)  - Arrange for interpreters to assist at discharge as needed  - Consider post-discharge preferences of patient/family/discharge partner  - Complete POLST form as appropriate  - Assess patient's ability to be responsible for managing their own health  - Refer to Case Management Department for coordinating discharge planning if the  patient needs post-hospital services based on physician/LIP order or complex needs related to functional status, cognitive ability or social support system  Outcome: Progressing

## 2024-12-10 VITALS
HEART RATE: 68 BPM | TEMPERATURE: 98 F | WEIGHT: 123.63 LBS | SYSTOLIC BLOOD PRESSURE: 115 MMHG | DIASTOLIC BLOOD PRESSURE: 49 MMHG | HEIGHT: 61 IN | OXYGEN SATURATION: 94 % | BODY MASS INDEX: 23.34 KG/M2 | RESPIRATION RATE: 18 BRPM

## 2024-12-10 LAB
ANION GAP SERPL CALC-SCNC: 4 MMOL/L (ref 0–18)
BASOPHILS # BLD AUTO: 0.03 X10(3) UL (ref 0–0.2)
BASOPHILS NFR BLD AUTO: 0.3 %
BUN BLD-MCNC: 16 MG/DL (ref 9–23)
BUN/CREAT SERPL: 21.1 (ref 10–20)
CALCIUM BLD-MCNC: 9.3 MG/DL (ref 8.7–10.4)
CHLORIDE SERPL-SCNC: 107 MMOL/L (ref 98–112)
CO2 SERPL-SCNC: 26 MMOL/L (ref 21–32)
CREAT BLD-MCNC: 0.76 MG/DL
DEPRECATED RDW RBC AUTO: 41.7 FL (ref 35.1–46.3)
EGFRCR SERPLBLD CKD-EPI 2021: 83 ML/MIN/1.73M2 (ref 60–?)
EOSINOPHIL # BLD AUTO: 0.17 X10(3) UL (ref 0–0.7)
EOSINOPHIL NFR BLD AUTO: 1.9 %
ERYTHROCYTE [DISTWIDTH] IN BLOOD BY AUTOMATED COUNT: 13.4 % (ref 11–15)
GLUCOSE BLD-MCNC: 224 MG/DL (ref 70–99)
GLUCOSE BLDC GLUCOMTR-MCNC: 133 MG/DL (ref 70–99)
GLUCOSE BLDC GLUCOMTR-MCNC: 208 MG/DL (ref 70–99)
HCT VFR BLD AUTO: 33.1 %
HGB BLD-MCNC: 11.1 G/DL
IMM GRANULOCYTES # BLD AUTO: 0.04 X10(3) UL (ref 0–1)
IMM GRANULOCYTES NFR BLD: 0.4 %
LYMPHOCYTES # BLD AUTO: 2.03 X10(3) UL (ref 1–4)
LYMPHOCYTES NFR BLD AUTO: 22.3 %
MCH RBC QN AUTO: 28.7 PG (ref 26–34)
MCHC RBC AUTO-ENTMCNC: 33.5 G/DL (ref 31–37)
MCV RBC AUTO: 85.5 FL
MONOCYTES # BLD AUTO: 0.53 X10(3) UL (ref 0.1–1)
MONOCYTES NFR BLD AUTO: 5.8 %
NEUTROPHILS # BLD AUTO: 6.32 X10 (3) UL (ref 1.5–7.7)
NEUTROPHILS # BLD AUTO: 6.32 X10(3) UL (ref 1.5–7.7)
NEUTROPHILS NFR BLD AUTO: 69.3 %
OSMOLALITY SERPL CALC.SUM OF ELEC: 292 MOSM/KG (ref 275–295)
PLATELET # BLD AUTO: 219 10(3)UL (ref 150–450)
POTASSIUM SERPL-SCNC: 4.2 MMOL/L (ref 3.5–5.1)
RBC # BLD AUTO: 3.87 X10(6)UL
SODIUM SERPL-SCNC: 137 MMOL/L (ref 136–145)
WBC # BLD AUTO: 9.1 X10(3) UL (ref 4–11)

## 2024-12-10 PROCEDURE — 99239 HOSP IP/OBS DSCHRG MGMT >30: CPT | Performed by: HOSPITALIST

## 2024-12-10 RX ORDER — INSULIN DEGLUDEC 100 U/ML
45 INJECTION, SOLUTION SUBCUTANEOUS DAILY
Status: DISCONTINUED | OUTPATIENT
Start: 2024-12-11 | End: 2024-12-10

## 2024-12-10 RX ORDER — INSULIN DEGLUDEC 100 U/ML
45 INJECTION, SOLUTION SUBCUTANEOUS DAILY
Qty: 10 ML | Refills: 0 | Status: SHIPPED | OUTPATIENT
Start: 2024-12-11

## 2024-12-10 RX ORDER — INSULIN DEGLUDEC 100 U/ML
10 INJECTION, SOLUTION SUBCUTANEOUS ONCE
Status: COMPLETED | OUTPATIENT
Start: 2024-12-10 | End: 2024-12-10

## 2024-12-10 NOTE — PHYSICAL THERAPY NOTE
PHYSICAL THERAPY TREATMENT NOTE - INPATIENT     Room Number: 549/549-A       Presenting Problem: fall  Co-Morbidities : depression, DM, HTN, HLD, arthritis, multiple system atrophy    Problem List  Principal Problem:    Urinary tract infection without hematuria, site unspecified  Active Problems:    Hyponatremia    Azotemia    Observed bruising on face and bandage on left knee denies pain at rest when asked more specifically stating pain both these areas appears well controlled.     PHYSICAL THERAPY ASSESSMENT   Patient demonstrates steady progress this session, goals  remain in progress.      Patient is requiring minimal assist as a result of the following impairments: decreased functional strength, decreased endurance/aerobic capacity, pain, impaired standing  balance, decreased muscular endurance, and medical status.     Patient continues to function below baseline with bed mobility, transfers, gait, stair negotiation, standing prolonged periods, and performing household tasks.  Next session anticipate patient to progress bed mobility, transfers, gait, stair negotiation, standing prolonged periods, and performing household tasks.  Physical Therapy will continue to follow patient for duration of hospitalization.    Patient continues to benefit from continued skilled PT services: to promote return to prior level of function and safety with continuous assistance and gradual rehabilitative therapy .    PLAN DURING HOSPITALIZATION  Nursing Mobility Recommendation : 1 Assist  PT Device Recommendation: Rolling walker  PT Treatment Plan: Bed mobility;Body mechanics;Endurance;Energy conservation;Patient education;Family education;Gait training;Balance training;Transfer training;Stair training;Strengthening;Range of motion  Frequency (Obs): 3-5x/week     SUBJECTIVE  'I have not eaten yet , I do not know what is happening today \" in repsonse to DC planning   (Pt requiring encouragement for participation today )    Pt  reports goal for DC to rehab facility --\"My daughter said I can not come home until I can do more and am stronger \"     OBJECTIVE  Precautions: Bed/chair alarm    WEIGHT BEARING RESTRICTION       PAIN ASSESSMENT   Ratin  Location: no complains       BALANCE  Static Sitting: Good  Dynamic Sitting: Fair  Static Standing: Poor +  Dynamic Standing: Poor +    ACTIVITY TOLERANCE  Pulse: 68 (resting 74  activity 88)        BP: 111/45 (resting  158/62  post actiivty varaible   166/116  reasses 189/79)              O2 WALK  Oxygen Therapy  SPO2% on Room Air at Rest: 98  SPO2% Ambulation on Room Air: 96    AM-PAC '6-Clicks' INPATIENT SHORT FORM - BASIC MOBILITY  How much difficulty does the patient currently have...  Patient Difficulty: Turning over in bed (including adjusting bedclothes, sheets and blankets)?: A Little   Patient Difficulty: Sitting down on and standing up from a chair with arms (e.g., wheelchair, bedside commode, etc.): A Little   Patient Difficulty: Moving from lying on back to sitting on the side of the bed?: A Little   How much help from another person does the patient currently need...   Help from Another: Moving to and from a bed to a chair (including a wheelchair)?: A Little   Help from Another: Need to walk in hospital room?: A Little   Help from Another: Climbing 3-5 steps with a railing?: A Lot     AM-PAC Score:  Raw Score: 17   Approx Degree of Impairment: 50.57%   Standardized Score (AM-PAC Scale): 42.13   CMS Modifier (G-Code): CK    FUNCTIONAL ABILITY STATUS  Functional Mobility/Gait Assessment  Gait Assistance: Minimum assistance  Distance (ft): 10 ft x 1   20 ft x 1   tolieting activity in bathoom    offered further ambulation , pt declining  Assistive Device: Rolling walker  Pattern: Shuffle  Rolling: contact guard assist  Supine to Sit: minimal assist    Sit to Stand: minimal assist   Pt after tolieting able to complete own hygiene care sitting on toliet. Pt requiring assist for mobility  off toliet and min support overall due to decrease balance for ADL at sink.   Cues provided for safety throughout mobility     Skilled Therapy Provided: Pt education , B AP , fxn mobility as above , tolieting activity and DC Planning.     The patient's Approx Degree of Impairment: 50.57% has been calculated based on documentation in the [facility] '6 clicks' Inpatient Daily Activity Short Form.  Research supports that patients with this level of impairment may benefit from LARA .  Final disposition will be made by interdisciplinary medical team.      Patient End of Session: Up in chair;Needs met;Call light within reach;RN aware of session/findings;All patient questions and concerns addressed;Hospital anti-slip socks;Alarm set    CURRENT GOALS   CURRENT GOALS   Goals to be met by: 12/19/24  Patient Goal Patient's self-stated goal is: to return home   Goal #1 Patient is able to demonstrate supine - sit EOB @ level: modified independent      Goal #1   Current Status  in progress    Goal #2 Patient is able to demonstrate transfers Sit to/from Stand at assistance level: modified independent with least restrictive device      Goal #2  Current Status  in progress    Goal #3 Patient is able to ambulate >100 feet with assist device: least restrictive device at assistance level: modified independent   Goal #3   Current Status  in progress    Goal #4 Patient will negotiate 6 stairs/one curb w/ assistive device and supervision   Goal #4   Current Status  not met     Goal #5 Patient to demonstrate independence with home activity/exercise instructions provided to patient in preparation for discharge.   Goal #5   Current Status  in progress    Goal #6     Goal #6  Current Status     Therapy activity 2 units

## 2024-12-10 NOTE — PROGRESS NOTES
[facility]  part of [facility]    Progress Note    Ananya Dowling Patient Status:  Inpatient    1950 MRN [id number]   Location [facility] 5SW/SE Attending Td Chaney MD   Hosp Day # 5 PCP Jermaine Monson MD     Chief Complaint:     UTI    Subjective:   Subjective:    Patient seen and examined  Endorsing a headache  Does not want to go home with her daughter.   Labile blood pressures    Objective:   Blood pressure 111/45, pulse 68, temperature 97.5 °F (36.4 °C), temperature source Oral, resp. rate 18, height 5' 1\" (1.549 m), weight 123 lb 9.6 oz (56.1 kg), SpO2 94%.  Physical Exam    General: Patient is alert and oriented x3 does not appear to be in acute distress at this time  HEENT: EOMI PERRLA, atraumatic normocephalic  Cardiac: S1-S2 appreciated  Lungs: Good air entry bilaterally clear to auscultation  Abdomen: Soft nontender nondistended positive bowel sounds  Ext: Peripheral pulses are positive  Neuro: No focal deficits noted  Psych: Normal mood  Skin: No rashes noted  MSK: Full range of motion intact      Results:   Lab Results   Component Value Date    WBC 9.1 12/10/2024    HGB 11.1 (L) 12/10/2024    HCT 33.1 (L) 12/10/2024    .0 12/10/2024    CREATSERUM 0.76 12/10/2024    BUN 16 12/10/2024     12/10/2024    K 4.2 12/10/2024     12/10/2024    CO2 26.0 12/10/2024     (H) 12/10/2024    CA 9.3 12/10/2024    ALB 4.3 11/15/2024    ALKPHO 92 11/15/2024    BILT 0.2 11/15/2024    TP 7.8 11/15/2024    AST 15 11/15/2024    ALT 10 11/15/2024    T4F 1.0 2022    TSH 4.010 (H) 2022    MG 2.0 2024    TROPHS 10 2024    ETOH <3 2023       No results found.        Assessment & Plan:       #Recurrent falls  History of multiple system atrophy  History of orthostatic hypotension  -Midodrine if positive  -PT/OT  -social work consult: plan to DC to Home with [facility]      Azotemia  Hyponatremia  -Gentle hydration     UTI,  uncomplicated  -Previous culture with pansensitive E. Coli  -Continue ceftriaxone  -UCX ecoli - dc on Ceftin      DM: OOC  A1C 11.3  Pt had discontinue long acting insulin as OP unclear why- resume 36 U tresiba, increase SSI to MD  -Carb controlled diet  -Insulin coverage     HTN  HLD  -Home medications as appropriate        Quality:  DVT Prophylaxis: Heparin  CODE status: Full code    Global A/P  -sw to assist in dc - plan for placement - pending insurance auth  -dc fluids  -Hyperglycemic - increased Long acting to 45 Units qAM, added extra 10 U today am.   -now patient is hypotensive - hold amlodipine.  -continue midodrine per protocol  -patient requesting a facility  -ucx finalized, continue IV rocephin while Inhouse will de-escalate   -hyperglycemic - optimize diabetic meds upon dc.  -Reviewed previous consultant notes  -Reviewed CBC, BMP, Mag, and Phos  -Reviewed tests ordered  -Repeat labs in am  -MDM: High, severe exacerbation of chronic illness posing a threat to life. IV medications requiring close inpatient monitoring.       Td Chaney MD

## 2024-12-10 NOTE — PLAN OF CARE
Problem: Patient Centered Care  Goal: Patient preferences are identified and integrated in the patient's plan of care  Description: Interventions:  - What would you like us to know as we care for you? From home with daughter  Problem: Diabetes/Glucose Control  Goal: Glucose maintained within prescribed range  Description: INTERVENTIONS:  - Monitor Blood Glucose as ordered  - Assess for signs and symptoms of hyperglycemia and hypoglycemia  - Administer ordered medications to maintain glucose within target range  - Assess barriers to adequate nutritional intake and initiate nutrition consult as needed  - Instruct patient on self management of diabetes  Outcome: Progressing     Problem: CARDIOVASCULAR - ADULT  Goal: Absence of cardiac arrhythmias or at baseline  Description: INTERVENTIONS:  - Continuous cardiac monitoring, monitor vital signs, obtain 12 lead EKG if indicated  - Evaluate effectiveness of antiarrhythmic and heart rate control medications as ordered  - Initiate emergency measures for life threatening arrhythmias  - Monitor electrolytes and administer replacement therapy as ordered  Outcome: Progressing     Problem: METABOLIC/FLUID AND ELECTROLYTES - ADULT  Goal: Glucose maintained within prescribed range  Description: INTERVENTIONS:  - Monitor Blood Glucose as ordered  - Assess for signs and symptoms of hyperglycemia and hypoglycemia  - Administer ordered medications to maintain glucose within target range  - Assess barriers to adequate nutritional intake and initiate nutrition consult as needed  - Instruct patient on self management of diabetes  Outcome: Progressing     Problem: SAFETY ADULT - FALL  Goal: Free from fall injury  Description: INTERVENTIONS:  - Assess pt frequently for physical needs  - Identify cognitive and physical deficits and behaviors that affect risk of falls.  - [place] fall precautions as indicated by assessment.  - Educate pt/family on patient safety including physical  limitations  - Instruct pt to call for assistance with activity based on assessment  - Modify environment to reduce risk of injury  - Provide assistive devices as appropriate  - Consider OT/PT consult to assist with strengthening/mobility  - Encourage toileting schedule  Outcome: Progressing     Problem: DISCHARGE PLANNING  Goal: Discharge to home or other facility with appropriate resources  Description: INTERVENTIONS:  - Identify barriers to discharge w/pt and caregiver  - Include patient/family/discharge partner in discharge planning  - Arrange for needed discharge resources and transportation as appropriate  - Identify discharge learning needs (meds, wound care, etc)  - Arrange for interpreters to assist at discharge as needed  - Consider post-discharge preferences of patient/family/discharge partner  - Complete POLST form as appropriate  - Assess patient's ability to be responsible for managing their own health  - Refer to Case Management Department for coordinating discharge planning if the patient needs post-hospital services based on physician/LIP order or complex needs related to functional status, cognitive ability or social support system  Outcome: Progressing     - Provide timely, complete, and accurate information to patient/family  - Incorporate patient and family knowledge, values, beliefs, and cultural backgrounds into the planning and delivery of care  - Encourage patient/family to participate in care and decision-making at the level they choose  - Honor patient and family perspectives and choices  Outcome: Progressing

## 2024-12-10 NOTE — PLAN OF CARE
Problem: Patient Centered Care  Goal: Patient preferences are identified and integrated in the patient's plan of care  Description: Interventions:  - What would you like us to know as we care for you?   - Provide timely, complete, and accurate information to patient/family  - Incorporate patient and family knowledge, values, beliefs, and cultural backgrounds into the planning and delivery of care  - Encourage patient/family to participate in care and decision-making at the level they choose  - Honor patient and family perspectives and choices  Outcome: Progressing     Problem: Diabetes/Glucose Control  Goal: Glucose maintained within prescribed range  Description: INTERVENTIONS:  - Monitor Blood Glucose as ordered  - Assess for signs and symptoms of hyperglycemia and hypoglycemia  - Administer ordered medications to maintain glucose within target range  - Assess barriers to adequate nutritional intake and initiate nutrition consult as needed  - Instruct patient on self management of diabetes  Outcome: Progressing       Problem: CARDIOVASCULAR - ADULT  Goal: Absence of cardiac arrhythmias or at baseline  Description: INTERVENTIONS:  - Continuous cardiac monitoring, monitor vital signs, obtain 12 lead EKG if indicated  - Evaluate effectiveness of antiarrhythmic and heart rate control medications as ordered  - Initiate emergency measures for life threatening arrhythmias  - Monitor electrolytes and administer replacement therapy as ordered  Outcome: Progressing     Problem: METABOLIC/FLUID AND ELECTROLYTES - ADULT  Goal: Glucose maintained within prescribed range  Description: INTERVENTIONS:  - Monitor Blood Glucose as ordered  - Assess for signs and symptoms of hyperglycemia and hypoglycemia  - Administer ordered medications to maintain glucose within target range  - Assess barriers to adequate nutritional intake and initiate nutrition consult as needed  - Instruct patient on self management of diabetes  Outcome:  Progressing  Goal: Hemodynamic stability and optimal renal function maintained  Description: INTERVENTIONS:  - Monitor labs and assess for signs and symptoms of volume excess or deficit  - Monitor intake, output and patient weight  - Monitor urine specific gravity, serum osmolarity and serum sodium as indicated or ordered  - Monitor response to interventions for patient's volume status, including labs, urine output, blood pressure (other measures as available)  - Encourage oral intake as appropriate  - Instruct patient on fluid and nutrition restrictions as appropriate  Outcome: Progressing     Problem: MUSCULOSKELETAL - ADULT  Goal: Return mobility to safest level of function  Description: INTERVENTIONS:  - Assess patient stability and activity tolerance for standing, transferring and ambulating w/ or w/o assistive devices  - Assist with transfers and ambulation using safe patient handling equipment as needed  - Ensure adequate protection for wounds/incisions during mobilization  - Obtain PT/OT consults as needed  - Advance activity as appropriate  - Communicate ordered activity level and limitations with patient/family  Outcome: Progressing     Problem: SAFETY ADULT - FALL  Goal: Free from fall injury  Description: INTERVENTIONS:  - Assess pt frequently for physical needs  - Identify cognitive and physical deficits and behaviors that affect risk of falls.  - [place] fall precautions as indicated by assessment.  - Educate pt/family on patient safety including physical limitations  - Instruct pt to call for assistance with activity based on assessment  - Modify environment to reduce risk of injury  - Provide assistive devices as appropriate  - Consider OT/PT consult to assist with strengthening/mobility  - Encourage toileting schedule  Outcome: Progressing     Problem: DISCHARGE PLANNING  Goal: Discharge to home or other facility with appropriate resources  Description: INTERVENTIONS:  - Identify barriers to  discharge w/pt and caregiver  - Include patient/family/discharge partner in discharge planning  - Arrange for needed discharge resources and transportation as appropriate  - Identify discharge learning needs (meds, wound care, etc)  - Arrange for interpreters to assist at discharge as needed  - Consider post-discharge preferences of patient/family/discharge partner  - Complete POLST form as appropriate  - Assess patient's ability to be responsible for managing their own health  - Refer to Case Management Department for coordinating discharge planning if the patient needs post-hospital services based on physician/LIP order or complex needs related to functional status, cognitive ability or social support system  Outcome: Progressing

## 2024-12-10 NOTE — DISCHARGE SUMMARY
[facility]  part of [facility]     Discharge Summary    Ananya Dowling Patient Status:  Inpatient    1950 MRN [id number]   Location [facility] 5SW/SE Attending Td Chaney MD   Hosp Day # 5 PCP Jermaine Monson MD     Date of Admission: 2024    Date of Discharge: 12/10/2024    Admitting Diagnosis: Urinary tract infection without hematuria, site unspecified [N39.0]    Discharge Diagnosis:   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, with long-term current use of insulin (HCC)    Chronic left-sided low back pain without sciatica    Mild recurrent major depression (HCC)    Hyperglycemia    Leukocytosis, unspecified type    Urinary tract infection without hematuria, site unspecified    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HCC)    Sepsis (HCC)    Sepsis due to urinary tract infection (HCC)    Hyponatremia    Azotemia       Reason for Admission:     UTI    Physical Exam:     General: Patient is alert and oriented x3 does not appear to be in acute distress at this time  HEENT: EOMI PERRLA, atraumatic normocephalic  Cardiac: S1-S2 appreciated  Lungs: Good air entry bilaterally clear to auscultation  Abdomen: Soft nontender nondistended positive bowel sounds  Ext: Peripheral pulses are positive  Neuro: No focal deficits noted  Psych: Normal mood  Skin: No rashes noted  MSK: Full range of motion intact      Hospital Course:     #Recurrent falls  History of multiple system atrophy  History of orthostatic hypotension  -Midodrine if positive  -PT/OT  -social work consult: plan to DC to Home with [facility]      Azotemia  Hyponatremia  -Gentle hydration     UTI, uncomplicated  -Previous culture with pansensitive E. Coli  -Continue ceftriaxone  -UCX ecoli - dc on  Ceftin      DM: OOC  A1C 11.3  Pt had discontinue long acting insulin as OP unclear why- resume 36 U tresiba, increase SSI to MD  -Carb controlled diet  -Insulin coverage     HTN  HLD  -Home medications as appropriate        Quality:  DVT Prophylaxis: Heparin  CODE status: Full code     Global A/P  -sw to assist in dc - plan for placement - pending insurance auth  -dc fluids  -Hyperglycemic - increased Long acting to 45 Units qAM, added extra 10 U today am    History of Present Illness:     Per admitting:   Ananya Dowling is a 74 year old female with history of depression, DM, HTN, HLD, arthritis, multiple system atrophy who presented to the ED following a fall.  Fall was witnessed by daughter.  Episode occurred 1 hour PTA.  She reportedly lost balance and fell hitting her face on kitchen counter and landing on her knees.  There was brief LOC.  Family reported that this was a 6th fall in the last week.  She appears to have more falls when she has a UTI.  Denies any other urinary symptoms.  No dizziness, chest pain, palpitations.  Per chart review, she has also had episodes of orthostatic hypotension, placed on trial of midodrine.    Disposition: Home or Self Care    Discharge Condition: Fair    Discharge Medications:   Current Discharge Medication List        START taking these medications    Details   cefuroxime 500 MG Oral Tab Take 1 tablet (500 mg total) by mouth 2 (two) times daily for 5 days.  Qty: 10 tablet, Refills: 0           CONTINUE these medications which have CHANGED    Details   insulin degludec 100 units/mL Subcutaneous Solution Pen-injector Inject 45 Units into the skin daily.  Qty: 10 mL, Refills: 0           CONTINUE these medications which have NOT CHANGED    Details   acetaminophen (TYLENOL) 325 MG Oral Tab Take 2 tablets (650 mg total) by mouth every 6 (six) hours as needed.  Qty: 30 tablet, Refills: 0      midodrine 5 MG Oral Tab Take 1 tablet (5 mg total) by mouth in the morning and 1 tablet  (5 mg total) at noon and 1 tablet (5 mg total) in the evening.  Qty: 90 tablet, Refills: 1      simvastatin 20 MG Oral Tab Take 1 tablet (20 mg total) by mouth daily.  Qty: 90 tablet, Refills: 1      ergocalciferol 1.25 MG (70882 UT) Oral Cap Take 1 capsule (50,000 Units total) by mouth once a week.  Qty: 12 capsule, Refills: 0    Associated Diagnoses: Vitamin D deficiency      sertraline 100 MG Oral Tab Take 1 tablet (100 mg total) by mouth daily.  Qty: 90 tablet, Refills: 0      methenamine 1 g Oral Tab Take 1 tablet (1 g total) by mouth 2 (two) times daily.  Qty: 60 tablet, Refills: 5    Associated Diagnoses: Recurrent UTI      gabapentin 300 MG Oral Cap Take 1 capsule (300 mg total) by mouth 3 (three) times daily.  Qty: 90 capsule, Refills: 3      estradiol (ESTRACE) 0.1 MG/GM Vaginal Cream Apply fingertip amount of cream to the vagina every night for 1 week and then every other night indefinitely  Qty: 42.5 g, Refills: 11    Associated Diagnoses: Genitourinary syndrome of menopause; Recurrent UTI      Insulin Lispro, 1 Unit Dial, 100 UNIT/ML Subcutaneous Solution Pen-injector Patient will take 14 units with small carb meals and 18-20 units with larger carb meals.  Qty: 54 mL, Refills: 1    Associated Diagnoses: Type 2 diabetes mellitus with hyperglycemia, with long-term current use of insulin ([facility])      losartan 50 MG Oral Tab Take 1 tablet (50 mg total) by mouth daily.      lidocaine 5 % External Patch Place 1 patch onto the skin daily.  Qty: 14 patch, Refills: 0      clotrimazole 1 % External Cream Apply 1 Application topically 2 (two) times daily.  Qty: 60 g, Refills: 1      Accu-Chek FastClix Lancets Does not apply Misc Use to check blood sugars 3 times daily.  Qty: 300 each, Refills: 1    Associated Diagnoses: Type 2 diabetes mellitus with hyperglycemia, with long-term current use of insulin ([facility])      Blood Glucose Monitoring Suppl (ACCU-CHEK GUIDE) w/Device Does not apply Kit Use to check blood sugars 3  times daily.  Qty: 1 kit, Refills: 0    Associated Diagnoses: Type 2 diabetes mellitus with hyperglycemia, with long-term current use of insulin ([facility])      Glucose Blood (ACCU-CHEK GUIDE) In Vitro Strip Use to check blood sugars 3 times daily.  Qty: 300 strip, Refills: 1    Associated Diagnoses: Type 2 diabetes mellitus with hyperglycemia, with long-term current use of insulin (HCC)      lidocaine 4 % External Patch Place 1 patch onto the skin daily.      Insulin Pen Needle (PEN NEEDLES) 32G X 4 MM Does not apply Misc 1 pen  4 (four) times daily. Use  New pen needle  With each injection  Qty: 400 each, Refills: 0           STOP taking these medications       cephALEXin 500 MG Oral Cap              Total dc time > 30 min    Td Chaney MD  12/10/2024  10:15 AM     Hospital Discharge Diagnoses:  UTI    Lace+ Score: 70  59-90 High Risk  29-58 Medium Risk  0-28   Low Risk.    TCM Follow-Up Recommendation:  LACE > 58: High Risk of readmission after discharge from the hospital.

## 2024-12-10 NOTE — CM/SW NOTE
12/10/24 0950   Discharge disposition   Expected discharge disposition subacute   Post Acute Care Provider [place] [facility]   Discharge transportation [facility]     Pt discussed in RN DC Rounds. SW was informed that auth was approved. Patient has a bed avail for E today. E is requesting for 2pm. Pt requires an ambulance according to the RN due to being a max assist. WAQAS called and ordered an Ambulance from [facility] to transport pt to [facility]  at 2:00 PM.  PCS flow sheet completed. RN to attached to AVS and print out. Patient/family notified transport is not covered by insurance. Pt/family are agreeable to the charges- 40.00.   RN to call report to [facility] at [phone].         12/10/24 0950   Discharge disposition   Expected discharge disposition subacute   Post Acute Care Provider [facility]   Discharge transportation [facility] ALAN received an update in regards to daughter wanting to change facilities. Daughter was called and informed SW that patient owes money to [facility]. Daughter doesn't want patient to go to there. Daughter wishes for patient to go to [facility]. WAQAS informed daughter that patient is alert and oriented, and insurance auth has been approved. SW updated daughter SW is unsure if things can be done due to insurance and bed avail at [facility]. WAQAS called [facility] to see if they have a bed today and if they can accept patient. Yodit confirmed that they do, and able to accept patient today. Theresa is aware of PAC choice being changed from [facility]. WAQAS called and updated [facility] to switch the time and location. WAQAS spoke with Kashif- Time was switched to 4pm and location was flipped from E to [facility].      Daughter and patient updated of [facility].  DSC requested to changed PAC choice from [facility] to [facility].     RN to call report to [facility] at [phone].      SW/SAMMY to remain available for support and/or discharge planning.     Rosalva Lynn, MSW, LSW   x  [phone]

## 2024-12-10 NOTE — PLAN OF CARE
Problem: Patient Centered Care  Goal: Patient preferences are identified and integrated in the patient's plan of care  Description: Interventions:  - What would you like us to know as we care for you?   - Provide timely, complete, and accurate information to patient/family  - Incorporate patient and family knowledge, values, beliefs, and cultural backgrounds into the planning and delivery of care  - Encourage patient/family to participate in care and decision-making at the level they choose  - Honor patient and family perspectives and choices  Outcome: Adequate for Discharge     Problem: Diabetes/Glucose Control  Goal: Glucose maintained within prescribed range  Description: INTERVENTIONS:  - Monitor Blood Glucose as ordered  - Assess for signs and symptoms of hyperglycemia and hypoglycemia  - Administer ordered medications to maintain glucose within target range  - Assess barriers to adequate nutritional intake and initiate nutrition consult as needed  - Instruct patient on self management of diabetes  Outcome: Adequate for Discharge          Problem: CARDIOVASCULAR - ADULT  Goal: Absence of cardiac arrhythmias or at baseline  Description: INTERVENTIONS:  - Continuous cardiac monitoring, monitor vital signs, obtain 12 lead EKG if indicated  - Evaluate effectiveness of antiarrhythmic and heart rate control medications as ordered  - Initiate emergency measures for life threatening arrhythmias  - Monitor electrolytes and administer replacement therapy as ordered  Outcome: Adequate for Discharge     Problem: METABOLIC/FLUID AND ELECTROLYTES - ADULT  Goal: Glucose maintained within prescribed range  Description: INTERVENTIONS:  - Monitor Blood Glucose as ordered  - Assess for signs and symptoms of hyperglycemia and hypoglycemia  - Administer ordered medications to maintain glucose within target range  - Assess barriers to adequate nutritional intake and initiate nutrition consult as needed  - Instruct patient on self  management of diabetes  Outcome: Adequate for Discharge  Goal: Hemodynamic stability and optimal renal function maintained  Description: INTERVENTIONS:  - Monitor labs and assess for signs and symptoms of volume excess or deficit  - Monitor intake, output and patient weight  - Monitor urine specific gravity, serum osmolarity and serum sodium as indicated or ordered  - Monitor response to interventions for patient's volume status, including labs, urine output, blood pressure (other measures as available)  - Encourage oral intake as appropriate  - Instruct patient on fluid and nutrition restrictions as appropriate  Outcome: Adequate for Discharge     Problem: MUSCULOSKELETAL - ADULT  Goal: Return mobility to safest level of function  Description: INTERVENTIONS:  - Assess patient stability and activity tolerance for standing, transferring and ambulating w/ or w/o assistive devices  - Assist with transfers and ambulation using safe patient handling equipment as needed  - Ensure adequate protection for wounds/incisions during mobilization  - Obtain PT/OT consults as needed  - Advance activity as appropriate  - Communicate ordered activity level and limitations with patient/family  Outcome: Adequate for Discharge     Problem: SAFETY ADULT - FALL  Goal: Free from fall injury  Description: INTERVENTIONS:  - Assess pt frequently for physical needs  - Identify cognitive and physical deficits and behaviors that affect risk of falls.  - [place] fall precautions as indicated by assessment.  - Educate pt/family on patient safety including physical limitations  - Instruct pt to call for assistance with activity based on assessment  - Modify environment to reduce risk of injury  - Provide assistive devices as appropriate  - Consider OT/PT consult to assist with strengthening/mobility  - Encourage toileting schedule  Outcome: Adequate for Discharge     Problem: DISCHARGE PLANNING  Goal: Discharge to home or other facility with  appropriate resources  Description: INTERVENTIONS:  - Identify barriers to discharge w/pt and caregiver  - Include patient/family/discharge partner in discharge planning  - Arrange for needed discharge resources and transportation as appropriate  - Identify discharge learning needs (meds, wound care, etc)  - Arrange for interpreters to assist at discharge as needed  - Consider post-discharge preferences of patient/family/discharge partner  - Complete POLST form as appropriate  - Assess patient's ability to be responsible for managing their own health  - Refer to Case Management Department for coordinating discharge planning if the patient needs post-hospital services based on physician/LIP order or complex needs related to functional status, cognitive ability or social support system  Outcome: Adequate for Discharge       IV removed. I called receiving RN to give her report, answered all questions. Patient left via [facility] to [facility].

## 2024-12-16 ENCOUNTER — INITIAL APN SNF VISIT (OUTPATIENT)
Dept: INTERNAL MEDICINE CLINIC | Facility: SKILLED NURSING FACILITY | Age: 74
End: 2024-12-16

## 2024-12-16 DIAGNOSIS — Z79.4 CONTROLLED TYPE 2 DIABETES MELLITUS WITH OTHER SPECIFIED COMPLICATION, WITH LONG-TERM CURRENT USE OF INSULIN (HCC): ICD-10-CM

## 2024-12-16 DIAGNOSIS — N39.0 URINARY TRACT INFECTION WITHOUT HEMATURIA, SITE UNSPECIFIED: ICD-10-CM

## 2024-12-16 DIAGNOSIS — N30.00 ACUTE CYSTITIS WITHOUT HEMATURIA: ICD-10-CM

## 2024-12-16 DIAGNOSIS — I10 PRIMARY HYPERTENSION: ICD-10-CM

## 2024-12-16 DIAGNOSIS — Z79.899 ENCOUNTER FOR MEDICATION REVIEW: ICD-10-CM

## 2024-12-16 DIAGNOSIS — E87.1 HYPONATREMIA: Primary | ICD-10-CM

## 2024-12-16 DIAGNOSIS — E11.69 CONTROLLED TYPE 2 DIABETES MELLITUS WITH OTHER SPECIFIED COMPLICATION, WITH LONG-TERM CURRENT USE OF INSULIN (HCC): ICD-10-CM

## 2024-12-16 DIAGNOSIS — R79.89 AZOTEMIA: ICD-10-CM

## 2024-12-16 DIAGNOSIS — I95.1 ORTHOSTATIC HYPOTENSION: ICD-10-CM

## 2024-12-16 NOTE — PROGRESS NOTES
HPI: Ananya Dowling  : 1950  Age 74 year old  female patient is admitted to [facility] for LARA    Reason for visit: Initial APRN assessment and f/u UTI, frequent falls, DM, hypotension, physical deconditioning     EM: -12/10  [facility]: 12/10-present     This is a 73 yo female w/ past medical hx significant for DM, orthostatic hypotension,  depression, visual impairment; who presented to ED after a witnessed fall at home.  Per daughter pt w/ 6 falls over a week. Noted w/ positive UA.  UCx + ecoli.  She was started on ceftriaxone and discharged to complete Ceftin x 5 days.  Hgb A1c was elevated at 11.3.  It was noted that pt stopped taking long acting insulin at home and it was restarted.   Amlodipine was stopped due to hypotension. Once medically stable pt was transferred to [facility] for LARA.     Pt seen in a WC, in no acute distress.  Sts she is feelink ok.  Participating w/ therapies.  Sts she is able to ambulate w/ assist.  Denies nausea, vomiting, constipation or diarrhea.  Denies dysuria.  Sts she lives w/ family at home and has help during the day if needed.      Past Medical History:    Arthritis    Back problem    Depression    Diabetes (HCC)    Essential hypertension    High blood pressure    High cholesterol    Visual impairment     No past surgical history on file.  Family History   Problem Relation Age of Onset    Heart Attack Father     Stroke Mother     Heart Disorder Son     Diabetes Son      Social History     Socioeconomic History    Marital status:    Tobacco Use    Smoking status: Never    Smokeless tobacco: Never   Vaping Use    Vaping status: Never Used   Substance and Sexual Activity    Alcohol use: Yes     Comment: very infrequent, every couple of months    Drug use: Never     Social Drivers of Health     Financial Resource Strain: Low Risk  (2024)    Financial Resource Strain     Difficulty of Paying Living Expenses: Not very hard     Med  Affordability: No   Food Insecurity: No Food Insecurity (12/5/2024)    Food Insecurity     Food Insecurity: Never true   Transportation Needs: No Transportation Needs (12/5/2024)    Transportation Needs     Lack of Transportation: No   Physical Activity: Inactive (2/16/2023)    Exercise Vital Sign     Days of Exercise per Week: 0 days     Minutes of Exercise per Session: 0 min   Stress: No Stress Concern Present (2/16/2023)    Stress     Feeling of Stress : No   Social Connections: Socially Integrated (2/16/2023)    Social Connections     Frequency of Socialization with Friends and Family: 3   Housing Stability: Low Risk  (12/5/2024)    Housing Stability     Housing Instability: No       ALLERGIES:  Allergies[1]    CODE STATUS:  Full Code    CURRENT MEDICATIONS: Reviewed on SNF EMR     SUBJECTIVE/ROS:  GENERAL HEALTH: feels well  RESPIRATORY: denies shortness of breath, wheezing or cough   CARDIOVASCULAR:denies chest pain, no palpitations   GI: denies nausea, vomiting, constipation, diarrhea; no rectal bleeding; no heartburn  MUSCULOSKELETAL:no joint complaints upper or lower extremities  NEURO:denies seizures  PSYCHE: + depression  HEMATOLOGY:denies excessive bleeding  ENDOCRINE: denies excessive thirst or urination; denies unexpected wt gain or wt loss  SKIN: denies any unusual skin lesions or rashes      OBJECTIVE:  VITALS: Reviewed   LABS/Imaging: Reviewed. CBC, CMP 12/17  PHYSICAL EXAM:  GENERAL HEALTH: well developed, well nourished, in no apparent distress  HEENT: atraumatic/normocephalic, mucous membranes pink and moist, EOMI, sclera anicteric, conjunctiva normal.   RESPIRATORY:clear   CARDIOVASCULAR: regular   ABDOMEN:  normal active BS+, soft, non distended, nontender   LYMPHATIC:no lymphedema  MUSCULOSKELETAL: no acute synovitis upper or lower extremity  EXTREMITIES/VASCULAR:no cyanosis, clubbing or edema  LINES, TUBES, DRAINS:  none  SKIN: multiple bruising UEs  NEUROLOGIC: follows commands  PSYCHIATRIC:  A/O x2-3, calm and cooperative           ASSESSMENT/PLAN    Continue therapies    Monitor blood sugars, variable at this time.      UTI  - ucx + e coli  - s/p ceftriaxone  - completed Ceftin x 5 days on discharge  - cont methenamine  - monitor  Frequent falls  - 6 falls reported at home in 1 week  - cont therapies  - maintain safety precautions  DM II  - Hgb A1c 11.3   - pt reportedly stopped long acting insulin at home  - Insulin Degludec restarted, cont 40 U  - Cont insulin lispro 7 U w/ meals  - accu checks  - hypoglycemia management per facility's guidelines  - f/u w/    - monitor  Orthostatic Hypotension  - amlodipine was stopped  - cont midodrine    HTN  - amlodipine stopped due to above  - remains on losartan   - in house cardiology consult   HLD   - cont statin   Depression  - cont sertraline  Hyponatremia  - s/p IVF at the hospital  - monitor labs  GOC  - Full code  - goal to return home post [facility]  -  assisting w/ discharge planning       Marjan Maldonado, APRN    60 minutes spent w/ patient and staff, including but not limited to/ reviewing present status, needs, abilities with disciplines, reviewing medical records, vital signs, labs, completing medication reconciliation and entering orders for continued care in [facility].    12/16/24   12:18 PM                      [1] No Known Allergies

## 2024-12-18 ENCOUNTER — TELEPHONE (OUTPATIENT)
Dept: ENDOCRINOLOGY CLINIC | Facility: CLINIC | Age: 74
End: 2024-12-18

## 2024-12-18 NOTE — TELEPHONE ENCOUNTER
Patient is at extended care nursing home, the MD's there are request to have patient be seen sooner. Please call Xochitl back.

## 2024-12-26 RX ORDER — SERTRALINE HYDROCHLORIDE 100 MG/1
100 TABLET, FILM COATED ORAL DAILY
Qty: 90 TABLET | Refills: 0 | Status: SHIPPED | OUTPATIENT
Start: 2024-12-26

## 2024-12-30 ENCOUNTER — SNF VISIT (OUTPATIENT)
Dept: INTERNAL MEDICINE CLINIC | Facility: SKILLED NURSING FACILITY | Age: 74
End: 2024-12-30

## 2024-12-30 DIAGNOSIS — N39.0 UTI DUE TO EXTENDED-SPECTRUM BETA LACTAMASE (ESBL) PRODUCING ESCHERICHIA COLI: Primary | ICD-10-CM

## 2024-12-30 DIAGNOSIS — Z79.4 TYPE 2 DIABETES MELLITUS WITH STABLE PROLIFERATIVE RETINOPATHY OF RIGHT EYE, WITH LONG-TERM CURRENT USE OF INSULIN (HCC): ICD-10-CM

## 2024-12-30 DIAGNOSIS — B96.29 UTI DUE TO EXTENDED-SPECTRUM BETA LACTAMASE (ESBL) PRODUCING ESCHERICHIA COLI: Primary | ICD-10-CM

## 2024-12-30 DIAGNOSIS — Z09 ENCOUNTER FOR FOLLOW-UP: ICD-10-CM

## 2024-12-30 DIAGNOSIS — E11.3551 TYPE 2 DIABETES MELLITUS WITH STABLE PROLIFERATIVE RETINOPATHY OF RIGHT EYE, WITH LONG-TERM CURRENT USE OF INSULIN (HCC): ICD-10-CM

## 2024-12-30 DIAGNOSIS — R53.81 PHYSICAL DECONDITIONING: ICD-10-CM

## 2024-12-30 DIAGNOSIS — Z16.12 UTI DUE TO EXTENDED-SPECTRUM BETA LACTAMASE (ESBL) PRODUCING ESCHERICHIA COLI: Primary | ICD-10-CM

## 2024-12-30 NOTE — PROGRESS NOTES
HPI: Ananya Dowling : 1950 Age 74 year old female patient is admitted to [facility] for LARA    Reason for visit: ESBL UTI and f/u frequent falls, DM, hypotension, physical deconditioning    EM: -12/10  [facility]: 12/10-present    This is a 75 yo female w/ past medical hx significant for DM, orthostatic hypotension, depression, visual impairment; who presented to ED after a witnessed fall at home. Per daughter pt w/ 6 falls over a week. Noted w/ positive UA. UCx + ecoli. She was started on ceftriaxone and discharged to complete Ceftin x 5 days. Hgb A1c was elevated at 11.3. It was noted that pt stopped taking long acting insulin at home and it was restarted. Amlodipine was stopped due to hypotension. Once medically stable pt was transferred to [facility] for LARA.  Pt seen in a WC, in no acute distress. Sts she is feeling ok. Participating w/ therapies. Sts she is able to ambulate w/ assist. Denies nausea, vomiting, constipation or diarrhea. Denies dysuria. UCx was positive for E.Coli ESBL UTI and pt was started on macrobid (EOT 25).     Past Medical History:   Arthritis   Back problem   Depression   Diabetes (HCC)   Essential hypertension   High blood pressure   High cholesterol   Visual impairment  No past surgical history on file.  Family History  Problem Relation Age of Onset   Heart Attack Father   Stroke Mother   Heart Disorder Son   Diabetes Son  Social History  Socioeconomic History   Marital status:   Tobacco Use   Smoking status: Never   Smokeless tobacco: Never  Vaping Use   Vaping status: Never Used  Substance and Sexual Activity   Alcohol use: Yes  Comment: very infrequent, every couple of months   Drug use: Never  Social Drivers of Health  Financial Resource Strain: Low Risk (2024)  Financial Resource Strain   Difficulty of Paying Living Expenses: Not very hard   Med Affordability: No  Food Insecurity: No Food Insecurity (2024)  Food Insecurity    Food Insecurity: Never true  Transportation Needs: No Transportation Needs (12/5/2024)  Transportation Needs   Lack of Transportation: No  Physical Activity: Inactive (2/16/2023)  Exercise Vital Sign   Days of Exercise per Week: 0 days   Minutes of Exercise per Session: 0 min  Stress: No Stress Concern Present (2/16/2023)  Stress   Feeling of Stress : No  Social Connections: Socially Integrated (2/16/2023)  Social Connections   Frequency of Socialization with Friends and Family: 3  Housing Stability: Low Risk (12/5/2024)  Housing Stability   Housing Instability: No  ALLERGIES:  [Allergies]  [Allergies]  No Known Allergies  CODE STATUS: Full Code  CURRENT MEDICATIONS: Reviewed on SNF EMR  SUBJECTIVE/ROS:  GENERAL HEALTH: feels well  RESPIRATORY: denies shortness of breath, wheezing or cough  CARDIOVASCULAR:denies chest pain, no palpitations  GI: denies nausea, vomiting, constipation, diarrhea; no rectal bleeding; no heartburn  MUSCULOSKELETAL:no joint complaints upper or lower extremities  NEURO:denies seizures  PSYCHE: + depression  HEMATOLOGY:denies excessive bleeding  ENDOCRINE: denies excessive thirst or urination; denies unexpected wt gain or wt loss  SKIN: denies any unusual skin lesions or rashes  OBJECTIVE:  VITALS: Reviewed  LABS/Imaging: Reviewed. CBC, CMP 12/31  PHYSICAL EXAM:  GENERAL HEALTH: well developed, well nourished, in no apparent distress  HEENT: atraumatic/normocephalic, mucous membranes pink and moist, EOMI, sclera anicteric, conjunctiva normal.  RESPIRATORY:clear  CARDIOVASCULAR: regular  ABDOMEN: normal active BS+, soft, non distended, nontender  LYMPHATIC:no lymphedema  MUSCULOSKELETAL: no acute synovitis upper or lower extremity  EXTREMITIES/VASCULAR:no cyanosis, clubbing or edema  LINES, TUBES, DRAINS: none  SKIN: multiple bruising UEs  NEUROLOGIC: follows commands  PSYCHIATRIC: A/O x2-3, calm and cooperative    ASSESSMENT/PLAN  Continue therapies  Monitor blood sugars, variable at this  time.    1. Re-current UTI  - ucx + e coli  - s/p ceftriaxone  - completed Ceftin x 5 days on discharge  - on methenamine  - re-tested 12/19 due to worsening symptoms and + ESBL E coli: Macrobid (EOT 1/2). Contact ISO  - monitor  2. Frequent falls  - 6 falls reported at home in 1 week  - cont therapies  - maintain safety precautions  3. DM II  - Hgb A1c 11.3  - pt reportedly stopped long acting insulin at home  - Insulin Degludec restarted, cont 40 U  - Cont insulin lispro 7 U w/ meals  - accu checks  - hypoglycemia management per facility's guidelines  - f/u w/   - monitor  4. Orthostatic Hypotension  - amlodipine was stopped  - cont midodrine  5. HTN  - amlodipine stopped due to above  - remains on losartan  - in house cardiology consult  6. HLD  - cont statin  7. Depression  - cont sertraline  8. Hyponatremia  - s/p IVF at the hospital  - monitor labs  9. GOC  - Full code  - goal to return home post [facility] assisting w/ discharge planning  Marjan Maldonado, APRN  30 minutes spent w/ patient and staff, including but not limited to/ reviewing present status, needs, abilities with disciplines, reviewing medical records, vital signs, labs, completing medication reconciliation and entering orders for continued care in [facility].

## 2024-12-31 ENCOUNTER — PATIENT OUTREACH (OUTPATIENT)
Dept: CASE MANAGEMENT | Age: 74
End: 2024-12-31

## 2024-12-31 DIAGNOSIS — Z79.4 TYPE 2 DIABETES MELLITUS WITH STAGE 3B CHRONIC KIDNEY DISEASE, WITH LONG-TERM CURRENT USE OF INSULIN (HCC): Primary | ICD-10-CM

## 2024-12-31 DIAGNOSIS — N18.32 TYPE 2 DIABETES MELLITUS WITH STAGE 3B CHRONIC KIDNEY DISEASE, WITH LONG-TERM CURRENT USE OF INSULIN (HCC): Primary | ICD-10-CM

## 2024-12-31 DIAGNOSIS — I73.9 PERIPHERAL VASCULAR DISEASE (HCC): ICD-10-CM

## 2024-12-31 DIAGNOSIS — E78.5 DYSLIPIDEMIA: ICD-10-CM

## 2024-12-31 DIAGNOSIS — E11.22 TYPE 2 DIABETES MELLITUS WITH STAGE 3B CHRONIC KIDNEY DISEASE, WITH LONG-TERM CURRENT USE OF INSULIN (HCC): Primary | ICD-10-CM

## 2024-12-31 NOTE — PROGRESS NOTES
Spoke to Ananya for CCM.      Updates to patient care team/comments: None   Patient reported changes in medications: medication are currently being managed by the rehab facility.    Med Adherence  Comment: Pt confirmed she is taking medications as instructed by their providers.     Health Maintenance:   Health Maintenance   Topic Date Due    DEXA Scan  Never done    Colorectal Cancer Screening  02/22/2022    Diabetes Care: Microalb/Creat Ratio  04/20/2023    COVID-19 Vaccine (3 - 2024-25 season) 09/01/2024    Diabetes Care Dilated Eye Exam  09/29/2024    Influenza Vaccine (1) Never done    Diabetes Care Foot Exam  01/25/2025    Diabetes Care A1C  03/05/2025    Mammogram  04/30/2025    Diabetes Care: GFR  12/10/2025    MA Annual Health Assessment  Completed    Annual Depression Screening  Completed    Fall Risk Screening (Annual)  Completed    Pneumococcal Vaccine: 65+ Years  Completed    Zoster Vaccines  Completed       Patient updates/concerns:    The patient daughter Xochitl reports that the patient is currently in a rehab facility and expresses concern about her mother returning ome. She states that the patient's memory is worsening, making it increasingly difficult for her to provide care on her own. Xochitl has spoken to the  at the rehab facility, who advised that the patient is in a challenging financial situation earning too much from social security to qualify for assistance, but not enough to afford long term care out of pocket. Xochitl is seeking guidance and support.    Goals/Action Plan:    Active goal from previous outreach:   What: Strengthen my legs            - Where/When/How: She will continue with PT and inquire about PT at home upon discharge.  Patient reported progress towards goals:                - What: She is currently at rehabilitation.           - Where/When/How: She is receiving daily PT  Patient Reported Barriers and Concerns: She will be discharged in the next day or so.                    - Plan for overcoming barriers: She will be discharged home on .    Care Managers Interventions:   CCM will assist by reaching out to [facility] senior advisors and seeking guidance for the patient.     Nutrition and exercise counseling: I encouraged the patient to consume three balanced meals daily and discussed the importance of incorporating 20-30 minutes of daily exercise, including gentle stretches. To improve overall health.    Chat review and documentation: I thoroughly reviewed the patient chart and updated Care Everywhere.    Immunization Status: I conducted an [employer] immunization query, which confirmed that no updates were necessary at this time.    Social determinants of health: I updated social determinants of health to reflect any recent changes.    Patient support: I actively listened to the patient's concern's, provided emotional support and encouraged the patient to reach out, if she needs further assistance.     Reviewed Future Appointments:   Future Appointments   Date Time Provider Department Center   2/24/2025 11:45 AM Martha Lees MD [facility] Care Manager Follow Up Date: One month. Encouraged patient to call as needed.    Reason For Follow Up: review progress and or barriers towards patient's goals.     Time Spent This Encounter Total: 28 min medical record review, telephone communication, care plan updates where needed, education, goals, and action plan recreation/update. Provided acknowledgment and validation to patient's concerns.   Monthly Minute Total including today: 28 min  Physical assessment, complete health history, and need for CCM established by Jermaine Monson MD.  Friendly reminder - 2025 Benefits Open Enrollment is here!   We encourage you to review your current Medicare coverage and explore your options during this period. We have developed a Medicare Information Page on our website to serve as a resource for guidance and answers to any questions  you might have.   You can access it by visiting, [email or web address]

## 2025-01-03 ENCOUNTER — PATIENT OUTREACH (OUTPATIENT)
Dept: CASE MANAGEMENT | Age: 75
End: 2025-01-03

## 2025-01-03 NOTE — PROGRESS NOTES
I spoke to [employer]  who reports that the patient may qualify for long term care assistance based on her monthly income.    I contacted Xochitl to provide an update. [email or web address]

## 2025-01-06 ENCOUNTER — SNF VISIT (OUTPATIENT)
Dept: INTERNAL MEDICINE CLINIC | Facility: SKILLED NURSING FACILITY | Age: 75
End: 2025-01-06

## 2025-01-06 DIAGNOSIS — E11.22 TYPE 2 DIABETES MELLITUS WITH STAGE 3B CHRONIC KIDNEY DISEASE, WITH LONG-TERM CURRENT USE OF INSULIN (HCC): ICD-10-CM

## 2025-01-06 DIAGNOSIS — R53.81 PHYSICAL DECONDITIONING: ICD-10-CM

## 2025-01-06 DIAGNOSIS — N18.32 TYPE 2 DIABETES MELLITUS WITH STAGE 3B CHRONIC KIDNEY DISEASE, WITH LONG-TERM CURRENT USE OF INSULIN (HCC): ICD-10-CM

## 2025-01-06 DIAGNOSIS — Z09 ENCOUNTER FOR FOLLOW-UP: ICD-10-CM

## 2025-01-06 DIAGNOSIS — B96.29 UTI DUE TO EXTENDED-SPECTRUM BETA LACTAMASE (ESBL) PRODUCING ESCHERICHIA COLI: Primary | ICD-10-CM

## 2025-01-06 DIAGNOSIS — Z16.12 UTI DUE TO EXTENDED-SPECTRUM BETA LACTAMASE (ESBL) PRODUCING ESCHERICHIA COLI: Primary | ICD-10-CM

## 2025-01-06 DIAGNOSIS — Z79.4 TYPE 2 DIABETES MELLITUS WITH STAGE 3B CHRONIC KIDNEY DISEASE, WITH LONG-TERM CURRENT USE OF INSULIN (HCC): ICD-10-CM

## 2025-01-06 DIAGNOSIS — N39.0 UTI DUE TO EXTENDED-SPECTRUM BETA LACTAMASE (ESBL) PRODUCING ESCHERICHIA COLI: Primary | ICD-10-CM

## 2025-01-06 PROCEDURE — 99309 SBSQ NF CARE MODERATE MDM 30: CPT | Performed by: CLINICAL NURSE SPECIALIST

## 2025-01-06 PROCEDURE — 1111F DSCHRG MED/CURRENT MED MERGE: CPT | Performed by: CLINICAL NURSE SPECIALIST

## 2025-01-06 NOTE — PROGRESS NOTES
I spoke to Xochitl and informed her that [facility] senior advisors may be able to help guide her. I am waiting on an [facility] representative for her area to return my call so that I can provide her information.

## 2025-01-07 NOTE — PROGRESS NOTES
HPI: Ananya Dowling : 1950 Age 74 year old female patient is admitted to [facility] for LARA    Reason for visit: F/u ESBL UTI and f/u frequent falls, DM, hypotension, physical deconditioning    [facility]: -12/10  [facility]: 12/10-present    This is a 73 yo female w/ past medical hx significant for DM, orthostatic hypotension, depression, visual impairment; who presented to ED after a witnessed fall at home. Per daughter pt w/ 6 falls over a week. Noted w/ positive UA. UCx + ecoli. She was started on ceftriaxone and discharged to complete Ceftin x 5 days. Hgb A1c was elevated at 11.3. It was noted that pt stopped taking long acting insulin at home and it was restarted. Amlodipine was stopped due to hypotension. Once medically stable pt was transferred to [facility] for LARA.  Pt seen walking independently in her room, in no acute distress. Sts she is feeling ok. Participating w/ therapies. Sts she is able to ambulate but sometimes feels \"wobbly\". Discussed w/ pt to ask for assistance w/ ambulation/physical activity to make sure she does not fall when feeling not steady. Denies nausea, vomiting, constipation or diarrhea. Denies dysuria. Pt was issued [employer]OM[employer] w/ last covered day of .  Pt's goal is to return home.  Discussed w/ pt that she will need help at home and should not live alone. Discussed options of AL facility as well.  SW will reach out to family to see if they want to appeal NOMNC.    Past Medical History:   Arthritis   Back problem   Depression   Diabetes (HCC)   Essential hypertension   High blood pressure   High cholesterol   Visual impairment  No past surgical history on file.  Family History  Problem Relation Age of Onset   Heart Attack Father   Stroke Mother   Heart Disorder Son   Diabetes Son  Social History  Socioeconomic History   Marital status:   Tobacco Use   Smoking status: Never   Smokeless tobacco: Never  Vaping Use   Vaping status: Never Used  Substance  and Sexual Activity   Alcohol use: Yes  Comment: very infrequent, every couple of months   Drug use: Never  Social Drivers of Health  Financial Resource Strain: Low Risk (8/27/2024)  Financial Resource Strain   Difficulty of Paying Living Expenses: Not very hard   Med Affordability: No  Food Insecurity: No Food Insecurity (12/5/2024)  Food Insecurity   Food Insecurity: Never true  Transportation Needs: No Transportation Needs (12/5/2024)  Transportation Needs   Lack of Transportation: No  Physical Activity: Inactive (2/16/2023)  Exercise Vital Sign   Days ofExercise per Week: 0 days   Minutes of Exercise per Session: 0 min  Stress: No Stress Concern Present (2/16/2023)  Stress   Feeling of Stress : No  Social Connections: Socially Integrated (2/16/2023)  Social Connections   Frequency of Socialization with Friends and Family: 3  Housing Stability: Low Risk (12/5/2024)  Housing Stability   Housing Instability: No  ALLERGIES:  [Allergies]  [Allergies]  No Known Allergies  CODE STATUS: Full Code  CURRENT MEDICATIONS: Reviewed on SNF EMR  SUBJECTIVE/ROS:  GENERAL HEALTH: feels well  RESPIRATORY: denies shortness of breath, wheezing or cough  CARDIOVASCULAR:denies chest pain, no palpitations  GI: denies nausea, vomiting, constipation, diarrhea; no rectal bleeding; no heartburn  MUSCULOSKELETAL:+ L shoulder pain   NEURO:denies seizures  PSYCHE: + depression  HEMATOLOGY:denies excessive bleeding  ENDOCRINE: denies excessive thirst or urination; denies unexpected wt gain or wt loss  SKIN: denies any unusual skin lesions or rashes  OBJECTIVE:  VITALS: Reviewed  LABS/Imaging: Reviewed. CBC, CMP 1/6: results pending   PHYSICAL EXAM:  GENERAL HEALTH: well developed, well nourished, in no apparent distress  HEENT: atraumatic/normocephalic, mucous membranes pink and moist, EOMI, sclera anicteric, conjunctiva normal.  RESPIRATORY: clear  CARDIOVASCULAR: regular  ABDOMEN: normal active BS+, soft, non distended,  nontender  LYMPHATIC:no lymphedema  MUSCULOSKELETAL: no acute synovitis upper or lower extremity  EXTREMITIES/VASCULAR:no cyanosis, clubbing or edema  LINES, TUBES, DRAINS: none  SKIN: multiple bruising UEs  NEUROLOGIC: follows commands  PSYCHIATRIC: A/O x2-3, calm and cooperative    ASSESSMENT/PLAN  Continue therapies. NOMNC issued by insurance w/ last covered day 1/8.  SW will discuss w/ family and proceed accordingly to their wishes.  Pt will need assistance at home vs AL facility.     Monitor blood sugars, variable at this time.    1. Re-current UTI  - ucx + e coli  - s/p ceftriaxone  - completed Ceftin x 5 days on discharge  - on methenamine  - re-tested 12/19 due to worsening symptoms and + ESBL E coli: Macrobid (EOT 1/2).   - monitor  2. Frequent falls  - 6 falls reported at home in1 week  - cont therapies  - maintain safety precautions  3. DM II  - Hgb A1c 11.3  - pt reportedly stopped long acting insulin at home  - Insulin Degludec restarted, cont 40 U  - Cont insulin lispro 7 U w/ meals  - accu checks  - hypoglycemia management per facility's guidelines  - f/u w/   - monitor  4. Orthostatic Hypotension  - amlodipine was stopped  - cont midodrine  5. HTN  - amlodipine stopped due to above  - remains on losartan  - in house cardiology consult  6. HLD  - cont statin  7. Depression  - cont sertraline  8. Hyponatremia  - s/p IVF at the hospital  - monitor labs  9. GOC  - Full code  - goal to return home post LARA but will need assistance at home  - SW assisting w/ discharge planning  Marjan Maldonado, APRN  30 minutes spent w/ patient and staff, including but not limited to/ reviewing present status, needs, abilities with disciplines, reviewing medical records, vital signs, labs, completing medication reconciliation and entering orders for continued care in [facility].

## 2025-01-09 ENCOUNTER — SNF VISIT (OUTPATIENT)
Dept: INTERNAL MEDICINE CLINIC | Facility: SKILLED NURSING FACILITY | Age: 75
End: 2025-01-09

## 2025-01-09 DIAGNOSIS — Z09 ENCOUNTER FOR FOLLOW-UP: Primary | ICD-10-CM

## 2025-01-09 PROCEDURE — 1111F DSCHRG MED/CURRENT MED MERGE: CPT | Performed by: CLINICAL NURSE SPECIALIST

## 2025-01-10 ENCOUNTER — PATIENT OUTREACH (OUTPATIENT)
Dept: CASE MANAGEMENT | Age: 75
End: 2025-01-10

## 2025-01-10 NOTE — PROGRESS NOTES
Transitions of Care Navigation  Discharge Date: 25 from [place] extended care  Contact Date: 1/10/2025    Transitions of Care Assessment:  JYOTI Initial Assessment    General:  Assessment completed with: Patient  Patient Subjective: NCM spoke with patient states is feeling a little off balance. Patient put her daughter on the phone, Xochitl states pt has been off balance, wobbly when walking with walker. Xochitl states pt had an incident where she was up and walking with walker, almost tipped over. Daughter states she appealed doctor's decision for patient to be discharged but it was denied. Patient denies any fevers, chills, nausea, vomiting, shortness of breath, chest pain or any others. Xochitl states pt's FBS this morning at 239. She states [facility] has not reached out to them yet.  Chief Complaint: Urinary tract infection without hematuria, site unspecified  Verify patient name and  with patient/ caregiver: Yes    Hospital Stay/Discharge:  Tell me what you understand of why you were in the hospital or emergency department: Pt was sent to [facility] from hospital discharge. She reported to ED following a fall at home.  Prior to leaving the hospital were your Discharge Instructions reviewed with you?: Yes  Did you receive a copy of your written Discharge Instructions?: Yes  What questions do you have about your Discharge Instructions?: none  Do you feel better or worse since you left the hospital or emergency department?: Same    Follow - Up Appointment:  Do you have a follow-up appointment?: Yes  Date: 25  Physician: PCP  Are there any barriers to getting to your follow-up appointment?: No    Home Health/DME:  Prior to leaving the hospital was Home Health (HH) arranged for you?: Yes  Has HH been out or set up a visit to see you?: No     Prior to leaving the hospital or emergency department was Durable Medical Equipment (DME), medical supplies, or infusions arranged for you?: N/A  Are DME/medical supply/infusions needs  identified by staff during this assessment?: No     Medications/Diet:  Did any of your medications change, during or after your hospital stay or ED visit?: Yes  Do you have your new or updated medications?: Yes  Do you understand what your medications are for and possible side effects?: Yes  Are there any reasons that keep you from taking your medication as prescribed?: No  Any concerns about medication refills?: No    Were you given a different diet per your Discharge Instructions?: No  Reason: n/a     Questions/Concerns:  Do you have any questions or concerns that have not been discussed?: No   JYOTI Follow-up Assessment    General:  Assessment completed with: Patient  Patient Subjective: NCM spoke with patient states is feeling a little off balance. Patient put her daughter on the phone, Xohcitl states pt has been off balance, wobbly when walking with walker. Xochitl states pt had an incident where she was up and walking with walker, almost tipped over. Daughter states she appealed doctor's decision for patient to be discharged but it was denied. Patient denies any fevers, chills, nausea, vomiting, shortness of breath, chest pain or any others. Xochitl states pt's FBS this morning at 239. She states [facility] has not reached out to them yet.  Chief Complaint: Urinary tract infection without hematuria, site unspecified  Community Resources: Home Health ([facility])    Nursing Interventions:  All discharge instructions reviewed with the patient. Reviewed when to call MD vs when to call 911 or go the ED. Educated patient on the importance of taking all meds as prescribed as well as close f/u with PCP/specialists. Pt verbalized understanding and will contact the office with any further questions or concerns. Patient denies fevers, chills, nausea, vomiting, shortness of breath, chest pain, or any other symptoms at this time.   [facility] provided contact information for any further questions/concerns. Patient verbalized understanding and  agreeable.         Medications:Reviewed medication list.  Medications are up to date.  Current Outpatient Medications   Medication Sig Dispense Refill    SERTRALINE 100 MG Oral Tab TAKE 1 TABLET(100 MG) BY MOUTH DAILY 90 tablet 0    insulin degludec 100 units/mL Subcutaneous Solution Pen-injector Inject 45 Units into the skin daily. (Patient taking differently: Inject 40 Units into the skin daily.) 10 mL 0    losartan 50 MG Oral Tab Take 1 tablet (50 mg total) by mouth daily.      acetaminophen (TYLENOL) 325 MG Oral Tab Take 2 tablets (650 mg total) by mouth every 6 (six) hours as needed. 30 tablet 0    lidocaine 5 % External Patch Place 1 patch onto the skin daily. 14 patch 0    midodrine 5 MG Oral Tab Take 1 tablet (5 mg total) by mouth in the morning and 1 tablet (5 mg total) at noon and 1 tablet (5 mg total) in the evening. 90 tablet 1    simvastatin 20 MG Oral Tab Take 1 tablet (20 mg total) by mouth daily. 90 tablet 1    clotrimazole 1 % External Cream Apply 1 Application topically 2 (two) times daily. 60 g 1    ergocalciferol 1.25 MG (29394 UT) Oral Cap Take 1 capsule (50,000 Units total) by mouth once a week. 12 capsule 0    methenamine 1 g Oral Tab Take 1 tablet (1 g total) by mouth 2 (two) times daily. 60 tablet 5    gabapentin 300 MG Oral Cap Take 1 capsule (300 mg total) by mouth 3 (three) times daily. 90 capsule 3    estradiol (ESTRACE) 0.1 MG/GM Vaginal Cream Apply fingertip amount of cream to the vagina every night for 1 week and then every other night indefinitely 42.5 g 11    Accu-Chek FastClix Lancets Does not apply Misc Use to check blood sugars 3 times daily. 300 each 1    Blood Glucose Monitoring Suppl (ACCU-CHEK GUIDE) w/Device Does not apply Kit Use to check blood sugars 3 times daily. 1 kit 0    Glucose Blood (ACCU-CHEK GUIDE) In Vitro Strip Use to check blood sugars 3 times daily. 300 strip 1    lidocaine 4 % External Patch Place 1 patch onto the skin daily.      Insulin Lispro, 1 Unit Dial,  100 UNIT/ML Subcutaneous Solution Pen-injector Patient will take 14 units with small carb meals and 18-20 units with larger carb meals. (Patient taking differently: Inject 7 Units into the skin in the morning, at noon, and at bedtime.) 54 mL 1    Insulin Pen Needle (PEN NEEDLES) 32G X 4 MM Does not apply Misc 1 pen  4 (four) times daily. Use  New pen needle  With each injection 400 each 0         Follow-up Appointments:  Your appointments       Date & Time Appointment Department ([place])    Jan 14, 2025 12:00 PM CST Follow Up Visit with [facility] )    Contact your primary care provider if your insurance requires a referral.    Please arrive 15 minutes prior to your scheduled appointment. Be sure to bring your current Insurance card, Photo ID, and medication bottles or a list of your current medications.      A 24 hour notice is required to cancel any appointment or you may be charged a $40 No Show Fee.     Important: 24 hour notice is required to cancel any appointment or you may be charged a $40 No Show Fee. Please notify your physician office.         Feb 24, 2025 11:45 AM CST Follow Up Visit with Martha Lees MD [facility] [place] ([facility])    Contact your primary care provider if your insurance requires a referral.    Please arrive 15 minutes prior to your scheduled appointment. Be sure to bring your current Insurance card, Photo ID, and medication bottles or a list of your current medications.      A 24 hour notice is required to cancel any appointment or you may be charged a $40 No Show Fee.     Important: 24 hour notice is required to cancel any appointment or you may be charged a $40 No Show Fee. Please notify your physician office.               [facility]   [address]  [facility] [address]  [phone] [facility] [place]  [address] [address]  [phone]            Transitional Care Clinic  Was TCC Ordered: No      Primary Care Provider (If no TCC appointment)  Does patient already have a PCP appointment scheduled? Yes  Nurse Care Manager Confirmed PCP office TCM/JYOTI appointment with patient       Specialist  Does the patient have any other follow-up appointment(s) need to be scheduled? Yes   -If yes: Nurse Care Manager reviewed upcoming specialist appointments with patient: Yes   -Does the patient need assistance scheduling appointment(s): No    [x]  Patient verbally agrees to additional follow-up calls from Nurse Care Manager    Book By Date: 1/16/25

## 2025-01-15 PROBLEM — F33.2 SEVERE EPISODE OF RECURRENT MAJOR DEPRESSIVE DISORDER, WITHOUT PSYCHOTIC FEATURES (HCC): Status: ACTIVE | Noted: 2025-01-15

## 2025-01-15 PROBLEM — R45.851 SUICIDAL IDEATIONS: Status: ACTIVE | Noted: 2025-01-15

## 2025-01-15 PROBLEM — F32.9 MDD (MAJOR DEPRESSIVE DISORDER): Status: ACTIVE | Noted: 2025-01-15

## 2025-01-16 ENCOUNTER — PATIENT OUTREACH (OUTPATIENT)
Dept: CASE MANAGEMENT | Age: 75
End: 2025-01-16

## 2025-01-16 DIAGNOSIS — F33.2 SEVERE RECURRENT MAJOR DEPRESSION WITHOUT PSYCHOTIC FEATURES (HCC): ICD-10-CM

## 2025-01-16 DIAGNOSIS — G23.8 MULTIPLE SYSTEM ATROPHY (HCC): ICD-10-CM

## 2025-01-16 DIAGNOSIS — G90.3 MULTIPLE SYSTEM ATROPHY (HCC): ICD-10-CM

## 2025-01-16 DIAGNOSIS — F41.1 GENERALIZED ANXIETY DISORDER: ICD-10-CM

## 2025-01-16 DIAGNOSIS — F09 COGNITIVE DISORDER: ICD-10-CM

## 2025-01-16 DIAGNOSIS — Z79.4 TYPE 2 DIABETES MELLITUS WITH STAGE 3B CHRONIC KIDNEY DISEASE, WITH LONG-TERM CURRENT USE OF INSULIN (HCC): Primary | ICD-10-CM

## 2025-01-16 DIAGNOSIS — E11.22 TYPE 2 DIABETES MELLITUS WITH STAGE 3B CHRONIC KIDNEY DISEASE, WITH LONG-TERM CURRENT USE OF INSULIN (HCC): Primary | ICD-10-CM

## 2025-01-16 DIAGNOSIS — N18.32 TYPE 2 DIABETES MELLITUS WITH STAGE 3B CHRONIC KIDNEY DISEASE, WITH LONG-TERM CURRENT USE OF INSULIN (HCC): Primary | ICD-10-CM

## 2025-01-16 PROBLEM — F32.9 MDD (MAJOR DEPRESSIVE DISORDER): Status: RESOLVED | Noted: 2025-01-15 | Resolved: 2025-01-16

## 2025-01-16 NOTE — PROGRESS NOTES
Spoke to Ananya  daughter Xochitl for CCM.      Patient updates/concerns:    I spoke to Xochitl the patient daughter who reports that Dr. Monson recommended the patient go to the ED after completing a depression screening in the office, which revealed suicidal ideation. The patient was admitted for one day and discharged to [facility] in [place].     Xochitl feels some what relieve that the patient is receiving the care she needs tee is concerned about her physical therapy needs, which are likely not being addressed at [facility]. She is worried about the patient's physical condition upon discharge.    She also shared that [facility] contacted her bit is still reviewing the case since the patient cannot afford to pay out of pocket for a skilled nursing facility for the fist six months or year.     Care Managers Interventions:     CCM  provided emotional support to Xochitl through empathic and reflective listening.       Chat review and documentation: I thoroughly reviewed the patient chart and updated Care Everywhere.    Patient support: I actively listened to the patient's concern's, provided emotional support and encouraged the patient to reach out, if she needs further assistance.     Reviewed Future Appointments:   Future Appointments   Date Time Provider Department Center   2/24/2025 11:45 AM Martha Lees MD [facility]     Next Care Manager Follow Up Date: One month. Encouraged patient to call as needed.    Reason For Follow Up: review progress and or barriers towards patient's goals.     Time Spent This Encounter Total: 13 min medical record review, telephone communication, care plan updates where needed, education, goals, and action plan recreation/update. Provided acknowledgment and validation to patient's concerns.   Monthly Minute Total including today: 28 min    Physical assessment, complete health history, and need for CCM established by Jermaine Monson MD.  Friendly reminder - 2025 Benefits  Open Enrollment is here!   We encourage you to review your current Medicare coverage and explore your options during this period. We have developed a Medicare Information Page on our website to serve as a resource for guidance and answers to any questions you might have.   You can access it by visiting, [email or web address]

## 2025-01-23 ENCOUNTER — PATIENT OUTREACH (OUTPATIENT)
Dept: CASE MANAGEMENT | Age: 75
End: 2025-01-23

## 2025-01-24 NOTE — PROGRESS NOTES
NCM attempted calling patient for JYOTI/7 day follow up, no answer, call went to , but MB was full. Unable to leave message.   Unable to reach pt after several attempts. NCM closing encounter.

## 2025-01-27 RX ORDER — INSULIN DEGLUDEC 200 U/ML
36 INJECTION, SOLUTION SUBCUTANEOUS DAILY
Qty: 36 ML | Refills: 1 | Status: SHIPPED | OUTPATIENT
Start: 2025-01-27

## 2025-01-27 NOTE — TELEPHONE ENCOUNTER
Endocrine refill protocol for basal insulins     Protocol Criteria: FAILED Reason: Elevated A1C    If all below requirements are met, send a 90-day supply with 1 refill per provider protocol.       Verify Appointment with Endocrinology completed in the last 6 months or scheduled in the next 3 months.  Verify A1C has been completed within the last 6 months and is below 8.5%     Last completed office visit:Visit date not found   Next scheduled Follow up:   Future Appointments   Date Time Provider Department Center   2/24/2025 11:45 AM [facility]      Last A1c result: Last A1c value was 10.1% done 1/16/2025.

## 2025-01-31 ENCOUNTER — TELEPHONE (OUTPATIENT)
Dept: INTERNAL MEDICINE CLINIC | Facility: CLINIC | Age: 75
End: 2025-01-31

## 2025-01-31 DIAGNOSIS — E55.9 VITAMIN D DEFICIENCY: ICD-10-CM

## 2025-01-31 DIAGNOSIS — N39.0 RECURRENT UTI: ICD-10-CM

## 2025-01-31 RX ORDER — ERGOCALCIFEROL 1.25 MG/1
50000 CAPSULE, LIQUID FILLED ORAL WEEKLY
Qty: 12 CAPSULE | Refills: 0 | Status: SHIPPED | OUTPATIENT
Start: 2025-01-31

## 2025-01-31 RX ORDER — LOSARTAN POTASSIUM 50 MG/1
50 TABLET ORAL DAILY
Qty: 90 TABLET | Refills: 1 | Status: SHIPPED | OUTPATIENT
Start: 2025-01-31

## 2025-01-31 NOTE — TELEPHONE ENCOUNTER
Reached patient  and patient's daughter, Xochitl, for medication adherence consult. Patient failed adherence measure for losartan and simvastatin per insurance report. Both medications appears overdue for refill.    Patient reports that she is taking the medication as prescribed. She reports tolerating the medication well. Patient's daughter reports that she helps her with medications.She endorses the need for refill on losartan and simvastatin. Patient's daughter also requests refills on midodrine, methenamine, ergocalciferol and clotrimazole cream. Will call pharmacy for refills on simvastatin, midodrine, methenamine, and clotrimazole. Will pend orders for losartan and ergocalciferol for PCP to review.     Provided education on importance of adherence. Patient denies any questions or concerns with medication.

## 2025-02-04 DIAGNOSIS — Z79.4 TYPE 2 DIABETES MELLITUS WITH HYPERGLYCEMIA, WITH LONG-TERM CURRENT USE OF INSULIN (HCC): ICD-10-CM

## 2025-02-04 DIAGNOSIS — E11.65 TYPE 2 DIABETES MELLITUS WITH HYPERGLYCEMIA, WITH LONG-TERM CURRENT USE OF INSULIN (HCC): ICD-10-CM

## 2025-02-04 NOTE — TELEPHONE ENCOUNTER
Endocrine refill protocol for rapid acting, regular, intermediate, and mixed insulin:    Protocol Criteria:  FAILED Reason: No Visit in required time frame and Elevated A1C    If all below requirements are met, send a 90-day supply with 1 refill per provider protocol.    Verify appointment with Endocrinology completed in the last 6 months or scheduled in the next 3 months.  Verify A1C has been completed within the last 6 months and is below 8.5%    -May substitute prescriptions for Novolog and Humalog unless documented allergy (pens and vials) at the same dose and concentration per insurance preference and provider protocol.   -May substitute prescriptions for Novolin R and Humulin R unless documented allergy (pens and vials) at the same dose and concentration per insurance preference and provider protocol.   -May substitute prescriptions for Novolin N and Humulin N unless documented allergy (pens and vials) at the same dose and concentration per insurance preference and provider protocol.   -May substitute prescriptions for Humulin and Novolin 70/30 insulin unless documented allergy at the same dose and concentration per insurance preference and provider protocol.    Last completed office visit: Visit date not found   Next scheduled Follow up:   Future Appointments   Date Time Provider Department Center   2/24/2025 11:45 AM Martha Lees MD [facility] [place]      Last A1C result: 10.1% done 1/16/2025.

## 2025-02-04 NOTE — TELEPHONE ENCOUNTER
Current Outpatient Medications   Medication Sig Dispense Refill                                                                                               Insulin Lispro, 1 Unit Dial, 100 UNIT/ML Subcutaneous Solution Pen-injector Patient will take 14 units with small carb meals and 18-20 units with larger carb meals. 54 mL 1

## 2025-02-06 RX ORDER — INSULIN LISPRO 100 [IU]/ML
INJECTION, SOLUTION INTRAVENOUS; SUBCUTANEOUS
Qty: 54 ML | Refills: 1 | Status: SHIPPED | OUTPATIENT
Start: 2025-02-06

## 2025-02-06 NOTE — TELEPHONE ENCOUNTER
Current Outpatient Medications   Medication Sig Dispense Refill    Insulin Lispro, 1 Unit Dial, 100 UNIT/ML Subcutaneous Solution Pen-injector Patient will take 14 units with small carb meals and 18-20 units with larger carb meals. 54 mL 1                 Pharmacy message: Drug not on formulary.

## 2025-02-10 ENCOUNTER — TELEPHONE (OUTPATIENT)
Dept: ENDOCRINOLOGY CLINIC | Facility: CLINIC | Age: 75
End: 2025-02-10

## 2025-02-10 DIAGNOSIS — Z79.4 TYPE 2 DIABETES MELLITUS WITH HYPERGLYCEMIA, WITH LONG-TERM CURRENT USE OF INSULIN (HCC): Primary | ICD-10-CM

## 2025-02-10 DIAGNOSIS — E11.65 TYPE 2 DIABETES MELLITUS WITH HYPERGLYCEMIA, WITH LONG-TERM CURRENT USE OF INSULIN (HCC): Primary | ICD-10-CM

## 2025-02-10 NOTE — TELEPHONE ENCOUNTER
Brian calling back to inform Rn that Lispro is not covered but Tresiba and Aspart are.  For additional questions please call.  Thank you.

## 2025-02-11 NOTE — TELEPHONE ENCOUNTER
Dr. Lees --    Pt's insurnace is no longer covering lispro, preferred is Aspart. Ok to send at same dose?

## 2025-02-14 ENCOUNTER — PATIENT OUTREACH (OUTPATIENT)
Dept: CASE MANAGEMENT | Age: 75
End: 2025-02-14

## 2025-02-14 NOTE — PROGRESS NOTES
Reviewed pt's chart including recent visits.  Attempted to contact pt for monthly outreach no answer left detailed message for pt to call back.     Pt is due for:     Health Maintenance   Topic Date Due    DEXA Scan  Never done    Colorectal Cancer Screening  02/22/2022    COVID-19 Vaccine (3 - 2024-25 season) 09/01/2024    Diabetes Care Dilated Eye Exam  09/29/2024    Influenza Vaccine (1) Never done    Annual Well Visit  01/01/2025    Diabetes Care: Foot Exam (Annual)  01/01/2025    Diabetes Care: Microalb/Creat Ratio (Annual)  01/01/2025    Mammogram  04/30/2025    Diabetes Care A1C  04/16/2025    Diabetes Care: GFR  01/16/2026    Annual Depression Screening  Completed    Fall Risk Screening (Annual)  Completed    Pneumococcal Vaccine: 50+ Years  Completed    Zoster Vaccines  Completed    Meningococcal B Vaccine  Aged Out     Chat review and documentation: I thoroughly reviewed the patient chart and updated Care Everywhere.    Immunization Status: I conducted an [employer] immunization query, which confirmed that no updates were necessary at this time.     Reviewed Future Appointments:   Future Appointments   Date Time Provider Department Center   2/24/2025 11:40 AM Martha Lees MD [facility]  [place]   2/26/2025  2:30 PM Duy Oakley APRN [facility] [place]     Time Spent This Encounter Total: 3 min medical record review, telephone communication, care plan updates where needed, education, goals, and action plan recreation/update. Provided acknowledgment and validation to patient's concerns.   Monthly Minute Total including today: 3 min

## 2025-02-19 NOTE — TELEPHONE ENCOUNTER
Called pharmacy and was notified that pt picked up Tresiba 2/13 and Humalog $105 for 81 days. Script for aspart cancelled.

## 2025-02-24 ENCOUNTER — HOSPITAL ENCOUNTER (OUTPATIENT)
Facility: HOSPITAL | Age: 75
Setting detail: OBSERVATION
Discharge: HOME HEALTH | End: 2025-02-28
Attending: STUDENT IN AN ORGANIZED HEALTH CARE EDUCATION/TRAINING PROGRAM | Admitting: HOSPITALIST
Payer: MEDICARE

## 2025-02-24 ENCOUNTER — OFFICE VISIT (OUTPATIENT)
Dept: ENDOCRINOLOGY CLINIC | Facility: CLINIC | Age: 75
End: 2025-02-24

## 2025-02-24 VITALS
BODY MASS INDEX: 24.17 KG/M2 | HEART RATE: 91 BPM | DIASTOLIC BLOOD PRESSURE: 52 MMHG | SYSTOLIC BLOOD PRESSURE: 139 MMHG | WEIGHT: 128 LBS | HEIGHT: 61 IN

## 2025-02-24 DIAGNOSIS — R26.81 UNSTEADY GAIT: ICD-10-CM

## 2025-02-24 DIAGNOSIS — N30.00 ACUTE CYSTITIS WITHOUT HEMATURIA: Primary | ICD-10-CM

## 2025-02-24 DIAGNOSIS — Z79.4 TYPE 2 DIABETES MELLITUS WITH HYPERGLYCEMIA, WITH LONG-TERM CURRENT USE OF INSULIN (HCC): Primary | ICD-10-CM

## 2025-02-24 DIAGNOSIS — E78.5 DYSLIPIDEMIA: ICD-10-CM

## 2025-02-24 DIAGNOSIS — Z79.4 TYPE 2 DIABETES MELLITUS WITH STABLE PROLIFERATIVE RETINOPATHY OF RIGHT EYE, WITH LONG-TERM CURRENT USE OF INSULIN (HCC): ICD-10-CM

## 2025-02-24 DIAGNOSIS — R80.9 MICROALBUMINURIA DUE TO TYPE 2 DIABETES MELLITUS (HCC): ICD-10-CM

## 2025-02-24 DIAGNOSIS — E11.29 MICROALBUMINURIA DUE TO TYPE 2 DIABETES MELLITUS (HCC): ICD-10-CM

## 2025-02-24 DIAGNOSIS — E55.9 VITAMIN D DEFICIENCY: ICD-10-CM

## 2025-02-24 DIAGNOSIS — E11.65 TYPE 2 DIABETES MELLITUS WITH HYPERGLYCEMIA, WITH LONG-TERM CURRENT USE OF INSULIN (HCC): Primary | ICD-10-CM

## 2025-02-24 DIAGNOSIS — E11.3551 TYPE 2 DIABETES MELLITUS WITH STABLE PROLIFERATIVE RETINOPATHY OF RIGHT EYE, WITH LONG-TERM CURRENT USE OF INSULIN (HCC): ICD-10-CM

## 2025-02-24 LAB
ALBUMIN SERPL-MCNC: 4.2 G/DL (ref 3.2–4.8)
ALBUMIN/GLOB SERPL: 1.2 {RATIO} (ref 1–2)
ALP LIVER SERPL-CCNC: 80 U/L
ALT SERPL-CCNC: 8 U/L
ANION GAP SERPL CALC-SCNC: 8 MMOL/L (ref 0–18)
AST SERPL-CCNC: 16 U/L (ref ?–34)
BASOPHILS # BLD AUTO: 0.03 X10(3) UL (ref 0–0.2)
BASOPHILS NFR BLD AUTO: 0.3 %
BILIRUB SERPL-MCNC: 0.2 MG/DL (ref 0.2–1.1)
BILIRUB UR QL: NEGATIVE
BUN BLD-MCNC: 19 MG/DL (ref 9–23)
BUN/CREAT SERPL: 18.4 (ref 10–20)
CALCIUM BLD-MCNC: 9.4 MG/DL (ref 8.7–10.4)
CHLORIDE SERPL-SCNC: 99 MMOL/L (ref 98–112)
CO2 SERPL-SCNC: 27 MMOL/L (ref 21–32)
COLOR UR: YELLOW
CREAT BLD-MCNC: 1.03 MG/DL
DEPRECATED RDW RBC AUTO: 38.9 FL (ref 35.1–46.3)
EGFRCR SERPLBLD CKD-EPI 2021: 57 ML/MIN/1.73M2 (ref 60–?)
EOSINOPHIL # BLD AUTO: 0.09 X10(3) UL (ref 0–0.7)
EOSINOPHIL NFR BLD AUTO: 0.9 %
ERYTHROCYTE [DISTWIDTH] IN BLOOD BY AUTOMATED COUNT: 13 % (ref 11–15)
GLOBULIN PLAS-MCNC: 3.5 G/DL (ref 2–3.5)
GLUCOSE BLD-MCNC: 310 MG/DL (ref 70–99)
GLUCOSE BLDC GLUCOMTR-MCNC: 249 MG/DL (ref 70–99)
GLUCOSE BLDC GLUCOMTR-MCNC: 275 MG/DL (ref 70–99)
GLUCOSE BLDC GLUCOMTR-MCNC: 316 MG/DL (ref 70–99)
GLUCOSE BLOOD: 319
GLUCOSE UR-MCNC: 1000 MG/DL
HCT VFR BLD AUTO: 36.1 %
HEMOGLOBIN A1C: 9 % (ref 4.3–5.6)
HGB BLD-MCNC: 12 G/DL
HYALINE CASTS #/AREA URNS AUTO: PRESENT /LPF
IMM GRANULOCYTES # BLD AUTO: 0.03 X10(3) UL (ref 0–1)
IMM GRANULOCYTES NFR BLD: 0.3 %
KETONES UR-MCNC: NEGATIVE MG/DL
LEUKOCYTE ESTERASE UR QL STRIP.AUTO: 500
LYMPHOCYTES # BLD AUTO: 1.96 X10(3) UL (ref 1–4)
LYMPHOCYTES NFR BLD AUTO: 20.3 %
MCH RBC QN AUTO: 28 PG (ref 26–34)
MCHC RBC AUTO-ENTMCNC: 33.2 G/DL (ref 31–37)
MCV RBC AUTO: 84.1 FL
MONOCYTES # BLD AUTO: 0.5 X10(3) UL (ref 0.1–1)
MONOCYTES NFR BLD AUTO: 5.2 %
NEUTROPHILS # BLD AUTO: 7.05 X10 (3) UL (ref 1.5–7.7)
NEUTROPHILS # BLD AUTO: 7.05 X10(3) UL (ref 1.5–7.7)
NEUTROPHILS NFR BLD AUTO: 73 %
OSMOLALITY SERPL CALC.SUM OF ELEC: 292 MOSM/KG (ref 275–295)
PH UR: 5.5 [PH] (ref 5–8)
PLATELET # BLD AUTO: 293 10(3)UL (ref 150–450)
POTASSIUM SERPL-SCNC: 3.8 MMOL/L (ref 3.5–5.1)
PROT SERPL-MCNC: 7.7 G/DL (ref 5.7–8.2)
PROT UR-MCNC: 50 MG/DL
RBC # BLD AUTO: 4.29 X10(6)UL
SODIUM SERPL-SCNC: 134 MMOL/L (ref 136–145)
SP GR UR STRIP: 1.02 (ref 1–1.03)
TEST STRIP EXPIRATION DATE: NORMAL DATE
TEST STRIP LOT #: NORMAL NUMERIC
UROBILINOGEN UR STRIP-ACNC: NORMAL
WBC # BLD AUTO: 9.7 X10(3) UL (ref 4–11)
WBC #/AREA URNS AUTO: >50 /HPF
WBC CLUMPS UR QL AUTO: PRESENT /HPF

## 2025-02-24 PROCEDURE — 3075F SYST BP GE 130 - 139MM HG: CPT | Performed by: HOSPITALIST

## 2025-02-24 PROCEDURE — 99222 1ST HOSP IP/OBS MODERATE 55: CPT | Performed by: HOSPITALIST

## 2025-02-24 PROCEDURE — 1159F MED LIST DOCD IN RCRD: CPT | Performed by: HOSPITALIST

## 2025-02-24 PROCEDURE — 3008F BODY MASS INDEX DOCD: CPT | Performed by: HOSPITALIST

## 2025-02-24 PROCEDURE — 3078F DIAST BP <80 MM HG: CPT | Performed by: HOSPITALIST

## 2025-02-24 RX ORDER — HYDRALAZINE HYDROCHLORIDE 20 MG/ML
10 INJECTION INTRAMUSCULAR; INTRAVENOUS EVERY 6 HOURS PRN
Status: DISCONTINUED | OUTPATIENT
Start: 2025-02-24 | End: 2025-02-28

## 2025-02-24 RX ORDER — ONDANSETRON 2 MG/ML
4 INJECTION INTRAMUSCULAR; INTRAVENOUS EVERY 6 HOURS PRN
Status: DISCONTINUED | OUTPATIENT
Start: 2025-02-24 | End: 2025-02-28

## 2025-02-24 RX ORDER — INSULIN LISPRO 100 [IU]/ML
INJECTION, SOLUTION INTRAVENOUS; SUBCUTANEOUS
Status: ON HOLD | COMMUNITY
End: 2025-02-28

## 2025-02-24 RX ORDER — NICOTINE POLACRILEX 4 MG
15 LOZENGE BUCCAL
Status: DISCONTINUED | OUTPATIENT
Start: 2025-02-24 | End: 2025-02-28

## 2025-02-24 RX ORDER — HEPARIN SODIUM 5000 [USP'U]/ML
5000 INJECTION, SOLUTION INTRAVENOUS; SUBCUTANEOUS EVERY 12 HOURS SCHEDULED
Status: DISCONTINUED | OUTPATIENT
Start: 2025-02-24 | End: 2025-02-28

## 2025-02-24 RX ORDER — DEXTROSE MONOHYDRATE 25 G/50ML
50 INJECTION, SOLUTION INTRAVENOUS
Status: DISCONTINUED | OUTPATIENT
Start: 2025-02-24 | End: 2025-02-28

## 2025-02-24 RX ORDER — ACETAMINOPHEN 500 MG
500 TABLET ORAL EVERY 4 HOURS PRN
Status: DISCONTINUED | OUTPATIENT
Start: 2025-02-24 | End: 2025-02-28

## 2025-02-24 RX ORDER — SODIUM CHLORIDE 9 MG/ML
INJECTION, SOLUTION INTRAVENOUS CONTINUOUS
Status: DISCONTINUED | OUTPATIENT
Start: 2025-02-24 | End: 2025-02-28

## 2025-02-24 RX ORDER — NICOTINE POLACRILEX 4 MG
30 LOZENGE BUCCAL
Status: DISCONTINUED | OUTPATIENT
Start: 2025-02-24 | End: 2025-02-28

## 2025-02-24 NOTE — PROGRESS NOTES
Name: Ananya Dowling  Date: 2/24/25    Referring Physician: No ref. provider found    HISTORY OF PRESENT ILLNESS   Ananya Dowling is a 74 year old female who presents for follow-up of management of type 2 diabetes. She was diagnosed with diabetes about 19 years ago. We saw that she was not taking her insulin regularly, and so asked her to at least start taking her insulin and we would lower her doses. I felt that patient was on too high of doses and therefore was not taking them regularly to prevent hypoglycemia. I had tried to decrease her doses, but she had been admitted to the hospital and her doses were increased.     I had stopped her humalog and started her on Trulicity 0.75mg and continued her Tresiba 36 units. She had been doing well on this, but cannot afford the Trulicity due to the donut hole. We were also able to get the DexCom for her. She restarted her humalog at 14 units.      Blood Glucose Today: 308 today  HbA1C or glycohemoglobin is 10.0 today (and it was 8.2 on 8/21/23 and it was 8.8 on 5/25/23 and it was 12.5 on 3/19/23 and it was 9.4 on 8/10/22 and it was 12 on 6/30/22 and it was 13 on 4/19/22)  Type 1 or Type 2?: Type 2  Medications for DM: Tresiba 36 units qAM; Humalog 14 units with meals  Checking blood sugars 3-4 times a day  Blood sugars are always in the two hundreds  Dietary compliance: fair    Exercise: everyday, physical therapy    Polyuria/polydipsia: none    Blurred vision: none    Flu Vaccine This Season: yes    Covid Vaccine: overdue for booster    REVIEW OF SYSTEMS  CV: Cardiovascular disease present: none         Hypertension present: yes, on meds, at goal         Hyperlipidemia present: not at goal, HDL is low, LDL- 108, on meds (1/16/25)         Peripheral Vascular Disease present: none    : Nephropathy present: MAC: 120.7 (4/19/22) Creatinine: 0.74 (2/28/25)     Neuro: Neuropathy present: none    Eyes: Diabetic retinopathy present: DM Retinopathy            Most recent visit  to eye doctor in last 12 months: sees eye doctor regularly    Skin: Infection or ulceration: none    Osteoporosis/ Osteopenia: Scheduled for July Vitamin D: 16 (4/02/24)    Thyroid disease: none TSH: 3.562 (1/16/25)    Medications:     Current Outpatient Medications:     midodrine 5 MG Oral Tab, Take 1 tablet (5 mg total) by mouth daily as needed (low blood pressure)., Disp: , Rfl:     insulin degludec (TRESIBA FLEXTOUCH) 200 UNIT/ML Subcutaneous Solution Pen-injector, Inject 30 Units into the skin daily., Disp: 36 mL, Rfl: 1    acetaminophen 500 MG Oral Tab, Take 1 tablet (500 mg total) by mouth every 6 (six) hours as needed for Fever or Pain (equal to or greater than 100.4)., Disp: , Rfl:     gabapentin 100 MG Oral Cap, Take 1 capsule (100 mg total) by mouth 3 (three) times daily., Disp: 30 capsule, Rfl: 0    insulin lispro (HUMALOG) 100 UNIT/ML Injection Solution, Inject 1-11 Units into the skin 3 (three) times daily before meals. Base line 5 units with each meal; 1 unit for every 50 over 150, Disp: , Rfl:     cephALEXin 500 MG Oral Cap, Take 1 capsule (500 mg total) by mouth 3 (three) times daily. Start tonight and finish all doses; stop if rash, Disp: 13 capsule, Rfl: 0    losartan 50 MG Oral Tab, Take 1 tablet (50 mg total) by mouth daily., Disp: 90 tablet, Rfl: 1    ergocalciferol 1.25 MG (17605 UT) Oral Cap, Take 1 capsule (50,000 Units total) by mouth once a week., Disp: 12 capsule, Rfl: 0    DULoxetine 30 MG Oral Cap DR Particles, Take 1 capsule (30 mg total) by mouth daily., Disp: 30 capsule, Rfl: 0    simvastatin 20 MG Oral Tab, Take 1 tablet (20 mg total) by mouth daily., Disp: 90 tablet, Rfl: 1    methenamine 1 g Oral Tab, Take 1 tablet (1 g total) by mouth 2 (two) times daily., Disp: 60 tablet, Rfl: 5    Accu-Chek FastClix Lancets Does not apply Misc, Use to check blood sugars 3 times daily., Disp: 300 each, Rfl: 1     Allergies:   No Known Allergies    Social History:   Social History      Socioeconomic History    Marital status:    Tobacco Use    Smoking status: Never    Smokeless tobacco: Never   Vaping Use    Vaping status: Never Used   Substance and Sexual Activity    Alcohol use: Not Currently     Comment: very infrequent, every couple of months    Drug use: Never     Social Drivers of Health     Food Insecurity: No Food Insecurity (2/24/2025)    NCSS - Food Insecurity     Worried About Running Out of Food in the Last Year: No     Ran Out of Food in the Last Year: No   Transportation Needs: No Transportation Needs (2/24/2025)    NCSS - Transportation     Lack of Transportation: No   Stress: No Stress Concern Present (2/16/2023)    Stress     Feeling of Stress : No   Housing Stability: At Risk (2/24/2025)    NCSS - Housing/Utilities     Has Housing: Yes     Worried About Losing Housing: Yes     Unable to Get Utilities: No       Medical History:   Past Medical History:    Arthritis    Back problem    Depression    Diabetes (HCC)    Essential hypertension    High blood pressure    High cholesterol    Hyperlipidemia    Visual impairment       Surgical history:   No past surgical history on file.      PHYSICAL EXAM  Vitals:    02/24/25 1158   BP: 139/52   Pulse: 91   Weight: 128 lb (58.1 kg)   Height: 5' 1\" (1.549 m)             General Appearance:  alert, well developed, in no acute distress  Eyes:  normal conjunctivae, sclera., normal sclera and normal pupils  Psychiatric:  oriented to time, self, and place  Nutritional:  no abnormal weight gain or loss    Lab Data:   Lab Results   Component Value Date     (H) 01/16/2025    A1C 9.0 (A) 02/24/2025     Lab Results   Component Value Date     (H) 02/28/2025    BUN 16 02/28/2025    BUNCREA 21.6 (H) 02/28/2025    CREATSERUM 0.74 02/28/2025    ANIONGAP 9 02/28/2025    GFRNAA 42 (L) 04/19/2022    GFRAA 48 (L) 04/19/2022    CA 9.0 02/28/2025    OSMOCALC 292 02/28/2025    ALKPHO 80 02/24/2025    AST 16 02/24/2025    ALT 8 (L)  02/24/2025    BILT 0.2 02/24/2025    TP 7.7 02/24/2025    ALB 4.2 02/24/2025    GLOBULIN 3.5 02/24/2025     02/28/2025    K 3.6 02/28/2025     02/28/2025    CO2 24.0 02/28/2025     Lab Results   Component Value Date    CHOLEST 161 01/16/2025    TRIG 92 01/16/2025    HDL 36 (L) 01/16/2025     (H) 01/16/2025    VLDL 16 01/16/2025    NONHDLC 125 01/16/2025     Lab Results   Component Value Date    MALBP 14.00 04/19/2022    CREUR 44.10 01/15/2025       ASSESSMENT/PLAN:  This is a 74 year-old woman here for evaluation and management of uncontrolled type 2 diabetes. We discussed the ABCs of DM.     1.) Hyperglycemia Management- We discussed the importance of glycemic control to prevent complications of diabetes. We discussed the importance of SBGM. I offered and provided patient education materials and offered a blood glucose log book.   - Continue Tresiba to 36 units every morning; Continue Humalog from 14 units tid with meals  - Continue checking blood sugars with Dexcom    2.) Management of Diabetic Complications- We discussed the complications of diabetes include retinopathy, neuropathy, nephropathy and cardiovascular disease.   - Ophtho- up to date  - Flu and Covid vaccine- up to date, except for booster  - BP- at goal, on meds  - Lipids- check in 3 months  - MAC- elevated, will monitor, check in 3 months  - CMP- check in 3 months  - Neuropathy- none  - CAD- none    3.) Lifestyle Management for Diabetes- We discussed importance of a low CHO diet, and recommend 45gm per meal or 135gm per day. We discussed the importance of trying to follow a Mediterranean diet, with an emphasis on vegetables at every meal, with lots whole grains, and protein from either plant-based sources, or poultry and fish.   - Diet- I have given her 'the diabetes plate' handout  - Exercise- she will work on this    Return to clinic in 3 months    Prior to this encounter, I spent over 15 minutes with preparing for the visit,  including reviewing documents from other specialties as well as from PCP and going over test results. During the face to face encounter, I spent an additional 15 minutes which were determined for follow-up. Greater than 50% of the time was spent in counseling, anticipatory guidance, and coordination of care. Patient concerns were answered to the best of my knowledge.      2/24/25  Martha Lees MD

## 2025-02-24 NOTE — ED QUICK NOTES
Orders for admission, patient is aware of plan and ready to go upstairs. Any questions, please call ED RN yosi at extension [phone].     Patient Covid vaccination status: Fully vaccinated     COVID Test Ordered in ED: None    COVID Suspicion at Admission: N/A    Running Infusions:  None    Mental Status/LOC at time of transport: x4    Other pertinent information:   CIWA score: N/A   NIH score:  N/A

## 2025-02-24 NOTE — ED PROVIDER NOTES
Patient Seen in: [facility] Emergency Department      History     Chief Complaint   Patient presents with    Hyperglycemia    Urinary Symptoms     Stated Complaint: UTI    Subjective:   HPI      74-year-old female hx of MSA, IDDM, HTN, HLD, presenting for concerns of UTI and elevated blood sugar.  She is coming by her daughter provides some history.  Over the past month or so patient has had persistently elevated sugars in the 300s to as high as the 600s.  The past few days has developed urinary frequency, which concerned family for UTI.  She denies burning pain with urination blood in urine stream flank pain fevers or vomiting.  She has been compliant with her diabetes regimen, however notes that due to insurance issues has not been able to obtain Trulicity due to the prohibitive cost.  She takes Tresiba 36 units nightly, Humalog 11 units with meals.  She saw her endocrinologist yesterday and they started glipizide which she has not picked up yet.    Objective:     Past Medical History:    Arthritis    Back problem    Depression    Diabetes (HCC)    Essential hypertension    High blood pressure    High cholesterol    Hyperlipidemia    Visual impairment              History reviewed. No pertinent surgical history.             Social History     Socioeconomic History    Marital status:    Tobacco Use    Smoking status: Never    Smokeless tobacco: Never   Vaping Use    Vaping status: Never Used   Substance and Sexual Activity    Alcohol use: Not Currently     Comment: very infrequent, every couple of months    Drug use: Never     Social Drivers of Health     Food Insecurity: No Food Insecurity (2/24/2025)    NCSS - Food Insecurity     Worried About Running Out of Food in the Last Year: No     Ran Out of Food in the Last Year: No   Transportation Needs: No Transportation Needs (2/24/2025)    NCSS - Transportation     Lack of Transportation: No   Housing Stability: At Risk (2/24/2025)    NCSS -  Housing/Utilities     Has Housing: Yes     Worried About Losing Housing: Yes     Unable to Get Utilities: No                  Physical Exam     ED Triage Vitals   BP 02/24/25 1325 156/73   Pulse 02/24/25 1325 89   Resp 02/24/25 1325 18   Temp 02/24/25 1325 97.2 °F (36.2 °C)   Temp src 02/24/25 1945 Oral   SpO2 02/24/25 1325 99 %   O2 Device 02/24/25 1325 None (Room air)       Current Vitals:   Vital Signs  BP: 138/72  Pulse: 90  Resp: 16  Temp: 97.5 °F (36.4 °C)  Temp src: Oral  MAP (mmHg): 82    Oxygen Therapy  SpO2: 94 %  O2 Device: None (Room air)        Physical Exam  Constitutional: awake, alert, no sig distress  HENT: mmm, no lesions,  Neck: normal range of motion, no tenderness, supple.  Eyes: PERRL, EOMI, conjunctiva normal, no discharge. Sclera anicteric.  Cardiovascular: rr no murmur  Respiratory: Normal breath sounds, no respiratory distress, no wheezing, no chest tenderness.  GI: Bowel sounds normal, Soft, no tenderness, no masses, no pulsatile masses.  : No CVA tenderness.  Skin: Warm, dry, no erythema, no rash.  Musculoskeletal: Intact distal pulses, no edema, no tenderness, no cyanosis, no clubbing. Good range of motion in all major joints. No tenderness to palpation or major deformities noted. Back- No tenderness.  Neurologic: Alert & oriented x 3, normal motor function, normal sensory function, no focal deficits noted.  Psych: Calm, cooperative, nl affect        ED Course     Labs Reviewed   COMP METABOLIC PANEL (14) - Abnormal; Notable for the following components:       Result Value    Glucose 310 (*)     Sodium 134 (*)     Creatinine 1.03 (*)     eGFR-Cr 57 (*)     ALT 8 (*)     All other components within normal limits   URINALYSIS WITH CULTURE REFLEX - Abnormal; Notable for the following components:    Clarity Urine Turbid (*)     Glucose Urine 1000 (*)     Blood Urine Trace (*)     Protein Urine 50 (*)     Nitrite Urine 2+ (*)     Leukocyte Esterase Urine 500 (*)     WBC Urine >50 (*)      RBC Urine 6-10 (*)     Bacteria Urine 2+ (*)     Squamous Epi. Cells Few (*)     Hyaline Casts Present (*)     WBC Clump Present (*)     All other components within normal limits   POCT GLUCOSE - Abnormal; Notable for the following components:    POC Glucose  316 (*)     All other components within normal limits   POCT GLUCOSE - Abnormal; Notable for the following components:    POC Glucose  275 (*)     All other components within normal limits   POCT GLUCOSE - Abnormal; Notable for the following components:    POC Glucose  249 (*)     All other components within normal limits   CBC WITH DIFFERENTIAL WITH PLATELET   RAINBOW DRAW LAVENDER   RAINBOW DRAW LIGHT GREEN   RAINBOW DRAW BLUE   RAINBOW DRAW GOLD   URINE CULTURE, ROUTINE       ED Course as of 02/24/25 2243  ------------------------------------------------------------  Time: 02/24 1526  Value: Glucose(!): 310  Comment: Took Tresiba 36 u last night, and humalog 11u so far today              MDM      74F hx as above presenting with hyperglycemia, urinary frequency  On arrival vss, reassuring  -pt does not appear septic or systemically ill  Ddx: UTI, urinary frequency d/t hyperglycemia, metabolic derangement, DKA    Admission disposition: 2/24/2025  5:48 PM       UA +, started rocephin after review of culture data.  I had shared decision-making conversation with patient's daughter who is caretaker.  She states the patient is very unsteady on feet.  Has multiple recent falls and feels uncomfortable with the prospect of discharge due to gait instability.  For this reason we will admit.  Patient is not septic or systemically ill.  Medical Decision Making      Disposition and Plan     Clinical Impression:  1. Acute cystitis without hematuria    2. Unsteady gait         Disposition:  Admit  2/24/2025  5:48 pm    Follow-up:  No follow-up provider specified.  We recommend that you schedule follow up care with a primary care provider within the next three months to  obtain basic health screening including reassessment of your blood pressure.      Medications Prescribed:  Current Discharge Medication List              Supplementary Documentation:         Hospital Problems       Present on Admission  Date Reviewed: 1/22/2025            ICD-10-CM Noted POA    * (Principal) Acute cystitis without hematuria N30.00 2/24/2025 Unknown    Acute cystitis N30.00 2/24/2025 Unknown    Unsteady gait R26.81 2/24/2025 Unknown

## 2025-02-24 NOTE — ED INITIAL ASSESSMENT (HPI)
Pt c/o of hyperglycemia and UTI. Per pt her BS has been in the 300s. Denies taking abx. Denies fevers.

## 2025-02-25 LAB
ANION GAP SERPL CALC-SCNC: 6 MMOL/L (ref 0–18)
BASOPHILS # BLD AUTO: 0.03 X10(3) UL (ref 0–0.2)
BASOPHILS NFR BLD AUTO: 0.4 %
BUN BLD-MCNC: 14 MG/DL (ref 9–23)
BUN/CREAT SERPL: 16.7 (ref 10–20)
CALCIUM BLD-MCNC: 8.6 MG/DL (ref 8.7–10.4)
CHLORIDE SERPL-SCNC: 106 MMOL/L (ref 98–112)
CO2 SERPL-SCNC: 27 MMOL/L (ref 21–32)
CREAT BLD-MCNC: 0.84 MG/DL
DEPRECATED RDW RBC AUTO: 38.8 FL (ref 35.1–46.3)
EGFRCR SERPLBLD CKD-EPI 2021: 73 ML/MIN/1.73M2 (ref 60–?)
EOSINOPHIL # BLD AUTO: 0.11 X10(3) UL (ref 0–0.7)
EOSINOPHIL NFR BLD AUTO: 1.5 %
ERYTHROCYTE [DISTWIDTH] IN BLOOD BY AUTOMATED COUNT: 12.7 % (ref 11–15)
GLUCOSE BLD-MCNC: 260 MG/DL (ref 70–99)
GLUCOSE BLDC GLUCOMTR-MCNC: 287 MG/DL (ref 70–99)
GLUCOSE BLDC GLUCOMTR-MCNC: 316 MG/DL (ref 70–99)
GLUCOSE BLDC GLUCOMTR-MCNC: 339 MG/DL (ref 70–99)
GLUCOSE BLDC GLUCOMTR-MCNC: 345 MG/DL (ref 70–99)
HCT VFR BLD AUTO: 33.7 %
HGB BLD-MCNC: 11.5 G/DL
IMM GRANULOCYTES # BLD AUTO: 0.03 X10(3) UL (ref 0–1)
IMM GRANULOCYTES NFR BLD: 0.4 %
LYMPHOCYTES # BLD AUTO: 1.89 X10(3) UL (ref 1–4)
LYMPHOCYTES NFR BLD AUTO: 25.9 %
MCH RBC QN AUTO: 28.5 PG (ref 26–34)
MCHC RBC AUTO-ENTMCNC: 34.1 G/DL (ref 31–37)
MCV RBC AUTO: 83.4 FL
MONOCYTES # BLD AUTO: 0.47 X10(3) UL (ref 0.1–1)
MONOCYTES NFR BLD AUTO: 6.4 %
MRSA DNA SPEC QL NAA+PROBE: NEGATIVE
NEUTROPHILS # BLD AUTO: 4.78 X10 (3) UL (ref 1.5–7.7)
NEUTROPHILS # BLD AUTO: 4.78 X10(3) UL (ref 1.5–7.7)
NEUTROPHILS NFR BLD AUTO: 65.4 %
OSMOLALITY SERPL CALC.SUM OF ELEC: 297 MOSM/KG (ref 275–295)
PLATELET # BLD AUTO: 261 10(3)UL (ref 150–450)
POTASSIUM SERPL-SCNC: 4.1 MMOL/L (ref 3.5–5.1)
RBC # BLD AUTO: 4.04 X10(6)UL
SODIUM SERPL-SCNC: 139 MMOL/L (ref 136–145)
WBC # BLD AUTO: 7.3 X10(3) UL (ref 4–11)

## 2025-02-25 PROCEDURE — 99233 SBSQ HOSP IP/OBS HIGH 50: CPT | Performed by: HOSPITALIST

## 2025-02-25 NOTE — PLAN OF CARE
Problem: Patient Centered Care  Goal: Patient preferences are identified and integrated in the patient's plan of care  Description: Interventions:  - What would you like us to know as we care for you? Home w/my daughter  - Provide timely, complete, and accurate information to patient/family  - Incorporate patient and family knowledge, values, beliefs, and cultural backgrounds into the planning and delivery of care  - Encourage patient/family to participate in care and decision-making at the level they choose  - Honor patient and family perspectives and choices  Outcome: Progressing     Problem: Diabetes/Glucose Control  Goal: Glucose maintained within prescribed range  Description: INTERVENTIONS:  - Monitor Blood Glucose as ordered  - Assess for signs and symptoms of hyperglycemia and hypoglycemia  - Administer ordered medications to maintain glucose within target range  - Assess barriers to adequate nutritional intake and initiate nutrition consult as needed  - Instruct patient on self management of diabetes  Outcome: Progressing     Problem: Patient/Family Goals  Goal: Patient/Family Long Term Goal  Description: Patient's Long Term Goal: go back home stronger    Interventions:  - IV abx for UTI  -PT/OT  - See additional Care Plan goals for specific interventions  Outcome: Progressing  Goal: Patient/Family Short Term Goal  Description: Patient's Short Term Goal: No falls    Interventions:   - fall precautions in place  --PT to work with patient   - See additional Care Plan goals for specific interventions  Outcome: Progressing     Problem: RISK FOR INFECTION - ADULT  Goal: Absence of fever/infection during anticipated neutropenic period  Description: INTERVENTIONS  - Monitor WBC  - Administer growth factors as ordered  - Implement neutropenic guidelines  Outcome: Progressing     Problem: SAFETY ADULT - FALL  Goal: Free from fall injury  Description: INTERVENTIONS:  - Assess pt frequently for physical needs  -  Identify cognitive and physical deficits and behaviors that affect risk of falls.  - [place] fall precautions as indicated by assessment.  - Educate pt/family on patient safety including physical limitations  - Instruct pt to call for assistance with activity based on assessment  - Modify environment to reduce risk of injury  - Provide assistive devices as appropriate  - Consider OT/PT consult to assist with strengthening/mobility  - Encourage toileting schedule  Outcome: Progressing     Problem: DISCHARGE PLANNING  Goal: Discharge to home or other facility with appropriate resources  Description: INTERVENTIONS:  - Identify barriers to discharge w/pt and caregiver  - Include patient/family/discharge partner in discharge planning  - Arrange for needed discharge resources and transportation as appropriate  - Identify discharge learning needs (meds, wound care, etc)  - Arrange for interpreters to assist at discharge as needed  - Consider post-discharge preferences of patient/family/discharge partner  - Complete POLST form as appropriate  - Assess patient's ability to be responsible for managing their own health  - Refer to Case Management Department for coordinating discharge planning if the patient needs post-hospital services based on physician/LIP order or complex needs related to functional status, cognitive ability or social support system  Outcome: Progressing

## 2025-02-25 NOTE — H&P
[facility]    PATIENT'S NAME: АННА SHEPPARD   ATTENDING PHYSICIAN: Keaton James MD   PATIENT ACCOUNT#:   [id number]    LOCATION:  [address]  MEDICAL RECORD #:   [id number]       YOB: 1950  ADMISSION DATE:       02/24/2025    HISTORY AND PHYSICAL EXAMINATION    CHIEF COMPLAINT:  Urinary tract infection and gait imbalance.    HISTORY OF PRESENT ILLNESS:  The patient is a 74-year-old  female with known underlying multiple system atrophy and gait abnormality and chronic orthostatic hypotension, brought in today for evaluation by her family for progressive confusion, refusal to take medications and hyperglycemia.  In the emergency room, CBC and chemistry were unremarkable except for GFR of 57 which is below her baseline.  Glucose 310.  Urinalysis showed gross urinary tract infection.  Urine cultures were obtained.  Started on IV Rocephin, and she will be admitted to the hospital for further management.    PAST MEDICAL HISTORY:  Diabetes mellitus type 2, hypertension, hyperlipidemia, degenerative joint disease of lumbar spine, depression, osteoarthritis, chronic gait imbalance, and orthostatic hypotension with possible underlying multiple system atrophy.    PAST SURGICAL HISTORY:  None.    MEDICATIONS:  Please see medication reconciliation list.     ALLERGIES:  No known drug allergies.    FAMILY HISTORY:  Positive for hypertension and diabetes mellitus type 2.    SOCIAL HISTORY:  No tobacco, alcohol, or drug use.  Usually requires some assistance in her basic activities of daily living.     REVIEW OF SYSTEMS:  Per the family, patient has been more confused and refusing her medications with progressive hyperglycemia.  She has been having gait imbalance, and she has been tending to fall at home, but no witnessed falls by the family.  Other 12-point review of systems is unobtainable from the patient herself.      PHYSICAL EXAMINATION:    GENERAL:  Alert, able to answer  simple questions but at times confused.  VITAL SIGNS:  Temperature 97.2, pulse 93, respiratory rate 16, blood pressure 139/52, pulse ox 97% on room air.    HEENT:  Atraumatic.  Oropharynx clear.  Dry mucous membranes.  Ears, nose normal.  Eyes:  Anicteric sclerae.   NECK:  Supple.  No lymphadenopathy.  Trachea midline.  Full range of motion.  LUNGS:  Clear to auscultation bilaterally.  Normal respiratory effort.    HEART:  Regular rate, rhythm.  S1 and S2 auscultated.  No murmur.   ABDOMEN:  Soft, nondistended.  No tenderness.  Positive bowel sounds.    EXTREMITIES:  No peripheral edema, clubbing, or cyanosis.  NEUROLOGIC:  No clear focal defects.    ASSESSMENT:    1.   Urinary tract infection.  2.   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  3.   Increased gait imbalance.  4.   Diabetes mellitus type 2, uncontrolled.  Refusal to take medications.    PLAN:  Patient will be admitted to general medical floor.  IV fluids.  Rocephin.  Continue home medications.  Monitor Accu-Cheks.  Physical therapy and  evaluation.  Further recommendations to follow.     Dictated By Melody Barton MD  d: 02/24/2025 18:11:17  t: 02/24/2025 18:31:13  Job [id number]  FB/    cc: Keaton James MD

## 2025-02-25 NOTE — PHYSICAL THERAPY NOTE
PHYSICAL THERAPY EVALUATION - INPATIENT     Room Number: 528/528-A  Evaluation Date: 2/25/2025  Type of Evaluation: Initial   Physician Order: PT Eval and Treat    Presenting Problem: acute cystitis without hematuria     Reason for Therapy: Mobility Dysfunction and Discharge Planning    PHYSICAL THERAPY ASSESSMENT   Patient is a 74 year old female admitted 2/24/2025 for acute cystitis without hematuria.  Prior to admission, patient's baseline is independent with ADLs and functional mobility with RW, intermittent assist from family.  Patient is currently functioning below baseline with bed mobility, transfers, gait, stair negotiation, standing prolonged periods, and performing household tasks.  Patient is requiring up to minimal assist as a result of the following impairments: decreased functional strength, pain, impaired dynamic standing balance, decreased muscular endurance, and medical status.  Physical Therapy will continue to follow for duration of hospitalization.    Patient will benefit from continued skilled PT Services to promote return to prior level of function and safety with continuous assistance and gradual rehabilitative therapy .    PLAN DURING HOSPITALIZATION  Nursing Mobility Recommendation : 1 Assist  PT Device Recommendation: Rolling walker;Gait belt  PT Treatment Plan: Bed mobility;Body mechanics;Endurance;Energy conservation;Patient education;Gait training;Strengthening;Stair training;Transfer training;Balance training  Rehab Potential : Good  Frequency (Obs): 3-5x/week     PHYSICAL THERAPY MEDICAL/SOCIAL HISTORY     Problem List  Principal Problem:    Acute cystitis without hematuria  Active Problems:    Acute cystitis    Unsteady gait    HOME SITUATION  Type of Home: House  Home Layout: Multi-level;Bed/bath upstairs  Stairs to Enter : 4   Railing: Yes    Stairs to bedroom: 6  Lives With: Daughter;Family    Patient Regularly Uses: Rolling walker     SUBJECTIVE  \"I just keep falling\"      PHYSICAL THERAPY EXAMINATION   OBJECTIVE  Precautions: Bed/chair alarm  Fall Risk: High fall risk    PAIN ASSESSMENT  Rating:  (did not rate)  Location: back, bilateral ankles  Management Techniques: Activity promotion;Body mechanics;Repositioning    COGNITION  Overall Cognitive Status:  WFL - within functional limits    RANGE OF MOTION AND STRENGTH ASSESSMENT  Upper extremity ROM and strength are within functional limits.  Lower extremity ROM is within functional limits.  Lower extremity strength is within functional limits.    BALANCE  Static Sitting: Good  Dynamic Sitting: Fair +  Static Standing: Fair -  Dynamic Standing: Fair -    AM-PAC '6-Clicks' INPATIENT SHORT FORM - BASIC MOBILITY  How much difficulty does the patient currently have...  Patient Difficulty: Turning over in bed (including adjusting bedclothes, sheets and blankets)?: A Little   Patient Difficulty: Sitting down on and standing up from a chair with arms (e.g., wheelchair, bedside commode, etc.): A Little   Patient Difficulty: Moving from lying on back to sitting on the side of the bed?: A Little   How much help from another person does the patient currently need...   Help from Another: Moving to and from a bed to a chair (including a wheelchair)?: A Little   Help from Another: Need to walk in hospital room?: A Little   Help from Another: Climbing 3-5 steps with a railing?: A Lot     AM-PAC Score:  Raw Score: 17   Approx Degree of Impairment: 50.57%   Standardized Score (AM-PAC Scale): 42.13   CMS Modifier (G-Code): CK    FUNCTIONAL ABILITY STATUS  Functional Mobility/Gait Assessment  Gait Assistance: Minimum assistance;Contact guard assist  Distance (ft): 45  Assistive Device: Rolling walker  Pattern: Shuffle (slow, unsteady, no LOB)  Supine to Sit: minimal assist  Sit to Stand: contact guard assist; minimum assist from chair     Exercise/Education Provided:  Education Provided To: Patient     Patient Education: Role of Physical  Therapy;Plan of Care;DME Recommendations;Functional Transfer Techniques;Fall Prevention;Posture/Positioning;Energy Conservation;Proper Body Mechanics;Gait Training     Patient's Response to Education: Verbalized Understanding;Requires Further Education     Skilled Therapy Provided: Pt received resting in bed and agreeable to activity. C/o generalized pain in back and bilateral ankles with walking. Demos bed mobility with use of bed modifications. Demos STS transfer from bed with RW, ambulated in room with RW. Slow and shuffled, unsteady gait but no overt LOB. After seated rest break, required min A and several attempts to complete STS from chair. Needs repeated cues for safe hand placement and body mechanics prior to transfer. Pt was left sitting in chair with alarm on, needs within reach, handoff to RN complete.     The patient's Approx Degree of Impairment: 50.57% has been calculated based on documentation in the [facility] '6 clicks' Inpatient Basic Mobility Short Form.  Research supports that patients with this level of impairment may benefit from rehab facility.  Final disposition will be made by interdisciplinary medical team.    Patient End of Session: Up in chair;Needs met;Call light within reach;RN aware of session/findings;All patient questions and concerns addressed;Hospital anti-slip socks;Alarm set;Discussed recommendations with /    CURRENT GOALS  Goals to be met by: 3/4/25  Patient Goal Patient's self-stated goal is: go home   Goal #1 Patient is able to demonstrate supine - sit EOB @ level: supervision     Goal #1   Current Status    Goal #2 Patient is able to demonstrate transfers Sit to/from Stand at assistance level: supervision with walker - rolling     Goal #2  Current Status    Goal #3 Patient is able to ambulate 150 feet with assist device: walker - rolling at assistance level: supervision   Goal #3   Current Status    Goal #4 Patient will negotiate 6 stairs/one curb w/  assistive device and supervision   Goal #4   Current Status    Goal #5 Patient to demonstrate independence with home activity/exercise instructions provided to patient in preparation for discharge.   Goal #5   Current Status    Goal #6    Goal #6  Current Status      Patient Evaluation Complexity Level:  History Moderate - 1 or 2 personal factors and/or co-morbidities   Examination of body systems Moderate - addressing a total of 3 or more elements   Clinical Presentation  Moderate - Evolving   Clinical Decision Making  Moderate Complexity     Therapeutic Activity:  25 minutes

## 2025-02-25 NOTE — PROGRESS NOTES
[facility]  part of [facility]    Progress Note    Ananya Dowling Patient Status:  Observation    1950 MRN [id number]   Location [facility] 5SW/SE Attending Kera Winter,    Hosp Day # 0 PCP Jermaine Monson MD     Chief complaint confusion, uti    Subjective:   Ananya Dowling is a(n) 74 year old female pt much better today     ROS:   No cp, sob   No c/d   No n/v     Objective:   Blood pressure 134/45, pulse 82, temperature 98 °F (36.7 °C), temperature source Oral, resp. rate 18, height 5' 1\" (1.549 m), weight 127 lb 3.2 oz (57.7 kg), SpO2 98%.      Intake/Output Summary (Last 24 hours) at 2025 1703  Last data filed at 2025 1300  Gross per 24 hour   Intake 2513.15 ml   Output --   Net 2513.15 ml       Patient Weight(s) for the past 336 hrs:   Weight   25 127 lb 3.2 oz (57.7 kg)           General appearance: alert, appears stated age and cooperative  Pulmonary:  clear to auscultation bilaterally  Cardiovascular: S1, S2 normal, no murmur, click, rub or gallop, regular rate and rhythm  Abdominal: soft, non-tender; bowel sounds normal; no masses,  no organomegaly  Extremities: extremities normal, atraumatic, no cyanosis or edema        Medicines:     Current Facility-Administered Medications   Medication Dose Route Frequency    sodium chloride 0.9% infusion   Intravenous Continuous    heparin (Porcine) 5000 UNIT/ML injection 5,000 Units  5,000 Units Subcutaneous 2 times per day    acetaminophen (Tylenol Extra Strength) tab 500 mg  500 mg Oral Q4H PRN    ondansetron (Zofran) 4 MG/2ML injection 4 mg  4 mg Intravenous Q6H PRN    OLANZapine (Zyprexa) 5 mg in sterile water for injection (PF) IM injection  5 mg Intramuscular Q12H PRN    glucose (Dex4) 15 GM/59ML oral liquid 15 g  15 g Oral Q15 Min PRN    Or    glucose (Glutose) 40% oral gel 15 g  15 g Oral Q15 Min PRN    Or    glucose-vitamin C (Dex-4) chewable tab 4 tablet  4 tablet Oral Q15 Min PRN    Or     dextrose 50% injection 50 mL  50 mL Intravenous Q15 Min PRN    Or    glucose (Dex4) 15 GM/59ML oral liquid 30 g  30 g Oral Q15 Min PRN    Or    glucose (Glutose) 40% oral gel 30 g  30 g Oral Q15 Min PRN    Or    glucose-vitamin C (Dex-4) chewable tab 8 tablet  8 tablet Oral Q15 Min PRN    insulin aspart (NovoLOG) 100 Units/mL FlexPen 1-7 Units  1-7 Units Subcutaneous TID CC and HS    cefTRIAXone (Rocephin) 2 g in sodium chloride 0.9% 100 mL IVPB-ADDV  2 g Intravenous Q24H    hydrALAzine (Apresoline) 20 mg/mL injection 10 mg  10 mg Intravenous Q6H PRN           Ant-Infective Medications            methenamine 1 g Oral Tab            Blood Sugar Medications            insulin lispro (HUMALOG) 100 UNIT/ML Injection Solution    TRESIBA FLEXTOUCH 200 UNIT/ML Subcutaneous Solution Pen-injector            Blood Pressure and Cardiac Medications            losartan 50 MG Oral Tab    midodrine 5 MG Oral Tab             sodium chloride 83 mL/hr at 02/25/25 0702           Lab Results   Component Value Date    WBC 7.3 02/25/2025    HGB 11.5 (L) 02/25/2025    HCT 33.7 (L) 02/25/2025    .0 02/25/2025    CREATSERUM 0.84 02/25/2025    BUN 14 02/25/2025     02/25/2025    K 4.1 02/25/2025     02/25/2025    CO2 27.0 02/25/2025     (H) 02/25/2025    CA 8.6 (L) 02/25/2025    ALB 4.2 02/24/2025    ALKPHO 80 02/24/2025    BILT 0.2 02/24/2025    TP 7.7 02/24/2025    AST 16 02/24/2025    ALT 8 (L) 02/24/2025    T4F 1.0 04/19/2022    TSH 3.562 01/16/2025    MG 2.0 08/25/2024    ETOH <3 01/14/2025       No results found.        Results:     CBC:    Lab Results   Component Value Date    WBC 7.3 02/25/2025    WBC 9.7 02/24/2025    WBC 8.6 01/16/2025     Lab Results   Component Value Date    HGB 11.5 (L) 02/25/2025    HGB 12.0 02/24/2025    HGB 12.3 01/16/2025      Lab Results   Component Value Date    .0 02/25/2025    .0 02/24/2025    .0 01/16/2025       Recent Labs   Lab 02/24/25  1352  02/25/25  0546   * 260*   BUN 19 14   CREATSERUM 1.03* 0.84   CA 9.4 8.6*   * 139   K 3.8 4.1   CL 99 106   CO2 27.0 27.0           Assessment and Plan:        ASSESSMENT:    1.       Urinary tract infection. Await final cx. Cont iv abx   2.       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 Pt better today   3.       Increased gait imbalance. Pt/ot   4.       Diabetes mellitus type 2, uncontrolled.  Refusal to take medications. Cont iss                 Kera Winter,          Chart reviewed, including current vitals, notes, labs and imaging  Labs ordered and medications adjusted as outlined above  Coordinate care with care team/consultants  Discussed with patient results of tests, management plan as outlined above, and the need for ongoing hospitalization  D/w RN     [facility]        2/25/2025       PHYSICIAN Certification of Need for Inpatient Hospitalization - Initial Certification    Patient will require inpatient services that will reasonably be expected to span two midnight's based on the clinical documentation in H+P.   Based on patients current state of illness, I anticipate that, after discharge, patient will require TBD.        Supplementary Documentation:   DVT Mechanical Prophylaxis:        DVT Pharmacologic Prophylaxis   Medication    heparin (Porcine) 5000 UNIT/ML injection 5,000 Units                Code Status: Full Code  Calzada: External urinary catheter in place  Calzada Duration (in days):   Central line:    MIRIAM: 2/27/2025        **Certification      PHYSICIAN Certification of Need for Inpatient Hospitalization - Initial Certification    Patient will require inpatient services that will reasonably be expected to span two midnight's based on the clinical documentation in H+P.   Based on patients current state of illness, I anticipate that, after discharge, patient will require TBD.

## 2025-02-25 NOTE — BH PROGRESS NOTE
Care Coordinator Mental Health was consulted for PHQ-4 score = 6.     Writer met with pt at bedside. Pt not appropriate for PHQ-4 follow up due to confusion. Pt not able to participate in meeting with writer due to mentation. No further follow up at this time.

## 2025-02-25 NOTE — CM/SW NOTE
02/25/25 1200   CM/SW Referral Data   Referral Source Physician   Reason for Referral Discharge planning;Financial issues   Informant Patient;EMR;Clinical Staff Member   Medical Hx   Does patient have an established PCP? Yes  (Dr. Jermaine Monson)   Significant Past Medical/Mental Health Hx DMll; MDD   Patient Info   Advanced directives? No   Information provided? No   Patient's Home Environment House   Number of Levels in Home 1   Number of Stair in Home 6 KERI   Patient lives with Daughter;Grandchild   Patient Status Prior to Admission   Independent with ADLs and Mobility Yes   Services in place prior to admission DME/Supplies at home   Type of DME/Supplies Wheeled Walker   Discharge Needs   Anticipated D/C needs Home health care;Caregiver services;Long term care facility     SW received MD order for SDOH/discharge planning.    Pt admitted under observation for hyperglycemia/UTI.    SW met with pt bedside to complete assessment.    Pt seen up in the chair, Aox4.    SW confirmed address and PCP on file.    Pt confirmed she is from home w/her daughter JUANA Leung and granddaughter Anne.    Pt stated she uses a RW at home and is independent with ADLs.    Per EMR, it appears there have been recurring family dynamic issues with the patient and her family.  Pt has had several admissions and ER visits.    CCM/ EDCM and outpatient team appear to be involved with her care and have offered resources multiple times.  Pt and daughter have been made aware LTC is not an option based on finances.    SW informed pt that her daughter  has expressed concern to care team about caregiver fatigue.  Pt confirmed her daughter often expresses frustration and wants her to find her own living arrangement.    Pt stated that she cannot afford senior living but she makes $2500 and is over-income for Medicaid.  Pt stated she gives her daughter $700 per month to cover rent/household expenses.    Anticipated therapy need: Gradual Rehabilitative  Therapy.     Pt was discharged from [facility] 1/6 after she was issued notice of Medicare non-coverage.  SW stated we will likely not get approval for her to go back as she is functioning too well.    SW stated that without a payor source for rehab her only option is to go back to her daughter's home.  Pt v/u.    SW asked if she would be willing to use some of her SS income to pay for part-time caregivers and she was not sure.      It appears AMADOR Aldana made a referral to [facility] on 2/24.    PLAN: DC home w/family pending med clear    / to remain available for support and/or discharge planning.     Pamela DUMONT MSW, LSW  Discharge Planner

## 2025-02-25 NOTE — PLAN OF CARE
Problem: Patient Centered Care  Goal: Patient preferences are identified and integrated in the patient's plan of care  Description: Interventions:  - What would you like us to know as we care for you? Home w/my daughter  - Provide timely, complete, and accurate information to patient/family  - Incorporate patient and family knowledge, values, beliefs, and cultural backgrounds into the planning and delivery of care  - Encourage patient/family to participate in care and decision-making at the level they choose  - Honor patient and family perspectives and choices  Outcome: Progressing     Problem: Diabetes/Glucose Control  Goal: Glucose maintained within prescribed range  Description: INTERVENTIONS:  - Monitor Blood Glucose as ordered  - Assess for signs and symptoms of hyperglycemia and hypoglycemia  - Administer ordered medications to maintain glucose within target range  - Assess barriers to adequate nutritional intake and initiate nutrition consult as needed  - Instruct patient on self management of diabetes  Outcome: Progressing     Problem: Patient/Family Goals  Goal: Patient/Family Long Term Goal  Description: Patient's Long Term Goal: go back home stronger    Interventions:  - IV abx for UTI  -PT/OT  - See additional Care Plan goals for specific interventions  Outcome: Progressing  Goal: Patient/Family Short Term Goal  Description: Patient's Short Term Goal: No falls    Interventions:   - fall precautions in place  --PT to work with patient   - See additional Care Plan goals for specific interventions  Outcome: Progressing     Problem: RISK FOR INFECTION - ADULT  Goal: Absence of fever/infection during anticipated neutropenic period  Description: INTERVENTIONS  - Monitor WBC  - Administer growth factors as ordered  - Implement neutropenic guidelines  Outcome: Progressing     Problem: SAFETY ADULT - FALL  Goal: Free from fall injury  Description: INTERVENTIONS:  - Assess pt frequently for physical needs  -  Identify cognitive and physical deficits and behaviors that affect risk of falls.  - [place] fall precautions as indicated by assessment.  - Educate pt/family on patient safety including physical limitations  - Instruct pt to call for assistance with activity based on assessment  - Modify environment to reduce risk of injury  - Provide assistive devices as appropriate  - Consider OT/PT consult to assist with strengthening/mobility  - Encourage toileting schedule  Outcome: Progressing     Problem: DISCHARGE PLANNING  Goal: Discharge to home or other facility with appropriate resources  Description: INTERVENTIONS:  - Identify barriers to discharge w/pt and caregiver  - Include patient/family/discharge partner in discharge planning  - Arrange for needed discharge resources and transportation as appropriate  - Identify discharge learning needs (meds, wound care, etc)  - Arrange for interpreters to assist at discharge as needed  - Consider post-discharge preferences of patient/family/discharge partner  - Complete POLST form as appropriate  - Assess patient's ability to be responsible for managing their own health  - Refer to Case Management Department for coordinating discharge planning if the patient needs post-hospital services based on physician/LIP order or complex needs related to functional status, cognitive ability or social support system  Outcome: Progressing

## 2025-02-26 LAB
ANION GAP SERPL CALC-SCNC: 7 MMOL/L (ref 0–18)
BASOPHILS # BLD AUTO: 0.03 X10(3) UL (ref 0–0.2)
BASOPHILS NFR BLD AUTO: 0.4 %
BUN BLD-MCNC: 13 MG/DL (ref 9–23)
BUN/CREAT SERPL: 16.3 (ref 10–20)
CALCIUM BLD-MCNC: 8.9 MG/DL (ref 8.7–10.4)
CHLORIDE SERPL-SCNC: 106 MMOL/L (ref 98–112)
CO2 SERPL-SCNC: 25 MMOL/L (ref 21–32)
CREAT BLD-MCNC: 0.8 MG/DL
DEPRECATED RDW RBC AUTO: 39 FL (ref 35.1–46.3)
EGFRCR SERPLBLD CKD-EPI 2021: 78 ML/MIN/1.73M2 (ref 60–?)
EOSINOPHIL # BLD AUTO: 0.16 X10(3) UL (ref 0–0.7)
EOSINOPHIL NFR BLD AUTO: 2.1 %
ERYTHROCYTE [DISTWIDTH] IN BLOOD BY AUTOMATED COUNT: 12.8 % (ref 11–15)
GLUCOSE BLD-MCNC: 275 MG/DL (ref 70–99)
GLUCOSE BLDC GLUCOMTR-MCNC: 266 MG/DL (ref 70–99)
GLUCOSE BLDC GLUCOMTR-MCNC: 268 MG/DL (ref 70–99)
GLUCOSE BLDC GLUCOMTR-MCNC: 334 MG/DL (ref 70–99)
GLUCOSE BLDC GLUCOMTR-MCNC: 368 MG/DL (ref 70–99)
HCT VFR BLD AUTO: 32.1 %
HGB BLD-MCNC: 10.8 G/DL
IMM GRANULOCYTES # BLD AUTO: 0.03 X10(3) UL (ref 0–1)
IMM GRANULOCYTES NFR BLD: 0.4 %
LYMPHOCYTES # BLD AUTO: 1.91 X10(3) UL (ref 1–4)
LYMPHOCYTES NFR BLD AUTO: 25 %
MCH RBC QN AUTO: 28.3 PG (ref 26–34)
MCHC RBC AUTO-ENTMCNC: 33.6 G/DL (ref 31–37)
MCV RBC AUTO: 84 FL
MONOCYTES # BLD AUTO: 0.45 X10(3) UL (ref 0.1–1)
MONOCYTES NFR BLD AUTO: 5.9 %
NEUTROPHILS # BLD AUTO: 5.05 X10 (3) UL (ref 1.5–7.7)
NEUTROPHILS # BLD AUTO: 5.05 X10(3) UL (ref 1.5–7.7)
NEUTROPHILS NFR BLD AUTO: 66.2 %
OSMOLALITY SERPL CALC.SUM OF ELEC: 296 MOSM/KG (ref 275–295)
PLATELET # BLD AUTO: 250 10(3)UL (ref 150–450)
POTASSIUM SERPL-SCNC: 4 MMOL/L (ref 3.5–5.1)
RBC # BLD AUTO: 3.82 X10(6)UL
SODIUM SERPL-SCNC: 138 MMOL/L (ref 136–145)
WBC # BLD AUTO: 7.6 X10(3) UL (ref 4–11)

## 2025-02-26 PROCEDURE — 99233 SBSQ HOSP IP/OBS HIGH 50: CPT | Performed by: HOSPITALIST

## 2025-02-26 NOTE — PROGRESS NOTES
[facility]  part of [facility]    Progress Note    Ananya Dowling Patient Status:  Observation    1950 MRN [id number]   Location [facility] 5SW/SE Attending Malgorzata Soto,*   Hosp Day # 0 PCP Jermaine Monson MD     Chief Complaint: ok    Subjective:     Constitutional:  Positive for fatigue. Negative for appetite change and chills.   HENT:  Negative for congestion.    Respiratory:  Negative for cough, chest tightness and shortness of breath.    Cardiovascular:  Negative for leg swelling.   Gastrointestinal:  Negative for abdominal pain.   Genitourinary:  Positive for dysuria.   Musculoskeletal:  Negative for joint pain.   Neurological:  Positive for weakness.   Psychiatric/Behavioral:  Positive for sleep disturbance. Negative for decreased concentration. The patient is not nervous/anxious.        Objective:   Blood pressure 148/55, pulse 80, temperature 97.8 °F (36.6 °C), temperature source Oral, resp. rate 16, height 5' 1\" (1.549 m), weight 127 lb 3.2 oz (57.7 kg), SpO2 98%.  Physical Exam  Vitals and nursing note reviewed.   Constitutional:       General: She is not in acute distress.     Appearance: She is normal weight. She is ill-appearing. She is not toxic-appearing or diaphoretic.   HENT:      Head: Normocephalic and atraumatic.   Cardiovascular:      Rate and Rhythm: Normal rate.      Pulses: Normal pulses.      Heart sounds: Normal heart sounds.   Pulmonary:      Effort: Pulmonary effort is normal.      Breath sounds: Rhonchi present.   Abdominal:      General: Bowel sounds are normal.      Palpations: Abdomen is soft.      Tenderness: There is no abdominal tenderness.   Skin:     General: Skin is warm and dry.      Capillary Refill: Capillary refill takes less than 2 seconds.   Neurological:      Mental Status: She is alert. Mental status is at baseline.   Psychiatric:         Behavior: Behavior normal.         Results:   Lab Results   Component Value Date     WBC 7.6 02/26/2025    HGB 10.8 (L) 02/26/2025    HCT 32.1 (L) 02/26/2025    .0 02/26/2025    CREATSERUM 0.80 02/26/2025    BUN 13 02/26/2025     02/26/2025    K 4.0 02/26/2025     02/26/2025    CO2 25.0 02/26/2025     (H) 02/26/2025    CA 8.9 02/26/2025    ALB 4.2 02/24/2025    ALKPHO 80 02/24/2025    BILT 0.2 02/24/2025    TP 7.7 02/24/2025    AST 16 02/24/2025    ALT 8 (L) 02/24/2025    T4F 1.0 04/19/2022    TSH 3.562 01/16/2025    MG 2.0 08/25/2024    TROPHS 10 12/05/2024    ETOH <3 01/14/2025       No results found.        Assessment & Plan:     1.       Urinary tract infection. Await final cx. Cont iv abx sensitive change to ancef  2.       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 Pt better today   3.       Increased gait imbalance. Pt/ot walker at all times  4.       Diabetes mellitus type 2, uncontrolled.  Refusal to take medications. Cont iss      Complex mdm; coordinating care with nurse and counseling pt and with her permission her daughter on phone about uti/need walker          BI HATHAWAY MD

## 2025-02-27 LAB
ANION GAP SERPL CALC-SCNC: 5 MMOL/L (ref 0–18)
BASOPHILS # BLD AUTO: 0.03 X10(3) UL (ref 0–0.2)
BASOPHILS NFR BLD AUTO: 0.4 %
BUN BLD-MCNC: 13 MG/DL (ref 9–23)
BUN/CREAT SERPL: 15.3 (ref 10–20)
CALCIUM BLD-MCNC: 9 MG/DL (ref 8.7–10.4)
CHLORIDE SERPL-SCNC: 107 MMOL/L (ref 98–112)
CO2 SERPL-SCNC: 26 MMOL/L (ref 21–32)
CREAT BLD-MCNC: 0.85 MG/DL
DEPRECATED RDW RBC AUTO: 38.9 FL (ref 35.1–46.3)
EGFRCR SERPLBLD CKD-EPI 2021: 72 ML/MIN/1.73M2 (ref 60–?)
EOSINOPHIL # BLD AUTO: 0.19 X10(3) UL (ref 0–0.7)
EOSINOPHIL NFR BLD AUTO: 2.4 %
ERYTHROCYTE [DISTWIDTH] IN BLOOD BY AUTOMATED COUNT: 12.7 % (ref 11–15)
GLUCOSE BLD-MCNC: 326 MG/DL (ref 70–99)
GLUCOSE BLDC GLUCOMTR-MCNC: 227 MG/DL (ref 70–99)
GLUCOSE BLDC GLUCOMTR-MCNC: 314 MG/DL (ref 70–99)
GLUCOSE BLDC GLUCOMTR-MCNC: 374 MG/DL (ref 70–99)
GLUCOSE BLDC GLUCOMTR-MCNC: 390 MG/DL (ref 70–99)
HCT VFR BLD AUTO: 32.2 %
HGB BLD-MCNC: 10.7 G/DL
IMM GRANULOCYTES # BLD AUTO: 0.02 X10(3) UL (ref 0–1)
IMM GRANULOCYTES NFR BLD: 0.3 %
LYMPHOCYTES # BLD AUTO: 2.22 X10(3) UL (ref 1–4)
LYMPHOCYTES NFR BLD AUTO: 28.1 %
MAGNESIUM SERPL-MCNC: 1.9 MG/DL (ref 1.6–2.6)
MCH RBC QN AUTO: 27.5 PG (ref 26–34)
MCHC RBC AUTO-ENTMCNC: 33.2 G/DL (ref 31–37)
MCV RBC AUTO: 82.8 FL
MONOCYTES # BLD AUTO: 0.49 X10(3) UL (ref 0.1–1)
MONOCYTES NFR BLD AUTO: 6.2 %
NEUTROPHILS # BLD AUTO: 4.96 X10 (3) UL (ref 1.5–7.7)
NEUTROPHILS # BLD AUTO: 4.96 X10(3) UL (ref 1.5–7.7)
NEUTROPHILS NFR BLD AUTO: 62.6 %
OSMOLALITY SERPL CALC.SUM OF ELEC: 299 MOSM/KG (ref 275–295)
PHOSPHATE SERPL-MCNC: 4 MG/DL (ref 2.4–5.1)
PLATELET # BLD AUTO: 246 10(3)UL (ref 150–450)
POTASSIUM SERPL-SCNC: 3.9 MMOL/L (ref 3.5–5.1)
RBC # BLD AUTO: 3.89 X10(6)UL
SODIUM SERPL-SCNC: 138 MMOL/L (ref 136–145)
WBC # BLD AUTO: 7.9 X10(3) UL (ref 4–11)

## 2025-02-27 PROCEDURE — 99233 SBSQ HOSP IP/OBS HIGH 50: CPT | Performed by: HOSPITALIST

## 2025-02-27 RX ORDER — AMLODIPINE BESYLATE 2.5 MG/1
2.5 TABLET ORAL DAILY
Status: DISCONTINUED | OUTPATIENT
Start: 2025-02-27 | End: 2025-02-28

## 2025-02-27 RX ORDER — INSULIN DEGLUDEC 100 U/ML
23 INJECTION, SOLUTION SUBCUTANEOUS DAILY
Status: DISCONTINUED | OUTPATIENT
Start: 2025-02-27 | End: 2025-02-28

## 2025-02-27 NOTE — PHYSICAL THERAPY NOTE
PHYSICAL THERAPY TREATMENT NOTE - INPATIENT     Room Number: 528/528-A       Presenting Problem: acute cystitis without hematuria    Problem List  Principal Problem:    Acute cystitis without hematuria  Active Problems:    Acute cystitis    Unsteady gait    PHYSICAL THERAPY ASSESSMENT   Patient demonstrates good  progress this session, goals  remain in progress.      Patient is requiring minimal assist as a result of the following impairments: decreased functional strength.     Patient continues to function below baseline with bed mobility, transfers, gait, stair negotiation, standing prolonged periods, and performing household tasks.  Next session anticipate patient to progress bed mobility, transfers, and gait.  Physical Therapy will continue to follow patient for duration of hospitalization.    Patient continues to benefit from continued skilled PT services: to promote return to prior level of function and safety with continuous assistance and gradual rehabilitative therapy . If patient to return home, anticipate benefit from increased supervision/assistance for safety and  PT.     PLAN DURING HOSPITALIZATION  Nursing Mobility Recommendation : 1 Assist  PT Device Recommendation: Rolling walker;Gait belt  PT Treatment Plan: Bed mobility;Body mechanics;Endurance;Energy conservation;Patient education;Gait training;Strengthening;Stair training;Transfer training;Balance training  Frequency (Obs): 3-5x/week     SUBJECTIVE  Agreeable to activity.     OBJECTIVE  Precautions: Bed/chair alarm    PAIN ASSESSMENT   Ratin  Location: back, bilateral ankles  Management Techniques: Activity promotion;Body mechanics;Repositioning    BALANCE  Static Sitting: Good  Dynamic Sitting: Fair +  Static Standing: Fair  Dynamic Standing: Fair -    ACTIVITY TOLERANCE  Pulse: 98  BP: 117/90    O2 WALK  Oxygen Therapy  SPO2% Ambulation on Room Air: 99    AM-PAC '6-Clicks' INPATIENT SHORT FORM - BASIC MOBILITY  How much difficulty does  the patient currently have...  Patient Difficulty: Turning over in bed (including adjusting bedclothes, sheets and blankets)?: A Little   Patient Difficulty: Sitting down on and standing up from a chair with arms (e.g., wheelchair, bedside commode, etc.): A Little   Patient Difficulty: Moving from lying on back to sitting on the side of the bed?: A Little   How much help from another person does the patient currently need...   Help from Another: Moving to and from a bed to a chair (including a wheelchair)?: A Little   Help from Another: Need to walk in hospital room?: A Little   Help from Another: Climbing 3-5 steps with a railing?: A Lot     AM-PAC Score:  Raw Score: 17   Approx Degree of Impairment: 50.57%   Standardized Score (AM-PAC Scale): 42.13   CMS Modifier (G-Code): CK    FUNCTIONAL ABILITY STATUS  Functional Mobility/Gait Assessment  Gait Assistance: Contact guard assist  Distance (ft): 15 x 2  Assistive Device: Rolling walker  Pattern: Shuffle (slow, unsteady)  Supine to Sit: minimal assist  Sit to Stand: contact guard assist    Skilled Therapy Provided: Pt received resting in bed and agreeable to activity. Demos bed mobility with increased time. Demos STS transfers to/from bed, toilet, and chair with RW and CGA, cues given for safe hand placement and body mechanics. Assisted pt to ambulate to bathroom with RW and manage shani cares and hand washing, then to sit in the recliner chair. Pt reported fatigue with activity. Left sitting in chair with alarm on, needs within reach, handoff to RN complete.      The patient's Approx Degree of Impairment: 50.57% has been calculated based on documentation in the [facility] '6 clicks' Inpatient Daily Activity Short Form.  Research supports that patients with this level of impairment may benefit from rehab facility.  Final disposition will be made by interdisciplinary medical team.    Patient End of Session: Up in chair;Needs met;RN aware of session/findings;Call light  within reach;All patient questions and concerns addressed;Hospital anti-slip socks;Alarm set    CURRENT GOALS   Goals to be met by: 3/4/25  Patient Goal Patient's self-stated goal is: go home   Goal #1 Patient is able to demonstrate supine - sit EOB @ level: supervision      Goal #1   Current Status  In progress   Goal #2 Patient is able to demonstrate transfers Sit to/from Stand at assistance level: supervision with walker - rolling      Goal #2  Current Status In progress   Goal #3 Patient is able to ambulate 150 feet with assist device: walker - rolling at assistance level: supervision   Goal #3   Current Status In progress   Goal #4 Patient will negotiate 6 stairs/one curb w/ assistive device and supervision   Goal #4   Current Status In progress   Goal #5 Patient to demonstrate independence with home activity/exercise instructions provided to patient in preparation for discharge.   Goal #5   Current Status In progress   Goal #6     Goal #6  Current Status        Therapeutic Activity: 24 minutes

## 2025-02-27 NOTE — PLAN OF CARE
Problem: Patient Centered Care  Goal: Patient preferences are identified and integrated in the patient's plan of care  Description: Interventions:  - What would you like us to know as we care for you? Home w/my daughter  - Provide timely, complete, and accurate information to patient/family  - Incorporate patient and family knowledge, values, beliefs, and cultural backgrounds into the planning and delivery of care  - Encourage patient/family to participate in care and decision-making at the level they choose  - Honor patient and family perspectives and choices  Outcome: Progressing     Problem: Diabetes/Glucose Control  Goal: Glucose maintained within prescribed range  Description: INTERVENTIONS:  - Monitor Blood Glucose as ordered  - Assess for signs and symptoms of hyperglycemia and hypoglycemia  - Administer ordered medications to maintain glucose within target range  - Assess barriers to adequate nutritional intake and initiate nutrition consult as needed  - Instruct patient on self management of diabetes  Outcome: Progressing     Problem: Patient/Family Goals  Goal: Patient/Family Long Term Goal  Description: Patient's Long Term Goal: go back home stronger    Interventions:  - IV abx for UTI  -PT/OT  - See additional Care Plan goals for specific interventions  Outcome: Progressing  Goal: Patient/Family Short Term Goal  Description: Patient's Short Term Goal: No falls    Interventions:   - fall precautions in place  --PT to work with patient   - See additional Care Plan goals for specific interventions  Outcome: Progressing     Problem: RISK FOR INFECTION - ADULT  Goal: Absence of fever/infection during anticipated neutropenic period  Description: INTERVENTIONS  - Monitor WBC  - Administer growth factors as ordered  - Implement neutropenic guidelines  Outcome: Progressing     Problem: SAFETY ADULT - FALL  Goal: Free from fall injury  Description: INTERVENTIONS:  - Assess pt frequently for physical needs  -  Identify cognitive and physical deficits and behaviors that affect risk of falls.  - [place] fall precautions as indicated by assessment.  - Educate pt/family on patient safety including physical limitations  - Instruct pt to call for assistance with activity based on assessment  - Modify environment to reduce risk of injury  - Provide assistive devices as appropriate  - Consider OT/PT consult to assist with strengthening/mobility  - Encourage toileting schedule  Outcome: Progressing     Problem: DISCHARGE PLANNING  Goal: Discharge to home or other facility with appropriate resources  Description: INTERVENTIONS:  - Identify barriers to discharge w/pt and caregiver  - Include patient/family/discharge partner in discharge planning  - Arrange for needed discharge resources and transportation as appropriate  - Identify discharge learning needs (meds, wound care, etc)  - Arrange for interpreters to assist at discharge as needed  - Consider post-discharge preferences of patient/family/discharge partner  - Complete POLST form as appropriate  - Assess patient's ability to be responsible for managing their own health  - Refer to Case Management Department for coordinating discharge planning if the patient needs post-hospital services based on physician/LIP order or complex needs related to functional status, cognitive ability or social support system  Outcome: Progressing

## 2025-02-27 NOTE — DISCHARGE INSTRUCTIONS
Sometimes managing your health at home requires assistance.  The [facility] team has recognized your preference to use [facility].  They can be reached by phone at [phone].  The fax number for your reference is [phone].  A representative from the home health agency will contact you or your family to schedule your first visit.      Ok to go home hhc/pt/ot  Walker at all times   [address] ada cardiac diet     Medication List        START taking these medications      acetaminophen 500 MG Tabs  Commonly known as: Tylenol Extra Strength     cephALEXin 500 MG Caps  Commonly known as: Keflex  Take 1 capsule (500 mg total) by mouth 3 (three) times daily. Start tonight and finish all doses; stop if rash            CHANGE how you take these medications      ergocalciferol 1.25 MG (48755 UT) Caps  Commonly known as: Vitamin D2  Take 1 capsule (50,000 Units total) by mouth once a week.  What changed: additional instructions     gabapentin 100 MG Caps  Commonly known as: Neurontin  Take 1 capsule (100 mg total) by mouth 3 (three) times daily.  What changed:   medication strength  how much to take     HumaLOG 100 UNIT/ML Soln  Generic drug: insulin lispro  What changed:   how much to take  additional instructions     losartan 50 MG Tabs  Commonly known as: Cozaar  Take 1 tablet (50 mg total) by mouth daily.  What changed:   when to take this  reasons to take this     methenamine 1 g Tabs  Commonly known as: Hiprex  Take 1 tablet (1 g total) by mouth 2 (two) times daily.  What changed: when to take this     Tresiba FlexTouch 200 UNIT/ML Sopn  Generic drug: insulin degludec  Inject 30 Units into the skin daily.  What changed: how much to take            CONTINUE taking these medications      Accu-Chek FastClix Lancets Misc  Use to check blood sugars 3 times daily.     DULoxetine 30 MG Cpep  Commonly known as: Cymbalta  Take 1 capsule (30 mg total) by mouth daily.     midodrine 5 MG  Tabs  Commonly known as: ProAmatine     simvastatin 20 MG Tabs  Commonly known as: Zocor  Take 1 tablet (20 mg total) by mouth daily.            STOP taking these medications      estradiol 0.1 MG/GM Crea  Commonly known as: Estrace               Where to Get Your Medications        [id number] - [place], [place] - [address] AT [address], [phone], [phone] [address] [facility] [address]      Phone: [phone]   cephALEXin 500 MG Caps  gabapentin 100 MG Caps  Tresiba FlexTouch 200 UNIT/ML Sopn

## 2025-02-27 NOTE — PLAN OF CARE
Problem: Patient Centered Care  Goal: Patient preferences are identified and integrated in the patient's plan of care  Description: Interventions:  - What would you like us to know as we care for you? Home w/my daughter  - Provide timely, complete, and accurate information to patient/family  - Incorporate patient and family knowledge, values, beliefs, and cultural backgrounds into the planning and delivery of care  - Encourage patient/family to participate in care and decision-making at the level they choose  - Honor patient and family perspectives and choices  Outcome: Progressing     Problem: Diabetes/Glucose Control  Goal: Glucose maintained within prescribed range  Description: INTERVENTIONS:  - Monitor Blood Glucose as ordered  - Assess for signs and symptoms of hyperglycemia and hypoglycemia  - Administer ordered medications to maintain glucose within target range  - Assess barriers to adequate nutritional intake and initiate nutrition consult as needed  - Instruct patient on self management of diabetes  Outcome: Progressing     Problem: Patient/Family Goals  Goal: Patient/Family Long Term Goal  Description: Patient's Long Term Goal: go back home stronger    Interventions:  - IV abx for UTI  -PT/OT  - See additional Care Plan goals for specific interventions  Outcome: Progressing  Goal: Patient/Family Short Term Goal  Description: Patient's Short Term Goal: No falls    Interventions:   - fall precautions in place  --PT to work with patient   - See additional Care Plan goals for specific interventions  Outcome: Progressing     Problem: RISK FOR INFECTION - ADULT  Goal: Absence of fever/infection during anticipated neutropenic period  Description: INTERVENTIONS  - Monitor WBC  - Administer growth factors as ordered  - Implement neutropenic guidelines  Outcome: Progressing     Problem: SAFETY ADULT - FALL  Goal: Free from fall injury  Description: INTERVENTIONS:  - Assess pt frequently for physical needs  -  Identify cognitive and physical deficits and behaviors that affect risk of falls.  - [place] fall precautions as indicated by assessment.  - Educate pt/family on patient safety including physical limitations  - Instruct pt to call for assistance with activity based on assessment  - Modify environment to reduce risk of injury  - Provide assistive devices as appropriate  - Consider OT/PT consult to assist with strengthening/mobility  - Encourage toileting schedule  Outcome: Progressing     Problem: DISCHARGE PLANNING  Goal: Discharge to home or other facility with appropriate resources  Description: INTERVENTIONS:  - Identify barriers to discharge w/pt and caregiver  - Include patient/family/discharge partner in discharge planning  - Arrange for needed discharge resources and transportation as appropriate  - Identify discharge learning needs (meds, wound care, etc)  - Arrange for interpreters to assist at discharge as needed  - Consider post-discharge preferences of patient/family/discharge partner  - Complete POLST form as appropriate  - Assess patient's ability to be responsible for managing their own health  - Refer to Case Management Department for coordinating discharge planning if the patient needs post-hospital services based on physician/LIP order or complex needs related to functional status, cognitive ability or social support system  Outcome: Progressing

## 2025-02-27 NOTE — PLAN OF CARE
Problem: Patient Centered Care  Goal: Patient preferences are identified and integrated in the patient's plan of care  Description: Interventions:  - What would you like us to know as we care for you? Home w/my daughter  - Provide timely, complete, and accurate information to patient/family  - Incorporate patient and family knowledge, values, beliefs, and cultural backgrounds into the planning and delivery of care  - Encourage patient/family to participate in care and decision-making at the level they choose  - Honor patient and family perspectives and choices  Outcome: Progressing     Problem: Diabetes/Glucose Control  Goal: Glucose maintained within prescribed range  Description: INTERVENTIONS:  - Monitor Blood Glucose as ordered  - Assess for signs and symptoms of hyperglycemia and hypoglycemia  - Administer ordered medications to maintain glucose within target range  - Assess barriers to adequate nutritional intake and initiate nutrition consult as needed  - Instruct patient on self management of diabetes  Outcome: Progressing     Problem: Patient/Family Goals  Goal: Patient/Family Long Term Goal  Description: Patient's Long Term Goal: go back home stronger    Interventions:  - IV abx for UTI  -PT/OT  - See additional Care Plan goals for specific interventions  Outcome: Progressing  Goal: Patient/Family Short Term Goal  Description: Patient's Short Term Goal: No falls    Interventions:   - fall precautions in place  --PT to work with patient   - See additional Care Plan goals for specific interventions  Outcome: Progressing     Problem: RISK FOR INFECTION - ADULT  Goal: Absence of fever/infection during anticipated neutropenic period  Description: INTERVENTIONS  - Monitor WBC  - Administer growth factors as ordered  - Implement neutropenic guidelines  Outcome: Progressing     Problem: SAFETY ADULT - FALL  Goal: Free from fall injury  Description: INTERVENTIONS:  - Assess pt frequently for physical needs  -  Identify cognitive and physical deficits and behaviors that affect risk of falls.  - [place] fall precautions as indicated by assessment.  - Educate pt/family on patient safety including physical limitations  - Instruct pt to call for assistance with activity based on assessment  - Modify environment to reduce risk of injury  - Provide assistive devices as appropriate  - Consider OT/PT consult to assist with strengthening/mobility  - Encourage toileting schedule  Outcome: Progressing     Problem: DISCHARGE PLANNING  Goal: Discharge to home or other facility with appropriate resources  Description: INTERVENTIONS:  - Identify barriers to discharge w/pt and caregiver  - Include patient/family/discharge partner in discharge planning  - Arrange for needed discharge resources and transportation as appropriate  - Identify discharge learning needs (meds, wound care, etc)  - Arrange for interpreters to assist at discharge as needed  - Consider post-discharge preferences of patient/family/discharge partner  - Complete POLST form as appropriate  - Assess patient's ability to be responsible for managing their own health  - Refer to Case Management Department for coordinating discharge planning if the patient needs post-hospital services based on physician/LIP order or complex needs related to functional status, cognitive ability or social support system  Outcome: Progressing

## 2025-02-27 NOTE — PROGRESS NOTES
[facility]  part of [facility]    Progress Note    Ananya Dowling Patient Status:  Observation    1950 MRN [id number]   Location [facility] 5SW/SE Attending Malgorzata Soto,*   Hosp Day # 0 PCP Jermaine Monson MD     Chief Complaint: ok    Subjective:     Constitutional:  Positive for fatigue. Negative for appetite change and chills.   HENT:  Negative for congestion.    Respiratory:  Negative for cough, chest tightness and shortness of breath.    Cardiovascular:  Negative for leg swelling.   Gastrointestinal:  Negative for abdominal pain.   Genitourinary:  Positive for dysuria.   Musculoskeletal:  Negative for joint pain.   Neurological:  Positive for weakness.   Psychiatric/Behavioral:  Positive for sleep disturbance. Negative for decreased concentration. The patient is not nervous/anxious.        Objective:   Blood pressure 117/90, pulse 98, temperature 97.7 °F (36.5 °C), temperature source Oral, resp. rate 16, height 5' 1\" (1.549 m), weight 127 lb 3.2 oz (57.7 kg), SpO2 95%.  Physical Exam  Vitals and nursing note reviewed.   Constitutional:       General: She is not in acute distress.     Appearance: She is normal weight. She is ill-appearing. She is not toxic-appearing or diaphoretic.   HENT:      Head: Normocephalic and atraumatic.   Cardiovascular:      Rate and Rhythm: Normal rate.      Pulses: Normal pulses.      Heart sounds: Normal heart sounds.   Pulmonary:      Effort: Pulmonary effort is normal.      Breath sounds: Rhonchi present.   Abdominal:      General: Bowel sounds are normal.      Palpations: Abdomen is soft.      Tenderness: There is no abdominal tenderness.   Skin:     General: Skin is warm and dry.      Capillary Refill: Capillary refill takes less than 2 seconds.   Neurological:      Mental Status: She is alert. Mental status is at baseline.   Psychiatric:         Behavior: Behavior normal.         Results:   Lab Results   Component Value Date     WBC 7.9 02/27/2025    HGB 10.7 (L) 02/27/2025    HCT 32.2 (L) 02/27/2025    .0 02/27/2025    CREATSERUM 0.85 02/27/2025    BUN 13 02/27/2025     02/27/2025    K 3.9 02/27/2025     02/27/2025    CO2 26.0 02/27/2025     (H) 02/27/2025    CA 9.0 02/27/2025    ALB 4.2 02/24/2025    ALKPHO 80 02/24/2025    BILT 0.2 02/24/2025    TP 7.7 02/24/2025    AST 16 02/24/2025    ALT 8 (L) 02/24/2025    T4F 1.0 04/19/2022    TSH 3.562 01/16/2025    MG 1.9 02/27/2025    PHOS 4.0 02/27/2025    TROPHS 10 12/05/2024    ETOH <3 01/14/2025       No results found.        Assessment & Plan:     1.       Urinary tract infection. Await final cx. Cont iv abx sensitive change to ancef  2.       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 Pt better today   3.       Increased gait imbalance. Pt/ot walker at all times;   4.       Diabetes mellitus type 2, uncontrolled.  Refusal to take medications. Cont iss   5.       Htn add meds     Complex mdm; coordinating care with nurse and counseling pt and with her permission her daughter on phone about uti/need walker          BI HATHAWAY MD

## 2025-02-27 NOTE — OCCUPATIONAL THERAPY NOTE
OCCUPATIONAL THERAPY EVALUATION - INPATIENT     Room Number: 528/528-A  Evaluation Date: 2/27/2025  Type of Evaluation: Initial  Presenting Problem: UTI    Physician Order: IP Consult to Occupational Therapy  Reason for Therapy: ADL/IADL Dysfunction and Discharge Planning    OCCUPATIONAL THERAPY ASSESSMENT   Patient is a 74 year old female admitted 2/24/2025.  Prior to admission, patient's baseline is mod I.  Patient is currently functioning below baseline with ADLs/mobility.  Patient is requiring contact guard assist and moderate assist as a result of the following impairments: decreased functional strength, decreased functional reach, decreased endurance, impaired   balance, and decreased muscular endurance. Occupational Therapy will continue to follow for duration of hospitalization.    Patient will benefit from continued skilled OT Services to promote return to prior level of function and safety with continuous assistance and gradual rehabilitative therapy. Per discussion with SW, pt will not qualify for rehab. If dc home, may benefit from HHOT.    PLAN DURING HOSPITALIZATION  OT Device Recommendations: Gissel tim  OT Treatment Plan: Balance activities;Energy conservation/work simplification techniques;ADL training;Functional transfer training;UE strengthening/ROM;Endurance training;Patient/Family training;Patient/Family education;Equipment eval/education;Compensatory technique education     OCCUPATIONAL THERAPY MEDICAL/SOCIAL HISTORY   Problem List  Principal Problem:    Acute cystitis without hematuria  Active Problems:    Acute cystitis    Unsteady gait    HOME SITUATION  Type of Home: House  Home Layout: Multi-level; Bed/bath upstairs  Lives With: Daughter; Family  Toilet and Equipment: Standard height toilet  Shower/Tub and Equipment: Walk-in shower; Shower chair  Patient Regularly Uses: Rolling walker    Prior Level of Pratt: mod I RW    SUBJECTIVE  \"I want to bathe up\"    OCCUPATIONAL THERAPY  EXAMINATION      OBJECTIVE  Precautions: Bed/chair alarm  Fall Risk: Standard fall risk      PAIN ASSESSMENT  Ratin        COGNITION  A&Ox3-4, following all commands    VISION  WFL during ADLs    Communication:   WFL    Behavioral/Emotional/Social: WFL, flat affect    RANGE OF MOTION   Upper extremity ROM is within functional limits     STRENGTH ASSESSMENT  Upper extremity strength is within functional limits     COORDINATION  Gross Motor: WFL   Fine Motor: WFL     ACTIVITIES OF DAILY LIVING ASSESSMENT  AM-PAC ‘6-Clicks’ Inpatient Daily Activity Short Form  How much help from another person does the patient currently need…  -   Putting on and taking off regular lower body clothing?: A Lot  -   Bathing (including washing, rinsing, drying)?: A Lot  -   Toileting, which includes using toilet, bedpan or urinal? : A Little  -   Putting on and taking off regular upper body clothing?: A Little  -   Taking care of personal grooming such as brushing teeth?: A Little  -   Eating meals?: None    AM-PAC Score:  Score: 17  Approx Degree of Impairment: 50.11%  Standardized Score (AM-PAC Scale): 37.26  CMS Modifier (G-Code): CK    FUNCTIONAL TRANSFER ASSESSMENT  Sit to Stand: Edge of Bed; Chair  Edge of Bed: Contact Guard Assist  Chair: Contact Guard Assist  Toilet Transfer: Contact Guard Assist (cues for hand placement and grab bar use)  Functional mobility activity bathroom distances with CGA and RW, increased time    BED MOBILITY  Supine to Sit : Contact Guard Assist    BALANCE ASSESSMENT  Static Sitting: Modified Independent  Static Standing: Stand-by Assist    FUNCTIONAL ADL ASSESSMENT  Eating: Independent  Grooming Standing: Stand-by Assist (hand hygiene at sink)  Bathing Seated: Moderate Assist, sponge bathing seated on toilet  UB Dressing Seated: Contact Guard Assist  LB Dressing Seated: Moderate Assist (donning/doffing briefs, cues for hand placement on RW for support for balance)  Toileting Seated:  Supervision  Requires increased time for all ADLs as well as encouragement to attempt to perform ADL tasks       Skilled Therapy Provided: RN approved session. Hand off from PT following gait training, OT focusing on ADL assessment. Performed ADLs/functional mobility/transfers as stated above.  At the end of session, patient left in chair with needs met, alarm on and RN aware of session.    EDUCATION PROVIDED  Patient Education : Role of Occupational Therapy; Plan of Care; Fall Prevention; Posture/Positioning; Energy Conservation; Other (ADL training)  Patient's Response to Education: Verbalized Understanding; Returned Demonstration    The patient's Approx Degree of Impairment: 50.11% has been calculated based on documentation in the [facility] '6 clicks' Inpatient Daily Activity Short Form.  Research supports that patients with this level of impairment may benefit from rehab.  Final disposition will be made by interdisciplinary medical team.     Patient End of Session: Up in chair;Call light within reach;Needs met;RN aware of session/findings;All patient questions and concerns addressed;Alarm set    OT Goals  Patients self stated goal is: increased independence     Patient will complete functional transfer with mod I  Comment:     Patient will complete LB dressing with mod I  Comment:     Patient will tolerate standing for 10 minutes in prep for adls with mod I   Comment:    Patient will complete bathing seated with SPV  Comment:          Goals  on:   Frequency:     Patient Evaluation Complexity Level:   Occupational Profile/Medical History MODERATE - Expanded review of history including review of medical or therapy record   Specific performance deficits impacting engagement in ADL/IADL MODERATE  3 - 5 performance deficits   Client Assessment/Performance Deficits MODERATE - Comorbidities and min to mod modifications of tasks    Clinical Decision Making MODERATE - Analysis of occupational profile, detailed  assessments, several treatment options    Overall Complexity MODERATE     OT Session Time: 30 minutes  Self-Care Home Management: 15 minutes  Evalaution    Barbara Macdonald OTR/L

## 2025-02-27 NOTE — CM/SW NOTE
02/25/25 1200   Discharge Needs   Anticipated D/C needs Home health care;Caregiver services;Long term care facility   Choice of Post-Acute Provider   Informed patient of right to choose their preferred provider Yes   List of appropriate post-acute services provided to patient/family with quality data No - Declined list   Patient/family choice [facility]     SW received MD order for [facility]. vs. [facility].    Therapy to see pt today.    SW spoke to pt's daughter Xochitl and reiterated that unfortunately [facility] is not an option at this point. As pt is at her baseline and was just discharged from [facility] as insurance was no longer willing to cover.  Xochitl v/u.    Xochitl is agreeable to home health care and is requesting [facility].    Xochitl is agreeable to [facility] senior services evaluation as well as  private duty CG resources.     [facility] rsvd in Aidin.  Face to face entered/uploaded. Essence Castillo aware of choice.      PLAN: DC home w/[facility] pending med clear    / to remain available for support and/or discharge planning.      Pamela DOTSON, LSW  Discharge Planner

## 2025-02-27 NOTE — HOME CARE LIAISON
Referral received from SW/CM team via Aidin. Discussed with patient's dtr Xochitl the recommendation for home health services, dtr agreeable, and all questions answered. Updated WAQAS Santiago.

## 2025-02-28 VITALS
TEMPERATURE: 98 F | OXYGEN SATURATION: 97 % | SYSTOLIC BLOOD PRESSURE: 135 MMHG | HEIGHT: 61 IN | DIASTOLIC BLOOD PRESSURE: 58 MMHG | WEIGHT: 127.19 LBS | BODY MASS INDEX: 24.01 KG/M2 | RESPIRATION RATE: 16 BRPM | HEART RATE: 102 BPM

## 2025-02-28 LAB
ANION GAP SERPL CALC-SCNC: 9 MMOL/L (ref 0–18)
BASOPHILS # BLD AUTO: 0.04 X10(3) UL (ref 0–0.2)
BASOPHILS NFR BLD AUTO: 0.4 %
BUN BLD-MCNC: 16 MG/DL (ref 9–23)
BUN/CREAT SERPL: 21.6 (ref 10–20)
CALCIUM BLD-MCNC: 9 MG/DL (ref 8.7–10.4)
CHLORIDE SERPL-SCNC: 106 MMOL/L (ref 98–112)
CO2 SERPL-SCNC: 24 MMOL/L (ref 21–32)
CREAT BLD-MCNC: 0.74 MG/DL
DEPRECATED RDW RBC AUTO: 38.6 FL (ref 35.1–46.3)
EGFRCR SERPLBLD CKD-EPI 2021: 85 ML/MIN/1.73M2 (ref 60–?)
EOSINOPHIL # BLD AUTO: 0.21 X10(3) UL (ref 0–0.7)
EOSINOPHIL NFR BLD AUTO: 2.2 %
ERYTHROCYTE [DISTWIDTH] IN BLOOD BY AUTOMATED COUNT: 12.8 % (ref 11–15)
GLUCOSE BLD-MCNC: 154 MG/DL (ref 70–99)
GLUCOSE BLDC GLUCOMTR-MCNC: 212 MG/DL (ref 70–99)
GLUCOSE BLDC GLUCOMTR-MCNC: 221 MG/DL (ref 70–99)
HCT VFR BLD AUTO: 30.5 %
HGB BLD-MCNC: 10.4 G/DL
IMM GRANULOCYTES # BLD AUTO: 0.04 X10(3) UL (ref 0–1)
IMM GRANULOCYTES NFR BLD: 0.4 %
LYMPHOCYTES # BLD AUTO: 2.72 X10(3) UL (ref 1–4)
LYMPHOCYTES NFR BLD AUTO: 28 %
MAGNESIUM SERPL-MCNC: 1.9 MG/DL (ref 1.6–2.6)
MCH RBC QN AUTO: 28 PG (ref 26–34)
MCHC RBC AUTO-ENTMCNC: 34.1 G/DL (ref 31–37)
MCV RBC AUTO: 82 FL
MONOCYTES # BLD AUTO: 0.67 X10(3) UL (ref 0.1–1)
MONOCYTES NFR BLD AUTO: 6.9 %
NEUTROPHILS # BLD AUTO: 6.03 X10 (3) UL (ref 1.5–7.7)
NEUTROPHILS # BLD AUTO: 6.03 X10(3) UL (ref 1.5–7.7)
NEUTROPHILS NFR BLD AUTO: 62.1 %
OSMOLALITY SERPL CALC.SUM OF ELEC: 292 MOSM/KG (ref 275–295)
PHOSPHATE SERPL-MCNC: 3.9 MG/DL (ref 2.4–5.1)
PLATELET # BLD AUTO: 259 10(3)UL (ref 150–450)
POTASSIUM SERPL-SCNC: 3.6 MMOL/L (ref 3.5–5.1)
RBC # BLD AUTO: 3.72 X10(6)UL
SODIUM SERPL-SCNC: 139 MMOL/L (ref 136–145)
WBC # BLD AUTO: 9.7 X10(3) UL (ref 4–11)

## 2025-02-28 PROCEDURE — 99239 HOSP IP/OBS DSCHRG MGMT >30: CPT | Performed by: HOSPITALIST

## 2025-02-28 RX ORDER — GABAPENTIN 100 MG/1
100 CAPSULE ORAL 3 TIMES DAILY
Qty: 30 CAPSULE | Refills: 0 | Status: SHIPPED | OUTPATIENT
Start: 2025-02-28

## 2025-02-28 RX ORDER — INSULIN DEGLUDEC 200 U/ML
30 INJECTION, SOLUTION SUBCUTANEOUS DAILY
Qty: 36 ML | Refills: 1 | Status: SHIPPED | OUTPATIENT
Start: 2025-02-28

## 2025-02-28 RX ORDER — INSULIN LISPRO 100 [IU]/ML
INJECTION, SOLUTION INTRAVENOUS; SUBCUTANEOUS
Status: SHIPPED | COMMUNITY
Start: 2025-02-28

## 2025-02-28 RX ORDER — MIDODRINE HYDROCHLORIDE 5 MG/1
5 TABLET ORAL DAILY PRN
Status: SHIPPED | COMMUNITY
Start: 2025-02-28

## 2025-02-28 RX ORDER — ACETAMINOPHEN 500 MG
500 TABLET ORAL EVERY 6 HOURS PRN
Status: SHIPPED | COMMUNITY
Start: 2025-02-28

## 2025-02-28 RX ORDER — CEPHALEXIN 500 MG/1
500 CAPSULE ORAL 3 TIMES DAILY
Qty: 13 CAPSULE | Refills: 0 | Status: SHIPPED | OUTPATIENT
Start: 2025-02-28

## 2025-02-28 NOTE — DISCHARGE SUMMARY
Dc summary#[id number]  > 30 min spent on dc    Hospital Discharge Diagnoses: acute metabolic encephalopathy from uti    Lace+ Score: 69  59-90 High Risk  29-58 Medium Risk  0-28   Low Risk.    TCM Follow-Up Recommendation:  LACE > 58: High Risk of readmission after discharge from the hospital.tcm fu recommended

## 2025-02-28 NOTE — PLAN OF CARE
Problem: Patient Centered Care  Goal: Patient preferences are identified and integrated in the patient's plan of care  Description: Interventions:  - What would you like us to know as we care for you? Home w/my daughter  - Provide timely, complete, and accurate information to patient/family  - Incorporate patient and family knowledge, values, beliefs, and cultural backgrounds into the planning and delivery of care  - Encourage patient/family to participate in care and decision-making at the level they choose  - Honor patient and family perspectives and choices  Outcome: Adequate for Discharge     Problem: Diabetes/Glucose Control  Goal: Glucose maintained within prescribed range  Description: INTERVENTIONS:  - Monitor Blood Glucose as ordered  - Assess for signs and symptoms of hyperglycemia and hypoglycemia  - Administer ordered medications to maintain glucose within target range  - Assess barriers to adequate nutritional intake and initiate nutrition consult as needed  - Instruct patient on self management of diabetes  Outcome: Adequate for Discharge     Problem: Patient/Family Goals  Goal: Patient/Family Long Term Goal  Description: Patient's Long Term Goal: go back home stronger    Interventions:  - IV abx for UTI  -PT/OT  - See additional Care Plan goals for specific interventions  Outcome: Adequate for Discharge  Goal: Patient/Family Short Term Goal  Description: Patient's Short Term Goal: No falls    Interventions:   - fall precautions in place  --PT to work with patient   - See additional Care Plan goals for specific interventions  Outcome: Adequate for Discharge     Problem: RISK FOR INFECTION - ADULT  Goal: Absence of fever/infection during anticipated neutropenic period  Description: INTERVENTIONS  - Monitor WBC  - Administer growth factors as ordered  - Implement neutropenic guidelines  Outcome: Adequate for Discharge     Problem: SAFETY ADULT - FALL  Goal: Free from fall injury  Description:  INTERVENTIONS:  - Assess pt frequently for physical needs  - Identify cognitive and physical deficits and behaviors that affect risk of falls.  - [place] fall precautions as indicated by assessment.  - Educate pt/family on patient safety including physical limitations  - Instruct pt to call for assistance with activity based on assessment  - Modify environment to reduce risk of injury  - Provide assistive devices as appropriate  - Consider OT/PT consult to assist with strengthening/mobility  - Encourage toileting schedule  Outcome: Adequate for Discharge     Problem: DISCHARGE PLANNING  Goal: Discharge to home or other facility with appropriate resources  Description: INTERVENTIONS:  - Identify barriers to discharge w/pt and caregiver  - Include patient/family/discharge partner in discharge planning  - Arrange for needed discharge resources and transportation as appropriate  - Identify discharge learning needs (meds, wound care, etc)  - Arrange for interpreters to assist at discharge as needed  - Consider post-discharge preferences of patient/family/discharge partner  - Complete POLST form as appropriate  - Assess patient's ability to be responsible for managing their own health  - Refer to Case Management Department for coordinating discharge planning if the patient needs post-hospital services based on physician/LIP order or complex needs related to functional status, cognitive ability or social support system  Outcome: Adequate for Discharge       IV removed. I went over AVS paperwork with patient, answered all questions. Patient went down via wheelchair.

## 2025-02-28 NOTE — CM/SW NOTE
02/28/25 1500   Discharge disposition   Expected discharge disposition Home-Health   Post Acute Care Provider [facility]   Discharge transportation Private car     WAQAS confirmed with Dr. Soto and RN Paz who stated pt is medically ready for discharge today.     HHC Orders/attached via Aidin to [facility] .  Liajon Castillo from [facility]  made aware of discharge through secure chat.      WAQAS confirmed that [facility]  contact information added to AVS.    PLAN: DC home with [facility]     Pamela DOTSON, LSW  Discharge Planner

## 2025-02-28 NOTE — PLAN OF CARE
Problem: Patient Centered Care  Goal: Patient preferences are identified and integrated in the patient's plan of care  Description: Interventions:  - What would you like us to know as we care for you? Home w/my daughter  - Provide timely, complete, and accurate information to patient/family  - Incorporate patient and family knowledge, values, beliefs, and cultural backgrounds into the planning and delivery of care  - Encourage patient/family to participate in care and decision-making at the level they choose  - Honor patient and family perspectives and choices  Outcome: Progressing     Problem: Diabetes/Glucose Control  Goal: Glucose maintained within prescribed range  Description: INTERVENTIONS:  - Monitor Blood Glucose as ordered  - Assess for signs and symptoms of hyperglycemia and hypoglycemia  - Administer ordered medications to maintain glucose within target range  - Assess barriers to adequate nutritional intake and initiate nutrition consult as needed  - Instruct patient on self management of diabetes  Outcome: Progressing     Problem: Patient/Family Goals  Goal: Patient/Family Long Term Goal  Description: Patient's Long Term Goal: go back home stronger    Interventions:  - IV abx for UTI  -PT/OT  - See additional Care Plan goals for specific interventions  Outcome: Progressing  Goal: Patient/Family Short Term Goal  Description: Patient's Short Term Goal: No falls    Interventions:   - fall precautions in place  --PT to work with patient   - See additional Care Plan goals for specific interventions  Outcome: Progressing     Problem: RISK FOR INFECTION - ADULT  Goal: Absence of fever/infection during anticipated neutropenic period  Description: INTERVENTIONS  - Monitor WBC  - Administer growth factors as ordered  - Implement neutropenic guidelines  Outcome: Progressing     Problem: SAFETY ADULT - FALL  Goal: Free from fall injury  Description: INTERVENTIONS:  - Assess pt frequently for physical needs  -  Identify cognitive and physical deficits and behaviors that affect risk of falls.  - [place] fall precautions as indicated by assessment.  - Educate pt/family on patient safety including physical limitations  - Instruct pt to call for assistance with activity based on assessment  - Modify environment to reduce risk of injury  - Provide assistive devices as appropriate  - Consider OT/PT consult to assist with strengthening/mobility  - Encourage toileting schedule  Outcome: Progressing     Problem: DISCHARGE PLANNING  Goal: Discharge to home or other facility with appropriate resources  Description: INTERVENTIONS:  - Identify barriers to discharge w/pt and caregiver  - Include patient/family/discharge partner in discharge planning  - Arrange for needed discharge resources and transportation as appropriate  - Identify discharge learning needs (meds, wound care, etc)  - Arrange for interpreters to assist at discharge as needed  - Consider post-discharge preferences of patient/family/discharge partner  - Complete POLST form as appropriate  - Assess patient's ability to be responsible for managing their own health  - Refer to Case Management Department for coordinating discharge planning if the patient needs post-hospital services based on physician/LIP order or complex needs related to functional status, cognitive ability or social support system  Outcome: Progressing

## 2025-03-01 NOTE — DISCHARGE SUMMARY
[facility]    PATIENT'S NAME: АННА SHEPPARD   ATTENDING PHYSICIAN: Malgorzata Soto MD   PATIENT ACCOUNT#:   [id number]    LOCATION:  [address] [place] [address]  MEDICAL RECORD #:   [id number]       YOB: 1950  ADMISSION DATE:       02/24/2025      DISCHARGE DATE:  02/28/2025    DISCHARGE SUMMARY  35 min spent on dc    DISCHARGE DIAGNOSES:  Acute metabolic encephalopathy from urinary tract infection, noncompliance with medications, and unsteady gait.    HISTORY AND HOSPITAL COURSE:  This is a very pleasant 74-year-old English-speaking  American female who appears much younger than her stated age, who presents with a history of gait imbalance and urinary tract infection.  She was found to have a urinary tract infection with E coli and sensitive to cefazolin, and she was changed to that and then eventually sent for discharge.  She was admitted to the hospital and seen by Physical Therapy.  She was seen somewhat impulsive and unwilling to use her walker which her daughter confirmed.  She was also not very compliant with diabetes care and medications, requiring someone listens when here than she did at home despite having the infection.  So, today, she seems stable, she was able to eat, she walked around and was able to discharge her home.    PHYSICAL EXAMINATION ON DISCHARGE:    VITAL SIGNS:  Temperature is 98, pulse 102, respiratory rate 16, blood pressure 135/50, 97%.  LUNGS:  Occasional rhonchi.  HEART:  Normal S1, S2.  No S3.   ABDOMEN:  Soft and nontender.  EXTREMITIES:  Without calf tenderness.  NEUROLOGIC:  Alert, oriented, friendly, and cooperative.    LABORATORY STUDIES:  Please see chart.    ASSESSMENT AND PLAN:    1.   Urinary tract infection.  Continue Keflex.  2.   Acute metabolic encephalopathy.  Would follow up on patient to Neurology, but this one seems to be related to a urinary tract infection.  3.   Type 2 diabetes.  Occasional refusal to take medications.  4.   Hypertension.   Occasional refusal to take medications.    CONDITION ON DISCHARGE:  Stable.    CODE STATUS:  Full Code per records.  Not discussed during this hospitalization.    ACTIVITY:  Home health care, PT, and OT.  Otherwise, as tolerated.    DIET:  Low fat, low salt, 2000-calorie ADA.    FOLLOWUP:  Follow up with Dr. Monson in a 1 week, Dr. Lees in 1 to 3 weeks.  Walker at all times.    DISCHARGE MEDICATIONS:    1.   Duloxetine 30 mg daily.  2.   Vitamin D 50,000 units weekly.  3.   Losartan 50 mg daily.  4.   Methenamine 1 g twice a day.  5.   Midodrine 5 mg daily as needed for low blood pressure.  6.   Zocor 20 mg daily.  Watch for muscle weakness.  7.   Tylenol 500 mg every 6 hours as needed.  Watch total daily Tylenol, limit to 3 g.  8.   Keflex 500 mg 3 times a day.  Finish all 13 doses    RISK OF READMISSION:  High.  TCM followup is recommended.     Dictated By Malgorzata Soto MD  d: 02/28/2025 17:26:24  t: 02/28/2025 17:53:03  Job [id number]  [facility]/

## 2025-03-03 ENCOUNTER — PATIENT OUTREACH (OUTPATIENT)
Dept: CASE MANAGEMENT | Age: 75
End: 2025-03-03

## 2025-03-03 PROBLEM — Z79.4 TYPE 2 DIABETES MELLITUS WITH HYPERGLYCEMIA, WITH LONG-TERM CURRENT USE OF INSULIN (HCC): Status: ACTIVE | Noted: 2025-03-03

## 2025-03-03 PROBLEM — E55.9 VITAMIN D DEFICIENCY: Status: ACTIVE | Noted: 2025-03-03

## 2025-03-03 PROBLEM — E11.65 TYPE 2 DIABETES MELLITUS WITH HYPERGLYCEMIA, WITH LONG-TERM CURRENT USE OF INSULIN (HCC): Status: ACTIVE | Noted: 2025-03-03

## 2025-03-03 PROBLEM — R80.9 MICROALBUMINURIA DUE TO TYPE 2 DIABETES MELLITUS (HCC): Status: ACTIVE | Noted: 2025-03-03

## 2025-03-03 PROBLEM — E11.29 MICROALBUMINURIA DUE TO TYPE 2 DIABETES MELLITUS (HCC): Status: ACTIVE | Noted: 2025-03-03

## 2025-03-03 NOTE — PROGRESS NOTES
NCM attempted to reach the patient to complete a transitions of care call. Phone rings however no option to leave a message as the mailbox is full. NCM to follow up at another time.

## 2025-03-04 NOTE — PROGRESS NOTES
NCM attempted to reach the patient to complete a transitions of care call. Phone rings however I am unable to leave a message to call back as the mailbox is full. NCM to follow up at another time.

## 2025-03-05 ENCOUNTER — PATIENT OUTREACH (OUTPATIENT)
Dept: CASE MANAGEMENT | Age: 75
End: 2025-03-05

## 2025-03-05 NOTE — PROGRESS NOTES
Hospital follow up.    Last A1C Value: 9% Date: [2/24/2025]    Jermaine Monson M.D.  Internal Medicine; Geriatrics  [facility]  [address] [place] [address] [phone]    Martha Lees M.D.  Endocrinology Diabetes and Metabolism  [address] [address] [phone]    Attempt #1:  Mailbox is full, unable to leave a message.

## 2025-03-05 NOTE — PROGRESS NOTES
[facility] attempted to reach the patient to complete a transitions of care call. Unable to leave a message as the mailbox is full. I also attempted to call patient's daughter but there was no answer-- Left message to call back. [facility] provided direct contact info at [phone]. Unable to reach the patient after several attempts. [facility] closing encounter.

## 2025-03-06 NOTE — PROGRESS NOTES
Hospital Follow up for Diabetes (Discharge 2/28 elm)   Last A1C Value: 9% Date: [2/24/2025]    PCP  Jermaine Monson M.D.  [address] [address] [phone]  Endocrinology  Martha Lees M.D.  Endocrinology Diabetes and Metabolism  [address] [address] [phone]  Attempt #2:  mail box full; unable to leave vm

## 2025-03-07 NOTE — PROGRESS NOTES
Hospital follow up.    Last A1C Value: 9% Date: [2/24/2025]    Jermaine Monson M.D.  Internal Medicine; Geriatrics  [facility]  [address] [place] [address] [phone]    Martha Lees M.D.  Endocrinology Diabetes and Metabolism  [address] [address] [phone]    Attempt #3:  Mailbox is full, unable to leave a message. Closing encounter. Will re-open if patient returns call.

## 2025-03-08 ENCOUNTER — TELEPHONE (OUTPATIENT)
Dept: LAB | Facility: REFERENCE LAB | Age: 75
End: 2025-03-08

## 2025-03-24 ENCOUNTER — TELEPHONE (OUTPATIENT)
Age: 75
End: 2025-03-24

## 2025-03-24 DIAGNOSIS — H91.90 HARD OF HEARING: Primary | ICD-10-CM

## 2025-03-24 DIAGNOSIS — H90.5 SENSORINEURAL HEARING LOSS (SNHL), UNSPECIFIED LATERALITY: ICD-10-CM

## 2025-03-24 DIAGNOSIS — Z01.10: ICD-10-CM

## 2025-03-24 NOTE — TELEPHONE ENCOUNTER
Daughter of pt is calling requesting a referral for ent. Daughter mention that she is always screaming at pt because can not hear her.     Please call and advise

## 2025-03-28 NOTE — DISCHARGE SUMMARY
[facility]  part of [facility]     Discharge Summary    Ananya Dowling Patient Status:  Inpatient    1950 MRN [id number]   Location [facility] 5SW/SE Attending Td Chaney MD   Hosp Day # 2 PCP Jermaine Monson MD     Date of Admission: 2024    Date of Discharge: 2024.    Admitting Diagnosis: Urinary tract infection without hematuria, site unspecified [N39.0]    Discharge Diagnosis:   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, with long-term current use of insulin (HCC)    Chronic left-sided low back pain without sciatica    Mild recurrent major depression (HCC)    Hyperglycemia    Leukocytosis, unspecified type    Urinary tract infection without hematuria, site unspecified    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HCC)    Sepsis (HCC)    Sepsis due to urinary tract infection (HCC)    Hyponatremia    Azotemia       Reason for Admission:     UTI    Physical Exam:     General: Patient is alert and oriented x3 does not appear to be in acute distress at this time  HEENT: EOMI PERRLA, atraumatic normocephalic  Cardiac: S1-S2 appreciated  Lungs: Good air entry bilaterally clear to auscultation  Abdomen: Soft nontender nondistended positive bowel sounds  Ext: Peripheral pulses are positive  Neuro: No focal deficits noted  Psych: Normal mood  Skin: No rashes noted  MSK: Full range of motion intact      Hospital Course:     #Recurrent falls  History of multiple system atrophy  History of orthostatic hypotension  -Midodrine if positive  -PT/OT  -social work consult: plan to DC to Home with [facility]      Azotemia  Hyponatremia  -Gentle hydration     UTI, uncomplicated  -Previous culture with pansensitive E. Coli  -Continue ceftriaxone  -UCX ecoli - dc on  Notified patient's mom that patient is due to send a loop transmission this Monday.   Ceftin      DM: OOC  A1C 11.3  Pt had discontinue long acting insulin as OP unclear why- resume 36 U tresiba, increase SSI to MD  -Carb controlled diet  -Insulin coverage     HTN  HLD  -Home medications as appropriate        Quality:  DVT Prophylaxis: Heparin  CODE status: Full code  MIRIAM: TBD         History of Present Illness:     Per admitting:   ezequiel Dowling is a 74 year old female with history of depression, DM, HTN, HLD, arthritis, multiple system atrophy who presented to the ED following a fall.  Fall was witnessed by daughter.  Episode occurred 1 hour PTA.  She reportedly lost balance and fell hitting her face on kitchen counter and landing on her knees.  There was brief LOC.  Family reported that this was a 6th fall in the last week.  She appears to have more falls when she has a UTI.  Denies any other urinary symptoms.  No dizziness, chest pain, palpitations.  Per chart review, she has also had episodes of orthostatic hypotension, placed on trial of midodrine.    Disposition: Home or Self Care    Discharge Condition: Stable    Discharge Medications:   Current Discharge Medication List        START taking these medications    Details   cefuroxime 500 MG Oral Tab Take 1 tablet (500 mg total) by mouth 2 (two) times daily for 5 days.  Qty: 10 tablet, Refills: 0           CONTINUE these medications which have NOT CHANGED    Details   acetaminophen (TYLENOL) 325 MG Oral Tab Take 2 tablets (650 mg total) by mouth every 6 (six) hours as needed.  Qty: 30 tablet, Refills: 0      midodrine 5 MG Oral Tab Take 1 tablet (5 mg total) by mouth in the morning and 1 tablet (5 mg total) at noon and 1 tablet (5 mg total) in the evening.  Qty: 90 tablet, Refills: 1      simvastatin 20 MG Oral Tab Take 1 tablet (20 mg total) by mouth daily.  Qty: 90 tablet, Refills: 1      ergocalciferol 1.25 MG (01734 UT) Oral Cap Take 1 capsule (50,000 Units total) by mouth once a week.  Qty: 12 capsule, Refills: 0    Associated Diagnoses:  Vitamin D deficiency      sertraline 100 MG Oral Tab Take 1 tablet (100 mg total) by mouth daily.  Qty: 90 tablet, Refills: 0      methenamine 1 g Oral Tab Take 1 tablet (1 g total) by mouth 2 (two) times daily.  Qty: 60 tablet, Refills: 5    Associated Diagnoses: Recurrent UTI      gabapentin 300 MG Oral Cap Take 1 capsule (300 mg total) by mouth 3 (three) times daily.  Qty: 90 capsule, Refills: 3      estradiol (ESTRACE) 0.1 MG/GM Vaginal Cream Apply fingertip amount of cream to the vagina every night for 1 week and then every other night indefinitely  Qty: 42.5 g, Refills: 11    Associated Diagnoses: Genitourinary syndrome of menopause; Recurrent UTI      Insulin Degludec (TRESIBA FLEXTOUCH) 100 UNIT/ML Subcutaneous Solution Pen-injector Inject 36 Units into the skin daily.      Insulin Lispro, 1 Unit Dial, 100 UNIT/ML Subcutaneous Solution Pen-injector Patient will take 14 units with small carb meals and 18-20 units with larger carb meals.  Qty: 54 mL, Refills: 1    Associated Diagnoses: Type 2 diabetes mellitus with hyperglycemia, with long-term current use of insulin ([facility])      losartan 50 MG Oral Tab Take 1 tablet (50 mg total) by mouth daily.      lidocaine 5 % External Patch Place 1 patch onto the skin daily.  Qty: 14 patch, Refills: 0      clotrimazole 1 % External Cream Apply 1 Application topically 2 (two) times daily.  Qty: 60 g, Refills: 1      Accu-Chek FastClix Lancets Does not apply Misc Use to check blood sugars 3 times daily.  Qty: 300 each, Refills: 1    Associated Diagnoses: Type 2 diabetes mellitus with hyperglycemia, with long-term current use of insulin ([facility])      Blood Glucose Monitoring Suppl (ACCU-CHEK GUIDE) w/Device Does not apply Kit Use to check blood sugars 3 times daily.  Qty: 1 kit, Refills: 0    Associated Diagnoses: Type 2 diabetes mellitus with hyperglycemia, with long-term current use of insulin ([facility])      Glucose Blood (ACCU-CHEK GUIDE) In Vitro Strip Use to check blood sugars  3 times daily.  Qty: 300 strip, Refills: 1    Associated Diagnoses: Type 2 diabetes mellitus with hyperglycemia, with long-term current use of insulin (HCC)      lidocaine 4 % External Patch Place 1 patch onto the skin daily.      Insulin Pen Needle (PEN NEEDLES) 32G X 4 MM Does not apply Misc 1 pen  4 (four) times daily. Use  New pen needle  With each injection  Qty: 400 each, Refills: 0           STOP taking these medications       cephALEXin 500 MG Oral Cap            Total dc time > 30 min    Td Chaney MD  12/7/2024  11:44 AM     Hospital Discharge Diagnoses:  UTI    Lace+ Score: 67  59-90 High Risk  29-58 Medium Risk  0-28   Low Risk.    TCM Follow-Up Recommendation:  LACE > 58: High Risk of readmission after discharge from the hospital.

## 2025-03-31 ENCOUNTER — TELEPHONE (OUTPATIENT)
Age: 75
End: 2025-03-31

## 2025-03-31 NOTE — TELEPHONE ENCOUNTER
Patient says she is in a rehab now and I can try again at a different time to schedule for her annual physical

## 2025-04-10 ENCOUNTER — TELEPHONE (OUTPATIENT)
Age: 75
End: 2025-04-10

## 2025-04-10 NOTE — TELEPHONE ENCOUNTER
Nidia from [facility] called asking if pcp will follow pt for home health   To please call her back at [phone]

## 2025-04-10 NOTE — TELEPHONE ENCOUNTER
Daughter of pt is calling stating that pt is in facility, but would like for pt to have physical and occupational therapy. Daughter also mention would like to talk to nurse because is not sure what doctor can and can not do in regards to doing nursing home.      Please call and advise

## 2025-04-11 NOTE — TELEPHONE ENCOUNTER
Attempted to contact Nidia from [facility]. [phone]. Message left on voicemail that Dr. Monson has agreed to home health services.

## 2025-04-11 NOTE — TELEPHONE ENCOUNTER
Spoke with Nidia From [facility] [phone]. She states that family has appealed to stay in skilled facility. Once patient is discharged. Home health services  will be rendered. Skilled, PT and OT services. They domenica send orders for Dr. Monson to sign since he has agreed to follow patient for home health.

## 2025-04-11 NOTE — TELEPHONE ENCOUNTER
Left a message on Xochitl's ( HIPAA authorized) daughter left a message on voice mail to call the office.     Inquire what daughter is referring too as \"facility\"?    Is patient inpatient?

## 2025-04-26 DIAGNOSIS — Z79.4 TYPE 2 DIABETES MELLITUS WITH HYPERGLYCEMIA, WITH LONG-TERM CURRENT USE OF INSULIN (HCC): ICD-10-CM

## 2025-04-26 DIAGNOSIS — E11.65 TYPE 2 DIABETES MELLITUS WITH HYPERGLYCEMIA, WITH LONG-TERM CURRENT USE OF INSULIN (HCC): ICD-10-CM

## 2025-04-28 ENCOUNTER — TELEPHONE (OUTPATIENT)
Dept: ENDOCRINOLOGY CLINIC | Facility: CLINIC | Age: 75
End: 2025-04-28

## 2025-04-28 DIAGNOSIS — E11.65 TYPE 2 DIABETES MELLITUS WITH HYPERGLYCEMIA, WITH LONG-TERM CURRENT USE OF INSULIN (HCC): ICD-10-CM

## 2025-04-28 DIAGNOSIS — Z79.4 TYPE 2 DIABETES MELLITUS WITH HYPERGLYCEMIA, WITH LONG-TERM CURRENT USE OF INSULIN (HCC): ICD-10-CM

## 2025-04-28 RX ORDER — BLOOD-GLUCOSE METER
EACH MISCELLANEOUS
Qty: 1 KIT | Refills: 1 | Status: SHIPPED | OUTPATIENT
Start: 2025-04-28

## 2025-04-28 RX ORDER — BLOOD-GLUCOSE METER
1 EACH MISCELLANEOUS 3 TIMES DAILY
Qty: 1 KIT | Refills: 1 | Status: SHIPPED | OUTPATIENT
Start: 2025-04-28 | End: 2025-04-28

## 2025-04-28 RX ORDER — BLOOD SUGAR DIAGNOSTIC
STRIP MISCELLANEOUS
COMMUNITY
End: 2025-04-28

## 2025-04-28 RX ORDER — BLOOD SUGAR DIAGNOSTIC
STRIP MISCELLANEOUS
Qty: 300 EACH | Refills: 1 | Status: SHIPPED | OUTPATIENT
Start: 2025-04-28

## 2025-04-28 NOTE — TELEPHONE ENCOUNTER
Endocrine Refill protocol for Glucose testing supplies     Protocol Criteria: PASSED Reason: N/A    If below requirement is met, send a 90-day supply with 1 refill per provider protocol.    Verify appointment with Endocrinology completed in the last 6 months or scheduled in the next 3 months.    Last completed office visit: 2/24/2025 Martha Lees MD   Next scheduled Follow up: No future appointments.      
Patient daughter states that patient has misplaced her glucometer and has not checked her Blood Sugar levels in 3 days.  She is requesting an prescription be sent to [facility].  Patient currently uses [facility].        
No

## 2025-04-28 NOTE — TELEPHONE ENCOUNTER
Endocrine Refill protocol for Glucose testing supplies     Protocol Criteria: PASSED Reason: N/A    If below requirement is met, send a 90-day supply with 1 refill per provider protocol.    Verify appointment with Endocrinology completed in the last 6 months or scheduled in the next 3 months.    Last completed office visit: 2/24/2025 Martha Lees MD   Next scheduled Follow up: No future appointments.

## 2025-04-29 ENCOUNTER — TELEPHONE (OUTPATIENT)
Dept: ENDOCRINOLOGY CLINIC | Facility: CLINIC | Age: 75
End: 2025-04-29

## 2025-04-29 DIAGNOSIS — E11.65 TYPE 2 DIABETES MELLITUS WITH HYPERGLYCEMIA, WITH LONG-TERM CURRENT USE OF INSULIN (HCC): ICD-10-CM

## 2025-04-29 DIAGNOSIS — Z79.4 TYPE 2 DIABETES MELLITUS WITH HYPERGLYCEMIA, WITH LONG-TERM CURRENT USE OF INSULIN (HCC): ICD-10-CM

## 2025-04-29 RX ORDER — LANCETS
EACH MISCELLANEOUS
Qty: 300 EACH | Refills: 1 | Status: SHIPPED | OUTPATIENT
Start: 2025-04-29

## 2025-05-01 ENCOUNTER — OFFICE VISIT (OUTPATIENT)
Age: 75
End: 2025-05-01
Payer: MEDICARE

## 2025-05-01 ENCOUNTER — TELEPHONE (OUTPATIENT)
Age: 75
End: 2025-05-01

## 2025-05-01 VITALS
BODY MASS INDEX: 24.17 KG/M2 | HEIGHT: 61 IN | OXYGEN SATURATION: 96 % | DIASTOLIC BLOOD PRESSURE: 50 MMHG | SYSTOLIC BLOOD PRESSURE: 102 MMHG | WEIGHT: 128 LBS | HEART RATE: 100 BPM

## 2025-05-01 DIAGNOSIS — E11.22 TYPE 2 DIABETES MELLITUS WITH STAGE 3B CHRONIC KIDNEY DISEASE, WITH LONG-TERM CURRENT USE OF INSULIN (HCC): ICD-10-CM

## 2025-05-01 DIAGNOSIS — F33.0 MILD RECURRENT MAJOR DEPRESSION: Chronic | ICD-10-CM

## 2025-05-01 DIAGNOSIS — N18.32 TYPE 2 DIABETES MELLITUS WITH STAGE 3B CHRONIC KIDNEY DISEASE, WITH LONG-TERM CURRENT USE OF INSULIN (HCC): ICD-10-CM

## 2025-05-01 DIAGNOSIS — R41.0 DELIRIUM: ICD-10-CM

## 2025-05-01 DIAGNOSIS — N12 PYELONEPHRITIS: Primary | ICD-10-CM

## 2025-05-01 DIAGNOSIS — Z79.4 DIABETES MELLITUS DUE TO UNDERLYING CONDITION WITH HYPEROSMOLARITY WITHOUT COMA, WITH LONG-TERM CURRENT USE OF INSULIN (HCC): Primary | ICD-10-CM

## 2025-05-01 DIAGNOSIS — E08.00 DIABETES MELLITUS DUE TO UNDERLYING CONDITION WITH HYPEROSMOLARITY WITHOUT COMA, WITH LONG-TERM CURRENT USE OF INSULIN (HCC): Primary | ICD-10-CM

## 2025-05-01 DIAGNOSIS — R55 SYNCOPE AND COLLAPSE: ICD-10-CM

## 2025-05-01 DIAGNOSIS — F09 COGNITIVE DISORDER: ICD-10-CM

## 2025-05-01 DIAGNOSIS — Z79.4 TYPE 2 DIABETES MELLITUS WITH STAGE 3B CHRONIC KIDNEY DISEASE, WITH LONG-TERM CURRENT USE OF INSULIN (HCC): ICD-10-CM

## 2025-05-01 PROCEDURE — 1160F RVW MEDS BY RX/DR IN RCRD: CPT | Performed by: INTERNAL MEDICINE

## 2025-05-01 PROCEDURE — G2211 COMPLEX E/M VISIT ADD ON: HCPCS | Performed by: INTERNAL MEDICINE

## 2025-05-01 PROCEDURE — 99214 OFFICE O/P EST MOD 30 MIN: CPT | Performed by: INTERNAL MEDICINE

## 2025-05-01 PROCEDURE — 3078F DIAST BP <80 MM HG: CPT | Performed by: INTERNAL MEDICINE

## 2025-05-01 PROCEDURE — 1159F MED LIST DOCD IN RCRD: CPT | Performed by: INTERNAL MEDICINE

## 2025-05-01 PROCEDURE — 3008F BODY MASS INDEX DOCD: CPT | Performed by: INTERNAL MEDICINE

## 2025-05-01 PROCEDURE — 3074F SYST BP LT 130 MM HG: CPT | Performed by: INTERNAL MEDICINE

## 2025-05-01 RX ORDER — VANCOMYCIN HYDROCHLORIDE 125 MG/1
125 CAPSULE ORAL DAILY
Qty: 14 CAPSULE | Refills: 0 | Status: SHIPPED | OUTPATIENT
Start: 2025-05-01

## 2025-05-01 RX ORDER — NITROFURANTOIN 25; 75 MG/1; MG/1
100 CAPSULE ORAL 2 TIMES DAILY
Qty: 14 CAPSULE | Refills: 0 | Status: SHIPPED | OUTPATIENT
Start: 2025-05-01 | End: 2025-05-06

## 2025-05-01 NOTE — TELEPHONE ENCOUNTER
Daughter of pt is calling requesting referral for dr. Martha Lees. Daughter mention that she still does not have an appointment schedule, that they need referral first.       Please call and advise

## 2025-05-01 NOTE — PROGRESS NOTES
The following individual(s) verbally consented to be recorded using ambient AI listening technology and understand that they can each withdraw their consent to this listening technology at any point by asking the clinician to turn off or pause the recording: YES    Patient name: Ananya BENTLEY Dowling  Additional names:Xochitl

## 2025-05-02 RX ORDER — SIMVASTATIN 20 MG
20 TABLET ORAL DAILY
Qty: 90 TABLET | Refills: 1 | Status: SHIPPED | OUTPATIENT
Start: 2025-05-02

## 2025-05-02 NOTE — PROGRESS NOTES
History of Present Illness  Ananya Dowling is a 74 year old female with diabetes and recurrent urinary tract infections who presents with poorly controlled blood sugars and urinary symptoms. She is accompanied by her daughter, who assists with her insulin dosing and medical appointments.    She has a history of diabetes with recent episodes of poorly controlled blood sugars. Her daughter notes that her blood sugars are better controlled in a facility, typically not exceeding 170 mg/dL. However, at home, her blood sugars have been high, with a recent reading of 348 mg/dL after a period without glucose monitoring due to a misplaced glucose machine. She is currently on 30 units of Tresiba and 7 to 11 units of Humalog, adjusted based on her blood sugar levels.  Last A1c was 9.5.  She does follow with endocrinology.    She has a history of recurrent urinary tract infections, with past cultures showing E. Coli resistant to ampicillin and Cipro but sensitive to Macrobid. She experiences urinary symptoms including foul-smelling urine, which her caregiver notes 'stinks up the whole house.' She uses diapers due to incontinence and has experienced pyelonephritis in the past, confirmed by a CT scan showing a wedge-shaped hypodensity in the left upper pole of the kidney.  Suspected to be pyelonephritis.  She has been treated with Cefalexin in the past for urinary infections and is currently on methenamine to prevent infections.    She has a history of cognitive issues, with her caregiver noting memory problems and repetitive speech, raising concerns about possible dementia. She denies having dementia, but her caregiver reports that 'everyone she talks to' thinks there might be an issue.    She also has a history of depression, which she reports is 'a little better.'    Currently denies any fever chills chest pains or shortness of breath.         Patient Active Problem List    Diagnosis Date Noted    Microalbuminuria due to  type 2 diabetes mellitus (HCC) 03/03/2025    Vitamin D deficiency 03/03/2025    Type 2 diabetes mellitus with hyperglycemia, with long-term current use of insulin ([facility]) 03/03/2025    Acute cystitis 02/24/2025    Acute cystitis without hematuria 02/24/2025    Unsteady gait 02/24/2025    Generalized anxiety disorder 01/16/2025    Cognitive disorder 01/16/2025    Severe recurrent major depression without psychotic features ([facility]) 01/15/2025    Suicidal ideations 01/15/2025    Hyponatremia 12/05/2024    Azotemia 12/05/2024    Sepsis ([facility]) 08/24/2024    Sepsis due to urinary tract infection ([facility]) 08/24/2024    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[facility]) 05/05/2024    Hyperglycemia 05/02/2024    Leukocytosis, unspecified type 05/02/2024    Urinary tract infection without hematuria, site unspecified 05/02/2024    Mild recurrent major depression 01/25/2024    Chronic left-sided low back pain without sciatica 05/30/2023    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      General: No distress.  Nontoxic looking.  Affect is the same somewhat cynical.  Does not smile.    Cognition oriented and alert-she answers questions appropriately.  Does not repeat herself in the office.    Neck: No JVD noted    Lungs: Clear to ascultation , no rales    Heart: Regular sounding, S1, S2 nl , no murmur    Abdomen: Nondistended-no CVA tenderness.    Extremities: No  edema noted bilat    Skin: No rashes or lesions noted    Neurological: No focal deficit , no tremor               Ananya was seen today for follow - up.    Diagnoses and all orders for this  visit:    Pyelonephritis    Syncope and collapse    Delirium    Type 2 diabetes mellitus with stage 3b chronic kidney disease, with long-term current use of insulin ([facility])    Other orders  -     nitrofurantoin monohydrate macro 100 MG Oral Cap; Take 1 capsule (100 mg total) by mouth 2 (two) times daily for 5 days.  -     vancomycin 125 MG Oral Cap; Take 1 capsule (125 mg total) by mouth daily.       Assessment & Plan  Recurrent urinary tract infection  Recurrent urinary tract infections with recent symptoms of foul-smelling urine. Previous cultures showed E. Coli resistant to ampicillin and Cipro, but sensitive to Macrobid. Current symptoms suggest another UTI, possibly with the same organism. Empirical treatment with Macrobid is initiated to prevent potential hospitalization.  - Prescribe Macrobid for 7 days.  - Continue Methenamine to prevent infections.  - Provide a urine specimen cup for future testing if symptoms persist.  - Discontinue Cefalexin.    Pyelonephritis  CT scan shows a wedge-shaped hypodensity in the left upper pole of the kidney, consistent with pyelonephritis. No acute symptoms currently present.  Will consider urology referral.      Mild recurrent major depression  Depression is slightly improved. No acute changes.  Commend to stay on current treatment    Poor glycemic control  A1c is 12.7, indicating poor glycemic control with average blood glucose levels in the 300s. Emphasis on blood glucose monitoring and dietary management. Insulin dosing adjustments based on blood glucose readings are necessary.  - Check blood glucose levels in the morning and two hours postprandial.  - Target morning blood glucose around 120 and postprandial levels less than 180.  - Adjust Tresiba insulin dose by 2 units if morning glucose consistently exceeds 200.  - Continue Midodrine to maintain blood pressure.  - Prescribe Vancomycin 125 mg once daily while on antibiotics to prevent C. diff infection.             This  note was prepared using Dragon Medical voice recognition dictation software and as a result, errors may occur. When identified, these errors have been corrected. While every attempt is made to correct errors during dictation, discrepancies may still exist          [1]   Current Outpatient Medications:     nitrofurantoin monohydrate macro 100 MG Oral Cap, Take 1 capsule (100 mg total) by mouth 2 (two) times daily for 5 days., Disp: 14 capsule, Rfl: 0    vancomycin 125 MG Oral Cap, Take 1 capsule (125 mg total) by mouth daily., Disp: 14 capsule, Rfl: 0    SIMVASTATIN 20 MG Oral Tab, TAKE 1 TABLET(20 MG) BY MOUTH DAILY, Disp: 90 tablet, Rfl: 1    Accu-Chek FastClix Lancets Does not apply Misc, Use to check blood sugars 3 times daily., Disp: 300 each, Rfl: 1    Blood Glucose Monitoring Suppl (ACCU-CHEK GUIDE) w/Device Does not apply Kit, Use to check sugars, Disp: 1 kit, Rfl: 1    Glucose Blood (ACCU-CHEK GUIDE TEST) In Vitro Strip, Use to check sugars 3 time daily, Disp: 300 each, Rfl: 1    midodrine 5 MG Oral Tab, Take 1 tablet (5 mg total) by mouth daily as needed (low blood pressure)., Disp: , Rfl:     insulin degludec (TRESIBA FLEXTOUCH) 200 UNIT/ML Subcutaneous Solution Pen-injector, Inject 30 Units into the skin daily., Disp: 36 mL, Rfl: 1    acetaminophen 500 MG Oral Tab, Take 1 tablet (500 mg total) by mouth every 6 (six) hours as needed for Fever or Pain (equal to or greater than 100.4)., Disp: , Rfl:     gabapentin 100 MG Oral Cap, Take 1 capsule (100 mg total) by mouth 3 (three) times daily., Disp: 30 capsule, Rfl: 0    insulin lispro (HUMALOG) 100 UNIT/ML Injection Solution, Inject 1-11 Units into the skin 3 (three) times daily before meals. Base line 5 units with each meal; 1 unit for every 50 over 150, Disp: , Rfl:     losartan 50 MG Oral Tab, Take 1 tablet (50 mg total) by mouth daily., Disp: 90 tablet, Rfl: 1    ergocalciferol 1.25 MG (63213 UT) Oral Cap, Take 1 capsule (50,000 Units total) by mouth  once a week., Disp: 12 capsule, Rfl: 0    DULoxetine 30 MG Oral Cap DR Particles, Take 1 capsule (30 mg total) by mouth daily., Disp: 30 capsule, Rfl: 0    methenamine 1 g Oral Tab, Take 1 tablet (1 g total) by mouth 2 (two) times daily., Disp: 60 tablet, Rfl: 5

## 2025-05-30 ENCOUNTER — TELEPHONE (OUTPATIENT)
Age: 75
End: 2025-05-30

## 2025-05-30 NOTE — TELEPHONE ENCOUNTER
Nidia is calling from home health wanting to know if can get a verbal for that dr. Monson will sign home health orders for pt above.       Please call Nidia at [phone] confidential voice mail.

## 2025-06-03 ENCOUNTER — PATIENT OUTREACH (OUTPATIENT)
Dept: CASE MANAGEMENT | Age: 75
End: 2025-06-03

## 2025-06-03 NOTE — PROGRESS NOTES
Contacted the patient regarding the [facility] care management program and informed her that she will be transitioning to a new care manager at this time. The patient states she no longer would like to participate in the program. She states her daughter is managing her care and reaches out directly to Dr. Arellano office for anything. She will have her daughter continue to reach out to Dr. Arellano office for any needs.

## 2025-06-14 ENCOUNTER — APPOINTMENT (OUTPATIENT)
Dept: CT IMAGING | Facility: HOSPITAL | Age: 75
End: 2025-06-14
Attending: EMERGENCY MEDICINE
Payer: MEDICARE

## 2025-06-14 ENCOUNTER — HOSPITAL ENCOUNTER (EMERGENCY)
Facility: HOSPITAL | Age: 75
Discharge: HOME | End: 2025-06-14
Attending: EMERGENCY MEDICINE
Payer: MEDICARE

## 2025-06-14 VITALS
OXYGEN SATURATION: 98 % | SYSTOLIC BLOOD PRESSURE: 160 MMHG | BODY MASS INDEX: 24.17 KG/M2 | HEART RATE: 74 BPM | HEIGHT: 61 IN | RESPIRATION RATE: 18 BRPM | DIASTOLIC BLOOD PRESSURE: 80 MMHG | TEMPERATURE: 98 F | WEIGHT: 128 LBS

## 2025-06-14 DIAGNOSIS — N30.00 ACUTE CYSTITIS WITHOUT HEMATURIA: Primary | ICD-10-CM

## 2025-06-14 LAB
ALBUMIN SERPL-MCNC: 4.5 G/DL (ref 3.2–4.8)
ALP LIVER SERPL-CCNC: 86 U/L (ref 55–142)
ALT SERPL-CCNC: 14 U/L (ref 10–49)
ANION GAP SERPL CALC-SCNC: 6 MMOL/L (ref 0–18)
AST SERPL-CCNC: 23 U/L (ref ?–34)
BASOPHILS # BLD AUTO: 0.05 X10(3) UL (ref 0–0.2)
BASOPHILS NFR BLD AUTO: 0.5 %
BILIRUB DIRECT SERPL-MCNC: <0.1 MG/DL (ref ?–0.3)
BILIRUB SERPL-MCNC: 0.2 MG/DL (ref 0.2–1.1)
BILIRUB UR QL: NEGATIVE
BUN BLD-MCNC: 19 MG/DL (ref 9–23)
BUN/CREAT SERPL: 19.2 (ref 10–20)
CALCIUM BLD-MCNC: 9.5 MG/DL (ref 8.7–10.4)
CHLORIDE SERPL-SCNC: 102 MMOL/L (ref 98–112)
CO2 SERPL-SCNC: 28 MMOL/L (ref 21–32)
COLOR UR: YELLOW
CREAT BLD-MCNC: 0.99 MG/DL (ref 0.55–1.02)
DEPRECATED RDW RBC AUTO: 40.7 FL (ref 35.1–46.3)
EGFRCR SERPLBLD CKD-EPI 2021: 60 ML/MIN/1.73M2 (ref 60–?)
EOSINOPHIL # BLD AUTO: 0.21 X10(3) UL (ref 0–0.7)
EOSINOPHIL NFR BLD AUTO: 2.1 %
ERYTHROCYTE [DISTWIDTH] IN BLOOD BY AUTOMATED COUNT: 13.2 % (ref 11–15)
GLUCOSE BLD-MCNC: 242 MG/DL (ref 70–99)
GLUCOSE BLDC GLUCOMTR-MCNC: 296 MG/DL (ref 70–99)
GLUCOSE UR-MCNC: 500 MG/DL
HCT VFR BLD AUTO: 35.6 % (ref 35–48)
HGB BLD-MCNC: 11.9 G/DL (ref 12–16)
IMM GRANULOCYTES # BLD AUTO: 0.05 X10(3) UL (ref 0–1)
IMM GRANULOCYTES NFR BLD: 0.5 %
KETONES UR-MCNC: NEGATIVE MG/DL
LEUKOCYTE ESTERASE UR QL STRIP.AUTO: 500
LYMPHOCYTES # BLD AUTO: 2.01 X10(3) UL (ref 1–4)
LYMPHOCYTES NFR BLD AUTO: 19.9 %
MCH RBC QN AUTO: 28.3 PG (ref 26–34)
MCHC RBC AUTO-ENTMCNC: 33.4 G/DL (ref 31–37)
MCV RBC AUTO: 84.8 FL (ref 80–100)
MONOCYTES # BLD AUTO: 0.75 X10(3) UL (ref 0.1–1)
MONOCYTES NFR BLD AUTO: 7.4 %
NEUTROPHILS # BLD AUTO: 7.02 X10 (3) UL (ref 1.5–7.7)
NEUTROPHILS # BLD AUTO: 7.02 X10(3) UL (ref 1.5–7.7)
NEUTROPHILS NFR BLD AUTO: 69.6 %
OSMOLALITY SERPL CALC.SUM OF ELEC: 292 MOSM/KG (ref 275–295)
PH UR: 5.5 [PH] (ref 5–8)
PLATELET # BLD AUTO: 268 10(3)UL (ref 150–450)
POTASSIUM SERPL-SCNC: 4.3 MMOL/L (ref 3.5–5.1)
PROT SERPL-MCNC: 7.6 G/DL (ref 5.7–8.2)
PROT UR-MCNC: 100 MG/DL
RBC # BLD AUTO: 4.2 X10(6)UL (ref 3.8–5.3)
RBC #/AREA URNS AUTO: >10 /HPF
SODIUM SERPL-SCNC: 136 MMOL/L (ref 136–145)
SP GR UR STRIP: 1.02 (ref 1–1.03)
UROBILINOGEN UR STRIP-ACNC: NORMAL
WBC # BLD AUTO: 10.1 X10(3) UL (ref 4–11)
WBC #/AREA URNS AUTO: >50 /HPF
WBC CLUMPS UR QL AUTO: PRESENT /HPF
YEAST UR QL: PRESENT /HPF

## 2025-06-14 PROCEDURE — 99285 EMERGENCY DEPT VISIT HI MDM: CPT

## 2025-06-14 PROCEDURE — 80048 BASIC METABOLIC PNL TOTAL CA: CPT | Performed by: EMERGENCY MEDICINE

## 2025-06-14 PROCEDURE — 96365 THER/PROPH/DIAG IV INF INIT: CPT

## 2025-06-14 PROCEDURE — 80076 HEPATIC FUNCTION PANEL: CPT | Performed by: EMERGENCY MEDICINE

## 2025-06-14 PROCEDURE — 96361 HYDRATE IV INFUSION ADD-ON: CPT

## 2025-06-14 PROCEDURE — 82962 GLUCOSE BLOOD TEST: CPT

## 2025-06-14 PROCEDURE — 81001 URINALYSIS AUTO W/SCOPE: CPT | Performed by: EMERGENCY MEDICINE

## 2025-06-14 PROCEDURE — 85025 COMPLETE CBC W/AUTO DIFF WBC: CPT | Performed by: EMERGENCY MEDICINE

## 2025-06-14 PROCEDURE — 87186 SC STD MICRODIL/AGAR DIL: CPT | Performed by: EMERGENCY MEDICINE

## 2025-06-14 PROCEDURE — 87077 CULTURE AEROBIC IDENTIFY: CPT | Performed by: EMERGENCY MEDICINE

## 2025-06-14 PROCEDURE — 87086 URINE CULTURE/COLONY COUNT: CPT | Performed by: EMERGENCY MEDICINE

## 2025-06-14 PROCEDURE — 70450 CT HEAD/BRAIN W/O DYE: CPT | Performed by: EMERGENCY MEDICINE

## 2025-06-14 PROCEDURE — 99284 EMERGENCY DEPT VISIT MOD MDM: CPT

## 2025-06-14 PROCEDURE — 96375 TX/PRO/DX INJ NEW DRUG ADDON: CPT

## 2025-06-14 RX ORDER — BUSPIRONE HYDROCHLORIDE 5 MG/1
5 TABLET ORAL 3 TIMES DAILY
COMMUNITY

## 2025-06-14 RX ORDER — GABAPENTIN 300 MG/1
300 CAPSULE ORAL 3 TIMES DAILY
COMMUNITY

## 2025-06-14 RX ORDER — ACETAMINOPHEN 325 MG/1
650 TABLET ORAL ONCE
Status: COMPLETED | OUTPATIENT
Start: 2025-06-14 | End: 2025-06-14

## 2025-06-14 RX ORDER — CAPTOPRIL 25 MG/1
25 TABLET ORAL 2 TIMES DAILY
COMMUNITY

## 2025-06-14 RX ORDER — CEPHALEXIN 500 MG/1
500 CAPSULE ORAL 2 TIMES DAILY
Qty: 10 CAPSULE | Refills: 0 | Status: SHIPPED | OUTPATIENT
Start: 2025-06-14 | End: 2025-06-19

## 2025-06-14 RX ORDER — METOPROLOL TARTRATE 25 MG/1
25 TABLET, FILM COATED ORAL 2 TIMES DAILY
COMMUNITY

## 2025-06-14 RX ORDER — ONDANSETRON 2 MG/ML
4 INJECTION INTRAMUSCULAR; INTRAVENOUS ONCE
Status: COMPLETED | OUTPATIENT
Start: 2025-06-14 | End: 2025-06-14

## 2025-06-14 RX ORDER — ONDANSETRON 4 MG/1
4 TABLET, ORALLY DISINTEGRATING ORAL EVERY 4 HOURS PRN
Qty: 10 TABLET | Refills: 0 | Status: SHIPPED | OUTPATIENT
Start: 2025-06-14 | End: 2025-06-21

## 2025-06-14 NOTE — ED INITIAL ASSESSMENT (HPI)
Patient arrived via triage for fall, uti,and MSA. Per daughter, she states patient is very aggressive and confused. Per daughter, it started this past Monday. Patient denies fever but patient has nausea. Per daughter, patient blood sugars have been elevated. Patient is on insulin/sliding scale. Patient accucheck in triage is 296. Patient denies any pain at this time.

## 2025-06-15 NOTE — ED PROVIDER NOTES
Patient Seen in: [facility] Emergency Department    History     Chief Complaint   Patient presents with    Fall    Urinary Symptoms       HPI    74-year-old female presents to the emergency department with her daughter for evaluation of change in mental status and likely urinary tract infection.  Daughter states that patient has been more confused, aggressive, and has not been wanting to eat past 4 to 5 days.  She has had some nausea and has had an episode of vomiting but not today.  Patient has been experiencing elevated blood sugars at home.  Daughter states that patient is also fallen last week and did hit her head.  Patient is denying any headache at this time.  No fevers or chills.    History from Independent Source: Daughter gave history as stated in HPI due to mental status changes    External Records Reviewed: On chart review, patient was seen in the ED at [facility] in May and diagnosed with urinary tract infection at that time.  She was treated with cephalexin.    History reviewed. Past Medical History[1]    History reviewed. Past Surgical History[2]      Medications :  Prescriptions Prior to Admission[3]     Family History[4]    Smoking Status: Social Hx on file[5]    Constitutional and vital signs reviewed.      Social History and Family History elements reviewed from today, pertinent positives to the presenting problem noted.    Physical Exam     ED Triage Vitals [06/14/25 1734]   /58   Pulse 66   Resp 18   Temp 97.7 °F (36.5 °C)   Temp src Oral   SpO2 99 %   O2 Device None (Room air)       Physical Exam   Constitutional: AAOx2, well nourished, NAD  HEENT: Normocephalic, PERRLA, MMM  CV: s1s2+, RRR, no m/r/g, normal distal pulses  Pulmonary/Chest: CTA b/l with no rales, wheezes.  No chest wall tenderness  Abdominal: Nontender.  Nondistended. Soft. Bowel sounds are normal.   Neck/Back:   :   Musculoskeletal: Normal range of motion. No deformity.   Neurological: Awake, alert. Normal  reflexes. No cranial nerve deficit.    Skin: Skin is warm and dry. No rash noted. No erythema.   Psychiatric:      All measures to prevent infection transmission during my interaction with the patient were taken. The patient was already wearing a droplet mask on my arrival to the room. Personal protective equipment was worn throughout the duration of the exam.      ED Course        Labs Reviewed   URINALYSIS WITH CULTURE REFLEX - Abnormal; Notable for the following components:       Result Value    Clarity Urine Ex.Turbid (*)     Glucose Urine 500 (*)     Blood Urine 1+ (*)     Protein Urine 100 (*)     Nitrite Urine 1+ (*)     Leukocyte Esterase Urine 500 (*)     WBC Urine >50 (*)     RBC Urine >10 (*)     Bacteria Urine 1+ (*)     Squamous Epi. Cells Few (*)     Yeast Urine Present (*)     WBC Clump Present (*)     All other components within normal limits   BASIC METABOLIC PANEL (8) - Abnormal; Notable for the following components:    Glucose 242 (*)     All other components within normal limits   CBC WITH DIFFERENTIAL WITH PLATELET - Abnormal; Notable for the following components:    HGB 11.9 (*)     All other components within normal limits   POCT GLUCOSE - Abnormal; Notable for the following components:    POC Glucose  296 (*)     All other components within normal limits   HEPATIC FUNCTION PANEL (7) - Normal   URINE CULTURE, ROUTINE     My Independent Interpretation of EKG (if performed):     Monitor Interpretation:   normal sinus rhythm as interpreted by me.      Imaging Results Available and Reviewed while in ED: CT BRAIN OR HEAD (CPT=70450)  Result Date: 6/14/2025  CONCLUSION:  1. No acute intracranial finding. 2. Moderate changes of chronic small vessel disease in cerebral white matter.    Dictated by (CST): Darren Lopez MD on 6/14/2025 at 8:01 PM     Finalized by (CST): Darren Lopez MD on 6/14/2025 at 8:03 PM          ED Medications Administered:   Medications   cefTRIAXone (Rocephin) 2 g in sodium  chloride 0.9% 100mL IVPB-ADRIANO (2 g Intravenous New Bag 6/14/25 2110)   sodium chloride 0.9 % IV bolus 1,000 mL (0 mL Intravenous Stopped 6/14/25 2110)   ondansetron (Zofran) 4 MG/2ML injection 4 mg (4 mg Intravenous Given 6/14/25 1949)   acetaminophen (Tylenol) tab 650 mg (650 mg Oral Given 6/14/25 2023)             MDM     Vitals:    06/14/25 1734 06/14/25 1915   BP: 149/58 148/61   Pulse: 66 71   Resp: 18 17   Temp: 97.7 °F (36.5 °C)    TempSrc: Oral    SpO2: 99% 98%   Weight: 58.1 kg    Height: 154.9 cm (5' 1\")      *I personally reviewed and interpreted all ED vitals.    Independent Interpretation of Studies: I have independently reviewed patient's CT head and there are no acute findings    Social Determinants of Health:     Procedures:      Differential/MDM/Shared Decision Making: Differential Diagnosis includes UTI, dehydration, DKA, electrolyte abnormality, dehydration, others.      The patient already  has a past medical history of Arthritis, Back problem, Depression, Diabetes (HCC), Essential hypertension, High blood pressure, High cholesterol, Hyperlipidemia, and Visual impairment.  to contribute to the complexity of this ED evaluation.           Medications, Diagnostics, or Disposition considered but not done:     Patient does have a urinary tract infection.  Remainder of workup is relatively unremarkable.  No anion gap.  Patient covered with IV ceftriaxone based on the last culture at [facility].  Management of case was discussed with patient and daughter.  They are comfortable discharge.      Condition upon leaving the department: Stable    Disposition and Plan     Clinical Impression:  1. Acute cystitis without hematuria        Disposition:  Discharge    Follow-up:  Jermaine Monson  [address]  [facility] [address]  [place] [id number] [phone]    Call in 2 day(s)        Medications Prescribed:  Current Discharge Medication List        START taking these medications    Details   cephALEXin 500 MG Oral  Cap Take 1 capsule (500 mg total) by mouth 2 (two) times daily for 5 days.  Qty: 10 capsule, Refills: 0      ondansetron 4 MG Oral Tablet Dispersible Take 1 tablet (4 mg total) by mouth every 4 (four) hours as needed for Nausea.  Qty: 10 tablet, Refills: 0                      [1]   Past Medical History:   Arthritis    Back problem    Depression    Diabetes (HCC)    Essential hypertension    High blood pressure    High cholesterol    Hyperlipidemia    Visual impairment   [2] History reviewed. No pertinent surgical history.  [3] (Not in a hospital admission)   [4]   Family History  Problem Relation Age of Onset    Heart Attack Father     Stroke Mother     Heart Disorder Son     Diabetes Son    [5]   Social History  Socioeconomic History    Marital status:    Tobacco Use    Smoking status: Never    Smokeless tobacco: Never   Vaping Use    Vaping status: Never Used   Substance and Sexual Activity    Alcohol use: Not Currently     Comment: very infrequent, every couple of months    Drug use: Never

## 2025-06-15 NOTE — ED QUICK NOTES
Action and side effects of Keflex reviewed. Importance of MD follow up reviewed. Pt and daughter verbalized understanding.

## 2025-06-16 ENCOUNTER — PATIENT OUTREACH (OUTPATIENT)
Age: 75
End: 2025-06-16

## 2025-06-16 ENCOUNTER — TELEPHONE (OUTPATIENT)
Age: 75
End: 2025-06-16

## 2025-06-16 NOTE — PROGRESS NOTES
Transitions of Care Navigation  Discharge Date: 25  Contact Date: 2025    Transitions of Care Assessment:       25 1047   JYOTI Assessment   Assessment Type JYOTI Initial   Assessment completed with Children  (Daughter Xochitl)   Patient Subjective Spoke with patient's daughter Xochitl. Verified ok per HERB. Xochitl discussed with me reasoning for bringing her mother in to the ER. Xochitl states she was out of it and having issues with her balance. Has had multiple falls last week. Xochitl reports patient has started the prescribed oral antibiotic last night. Xochitl denies the patient having chest pain, shortness of breath, fever, chills, nausea, vomiting, diarrhea.Urine continues to be cloudy and has a foul odor.Xochitl denies the patient having any new or worsening symptoms.   Chief Complaint Primary Diagnosis Acute cystitis without hematuria   JYOTI Navigation Initial Assessment   Verify patient name and  with patient/ caregiver Yes   Tell me what you understand of why you were in the hospital or emergency department Daughter reports patient was not herself- that she was very out of it and had multiple falls   Prior to leaving the hospital were your Discharge Instructions reviewed with you? Yes   Did you receive a copy of your written Discharge Instructions? Yes   What questions do you have about your Discharge Instructions? Denies   Do you feel better or worse since you left the hospital or emergency department? Same   Do you have a follow-up appointment? No   Were you able to schedule the appt? Not Scheduled   Are there any barriers to getting to your follow-up appointment? No   Prior to leaving the hospital was Home Health (HH) arranged for you? N/A   Are HH needs identified by staff during the assessment? No   Prior to leaving the hospital or emergency department was Durable Medical Equipment (DME), medical supplies, or infusions arranged for you? N/A   Are DME/medical supply/infusions needs identified by  staff during this assessment? No   Did any of your medications change, during or after your hospital stay or ED visit? Yes   Do you have your new or updated medications? Yes   Do you understand what your medications are for and possible side effects? Yes   Are there any reasons that keep you from taking your medication as prescribed? No   Any concerns about medication refills? No   Were you given a different diet per your Discharge Instructions? No   Do you have any questions or concerns that have not been discussed? No         Nursing Interventions:    Xochitl reports patient has started the prescribed oral antibiotic last night. Xochitl denies the patient having chest pain, shortness of breath, fever, chills, nausea, vomiting, diarrhea.Urine continues to be cloudy and has a foul odor.Xochitl denies the patient having any new or worsening symptoms.     SDOH: Daughter not working. Does not have any help with her. Concerned for food and daughter is concerned about losing housing. Has meals on wheels.    Patient's daughter agreed to speaking to a CHW regarding food/housing insecurity. (Message sent)     Attempted to scheduled ER follow up with Dr. Monson. Nurse Care Manager receives red security warning that there is no available appointments. A message was sent to the PCP office for further assistance and the daughter is aware she will receive a call back directly.     Advised/reviewed importance of completing full course of antibiotic therapy. Provided education on signs/symptoms of infection. All d/c instructions reviewed with pt's daughter.  Reviewed when to call MD vs when to go to ER/call 911.  Educated pt on the importance of taking all meds as prescribed as well as close f/u with PCP/specialists.  Pt verbalized understanding and will contact office with any further questions or concerns.       Medications:  Medication Reconciliation:  I am aware of an inpatient discharge within the last 30 days.  The discharge  medication list has been reconciled with the patient's current medication list and reviewed by me. See medication list for additions of new medication, and changes to current doses of medications and discontinued medications.  [Current Medications]    [Current Medications]  Current Outpatient Medications   Medication Sig Dispense Refill    metoprolol tartrate 25 MG Oral Tab Take 1 tablet (25 mg total) by mouth 2 (two) times daily.      captopril 25 MG Oral Tab Take 1 tablet (25 mg total) by mouth 2 (two) times daily.      gabapentin 300 MG Oral Cap Take 1 capsule (300 mg total) by mouth 3 (three) times daily.      busPIRone 5 MG Oral Tab Take 1 tablet (5 mg total) by mouth 3 (three) times daily.      cephALEXin 500 MG Oral Cap Take 1 capsule (500 mg total) by mouth 2 (two) times daily for 5 days. 10 capsule 0    ondansetron 4 MG Oral Tablet Dispersible Take 1 tablet (4 mg total) by mouth every 4 (four) hours as needed for Nausea. 10 tablet 0    SIMVASTATIN 20 MG Oral Tab TAKE 1 TABLET(20 MG) BY MOUTH DAILY 90 tablet 1    vancomycin 125 MG Oral Cap Take 1 capsule (125 mg total) by mouth daily. (Patient not taking: Reported on 6/14/2025) 14 capsule 0    Accu-Chek FastClix Lancets Does not apply Misc Use to check blood sugars 3 times daily. 300 each 1    Blood Glucose Monitoring Suppl (ACCU-CHEK GUIDE) w/Device Does not apply Kit Use to check sugars 1 kit 1    Glucose Blood (ACCU-CHEK GUIDE TEST) In Vitro Strip Use to check sugars 3 time daily 300 each 1    midodrine 5 MG Oral Tab Take 1 tablet (5 mg total) by mouth daily as needed (low blood pressure).      insulin degludec (TRESIBA FLEXTOUCH) 200 UNIT/ML Subcutaneous Solution Pen-injector Inject 30 Units into the skin daily. 36 mL 1    acetaminophen 500 MG Oral Tab Take 1 tablet (500 mg total) by mouth every 6 (six) hours as needed for Fever or Pain (equal to or greater than 100.4).      gabapentin 100 MG Oral Cap Take 1 capsule (100 mg total) by mouth 3 (three)  times daily. (Patient not taking: Reported on 6/14/2025) 30 capsule 0    insulin lispro (HUMALOG) 100 UNIT/ML Injection Solution Inject 1-11 Units into the skin 3 (three) times daily before meals. Base line 5 units with each meal; 1 unit for every 50 over 150      losartan 50 MG Oral Tab Take 1 tablet (50 mg total) by mouth daily. (Patient not taking: Reported on 6/14/2025) 90 tablet 1    ergocalciferol 1.25 MG (31524 UT) Oral Cap Take 1 capsule (50,000 Units total) by mouth once a week. 12 capsule 0    DULoxetine 30 MG Oral Cap DR Particles Take 1 capsule (30 mg total) by mouth daily. 30 capsule 0    methenamine 1 g Oral Tab Take 1 tablet (1 g total) by mouth 2 (two) times daily. 60 tablet 5           Follow-up Appointments:      Transitional Care Clinic  Was TCC Ordered: No      Primary Care Provider (If no TCC appointment)  Does patient already have a PCP appointment scheduled? No  Nurse Care Manager Attempted to schedule PCP office ER Follow-up appointment with patient        [x]  Patient verbally agrees to additional follow-up calls from Nurse Care Manager    Book By Date: 06/21/2025

## 2025-06-23 ENCOUNTER — PATIENT OUTREACH (OUTPATIENT)
Age: 75
End: 2025-06-23

## 2025-06-23 ENCOUNTER — TELEPHONE (OUTPATIENT)
Age: 75
End: 2025-06-23

## 2025-06-23 NOTE — PROGRESS NOTES
06/23/25 1020   Call Details   Call Attempt # 2   SDOH Domain(s) with needs Other (Comment);Food Insecurity  (Resources for Assisted Living, Nursing Home)   SDOH Free Text Details   Food Insecurity Details Declines resources for food insecurity at this time.   Resources Provided   Resources Provided alon     Spoke to patient's daughter Xochitl- Patient is over income for eligibility for Nursing Home/Assisted Living daughter states she is concerned for her 8 year old daughter and patient's aggression. CHW completed outreach to [facility]- CHW left voicemail for Zulema for Senior Housing referral. Patient's daughter was informed.

## 2025-06-24 ENCOUNTER — PATIENT OUTREACH (OUTPATIENT)
Age: 75
End: 2025-06-24

## 2025-06-24 NOTE — PROGRESS NOTES
Nurse Care Manager called and spoke with patient's daughter Xochitl. Verified ok HERB. Xochitl states her mother is now in a psych garces in [place]. Xochitl states her mother was having suicidal thoughts and also tried to sneak out of the house late at night. Xochitl expressed concerns that she does not feel safe with her mother being discharged from the psych garces back home as she has an 8 year old daughter. Nurse Care Manager encouraged Xochitl to call the Psych garces to speak with a  regarding her concerns for safety. Xochitl did confirm with me with that the patient is enrolled in [facility]. I also encouraged Xochitl to reach out to [facility] for further advice. I discussed with Xochitl other options regarding a living situation such as assisted living and she seemed open to this. Xochitl verbalized understanding of this and was very thankful for the follow up call. Encounter closing. No further follow up as patient is enrolled in [facility].

## 2025-06-25 NOTE — PROGRESS NOTES
06/25/25 1241   Call Details   Call Attempt # 3   SDOH Domain(s) with needs Other (Comment)  (Assisted Living/Nursing Home)   Resources Provided   Resources Provided alon         Spoke with daughter Xochitl- states she is working closely with Yojana at [facility]. Per patient if [facility] is not able to assist with assisted living or nursing home for patient she will be contacting [facility].

## 2025-07-07 ENCOUNTER — PATIENT OUTREACH (OUTPATIENT)
Age: 75
End: 2025-07-07

## 2025-07-07 NOTE — PROGRESS NOTES
07/07/25 1021   Call Details   Call Attempt # 4   SDOH Domain(s) with needs Other (Comment)  (Facility Assistance)   Resources Provided   Resources Provided findhelp   Outreach Outcome   SDOH Overall Outreach Outcome Resources Provided but unable to follow-up         Unable to follow-up with patient's daughter, Xochitl.

## 2025-07-09 ENCOUNTER — HOSPITAL ENCOUNTER (EMERGENCY)
Facility: HOSPITAL | Age: 75
Discharge: HOME | End: 2025-07-09
Attending: EMERGENCY MEDICINE
Payer: MEDICARE

## 2025-07-09 ENCOUNTER — APPOINTMENT (OUTPATIENT)
Dept: CT IMAGING | Facility: HOSPITAL | Age: 75
End: 2025-07-09
Attending: EMERGENCY MEDICINE
Payer: MEDICARE

## 2025-07-09 VITALS
BODY MASS INDEX: 26.11 KG/M2 | RESPIRATION RATE: 18 BRPM | HEART RATE: 94 BPM | DIASTOLIC BLOOD PRESSURE: 80 MMHG | WEIGHT: 133 LBS | TEMPERATURE: 98 F | HEIGHT: 60 IN | SYSTOLIC BLOOD PRESSURE: 162 MMHG | OXYGEN SATURATION: 99 %

## 2025-07-09 DIAGNOSIS — S00.03XA HEMATOMA OF SCALP, INITIAL ENCOUNTER: ICD-10-CM

## 2025-07-09 DIAGNOSIS — S09.8XXA BLUNT HEAD INJURY, INITIAL ENCOUNTER: Primary | ICD-10-CM

## 2025-07-09 LAB
ATRIAL RATE: 92 BPM
GLUCOSE BLDC GLUCOMTR-MCNC: 409 MG/DL (ref 70–99)
P AXIS: 39 DEGREES
P-R INTERVAL: 146 MS
Q-T INTERVAL: 340 MS
QRS DURATION: 68 MS
QTC CALCULATION (BEZET): 420 MS
R AXIS: 45 DEGREES
T AXIS: 88 DEGREES
VENTRICULAR RATE: 92 BPM

## 2025-07-09 PROCEDURE — 93005 ELECTROCARDIOGRAM TRACING: CPT

## 2025-07-09 PROCEDURE — 99284 EMERGENCY DEPT VISIT MOD MDM: CPT

## 2025-07-09 PROCEDURE — 82962 GLUCOSE BLOOD TEST: CPT

## 2025-07-09 PROCEDURE — 70450 CT HEAD/BRAIN W/O DYE: CPT | Performed by: RADIOLOGY

## 2025-07-09 PROCEDURE — 93010 ELECTROCARDIOGRAM REPORT: CPT

## 2025-07-09 NOTE — ED PROVIDER NOTES
Patient Seen in: [facility] Emergency Department    History     Chief Complaint   Patient presents with    Trauma       HPI    74-year-old female who presents to the emergency department after having an unwitnessed fall, daughter at the bedside states that she is at her baseline mental status, has a hematoma to the right eyebrow.  No new weakness or numbness or paresthesias.    History reviewed. Past Medical History[1]    History reviewed. Past Surgical History[2]      Medications :  Prescriptions Prior to Admission[3]     Family History[4]    Smoking Status: Social Hx on file[5]    Constitutional and vital signs reviewed.      Social History and Family History elements reviewed from today, pertinent positives to the presenting problem noted.    Physical Exam     ED Triage Vitals [07/09/25 1003]   BP (!) 180/76   Pulse 96   Resp 18   Temp 98.2 °F (36.8 °C)   Temp src Oral   SpO2 99 %   O2 Device None (Room air)       All measures to prevent infection transmission during my interaction with the patient were taken. The patient was already wearing a droplet mask on my arrival to the room. Personal protective equipment was worn throughout the duration of the exam.  Handwashing was performed prior to and after the exam.  Stethoscope and any equipment used during my examination was cleaned with super sani-cloth germicidal wipes following the exam.     Physical Exam    General: NAD  Head: Right frontal scalp hematoma.  No lacerations  Mouth/Throat/Ears/Nose: Oropharynx is clear and moist.   Eyes: Conjunctivae and EOM are normal.   Neck: Normal range of motion. Supple.   Cardiovascular: Normal rate, regular rhythm, normal heart sounds.  Respiratory/Chest: Clear and equal bilaterally. Exhibits no tenderness.  Gastrointestinal: Soft, non-tender, non-distended. Bowel sounds are normal.   Musculoskeletal:No swelling or deformity.   Neurological: Alert and appropriate. No focal deficits.   CN II-XII grossly intact  5/5  strength: Left  strength, deltoid abduction, achilles, patella  5/5 strength: Right  strength, deltoid abduction, achilles, patella   SILT to bilateral upper and lower extremities     Skin: Skin is warm and dry. No pallor.        ED Course        Labs Reviewed   POCT GLUCOSE - Abnormal; Notable for the following components:       Result Value    POC Glucose  409 (*)     All other components within normal limits   RAINBOW DRAW LAVENDER   RAINBOW DRAW LIGHT GREEN   RAINBOW DRAW BLUE   RAINBOW DRAW GOLD     EKG    Rate, intervals and axes as noted on EKG Report.  Rate: 92  Rhythm: Sinus Rhythm  Reading: No STEMI.  This is my interpretation.           As Interpreted by me    Imaging Results Available and Reviewed while in ED: No results found.    ED Medications Administered: Medications - No data to display      MDM     Vitals:    07/09/25 1003 07/09/25 1158   BP: (!) 180/76 (!) 162/80   Pulse: 96 94   Resp: 18 18   Temp: 98.2 °F (36.8 °C)    TempSrc: Oral    SpO2: 99% 99%   Weight: 60.3 kg    Height: 152.4 cm (5')      *I personally reviewed and interpreted all ED vitals.    Pulse Ox: 99%, Room air, Normal       Medical Decision Making      Differential Diagnosis/ Diagnostic Considerations: Intracranial hemorrhage, scalp hematoma    Complicating Factors: The patient already has diabetes to contribute to the complexity of this ED evaluation.    I reviewed prior chart records including ED note from June 14, 2025.  CT head without intracranial hemorrhage on my interpretation.    Dc In stable condition.  Patient and daughter are comfortable with the plan.    Prescriptions:tylenol for mild discomfort. Icing hematoma       Disposition and Plan     Clinical Impression:  1. Blunt head injury, initial encounter    2. Hematoma of scalp, initial encounter        Disposition:  Discharge    Follow-up:  Jermaine Monson  [address] [facility] [address]  [facility] [address]  [phone]    Schedule an appointment as soon as  possible for a visit in 1 day(s)        Medications Prescribed:  Discharge Medication List as of 7/9/2025 11:59 AM                           [1]   Past Medical History:   Arthritis    Back problem    Depression    Diabetes (HCC)    Essential hypertension    High blood pressure    High cholesterol    Hyperlipidemia    Visual impairment   [2] History reviewed. No pertinent surgical history.  [3] (Not in a hospital admission)   [4]   Family History  Problem Relation Age of Onset    Heart Attack Father     Stroke Mother     Heart Disorder Son     Diabetes Son    [5]   Social History  Socioeconomic History    Marital status:    Tobacco Use    Smoking status: Never    Smokeless tobacco: Never   Vaping Use    Vaping status: Never Used   Substance and Sexual Activity    Alcohol use: Not Currently     Comment: very infrequent, every couple of months    Drug use: Never

## 2025-07-09 NOTE — ED INITIAL ASSESSMENT (HPI)
Pt arrives with daughter AxOx1-2 which is not her normal baseline per daughter report. + fall this morning, + LOC, pt doesn't remember what happened. + hematoma to right eyebrow. Skin intact, no active bleeding noted. H/o DM

## 2025-07-10 ENCOUNTER — TELEPHONE (OUTPATIENT)
Age: 75
End: 2025-07-10

## 2025-07-10 ENCOUNTER — PATIENT OUTREACH (OUTPATIENT)
Age: 75
End: 2025-07-10

## 2025-07-10 RX ORDER — METOPROLOL TARTRATE 25 MG/1
25 TABLET, FILM COATED ORAL 2 TIMES DAILY
COMMUNITY
Start: 2025-05-09 | End: 2025-07-15

## 2025-07-10 RX ORDER — ASPIRIN 81 MG/1
81 TABLET ORAL DAILY PRN
COMMUNITY
Start: 2025-07-04

## 2025-07-10 RX ORDER — CASTOR OIL AND BALSAM, PERU 788; 87 MG/G; MG/G
OINTMENT TOPICAL
COMMUNITY
Start: 2025-07-05

## 2025-07-10 RX ORDER — SAXAGLIPTIN 2.5 MG/1
2 TABLET, FILM COATED ORAL DAILY
COMMUNITY

## 2025-07-10 NOTE — TELEPHONE ENCOUNTER
Nurse Navigator spoke to the patient's daughter for Transitions of Care. The patient was seen in the emergency department on 7/9/2025 after a fall and head injury.     (The daughter was not at home with the patient during the Transitions of Care call.)     The daughter reported improvement overall with symptoms. The daughter denies any new/worsened confusion, difficulty with speech, dizziness, weakness on one side of the body, or reported numbness/tingling.The daughter reported right eye hematoma has decreased in size with icing. The daughter denies any increased pain, discharge, fever, or redness. The daughter reported continued headache.    The daughter did report an episode of vomiting last night after dinner. The daughter reported the patient has been vomiting after meals, prior to the emergency department visit, the past 2 weeks. The patient vomited again twice this morning after breakfast, and after going to a scheduled doctor's appointment.     The daughter reported a blood pressure reading today 130/55 with a pulse of 70.      The patient reported glucose was elevated at 320 this morning. The patient had one meal but has not eaten since. The daughter denies any fruity breath or increased thirst.     Advised to the emergency department if not keeping down food/fluids, or having any new neurological symptoms. Nurse Navigator educated the patient on signs/symptoms of concussion, stroke, or intracranial bleeding. The daughter verbalized understanding, but did report vomiting was present prior to the fall/head injury    A call was made to endocrinology to report the patient's elevated glucose readings and symptoms.     The daughter also confirmed the patient is still established with [facility], and will be rescheduling a home visit through the agency this week.     Please follow up with the daughter to provide any further recommendations. Thank you.

## 2025-07-10 NOTE — PROGRESS NOTES
The daughter confirmed the patient is back at home. (The daughter was not at home with the patient during the Transitions of Care call.) The daughter reported improvement overall with symptoms. The daughter denies any new/worsened confusion, difficulty with speech, dizziness, weakness on one side of the body, or reported numbness/tingling.The daughter reported right eye hematoma has decreased in size with icing. The daughter denies any increased pain, discharge, fever, or redness. The daughter reported continued headache.The daughter did report an episode of vomiting last night after dinner. The daughter reported the patient has been vomiting after meals, prior to the emergency department visit, the past 2 weeks. The patient vomited again twice this morning after breakfast, and after going to a scheduled doctor's appointment.  The daughter reported the patient was at a behavioral facility and the family placed her at [facility]. The daughter reported the family took the patient from that facility against medical advise due to the conditions.  The daughter reported a blood pressure reading today 130/55 with a pulse of 70.  The patient was scheduled to follow up today with Ear Nose and Throat specialist, Dr. Ryder, but the patient arrived late. The daughter reported plans to reschedule.  The patient reported glucose was elevated at 320 this morning. The patient had one meal but has not eaten since. The daughter denies any fruity breath or increased thirst.     Advised to the emergency department if not keeping down food/fluids, or having any new neurological symptoms. Nurse Navigator educated the patient on signs/symptoms of concussion, stroke, or intracranial bleeding. The daughter verbalized understanding, but did report vomiting was present prior to the fall/head injury. A telephone encounter was sent to the primary care physician office for a condition update.     A call was made to endocrinology to  report the patient's elevated glucose readings and symptoms.     The daughter also confirmed the patient is still established with [facility], and will be rescheduling a home visit through the agency this week. The patient will continue under care of [facility].     The daughter reported plans to work on placement at another living facility/memory care. Nurse Navigator provided the daughter with the following resource:      [facility] [place], [place] [id number] [phone]

## 2025-07-10 NOTE — PROGRESS NOTES
Attempted to reach the patient for Transitions of Care. The phone rang multiple times and went to voicemail. The voicemail box was full and Nurse Navigator was unable to leave a message.

## 2025-07-11 NOTE — TELEPHONE ENCOUNTER
Left a message on [employer]och[employer] ( HIPAA authorized) daughter's voice mail to call the office.     Inquire if Ananya is still vomiting? What instructions she has via Endocrinology office for elevated blood sugar?

## 2025-07-12 ENCOUNTER — HOSPITAL ENCOUNTER (EMERGENCY)
Facility: HOSPITAL | Age: 75
Discharge: HOME | End: 2025-07-12
Payer: MEDICARE

## 2025-07-12 ENCOUNTER — APPOINTMENT (OUTPATIENT)
Dept: CT IMAGING | Facility: HOSPITAL | Age: 75
End: 2025-07-12
Attending: NURSE PRACTITIONER
Payer: MEDICARE

## 2025-07-12 VITALS
HEIGHT: 63 IN | RESPIRATION RATE: 18 BRPM | SYSTOLIC BLOOD PRESSURE: 183 MMHG | BODY MASS INDEX: 24.45 KG/M2 | OXYGEN SATURATION: 97 % | DIASTOLIC BLOOD PRESSURE: 59 MMHG | TEMPERATURE: 98 F | HEART RATE: 81 BPM | WEIGHT: 138 LBS

## 2025-07-12 DIAGNOSIS — S09.90XA CLOSED HEAD INJURY, INITIAL ENCOUNTER: Primary | ICD-10-CM

## 2025-07-12 DIAGNOSIS — R73.9 HYPERGLYCEMIA: ICD-10-CM

## 2025-07-12 LAB
ANION GAP SERPL CALC-SCNC: 10 MMOL/L (ref 0–18)
BASOPHILS # BLD AUTO: 0.04 X10(3) UL (ref 0–0.2)
BASOPHILS NFR BLD AUTO: 0.4 %
BUN BLD-MCNC: 14 MG/DL (ref 9–23)
BUN/CREAT SERPL: 14.3 (ref 10–20)
CALCIUM BLD-MCNC: 9.7 MG/DL (ref 8.7–10.4)
CHLORIDE SERPL-SCNC: 99 MMOL/L (ref 98–112)
CO2 SERPL-SCNC: 24 MMOL/L (ref 21–32)
CREAT BLD-MCNC: 0.98 MG/DL (ref 0.55–1.02)
DEPRECATED RDW RBC AUTO: 39.3 FL (ref 35.1–46.3)
EGFRCR SERPLBLD CKD-EPI 2021: 61 ML/MIN/1.73M2 (ref 60–?)
EOSINOPHIL # BLD AUTO: 0.16 X10(3) UL (ref 0–0.7)
EOSINOPHIL NFR BLD AUTO: 1.7 %
ERYTHROCYTE [DISTWIDTH] IN BLOOD BY AUTOMATED COUNT: 12.6 % (ref 11–15)
GLUCOSE BLD-MCNC: 325 MG/DL (ref 70–99)
HCT VFR BLD AUTO: 36.5 % (ref 35–48)
HGB BLD-MCNC: 12.2 G/DL (ref 12–16)
IMM GRANULOCYTES # BLD AUTO: 0.03 X10(3) UL (ref 0–1)
IMM GRANULOCYTES NFR BLD: 0.3 %
LYMPHOCYTES # BLD AUTO: 2.3 X10(3) UL (ref 1–4)
LYMPHOCYTES NFR BLD AUTO: 24.3 %
MCH RBC QN AUTO: 28.7 PG (ref 26–34)
MCHC RBC AUTO-ENTMCNC: 33.4 G/DL (ref 31–37)
MCV RBC AUTO: 85.9 FL (ref 80–100)
MONOCYTES # BLD AUTO: 0.53 X10(3) UL (ref 0.1–1)
MONOCYTES NFR BLD AUTO: 5.6 %
NEUTROPHILS # BLD AUTO: 6.4 X10 (3) UL (ref 1.5–7.7)
NEUTROPHILS # BLD AUTO: 6.4 X10(3) UL (ref 1.5–7.7)
NEUTROPHILS NFR BLD AUTO: 67.7 %
OSMOLALITY SERPL CALC.SUM OF ELEC: 289 MOSM/KG (ref 275–295)
PLATELET # BLD AUTO: 258 10(3)UL (ref 150–450)
POTASSIUM SERPL-SCNC: 4.5 MMOL/L (ref 3.5–5.1)
RBC # BLD AUTO: 4.25 X10(6)UL (ref 3.8–5.3)
SODIUM SERPL-SCNC: 133 MMOL/L (ref 136–145)
WBC # BLD AUTO: 9.5 X10(3) UL (ref 4–11)

## 2025-07-12 PROCEDURE — 70450 CT HEAD/BRAIN W/O DYE: CPT | Performed by: STUDENT IN AN ORGANIZED HEALTH CARE EDUCATION/TRAINING PROGRAM

## 2025-07-12 PROCEDURE — 99284 EMERGENCY DEPT VISIT MOD MDM: CPT

## 2025-07-12 PROCEDURE — 80048 BASIC METABOLIC PNL TOTAL CA: CPT | Performed by: NURSE PRACTITIONER

## 2025-07-12 PROCEDURE — 85025 COMPLETE CBC W/AUTO DIFF WBC: CPT | Performed by: NURSE PRACTITIONER

## 2025-07-12 PROCEDURE — S0119 ONDANSETRON 4 MG: HCPCS | Performed by: NURSE PRACTITIONER

## 2025-07-12 PROCEDURE — 36415 COLL VENOUS BLD VENIPUNCTURE: CPT

## 2025-07-12 RX ORDER — ONDANSETRON 4 MG/1
4 TABLET, ORALLY DISINTEGRATING ORAL ONCE
Status: COMPLETED | OUTPATIENT
Start: 2025-07-12 | End: 2025-07-12

## 2025-07-13 NOTE — ED QUICK NOTES
Care endorsed to Trino COSTA.  Patient resting on bed, on VS monitor.    No new requests at this time, awaiting CT scan results.

## 2025-07-13 NOTE — ED INITIAL ASSESSMENT (HPI)
PT to ED via wc with daughter.   Daughter reporting concern for concussion s/p fall 07/09. Since fall + blurry vision and vomiting 30-45 mins after eating. Daughter denying blood thinners and asa daily.   Fall 07/09, seen here for fall.

## 2025-07-13 NOTE — ED PROVIDER NOTES
Patient Seen in: [facility] Emergency Department      History  Chief Complaint   Patient presents with    Fall     Stated Complaint: fall, no blood thinners, no asa    Subjective:   73yo/f w w hx of DM, HTN, HLD who fell on July 8th and was seen in the ED. Had a negative CT at that time and sent home. Daughter reports someone from ?Dr Arellano office called the 2 days ago and advised she been seen in the ER for another CT scan to ?r/o bleeding because of continued vomiting. Daughter attributes to \"multi-system atrophy\". Mother did not want to come in until today. Per notes there appeared to be concern about potential hyperglycemia.                       Objective:     Past Medical History:    Arthritis    Back problem    Depression    Diabetes (HCC)    Essential hypertension    High blood pressure    High cholesterol    Hyperlipidemia    Visual impairment              History reviewed. No pertinent surgical history.             Social History     Socioeconomic History    Marital status:    Tobacco Use    Smoking status: Never    Smokeless tobacco: Never   Vaping Use    Vaping status: Never Used   Substance and Sexual Activity    Alcohol use: Not Currently     Comment: very infrequent, every couple of months    Drug use: Never     Social Drivers of Health     Food Insecurity: Food Insecurity Present (6/16/2025)    NCSS - Food Insecurity     Worried About Running Out of Food in the Last Year: Yes     Ran Out of Food in the Last Year: No   Transportation Needs: No Transportation Needs (6/16/2025)    NCSS - Transportation     Lack of Transportation: No   Housing Stability: Not At Risk (6/16/2025)    NCSS - Housing/Utilities     Has Housing: Yes     Worried About Losing Housing: No     Unable to Get Utilities: No                                Physical Exam    ED Triage Vitals [07/12/25 2107]   BP (!) 168/68   Pulse 91   Resp 20   Temp 98 °F (36.7 °C)   Temp src Oral   SpO2 98 %   O2 Device None (Room air)  Hospitalist Progress NoteNAME: Kevin Cosme :  1960 MRN:  [id number] Interim Hospital Summary: 63 y.o. male whom presented on 2019 with Assessment / Plan: 
 
Acute sigmoid diverticulitis 
-CT scan abdomen Acute sigmoid diverticulitis. No evidence of perforation or focal drainable fluid collection 
-still with fever and leukocytosis. Continue IV abx 
-pain and tenderness better. No vomiting. Will allow CLD 
-continue IVFs 
-discussed need for OP colonoscopy and GI follow up 
 
 
 
25.0 - 29.9 Overweight / Body mass index is 27.74 kg/m². Code status: Full Prophylaxis: Lovenox Recommended Disposition: Home w/Family Subjective: Chief Complaint / Reason for Physician Visit Follow up of abdominal pain, diverticulitis Chart reviewed in detail. Discussed with RN events overnight. Review of Systems: 
Symptom Y/N Comments  Symptom Y/N Comments Fever/Chills    Chest Pain Poor Appetite    Edema Cough    Abdominal Pain y Sputum    Joint Pain SOB/ANSARI    Pruritis/Rash Nausea/vomit    Tolerating PT/OT Diarrhea    Tolerating Diet Constipation    Other Could NOT obtain due to:   
 
PO intake: No data found. Objective: VITALS:  
Last 24hrs VS reviewed since prior progress note. Most recent are: 
Patient Vitals for the past 24 hrs: 
 Temp Pulse Resp BP SpO2  
19 1154 99.4 °F (37.4 °C) 97 16 130/88 97 % 19 1037 (!) 101 °F (38.3 °C) 97 18 131/85 91 % 19 0800 98.4 °F (36.9 °C) 88 17 134/89 96 % 19 0600    134/89 90 % 19 0400    124/88 92 % 19 0300 98.1 °F (36.7 °C) 92 18 118/82 92 % 19 0245    124/84 92 % 19 0230    128/82 91 % 19 0215    127/79 91 % 19 0200    125/74 91 % 19 0145    134/70 91 % 19 0130    133/79 90 % 19 0115    136/89 93 %       Current Vitals:   Vital Signs  BP: (!) 189/63  Pulse: 80  Resp: 16  Temp: 98 °F (36.7 °C)  Temp src: Oral  MAP (mmHg): 98    Oxygen Therapy  SpO2: 98 %  O2 Device: None (Room air)            Physical Exam  Vitals and nursing note reviewed.   Constitutional:       General: She is not in acute distress.     Appearance: She is well-developed.   HENT:      Head: Normocephalic and atraumatic.      Nose: Nose normal.      Mouth/Throat:      Mouth: Mucous membranes are moist.   Eyes:      Conjunctiva/sclera: Conjunctivae normal.      Pupils: Pupils are equal, round, and reactive to light.   Cardiovascular:      Rate and Rhythm: Normal rate and regular rhythm.      Heart sounds: Normal heart sounds.   Pulmonary:      Effort: Pulmonary effort is normal.      Breath sounds: Normal breath sounds.   Abdominal:      General: Bowel sounds are normal.      Palpations: Abdomen is soft.   Musculoskeletal:         General: No tenderness or deformity. Normal range of motion.      Cervical back: Normal range of motion and neck supple.   Skin:     General: Skin is warm and dry.      Capillary Refill: Capillary refill takes less than 2 seconds.      Findings: No rash.      Comments: Normal color   Neurological:      General: No focal deficit present.      Mental Status: She is alert and oriented to person, place, and time.      GCS: GCS eye subscore is 4. GCS verbal subscore is 5. GCS motor subscore is 6.      Cranial Nerves: No cranial nerve deficit.      Gait: Gait normal.                 ED Course  Labs Reviewed   BASIC METABOLIC PANEL (8) - Abnormal; Notable for the following components:       Result Value    Glucose 325 (*)     Sodium 133 (*)     All other components within normal limits   CBC WITH DIFFERENTIAL WITH PLATELET          NONCONTRAST HEAD CT    Comparison: 12/5/2024    IMPRESSION  No intracranial hemorrhage, or mass effect.  Intact calvarium.  No clear CT evidence of acute stroke, however CT has a diminished  02/14/19 0100    124/90 92 % 02/14/19 0045    (!) 132/92 91 % 02/14/19 0030    129/86 92 % 02/14/19 0015    131/73 91 % 02/14/19 0000     93 % 02/13/19 2345    137/86 92 % 02/13/19 2330    (!) 133/94 92 % 02/13/19 1855 99.2 °F (37.3 °C) (!) 102 16 116/80 99 % No intake or output data in the 24 hours ending 02/14/19 1513 PHYSICAL EXAM: 
General: WD, WN. Alert, cooperative, no acute distress   
EENT:  EOMI. Anicteric sclerae. MMM Resp:  CTA bilaterally, no wheezing or rales. No accessory muscle use CV:  Regular  rhythm,  No edema GI:  Soft, Non distended, (+) lower abd wall tenderness.  +Bowel sounds Neurologic:  Alert and oriented X 3, normal speech, Psych:   Good insight. Not anxious nor agitated Skin:  No rashes. No jaundice Reviewed most current lab test results and cultures  YES Reviewed most current radiology test results   YES Review and summation of old records today    NO Reviewed patient's current orders and MAR    YES 
PMH/SH reviewed - no change compared to H&P 
________________________________________________________________________ Care Plan discussed with: 
  Comments Patient y Family RN Care Manager Consultant Multidiciplinary team rounds were held today with , nursing, pharmacist and clinical coordinator. Patient's plan of care was discussed; medications were reviewed and discharge planning was addressed. ________________________________________________________________________ Total NON critical care TIME:  15   Minutes Total CRITICAL CARE TIME Spent:   Minutes non procedure based Comments >50% of visit spent in counseling and coordination of care x This includes time during multidisciplinary rounds if indicated above  
________________________________________________________________________ John Dust, MD  
 
 Procedures: see electronic medical records for all procedures/Xrays and details which were not copied into this note but were reviewed prior to creation of Plan. LABS: 
I reviewed today's most current labs and imaging studies. Pertinent labs include: 
Recent Labs  
  02/14/19 0321 02/13/19 
1901 WBC 15.3* 17.3* HGB 14.1 15.9 HCT 40.7 45.6  319 Recent Labs  
  02/14/19 0321 02/13/19 
1901  133* K 3.7 3.8  102 CO2 23 26 * 101* BUN 8 7 CREA 0.83 0.90  
CA 8.5 9.7 ALB  --  4.1 TBILI  --  1.7* SGOT  --  17 ALT  --  31  
 
 sensitivity.   Moderate white matter changes and generalized volume loss.  Mild mucosal thickening of the left maxillary sinus.                  MDM             Medical Decision Making  75yo/f w hx and exam as stated; c/o on going headache, vomiting, on and off blurred vision since fall -      Ct non acute  No vomiting in ed  Non acute neuro exam  No focal deficits  No chest pain  No weakness or focal deficits  Hyperglycemia w/o gap  No fever      Plan  Dc to home  Close fu      Amount and/or Complexity of Data Reviewed  Labs:  Decision-making details documented in ED Course.  Radiology:  Decision-making details documented in ED Course.    Risk  OTC drugs.  Prescription drug management.        Disposition and Plan     Clinical Impression:  1. Closed head injury, initial encounter    2. Hyperglycemia         Disposition:  Discharge  7/12/2025 11:19 pm    Follow-up:  Jermaine Monson  E. LU[address] [address]  [phone]    Follow up in 2 day(s)      We recommend that you schedule follow up care with a primary care provider within the next three months to obtain basic health screening including reassessment of your blood pressure.      Medications Prescribed:  Current Discharge Medication List                Supplementary Documentation:

## 2025-07-14 ENCOUNTER — PATIENT OUTREACH (OUTPATIENT)
Age: 75
End: 2025-07-14

## 2025-07-14 RX ORDER — OMEPRAZOLE 20 MG/1
20 CAPSULE, DELAYED RELEASE ORAL DAILY
Qty: 90 CAPSULE | Refills: 3 | Status: SHIPPED | OUTPATIENT
Start: 2025-07-14

## 2025-07-14 NOTE — PROGRESS NOTES
This writer contacted patient at  [phone] for JYOTI Navigation post-hospital discharge follow up call on 7/14/2025 at 10:10 AM. The phone rings however no voicemail is set up and there is no option to leave a message to call back. Nurse Navigator to follow up at a later time.

## 2025-07-14 NOTE — PROGRESS NOTES
This writer contacted patient at [phone] for JYOTI Navigation post-hospital discharge follow up call on 7/14/2025 at 1:53 PM. There was no answer and I was unable to reach the patient at this time. I left a nondescript message with my contact information and the reason for my call on voicemail.

## 2025-07-14 NOTE — TELEPHONE ENCOUNTER
Please review.  Protocol failed / Has no protocol.     Medications are not on chart. Not listed as patient reported.    Omeprazole last written by Qiana Gonzáles MD Pulmonology    Requested Prescriptions   Pending Prescriptions Disp Refills    omeprazole 20 MG Oral Capsule Delayed Release [Pharmacy Med Name: OMEPRAZOLE 20MG CAPSULES] 90 capsule 3     Sig: Take 1 capsule (20 mg total) by mouth daily.       Gastrointestional Medication Protocol Failed - 7/14/2025  5:01 PM        Failed - Medication is active on med list        Passed - In person appointment or virtual visit in the past 12 mos or appointment in next 3 mos          metFORMIN HCl 1000 MG Oral Tab [Pharmacy Med Name: METFORMIN 1000MG TABLETS] 90 tablet 3     Sig: Take 1 tablet (1,000 mg total) by mouth every morning.       Diabetes Medication Protocol Failed - 7/14/2025  5:01 PM        Failed - Last A1C < 7.5 and within past 6 months     Lab Results   Component Value Date    A1C 9.0 (A) 02/24/2025           Failed - Medication is active on med list        Passed - In person appointment or virtual visit in the past 6 mos or appointment in next 3 mos        Passed - Microalbumin procedure in past 12 months or taking ACE/ARB        Passed - EGFRCR or GFRNAA > 50        Passed - GFR in the past 12 months          metFORMIN 500 MG Oral Tab [Pharmacy Med Name: METFORMIN 500MG TABLETS] 90 tablet 3     Sig: Take 1 tablet (500 mg total) by mouth every evening.       Diabetes Medication Protocol Failed - 7/14/2025  5:01 PM        Failed - Last A1C < 7.5 and within past 6 months     Lab Results   Component Value Date    A1C 9.0 (A) 02/24/2025           Failed - Medication is active on med list        Passed - In person appointment or virtual visit in the past 6 mos or appointment in next 3 mos        Passed - Microalbumin procedure in past 12 months or taking ACE/ARB        Passed - EGFRCR or GFRNAA > 50        Passed - GFR in the past 12 months

## 2025-07-15 ENCOUNTER — OFFICE VISIT (OUTPATIENT)
Age: 75
End: 2025-07-15
Payer: MEDICARE

## 2025-07-15 VITALS
HEART RATE: 71 BPM | OXYGEN SATURATION: 96 % | WEIGHT: 138 LBS | BODY MASS INDEX: 24.45 KG/M2 | DIASTOLIC BLOOD PRESSURE: 54 MMHG | HEIGHT: 63 IN | SYSTOLIC BLOOD PRESSURE: 130 MMHG

## 2025-07-15 DIAGNOSIS — F03.B3 MODERATE DEMENTIA WITH MOOD DISTURBANCE, UNSPECIFIED DEMENTIA TYPE (HCC): Primary | ICD-10-CM

## 2025-07-15 DIAGNOSIS — E11.22 TYPE 2 DIABETES MELLITUS WITH STAGE 3B CHRONIC KIDNEY DISEASE, WITH LONG-TERM CURRENT USE OF INSULIN (HCC): ICD-10-CM

## 2025-07-15 DIAGNOSIS — G90.3 MULTIPLE SYSTEM ATROPHY (HCC): ICD-10-CM

## 2025-07-15 DIAGNOSIS — N18.32 TYPE 2 DIABETES MELLITUS WITH STAGE 3B CHRONIC KIDNEY DISEASE, WITH LONG-TERM CURRENT USE OF INSULIN (HCC): ICD-10-CM

## 2025-07-15 DIAGNOSIS — Z79.4 TYPE 2 DIABETES MELLITUS WITH STABLE PROLIFERATIVE RETINOPATHY OF RIGHT EYE, WITH LONG-TERM CURRENT USE OF INSULIN (HCC): ICD-10-CM

## 2025-07-15 DIAGNOSIS — G23.8 MULTIPLE SYSTEM ATROPHY (HCC): ICD-10-CM

## 2025-07-15 DIAGNOSIS — N39.0 RECURRENT UTI: ICD-10-CM

## 2025-07-15 DIAGNOSIS — Z79.4 TYPE 2 DIABETES MELLITUS WITH STAGE 3B CHRONIC KIDNEY DISEASE, WITH LONG-TERM CURRENT USE OF INSULIN (HCC): ICD-10-CM

## 2025-07-15 DIAGNOSIS — I50.32 CHRONIC DIASTOLIC (CONGESTIVE) HEART FAILURE (HCC): ICD-10-CM

## 2025-07-15 DIAGNOSIS — E11.3551 TYPE 2 DIABETES MELLITUS WITH STABLE PROLIFERATIVE RETINOPATHY OF RIGHT EYE, WITH LONG-TERM CURRENT USE OF INSULIN (HCC): ICD-10-CM

## 2025-07-15 PROCEDURE — 1160F RVW MEDS BY RX/DR IN RCRD: CPT | Performed by: INTERNAL MEDICINE

## 2025-07-15 PROCEDURE — 1111F DSCHRG MED/CURRENT MED MERGE: CPT | Performed by: INTERNAL MEDICINE

## 2025-07-15 PROCEDURE — 1159F MED LIST DOCD IN RCRD: CPT | Performed by: INTERNAL MEDICINE

## 2025-07-15 PROCEDURE — 3075F SYST BP GE 130 - 139MM HG: CPT | Performed by: INTERNAL MEDICINE

## 2025-07-15 PROCEDURE — 3078F DIAST BP <80 MM HG: CPT | Performed by: INTERNAL MEDICINE

## 2025-07-15 PROCEDURE — G2211 COMPLEX E/M VISIT ADD ON: HCPCS | Performed by: INTERNAL MEDICINE

## 2025-07-15 PROCEDURE — 3008F BODY MASS INDEX DOCD: CPT | Performed by: INTERNAL MEDICINE

## 2025-07-15 PROCEDURE — 99215 OFFICE O/P EST HI 40 MIN: CPT | Performed by: INTERNAL MEDICINE

## 2025-07-15 RX ORDER — CAPTOPRIL 25 MG/1
25 TABLET ORAL DAILY
Qty: 90 TABLET | Refills: 3 | Status: SHIPPED | OUTPATIENT
Start: 2025-07-15

## 2025-07-15 RX ORDER — GABAPENTIN 300 MG/1
300 CAPSULE ORAL 3 TIMES DAILY
Qty: 270 CAPSULE | Refills: 1 | Status: SHIPPED | OUTPATIENT
Start: 2025-07-15

## 2025-07-15 RX ORDER — METOPROLOL TARTRATE 25 MG/1
25 TABLET, FILM COATED ORAL 2 TIMES DAILY
Qty: 180 TABLET | Refills: 1 | Status: SHIPPED | OUTPATIENT
Start: 2025-07-15

## 2025-07-15 RX ORDER — DULOXETIN HYDROCHLORIDE 30 MG/1
30 CAPSULE, DELAYED RELEASE ORAL DAILY
Qty: 30 CAPSULE | Refills: 2 | Status: SHIPPED | OUTPATIENT
Start: 2025-07-15

## 2025-07-15 RX ORDER — BUSPIRONE HYDROCHLORIDE 5 MG/1
5 TABLET ORAL 3 TIMES DAILY
Qty: 270 TABLET | Refills: 1 | Status: SHIPPED | OUTPATIENT
Start: 2025-07-15

## 2025-07-15 NOTE — PROGRESS NOTES
History of Present Illness  Ananya Dowling is a 74 year old female with diabetes and cognitive impairment who presents with high blood sugar and recent hospitalization for suicidal thoughts. She is accompanied by her daughter, who is also her primary caregiver.    She has been experiencing high blood sugar levels despite ongoing management. Her blood sugar remains uncontrolled, and she has been resistant to using continuous glucose monitoring devices like Dexcom or Yasmany 3. Her daughter has been providing blood sugar readings to the medical team for further evaluation. She is currently on saxagliptin for diabetes management and is not taking metformin.    She has a history of multiple hospitalizations, the most recent being for suicidal thoughts involving herself and her family. She was admitted to [facility] and then transferred to a behavioral center, which her daughter found to be in poor condition, leading to her discharge against medical advice.    She has a history of cognitive issues, including memory impairment and repetitive speech. She struggles with memory recall, such as remembering her address, family members' names, and recent meals. Her daughter reports that she has been experiencing falls, balance issues, and weakness, which have resulted in multiple CT scans to rule out head injuries.    Her medication regimen includes metoprolol, captopril, gabapentin, and vitamin D. She was previously on simvastatin and midodrine, which have been discontinued. Her daughter manages her medications and has a list from her recent hospital discharge.    She lives with her daughter Xochitl, Xochitl's  Isaias, and their children in [place]. She has difficulty recalling her address and the names of her grandchildren.             Patient Active Problem List    Diagnosis Date Noted    Chronic diastolic (congestive) heart failure (HCC) 07/15/2025    Moderate dementia with mood disturbance, unspecified dementia type  ([facility]) 07/15/2025    Microalbuminuria due to type 2 diabetes mellitus ([facility]) 03/03/2025    Vitamin D deficiency 03/03/2025    Type 2 diabetes mellitus with hyperglycemia, with long-term current use of insulin ([facility]) 03/03/2025    Acute cystitis 02/24/2025    Acute cystitis without hematuria 02/24/2025    Unsteady gait 02/24/2025    Generalized anxiety disorder 01/16/2025    Cognitive disorder 01/16/2025    Severe recurrent major depression without psychotic features ([facility]) 01/15/2025    Suicidal ideations 01/15/2025    Hyponatremia 12/05/2024    Azotemia 12/05/2024    Sepsis ([facility]) 08/24/2024    Sepsis due to urinary tract infection ([facility]) 08/24/2024    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[facility]) 05/05/2024    Hyperglycemia 05/02/2024    Leukocytosis, unspecified type 05/02/2024    Urinary tract infection without hematuria, site unspecified 05/02/2024    Mild recurrent major depression 01/25/2024    Chronic left-sided low back pain without sciatica 05/30/2023    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, with long-term current use of insulin ([facility]) 04/12/2023    Dyslipidemia 06/30/2022    Type 2 diabetes mellitus with stage 3b chronic kidney disease, with long-term current use of insulin ([facility])     Peripheral vascular disease 08/15/2018        Medications - Current[1]     Vitals:    07/15/25 1110   BP: 130/54   Pulse: 71   VITALSBody mass index is 24.45 kg/m².      General: In no distress.    Affect  and  cognition reveals flat affect.  I did a assessment of her cognition with short-term memory recall she was 0 out of 3 she was not able to answer any questions related to memory.  Currently does not endorse any suicidal ideation    The bruise on her right thigh from recent fall    Neurological exam without focal deficit has some generalized weakness.      Neck;  nl no JVD noted    Lungs: clear no rales noted    Heart: Regular     Murmur none     Abdomen unremarkable      Ananya was seen today for er  f/u.    Diagnoses and all orders for this visit:    Moderate dementia with mood disturbance, unspecified dementia type (HCC)    Chronic diastolic (congestive) heart failure (HCC)    Type 2 diabetes mellitus with stage 3b chronic kidney disease, with long-term current use of insulin (HCC)    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HCC)    Recurrent UTI    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, with long-term current use of insulin (HCC)    Other orders  -     DULoxetine 30 MG Oral Cap DR Particles; Take 1 capsule (30 mg total) by mouth daily.  -     metoprolol tartrate 25 MG Oral Tab; Take 1 tablet (25 mg total) by mouth 2 (two) times daily.  -     captopril 25 MG Oral Tab; Take 1 tablet (25 mg total) by mouth daily.  -     busPIRone 5 MG Oral Tab; Take 1 tablet (5 mg total) by mouth 3 (three) times daily.  -     gabapentin 300 MG Oral Cap; Take 1 capsule (300 mg total) by mouth 3 (three) times daily.       Assessment & Plan  Suicidal ideation and depression  Experiences suicidal thoughts linked to mild recurrent major depression.  Currently not at endorsing any suicidal ideation discharged against medical advice from [facility] facility.  - Renew duloxetine prescription and send to [facility].    Type 2 diabetes mellitus with stage 3b chronic kidney disease  Elevated blood glucose levels despite management. Refuses continuous glucose monitoring devices. Coordination with Dr. Lees's team ongoing.  - Coordinate with Dr. Lees's team for diabetes management.  - Encourage consideration of continuous glucose monitoring devices.    Hypertension and heart failure  On multiple antihypertensives including captopril and metoprolol. Captopril dosage adjustment considered.  - Renew metoprolol 25 mg twice a day for three months.  - Adjust captopril to once a day.    Recurrent urinary tract infections  Recurrent UTIs previously treated with ineffective methenamine. Caution with antibiotics due to C.  diff risk.  - Discontinue methenamine.  - Avoid unnecessary antibiotics to prevent C. diff.    Cognitive disorder  Significant memory impairment and cognitive issues, possibly early dementia. Overlap with depression symptoms noted.  - Discuss potential dementia medications once her condition stabilizes.    Multiple system atrophy  Contributes to balance issues and falls. Previously managed orthostatic hypotension with midodrine.  - Discontinue midodrine as blood pressure is well-managed.    Chronic left-sided low back pain  Managed with gabapentin, which may aid in behavioral issues.  - Continue gabapentin 300 mg three times a day.  - Provide 270 tablets for three months.    Dyslipidemia  Previously on simvastatin, discontinued due to cognitive concerns and current health priorities.  - Discontinue simvastatin.    General Health Maintenance  Requires routine health maintenance including eye examinations due to diabetes and other conditions.  - Request referrals for eye examinations.    Follow-up  Requires ongoing follow-up for multiple health conditions.  - Coordinate follow-up with Dr. Lees for diabetes management.  - Arrange follow-up for eye examinations.    With chart review and face-to-face I spent 50 minutes on this visit             This note was prepared using Dragon Medical voice recognition dictation software and as a result, errors may occur. When identified, these errors have been corrected. While every attempt is made to correct errors during dictation, discrepancies may still exist            [1]   Current Outpatient Medications:     DULoxetine 30 MG Oral Cap DR Particles, Take 1 capsule (30 mg total) by mouth daily., Disp: 30 capsule, Rfl: 2    metoprolol tartrate 25 MG Oral Tab, Take 1 tablet (25 mg total) by mouth 2 (two) times daily., Disp: 180 tablet, Rfl: 1    captopril 25 MG Oral Tab, Take 1 tablet (25 mg total) by mouth daily., Disp: 90 tablet, Rfl: 3    busPIRone 5 MG Oral Tab, Take 1  tablet (5 mg total) by mouth 3 (three) times daily., Disp: 270 tablet, Rfl: 1    gabapentin 300 MG Oral Cap, Take 1 capsule (300 mg total) by mouth 3 (three) times daily., Disp: 270 capsule, Rfl: 1    aspirin 81 MG Oral Tab EC, Take 1 tablet (81 mg total) by mouth daily as needed., Disp: , Rfl:     sAXagliptin 2.5 MG Oral Tab, Take 2 tablets (5 mg total) by mouth daily., Disp: , Rfl:     Accu-Chek FastClix Lancets Does not apply Misc, Use to check blood sugars 3 times daily., Disp: 300 each, Rfl: 1    Blood Glucose Monitoring Suppl (ACCU-CHEK GUIDE) w/Device Does not apply Kit, Use to check sugars, Disp: 1 kit, Rfl: 1    Glucose Blood (ACCU-CHEK GUIDE TEST) In Vitro Strip, Use to check sugars 3 time daily, Disp: 300 each, Rfl: 1    insulin degludec (TRESIBA FLEXTOUCH) 200 UNIT/ML Subcutaneous Solution Pen-injector, Inject 30 Units into the skin daily. (Patient taking differently: Inject 36 Units into the skin in the morning.), Disp: 36 mL, Rfl: 1    acetaminophen 500 MG Oral Tab, Take 1 tablet (500 mg total) by mouth every 6 (six) hours as needed for Fever or Pain (equal to or greater than 100.4)., Disp: , Rfl:     insulin lispro (HUMALOG) 100 UNIT/ML Injection Solution, Inject 1-11 Units into the skin in the morning and 1-11 Units at noon and 1-11 Units in the evening. Inject before meals. Base line 5 units with each meal; 1 unit for every 50 over 150., Disp: , Rfl:     ergocalciferol 1.25 MG (20567 UT) Oral Cap, Take 1 capsule (50,000 Units total) by mouth once a week., Disp: 12 capsule, Rfl: 0    balsam peru-castor oil External Ointment, Apply topically daily as needed for Wound care. Apply to Buttocks topically in the morning for MSAD (Patient not taking: Reported on 7/15/2025), Disp: , Rfl:

## 2025-07-15 NOTE — PROGRESS NOTES
The following individual(s) verbally consented to be recorded using ambient AI listening technology and understand that they can each withdraw their consent to this listening technology at any point by asking the clinician to turn off or pause the recording: YES    Patient name: Ananya Dowling  Additional names:

## 2025-07-16 ENCOUNTER — PATIENT OUTREACH (OUTPATIENT)
Age: 75
End: 2025-07-16

## 2025-07-16 NOTE — PROGRESS NOTES
07/16/25 1217   Call Details   Call Attempt # 6   SDOH Domain(s) with needs Other (Comment)  ([facility] resource f/u)   Outreach Outcome   SDOH Overall Outreach Outcome Resources Provided but unable to follow-up     Unable to reach patient's daughter, Xochitl, to follow-up on resource shared. Left v/m with office callback number [phone]. CHW encounter closed.

## 2025-07-17 NOTE — PROGRESS NOTES
Attemptted to reach the patient for Transitions of Care at, [phone]  the phone rang several times without an answer. The voicemail box was full and Nurse Navigator unable to leave a message.       This writer contacted patient's elysia (per HIPAA) at [phone]  for JYOTI Navigation follow up call on 7/17/2025 at 9:13 AM. There was no answer and I was unable to reach the patient at this time. I left a nondescript message with my contact information and the reason for my call on voicemail.

## 2025-07-30 ENCOUNTER — TELEPHONE (OUTPATIENT)
Age: 75
End: 2025-07-30

## 2025-08-06 ENCOUNTER — TELEPHONE (OUTPATIENT)
Age: 75
End: 2025-08-06

## 2025-08-06 DIAGNOSIS — Z79.4 TYPE 2 DIABETES MELLITUS WITH STAGE 3B CHRONIC KIDNEY DISEASE, WITH LONG-TERM CURRENT USE OF INSULIN (HCC): Primary | ICD-10-CM

## 2025-08-06 DIAGNOSIS — G89.29 CHRONIC LEFT-SIDED LOW BACK PAIN WITHOUT SCIATICA: ICD-10-CM

## 2025-08-06 DIAGNOSIS — N18.32 TYPE 2 DIABETES MELLITUS WITH STAGE 3B CHRONIC KIDNEY DISEASE, WITH LONG-TERM CURRENT USE OF INSULIN (HCC): Primary | ICD-10-CM

## 2025-08-06 DIAGNOSIS — M54.50 CHRONIC LEFT-SIDED LOW BACK PAIN WITHOUT SCIATICA: ICD-10-CM

## 2025-08-06 DIAGNOSIS — I73.9 PERIPHERAL VASCULAR DISEASE: ICD-10-CM

## 2025-08-06 DIAGNOSIS — R26.81 UNSTEADY GAIT: ICD-10-CM

## 2025-08-06 DIAGNOSIS — E11.22 TYPE 2 DIABETES MELLITUS WITH STAGE 3B CHRONIC KIDNEY DISEASE, WITH LONG-TERM CURRENT USE OF INSULIN (HCC): Primary | ICD-10-CM

## 2025-08-19 ENCOUNTER — OFFICE VISIT (OUTPATIENT)
Age: 75
End: 2025-08-19

## 2025-08-19 VITALS
BODY MASS INDEX: 24 KG/M2 | DIASTOLIC BLOOD PRESSURE: 64 MMHG | HEIGHT: 63 IN | HEART RATE: 51 BPM | SYSTOLIC BLOOD PRESSURE: 134 MMHG | OXYGEN SATURATION: 98 %

## 2025-08-19 DIAGNOSIS — R23.0 CYANOSIS: Primary | ICD-10-CM

## 2025-08-19 DIAGNOSIS — Z79.4 TYPE 2 DIABETES MELLITUS WITH STAGE 3B CHRONIC KIDNEY DISEASE, WITH LONG-TERM CURRENT USE OF INSULIN (HCC): ICD-10-CM

## 2025-08-19 DIAGNOSIS — R09.89 DECREASED PULSES IN FEET: ICD-10-CM

## 2025-08-19 DIAGNOSIS — Z12.11 COLON CANCER SCREENING: ICD-10-CM

## 2025-08-19 DIAGNOSIS — E11.22 TYPE 2 DIABETES MELLITUS WITH STAGE 3B CHRONIC KIDNEY DISEASE, WITH LONG-TERM CURRENT USE OF INSULIN (HCC): ICD-10-CM

## 2025-08-19 DIAGNOSIS — Z12.31 BREAST CANCER SCREENING BY MAMMOGRAM: ICD-10-CM

## 2025-08-19 DIAGNOSIS — N18.32 TYPE 2 DIABETES MELLITUS WITH STAGE 3B CHRONIC KIDNEY DISEASE, WITH LONG-TERM CURRENT USE OF INSULIN (HCC): ICD-10-CM

## 2025-08-19 PROCEDURE — 3078F DIAST BP <80 MM HG: CPT | Performed by: INTERNAL MEDICINE

## 2025-08-19 PROCEDURE — G2211 COMPLEX E/M VISIT ADD ON: HCPCS | Performed by: INTERNAL MEDICINE

## 2025-08-19 PROCEDURE — 1160F RVW MEDS BY RX/DR IN RCRD: CPT | Performed by: INTERNAL MEDICINE

## 2025-08-19 PROCEDURE — 99214 OFFICE O/P EST MOD 30 MIN: CPT | Performed by: INTERNAL MEDICINE

## 2025-08-19 PROCEDURE — 1126F AMNT PAIN NOTED NONE PRSNT: CPT | Performed by: INTERNAL MEDICINE

## 2025-08-19 PROCEDURE — 1159F MED LIST DOCD IN RCRD: CPT | Performed by: INTERNAL MEDICINE

## 2025-08-19 PROCEDURE — 3075F SYST BP GE 130 - 139MM HG: CPT | Performed by: INTERNAL MEDICINE

## 2025-08-22 ENCOUNTER — MED MANAGEMENT (OUTPATIENT)
Age: 75
End: 2025-08-22

## (undated) DIAGNOSIS — E11.65 UNCONTROLLED TYPE 2 DIABETES MELLITUS WITH HYPERGLYCEMIA (HCC): Primary | ICD-10-CM

## (undated) NOTE — Clinical Note
Initial assessment completed with patient.  Appointment scheduled for 1/14/25.  Patient agrees to additional follow-up calls from nurse care manager.  Thank you!

## (undated) NOTE — LETTER
CONTINUOUS GLUCOSE MONITOR AND INSULIN PUMP AGREEMENT     CONTINUOUS GLUCOSE MONITOR (CGM) (If not signed, not applicable)     CGMs are not currently approved by the FDA for use in the hospital. If you choose to continue to wear your CGM in the hospital, we ask that you agree to the following:     ?   Allow finger stick blood glucose tests to be performed using hospital glucose monitor.   ?   Insulin dosing and hypoglycemia (low blood sugar) treatment will be provided based on hospital glucose monitor        results.   ?   Remove your CGM on or before the date it is due to be removed or as ordered by physician.   ?   Remove your CGM prior to any scheduled surgery and as instructed for procedures.   ?   Remove your CGM prior to Magnetic Resonance Imaging (MRI), Computerized Tomography (CT) or x-ray as it can      damage your CGM.   ?   Inform staff of any skin irritation, redness, or other problems with your skin or CGM.     __________________________________________________________ ________________________________   Patient/Guardian Signature                                                                                  Date/Time     __________________________________________________________   Print Guardian Name     __________________________________________________________ ________________________________   Staff/RN Signature                                                                                                 Date/Time      INSULIN PUMP (If not signed, not applicable)     For your safety and best possible care, we ask that you agree to the following. If you cannot agree to the following requirements of this agreement, or, if it is not possible for you to meet these requirements, we will provide and administer insulin based on your provider’s orders.     ?   Communicate your site changes, carbohydrate intake, and all boluses to your nurse.   ?   Provide your own insulin and pump supplies.   ?   Change  your infusion site at least every 3 days, and as needed for skin irritation or two consecutive glucose readings       greater than 240 mg/dL.   ?   Make no changes to your basal rates, carb ratios, or correction factor unless directed to do so by the physician        managing your insulin pump.   ?   Notify your nurse before you eat so that your blood glucose level can be obtained with hospital glucose monitor.   ?   Report symptoms of low blood sugar to your nurse immediately.   ?   Notify your nurse of any problems with your pump that you cannot correct.     I understand that my insulin pump may be discontinued and a different method of insulin delivery will be provided for any of the following reasons:     ?   Inability to safely perform diabetes self-management activities because of the condition for which I was hospitalized,        or side effects of my treatment.   ? Inability or inconsistency in performing the diabetes self-management activities described above.     __________________________________________________________ ________________________________   Patient/Guardian Signature                                                                                 Date/Time     __________________________________________________________   Print Guardian Name     __________________________________________________________ ________________________________   Staff/RN Signature                                                                                                 Date/Time       Patient Name: Ananya Dowling     : 1950                 Printed: 2024     Medical Record #: [id number]                                            Page         INSULIN PUMP NURSING CHECKLIST   ON ADMISSION     ? Document insulin pump in Medical Devices/Implants section of Epic (even if patient not wearing pump in hospital).   ? Use link in Insulin Pump BPA to print the CGM/Insulin Pump Agreement and Checklists.     ? Apply patient label to Agreement and have patient read and sign it. Place on chart.   ? Add Insulin Pump LDA to the IV Assessment flowsheet.   ? Endocrinology to be consulted (except at [facility]):    ? César: Attending to initiate consult.    ?  Jonel: Open endocrinologist order through BPA (no cosign required). Page on-call endocrinologist          through [employer].    ?  Alfred Sharma: Notify attending of insulin pump.     EVERY SHIFT     ? Document pump site assessment and any site changes. ADMISSION   ? Chart ALL insulin bolus doses in MAR as “Self-administered via pump”.     PROTOCOL REMINDERS     ? Insulin Pump Focused order set is to be used for all patients using an insulin pump.   ? Patient to provide own pump supplies and insulin.   ? Point of care glucose to be performed using hospital glucometer, even if wearing a continuous glucose           monitor.     NOTIFY ENDOCRINOLOGY OR MFM  FOR: (at [facility], notify attending)     Temporary discontinuation of pump infusion for more than one hour   Surgery   Change in patient condition, mental status, or compliance that prohibits ability to self-manage the insulin pump   Patient reports insulin pump not operating properly   Patient does not have appropriate insulin or supplies for insulin pump   Risk for suicide   Blood glucose outside ranges indicated in insulin pump orders       DO NOT REMOVE INSULIN PUMP WITHOUT ORDER FROM ENDOCRINOLOGIST/MGM -  or attending at [facility]     NOT PART OF PERMANENT CHART     Patient Name: Ananya Dowling     : 1950                 Printed: 2024     Medical Record #: [id number]                                            Page  FOR: (at [facility],     CONTINUOUS GLUCOSE MONITOR (CGM) NURSING CHECKLIST     A Continuous Glucose Monitor (CGM), also referred to as a “sensor”, is a small device that takes frequent blood glucose (BG) readings, approximately every 5 minutes.   BG is measured in interstitial fluid by a  tiny electrode under the skin.   The CGM can be worn up to 10-14 days, depending on the model.   Often used along with an insulin pump, but may also be a stand-alone device.      ON ADMISSION     ?   Document CGM in Medical Devices/Implants in Admission Navigator  ?   When BPA fires, print the GM/Insulin Pump Agreement, place patient label, have patient read and sign and place on        chart.   ?   Add CGM LDA to the IV Assessment flowsheet   ?   Inform patient that HOSPITAL GLUCOSE MONITOR will be used for BG testing   ?   CGM not approved by FDA for inpatient use    ?   Frequent quality tests are performed on hospital glucose monitor                ?   Results of hospital glucose monitor cross over into electronic medical record   ?   Some medications interfere with CGM models including acetaminophen, aspirin, and Vitamin C    IMPORTANT INFORMATION     ? Point of care glucose to be performed using HOSPITAL glucose monitor.   ? Do not treat with insulin or treat hypoglycemia based on CGM values.   ? Patient to remove CGM before MRI, CT, x-ray, or any procedure that uses radiation such as ERCP,         fluoroscopic exam, IV Pyelogram, mammogram, cardiac cath, etc.   ? Exposure to above imaging may damage the CGM causing inaccurate BG readings, or prevent the         device from alarming.   ? Patient to remove CGM prior to surgery as it is often unknown if x-rays or other imaging will be used.   ? If CGM is removed or falls off, give to patient or family, or store according to hospital policy. Not all parts         are disposable and replacement can be very expensive.   ? Every shift, document CGM site assessment   ? Chart removal of CGM     NOTIFY ATTENDING/ENDOCRINOLOGIST IF:     ? Patient refuses to allow finger stick tests with hospital glucose monitor.   ? Any problems or irritation at CGM site.      NOT PART OF PERMANENT CHART  Patient Name: Ananya Dowling     : 1950                 Printed:   2024     Medical Record #: [id number]                                            Page 1/1 24/7

## (undated) NOTE — Clinical Note
Hello,  I have enclosed an update a received from [facility]. Please see CCM interventions on the bottom of the progress note.

## (undated) NOTE — IP AVS SNAPSHOT
[facility]            (For Outpatient Use Only) Initial Admit Date: 1/3/2022   Inpt/Obs Admit Date: Inpt: 1/3/22 / Obs: N/A   Discharge Date:    Tatum Arnold:  [de-identified]   MRN: [de-identified]   CSN: [id number]   CEID: [id number]        JOSÉ MIGUEL Subscriber Name:  Heike Bishop :    Subscriber ID:  Pt Rel to Subscriber:    Hospital Account Financial Class: Medicare    2022

## (undated) NOTE — Clinical Note
Patient Graduated - Patient completed the [facility]YOTI Navigation Program---no readmission in over 30 days. Patient has met goals, no further outreach needed. Thank you!  Future Appointments 11/12/2024 6:40 PM    Jermaine Monson MD        [facility]               [facility] 12/5/2024  1:30 PM    Martin Foy MD ENIELHUR            [facility]

## (undated) NOTE — IP AVS SNAPSHOT
Patient Demographics     Address  [address] [address] Phone  [phone] (Home)  [phone] (Mobile) *Preferred* E-mail Address  [email or web address]      Patient Contacts     Name Relation Home Work Mobile    KRYSTAL SHEPPARD (GRANDDAUGHTER) Relative   [phone]    ABRIL HELMS Daughter   [phone]      Allergies as of 5/7/2024  Review status set to Review Complete on 5/2/2024   No Known Allergies     Code Status Information     Code Status    Not on file        Patient Instructions           Diabetes:  Your A1C level is greater than 8%.  It is recommended that you attend an outpatient diabetes education program.  Please discuss with your Primary Care Provider at your next visit to obtain a referral.  If you wish to make an appointment at [facility], call [phone].         Follow-up Information     Jermaine Monson MD Follow up in 1 week(s).    Specialty: Internal Medicine  Contact information:  [address]  [address]  [facility] [address] [phone]             Martin Foy MD Follow up in 2 week(s).    Specialty: NEUROLOGY  Contact information:  [address]  [facility] [id number] [phone]                        Your Home Meds List      TAKE these medications       Instructions Authorizing Provider Morning Afternoon Evening As Needed   Accu-Chek Vickie Plus Strp      Use as directed to check blood glucose 3 times/day - DX code:E11.65 using insulin   Martha Lees         Contour Next Test Strp  Generic drug: Glucose Blood      Check BG 3x daily DX code:E11.65   Martha Lees         cefadroxil 500 MG Caps  Commonly known as: DURICEF      Take 1 capsule (500 mg total) by mouth 2 (two) times daily for 3 days.  Stop taking on: May 9, 2024   Tyshawn Poonja         ergocalciferol 1.25 MG (89625 UT) Caps  Commonly known as: Vitamin D2  Next dose due: As scheduled      Take 1 capsule (50,000 Units total) by mouth once a  week.   Martha Lees         gabapentin 300 MG Caps  Commonly known as: Neurontin      Take 1 capsule (300 mg total) by mouth 3 (three) times daily.          Insulin Lispro (1 Unit Dial) 100 UNIT/ML Sopn  Notes to patient: Give 12 units with meals and additional per blood glucose parameters.     Give 1 unit for blood glucose 150-180 mg/dL  Give 2 units for blood glucose 181-210 mg/dL  Give 3 units for blood glucose 211-240 mg/dL  Give 4 units for blood glucose 241-270 mg/dL  Give 5 units for blood glucose 271-300 mg/dL  Give 6 units for blood glucose 301-330 mg/dL  Give 7 units for blood glucose 331-360 mg/dL       Patient will take 14 units with small carb meals and 18-20 units with larger carb meals.   Martha Lees         lidocaine 4 % Ptch  Commonly known as: LIDODERM      Place 1 patch onto the skin daily.          losartan 50 MG Tabs  Commonly known as: Cozaar      Take 0.5 tablets (25 mg total) by mouth daily.   Jermaine Monson         Pen Needles 32G X 4 MM Misc      1 pen  4 (four) times daily. Use  New pen needle  With each injection   Martha Lees         pregabalin 50 MG Caps  Commonly known as: Lyrica      Take 1 capsule (50 mg total) by mouth 2 (two) times daily.   Donovan Meza         sertraline 100 MG Tabs  Commonly known as: Zoloft      Take 1 tablet (100 mg total) by mouth daily.   Jermaine Monson         simvastatin 20 MG Tabs  Commonly known as: Zocor      Take 1 tablet (20 mg total) by mouth daily.   Jermaine Monson         Tresiba FlexTouch 100 UNIT/ML Sopn  Generic drug: insulin degludec      Inject 36 Units into the skin daily.   Jermaine Monson               Where to Get Your Medications      These medications were sent to [id number] - [place], [place] - [address] AT [facility] OF [address] [facility], [phone], [phone] [address] [facility] [address]    Phone: [phone]   cefadroxil 500 MG Caps           [id number] - MAR ACTION  REPORT  (last 48 hrs)    ** SITE UNKNOWN **     Order ID Medication Name Action Time Action Reason Comments    [id number] acetaminophen (Tylenol Extra Strength) tab 500 mg 05/07/24 0942 Given      [id number] aspirin DR tab 325 mg 05/06/24 0921 Given      [id number] aspirin DR tab 325 mg 05/07/24 0942 Given      [id number] buPROPion ER (Wellbutrin XL) 24 hr tab 150 mg 05/06/24 0921 Given      [id number] buPROPion ER (Wellbutrin XL) 24 hr tab 150 mg 05/07/24 0942 Given      [id number] ceFAZolin (Ancef) 2 g in 20mL IV syringe premix 05/05/24 1643 Given      [id number] ceFAZolin (Ancef) 2 g in 20mL IV syringe premix 05/06/24 0054 Given      [id number] ceFAZolin (Ancef) 2 g in 20mL IV syringe premix 05/06/24 0921 Given      [id number] ceFAZolin (Ancef) 2 g in 20mL IV syringe premix 05/06/24 1648 Given      [id number] ceFAZolin (Ancef) 2 g in 20mL IV syringe premix 05/07/24 0047 Given      [id number] ceFAZolin (Ancef) 2 g in 20mL IV syringe premix 05/07/24 0942 Given      [id number] gabapentin (Neurontin) cap 300 mg 05/05/24 1644 Given      [id number] gabapentin (Neurontin) cap 300 mg 05/05/24 2122 Given      [id number] gabapentin (Neurontin) cap 300 mg 05/06/24 0921 Given      [id number] gabapentin (Neurontin) cap 300 mg 05/06/24 1648 Given      [id number] gabapentin (Neurontin) cap 300 mg 05/06/24 2055 Given      [id number] gabapentin (Neurontin) cap 300 mg 05/07/24 0942 Given      [id number] losartan (Cozaar) tab 50 mg 05/06/24 0921 Given      [id number] losartan (Cozaar) tab 50 mg 05/07/24 0942 Given      [id number] ondansetron (Zofran) 4 MG/2ML injection 4 mg 05/06/24 0949 Given      [id number] pravastatin (Pravachol) tab 10 mg 05/05/24 2122 Given      [id number] pravastatin (Pravachol) tab 10 mg 05/06/24 2055 Given      [id number] pregabalin (Lyrica) cap 50 mg 05/05/24 2122 Given      [id number] pregabalin (Lyrica) cap 50 mg 05/06/24 0921 Given      [id number] pregabalin (Lyrica) cap 50 mg 05/06/24 2055 Given      [id number] pregabalin  (Lyrica) cap 50 mg 05/07/24 0942 Given      [id number] sertraline (Zoloft) tab 100 mg 05/06/24 0921 Given      [id number] sertraline (Zoloft) tab 100 mg 05/07/24 0942 Given            LEFT LOWER ABDOMEN     Order ID Medication Name Action Time Action Reason Comments    [id number] enoxaparin (Lovenox) 40 MG/0.4ML SUBQ injection 40 mg 05/06/24 0921 Given      [id number] insulin aspart (NovoLOG) 100 Units/mL FlexPen 1-7 Units 05/06/24 1440 Given  258    [id number] insulin aspart (NovoLOG) 100 Units/mL FlexPen 12 Units 05/06/24 1440 Given  258    [id number] insulin degludec 100 units/mL flextouch 40 Units 05/06/24 0924 Given      [id number] insulin degludec 100 units/mL flextouch 40 Units 05/07/24 1146 Given  pt did not want to take it at scheduled time.          LEFT LOWER ARM     Order ID Medication Name Action Time Action Reason Comments    [id number] insulin aspart (NovoLOG) 100 Units/mL FlexPen 12 Units 05/06/24 1143 Given  149          LEFT UPPER ABDOMEN     Order ID Medication Name Action Time Action Reason Comments    [id number] insulin aspart (NovoLOG) 100 Units/mL FlexPen 1-7 Units 05/05/24 1720 Given      [id number] insulin aspart (NovoLOG) 100 Units/mL FlexPen 12 Units 05/05/24 1720 Given      [id number] insulin aspart (NovoLOG) 100 Units/mL FlexPen 12 Units 05/06/24 1648 Given  130          LEFT UPPER ARM     Order ID Medication Name Action Time Action Reason Comments    [id number] insulin aspart (NovoLOG) 100 Units/mL FlexPen 1-7 Units 05/05/24 2122 Given            RIGHT LOWER ABDOMEN     Order ID Medication Name Action Time Action Reason Comments    [id number] enoxaparin (Lovenox) 40 MG/0.4ML SUBQ injection 40 mg 05/07/24 0942 Given            RIGHT UPPER ABDOMEN     Order ID Medication Name Action Time Action Reason Comments    [id number] insulin aspart (NovoLOG) 100 Units/mL FlexPen 12 Units 05/07/24 1143 Given  pt did not want to take it on scheduled time.            Recent Vital Signs    Flowsheet Row Most Recent  Value   /49 Filed at 2024 1348   Pulse 66 Filed at 2024 1309   Resp 18 Filed at 2024 1309   Temp 98.7 °F (37.1 °C) Filed at 2024 1309   SpO2 92 % Filed at 2024 1309      Patient's Most Recent Weight    Flowsheet Row Most Recent Value   Patient Weight 63.1 kg (139 lb 3.2 oz)      Lab Results Last 24 Hours    No matching results found     Microbiology Results (All)     Procedure Component Value Units Date/Time    Blood Culture [[id number]] Collected: 24    Order Status: Completed Lab Status: Final result Updated: 24    Specimen: Blood,peripheral      Blood Culture Result No Growth 5 Days    Blood Culture [[id number]] Collected: 24    Order Status: Completed Lab Status: Final result Updated: 24    Specimen: Blood,peripheral      Blood Culture Result No Growth 5 Days    Urine Culture, Routine [[id number]]  (Abnormal)  (Susceptibility) Collected: 24    Order Status: Completed Lab Status: Final result Updated: 24    Specimen: Urine, clean catch      Urine Culture >100,000 CFU/ML Escherichia coli    Susceptibility      Escherichia coli      Not Specified     Ampicillin 4  Sensitive     Cefazolin <=4  Sensitive     Ciprofloxacin <=0.25  Sensitive     Gentamicin <=1  Sensitive     Levofloxacin <=0.12  Sensitive     Meropenem <=0.25  Sensitive     Nitrofurantoin <=16  Sensitive     Piperacillin + Tazobactam <=4  Sensitive     Trimethoprim/Sulfa <=20  Sensitive                               H&P - H&P Note      H&P signed by Rosemarie Patricio MD at 2024  5:41 AM  Version 1 of 1    Author: Rosemarie Patricio MD Service: — Author Type: Physician    Filed: 2024  5:41 AM Date of Service: 2024  4:08 AM Status: Signed    : Rosemarie Patricio MD (Physician)       [facility]  part [facility]    History & Physical    Ananya Dowling Patient Status:  Emergency    1950 MRN [id number]    Location [facility] EMERGENCY DEPARTMENT Attending Brittany Jaimes MD   Hosp Day # 0 PCP Jermaine Monson MD     Date:  5/2/2024  Date of Admission:  5/2/2024    Chief Complaint:  Chief Complaint   Patient presents with    Hyperglycemia    Nausea/Vomiting/Diarrhea       Assessment and Plan:    73-year-old female presents for evaluation of fatigue/nausea/elevated blood glucose.  Patient with recent brief hospitalization for E. coli septicemia, on presentation found to have asymptomatic urinary tract infection as well as elevated blood glucose of 390.  Patient admitted for IV antibiotics and management of blood glucose.    Recent E. coli septicemia 4/20/2024  Asymptomatic urinary tract infection  -Urinalysis grossly positive for UTI.  Per chart review, previous urine culture from 3/18/2023 with Enterobacter, resistant to Rocephin.  Patient received a dose of meropenem in the ED  -Given her recent history of E. coli septicemia and previous resistance, will continue with meropenem pending urine cultures, de-escalate IV antibiotic therapy once sensitivities become available    Fatigue  Hyperglycemia  Uncontrolled IDDM type II  -Most recent hemoglobin A1c 10 11/21/2023.  Blood glucose on presentation noted to be elevated 390.  -Hold home dose of glipizide  -Resume home dose Tresiba 36 units and initiate diabetic diet.  Check hemoglobin A1c.  Possibly noncompliant with diet/insulin administration.  Initiate diabetic diet.  Insulin sliding scale.  Hypoglycemia protocol.    Pseudohyponatremia  -Sodium level noted to be 129 on presentation but when corrected for elevated blood glucose it is actually 134    Other medical conditions  Essential hypertension  Hyperlipidemia  Depression  Arthritis    Prophylaxis  Lovenox    History of Present Illness:  Ananya Dowling is a(n) 73 year old female, who presents for evaluation of nausea, diarrhea, elevated blood glucose.  Patient reports that she was recently hospitalized at  an outside facility but unclear of why and per chart review, patient was actually hospitalized for E. coli septicemia 2 weeks ago.  Patient was discharged home and was doing okay but has had difficulty controlling her blood glucose.  Every time she checks her sugar is in the 300s.  She reports nausea but no vomiting.  She had 1 episode of watery diarrhea today.  She does report chills but no fever.  Denies any chest pain or shortness of breath.  Denies any lightheadedness or dizziness.  Patient denies symptoms of dysuria or increased frequency or urgency on urination.  Denies any numbness or tingling sensation in her extremities.  On presentation to the ED, initial vital signs reveal temp 98.3, heart rate 118, blood pressure 158/76, respiratory rate 22.  Lab work reveals blood glucose 390, sodium level 129 but when corrected for elevated blood glucose, it is actually 134.  Urinalysis grossly positive for UTI.  Patient received 2 L IV fluid and started on meropenem.  Patient admitted under hospitalist service for further evaluation management.    History:  Past Medical History:    Arthritis    Back problem    Depression    Diabetes (HCC)    Essential hypertension    High blood pressure    High cholesterol    Visual impairment     No past surgical history on file.  Family History   Problem Relation Age of Onset    Heart Attack Father     Stroke Mother     Heart Disorder Son     Diabetes Son       reports that she has never smoked. She has never used smokeless tobacco. She reports current alcohol use. She reports that she does not use drugs.      Allergies:  No Known Allergies    Home Medications:  Prior to Admission Medications   Prescriptions Last Dose Informant Patient Reported? Taking?   Glucose Blood (ACCU-CHEK FITO PLUS) In Vitro Strip   No No   Sig: Use as directed to check blood glucose 3 times/day - DX code:E11.65 using insulin   Glucose Blood (CONTOUR NEXT TEST) In Vitro Strip   No No   Sig: Check BG 3x  daily DX code:E11.65   Insulin Degludec (TRESIBA FLEXTOUCH) 100 UNIT/ML Subcutaneous Solution Pen-injector   Yes No   Sig: Inject 36 Units into the skin daily.   Insulin Lispro, 1 Unit Dial, 100 UNIT/ML Subcutaneous Solution Pen-injector   No No   Sig: Patient will take 14 units with small carb meals and 18-20 units with larger carb meals.   Insulin Pen Needle (PEN NEEDLES) 32G X 4 MM Does not apply Misc   No No   Si pen  4 (four) times daily. Use  New pen needle  With each injection   buPROPion  MG Oral Tablet 24 Hr   No No   Sig: Take 1 tablet (150 mg total) by mouth daily.   Patient not taking: Reported on 2024   ergocalciferol 1.25 MG (33458 UT) Oral Cap   No No   Sig: Take 1 capsule (50,000 Units total) by mouth once a week.   gabapentin 300 MG Oral Cap   Yes No   Sig: Take 1 capsule (300 mg total) by mouth 3 (three) times daily.   glipiZIDE 10 MG Oral Tab   No No   Sig: Take 1 tablet (10 mg total) by mouth 2 (two) times daily before meals.   lidocaine 4 % External Patch   Yes No   Sig: Place 1 patch onto the skin daily.   losartan 50 MG Oral Tab   No No   Sig: Take 0.5 tablets (25 mg total) by mouth daily.   pregabalin 50 MG Oral Cap   No No   Sig: Take 1 capsule (50 mg total) by mouth 2 (two) times daily.   sertraline 100 MG Oral Tab   No No   Sig: Take 1 tablet (100 mg total) by mouth daily.   simvastatin 20 MG Oral Tab   No No   Sig: Take 1 tablet (20 mg total) by mouth daily.      Facility-Administered Medications: None       Review of Systems:  Constitutional:  + fatigue  Eye:  Negative.  Ear/Nose/Mouth/Throat:  Negative.  Respiratory:  Negative  Cardiovascular: Negative  Gastrointestinal:  Negative.  Genitourinary:  Negative  Endocrine:  Negative.  Immunologic:  Negative.  Musculoskeletal:  Negative.  Integumentary:  Negative.  Neurologic:  Negative.  Psychiatric:  Negative.  ROS reviewed as documented in chart    Physical Exam:  Temp:  [98.3 °F (36.8 °C)] 98.3 °F (36.8 °C)  Pulse:   [] 97  Resp:  [16-22] 16  BP: (145-158)/(62-76) 152/66  SpO2:  [94 %-98 %] 98 %    General:  Alert and oriented.  Slightly confused  Diffuse skin problem:  None.  Eye:  Pupils are equal, round and reactive to light, extraocular movements are intact, Normal conjunctiva.  HENT:  Normocephalic, oral mucosa is moist.  Head:  Normocephalic, atraumatic.  Neck:  Supple, non-tender, no carotid bruit, no jugular venous distention, no lymphadenopathy, no thyromegaly.  Respiratory:  Lungs are clear to auscultation, respirations are non-labored, breath sounds are equal, symmetrical chest wall expansion.  Cardiovascular:  Normal rate, regular rhythm, no murmur, no edema.  Gastrointestinal:  Soft, non-tender, non-distended, normal bowel sounds, no organomegaly.  Lymphatics:  No lymphadenopathy neck, axilla, groin.  Musculoskeletal: Normal range of motion.  normal strength.  Feet:  Normal pulses.      Laboratory Data:   Lab Results   Component Value Date    WBC 19.5 05/01/2024    HGB 12.5 05/01/2024    HCT 36.8 05/01/2024    .0 05/01/2024    CREATSERUM 0.98 05/01/2024    BUN 21 05/02/2024     05/01/2024    K 4.8 05/02/2024    CL 98 05/01/2024    CO2 25.0 05/01/2024     05/01/2024    CA 9.4 05/01/2024       Imaging:  [facility] ARBEN 2D+3D SCREENING BILAT (CPT=77067/52698)    Result Date: 4/30/2024  CONCLUSION:   No mammographic evidence for malignancy.  As long as the patient's clinical breast exam remains unchanged, annual screening mammogram is recommended.   BI-RADS CATEGORY:   DIAGNOSTIC CATEGORY 2--BENIGN FINDING:   RECOMMENDATIONS:  ROUTINE MAMMOGRAM AND CLINICAL EVALUATION IN 12 MONTHS.       PLEASE NOTE: NORMAL MAMMOGRAM DOES NOT EXCLUDE THE POSSIBILITY OF BREAST CANCER.  A CLINICALLY SUSPICIOUS PALPABLE LUMP SHOULD BE BIOPSIED.   For patients over the age of 40, the target due date for the patient's next mammogram has been entered into a reminder system.   Patient received a discharge summary from the  technologist after completion of exam.  Breast marker legend used on images  Triangle = Palpable lump [place] = Skin tag or mole BB = Nipple Linear viri = Scar Square = Pain    Dictated by (CST): Maurilio Merchant MD on 2024 at 1:03 PM     Finalized by (CST): Maurilio Merchant MD on 2024 at 1:05 PM            CODE STATUS  Full    Primary care physician  Jermaine Monson MD    60 minutes spent on this admission - examining patient, obtaining history, reviewing previous medical records, going over test results/imaging and discussing plan of care. All questions answered.     Disposition  Clinical course will dictate outcome      Rosemarie Patricio MD  2024  4:08 AM       Electronically signed by Rosemarie Patricio MD on 2024  5:41 AM              Consults - MD Consult Notes      Consults signed by Martin Foy MD at 2024 12:58 PM     Author: Martin Foy MD Service: Neurology Author Type: Physician    Filed: 2024 12:58 PM Date of Service: 2024 12:55 PM Status: Signed    : Martin Foy MD (Physician)     Consult Orders    1. Consult to Neurology [[id number]] ordered by Esdras Eastman MD at 24 0945                           [facility] [address], [address] [place] [id number]  [phone]            Ananya Dowling Patient Status:  Inpatient    1950 MRN [id number]   Location Health system 4W/SW/SE Attending Esdras Eastman MD   Hosp Day # 3 PCP Jermaine Monson MD     Date of Admission:  2024  Date of Consult:  2024  Reason for Consultation:   Balance problems, lightheadedness, urinary incontinence.    History of Present Illness:   Patient is a 73 year old female who was admitted to the hospital for Hyperglycemia:  Patient with a history of hypertension, diabetes, history of few years of balance problems that are intermittently getting worse especially when she gets UTI or hyperglycemia.  History  of urinary incontinence, requiring to wear depends, sometimes she does not know she needs to urinate.  That has been going on for couple of years as well.  Also she keeps getting lightheaded when she stands up quickly.  On hospitalization in May 2024 drop of orthostatic hypotension was over 40.  Also some history of tremors intermittently for few months.  History of some cognitive decline as well.    Past Medical History  Past Medical History:    Arthritis    Back problem    Depression    Diabetes (HCC)    Essential hypertension    High blood pressure    High cholesterol    Visual impairment       Past Surgical History  History reviewed. No pertinent surgical history.    Family History  Family History   Problem Relation Age of Onset    Heart Attack Father     Stroke Mother     Heart Disorder Son     Diabetes Son        Social History  Pediatric History   Patient Parents    Not on file     Other Topics Concern    Not on file   Social History Narrative    Not on file           Current Medications:  Current Facility-Administered Medications   Medication Dose Route Frequency    aspirin DR tab 325 mg  325 mg Oral Daily    ceFAZolin (Ancef) 2 g in 20mL IV syringe premix  2 g Intravenous Q8H    insulin degludec 100 units/mL flextouch 40 Units  40 Units Subcutaneous Daily    insulin aspart (NovoLOG) 100 Units/mL FlexPen 12 Units  12 Units Subcutaneous TID CC    losartan (Cozaar) tab 50 mg  50 mg Oral Daily    buPROPion ER (Wellbutrin XL) 24 hr tab 150 mg  150 mg Oral Daily    gabapentin (Neurontin) cap 300 mg  300 mg Oral TID    pregabalin (Lyrica) cap 50 mg  50 mg Oral BID    sertraline (Zoloft) tab 100 mg  100 mg Oral Daily    pravastatin (Pravachol) tab 10 mg  10 mg Oral Nightly    ondansetron (Zofran) 4 MG/2ML injection 4 mg  4 mg Intravenous Q6H PRN    enoxaparin (Lovenox) 40 MG/0.4ML SUBQ injection 40 mg  40 mg Subcutaneous Daily    acetaminophen (Tylenol Extra Strength) tab 500 mg  500 mg Oral Q4H PRN    polyethylene  glycol (PEG 3350) (Miralax) 17 g oral packet 17 g  17 g Oral Daily PRN    sennosides (Senokot) tab 17.2 mg  17.2 mg Oral Nightly PRN    bisacodyl (Dulcolax) 10 MG rectal suppository 10 mg  10 mg Rectal Daily PRN    fleet enema (Fleet) 7-19 GM/118ML rectal enema 133 mL  1 enema Rectal Once PRN    glucose (Dex4) 15 GM/59ML oral liquid 15 g  15 g Oral Q15 Min PRN    Or    glucose (Glutose) 40% oral gel 15 g  15 g Oral Q15 Min PRN    Or    glucose-vitamin C (Dex-4) chewable tab 4 tablet  4 tablet Oral Q15 Min PRN    Or    dextrose 50% injection 50 mL  50 mL Intravenous Q15 Min PRN    Or    glucose (Dex4) 15 GM/59ML oral liquid 30 g  30 g Oral Q15 Min PRN    Or    glucose (Glutose) 40% oral gel 30 g  30 g Oral Q15 Min PRN    Or    glucose-vitamin C (Dex-4) chewable tab 8 tablet  8 tablet Oral Q15 Min PRN    insulin aspart (NovoLOG) 100 Units/mL FlexPen 1-7 Units  1-7 Units Subcutaneous TID CC and HS     Medications Prior to Admission   Medication Sig    Glucose Blood (CONTOUR NEXT TEST) In Vitro Strip Check BG 3x daily DX code:E11.65    gabapentin 300 MG Oral Cap Take 1 capsule (300 mg total) by mouth 3 (three) times daily.    lidocaine 4 % External Patch Place 1 patch onto the skin daily.    buPROPion  MG Oral Tablet 24 Hr Take 1 tablet (150 mg total) by mouth daily. (Patient not taking: Reported on 2024)    Insulin Degludec (TRESIBA FLEXTOUCH) 100 UNIT/ML Subcutaneous Solution Pen-injector Inject 36 Units into the skin daily.    [] glipiZIDE 10 MG Oral Tab Take 1 tablet (10 mg total) by mouth 2 (two) times daily before meals.    Insulin Lispro, 1 Unit Dial, 100 UNIT/ML Subcutaneous Solution Pen-injector Patient will take 14 units with small carb meals and 18-20 units with larger carb meals.    Insulin Pen Needle (PEN NEEDLES) 32G X 4 MM Does not apply Misc 1 pen  4 (four) times daily. Use  New pen needle  With each injection    ergocalciferol 1.25 MG (30001 UT) Oral Cap Take 1 capsule (50,000 Units  total) by mouth once a week.    Glucose Blood (ACCU-CHEK FITO PLUS) In Vitro Strip Use as directed to check blood glucose 3 times/day - DX code:E11.65 using insulin    simvastatin 20 MG Oral Tab Take 1 tablet (20 mg total) by mouth daily.    sertraline 100 MG Oral Tab Take 1 tablet (100 mg total) by mouth daily.    losartan 50 MG Oral Tab Take 0.5 tablets (25 mg total) by mouth daily.    pregabalin 50 MG Oral Cap Take 1 capsule (50 mg total) by mouth 2 (two) times daily.       Allergies  No Known Allergies    Review of Systems:   As in HPI, the rest of the 14 system review was done and was negative    Physical Exam:     Vitals:    05/05/24 0538 05/05/24 0823 05/05/24 0936 05/05/24 1152   BP: 144/55  121/82 158/65   Pulse:  88  76   Resp: 18 20  18   Temp: 98.1 °F (36.7 °C)   98 °F (36.7 °C)   TempSrc: Oral   Oral   SpO2: 97% 91%  97%   Weight:           General: No apparent distress, well nourished, well groomed.  Head- Normocephalic, atraumatic  Eyes- No redness or swelling  ENT- Hearing intake, smell preserved, normal glutition  Neck- No masses or adenopathy  Cv: pulses were palpable and normal, no cyanosis or edema     Neurological:     Mental Status- Alert and oriented x3.  Normal attention span and concentration  Thought process intact  Memory intact- recent and remote  Mood intact  Fund of knowledge appropriate for education and age    Language intact including: comprehension, naming, repetition, vocabulary    Cranial Nerves:  II.- Visual fields full to confrontation  III, IV, VI- EOM intact, QUINTON, mild endpoint nystagmus bilaterally  V. Facial sensation intact  VII. Face symmetric, no facial weakness  VIII. Hearing intact.  IX. Pallet elevates symmetrically.  XI. Shoulder shrug is intact  XII. Tongue is midline    Motor Exam:  Muscle tone normal,  No atrophy or fasciculations  Strength- upper extremities 5/5 proximally and distally                  - lower  extremities 5/5 proximally and distally    Sensory  Exam:  Light touch sensation- intact in all 4 extremities    Deep Tendon Reflexes:  Trace in upper extremities, absent in lower extremities    No clonus  No Babinski sign    Coordination:  Finger to nose mildly ataxic  Rapid alternating movements clumsy bilaterally      Results:     Laboratory Data:  Lab Results   Component Value Date    WBC 8.5 05/05/2024    HGB 11.0 (L) 05/05/2024    HCT 32.4 (L) 05/05/2024    .0 05/05/2024    CREATSERUM 0.71 05/05/2024    BUN 16 05/05/2024     05/05/2024    K 4.1 05/05/2024     05/05/2024    CO2 28.0 05/05/2024     (H) 05/05/2024    CA 8.8 05/05/2024    ALB 4.1 04/30/2024    ALKPHO 83 04/30/2024    TP 7.5 04/30/2024    AST 31 04/30/2024    ALT 24 04/30/2024    T4F 1.0 04/19/2022    TSH 4.010 (H) 04/19/2022    ETOH <3 03/18/2023         Imaging:    MRI BRAIN (CPT=70551)    Result Date: 5/5/2024  CONCLUSION:  1. No acute infarct or hemorrhage. 2. Mild-to-moderate changes of chronic small vessel disease in cerebral white matter. 3. Mild changes of chronic small vessel disease in the timbo. 4. Left maxillary sinusitis is probably chronic.    Dictated by (CST): Darren Lopez MD on 5/05/2024 at 12:05 PM     Finalized by (CST): Darren Lopez MD on 5/05/2024 at 12:08 PM          CT BRAIN OR HEAD ([id number])    Result Date: 5/4/2024  CONCLUSION:   No acute intracranial abnormality.  Generalized atrophy with mild-to-moderate chronic microvascular white matter ischemia.  Aerated secretions left maxillary sinus suggesting sinusitis.    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  Possibility of multiple system atrophy could be entertained, considering prolonged history of urinary incontinence, orthostatic hypotension and balance  problems.  At the same time clinical picture may be confounded by UTI.  Therefore a patient will need to follow-up in the clinic in 1 month, once she is improved with the current UTI.  To be reassessed at that time.  I have discussed the case with the daughter over the phone.  Most likely patient will require extensive rehabilitation and support going forward.  MRI of the brain and cervical spine was done last year and the MRI of the brain was repeated again this time, no other etiology were identified.    Thank you for allowing me to participate in the care of your patient.    Martin Foy MD  5/5/2024        Electronically signed by Martin Foy MD on 5/5/2024 12:58 PM           D/C Summary    No notes of this type exist for this encounter.        Physical Therapy Notes (last 72 hours)      Physical Therapy Note signed by Gila Portillo PT at 5/5/2024 12:41 PM  Version 1 of 1    Author: Gila Portillo PT Service: Physical Medicine and Rehabilitation Author Type: Physical Therapist    Filed: 5/5/2024 12:41 PM Date of Service: 5/5/2024  9:36 AM Status: Signed    : Gila Portillo PT (Physical Therapist)       PHYSICAL THERAPY TREATMENT NOTE - INPATIENT     Room Number: 455/455-A       Presenting Problem: hyperglycemia , nausea, vomiting, diarrhea  Co-Morbidities : recent hx of UTI    Problem List  Principal Problem:    Hyperglycemia  Active Problems:    Leukocytosis, unspecified type    Urinary tract infection without hematuria, site unspecified      PHYSICAL THERAPY ASSESSMENT   Patient demonstrates limited progress this session, goals  remain in progress.    Patient continues to function below baseline with transfers, gait, standing prolonged periods, and performing household tasks.  Contributing factors to remaining limitations include decreased functional strength, impaired standing/dynamic balance, impaired coordination, and cognitive deficits (delayed responses at times, brief  vacant starclare).  Next session anticipate patient to progress bed mobility, transfers, and gait.  Physical Therapy will continue to follow patient for duration of hospitalization.    Patient continues to benefit from continued skilled PT services: to promote return to prior level of function and safety with continuous assistance and gradual rehabilitative therapy .    PLAN  PT Treatment Plan: Bed mobility;Body mechanics;Endurance;Energy conservation;Patient education;Family education;Gait training;Neuromuscular re-educate;Strengthening;Transfer training;Balance training  Frequency (Obs): 3-5x/week    SUBJECTIVE  \"I do tend to \"zone\" out at random.\"    OBJECTIVE  Precautions: Bed/chair alarm    WEIGHT BEARING RESTRICTION                PAIN ASSESSMENT   Ratin          BALANCE  Static Sitting: Fair +  Dynamic Sitting: Fair  Static Standing: Poor +  Dynamic Standing: Poor    ACTIVITY TOLERANCE           BP: 121/82 (pre-activity 148/53)  BP Location: Right arm  BP Method: Automatic  Patient Position: Sitting     O2 WALK       AM-PAC '6-Clicks' INPATIENT SHORT FORM - BASIC MOBILITY  How much difficulty does the patient currently have...  Patient Difficulty: Turning over in bed (including adjusting bedclothes, sheets and blankets)?: A Little   Patient Difficulty: Sitting down on and standing up from a chair with arms (e.g., wheelchair, bedside commode, etc.): A Lot   Patient Difficulty: Moving from lying on back to sitting on the side of the bed?: A Little   How much help from another person does the patient currently need...   Help from Another: Moving to and from a bed to a chair (including a wheelchair)?: A Lot   Help from Another: Need to walk in hospital room?: A Lot   Help from Another: Climbing 3-5 steps with a railing?: A Lot     AM-PAC Score:  Raw Score: 14   Approx Degree of Impairment: 61.29%   Standardized Score (AM-PAC Scale): 38.1   CMS Modifier (G-Code): CL    FUNCTIONAL ABILITY STATUS  Functional  Mobility/Gait Assessment  Gait Assistance: Moderate assistance  Distance (ft): 15', 20'  Assistive Device: Rolling walker  Pattern:  (Slow cammie, frequent posterior LOB requiring Mod A to correct, narrow DUC)  Supine to Sit: supervision  Sit to Stand: minimal assist    Additional information: Pt supine upon arrival, agreeable to PT treatment. Pt ambulates from bed to bathroom and back to bedside chair. Requires multiple episodes of Mod A to recover posterior LOB. Pt reports needing to have BM thus ambulated to bathroom however pt had soiled pull ups and was unaware - OT staff present and assisted with cleaning pt and donning new pull ups. Pt has multiple episodes of vacant staring and states she \"zones\" out. Needs reminders with each sit > stand to find middle DUC as she has strong posterior lean/LOB. Pt ended session up in chair with alarm on, all needs in reach and RN updated. Expressed concerns to RN and MD regarding pt's balance/cognition and concern for neurologic involvement.     The patient's Approx Degree of Impairment: 61.29% has been calculated based on documentation in the [facility] '6 clicks' Inpatient Daily Activity Short Form.  Research supports that patients with this level of impairment may benefit from gradual rehabilitation.  Final disposition will be made by interdisciplinary medical team.    Patient End of Session: Up in chair;Needs met;Call light within reach;RN aware of session/findings;All patient questions and concerns addressed;Alarm set    CURRENT GOALS   Goals to be met by: 5/8/24  Patient Goal Patient's self-stated goal is: go home   Goal #1 Patient is able to demonstrate supine - sit EOB @ level: supervision     Goal #1   Current Status SBA   Goal #2 Patient is able to demonstrate transfers Sit to/from Stand at assistance level: supervision with walker - rolling     Goal #2  Current Status Mod A with RW   Goal #3 Patient is able to ambulate 100 feet with assist device: walker - rolling at  assistance level: supervision   Goal #3   Current Status 20' with Mod A and RW (Min to Mod with LOB)   Goal #4 Patient will negotiate 4 stairs/one curb w/ assistive device and supervision   Goal #4   Current Status NT   Goal #5 Patient to demonstrate independence with home activity/exercise instructions provided to patient in preparation for discharge.   Goal #5   Current Status Ongoing     Gait Trainin minutes  Therapeutic Activity: 15 minutes                  Occupational Therapy Notes (last 72 hours)      Occupational Therapy Note signed by Nakita Pepper OT at 2024  9:55 AM  Version 1 of 1    Author: Nakita Pepper OT Service: Rehab Author Type: Occupational Therapist    Filed: 2024  9:55 AM Date of Service: 2024  9:48 AM Status: Signed    : Nakita Pepper OT (Occupational Therapist)       OCCUPATIONAL THERAPY TREATMENT NOTE - INPATIENT        Room Number: 455/455-A     Presenting Problem: hyperglycemia, UTI    Problem List  Principal Problem:    Hyperglycemia  Active Problems:    Leukocytosis, unspecified type    Urinary tract infection without hematuria, site unspecified      OCCUPATIONAL THERAPY ASSESSMENT   Patient demonstrates limited progress this session, goals remain in progress.    Patient continues to function below baseline with ADls and functional mobility.   Contributing factors to remaining limitations include impaired sit and stand balance, cognitive deficits (impaired insight, problem solving), decreased insight to deficits, and decreased safety awareness.  Occupational Therapy will continue to follow patient for duration of hospitalization.    Patient continues to benefit from continued skilled OT services: to promote return to prior level of function and safety with continuous assistance and gradual rehabilitative therapy .     PLAN  OT Treatment Plan: Balance activities;ADL training;Functional transfer training;Endurance training;Patient/Family  education;Patient/Family training;Compensatory technique education  OT Device Recommendations: TBD    SUBJECTIVE  \"What road would you take to get here from [place]\"    OBJECTIVE  Precautions: Bed/chair alarm    PAIN ASSESSMENT  Ratin    ACTIVITY TOLERANCE  BP:  (148/53 seated EOB prior to activity; 121/82 post activity)[id number]  BP Location: Right arm  BP Method: Automatic  Patient Position: Sitting    ACTIVITIES OF DAILY LIVING ASSESSMENT  AM-PAC ‘6-Clicks’ Inpatient Daily Activity Short Form  How much help from another person does the patient currently need…  -   Putting on and taking off regular lower body clothing?: A Lot  -   Bathing (including washing, rinsing, drying)?: A Lot  -   Toileting, which includes using toilet, bedpan or urinal? : A Lot  -   Putting on and taking off regular upper body clothing?: A Little  -   Taking care of personal grooming such as brushing teeth?: A Lot  -   Eating meals?: None    AM-PAC Score:  Score: 15  Approx Degree of Impairment: 56.46%  Standardized Score (AM-PAC Scale): 34.69  CMS Modifier (G-Code): CK    FUNCTIONAL TRANSFER ASSESSMENT  Sit to Stand: Edge of Bed; Chair  Edge of Bed: Moderate Assist  Chair: Moderate Assist  Toilet Transfer: Moderate Assist    BED MOBILITY  Supine to Sit : Moderate Assist  Sit to Supine (OT): Not Tested    BALANCE ASSESSMENT  Static Sitting: Minimal Assist  Static Standing: Moderate Assist (min-mod A, initially with posterior lean requiring mod A for balance and progressing to min A with cues for posture/positioning)    FUNCTIONAL ADL ASSESSMENT  Grooming Seated: Supervision  LB Dressing Seated: Moderate Assist  Toileting Standing: Minimal Assist (standing with simulated reaching)    Skilled Therapy Provided: Pt received in bed. No family present. Pt without c/o.   Pt continues to demo impaired sit and stand balance in sitting and standing. Pt requires mod to max A to correct posterior LOB multile times during session.   Pt demo poor  insight with limited carry over of provided techniques.  Pt able to locate clock and read correctly after self correction.  Pt incontinent of bladder and bowel seemingly unaware of the mess she was making while performing toileting.   Communicated with nurse. PCT, and hospitalist regarding concerns in pt presentation.   Recommend use of gait belt and R/W for limited mobility tasks.     EDUCATION PROVIDED  Patient: Role of Occupational Therapy; Discharge Recommendations; Fall Prevention; Compensatory ADL Techniques  Patient's Response to Education: Verbalized Understanding; Requires Further Education    The patient's Approx Degree of Impairment: 56.46% has been calculated based on documentation in the [facility] '6 clicks' Inpatient Daily Activity Short Form.  Research supports that patients with this level of impairment may benefit from LARA.  Final disposition will be made by interdisciplinary medical team.    Patient End of Session: Up in chair;Needs met;Call light within reach;RN aware of session/findings;All patient questions and concerns addressed;Alarm set    OT Goals:  OT Goals  Patients self stated goal is: improved strength for ADLs     Patient will complete functional transfer with mod I  Comment: NOT MET    Patient will complete toileting with mod I  Comment: NOT MET    Patient will tolerate standing for 5 minutes in prep for adls with mod I   Comment:progressing    Patient will complete LB dressing with mod I and AE PRN  Comment:NOT MET          Goals  on: 2024  Frequency: 3-5x/wk    Self-Care Home Management: 30 minutes               Video Swallow Study Notes    No notes of this type exist for this encounter.     SLP Notes    No notes of this type exist for this encounter.     Immunizations     Name Date      Covid-19 Moderna 04/15/21     Covid-19 Moderna 21     INFLUENZA defer-23     Deferral: Patient Refused     Pneumococcal (Prevnar 13) 16     Pneumovax 23 09/10/20      Pneumovax 23 07/06/15     TD 03/20/08     TDAP 10/27/21     TDAP 05/21/18     Zoster Vaccine Recombinant Adjuvanted (Shingrix) 10/12/21     Zoster Vaccine Recombinant Adjuvanted (Shingrix) 08/02/21       Future Appointments        Provider Department [place]    5/9/2024 9:30 AM Martha Lees MD [facility]    5/21/2024 4:00 PM Jermaine Monson MD [place] [id number] [facility]      Multidisciplinary Problems     Active Goals     Not on file          Resolved Goals        Problem: Patient/Family Goals    Goal Priority Disciplines Outcome Interventions   Patient/Family Long Term Goal   (Resolved)     Interdisciplinary Adequate for Discharge    Description: Patient's Long Term Goal: ***    Interventions:  - ***  - See additional Care Plan goals for specific interventions   Patient/Family Short Term Goal   (Resolved)     Interdisciplinary Adequate for Discharge    Description: Patient's Short Term Goal: ***    Interventions:   - ***  - See additional Care Plan goals for specific interventions

## (undated) NOTE — LETTER
[address], [facility]   Date:   8/25/2022     Name:   Gladys Macias    YOB: 1950   MRN:   [id number]       WHERE IS YOUR PAIN NOW? Viri the areas on your body where you feel the described sensations. Use the appropriate symbol. Bartholome Litten the areas of radiation. Include all affected areas. Just to complete the picture, please draw in the face. ACHE:  ^ ^ ^   NUMBNESS:  0000   PINS & NEEDLES:  = = = =                              ^ ^ ^                       0000              = = = =                                    ^ ^ ^                       0000            = = = =      BURNING:  XXXX   STABBING: ////                  XXXX                ////                         XXXX          ////     Please viri the line below indicating your degree of pain right now  with 0 being no pain 10 being the worst pain possible.                                          0             1             2              3             4              5              6              7             8             9             10         Patient Signature:

## (undated) NOTE — LETTER
11/21/2023              [place]        [address]         Dear Socorro Brooks,    This letter is to inform you that our office has made several attempts to reach you by phone without success. We were attempting to contact you by phone regarding the chronic care management program you are enrolled in. Please contact our office at the number listed below as soon as you receive this letter to discuss this issue and to make the necessary changes in our system to your contact information. Thank you for your cooperation. Sincerely,    [address] [place] [address]   [address]   Phone: [phone]. [employer]

## (undated) NOTE — LETTER
[address], [facility]   Date:   11/1/2022     Name:   Tom Barker    YOB: 1950   MRN:   [id number]       [facility]? Viri the areas on your body where you feel the described sensations. Use the appropriate symbol. Lamonte [place] the areas of radiation. Include all affected areas. Just to complete the picture, please draw in the face. ACHE:  ^ ^ ^   NUMBNESS:  0000   PINS & NEEDLES:  = = = =                              ^ ^ ^                       0000              = = = =                                    ^ ^ ^                       0000            = = = =      BURNING:  XXXX   STABBING: ////                  XXXX                ////                         XXXX          ////     Please viri the line below indicating your degree of pain right now  with 0 being no pain 10 being the worst pain possible.                                          0             1             2              3             4              5              6              7             8             9             10         Patient Signature:

## (undated) NOTE — LETTER
Date: 4/25/2022    Patient Name: Jordi Campa          To Whom it may concern: This letter has been written at the patient's request. The above patient was seen at the [address] for treatment of a medical condition (significant cognitive impairment). If you have any questions or concerns, feel free to contact our office at [phone].             Sincerely,      Tristin Johnson MD

## (undated) NOTE — IP AVS SNAPSHOT
Patient Demographics     Address  [address] [address] Phone  [phone] [facility])  [phone] (Mobile) *Preferred*      Emergency Contact(s)     Name Relation Home Work Mobile    [facility] Daughter   [phone]      Allergies as of morning      Take 50 mg by mouth daily. simvastatin 20 MG Tabs  Commonly known as: ZOCOR  Next dose due: tonight      Take 1 tablet (20 mg total) by mouth daily. Martha Kaplan MD         Vitamin D 50 MCG (2000 UT) Caps  Next dose due:  Jodie Joaquin Pen (NOVOLOG) 100 UNIT/ML flexpen 16 Units 01/06/22 1709 Given      [id number] insulin detemir (LEVEMIR) 100 UNIT/ML flextouch 46 Units 01/06/22 0957 Given              Recent Vital Signs       Most Recent Value   Vitals 143/59 Filed at 01/06/2022 1459   Pu Abnormality         Status                     ---------                               -----------         ------                     CBC W/ DIFFERENTIAL[[id number]]                              Final result                 Please view resu Service: Internal Medicine Author Type: Physician    Filed: 1/4/2022  1:32 AM Date of Service: 1/3/2022 10:50 AM Status: Addendum    : Gustavo Molina MD (Physician)    Related Notes: Original Note by Gustavo Molina MD (Physician) filed at 1/4/2022  1:3 Socioeconomic History      Marital status:        Spouse name: Not on file      Number of children: Not on file      Years of education: Not on file      Highest education level: Not on file    Occupational History      Not on file    Tobacco Use 6. 3   HGB 13.4   .0       CMP  Recent Labs   Lab 01/03/22 0218   *   K 4.6   CL 93*   CO2 27.0   BUN 18       Coags:   Recent Labs   Lab 01/03/22 0218   .0         Urinalysis  Invalid input(s): Sherrie Marte, Nidia Hurtado chronic microangiopathic ischemic changes with large vessel calcific atherosclerosis. 3. Chronic left maxillary sinusitis. A preliminary report was issued by the [address] [place] [address] Radiology teleradiology service. There are no major discrepancies.   Dictated by (CST) 500s.  Patient is started on Levemir 30 units daily. NovoLog 8 units 3 times daily. 2. Generalized weakness secondary to Covid: Patient is vaccinated. Continue symptomatic care. Patient is not hypoxic. 3. Complicated UTI: Ceftriaxone.   Follow-up uri significant for blood glucose 535, sodium 131, UA positive for nitrates. Patient is admitted for further evaluation, endocrinology was consulted.     All Other ROS Negative except as mentioned in HPI    [unfilled]    No Known Allergies     Past Medical His 149/63 — — 77 15 96 % — —   01/03/22 0530 146/49 — — 77 17 97 % — —   01/03/22 0500 (!) 167/60 — — 82 12 97 % — —   01/03/22 0430 (!) 175/68 — — 83 15 99 % — —   01/03/22 0330 (!) 175/69 — — 78 14 95 % — —   01/03/22 0315 (!) 175/70 — — 78 20 98 % — —   01 MATTER: Mild chronic white matter ischemic changes, grossly stable. CEREBELLUM: Age-appropriate atrophy is present, without visible acute hemorrhage or lesion. BRAINSTEM: Age-appropriate atrophy is present, without visible acute hemorrhage or lesion.   CA glands are unremarkable. UPPER AIRWAY: The nasopharynx, oropharynx, and oral cavity are unremarkable. NECK: No lymphadenopathy. OTHER: Degenerative changes are seen within the cervical spine. .  Periapical lucency seen within the right mandibular 3rd mol Hyperglycemia     History of Present Illness:  Fredi Ramos is a a(n) 80year old female. 79 y/o F with uncontrolled DM2 presented with Covid infection. She does have a long history of uncontrolled DM secondary to noncompliance.   Per patient she stopp Diabetes Mellitus Type 2, Uncontrolled   - Secondary to noncompliance  - BG levels are improved since admission  - Increase Levemir to 30 units subcutaneous daily  - Novolog 8 units subcutaneous TID with meals  - High dose CF  - Hypoglycemia protocol    Wi receptivity to education and recommendations. The patient's Approx Degree of Impairment: 50.57% has been calculated based on documentation in the [facility] '6 clicks' Inpatient Basic Mobility Short Form.   Research supports that patients with this level of (G-Code): CK    FUNCTIONAL ABILITY STATUS  Functional Mobility/Gait Assessment  Gait Assistance: Contact guard assist  Distance (ft): 50 ft and 15 ft   Assistive Device: Rolling walker  Pattern: Shuffle (decreased cammie)      Patient End of Session: Up i Therapy: Mobility Dysfunction and Discharge Planning    PHYSICAL THERAPY ASSESSMENT   PPE worn: gloves, goggles, gown, N95+surgical mask, face shield    Patient is a 80year old female admitted 1/3/2022 for falls, weakness, unsteadiness COVID+ 1/3/22.  She check in on patient. Patient will benefit from continued IP PT services to address these deficits in preparation for discharge.     DISCHARGE RECOMMENDATIONS  PT Discharge Recommendations: Sub-acute rehabilitation (vs home with increased assist)    PLAN hearing;Bed/chair alarm       PAIN ASSESSMENT  Ratin  Location: no complaints       COGNITION  · Overall Cognitive Status:  Impaired    RANGE OF MOTION AND STRENGTH ASSESSMENT  Upper extremity ROM and strength are within functional limits   Lower extre transfer with RW but unsteady    Exercise/Education Provided:  Bed mobility  Energy conservation  Functional activity tolerated  Gait training  Posture  Strengthening  Transfer training    Patient End of Session: Up in chair;Needs met;Call light within dyllan ([facility] [address]    Frequent falls    COVID-19      OCCUPATIONAL THERAPY ASSESSMENT     RN contacted prior to start of care. Treatment coordinated w/ PT. Mask,gloves,gown, goggles, face shield worn. Pt received in bed, alert and oriented.  Sats at rest on room air at Activity Short Form  How much help from another person does the patient currently need…  -   Putting on and taking off regular lower body clothing?: A Little  -   Bathing (including washing, rinsing, drying)?: A Little  -   Toileting, which includes using face shield, N-95, gown, hair bonnet, surgical mask, and gloves. Per RN, pt ok to evaluate. Pt approached in bed and left in chair. Call light and all needs in reach. Co-treated with PT. Handoff given to RN.     /51, HR 92 supine  /46, HR 1 history.     HOME SITUATION  Home Situation  Type of Home: House  Home Layout: Multi-level (uses 2 levels per her report but inconsistent)  Lives With: Daughter;Family (JUANA works from home but not able to assist pt)  Patient Regularly Uses: Reading glasses self stated goal is: to go home     Patient will complete functional transfer with SBA  Comment:     Patient will complete toileting with SBA  Comment:     Patient will tolerate standing for 3 minutes in prep for adls with SBA   Comment:    Patient will co

## (undated) NOTE — Clinical Note
Initial assessment completed with patient.  Appointment scheduled for 9/03/24 with you--pt declines sooner appt. Patient agrees to additional follow-up calls from nurse care manager.  Thank you!  Future Appointments 9/3/2024   11:00 AM   Jermaine Monson MD        [facility]               [facility] 9/5/2024   2:45 PM    [facility]            [facility] 9/10/2024  6:40 PM    Jermaine Monson MD [id number]               [facility]